# Patient Record
Sex: FEMALE | Race: WHITE | NOT HISPANIC OR LATINO | Employment: OTHER | URBAN - METROPOLITAN AREA
[De-identification: names, ages, dates, MRNs, and addresses within clinical notes are randomized per-mention and may not be internally consistent; named-entity substitution may affect disease eponyms.]

---

## 2017-01-26 ENCOUNTER — TELEPHONE (OUTPATIENT)
Dept: PULMONOLOGY | Facility: HOSPICE | Age: 77
End: 2017-01-26

## 2017-01-26 ENCOUNTER — OFFICE VISIT (OUTPATIENT)
Dept: PULMONOLOGY | Facility: HOSPICE | Age: 77
End: 2017-01-26
Payer: MEDICARE

## 2017-01-26 VITALS
OXYGEN SATURATION: 93 % | DIASTOLIC BLOOD PRESSURE: 78 MMHG | BODY MASS INDEX: 34.93 KG/M2 | RESPIRATION RATE: 16 BRPM | TEMPERATURE: 98.4 F | SYSTOLIC BLOOD PRESSURE: 122 MMHG | WEIGHT: 185 LBS | HEART RATE: 82 BPM | HEIGHT: 61 IN

## 2017-01-26 DIAGNOSIS — G47.33 OSA (OBSTRUCTIVE SLEEP APNEA): ICD-10-CM

## 2017-01-26 PROCEDURE — 1101F PT FALLS ASSESS-DOCD LE1/YR: CPT | Mod: 8P | Performed by: NURSE PRACTITIONER

## 2017-01-26 PROCEDURE — 3017F COLORECTAL CA SCREEN DOC REV: CPT | Mod: 8P | Performed by: NURSE PRACTITIONER

## 2017-01-26 PROCEDURE — G8432 DEP SCR NOT DOC, RNG: HCPCS | Performed by: NURSE PRACTITIONER

## 2017-01-26 PROCEDURE — G8484 FLU IMMUNIZE NO ADMIN: HCPCS | Performed by: NURSE PRACTITIONER

## 2017-01-26 PROCEDURE — G8419 CALC BMI OUT NRM PARAM NOF/U: HCPCS | Performed by: NURSE PRACTITIONER

## 2017-01-26 PROCEDURE — 4040F PNEUMOC VAC/ADMIN/RCVD: CPT | Mod: 8P | Performed by: NURSE PRACTITIONER

## 2017-01-26 PROCEDURE — 99213 OFFICE O/P EST LOW 20 MIN: CPT | Performed by: NURSE PRACTITIONER

## 2017-01-26 PROCEDURE — 3014F SCREEN MAMMO DOC REV: CPT | Performed by: NURSE PRACTITIONER

## 2017-01-26 PROCEDURE — 1036F TOBACCO NON-USER: CPT | Performed by: NURSE PRACTITIONER

## 2017-01-26 RX ORDER — AMOXICILLIN AND CLAVULANATE POTASSIUM 875; 125 MG/1; MG/1
TABLET, FILM COATED ORAL
COMMUNITY
Start: 2017-01-23 | End: 2017-04-25

## 2017-01-26 NOTE — PATIENT INSTRUCTIONS
1. Continue CPAP 15 cm.  2. Clean equipment weekly and replace applies as allowed by insurance.

## 2017-01-26 NOTE — PROGRESS NOTES
Chief Complaint   Patient presents with   • Follow-Up     1 year chk up         HPI:  This is a 76 y.o. female with a history of obstructive sleep apnea.  Polysomnogram indicates AHI 23.6 and minimum saturation 69%. The patient is compliant with CPAP 15 cm. Compliance data 12/27-1/25/17 indicates 100% compliance for 7.5 hours each night. The AHI has been reduced to 0.9. She has minimal leakage. She feels her mask fits well and has no problems tolerating her nasal pillows. She is well rested when she wakes up she generally sleeps through the night. She's recently had low back surgery and right hip replacement and has some pain issues that disturbs her sleep.    Past Medical History   Diagnosis Date   • Hypertension    • Stroke (CMS-HCC)      x2 1994   • Arthritis      hands   • Unspecified urinary incontinence    • Unspecified hemorrhagic conditions (CMS-HCC)      nos-bleeds   • Other specified disorder of intestines      constipation   • High cholesterol    • Sleep apnea      uses cpap   • Snoring    • Constipation 7-2015   • Bronchitis    • Obesity    • Pneumonia    • Post-nasal drip    • Restless leg syndrome    • Urinary bladder disorder    • Tuberculosis      skin test (+), cxr (-)   • Pain 06-09-14     right knee, 6/10   • Pain      l shoulder   • Back pain    • Pain 12/5/16     right arm and shoulder   • DARIN (obstructive sleep apnea) 1/26/2017     AHI 23.6, minimum saturation 69%, on CPAP 15 cm.       Past Surgical History   Procedure Laterality Date   • Hysterectomy, total abdominal  1977   • Shoulder arthroplasty total Left 8/11/2015     Procedure: SHOULDER ARTHROPLASTY REVERSE TOTAL;  Surgeon: Yoshi Gamble M.D.;  Location: SURGERY [facility];  Service:    • Tonsillectomy     • Lumbar fusion posterior  9/26/2016     Procedure: LUMBAR FUSION POSTERIOR L2-S1 onlay ;  Surgeon: Florentino Pike M.D.;  Location: SURGERY [facility];  Service:    • Lumbar laminectomy diskectomy N/A 9/26/2016      "Procedure: LUMBAR LAMINECTOMY DISKECTOMY L2-S1;  Surgeon: Florentino Pike M.D.;  Location: SURGERY [facility];  Service:    • Other orthopedic surgery  1993     back and neck surgery   • Knee arthroplasty total  6/23/2014     Performed by Earle Jacobsen M.D. at SURGERY [facility]   • Carpal tunnel release Bilateral 1993   • Finger orif  as child   • Other orthopedic surgery  2015     shoulder   • Hip arthroplasty total Right 12/12/2016     Procedure: HIP ARTHROPLASTY TOTAL;  Surgeon: Earle Jacobsen M.D.;  Location: SURGERY [facility];  Service:        Social History   Substance Use Topics   • Smoking status: Never Smoker    • Smokeless tobacco: Never Used   • Alcohol Use: No      Comment: 1/month       ROS:   Constitutional: Denies fevers, chills, sweats, fatigue, and weight loss.  Eyes: Glasses.  Ears/nose/mouth/throat: Denies injury.  Cardiovascular: Denies chest pain, tightness.  Respiratory: Denies shortness of breath, cough, sputum, wheezing, hemoptysis.  GI: Denies heartburn, difficulty swallowing, nausea, and vomiting.  Neurological: Denies frequent headaches, dizziness, weakness.  Sleep: See history of present illness.    Vitals:  Filed Vitals:    01/26/17 0809   Height: 1.549 m (5' 0.98\")   Weight: 83.915 kg (185 lb)   Weight % change since last entry.: 0 %   BP: 122/78   Pulse: 82   BMI (Calculated): 34.97   Resp: 16   Temp: 36.9 °C (98.4 °F)     Allergies:  Sulfa drugs; Benicar; Codeine; Demerol; Other drug; Percocet; Statins; Valium; and Vicodin    Medications.  Current Outpatient Prescriptions   Medication Sig Dispense Refill   • fenofibrate (TRICOR) 145 MG Tab Take 145 mg by mouth every day.     • diltiazem CD (CARDIZEM CD) 240 MG CP24 Take 240 mg by mouth every bedtime.     • Non Formulary Request 1 Package. Indications: VITALIZER GOLD-MULTI VITAMIN     • acetaminophen (TYLENOL) 325 MG TABS Take 650 mg by mouth every four hours as needed.     • NON SPECIFIED Take 7 Caps by " "mouth every bedtime. \"Herb-Lax\"   Indications: constipation     • irbesartan (AVAPRO) 150 MG TABS Take 150 mg by mouth every morning. Indications: High Blood Pressure     • amoxicillin-clavulanate (AUGMENTIN) 875-125 MG Tab      • cyclobenzaprine (FLEXERIL) 5 MG tablet Take 1-2 Tabs by mouth 3 times a day as needed for Muscle Spasms. (Patient not taking: Reported on 1/26/2017) 45 Tab 0     No current facility-administered medications for this visit.       PHYSICAL EXAM:  Appearance: Well-developed, well-nourished, no acute distress.  Eyes. PERRL.  Hearing: Grossly intact.  Oropharynx: Tongue normal, posterior pharynx without erythema or exudate.  Respiratory effort: No intercostal retractions or use of accessory muscles.  Lung auscultation: No crackles, wheezing.  Heart auscultation: No murmur, gallop, or rub. Regular rate and rhythm.  Extremities: No cyanosis or edema.  Gait and Station: Normal  Orientation: Oriented to time, place, and person.    Assessment:  1. DARIN (obstructive sleep apnea)  DME MASK AND SUPPLIES         Plan:  1. Continue CPAP 15 cm.  2. Clean equipment weekly and replace applies as allowed by insurance.     Return in about 1 year (around 1/26/2018) for With KUSHAL Fisher.      "

## 2017-01-26 NOTE — TELEPHONE ENCOUNTER
"1. Caller Name: Irina                                          Call Back Number: [phone] (home)       Patient approves a detailed voicemail message: N\A    VOICE MESSAGE: Pt stated she would like chart updated. Patient stated she is not allergic to Sulfa Drugs and in allergies it states \" pain medication.\" Patient stated can take Morphine. Spoke to patient advised it is documented in chart. Pt aware   "

## 2017-01-26 NOTE — MR AVS SNAPSHOT
"        Irina Landa Layton   2017 8:00 AM   Office Visit   MRN: [id number]    Department:  [facility]   Dept Phone:  [phone]    Description:  Female : 1940   Provider:  MELISSA Miguel           Reason for Visit     Follow-Up 1 year chk up      Allergies as of 2017     Allergen Noted Reactions    Sulfa Drugs 2016   Anaphylaxis    blisters    Benicar [Olmesartan] 2014       blisters    Codeine 05/10/2013   Vomiting    Demerol 05/10/2013   Vomiting    Other Drug 2014   Vomiting    \"all pain meds\"    Percocet [Oxycodone-Acetaminophen] 2015   Vomiting    Statins [Hmg-Coa-R Inhibitors] 05/10/2013       Muscle cramping    Valium 2014   Vomiting    Vicodin [Hydrocodone-Acetaminophen] 05/10/2013   Vomiting      You were diagnosed with     DARIN (obstructive sleep apnea)   [182787]         Vital Signs     Blood Pressure Pulse Temperature Respirations Height Weight    122/78 mmHg 82 36.9 °C (98.4 °F) 16 1.549 m (5' 0.98\") 83.915 kg (185 lb)    Body Mass Index Oxygen Saturation Smoking Status             34.97 kg/m2 93% Never Smoker          Basic Information     Date Of Birth Sex Race Ethnicity Preferred Language    1940 Female White Non- English      Problem List              ICD-10-CM Priority Class Noted - Resolved    TIA (transient ischemic attack) G45.9   5/10/2013 - Present    Primary localized osteoarthrosis, lower leg M17.10   2014 - Present    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   2015 - Present    Spinal stenosis M48.00   2016 - Present    Osteoarthritis of right hip M16.11   2016 - Present    DARIN (obstructive sleep apnea) G47.33   2017 - Present      Health Maintenance        Date Due Completion Dates    IMM DTaP/Tdap/Td Vaccine (1 - Tdap) 1961 ---    PAP SMEAR 1963 ---    COLONOSCOPY 1992 ---    IMM ZOSTER VACCINE 2002 ---    BONE DENSITY 2007 ---    IMM PNEUMOCOCCAL 65+ (ADULT) " "LOW/MEDIUM RISK SERIES (1 of 2 - PCV13) 12/8/2007 ---    MAMMOGRAM 5/3/2012 5/3/2011, 4/20/2010, 4/20/2010, 4/16/2009, 4/16/2009, 5/16/2007, 5/16/2007    IMM INFLUENZA (1) 9/1/2016 ---            Current Immunizations     No immunizations on file.      Below and/or attached are the medications your provider expects you to take. Review all of your home medications and newly ordered medications with your provider and/or pharmacist. Follow medication instructions as directed by your provider and/or pharmacist. Please keep your medication list with you and share with your provider. Update the information when medications are discontinued, doses are changed, or new medications (including over-the-counter products) are added; and carry medication information at all times in the event of emergency situations     Allergies:  SULFA DRUGS - Anaphylaxis     BENICAR - (reactions not documented)     CODEINE - Vomiting     DEMEROL - Vomiting     OTHER DRUG - Vomiting     PERCOCET - Vomiting     STATINS - (reactions not documented)     VALIUM - Vomiting     VICODIN - Vomiting               Medications  Valid as of: January 26, 2017 -  8:35 AM    Generic Name Brand Name Tablet Size Instructions for use    Acetaminophen (Tab) TYLENOL 325 MG Take 650 mg by mouth every four hours as needed.        Amoxicillin-Pot Clavulanate (Tab) AUGMENTIN 875-125 MG         Cyclobenzaprine HCl (Tab) FLEXERIL 5 MG Take 1-2 Tabs by mouth 3 times a day as needed for Muscle Spasms.        DiltiaZEM HCl Coated Beads (CAPSULE SR 24 HR) CARDIZEM  MG Take 240 mg by mouth every bedtime.        Fenofibrate (Tab) TRICOR 145 MG Take 145 mg by mouth every day.        Irbesartan (Tab) AVAPRO 150 MG Take 150 mg by mouth every morning. Indications: High Blood Pressure        Non Formulary Request Non Formulary Request  1 Package. Indications: VITALIZER GOLD-MULTI VITAMIN        NON SPECIFIED   Take 7 Caps by mouth every bedtime. \"Herb-Lax\"   Indications: " constipation        .                 Medicines prescribed today were sent to:     [id number] - [place], [place] - [address]    [address] [place] [address]    Phone: [phone] Fax: [phone]    Open 24 Hours?: No      Medication refill instructions:       If your prescription bottle indicates you have medication refills left, it is not necessary to call your provider’s office. Please contact your pharmacy and they will refill your medication.    If your prescription bottle indicates you do not have any refills left, you may request refills at any time through one of the following ways: The online Fabrus system (except Urgent Care), by calling your provider’s office, or by asking your pharmacy to contact your provider’s office with a refill request. Medication refills are processed only during regular business hours and may not be available until the next business day. Your provider may request additional information or to have a follow-up visit with you prior to refilling your medication.   *Please Note: Medication refills are assigned a new Rx number when refilled electronically. Your pharmacy may indicate that no refills were authorized even though a new prescription for the same medication is available at the pharmacy. Please request the medicine by name with the pharmacy before contacting your provider for a refill.        Instructions    1. Continue CPAP 15 cm.  2. Clean equipment weekly and replace applies as allowed by insurance.               Fabrus Access Code: [email or web address]  Expires: 2/2/2017  8:57 AM    Fabrus  A secure, online tool to manage your health information     [employer]’s Fabrus® is a secure, online tool that connects you to your personalized health information from the privacy of your home -- day or night - making it very easy for you to manage your healthcare. Once the activation process is completed, you can even access your medical information using the Fabrus dianne,  which is available for free in the Apple Rigo store or Google Play store.     Create provides the following levels of access (as shown below):   My Chart Features   [facility] Primary Care Doctor [facility]  Specialists [facility]  Urgent  Care Non-[facility]  Primary Care  Doctor   Email your healthcare team securely and privately 24/7 X X X    Manage appointments: schedule your next appointment; view details of past/upcoming appointments X      Request prescription refills. X      View recent personal medical records, including lab and immunizations X X X X   View health record, including health history, allergies, medications X X X X   Read reports about your outpatient visits, procedures, consult and ER notes X X X X   See your discharge summary, which is a recap of your hospital and/or ER visit that includes your diagnosis, lab results, and care plan. X X       How to register for Create:  1. Go to  [email or web address].  2. Click on the Sign Up Now box, which takes you to the New Member Sign Up page. You will need to provide the following information:  a. Enter your Create Access Code exactly as it appears at the top of this page. (You will not need to use this code after you’ve completed the sign-up process. If you do not sign up before the expiration date, you must request a new code.)   b. Enter your date of birth.   c. Enter your home email address.   d. Click Submit, and follow the next screen’s instructions.  3. Create a Create ID. This will be your Create login ID and cannot be changed, so think of one that is secure and easy to remember.  4. Create a Create password. You can change your password at any time.  5. Enter your Password Reset Question and Answer. This can be used at a later time if you forget your password.   6. Enter your e-mail address. This allows you to receive e-mail notifications when new information is available in Create.  7. Click Sign Up. You can now view your health  information.    For assistance activating your [employer] account, call [phone]

## 2017-04-25 DIAGNOSIS — Z01.812 PRE-OPERATIVE LABORATORY EXAMINATION: ICD-10-CM

## 2017-04-25 DIAGNOSIS — Z01.810 PRE-OPERATIVE CARDIOVASCULAR EXAMINATION: ICD-10-CM

## 2017-04-25 LAB
AMORPH CRY #/AREA URNS HPF: PRESENT /HPF
ANION GAP SERPL CALC-SCNC: 6 MMOL/L (ref 0–11.9)
APPEARANCE UR: ABNORMAL
BILIRUB UR QL STRIP.AUTO: NEGATIVE
BUN SERPL-MCNC: 24 MG/DL (ref 8–22)
CALCIUM SERPL-MCNC: 9.4 MG/DL (ref 8.5–10.5)
CHLORIDE SERPL-SCNC: 106 MMOL/L (ref 96–112)
CO2 SERPL-SCNC: 27 MMOL/L (ref 20–33)
COLOR UR: YELLOW
CREAT SERPL-MCNC: 0.89 MG/DL (ref 0.5–1.4)
CULTURE IF INDICATED INDCX: NO UA CULTURE
EPI CELLS #/AREA URNS HPF: NORMAL /HPF
ERYTHROCYTE [DISTWIDTH] IN BLOOD BY AUTOMATED COUNT: 59 FL (ref 35.9–50)
GFR SERPL CREATININE-BSD FRML MDRD: >60 ML/MIN/1.73 M 2
GLUCOSE SERPL-MCNC: 77 MG/DL (ref 65–99)
GLUCOSE UR STRIP.AUTO-MCNC: NEGATIVE MG/DL
HCT VFR BLD AUTO: 39.7 % (ref 37–47)
HGB BLD-MCNC: 12.3 G/DL (ref 12–16)
KETONES UR STRIP.AUTO-MCNC: NEGATIVE MG/DL
LEUKOCYTE ESTERASE UR QL STRIP.AUTO: NEGATIVE
MCH RBC QN AUTO: 28.1 PG (ref 27–33)
MCHC RBC AUTO-ENTMCNC: 31 G/DL (ref 33.6–35)
MCV RBC AUTO: 90.6 FL (ref 81.4–97.8)
MICRO URNS: ABNORMAL
NITRITE UR QL STRIP.AUTO: NEGATIVE
PH UR STRIP.AUTO: 7 [PH]
PLATELET # BLD AUTO: 221 K/UL (ref 164–446)
PMV BLD AUTO: 12.4 FL (ref 9–12.9)
POTASSIUM SERPL-SCNC: 4.2 MMOL/L (ref 3.6–5.5)
PROT UR QL STRIP: NEGATIVE MG/DL
RBC # BLD AUTO: 4.38 M/UL (ref 4.2–5.4)
RBC # URNS HPF: NORMAL /HPF
RBC UR QL AUTO: NEGATIVE
SCCMEC + MECA PNL NOSE NAA+PROBE: NEGATIVE
SCCMEC + MECA PNL NOSE NAA+PROBE: NEGATIVE
SODIUM SERPL-SCNC: 139 MMOL/L (ref 135–145)
SP GR UR STRIP.AUTO: 1.02
WBC # BLD AUTO: 6.4 K/UL (ref 4.8–10.8)

## 2017-04-25 PROCEDURE — 87640 STAPH A DNA AMP PROBE: CPT | Mod: 59

## 2017-04-25 PROCEDURE — 85027 COMPLETE CBC AUTOMATED: CPT

## 2017-04-25 PROCEDURE — 87641 MR-STAPH DNA AMP PROBE: CPT

## 2017-04-25 PROCEDURE — 36415 COLL VENOUS BLD VENIPUNCTURE: CPT

## 2017-04-25 PROCEDURE — 80048 BASIC METABOLIC PNL TOTAL CA: CPT

## 2017-04-25 PROCEDURE — 81001 URINALYSIS AUTO W/SCOPE: CPT

## 2017-04-25 RX ORDER — IBUPROFEN 200 MG
400 TABLET ORAL EVERY 6 HOURS PRN
Status: ON HOLD | COMMUNITY
End: 2017-08-09

## 2017-04-25 RX ORDER — FEXOFENADINE HCL 60 MG/1
60 TABLET, FILM COATED ORAL 2 TIMES DAILY PRN
COMMUNITY
End: 2017-07-25

## 2017-04-25 RX ORDER — AMOXICILLIN AND CLAVULANATE POTASSIUM 875; 125 MG/1; MG/1
TABLET, FILM COATED ORAL PRN
Status: ON HOLD | COMMUNITY
End: 2017-08-08

## 2017-04-25 NOTE — OR NURSING
Pre-admit appointment completed. Pt instructed to continue regularly prescribed medications through the day before surgery. Pt instructed to take the following medications the day of surgery with a sip of water, per anesthesia protocol; tylenol if needed.    Pt will bring CPAP and walker DOS.

## 2017-04-25 NOTE — DISCHARGE PLANNING
I met with IRINA, : 1940, this afternoon to discuss her upcoming Left Total Hip Arthroplasty scheduled for 2017 with Dr. Jacobsen. The patient had a right knee replaced in , a left reverse shoulder replacement in , and a right hip replacement in 2016. Her insurance is [employer], and the patient states that she has all the equipment she needs. Her home is a single story with ramp entry and no steps within the home. She has a tub with chair, and a riser for her toilet. She has a good friend that will be with he during recovery, and will drive her to any appointment. Irina will have home [facility] for her physical therapy. She took a Total Joint class before her 2016 right hip replacement.    I answered her questions, and I reminded her to complete the Surgery Preparation Checklist and to bring it with her on the day of surgery.

## 2017-04-26 LAB — EKG IMPRESSION: NORMAL

## 2017-05-08 ENCOUNTER — APPOINTMENT (OUTPATIENT)
Dept: RADIOLOGY | Facility: MEDICAL CENTER | Age: 77
DRG: 470 | End: 2017-05-08
Attending: ORTHOPAEDIC SURGERY
Payer: MEDICARE

## 2017-05-08 ENCOUNTER — HOSPITAL ENCOUNTER (INPATIENT)
Facility: MEDICAL CENTER | Age: 77
LOS: 1 days | DRG: 470 | End: 2017-05-09
Attending: ORTHOPAEDIC SURGERY | Admitting: ORTHOPAEDIC SURGERY
Payer: MEDICARE

## 2017-05-08 DIAGNOSIS — M16.12 PRIMARY LOCALIZED OSTEOARTHROSIS OF THE HIP, LEFT: ICD-10-CM

## 2017-05-08 PROBLEM — M16.10 PRIMARY LOCALIZED OSTEOARTHROSIS OF THE HIP: Status: ACTIVE | Noted: 2017-05-08

## 2017-05-08 PROCEDURE — A9270 NON-COVERED ITEM OR SERVICE: HCPCS | Performed by: ORTHOPAEDIC SURGERY

## 2017-05-08 PROCEDURE — 500087 HCHG BLADE, RECIPROCATING: Performed by: ORTHOPAEDIC SURGERY

## 2017-05-08 PROCEDURE — A6223 GAUZE >16<=48 NO W/SAL W/O B: HCPCS | Performed by: ORTHOPAEDIC SURGERY

## 2017-05-08 PROCEDURE — 501486 HCHG STRYKER IRRIG SET HC W/TUBING: Performed by: ORTHOPAEDIC SURGERY

## 2017-05-08 PROCEDURE — 94760 N-INVAS EAR/PLS OXIMETRY 1: CPT

## 2017-05-08 PROCEDURE — 501838 HCHG SUTURE GENERAL: Performed by: ORTHOPAEDIC SURGERY

## 2017-05-08 PROCEDURE — 500864 HCHG NEEDLE, SPINAL 18G: Performed by: ORTHOPAEDIC SURGERY

## 2017-05-08 PROCEDURE — 72170 X-RAY EXAM OF PELVIS: CPT

## 2017-05-08 PROCEDURE — 160009 HCHG ANES TIME/MIN: Performed by: ORTHOPAEDIC SURGERY

## 2017-05-08 PROCEDURE — 94660 CPAP INITIATION&MGMT: CPT

## 2017-05-08 PROCEDURE — 502578 HCHG PACK, TOTAL HIP: Performed by: ORTHOPAEDIC SURGERY

## 2017-05-08 PROCEDURE — 700101 HCHG RX REV CODE 250

## 2017-05-08 PROCEDURE — 700111 HCHG RX REV CODE 636 W/ 250 OVERRIDE (IP): Performed by: ORTHOPAEDIC SURGERY

## 2017-05-08 PROCEDURE — 502000 HCHG MISC OR IMPLANTS RC 0278: Performed by: ORTHOPAEDIC SURGERY

## 2017-05-08 PROCEDURE — 97162 PT EVAL MOD COMPLEX 30 MIN: CPT

## 2017-05-08 PROCEDURE — 160035 HCHG PACU - 1ST 60 MINS PHASE I: Performed by: ORTHOPAEDIC SURGERY

## 2017-05-08 PROCEDURE — 160031 HCHG SURGERY MINUTES - 1ST 30 MINS LEVEL 5: Performed by: ORTHOPAEDIC SURGERY

## 2017-05-08 PROCEDURE — 502240 HCHG MISC OR SUPPLY RC 0272: Performed by: ORTHOPAEDIC SURGERY

## 2017-05-08 PROCEDURE — 700102 HCHG RX REV CODE 250 W/ 637 OVERRIDE(OP): Performed by: ORTHOPAEDIC SURGERY

## 2017-05-08 PROCEDURE — 160048 HCHG OR STATISTICAL LEVEL 1-5: Performed by: ORTHOPAEDIC SURGERY

## 2017-05-08 PROCEDURE — 700105 HCHG RX REV CODE 258: Performed by: ORTHOPAEDIC SURGERY

## 2017-05-08 PROCEDURE — G8979 MOBILITY GOAL STATUS: HCPCS | Mod: CI

## 2017-05-08 PROCEDURE — 0SRB0JZ REPLACEMENT OF LEFT HIP JOINT WITH SYNTHETIC SUBSTITUTE, OPEN APPROACH: ICD-10-PCS | Performed by: ORTHOPAEDIC SURGERY

## 2017-05-08 PROCEDURE — L1830 KO IMMOB CANVAS LONG PRE OTS: HCPCS | Performed by: ORTHOPAEDIC SURGERY

## 2017-05-08 PROCEDURE — G8978 MOBILITY CURRENT STATUS: HCPCS | Mod: CK

## 2017-05-08 PROCEDURE — 97530 THERAPEUTIC ACTIVITIES: CPT

## 2017-05-08 PROCEDURE — 160042 HCHG SURGERY MINUTES - EA ADDL 1 MIN LEVEL 5: Performed by: ORTHOPAEDIC SURGERY

## 2017-05-08 PROCEDURE — 700111 HCHG RX REV CODE 636 W/ 250 OVERRIDE (IP)

## 2017-05-08 PROCEDURE — 160002 HCHG RECOVERY MINUTES (STAT): Performed by: ORTHOPAEDIC SURGERY

## 2017-05-08 PROCEDURE — 160036 HCHG PACU - EA ADDL 30 MINS PHASE I: Performed by: ORTHOPAEDIC SURGERY

## 2017-05-08 PROCEDURE — 501487 HCHG STRYKER TIP: Performed by: ORTHOPAEDIC SURGERY

## 2017-05-08 PROCEDURE — 700112 HCHG RX REV CODE 229: Performed by: ORTHOPAEDIC SURGERY

## 2017-05-08 PROCEDURE — 501480 HCHG STOCKINETTE: Performed by: ORTHOPAEDIC SURGERY

## 2017-05-08 PROCEDURE — A4450 NON-WATERPROOF TAPE: HCPCS | Performed by: ORTHOPAEDIC SURGERY

## 2017-05-08 PROCEDURE — 770006 HCHG ROOM/CARE - MED/SURG/GYN SEMI*

## 2017-05-08 DEVICE — IMPLANTABLE DEVICE: Type: IMPLANTABLE DEVICE | Site: HIP | Status: FUNCTIONAL

## 2017-05-08 RX ORDER — IRBESARTAN 150 MG/1
150 TABLET ORAL EVERY MORNING
Status: DISCONTINUED | OUTPATIENT
Start: 2017-05-08 | End: 2017-05-09 | Stop reason: HOSPADM

## 2017-05-08 RX ORDER — DIPHENHYDRAMINE HYDROCHLORIDE 50 MG/ML
25 INJECTION INTRAMUSCULAR; INTRAVENOUS EVERY 6 HOURS PRN
Status: DISCONTINUED | OUTPATIENT
Start: 2017-05-08 | End: 2017-05-09 | Stop reason: HOSPADM

## 2017-05-08 RX ORDER — AMOXICILLIN 250 MG
1 CAPSULE ORAL
Status: DISCONTINUED | OUTPATIENT
Start: 2017-05-08 | End: 2017-05-09 | Stop reason: HOSPADM

## 2017-05-08 RX ORDER — SCOLOPAMINE TRANSDERMAL SYSTEM 1 MG/1
1 PATCH, EXTENDED RELEASE TRANSDERMAL
Status: DISCONTINUED | OUTPATIENT
Start: 2017-05-08 | End: 2017-05-09 | Stop reason: HOSPADM

## 2017-05-08 RX ORDER — KETOROLAC TROMETHAMINE 30 MG/ML
INJECTION, SOLUTION INTRAMUSCULAR; INTRAVENOUS
Status: DISCONTINUED | OUTPATIENT
Start: 2017-05-08 | End: 2017-05-08 | Stop reason: HOSPADM

## 2017-05-08 RX ORDER — HALOPERIDOL 5 MG/ML
1 INJECTION INTRAMUSCULAR EVERY 6 HOURS PRN
Status: DISCONTINUED | OUTPATIENT
Start: 2017-05-08 | End: 2017-05-09 | Stop reason: HOSPADM

## 2017-05-08 RX ORDER — LORATADINE 10 MG/1
10 TABLET ORAL DAILY
COMMUNITY
End: 2017-07-25

## 2017-05-08 RX ORDER — DILTIAZEM HYDROCHLORIDE 120 MG/1
240 CAPSULE, COATED, EXTENDED RELEASE ORAL
Status: DISCONTINUED | OUTPATIENT
Start: 2017-05-08 | End: 2017-05-09 | Stop reason: HOSPADM

## 2017-05-08 RX ORDER — FENOFIBRATE 145 MG/1
145 TABLET, COATED ORAL DAILY
Status: DISCONTINUED | OUTPATIENT
Start: 2017-05-08 | End: 2017-05-08

## 2017-05-08 RX ORDER — FENOFIBRATE 134 MG/1
134 CAPSULE ORAL DAILY
Status: DISCONTINUED | OUTPATIENT
Start: 2017-05-08 | End: 2017-05-09 | Stop reason: HOSPADM

## 2017-05-08 RX ORDER — DEXTROSE, SODIUM CHLORIDE, SODIUM LACTATE, POTASSIUM CHLORIDE, AND CALCIUM CHLORIDE 5; .6; .31; .03; .02 G/100ML; G/100ML; G/100ML; G/100ML; G/100ML
INJECTION, SOLUTION INTRAVENOUS CONTINUOUS
Status: DISCONTINUED | OUTPATIENT
Start: 2017-05-08 | End: 2017-05-09 | Stop reason: HOSPADM

## 2017-05-08 RX ORDER — ONDANSETRON 2 MG/ML
4 INJECTION INTRAMUSCULAR; INTRAVENOUS EVERY 4 HOURS PRN
Status: DISCONTINUED | OUTPATIENT
Start: 2017-05-08 | End: 2017-05-09 | Stop reason: HOSPADM

## 2017-05-08 RX ORDER — LORATADINE 10 MG/1
10 TABLET ORAL DAILY
Status: DISCONTINUED | OUTPATIENT
Start: 2017-05-08 | End: 2017-05-09 | Stop reason: HOSPADM

## 2017-05-08 RX ORDER — POLYETHYLENE GLYCOL 3350 17 G/17G
1 POWDER, FOR SOLUTION ORAL 2 TIMES DAILY PRN
Status: DISCONTINUED | OUTPATIENT
Start: 2017-05-08 | End: 2017-05-09 | Stop reason: HOSPADM

## 2017-05-08 RX ORDER — DEXAMETHASONE SODIUM PHOSPHATE 4 MG/ML
4 INJECTION, SOLUTION INTRA-ARTICULAR; INTRALESIONAL; INTRAMUSCULAR; INTRAVENOUS; SOFT TISSUE
Status: DISCONTINUED | OUTPATIENT
Start: 2017-05-08 | End: 2017-05-09 | Stop reason: HOSPADM

## 2017-05-08 RX ORDER — TRANEXAMIC ACID 100 MG/ML
INJECTION, SOLUTION INTRAVENOUS
Status: DISPENSED
Start: 2017-05-08 | End: 2017-05-08

## 2017-05-08 RX ORDER — MEPERIDINE HYDROCHLORIDE 25 MG/ML
INJECTION INTRAMUSCULAR; INTRAVENOUS; SUBCUTANEOUS
Status: COMPLETED
Start: 2017-05-08 | End: 2017-05-08

## 2017-05-08 RX ORDER — MORPHINE SULFATE 4 MG/ML
4 INJECTION, SOLUTION INTRAMUSCULAR; INTRAVENOUS
Status: DISCONTINUED | OUTPATIENT
Start: 2017-05-08 | End: 2017-05-09 | Stop reason: HOSPADM

## 2017-05-08 RX ORDER — AMOXICILLIN 250 MG
1 CAPSULE ORAL NIGHTLY
Status: DISCONTINUED | OUTPATIENT
Start: 2017-05-08 | End: 2017-05-09 | Stop reason: HOSPADM

## 2017-05-08 RX ORDER — ENEMA 19; 7 G/133ML; G/133ML
1 ENEMA RECTAL
Status: DISCONTINUED | OUTPATIENT
Start: 2017-05-08 | End: 2017-05-09 | Stop reason: HOSPADM

## 2017-05-08 RX ORDER — SODIUM CHLORIDE, SODIUM LACTATE, POTASSIUM CHLORIDE, CALCIUM CHLORIDE 600; 310; 30; 20 MG/100ML; MG/100ML; MG/100ML; MG/100ML
INJECTION, SOLUTION INTRAVENOUS
Status: DISCONTINUED | OUTPATIENT
Start: 2017-05-08 | End: 2017-05-08

## 2017-05-08 RX ORDER — LIDOCAINE HYDROCHLORIDE 10 MG/ML
INJECTION, SOLUTION INFILTRATION; PERINEURAL
Status: COMPLETED
Start: 2017-05-08 | End: 2017-05-08

## 2017-05-08 RX ORDER — BISACODYL 10 MG
10 SUPPOSITORY, RECTAL RECTAL
Status: DISCONTINUED | OUTPATIENT
Start: 2017-05-08 | End: 2017-05-09 | Stop reason: HOSPADM

## 2017-05-08 RX ORDER — EPINEPHRINE 1 MG/ML(1)
AMPUL (ML) INJECTION
Status: DISCONTINUED | OUTPATIENT
Start: 2017-05-08 | End: 2017-05-08 | Stop reason: HOSPADM

## 2017-05-08 RX ORDER — KETOROLAC TROMETHAMINE 30 MG/ML
15 INJECTION, SOLUTION INTRAMUSCULAR; INTRAVENOUS EVERY 6 HOURS
Status: DISCONTINUED | OUTPATIENT
Start: 2017-05-08 | End: 2017-05-09 | Stop reason: HOSPADM

## 2017-05-08 RX ORDER — DOCUSATE SODIUM 100 MG/1
100 CAPSULE, LIQUID FILLED ORAL 2 TIMES DAILY
Status: DISCONTINUED | OUTPATIENT
Start: 2017-05-08 | End: 2017-05-09 | Stop reason: HOSPADM

## 2017-05-08 RX ORDER — ROPIVACAINE HYDROCHLORIDE 5 MG/ML
INJECTION, SOLUTION EPIDURAL; INFILTRATION; PERINEURAL
Status: DISCONTINUED | OUTPATIENT
Start: 2017-05-08 | End: 2017-05-08 | Stop reason: HOSPADM

## 2017-05-08 RX ORDER — ACETAMINOPHEN 500 MG
1000 TABLET ORAL EVERY 6 HOURS
Status: DISCONTINUED | OUTPATIENT
Start: 2017-05-08 | End: 2017-05-09 | Stop reason: HOSPADM

## 2017-05-08 RX ORDER — FEXOFENADINE HCL 60 MG/1
60 TABLET, FILM COATED ORAL 2 TIMES DAILY PRN
Status: DISCONTINUED | OUTPATIENT
Start: 2017-05-08 | End: 2017-05-08

## 2017-05-08 RX ADMIN — DILTIAZEM HYDROCHLORIDE 240 MG: 120 CAPSULE, EXTENDED RELEASE ORAL at 20:25

## 2017-05-08 RX ADMIN — LIDOCAINE HYDROCHLORIDE 0.2 ML: 10 INJECTION, SOLUTION INFILTRATION; PERINEURAL at 07:00

## 2017-05-08 RX ADMIN — MORPHINE SULFATE 4 MG: 4 INJECTION INTRAVENOUS at 20:27

## 2017-05-08 RX ADMIN — HYDROMORPHONE HYDROCHLORIDE 0.5 MG: 1 INJECTION, SOLUTION INTRAMUSCULAR; INTRAVENOUS; SUBCUTANEOUS at 10:15

## 2017-05-08 RX ADMIN — DOCUSATE SODIUM 100 MG: 100 CAPSULE, LIQUID FILLED ORAL at 20:24

## 2017-05-08 RX ADMIN — MEPERIDINE HYDROCHLORIDE 12.5 MG: 25 INJECTION INTRAMUSCULAR; INTRAVENOUS; SUBCUTANEOUS at 09:43

## 2017-05-08 RX ADMIN — ACETAMINOPHEN 1000 MG: 500 TABLET ORAL at 17:57

## 2017-05-08 RX ADMIN — KETOROLAC TROMETHAMINE 15 MG: 30 INJECTION, SOLUTION INTRAMUSCULAR at 23:29

## 2017-05-08 RX ADMIN — KETOROLAC TROMETHAMINE 15 MG: 30 INJECTION, SOLUTION INTRAMUSCULAR at 17:57

## 2017-05-08 RX ADMIN — SODIUM CHLORIDE 2 G: 9 INJECTION, SOLUTION INTRAVENOUS at 23:29

## 2017-05-08 RX ADMIN — FENTANYL CITRATE 50 MCG: 50 INJECTION, SOLUTION INTRAMUSCULAR; INTRAVENOUS at 09:23

## 2017-05-08 RX ADMIN — HYDROMORPHONE HYDROCHLORIDE 0.5 MG: 1 INJECTION, SOLUTION INTRAMUSCULAR; INTRAVENOUS; SUBCUTANEOUS at 10:05

## 2017-05-08 RX ADMIN — HYDROMORPHONE HYDROCHLORIDE 0.4 MG: 1 INJECTION, SOLUTION INTRAMUSCULAR; INTRAVENOUS; SUBCUTANEOUS at 09:35

## 2017-05-08 RX ADMIN — ACETAMINOPHEN 1000 MG: 500 TABLET ORAL at 23:29

## 2017-05-08 RX ADMIN — ACETAMINOPHEN 1000 MG: 500 TABLET ORAL at 12:06

## 2017-05-08 RX ADMIN — SENNOSIDES AND DOCUSATE SODIUM 1 TABLET: 8.6; 5 TABLET ORAL at 20:24

## 2017-05-08 RX ADMIN — HYDROMORPHONE HYDROCHLORIDE 0.4 MG: 1 INJECTION, SOLUTION INTRAMUSCULAR; INTRAVENOUS; SUBCUTANEOUS at 09:38

## 2017-05-08 RX ADMIN — FENTANYL CITRATE 50 MCG: 50 INJECTION, SOLUTION INTRAMUSCULAR; INTRAVENOUS at 09:28

## 2017-05-08 RX ADMIN — HYDROMORPHONE HYDROCHLORIDE 0.5 MG: 1 INJECTION, SOLUTION INTRAMUSCULAR; INTRAVENOUS; SUBCUTANEOUS at 09:40

## 2017-05-08 RX ADMIN — DOCUSATE SODIUM 100 MG: 100 CAPSULE, LIQUID FILLED ORAL at 12:06

## 2017-05-08 RX ADMIN — HYDROMORPHONE HYDROCHLORIDE 0.2 MG: 1 INJECTION, SOLUTION INTRAMUSCULAR; INTRAVENOUS; SUBCUTANEOUS at 09:33

## 2017-05-08 RX ADMIN — KETOROLAC TROMETHAMINE 15 MG: 30 INJECTION, SOLUTION INTRAMUSCULAR at 12:07

## 2017-05-08 RX ADMIN — HYDROMORPHONE HYDROCHLORIDE 0.5 MG: 1 INJECTION, SOLUTION INTRAMUSCULAR; INTRAVENOUS; SUBCUTANEOUS at 09:44

## 2017-05-08 RX ADMIN — SODIUM CHLORIDE, SODIUM LACTATE, POTASSIUM CHLORIDE, CALCIUM CHLORIDE AND DEXTROSE MONOHYDRATE: 5; 600; 310; 30; 20 INJECTION, SOLUTION INTRAVENOUS at 11:48

## 2017-05-08 RX ADMIN — SODIUM CHLORIDE 2 G: 9 INJECTION, SOLUTION INTRAVENOUS at 15:05

## 2017-05-08 RX ADMIN — SODIUM CHLORIDE, SODIUM LACTATE, POTASSIUM CHLORIDE, CALCIUM CHLORIDE 1000 ML: 600; 310; 30; 20 INJECTION, SOLUTION INTRAVENOUS at 07:00

## 2017-05-08 ASSESSMENT — PAIN SCALES - GENERAL
PAINLEVEL_OUTOF10: 3
PAINLEVEL_OUTOF10: 10
PAINLEVEL_OUTOF10: 6
PAINLEVEL_OUTOF10: 10
PAINLEVEL_OUTOF10: 10
PAINLEVEL_OUTOF10: 2
PAINLEVEL_OUTOF10: 10
PAINLEVEL_OUTOF10: 10
PAINLEVEL_OUTOF10: 2
PAINLEVEL_OUTOF10: 2
PAINLEVEL_OUTOF10: 5
PAINLEVEL_OUTOF10: 3
PAINLEVEL_OUTOF10: 1

## 2017-05-08 ASSESSMENT — LIFESTYLE VARIABLES
DO YOU DRINK ALCOHOL: NO
EVER_SMOKED: NEVER
EVER_SMOKED: NEVER

## 2017-05-08 ASSESSMENT — GAIT ASSESSMENTS
DEVIATION: STEP TO;ANTALGIC
DISTANCE (FEET): 12
GAIT LEVEL OF ASSIST: CONTACT GUARD ASSIST
ASSISTIVE DEVICE: FRONT WHEEL WALKER

## 2017-05-08 NOTE — PROGRESS NOTES
Pt from PACU at 1145. Awake. No signs of distress noted. VSS. Pt states pain currently under control. No nausea. Discussed diet and tray ordered. Will monitor.

## 2017-05-08 NOTE — OR NURSING
0613 PT TO PRE OP TO ASSUME CARE. 0715Patient allergies and NPO status verified, home medication reconciliation completed and belongings secured. Patient verbalizes understanding of pain scale, expected course of stay and plan of care. Surgical site verified with patient. IV access established. Sequentials placed on R leg

## 2017-05-08 NOTE — FLOWSHEET NOTE
05/08/17 1325   Interdisciplinary Plan of Care-Goals (Indications)   Hyperinflation Protocol Indications Pre or Post-op Abdominal, Thoracic or Orthopedic Surgery   Interdisciplinary Plan of Care-Outcomes    Hyperinflation Protocol Goals/Outcome Greater Than 60% of Predicted I.S. Volume x 24 hrs   Education   Education Yes - Pt. / Family has been Instructed in use of Respiratory Equipment   Incentive Spirometry Group   Incentive Spirometry Instruction Yes   Breathing Exercises Yes   Incentive Spirometer Volume 1500 mL   Incentive Spirometer Date Last Changed 05/08/17   Respiratory WDL   Respiratory (WDL) X   Chest Exam   Respiration 16   Heart Rate (Monitored) 72   Breath Sounds   RML Breath Sounds Diminished   RLL Breath Sounds Diminished   LLL Breath Sounds Diminished   Secretions   Cough Dry;Non Productive;Strong   Oximetry   #Pulse Oximetry (Single Determination) Yes   Continuous Oximetry Yes   Oxygen   Home O2 Use Prior To Admission? No   Pulse Oximetry 98 %   O2 (LPM) 2   O2 Daily Delivery Respiratory  Nasal Cannula

## 2017-05-08 NOTE — IP AVS SNAPSHOT
" <p align=\"LEFT\"><IMG SRC=\"//[email or web address]\" alt=\"Image\" WIDTH=\"50%\" HEIGHT=\"200\" BORDER=\"\"></p>                   Name:Irina Traylor  Medical Record Number:[id number]  CSN: [id number]    YOB: 1940   Age: 76 y.o.  Sex: female  HT:1.549 m (5' 1\") WT: 76.3 kg (168 lb 3.4 oz)          Admit Date: 5/8/2017     Discharge Date:   Today's Date: 5/9/2017  Attending Doctor:  Earle Jacobsen M.D.                  Allergies:  Benicar; Codeine; Demerol; Other drug; Percocet; Statins; Valium; and Vicodin          Your appointments     Jun 22, 2017 10:15 AM   Adult Draw/Collection with [facility]   [facility] (--)    [address] [facility] [address]   [phone]              Follow-up Information     1. Follow up with Earle Jacobsen M.D. In 2 weeks.    Specialty:  Orthopaedics    Contact information    [address]  [address]  [facility] [address] [phone]           Medication List      Take these Medications        Instructions    acetaminophen 325 MG Tabs   Commonly known as:  TYLENOL    Take 650 mg by mouth every four hours as needed.   Dose:  650 mg       amoxicillin-clavulanate 875-125 MG Tabs   Commonly known as:  AUGMENTIN    Take  by mouth as needed (For dental procedures).       diltiazem  MG Cp24   Commonly known as:  CARDIZEM CD    Take 240 mg by mouth every bedtime.   Dose:  240 mg       fenofibrate 145 MG Tabs   Commonly known as:  TRICOR    Take 145 mg by mouth every day.   Dose:  145 mg       fexofenadine 60 MG Tabs   Commonly known as:  ALLEGRA    Take 60 mg by mouth 2 times a day as needed.   Dose:  60 mg       ibuprofen 200 MG Tabs   Commonly known as:  MOTRIN    Take 400 mg by mouth every 6 hours as needed.   Dose:  400 mg       irbesartan 150 MG Tabs   Commonly known as:  AVAPRO    Take 150 mg by mouth every morning. Indications: High Blood Pressure   Dose:  150 mg       loratadine 10 MG Tabs   Commonly known as:  CLARITIN    Take 10 mg by mouth " "every day. Indications: Hayfever   Dose:  10 mg       Non Formulary Request    1 Package. Indications: VITALIZER GOLD-MULTI VITAMIN   Dose:  1 Package       NON SPECIFIED    Take 7 Caps by mouth every bedtime. \"Herb-Lax\"   Indications: constipation   Dose:  7 Cap         "

## 2017-05-08 NOTE — CARE PLAN
Problem: Venous Thromboembolism (VTW)/Deep Vein Thrombosis (DVT) Prevention:  Goal: Patient will participate in Venous Thrombosis (VTE)/Deep Vein Thrombosis (DVT)Prevention Measures  Post op pt. She will begin pharmacologic DVT prevention tomorrow at 0700. SCDs on.     Problem: Knowledge Deficit  Goal: Knowledge of disease process/condition, treatment plan, diagnostic tests, and medications will improve  Discussed POC for the day.     Problem: Pain Management  Goal: Pain level will decrease to patient’s comfort goal  Pt states she is Opiate naive and and only tolerate tylenol and morphine. These are ordered as well as torodol.

## 2017-05-08 NOTE — IP AVS SNAPSHOT
Wattpad Access Code: Activation code not generated  Current Wattpad Status: Patient Declined    [employer]  A secure, online tool to manage your health information     [employer]’s Wattpad® is a secure, online tool that connects you to your personalized health information from the privacy of your home -- day or night - making it very easy for you to manage your healthcare. Once the activation process is completed, you can even access your medical information using the Wattpad rigo, which is available for free in the Apple Rigo store or Google Play store.     Wattpad provides the following levels of access (as shown below):   My Chart Features   [facility] Primary Care Doctor [facility]  Specialists [facility]  Urgent  Care Non-[facility]  Primary Care  Doctor   Email your healthcare team securely and privately 24/7 X X X X   Manage appointments: schedule your next appointment; view details of past/upcoming appointments X      Request prescription refills. X      View recent personal medical records, including lab and immunizations X X X X   View health record, including health history, allergies, medications X X X X   Read reports about your outpatient visits, procedures, consult and ER notes X X X X   See your discharge summary, which is a recap of your hospital and/or ER visit that includes your diagnosis, lab results, and care plan. X X       How to register for Wattpad:  1. Go to  [email or web address].  2. Click on the Sign Up Now box, which takes you to the New Member Sign Up page. You will need to provide the following information:  a. Enter your Wattpad Access Code exactly as it appears at the top of this page. (You will not need to use this code after you’ve completed the sign-up process. If you do not sign up before the expiration date, you must request a new code.)   b. Enter your date of birth.   c. Enter your home email address.   d. Click Submit, and follow the next screen’s instructions.  3. Create a Wattpad ID.  This will be your [facility] login ID and cannot be changed, so think of one that is secure and easy to remember.  4. Create a [facility] password. You can change your password at any time.  5. Enter your Password Reset Question and Answer. This can be used at a later time if you forget your password.   6. Enter your e-mail address. This allows you to receive e-mail notifications when new information is available in [facility].  7. Click Sign Up. You can now view your health information.    For assistance activating your [facility] account, call [phone]

## 2017-05-08 NOTE — OR NURSING
0915: To PACU from OR via bed, respirations spontaneous and non-labored, Icepack applied over c/d/i L hip surgical dressings.  0930: Pt in pain, medications given per MAR  0945: Pt stating pain medications not helping at all yet, moaning and crying, pain medication given per MAR.  1000: Pain still present, medication given per MAR. respirations spontaneous and non-labored.   1015: Pain decreased, but still not tolerable, medication given per MAR.  1030: Pain is tolerable, respirations spontaneous and non-labored.  1045: CPAP applied, pt now able to tolerate it, attempted use prior, but pain was keeping it from being effective.  1100: Pt resting comfortably, no nausea, pain more tolerable.  1115: Meets criteria to transfer to floor, report called.  1122: Pt transferred to GSU via bed, tolerated it well.

## 2017-05-08 NOTE — OR NURSING
0915: To PACU from OR via bed, respirations spontaneous and non-labored no airway in place, Icepack applied over c/d/i L hip surgical dressings.   0930: Pt in a lot of pain, medications given per MAR.  0945: Pain still not under control, medication given per MAR.  1000: Medication still being given for pain, it is becoming more tolerable, but still needing medicated for it, respirations spontaneous and non-labored, shivering treated with medication, see MAR.  1015: Pain has come down considerably, but just over tolerable, medication given per MAR, No change in surgical site assessment.  1030: Pain in now tolerable, pt resting more comfortably.   1045: CPAP applied, and pt now able to tolerate it, had tried before, but pain kept her from using it effectively.  1100: Pt still resting comfortably, no nausea, pain down to 2/10 and tolerable.  1115: Meets criteria to transfer to Stage 2, report called and pt readied for transfer.  1122: Pt transferred to GSU via bed, tolerated it well.

## 2017-05-08 NOTE — OR SURGEON
Immediate Post-Operative Note      PreOp Diagnosis:  Severe OA left hip    PostOp Diagnosis: same    Procedure(s):  HIP ARTHROPLASTY TOTAL - Wound Class: Clean    Surgeon(s):  ALAN Wick    Anesthesiologist/Type of Anesthesia:  Anesthesiologist: Rojelio Mahoney M.D.  Anesthesia Technician: Elissa Law/General    Surgical Staff:  Assistant: Humza Finnegan C.N.ALizbeth  Circulator: Marie Chauhan R.N.  Limb Marti: Noe Walter  Scrub Person: Yessenia Bray    Specimen: femoral head    Estimated Blood Loss: 150 cc    Findings: OA    Complications: none        5/8/2017 9:16 AM Earle Jacobsen

## 2017-05-08 NOTE — PROGRESS NOTES
Patient finished with PT. Resting in cardiac chair. VSS/CLIP. Tolerated ambulation without complaint.

## 2017-05-08 NOTE — IP AVS SNAPSHOT
5/9/2017    Irina Traylor  [address] [facility] [address]    Dear Irina:    [facility] wants to ensure your discharge home is safe and you or your loved ones have had all of your questions answered regarding your care after you leave the hospital.    Below is a list of resources and contact information should you have any questions regarding your hospital stay, follow-up instructions, or active medical symptoms.    Questions or Concerns Regarding… Contact   Medical Questions Related to Your Discharge  (7 days a week, 8am-5pm) Contact a Nurse Care Coordinator   [phone]   Medical Questions Not Related to Your Discharge  (24 hours a day / 7 days a week)  Contact the Nurse Health Line   [phone]    Medications or Discharge Instructions Refer to your discharge packet   or contact your [facility] Primary Care Provider   [phone]   Follow-up Appointment(s) Schedule your appointment via [employer]   or contact Scheduling [phone]   Billing Review your statement via [employer]  or contact Billing [phone]   Medical Records Review your records via [employer]   or contact Medical Records [phone]     You may receive a telephone call within two days of discharge. This call is to make certain you understand your discharge instructions and have the opportunity to have any questions answered. You can also easily access your medical information, test results and upcoming appointments via the [employer] free online health management tool. You can learn more and sign up at [email or web address]. For assistance setting up your [employer] account, please call [phone].    Once again, we want to ensure your discharge home is safe and that you have a clear understanding of any next steps in your care. If you have any questions or concerns, please do not hesitate to contact us, we are here for you. Thank you for choosing [facility] for your healthcare needs.    Sincerely,    Your [facility] Healthcare Team

## 2017-05-08 NOTE — OP REPORT
DATE OF SERVICE:  05/08/2017    DATE OF SURGERY:  05/08/2017    PREOPERATIVE DIAGNOSIS:  Advanced degenerative arthritis, left hip.    POSTOPERATIVE DIAGNOSIS:  Advanced degenerative arthritis, left hip.    PROCEDURE:  Left total hip replacement using the Biomet system with a size 56   ingrowth G7 cup, a high wall liner and ingrowth size 16 Taperloc stem and a   40, -3 head.    SURGEON:  Earle Jacobsen MD    ASSISTANT:  ACACIA Jordan.  Of note, the assistant played a crucial role   in this procedure by helping with the exposure as well as performing   reductions and dislocations for this complex arthroplasty.    ANESTHESIA:  General per endotracheal tube.    ANESTHESIOLOGIST:  Rojelio Mahoney MD.    OPERATIVE INDICATIONS:  The patient is a 76-year-old white female with a   history of severe bilateral hip arthritis.  She is approximately 4 months   status post right total hip replacement and has done very well.  She is now   limited in her function and rehab due to the pain in her left hip and is no   longer responding to conservative program that has included activity   modification, physical therapy, exercise, nonsteroidal anti-inflammatories and   corticosteroid injections and it is felt that a total hip replacement on the   left was indicated to maintain activity in this active woman.    PROCEDURE IN DETAIL:  The patient was brought to the operating room and placed   on table in supine position where a satisfactory general anesthetic agent was   administered per endotracheal tube.  The patient was given 2 grams of Ancef   and 10 mg/kg tranexamic acid IV prior to beginning the procedure.  Patient was   placed in right lateral decubitus position, secured on the PEG board.  The   left lower extremity was prepped with gel prep, draped free in a sterile   fashion.  A 15-cm posterolateral incision was made and carried sharply through   skin and subcutaneous tissue directly down to the fascia jeferson overlying the    greater trochanter.  This was opened in line with the incision.  Charnley   retractor was placed.  The abductors were retracted and the piriformis was   identified, tagged and divided.  The short external rotators were taken off as   a sleeve and a T-shaped capsulotomy was performed.  The hip was dislocated   and a femoral neck osteotomy was created approximately 1.5 cm proximal to the   lesser trochanter.  There was significant degenerative change noted on the   femoral head with no remaining articular cartilage.  An anterior release was   performed under direct visualization, an anterior retractor was placed with a   Hohmann retractor inferiorly.  The capsule was excised circumferentially.  The   pulvinar was excised as was the transverse acetabular ligament.  Concentric   reaming was performed and the acetabulum beginning with a size 42 and going up   to a size 55 reamer which gave excellent bleeding cortical cancellous bone.    A 56 trial was placed with excellent fit.  The wound was copiously irrigated   with normal saline and a 56 G7 cup was placed approximately 20 degrees of   anteversion and 45 degrees from the horizontal and fully firmly impacted.    This gave excellent stability.  This was secured with 2 screws through the   cup.  A trial high wall liner was placed with the apex at the 1:30 position.    The proximal femur was delivered into the wound.  The stump of the piriformis   was resected.  The cookie cutter was used followed by the canal finder, the   tapered reamer and a lateralizing reamer.  The broaches for the Taperloc   system were then used beginning with a size 6 and going up to a size 16.  With   the 15th in place, the calcar planer was used.  Size 16 gave excellent fit   and stability.  Trial reductions were performed with the 0 and -3 heads.  The   -3 head was just a little bit long.  Trial was removed and the 15 broach was   reinserted and sunk a little deeper with calcar planer again  used.  The was   reinserted and this time, the trial with the -3 head gave essentially equal   leg lengths with excellent stability and full range of motion of the hip.  The   trials were removed.  The wound was again copiously irrigated with normal   saline using a waterpik.  The permanent high wall liner was placed, the apex   at 1:30 position and firmly secured.  The proximal femur was irrigated.  The   permanent 16 Taperloc stem was placed and fully impacted again with excellent   stability.  Another trial reduction with the 40, -3 head was performed again   giving equal leg lengths with excellent stability and full range of motion of   the hip.  The wound was again copiously irrigated.  The 40, -3 head was   applied to the cleaned Marie taper and fully impacted.  The final reduction   was performed.  A joint solution consisting of 40 mL of 0.5% ropivacaine, 30   mg of Toradol, 1 mL of epinephrine mixed and 90 mL of normal saline was mixed.    A 60 mL was injected around the periarticular tissues.  The remainder was   injected in the subcutaneous tissue on closing.  The piriformis was repaired   with #1 Ethibond.  The fascia jeferson was closed with #2 PDO Quill suture, the   subcutaneous tissue with a 2-0 Monocryl and skin with staples.  A sterile   dressing of Adaptic flats and bandage tape was applied.  The patient was   placed into a knee immobilizer, awakened, extubated in the operating room,   taken to recovery room in stable condition.  Sponge and needle counts were   correct.  There were no identified intraoperative complications.       ____________________________________     MD JEMAL Márquez / TAB    DD:  05/08/2017 09:16:13  DT:  05/08/2017 11:44:37    D#:  [id number]  Job#:  [id number]    cc: MARIANNE PIERCE [facility] FA

## 2017-05-08 NOTE — IP AVS SNAPSHOT
" Home Care Instructions                                                                                                                  Name:Irina Traylor  Medical Record Number:[id number]  CSN: [id number]    YOB: 1940   Age: 76 y.o.  Sex: female  HT:1.549 m (5' 1\") WT: 76.3 kg (168 lb 3.4 oz)          Admit Date: 5/8/2017     Discharge Date:   Today's Date: 5/9/2017  Attending Doctor:  Earle Jacobsen M.D.                  Allergies:  Benicar; Codeine; Demerol; Other drug; Percocet; Statins; Valium; and Vicodin            Discharge Instructions       Discharge Instructions    Discharged to home by car with relative. Discharged via wheelchair, hospital escort: Yes.  Special equipment needed: No new equipment needed as the patient has all her own at home.    Be sure to schedule a follow-up appointment with your primary care doctor or any specialists as instructed.     Discharge Plan:   Diet Plan: Discussed  Activity Level: Discussed  Confirmed Follow up Appointment: Appointment Scheduled  Confirmed Symptoms Management: Discussed  Medication Reconciliation Updated: Yes  Influenza Vaccine Indication: Not indicated: Previously immunized this influenza season and > 8 years of age    I understand that a diet low in cholesterol, fat, and sodium is recommended for good health. Unless I have been given specific instructions below for another diet, I accept this instruction as my diet prescription.   Other diet: regular diet    Special Instructions: Discharge instructions for the Orthopedic Patient    Follow up with Primary Care Physician within 2 weeks of discharge to home, regarding:  Review of medications and diagnostic testing.  Surveillance for medical complications.  Workup and treatment of osteoporosis, if appropriate.     -Is this a Joint Replacement patient? Yes   Total Joint Hip Replacement Discharge Instructions    Pain  - The goal is to slowly wean off the prescription pain medicine.  - Ice " can be used for pain control.  20 minutes at a time is recommended, and never directly against your skin or incision.  - Most patients are off the pain pills by 3 weeks; others may require a low level of pain medications for many months. If your pain continues to be severe, follow up with your physician.  Infection  Deep hip joint infections that require removal of the prostheses occur in less than 0.1% of patients. Lesser infections in the skin (cellulites) are more common and much more easily treated.  - Keep the incision as clean and dry as possible.  - Always wash your hands before touching your incision.  - Skin infections tend to develop around 7-10 days after surgery, most can be treated with oral antibiotics.  - Dental Care should be delayed for 3 months after surgery, your surgeon recommends taking a dose of antibiotics 1 hour prior to any dental procedure.  After 2 years, most surgeons recommend antibiotics only before an extensive procedure.  Ask your surgeon what he recommends.  - Signs and symptoms of infection can include:  low grade fever, redness, pain, swelling and drainage from your incision.  Notify your surgeon immediately if you develop any of these symptoms.  Post op Disturbances  - Bowel habits - constipation is extremely common and is caused by a combination of anesthesia, lack of mobility and pain medicine.  Use stool softeners or laxatives if necessary. It is important not to ignore this problem, as bowel obstructions can be a serious complication after joint replacement surgery.  - Mood/Energy Level - Many patients experience a lack of energy and endurance for up to 2-3 months after surgery.  Some may also feel down and can even become depressed.  This is likely due to the postoperative anemia, change in activity level, lack of sleep, pain medicine and just the emotional reaction to the surgery itself that is a big disruption in a person’s life.  This usually passes.  If symptoms persist,  follow up with your primary physician.  - Returning to work - Your surgeon will give you more specific instructions.  Generally, if you work a sedentary job requiring little standing or walking, most patients may return within 2-6 weeks.  Manual labor jobs involving walking, lifting and standing may take 3-4 months.  Your surgeon’s office can provide a release to part-time or light duty work early on in your recovery and progress you to full duty as able.  - Driving - You can begin driving an automatic shift car in 4 to 8 weeks, provided you are no longer taking narcotic pain medication. If you have a stick-shift car and your right hip was replaced, do not begin driving until your doctor says you can.   - Avoiding falls -  throw rugs and tack down loose carpeting.  Be aware of floor hazards such as pets, small objects or uneven surfaces.   -  Airport Metal Detectors - The sensitivity of metal detectors varies and it is likely that your prosthesis will cause an alarm. Inform the  that you have an artificial joint.  Diet  - Resume your normal diet as tolerated.  - It is important to achieve a healthy nutritional status by eating a well balanced diet on a regular basis.  - Your physician may recommend that you take iron and vitamin supplements.   - Continue to drink plenty of fluids.  Shower/Bathing  - You may shower as soon as you get home from the hospital unless otherwise instructed.  - Keep your incision out of water.  To keep the incision dry when showering, cover it with a plastic bag or plastic wrap.  - Pat incision dry if it gets wet.  Don’t rub.  - Do not submerge in a bath until staples are out and the incision is completely healed. (Approximately 6-8 weeks after surgery).  Dressing Change:  Procedure (if recommended by your physician)  - Wash hands.  - Open all dressing change materials.  - Remove old dressing and discard.  - Inspect incision for redness, increase in clear drainage,  yellow/green drainage, odor and surrounding skin hot to touch.  -  ABD (large gauze) pad by one corner and lay over the incision.  Be careful not to touch the inside of the dressing that will lay over the incision.  - Secure in place as instructed (Ace wrap or tape).    Swelling/Bruising  - Swelling is normal after hip replacement and can involve the thigh, knee, calf and foot.  - Swelling can last from 3-6 months.  - Elevate your leg higher than your heart while reclining.  The first week you are home you should elevate your leg an equal amount of time, as you are active.    - Anti-inflammatory pills can be taken once you have stopped the blood thinners.  - The swelling is usually worse after you go home since you are upright for longer periods of time.  - Bruising is common and can involve the entire leg including the thigh, calf and even foot.  Bruising often does not appear until after you arrive home and it can be quite dramatic- purple, black, green.  The bruising you can see is not usually concerning and will subside without any treatment.      Blood Clot Prevention  Blood clots in the legs and the less common, but frightening, clots that travel to the lungs are a real focus of our preventative. Most patients are at standard risk for them, but those patients who are at higher risk include people who have had previous clots, a family history of clotting, smoking, diabetes, obesity, advanced age, use of estrogen and a sedentary lifestyle.    - Signs of blood clots in legs - Swelling in thigh, calf or ankle that does not go down with elevation.  Pain, heat and tenderness in calf, back of calf or groin area.  NOTE: blood clots can occur in either leg.  - You have been receiving anticoagulant therapy (blood thinners) in the hospital and you may be instructed to continue at home depending on your risk factors.  - Your risk for developing a clot continues for up to 2-3 months after surgery.  You should avoid  prolonged sitting and dehydration during that time (long air trips and car trips).  If you do take a trip during this time, please get up and move around every 1- 1.5 hours.  - If you are prescribed blood thinning medication for home, follow instructions as directed. (Handouts provided if applicable).      Activity    Once you get home, you should stay active. The key is not to overdo it! While you can expect some good days and some bad days, you should notice a gradual improvement over time you should notice a gradual improvement and a gradual increase in your endurance over the next 6 to 12 months.    - Weight Bearing - If you have undergone cemented or hybrid hip replacement, you can put some weight on the leg immediately using a cane or walker, and you should continue to use some support for 4 to 6 weeks to help the muscles recover.   - Sleeping Positions - Sleep on your back with your legs slightly apart or on your side with a regular pillow between your knees. Be sure to use the pillow for at least 6 weeks, or until your doctor says you can do without it. Sleeping on your stomach should be all right  - Sitting - For at least the first 3 months, sit only in chairs that have arms. Do not sit on low chairs, low stools, or reclining chairs. Do not cross your legs at the knees. The physical therapist will show you how to sit and stand from a chair, keeping your affected leg out in front of you. Get up and move around on a regular basis--at least once every hour.  - Walking - Walk as much as you like once your doctor gives you the go-ahead, but remember that walking is no substitute for your prescribed exercises. Walking with a pair of trekking poles is helpful and adds as much as 40% to the exercise you get when you walk  - Therapy may be needed in some cases, to strengthen your muscles and improve your gait (walking pattern).  This decision will be made at your post-operative appointment.  Follow your therapist  recommended post-operative exercises (handout provided by Therapist).  - Swimming is also recommended; you can begin as soon as the sutures have been removed and the wound is healed, approximately 6 to 8 weeks after surgery. Using a pair of training fins may make swimming a more enjoyable and effective exercise.  - Other activities - Lower impact activities are preferred.  If you have specific questions, consult your Surgeon.    - Sexual activity - Your surgeon can tell you when it should be safe to resume sexual activity.      When to Call the Doctor   Call the physician if:   - Fever over 100.5? F  - Increased pain, drainage, redness, odor or heat around the incision area  - Shaking chills  - Increased knee pain with activity and rest  - Increased pain in calf, tenderness or redness above or below the knee  - Increased swelling of calf, ankle, foot  - Sudden increased shortness of breath, sudden onset of chest pain, localized chest pain with coughing  - Incision opening  Or, if there are any questions or concerns about medications or care.       -Is this patient being discharged with medication to prevent blood clots?  Yes, Aspirin Aspirin, ASA oral tablets  What is this medicine?  ASPIRIN (AS pir in) is a pain reliever. It is used to treat mild pain and fever. This medicine is also used as directed by a doctor to prevent and to treat heart attacks, to prevent strokes, and to treat arthritis or inflammation.  This medicine may be used for other purposes; ask your health care provider or pharmacist if you have questions.  COMMON BRAND NAME(S): Aspir-Low, Aspir-Geena , Aspirtab , Eulogio Advanced Aspirin, Eulogio Aspirin Extra Strength, Eulogio Aspirin Plus, Eulogio Aspirin, Eulogio Genuine Aspirin, Eulogio Womens Aspirin , Bufferin Extra Strength, Bufferin Low Dose, Bufferin  What should I tell my health care provider before I take this medicine?  They need to know if you have any of these  conditions:  -anemia  -asthma  -bleeding problems  -child with chickenpox, the flu, or other viral infection  -diabetes  -gout  -if you frequently drink alcohol containing drinks  -kidney disease  -liver disease  -low level of vitamin K  -lupus  -smoke tobacco  -stomach ulcers or other problems  -an unusual or allergic reaction to aspirin, tartrazine dye, other medicines, dyes, or preservatives  -pregnant or trying to get pregnant  -breast-feeding  How should I use this medicine?  Take this medicine by mouth with a glass of water. Follow the directions on the package or prescription label. You can take this medicine with or without food. If it upsets your stomach, take it with food. Do not take your medicine more often than directed.  Talk to your pediatrician regarding the use of this medicine in children. While this drug may be prescribed for children as young as 12 years of age for selected conditions, precautions do apply. Children and teenagers should not use this medicine to treat chicken pox or flu symptoms unless directed by a doctor.  Patients over 65 years old may have a stronger reaction and need a smaller dose.  Overdosage: If you think you have taken too much of this medicine contact a poison control center or emergency room at once.  NOTE: This medicine is only for you. Do not share this medicine with others.  What if I miss a dose?  If you are taking this medicine on a regular schedule and miss a dose, take it as soon as you can. If it is almost time for your next dose, take only that dose. Do not take double or extra doses.  What may interact with this medicine?  Do not take this medicine with any of the following medications:  -cidofovir  -ketorolac  -probenecid  This medicine may also interact with the following medications:  -alcohol  -alendronate  -bismuth subsalicylate  -flavocoxid  -herbal supplements like feverfew, garlic, duane, ginkgo biloba, horse chestnut  -medicines for diabetes or  glaucoma like acetazolamide, methazolamide  -medicines for gout  -medicines that treat or prevent blood clots like enoxaparin, heparin, ticlopidine, warfarin  -other aspirin and aspirin-like medicines  -NSAIDs, medicines for pain and inflammation, like ibuprofen or naproxen  -pemetrexed  -sulfinpyrazone  -varicella live vaccine  This list may not describe all possible interactions. Give your health care provider a list of all the medicines, herbs, non-prescription drugs, or dietary supplements you use. Also tell them if you smoke, drink alcohol, or use illegal drugs. Some items may interact with your medicine.  What should I watch for while using this medicine?  If you are treating yourself for pain, tell your doctor or health care professional if the pain lasts more than 10 days, if it gets worse, or if there is a new or different kind of pain. Tell your doctor if you see redness or swelling. Also, check with your doctor if you have a fever that lasts for more than 3 days. Only take this medicine to prevent heart attacks or blood clotting if prescribed by your doctor or health care professional.  Do not take aspirin or aspirin-like medicines with this medicine. Too much aspirin can be dangerous. Always read the labels carefully.  This medicine can irritate your stomach or cause bleeding problems. Do not smoke cigarettes or drink alcohol while taking this medicine. Do not lie down for 30 minutes after taking this medicine to prevent irritation to your throat.  If you are scheduled for any medical or dental procedure, tell your healthcare provider that you are taking this medicine. You may need to stop taking this medicine before the procedure.  What side effects may I notice from receiving this medicine?  Side effects that you should report to your doctor or health care professional as soon as possible:  -allergic reactions like skin rash, itching or hives, swelling of the face, lips, or tongue  -black, tarry  stools  -bloody, coffee ground-like vomit  -breathing problems  -changes in hearing, ringing in the ears  -confusion  -general ill feeling or flu-like symptoms  -pain on swallowing  -redness, blistering, peeling or loosening of the skin, including inside the mouth or nose  -trouble passing urine or change in the amount of urine  -unusual bleeding or bruising  -unusually weak or tired  -yellowing of the eyes or skin  Side effects that usually do not require medical attention (report to your doctor or health care professional if they continue or are bothersome):  -diarrhea or constipation  -nausea, vomiting  -stomach gas, heartburn  This list may not describe all possible side effects. Call your doctor for medical advice about side effects. You may report side effects to FDA at 5-318-FDA-6001.  Where should I keep my medicine?  Keep out of the reach of children.  Store at room temperature between 15 and 30 degrees C (59 and 86 degrees F). Protect from heat and moisture. Do not use this medicine if it has a strong vinegar smell. Throw away any unused medicine after the expiration date.  NOTE: This sheet is a summary. It may not cover all possible information. If you have questions about this medicine, talk to your doctor, pharmacist, or health care provider.  © 2014, [employer]/Gold Standard. (3/10/2009 10:44:17 AM)      · Is patient discharged on Warfarin / Coumadin?   No     · Is patient Post Blood Transfusion?  No    Depression / Suicide Risk    As you are discharged from this [facility] facility, it is important to learn how to keep safe from harming yourself.    Recognize the warning signs:  · Abrupt changes in personality, positive or negative- including increase in energy   · Giving away possessions  · Change in eating patterns- significant weight changes-  positive or negative  · Change in sleeping patterns- unable to sleep or sleeping all the time   · Unwillingness or inability to  communicate  · Depression  · Unusual sadness, discouragement and loneliness  · Talk of wanting to die  · Neglect of personal appearance   · Rebelliousness- reckless behavior  · Withdrawal from people/activities they love  · Confusion- inability to concentrate     If you or a loved one observes any of these behaviors or has concerns about self-harm, here's what you can do:  · Talk about it- your feelings and reasons for harming yourself  · Remove any means that you might use to hurt yourself (examples: pills, rope, extension cords, firearm)  · Get professional help from the community (Mental Health, Substance Abuse, psychological counseling)  · Do not be alone:Call your Safe Contact- someone whom you trust who will be there for you.  · Call your local CRISIS HOTLINE 829-2030 or 744-882-5757  · Call your local Children's Mobile Crisis Response Team [place] (412) 435-4912 or www.Tap 'n Tap  · Call the toll free National Suicide Prevention Hotlines   · National Suicide Prevention Lifeline 605-753-NYHZ (1651)  · [employer] 800-SUICIDE (456-3510)        Your appointments     Jun 22, 2017 10:15 AM   Adult Draw/Collection with LAB [facility]   LAB - [place] (--)    [address] [address]   [phone]              Follow-up Information     1. Follow up with Earle Jacobsen M.D. In 2 weeks.    Specialty:  Orthopaedics    Contact information    [address]  [address]  [place] [address]  [phone]           Discharge Medication Instructions:    Below are the medications your physician expects you to take upon discharge:    Review all your home medications and newly ordered medications with your doctor and/or pharmacist. Follow medication instructions as directed by your doctor and/or pharmacist.    Please keep your medication list with you and share with your physician.               Medication List      CONTINUE taking these medications        Instructions    Morning  "Afternoon Evening Bedtime    acetaminophen 325 MG Tabs   Last time this was given:  1,000 mg on 5/9/2017 12:34 PM   Commonly known as:  TYLENOL        Take 650 mg by mouth every four hours as needed.   Dose:  650 mg                        amoxicillin-clavulanate 875-125 MG Tabs   Commonly known as:  AUGMENTIN        Take  by mouth as needed (For dental procedures).                        diltiazem  MG Cp24   Last time this was given:  240 mg on 5/8/2017  8:25 PM   Commonly known as:  CARDIZEM CD        Take 240 mg by mouth every bedtime.   Dose:  240 mg                        fenofibrate 145 MG Tabs   Commonly known as:  TRICOR        Take 145 mg by mouth every day.   Dose:  145 mg                        fexofenadine 60 MG Tabs   Commonly known as:  ALLEGRA        Take 60 mg by mouth 2 times a day as needed.   Dose:  60 mg                        ibuprofen 200 MG Tabs   Commonly known as:  MOTRIN        Take 400 mg by mouth every 6 hours as needed.   Dose:  400 mg                        irbesartan 150 MG Tabs   Last time this was given:  150 mg on 5/9/2017  8:25 AM   Commonly known as:  AVAPRO        Take 150 mg by mouth every morning. Indications: High Blood Pressure   Dose:  150 mg                        loratadine 10 MG Tabs   Last time this was given:  10 mg on 5/9/2017  8:25 AM   Commonly known as:  CLARITIN        Take 10 mg by mouth every day. Indications: Hayfever   Dose:  10 mg                        Non Formulary Request        1 Package. Indications: VITALIZER GOLD-MULTI VITAMIN   Dose:  1 Package                        NON SPECIFIED        Take 7 Caps by mouth every bedtime. \"Herb-Lax\"   Indications: constipation   Dose:  7 Cap                                Orders for after discharge     REFERRAL TO HOME HEALTH    Complete by:  As directed    Home health will create and establish a plan of care.  Post left KIRILL.  WBAT on left.  Posterior KIRILL precaution.  May shower with incision covered           "   Instructions           Diet / Nutrition:    Follow any diet instructions given to you by your doctor or the dietician, including how much salt (sodium) you are allowed each day.    If you are overweight, talk to your doctor about a weight reduction plan.    Activity:    Remain physically active following your doctor's instructions about exercise and activity.    Rest often.     Any time you become even a little tired or short of breath, SIT DOWN and rest.    Worsening Symptoms:    Report any of the following signs and symptoms to the doctor's office immediately:    *Pain of jaw, arm, or neck  *Chest pain not relieved by medication                               *Dizziness or loss of consciousness  *Difficulty breathing even when at rest   *More tired than usual                                       *Bleeding drainage or swelling of surgical site  *Swelling of feet, ankles, legs or stomach                 *Fever (>100ºF)  *Pink or blood tinged sputum  *Weight gain (3lbs/day or 5lbs /week)           *Shock from internal defibrillator (if applicable)  *Palpitations or irregular heartbeats                *Cool and/or numb extremities    Stroke Awareness    Common Risk Factors for Stroke include:    Age  Atrial Fibrillation  Carotid Artery Stenosis  Diabetes Mellitus  Excessive alcohol consumption  High blood pressure  Overweight   Physical inactivity  Smoking    Warning signs and symptoms of a stroke include:    *Sudden numbness or weakness of the face, arm or leg (especially on one side of the body).  *Sudden confusion, trouble speaking or understanding.  *Sudden trouble seeing in one or both eyes.  *Sudden trouble walking, dizziness, loss of balance or coordination.Sudden severe headache with no known cause.    It is very important to get treatment quickly when a stroke occurs. If you experience any of the above warning signs, call 911 immediately.                   Disclaimer         Quit Smoking / Tobacco Use:    I  understand the use of any tobacco products increases my chance of suffering from future heart disease or stroke and could cause other illnesses which may shorten my life. Quitting the use of tobacco products is the single most important thing I can do to improve my health. For further information on smoking / tobacco cessation call a Toll Free Quit Line at 1-197.547.2355 (*National [employer] [place]) or 1-894.122.4586 (American Lung Association) or you can access the web based program at www.lungusa.org.    [place] Tobacco Users Help Line:  (950) 925-7783       Toll Free: 1-330.255.8176  Quit Tobacco Program [facility] [phone]    Crisis Hotline:    [place] Crisis Hotline:  8-963-TNIULLU or 1-421.330.7825    [place] Crisis Hotline:    1-986.917.2970 or 425-894-9609    Discharge Survey:   Thank you for choosing [facility]. We hope we did everything we could to make your hospital stay a pleasant one. You may be receiving a phone survey and we would appreciate your time and participation in answering the questions. Your input is very valuable to us in our efforts to improve our service to our patients and their families.        My signature on this form indicates that:    1. I have reviewed and understand the above information.  2. My questions regarding this information have been answered to my satisfaction.  3. I have formulated a plan with my discharge nurse to obtain my prescribed medications for home.                  Disclaimer         __________________________________                     __________       ________                       Patient Signature                                                 Date                    Time

## 2017-05-08 NOTE — THERAPY
"Physical Therapy Evaluation completed.   Bed Mobility:  Supine to Sit: Moderate Assist  Transfers: Sit to Stand: Minimal Assist  Gait: Level Of Assist: Contact Guard Assist with Front-Wheel Walker x12 ft     Plan of Care: Will benefit from Physical Therapy 7 times per week  Discharge Recommendations: Equipment: No Equipment Needed. Post-acute therapy Discharge to home with outpatient or home health for additional skilled therapy services.    See \"Rehab Therapy-Acute\" Patient Summary Report for complete documentation.     "

## 2017-05-09 VITALS
OXYGEN SATURATION: 96 % | BODY MASS INDEX: 31.76 KG/M2 | HEIGHT: 61 IN | DIASTOLIC BLOOD PRESSURE: 67 MMHG | RESPIRATION RATE: 18 BRPM | SYSTOLIC BLOOD PRESSURE: 144 MMHG | WEIGHT: 168.21 LBS | TEMPERATURE: 97.8 F | HEART RATE: 62 BPM

## 2017-05-09 LAB
ANION GAP SERPL CALC-SCNC: 8 MMOL/L (ref 0–11.9)
BUN SERPL-MCNC: 22 MG/DL (ref 8–22)
CALCIUM SERPL-MCNC: 9.1 MG/DL (ref 8.4–10.2)
CHLORIDE SERPL-SCNC: 104 MMOL/L (ref 96–112)
CO2 SERPL-SCNC: 26 MMOL/L (ref 20–33)
CREAT SERPL-MCNC: 0.88 MG/DL (ref 0.5–1.4)
ERYTHROCYTE [DISTWIDTH] IN BLOOD BY AUTOMATED COUNT: 55.8 FL (ref 35.9–50)
GFR SERPL CREATININE-BSD FRML MDRD: >60 ML/MIN/1.73 M 2
GLUCOSE SERPL-MCNC: 142 MG/DL (ref 65–99)
HCT VFR BLD AUTO: 30.2 % (ref 37–47)
HGB BLD-MCNC: 9.8 G/DL (ref 12–16)
MCH RBC QN AUTO: 28.7 PG (ref 27–33)
MCHC RBC AUTO-ENTMCNC: 31.8 G/DL (ref 33.6–35)
MCV RBC AUTO: 90.2 FL (ref 81.4–97.8)
PLATELET # BLD AUTO: 137 K/UL (ref 164–446)
PMV BLD AUTO: 13.1 FL (ref 9–12.9)
POTASSIUM SERPL-SCNC: 4.4 MMOL/L (ref 3.6–5.5)
RBC # BLD AUTO: 3.38 M/UL (ref 4.2–5.4)
SODIUM SERPL-SCNC: 138 MMOL/L (ref 135–145)
WBC # BLD AUTO: 12 K/UL (ref 4.8–10.8)

## 2017-05-09 PROCEDURE — G8988 SELF CARE GOAL STATUS: HCPCS | Mod: CJ

## 2017-05-09 PROCEDURE — 80048 BASIC METABOLIC PNL TOTAL CA: CPT

## 2017-05-09 PROCEDURE — 700111 HCHG RX REV CODE 636 W/ 250 OVERRIDE (IP): Performed by: ORTHOPAEDIC SURGERY

## 2017-05-09 PROCEDURE — G8989 SELF CARE D/C STATUS: HCPCS | Mod: CJ

## 2017-05-09 PROCEDURE — 97530 THERAPEUTIC ACTIVITIES: CPT

## 2017-05-09 PROCEDURE — 700112 HCHG RX REV CODE 229: Performed by: ORTHOPAEDIC SURGERY

## 2017-05-09 PROCEDURE — 85027 COMPLETE CBC AUTOMATED: CPT

## 2017-05-09 PROCEDURE — 97110 THERAPEUTIC EXERCISES: CPT

## 2017-05-09 PROCEDURE — 94760 N-INVAS EAR/PLS OXIMETRY 1: CPT

## 2017-05-09 PROCEDURE — G8987 SELF CARE CURRENT STATUS: HCPCS | Mod: CJ

## 2017-05-09 PROCEDURE — 97535 SELF CARE MNGMENT TRAINING: CPT

## 2017-05-09 PROCEDURE — A9270 NON-COVERED ITEM OR SERVICE: HCPCS | Performed by: ORTHOPAEDIC SURGERY

## 2017-05-09 PROCEDURE — 36415 COLL VENOUS BLD VENIPUNCTURE: CPT

## 2017-05-09 PROCEDURE — 97116 GAIT TRAINING THERAPY: CPT

## 2017-05-09 PROCEDURE — 700102 HCHG RX REV CODE 250 W/ 637 OVERRIDE(OP): Performed by: ORTHOPAEDIC SURGERY

## 2017-05-09 PROCEDURE — 97165 OT EVAL LOW COMPLEX 30 MIN: CPT

## 2017-05-09 RX ADMIN — DOCUSATE SODIUM 100 MG: 100 CAPSULE, LIQUID FILLED ORAL at 08:25

## 2017-05-09 RX ADMIN — MORPHINE SULFATE 4 MG: 4 INJECTION INTRAVENOUS at 10:00

## 2017-05-09 RX ADMIN — FENOFIBRATE 134 MG: 134 CAPSULE ORAL at 08:25

## 2017-05-09 RX ADMIN — ACETAMINOPHEN 1000 MG: 500 TABLET ORAL at 05:29

## 2017-05-09 RX ADMIN — ACETAMINOPHEN 1000 MG: 500 TABLET ORAL at 12:34

## 2017-05-09 RX ADMIN — IRBESARTAN 150 MG: 150 TABLET ORAL at 08:25

## 2017-05-09 RX ADMIN — KETOROLAC TROMETHAMINE 15 MG: 30 INJECTION, SOLUTION INTRAMUSCULAR at 05:29

## 2017-05-09 RX ADMIN — ASPIRIN 325 MG: 325 TABLET, DELAYED RELEASE ORAL at 06:32

## 2017-05-09 RX ADMIN — LORATADINE 10 MG: 10 TABLET ORAL at 08:25

## 2017-05-09 ASSESSMENT — ACTIVITIES OF DAILY LIVING (ADL): TOILETING: INDEPENDENT

## 2017-05-09 ASSESSMENT — GAIT ASSESSMENTS
ASSISTIVE DEVICE: 4 WHEEL WALKER
DISTANCE (FEET): 150
GAIT LEVEL OF ASSIST: STAND BY ASSIST
ASSISTIVE DEVICE: 4 WHEEL WALKER
GAIT LEVEL OF ASSIST: STAND BY ASSIST
DISTANCE (FEET): 150
DEVIATION: DECREASED HEEL STRIKE;DECREASED TOE OFF

## 2017-05-09 ASSESSMENT — PAIN SCALES - GENERAL: PAINLEVEL_OUTOF10: 4

## 2017-05-09 NOTE — PROGRESS NOTES
Dr. Jacobsen at the bedside. Pt can D/C this afternoon if all goes well. DSG changed per order and New TEDs placed. OT in the room now.

## 2017-05-09 NOTE — PROGRESS NOTES
"Post op day # 1    No New Complaints, minimal pain, well controlled.  More pain in shoulder than hip at present    Blood pressure 141/74, pulse 63, temperature 37.1 °C (98.7 °F), resp. rate 17, height 1.549 m (5' 1\"), weight 76.3 kg (168 lb 3.4 oz), SpO2 96 %.    Neurovascular intact  Wound Clean and Dry    Recent Labs      05/09/17   0504   WBC  12.0*   RBC  3.38*   HEMOGLOBIN  9.8*   HEMATOCRIT  30.2*   MCV  90.2   MCH  28.7   MCHC  31.8*   RDW  55.8*   PLATELETCT  137*   MPV  13.1*     Recent Labs      05/09/17   0503   SODIUM  138   POTASSIUM  4.4   CHLORIDE  104   CO2  26   GLUCOSE  142*   BUN  22   CREATININE  0.88   CALCIUM  9.1       Plan: Begin mobilization, WBAT on left.  Home after pm PT today.  Will arrange home health and PT as pt lives alone with help from neighbors.  Dictated    "

## 2017-05-09 NOTE — DISCHARGE PLANNING
JULIETA noted new order written for HH for this patient.  JULIETA met with this patient at bedside and completed the choice form for [facility].  JULIETA faxed choice form to [facility] for follow up.

## 2017-05-09 NOTE — THERAPY
"Occupational Therapy Evaluation completed.   Functional Status: A&Ox4. Recalls 3/3 posterior hip prec's. WBAT LLE. BUE shldr flexion limitations (chronic). STS, ADL tranfers with Sup. Standing sponge bath with set-up, [place]. Toileting with Sup. UE dressing with [place], LE with SBA, using reacher, LH shoe horn. Grooming at sink with SBA, modA with hair. Walks 100' with Sup using 4-wheeled walker. Good safety.     Plan of Care: Patient with no further skilled OT needs in the acute care setting at this time  Discharge Recommendations:  Equipment: No Equipment Needed. Post-acute therapy Discharge to home with home health for additional skilled therapy services.  D/C ready from OT. Lives alone with many friends/neighbors whom are available to assist.   See \"Rehab Therapy-Acute\" Patient Summary Report for complete documentation.    "

## 2017-05-09 NOTE — DISCHARGE PLANNING
[facility] received a HH choice form. Per the choice form. The referral has been sent to [facility]

## 2017-05-09 NOTE — THERAPY
"Physical Therapy Treatment completed.   Bed Mobility:  Supine to Sit: Minimal Assist  Transfers: Sit to Stand: Stand by Assist  Gait: Level Of Assist: Stand by Assist with 4-Wheel Walker       Plan of Care: Will benefit from Physical Therapy 7 times per week  Discharge Recommendations: Equipment: No Equipment Needed. Post-acute therapy Discharge to home with outpatient or home health for additional skilled therapy services.     See \"Rehab Therapy-Acute\" Patient Summary Report for complete documentation.       "

## 2017-05-09 NOTE — PROGRESS NOTES
Pt ambulated the entire hallway with OT. Back to recliner chair. She was in quite a bit of pain. Requesting IV morphine. Medication given.

## 2017-05-09 NOTE — DISCHARGE PLANNING
Transition Team Assessment    Met with pt at bedside. Pt lives alone but has support. She has walker and cane at home. Pt will discharge with [facility]. Pt states she has no other needs.    Information Source  Orientation : Oriented x 4  Information Given By: Patient  Informant's Name: Irina Traylor  Who is responsible for making decisions for patient? : Patient    Readmission Evaluation  Is this a readmission?: No    Elopement Risk  Legal Hold: No  Ambulatory or Self Mobile in Wheelchair: No-Not an Elopement Risk  Elopement Risk: Not at Risk for Elopement    Interdisciplinary Discharge Planning  Does Admitting Nurse Feel This Could be a Complex Discharge?: No  Lives with - Patient's Self Care Capacity: Alone and Able to Care For Self  Patient or legal guardian wants to designate a caregiver (see row info): No  Support Systems: Friends / Neighbors  Housing / Facility: [address]  Do You Take your Prescribed Medications Regularly: Yes  Able to Return to Previous ADL's: Yes  Mobility Issues: Yes  Prior Services: Other (Comments) (friends assist with showers, dressing)  Patient Expects to be Discharged to:: home  Assistance Needed: No  Durable Medical Equipment: Walker, Commode    Discharge Preparedness  What is your plan after discharge?: Home with help, Home health care  What are your discharge supports?: Other (comment) (Friends, neighbors)  Prior Functional Level: Ambulatory, Drives Self, Independent with Activities of Daily Living, Independent with Medication Management, Uses Cane, Uses Walker  Difficulity with ADLs: Walking  Difficulty with ADLs Comment: Pt uses cane and walker  Difficulity with IADLs: None    Functional Assesment  Prior Functional Level: Ambulatory, Drives Self, Independent with Activities of Daily Living, Independent with Medication Management, Uses Cane, Uses Walker    Finances  Financial Barriers to Discharge: No  Prescription Coverage: Yes (Pharmacy: [facility], [place]  [place]    Vision / Hearing Impairment  Vision Impairment : Yes  Right Eye Vision: Impaired, Wears Glasses  Left Eye Vision: Impaired, Wears Glasses  Hearing Impairment : No    Values / Beliefs / Concerns  Values / Beliefs Concerns : No  Cultural Requests During Hospitalization:   Spiritual Requests During Hospitalization: Yes    Advance Directive  Advance Directive?: DPOA for Health Care  Durable Power of  Name and Contact : Ngozi Simental    Domestic Abuse  Have you ever been the victim of abuse or violence?: No  Physical Abuse or Sexual Abuse: No  Verbal Abuse or Emotional Abuse: No  Possible Abuse Reported to:: Not Applicable    Psychological Assessment  History of Substance Abuse: None  History of Psychiatric Problems: No  Non-compliant with Treatment: No  Newly Diagnosed Illness: No    Discharge Risks or Barriers  Discharge risks or barriers?: No    Anticipated Discharge Information  Anticipated discharge disposition: Home, [facility]  Discharge Address: [address] [place] [address]  Discharge Contact Phone Number: [phone]

## 2017-05-09 NOTE — DISCHARGE INSTRUCTIONS
Discharge Instructions    Discharged to home by car with relative. Discharged via wheelchair, hospital escort: Yes.  Special equipment needed: No new equipment needed as the patient has all her own at home.    Be sure to schedule a follow-up appointment with your primary care doctor or any specialists as instructed.     Discharge Plan:   Diet Plan: Discussed  Activity Level: Discussed  Confirmed Follow up Appointment: Appointment Scheduled  Confirmed Symptoms Management: Discussed  Medication Reconciliation Updated: Yes  Influenza Vaccine Indication: Not indicated: Previously immunized this influenza season and > 8 years of age    I understand that a diet low in cholesterol, fat, and sodium is recommended for good health. Unless I have been given specific instructions below for another diet, I accept this instruction as my diet prescription.   Other diet: regular diet    Special Instructions: Discharge instructions for the Orthopedic Patient    Follow up with Primary Care Physician within 2 weeks of discharge to home, regarding:  Review of medications and diagnostic testing.  Surveillance for medical complications.  Workup and treatment of osteoporosis, if appropriate.     -Is this a Joint Replacement patient? Yes   Total Joint Hip Replacement Discharge Instructions    Pain  - The goal is to slowly wean off the prescription pain medicine.  - Ice can be used for pain control.  20 minutes at a time is recommended, and never directly against your skin or incision.  - Most patients are off the pain pills by 3 weeks; others may require a low level of pain medications for many months. If your pain continues to be severe, follow up with your physician.  Infection  Deep hip joint infections that require removal of the prostheses occur in less than 0.1% of patients. Lesser infections in the skin (cellulites) are more common and much more easily treated.  - Keep the incision as clean and dry as possible.  - Always wash your  hands before touching your incision.  - Skin infections tend to develop around 7-10 days after surgery, most can be treated with oral antibiotics.  - Dental Care should be delayed for 3 months after surgery, your surgeon recommends taking a dose of antibiotics 1 hour prior to any dental procedure.  After 2 years, most surgeons recommend antibiotics only before an extensive procedure.  Ask your surgeon what he recommends.  - Signs and symptoms of infection can include:  low grade fever, redness, pain, swelling and drainage from your incision.  Notify your surgeon immediately if you develop any of these symptoms.  Post op Disturbances  - Bowel habits - constipation is extremely common and is caused by a combination of anesthesia, lack of mobility and pain medicine.  Use stool softeners or laxatives if necessary. It is important not to ignore this problem, as bowel obstructions can be a serious complication after joint replacement surgery.  - Mood/Energy Level - Many patients experience a lack of energy and endurance for up to 2-3 months after surgery.  Some may also feel down and can even become depressed.  This is likely due to the postoperative anemia, change in activity level, lack of sleep, pain medicine and just the emotional reaction to the surgery itself that is a big disruption in a person’s life.  This usually passes.  If symptoms persist, follow up with your primary physician.  - Returning to work - Your surgeon will give you more specific instructions.  Generally, if you work a sedentary job requiring little standing or walking, most patients may return within 2-6 weeks.  Manual labor jobs involving walking, lifting and standing may take 3-4 months.  Your surgeon’s office can provide a release to part-time or light duty work early on in your recovery and progress you to full duty as able.  - Driving - You can begin driving an automatic shift car in 4 to 8 weeks, provided you are no longer taking narcotic  pain medication. If you have a stick-shift car and your right hip was replaced, do not begin driving until your doctor says you can.   - Avoiding falls -  throw rugs and tack down loose carpeting.  Be aware of floor hazards such as pets, small objects or uneven surfaces.   -  Airport Metal Detectors - The sensitivity of metal detectors varies and it is likely that your prosthesis will cause an alarm. Inform the  that you have an artificial joint.  Diet  - Resume your normal diet as tolerated.  - It is important to achieve a healthy nutritional status by eating a well balanced diet on a regular basis.  - Your physician may recommend that you take iron and vitamin supplements.   - Continue to drink plenty of fluids.  Shower/Bathing  - You may shower as soon as you get home from the hospital unless otherwise instructed.  - Keep your incision out of water.  To keep the incision dry when showering, cover it with a plastic bag or plastic wrap.  - Pat incision dry if it gets wet.  Don’t rub.  - Do not submerge in a bath until staples are out and the incision is completely healed. (Approximately 6-8 weeks after surgery).  Dressing Change:  Procedure (if recommended by your physician)  - Wash hands.  - Open all dressing change materials.  - Remove old dressing and discard.  - Inspect incision for redness, increase in clear drainage, yellow/green drainage, odor and surrounding skin hot to touch.  -  ABD (large gauze) pad by one corner and lay over the incision.  Be careful not to touch the inside of the dressing that will lay over the incision.  - Secure in place as instructed (Ace wrap or tape).    Swelling/Bruising  - Swelling is normal after hip replacement and can involve the thigh, knee, calf and foot.  - Swelling can last from 3-6 months.  - Elevate your leg higher than your heart while reclining.  The first week you are home you should elevate your leg an equal amount of time, as you are  active.    - Anti-inflammatory pills can be taken once you have stopped the blood thinners.  - The swelling is usually worse after you go home since you are upright for longer periods of time.  - Bruising is common and can involve the entire leg including the thigh, calf and even foot.  Bruising often does not appear until after you arrive home and it can be quite dramatic- purple, black, green.  The bruising you can see is not usually concerning and will subside without any treatment.      Blood Clot Prevention  Blood clots in the legs and the less common, but frightening, clots that travel to the lungs are a real focus of our preventative. Most patients are at standard risk for them, but those patients who are at higher risk include people who have had previous clots, a family history of clotting, smoking, diabetes, obesity, advanced age, use of estrogen and a sedentary lifestyle.    - Signs of blood clots in legs - Swelling in thigh, calf or ankle that does not go down with elevation.  Pain, heat and tenderness in calf, back of calf or groin area.  NOTE: blood clots can occur in either leg.  - You have been receiving anticoagulant therapy (blood thinners) in the hospital and you may be instructed to continue at home depending on your risk factors.  - Your risk for developing a clot continues for up to 2-3 months after surgery.  You should avoid prolonged sitting and dehydration during that time (long air trips and car trips).  If you do take a trip during this time, please get up and move around every 1- 1.5 hours.  - If you are prescribed blood thinning medication for home, follow instructions as directed. (Handouts provided if applicable).      Activity    Once you get home, you should stay active. The key is not to overdo it! While you can expect some good days and some bad days, you should notice a gradual improvement over time you should notice a gradual improvement and a gradual increase in your endurance  over the next 6 to 12 months.    - Weight Bearing - If you have undergone cemented or hybrid hip replacement, you can put some weight on the leg immediately using a cane or walker, and you should continue to use some support for 4 to 6 weeks to help the muscles recover.   - Sleeping Positions - Sleep on your back with your legs slightly apart or on your side with a regular pillow between your knees. Be sure to use the pillow for at least 6 weeks, or until your doctor says you can do without it. Sleeping on your stomach should be all right  - Sitting - For at least the first 3 months, sit only in chairs that have arms. Do not sit on low chairs, low stools, or reclining chairs. Do not cross your legs at the knees. The physical therapist will show you how to sit and stand from a chair, keeping your affected leg out in front of you. Get up and move around on a regular basis--at least once every hour.  - Walking - Walk as much as you like once your doctor gives you the go-ahead, but remember that walking is no substitute for your prescribed exercises. Walking with a pair of trekking poles is helpful and adds as much as 40% to the exercise you get when you walk  - Therapy may be needed in some cases, to strengthen your muscles and improve your gait (walking pattern).  This decision will be made at your post-operative appointment.  Follow your therapist recommended post-operative exercises (handout provided by Therapist).  - Swimming is also recommended; you can begin as soon as the sutures have been removed and the wound is healed, approximately 6 to 8 weeks after surgery. Using a pair of training fins may make swimming a more enjoyable and effective exercise.  - Other activities - Lower impact activities are preferred.  If you have specific questions, consult your Surgeon.    - Sexual activity - Your surgeon can tell you when it should be safe to resume sexual activity.      When to Call the Doctor   Call the physician  if:   - Fever over 100.5? F  - Increased pain, drainage, redness, odor or heat around the incision area  - Shaking chills  - Increased knee pain with activity and rest  - Increased pain in calf, tenderness or redness above or below the knee  - Increased swelling of calf, ankle, foot  - Sudden increased shortness of breath, sudden onset of chest pain, localized chest pain with coughing  - Incision opening  Or, if there are any questions or concerns about medications or care.       -Is this patient being discharged with medication to prevent blood clots?  Yes, Aspirin Aspirin, ASA oral tablets  What is this medicine?  ASPIRIN (AS pir in) is a pain reliever. It is used to treat mild pain and fever. This medicine is also used as directed by a doctor to prevent and to treat heart attacks, to prevent strokes, and to treat arthritis or inflammation.  This medicine may be used for other purposes; ask your health care provider or pharmacist if you have questions.  COMMON BRAND NAME(S): Aspir-Low, Aspir-Geena , Aspirtab , EmbedStore Advanced Aspirin, EmbedStore Aspirin Extra Strength, EmbedStore Aspirin Plus, Eulogio Aspirin, EmbedStore Genuine Aspirin, EmbedStore Womens Aspirin , Bufferin Extra Strength, Bufferin Low Dose, Bufferin  What should I tell my health care provider before I take this medicine?  They need to know if you have any of these conditions:  -anemia  -asthma  -bleeding problems  -child with chickenpox, the flu, or other viral infection  -diabetes  -gout  -if you frequently drink alcohol containing drinks  -kidney disease  -liver disease  -low level of vitamin K  -lupus  -smoke tobacco  -stomach ulcers or other problems  -an unusual or allergic reaction to aspirin, tartrazine dye, other medicines, dyes, or preservatives  -pregnant or trying to get pregnant  -breast-feeding  How should I use this medicine?  Take this medicine by mouth with a glass of water. Follow the directions on the package or prescription label. You can take this  medicine with or without food. If it upsets your stomach, take it with food. Do not take your medicine more often than directed.  Talk to your pediatrician regarding the use of this medicine in children. While this drug may be prescribed for children as young as 12 years of age for selected conditions, precautions do apply. Children and teenagers should not use this medicine to treat chicken pox or flu symptoms unless directed by a doctor.  Patients over 65 years old may have a stronger reaction and need a smaller dose.  Overdosage: If you think you have taken too much of this medicine contact a poison control center or emergency room at once.  NOTE: This medicine is only for you. Do not share this medicine with others.  What if I miss a dose?  If you are taking this medicine on a regular schedule and miss a dose, take it as soon as you can. If it is almost time for your next dose, take only that dose. Do not take double or extra doses.  What may interact with this medicine?  Do not take this medicine with any of the following medications:  -cidofovir  -ketorolac  -probenecid  This medicine may also interact with the following medications:  -alcohol  -alendronate  -bismuth subsalicylate  -flavocoxid  -herbal supplements like feverfew, garlic, duane, ginkgo biloba, horse chestnut  -medicines for diabetes or glaucoma like acetazolamide, methazolamide  -medicines for gout  -medicines that treat or prevent blood clots like enoxaparin, heparin, ticlopidine, warfarin  -other aspirin and aspirin-like medicines  -NSAIDs, medicines for pain and inflammation, like ibuprofen or naproxen  -pemetrexed  -sulfinpyrazone  -varicella live vaccine  This list may not describe all possible interactions. Give your health care provider a list of all the medicines, herbs, non-prescription drugs, or dietary supplements you use. Also tell them if you smoke, drink alcohol, or use illegal drugs. Some items may interact with your  medicine.  What should I watch for while using this medicine?  If you are treating yourself for pain, tell your doctor or health care professional if the pain lasts more than 10 days, if it gets worse, or if there is a new or different kind of pain. Tell your doctor if you see redness or swelling. Also, check with your doctor if you have a fever that lasts for more than 3 days. Only take this medicine to prevent heart attacks or blood clotting if prescribed by your doctor or health care professional.  Do not take aspirin or aspirin-like medicines with this medicine. Too much aspirin can be dangerous. Always read the labels carefully.  This medicine can irritate your stomach or cause bleeding problems. Do not smoke cigarettes or drink alcohol while taking this medicine. Do not lie down for 30 minutes after taking this medicine to prevent irritation to your throat.  If you are scheduled for any medical or dental procedure, tell your healthcare provider that you are taking this medicine. You may need to stop taking this medicine before the procedure.  What side effects may I notice from receiving this medicine?  Side effects that you should report to your doctor or health care professional as soon as possible:  -allergic reactions like skin rash, itching or hives, swelling of the face, lips, or tongue  -black, tarry stools  -bloody, coffee ground-like vomit  -breathing problems  -changes in hearing, ringing in the ears  -confusion  -general ill feeling or flu-like symptoms  -pain on swallowing  -redness, blistering, peeling or loosening of the skin, including inside the mouth or nose  -trouble passing urine or change in the amount of urine  -unusual bleeding or bruising  -unusually weak or tired  -yellowing of the eyes or skin  Side effects that usually do not require medical attention (report to your doctor or health care professional if they continue or are bothersome):  -diarrhea or constipation  -nausea,  vomiting  -stomach gas, heartburn  This list may not describe all possible side effects. Call your doctor for medical advice about side effects. You may report side effects to FDA at 8-315-EBV-2878.  Where should I keep my medicine?  Keep out of the reach of children.  Store at room temperature between 15 and 30 degrees C (59 and 86 degrees F). Protect from heat and moisture. Do not use this medicine if it has a strong vinegar smell. Throw away any unused medicine after the expiration date.  NOTE: This sheet is a summary. It may not cover all possible information. If you have questions about this medicine, talk to your doctor, pharmacist, or health care provider.  © 2014, Elsevier/Gold Standard. (3/10/2009 10:44:17 AM)      · Is patient discharged on Warfarin / Coumadin?   No     · Is patient Post Blood Transfusion?  No    Depression / Suicide Risk    As you are discharged from this [facility] facility, it is important to learn how to keep safe from harming yourself.    Recognize the warning signs:  · Abrupt changes in personality, positive or negative- including increase in energy   · Giving away possessions  · Change in eating patterns- significant weight changes-  positive or negative  · Change in sleeping patterns- unable to sleep or sleeping all the time   · Unwillingness or inability to communicate  · Depression  · Unusual sadness, discouragement and loneliness  · Talk of wanting to die  · Neglect of personal appearance   · Rebelliousness- reckless behavior  · Withdrawal from people/activities they love  · Confusion- inability to concentrate     If you or a loved one observes any of these behaviors or has concerns about self-harm, here's what you can do:  · Talk about it- your feelings and reasons for harming yourself  · Remove any means that you might use to hurt yourself (examples: pills, rope, extension cords, firearm)  · Get professional help from the community (Mental Health, Substance Abuse,  psychological counseling)  · Do not be alone:Call your Safe Contact- someone whom you trust who will be there for you.  · Call your local CRISIS HOTLINE 491-6813 or 234-514-2068  · Call your local Children's Mobile Crisis Response Team [place] (304) 959-1068 or www.Art [employer] Click  · Call the toll free National Suicide Prevention Hotlines   · National Suicide Prevention Lifeline 673-595-MRPL (2150)  · National Hope Line Network 800-SUICIDE (315-0670)

## 2017-05-09 NOTE — PROGRESS NOTES
Pt up in  chair. No signs of distress noted. VSS. CMS intact to bilat feet. Able to plantar/dorsi flex both feet as well. DSC CDI. She states her pain under control with scheduled medication and that her right shoulder seems to be hurting her more than surgical hip. Pt working with the patient now. She is up with the FWW and seems to be tolerating ambulation better with the 4ww.

## 2017-05-09 NOTE — CARE PLAN
Problem: Safety  Goal: Will remain free from injury  Pt tolerating ambulation with her 4ww.     Problem: Knowledge Deficit  Goal: Knowledge of disease process/condition, treatment plan, diagnostic tests, and medications will improve  Discussed POC for the day including PT, OT and pain management. Pt wants to plan for discharge today.     Problem: Discharge Barriers/Planning  Goal: Patient’s continuum of care needs will be met  Pt has equipment at home as well as in home assistance.

## 2017-05-09 NOTE — FACE TO FACE
Face to Face Supporting Documentation - Home Health    The encounter with this patient was in whole or in part the primary reason for home health admission.    Date of encounter:   Patient:                    MRN:                       YOB: 2015  Irina Traylor  [id number]  1940     Home health to see patient for:  Skilled Nursing care for assessment, interventions & education  And PT  Skilled need for:  Surgical Aftercare post left KIRILL    Skilled nursing interventions to include:  Comment: Help with ADLS, bathing, wound care    Homebound status evidenced by:  Need the aid of supportive devices such as crutches, canes, wheelchairs or walkers. Leaving home requires a considerable and taxing effort. There is a normal inability to leave the home.    Community Physician to provide follow up care: Kailee Coyne D.O.     Optional Interventions? Yes, Details: Home health assistance and PT      I certify the face to face encounter for this home health care referral meets the CMS requirements and the encounter/clinical assessment with the patient was, in whole, or in part, for the medical condition(s) listed above, which is the primary reason for home health care. Based on my clinical findings: the service(s) are medically necessary, support the need for home health care, and the homebound criteria are met.  I certify that this patient has had a face to face encounter by myself.  Earle Jacobsen M.D. - NPI: [id number]

## 2017-05-10 NOTE — DISCHARGE SUMMARY
DATE OF ADMISSION:  05/08/2017    DATE OF DISCHARGE:  05/09/2017    ADMITTING DIAGNOSIS:  Advanced degenerative arthritis, left hip.    DISCHARGE DIAGNOSIS:  Advanced degenerative arthritis, left hip.    PROCEDURE:  Left total hip replacement on0 5/08/2017.    INFECTIONS:  None.    COMPLICATIONS:  None.    CONDITION ON DISCHARGE:  Stable.    ADMITTING INFORMATION:  Please see dictated note.    HOSPITAL COURSE:  After appropriate preoperative evaluation, obtaining   informed consent, the patient was taken to the operating room on 05/08/2017,   where the above-listed procedure was performed under general anesthetic agent.    The patient tolerated the procedure well and was taken to recovery room and   then to the floor in stable condition.  The patient received 24 hours of   perioperative Ancef.  She was placed on aspirin as well as use of mechanical   prophylaxis for DVT prevention.  She has had a benign postoperative course.    On the evening of surgery, she was helped up with ambulation and by   postoperative day #1, she is comfortable on p.o. pain medication and   independently ambulatory with her walker.  She has a stable hemoglobin, a   clean and intact wound and intact neurologic and vascular exam and soft and   nontender calf.  She will continue with therapy until this afternoon and will   be discharged home after afternoon therapy today.    DISPOSITION:  The patient will be discharged home today and will return to see   me in approximately 2 weeks for wound check and staple removal.  She may be   weightbearing as tolerated on the left leg and has been repeatedly instructed   in total hip precautions.  She will wear her knee immobilizer at night when   she is sleeping.  Arrangements were made for home health to begin this week to   help with activities of daily living and wound care as well as physical   therapy to work vigorously on her ambulation and strengthening.  The wound   should be kept clean and dry  and she may shower with the incision covered.  We   should be notified if there are problems with increasing pain, drainage,   evidence of infection, neurovascular compromise or other significant concern.    The patient will continue with her home medication and does have oral   morphine at home if she needs it, although she states her pain is very minimal   at this time.       ____________________________________     MD JEMAL Márquez / TAB    DD:  05/09/2017 08:32:15  DT:  05/09/2017 19:54:11    D#:  [id number]  Job#:  [id number]

## 2017-05-15 NOTE — DISCHARGE PLANNING
Received call from Amanda jolly [place] they have accepted patient on service and saw patient today.

## 2017-05-16 ENCOUNTER — PATIENT OUTREACH (OUTPATIENT)
Dept: HEALTH INFORMATION MANAGEMENT | Facility: OTHER | Age: 77
End: 2017-05-16

## 2017-06-22 ENCOUNTER — HOSPITAL ENCOUNTER (OUTPATIENT)
Dept: LAB | Facility: MEDICAL CENTER | Age: 77
End: 2017-06-22
Attending: FAMILY MEDICINE
Payer: MEDICARE

## 2017-06-22 LAB
BUN SERPL-MCNC: 33 MG/DL (ref 8–22)
CHOLEST SERPL-MCNC: 165 MG/DL (ref 100–199)
CREAT SERPL-MCNC: 1.08 MG/DL (ref 0.5–1.4)
CREAT UR-MCNC: 84.9 MG/DL
GFR SERPL CREATININE-BSD FRML MDRD: 50 ML/MIN/1.73 M 2
GLUCOSE SERPL-MCNC: 85 MG/DL (ref 65–99)
HDLC SERPL-MCNC: 69 MG/DL
LDLC SERPL CALC-MCNC: 84 MG/DL
MICROALBUMIN UR-MCNC: 5.1 MG/DL
MICROALBUMIN/CREAT UR: 60 MG/G (ref 0–30)
TRIGL SERPL-MCNC: 60 MG/DL (ref 0–149)

## 2017-06-22 PROCEDURE — 80061 LIPID PANEL: CPT

## 2017-06-22 PROCEDURE — 84520 ASSAY OF UREA NITROGEN: CPT

## 2017-06-22 PROCEDURE — 82570 ASSAY OF URINE CREATININE: CPT

## 2017-06-22 PROCEDURE — 82947 ASSAY GLUCOSE BLOOD QUANT: CPT

## 2017-06-22 PROCEDURE — 36415 COLL VENOUS BLD VENIPUNCTURE: CPT

## 2017-06-22 PROCEDURE — 82565 ASSAY OF CREATININE: CPT

## 2017-06-22 PROCEDURE — 82043 UR ALBUMIN QUANTITATIVE: CPT

## 2017-06-29 ENCOUNTER — HOSPITAL ENCOUNTER (OUTPATIENT)
Dept: RADIOLOGY | Facility: MEDICAL CENTER | Age: 77
End: 2017-06-29
Attending: NEUROLOGICAL SURGERY
Payer: MEDICARE

## 2017-06-29 ENCOUNTER — HOSPITAL ENCOUNTER (OUTPATIENT)
Dept: RADIOLOGY | Facility: MEDICAL CENTER | Age: 77
End: 2017-06-29
Attending: ORTHOPAEDIC SURGERY
Payer: MEDICARE

## 2017-06-29 DIAGNOSIS — M54.2 NECK ACHE: ICD-10-CM

## 2017-06-29 DIAGNOSIS — M24.111 ARTICULAR CARTILAGE DISORDER OF RIGHT SHOULDER REGION: ICD-10-CM

## 2017-06-29 PROCEDURE — 73221 MRI JOINT UPR EXTREM W/O DYE: CPT | Mod: RT

## 2017-06-29 PROCEDURE — 72141 MRI NECK SPINE W/O DYE: CPT

## 2017-07-12 ENCOUNTER — HOSPITAL ENCOUNTER (OUTPATIENT)
Dept: RADIOLOGY | Facility: MEDICAL CENTER | Age: 77
End: 2017-07-12
Attending: NEUROLOGICAL SURGERY
Payer: MEDICARE

## 2017-07-12 PROCEDURE — 72050 X-RAY EXAM NECK SPINE 4/5VWS: CPT

## 2017-07-19 ENCOUNTER — PATIENT OUTREACH (OUTPATIENT)
Dept: HEALTH INFORMATION MANAGEMENT | Facility: OTHER | Age: 77
End: 2017-07-19

## 2017-07-19 NOTE — PROGRESS NOTES
Irina Traylor was discharged from [facility] on 05/09/17 with a LACE score of 8. She was discharged home to follow up with her surgeon in 2 weeks. She also has an appointment for Lab services previously scheduled. [facility]'s Patient Advocate was able to engage with patient and confirm that she did not need new medication when she was discharged. Patient was not send home with any other services. Please see patients appointments below:    - Specialist - 06/27/17 - Dr. Earle Jacobsen - Appointment scheduled   Lab Services - 06/22/17 - Appointment scheduled

## 2017-07-25 ENCOUNTER — HOSPITAL ENCOUNTER (OUTPATIENT)
Dept: RADIOLOGY | Facility: MEDICAL CENTER | Age: 77
DRG: 472 | End: 2017-07-25
Attending: NEUROLOGICAL SURGERY | Admitting: NEUROLOGICAL SURGERY
Payer: MEDICARE

## 2017-07-25 ENCOUNTER — APPOINTMENT (OUTPATIENT)
Dept: ADMISSIONS | Facility: MEDICAL CENTER | Age: 77
DRG: 472 | End: 2017-07-25
Payer: MEDICARE

## 2017-07-25 DIAGNOSIS — Z01.811 PRE-OPERATIVE RESPIRATORY EXAMINATION: ICD-10-CM

## 2017-07-25 DIAGNOSIS — Z01.810 PRE-OPERATIVE CARDIOVASCULAR EXAMINATION: ICD-10-CM

## 2017-07-25 DIAGNOSIS — Z01.812 PRE-OPERATIVE LABORATORY EXAMINATION: ICD-10-CM

## 2017-07-25 LAB
ANION GAP SERPL CALC-SCNC: 4 MMOL/L (ref 0–11.9)
APTT PPP: 26.9 SEC (ref 24.7–36)
BASOPHILS # BLD AUTO: 0.8 % (ref 0–1.8)
BASOPHILS # BLD: 0.05 K/UL (ref 0–0.12)
BUN SERPL-MCNC: 38 MG/DL (ref 8–22)
CALCIUM SERPL-MCNC: 10.5 MG/DL (ref 8.5–10.5)
CHLORIDE SERPL-SCNC: 105 MMOL/L (ref 96–112)
CO2 SERPL-SCNC: 28 MMOL/L (ref 20–33)
CREAT SERPL-MCNC: 1.27 MG/DL (ref 0.5–1.4)
EKG IMPRESSION: NORMAL
EOSINOPHIL # BLD AUTO: 0.11 K/UL (ref 0–0.51)
EOSINOPHIL NFR BLD: 1.7 % (ref 0–6.9)
ERYTHROCYTE [DISTWIDTH] IN BLOOD BY AUTOMATED COUNT: 48.4 FL (ref 35.9–50)
GFR SERPL CREATININE-BSD FRML MDRD: 41 ML/MIN/1.73 M 2
GLUCOSE SERPL-MCNC: 80 MG/DL (ref 65–99)
HCT VFR BLD AUTO: 39.9 % (ref 37–47)
HGB BLD-MCNC: 12.3 G/DL (ref 12–16)
IMM GRANULOCYTES # BLD AUTO: 0.03 K/UL (ref 0–0.11)
IMM GRANULOCYTES NFR BLD AUTO: 0.5 % (ref 0–0.9)
INR PPP: 0.93 (ref 0.87–1.13)
LYMPHOCYTES # BLD AUTO: 1.2 K/UL (ref 1–4.8)
LYMPHOCYTES NFR BLD: 18.5 % (ref 22–41)
MCH RBC QN AUTO: 28.7 PG (ref 27–33)
MCHC RBC AUTO-ENTMCNC: 30.8 G/DL (ref 33.6–35)
MCV RBC AUTO: 93.2 FL (ref 81.4–97.8)
MONOCYTES # BLD AUTO: 0.52 K/UL (ref 0–0.85)
MONOCYTES NFR BLD AUTO: 8 % (ref 0–13.4)
NEUTROPHILS # BLD AUTO: 4.58 K/UL (ref 2–7.15)
NEUTROPHILS NFR BLD: 70.5 % (ref 44–72)
NRBC # BLD AUTO: 0 K/UL
NRBC BLD AUTO-RTO: 0 /100 WBC
PLATELET # BLD AUTO: 322 K/UL (ref 164–446)
PMV BLD AUTO: 11.8 FL (ref 9–12.9)
POTASSIUM SERPL-SCNC: 5.4 MMOL/L (ref 3.6–5.5)
PROTHROMBIN TIME: 12.8 SEC (ref 12–14.6)
RBC # BLD AUTO: 4.28 M/UL (ref 4.2–5.4)
SODIUM SERPL-SCNC: 137 MMOL/L (ref 135–145)
WBC # BLD AUTO: 6.5 K/UL (ref 4.8–10.8)

## 2017-07-25 PROCEDURE — 93010 ELECTROCARDIOGRAM REPORT: CPT | Performed by: INTERNAL MEDICINE

## 2017-07-25 PROCEDURE — 85730 THROMBOPLASTIN TIME PARTIAL: CPT

## 2017-07-25 PROCEDURE — 36415 COLL VENOUS BLD VENIPUNCTURE: CPT

## 2017-07-25 PROCEDURE — 80048 BASIC METABOLIC PNL TOTAL CA: CPT

## 2017-07-25 PROCEDURE — 71020 DX-CHEST-2 VIEWS: CPT

## 2017-07-25 PROCEDURE — 85025 COMPLETE CBC W/AUTO DIFF WBC: CPT

## 2017-07-25 PROCEDURE — 93005 ELECTROCARDIOGRAM TRACING: CPT

## 2017-07-25 PROCEDURE — 85610 PROTHROMBIN TIME: CPT

## 2017-07-25 RX ORDER — DIPHENHYDRAMINE HCL 25 MG
25 TABLET ORAL 2 TIMES DAILY
Status: ON HOLD | COMMUNITY
End: 2017-09-07

## 2017-07-25 NOTE — OR NURSING
Pre admit appointment: pt lives alone but stated that she will have someone to stay with her and check on her upon discharge.

## 2017-08-08 ENCOUNTER — APPOINTMENT (OUTPATIENT)
Dept: RADIOLOGY | Facility: MEDICAL CENTER | Age: 77
DRG: 472 | End: 2017-08-08
Attending: NEUROLOGICAL SURGERY
Payer: MEDICARE

## 2017-08-08 ENCOUNTER — HOSPITAL ENCOUNTER (INPATIENT)
Facility: MEDICAL CENTER | Age: 77
LOS: 1 days | DRG: 472 | End: 2017-08-09
Attending: NEUROLOGICAL SURGERY | Admitting: NEUROLOGICAL SURGERY
Payer: MEDICARE

## 2017-08-08 DIAGNOSIS — M50.00 INTERVERTEBRAL CERVICAL DISC DISORDER WITH MYELOPATHY, CERVICAL REGION: ICD-10-CM

## 2017-08-08 PROCEDURE — 700102 HCHG RX REV CODE 250 W/ 637 OVERRIDE(OP): Performed by: PHYSICIAN ASSISTANT

## 2017-08-08 PROCEDURE — 0RG10K1 FUSION OF CERVICAL VERTEBRAL JOINT WITH NONAUTOLOGOUS TISSUE SUBSTITUTE, POSTERIOR APPROACH, POSTERIOR COLUMN, OPEN APPROACH: ICD-10-PCS | Performed by: NEUROLOGICAL SURGERY

## 2017-08-08 PROCEDURE — 160042 HCHG SURGERY MINUTES - EA ADDL 1 MIN LEVEL 5: Performed by: NEUROLOGICAL SURGERY

## 2017-08-08 PROCEDURE — 700111 HCHG RX REV CODE 636 W/ 250 OVERRIDE (IP)

## 2017-08-08 PROCEDURE — A9270 NON-COVERED ITEM OR SERVICE: HCPCS

## 2017-08-08 PROCEDURE — 501423 HCHG SPONGE, SURGIFOAM 12X7: Performed by: NEUROLOGICAL SURGERY

## 2017-08-08 PROCEDURE — 4A11X4G MONITORING OF PERIPHERAL NERVOUS ELECTRICAL ACTIVITY, INTRAOPERATIVE, EXTERNAL APPROACH: ICD-10-PCS | Performed by: NEUROLOGICAL SURGERY

## 2017-08-08 PROCEDURE — 72040 X-RAY EXAM NECK SPINE 2-3 VW: CPT

## 2017-08-08 PROCEDURE — 500105 HCHG BONE MILL BM200: Performed by: NEUROLOGICAL SURGERY

## 2017-08-08 PROCEDURE — 502626 HCHG SURGIFLO HEMOSTATIC MATRIX 6ML: Performed by: NEUROLOGICAL SURGERY

## 2017-08-08 PROCEDURE — 770001 HCHG ROOM/CARE - MED/SURG/GYN PRIV*

## 2017-08-08 PROCEDURE — 160036 HCHG PACU - EA ADDL 30 MINS PHASE I: Performed by: NEUROLOGICAL SURGERY

## 2017-08-08 PROCEDURE — 700101 HCHG RX REV CODE 250

## 2017-08-08 PROCEDURE — 700112 HCHG RX REV CODE 229: Performed by: PHYSICIAN ASSISTANT

## 2017-08-08 PROCEDURE — 502240 HCHG MISC OR SUPPLY RC 0272: Performed by: NEUROLOGICAL SURGERY

## 2017-08-08 PROCEDURE — 160009 HCHG ANES TIME/MIN: Performed by: NEUROLOGICAL SURGERY

## 2017-08-08 PROCEDURE — 700111 HCHG RX REV CODE 636 W/ 250 OVERRIDE (IP): Performed by: PHYSICIAN ASSISTANT

## 2017-08-08 PROCEDURE — 160035 HCHG PACU - 1ST 60 MINS PHASE I: Performed by: NEUROLOGICAL SURGERY

## 2017-08-08 PROCEDURE — A9270 NON-COVERED ITEM OR SERVICE: HCPCS | Performed by: PHYSICIAN ASSISTANT

## 2017-08-08 PROCEDURE — 700101 HCHG RX REV CODE 250: Performed by: PHYSICIAN ASSISTANT

## 2017-08-08 PROCEDURE — 502000 HCHG MISC OR IMPLANTS RC 0278: Performed by: NEUROLOGICAL SURGERY

## 2017-08-08 PROCEDURE — 160031 HCHG SURGERY MINUTES - 1ST 30 MINS LEVEL 5: Performed by: NEUROLOGICAL SURGERY

## 2017-08-08 PROCEDURE — 500364 HCHG DISSECT TOOL, MIDAS: Performed by: NEUROLOGICAL SURGERY

## 2017-08-08 PROCEDURE — 160048 HCHG OR STATISTICAL LEVEL 1-5: Performed by: NEUROLOGICAL SURGERY

## 2017-08-08 PROCEDURE — 500331 HCHG COTTONOID, SURG PATTIE: Performed by: NEUROLOGICAL SURGERY

## 2017-08-08 PROCEDURE — 01N10ZZ RELEASE CERVICAL NERVE, OPEN APPROACH: ICD-10-PCS | Performed by: NEUROLOGICAL SURGERY

## 2017-08-08 PROCEDURE — 500444 HCHG HEMOSTAT, SURGICEL 2X3: Performed by: NEUROLOGICAL SURGERY

## 2017-08-08 PROCEDURE — 501838 HCHG SUTURE GENERAL: Performed by: NEUROLOGICAL SURGERY

## 2017-08-08 PROCEDURE — 160002 HCHG RECOVERY MINUTES (STAT): Performed by: NEUROLOGICAL SURGERY

## 2017-08-08 PROCEDURE — 700102 HCHG RX REV CODE 250 W/ 637 OVERRIDE(OP)

## 2017-08-08 PROCEDURE — 500122 HCHG BOVIE, BLADE: Performed by: NEUROLOGICAL SURGERY

## 2017-08-08 DEVICE — IMPLANTABLE DEVICE: Type: IMPLANTABLE DEVICE | Status: FUNCTIONAL

## 2017-08-08 DEVICE — BONE MATRIX OSTEOCEL PRO 2CC (FORMALY 1CC BONE MATRIX)---MUST ORDER IN QTY'S OF 10---: Type: IMPLANTABLE DEVICE | Status: FUNCTIONAL

## 2017-08-08 RX ORDER — LIDOCAINE AND PRILOCAINE 25; 25 MG/G; MG/G
1 CREAM TOPICAL
Status: DISCONTINUED | OUTPATIENT
Start: 2017-08-08 | End: 2017-08-09 | Stop reason: HOSPADM

## 2017-08-08 RX ORDER — DIPHENHYDRAMINE HYDROCHLORIDE 50 MG/ML
25 INJECTION INTRAMUSCULAR; INTRAVENOUS EVERY 6 HOURS PRN
Status: DISCONTINUED | OUTPATIENT
Start: 2017-08-08 | End: 2017-08-09 | Stop reason: HOSPADM

## 2017-08-08 RX ORDER — DIPHENHYDRAMINE HCL 25 MG
25 TABLET ORAL 2 TIMES DAILY
Status: DISCONTINUED | OUTPATIENT
Start: 2017-08-08 | End: 2017-08-08

## 2017-08-08 RX ORDER — DIPHENHYDRAMINE HCL 25 MG
25 TABLET ORAL EVERY 6 HOURS PRN
Status: DISCONTINUED | OUTPATIENT
Start: 2017-08-08 | End: 2017-08-09 | Stop reason: HOSPADM

## 2017-08-08 RX ORDER — SODIUM CHLORIDE, SODIUM LACTATE, POTASSIUM CHLORIDE, CALCIUM CHLORIDE 600; 310; 30; 20 MG/100ML; MG/100ML; MG/100ML; MG/100ML
INJECTION, SOLUTION INTRAVENOUS CONTINUOUS
Status: DISCONTINUED | OUTPATIENT
Start: 2017-08-08 | End: 2017-08-09 | Stop reason: HOSPADM

## 2017-08-08 RX ORDER — LORATADINE 10 MG/1
10 TABLET ORAL
COMMUNITY
End: 2019-06-04 | Stop reason: SDUPTHER

## 2017-08-08 RX ORDER — MORPHINE SULFATE 4 MG/ML
INJECTION, SOLUTION INTRAMUSCULAR; INTRAVENOUS
Status: COMPLETED
Start: 2017-08-08 | End: 2017-08-08

## 2017-08-08 RX ORDER — CYCLOBENZAPRINE HCL 10 MG
10 TABLET ORAL EVERY 8 HOURS PRN
Status: DISCONTINUED | OUTPATIENT
Start: 2017-08-08 | End: 2017-08-09 | Stop reason: HOSPADM

## 2017-08-08 RX ORDER — AMOXICILLIN 250 MG
1 CAPSULE ORAL NIGHTLY
Status: DISCONTINUED | OUTPATIENT
Start: 2017-08-08 | End: 2017-08-09 | Stop reason: HOSPADM

## 2017-08-08 RX ORDER — IRBESARTAN 150 MG/1
150 TABLET ORAL EVERY MORNING
Status: DISCONTINUED | OUTPATIENT
Start: 2017-08-09 | End: 2017-08-09 | Stop reason: HOSPADM

## 2017-08-08 RX ORDER — DILTIAZEM HYDROCHLORIDE 240 MG/1
240 CAPSULE, COATED, EXTENDED RELEASE ORAL
Status: DISCONTINUED | OUTPATIENT
Start: 2017-08-08 | End: 2017-08-09 | Stop reason: HOSPADM

## 2017-08-08 RX ORDER — LABETALOL HYDROCHLORIDE 5 MG/ML
INJECTION, SOLUTION INTRAVENOUS
Status: DISPENSED
Start: 2017-08-08 | End: 2017-08-09

## 2017-08-08 RX ORDER — DOCUSATE SODIUM 100 MG/1
100 CAPSULE, LIQUID FILLED ORAL 2 TIMES DAILY
Status: DISCONTINUED | OUTPATIENT
Start: 2017-08-08 | End: 2017-08-09 | Stop reason: HOSPADM

## 2017-08-08 RX ORDER — OXYCODONE HYDROCHLORIDE 10 MG/1
5 TABLET ORAL
Status: DISCONTINUED | OUTPATIENT
Start: 2017-08-08 | End: 2017-08-09 | Stop reason: HOSPADM

## 2017-08-08 RX ORDER — AMOXICILLIN 250 MG
1 CAPSULE ORAL
Status: DISCONTINUED | OUTPATIENT
Start: 2017-08-08 | End: 2017-08-09 | Stop reason: HOSPADM

## 2017-08-08 RX ORDER — MORPHINE SULFATE 4 MG/ML
2 INJECTION, SOLUTION INTRAMUSCULAR; INTRAVENOUS
Status: DISCONTINUED | OUTPATIENT
Start: 2017-08-08 | End: 2017-08-09 | Stop reason: HOSPADM

## 2017-08-08 RX ORDER — FENOFIBRATE 145 MG/1
145 TABLET, COATED ORAL EVERY MORNING
Status: DISCONTINUED | OUTPATIENT
Start: 2017-08-09 | End: 2017-08-08

## 2017-08-08 RX ORDER — POLYETHYLENE GLYCOL 3350 17 G/17G
1 POWDER, FOR SOLUTION ORAL 2 TIMES DAILY PRN
Status: DISCONTINUED | OUTPATIENT
Start: 2017-08-08 | End: 2017-08-09 | Stop reason: HOSPADM

## 2017-08-08 RX ORDER — BISACODYL 10 MG
10 SUPPOSITORY, RECTAL RECTAL
Status: DISCONTINUED | OUTPATIENT
Start: 2017-08-08 | End: 2017-08-09 | Stop reason: HOSPADM

## 2017-08-08 RX ORDER — LABETALOL HYDROCHLORIDE 5 MG/ML
INJECTION, SOLUTION INTRAVENOUS
Status: COMPLETED
Start: 2017-08-08 | End: 2017-08-08

## 2017-08-08 RX ORDER — FENOFIBRATE 134 MG/1
134 CAPSULE ORAL DAILY
Status: DISCONTINUED | OUTPATIENT
Start: 2017-08-09 | End: 2017-08-09 | Stop reason: HOSPADM

## 2017-08-08 RX ORDER — HYDROMORPHONE HYDROCHLORIDE 2 MG/1
2 TABLET ORAL
Status: COMPLETED | OUTPATIENT
Start: 2017-08-08 | End: 2017-08-08

## 2017-08-08 RX ORDER — LORATADINE 10 MG/1
10 TABLET ORAL DAILY
Status: DISCONTINUED | OUTPATIENT
Start: 2017-08-09 | End: 2017-08-09 | Stop reason: HOSPADM

## 2017-08-08 RX ORDER — ONDANSETRON 4 MG/1
4 TABLET, ORALLY DISINTEGRATING ORAL EVERY 4 HOURS PRN
Status: DISCONTINUED | OUTPATIENT
Start: 2017-08-08 | End: 2017-08-09 | Stop reason: HOSPADM

## 2017-08-08 RX ORDER — TEMAZEPAM 15 MG/1
15 CAPSULE ORAL
Status: DISCONTINUED | OUTPATIENT
Start: 2017-08-09 | End: 2017-08-09 | Stop reason: HOSPADM

## 2017-08-08 RX ORDER — MORPHINE SULFATE 10 MG/ML
INJECTION, SOLUTION INTRAMUSCULAR; INTRAVENOUS
Status: COMPLETED
Start: 2017-08-08 | End: 2017-08-08

## 2017-08-08 RX ORDER — TRAMADOL HYDROCHLORIDE 50 MG/1
100 TABLET ORAL EVERY 6 HOURS PRN
Status: DISCONTINUED | OUTPATIENT
Start: 2017-08-08 | End: 2017-08-09 | Stop reason: HOSPADM

## 2017-08-08 RX ORDER — ONDANSETRON 2 MG/ML
4 INJECTION INTRAMUSCULAR; INTRAVENOUS EVERY 4 HOURS PRN
Status: DISCONTINUED | OUTPATIENT
Start: 2017-08-08 | End: 2017-08-09 | Stop reason: HOSPADM

## 2017-08-08 RX ORDER — CLONIDINE HYDROCHLORIDE 0.1 MG/1
0.1 TABLET ORAL EVERY 4 HOURS PRN
Status: DISCONTINUED | OUTPATIENT
Start: 2017-08-08 | End: 2017-08-09 | Stop reason: HOSPADM

## 2017-08-08 RX ORDER — OXYCODONE HYDROCHLORIDE 5 MG/1
2.5 TABLET ORAL
Status: DISCONTINUED | OUTPATIENT
Start: 2017-08-08 | End: 2017-08-09 | Stop reason: HOSPADM

## 2017-08-08 RX ORDER — SODIUM CHLORIDE AND POTASSIUM CHLORIDE 150; 900 MG/100ML; MG/100ML
INJECTION, SOLUTION INTRAVENOUS CONTINUOUS
Status: DISCONTINUED | OUTPATIENT
Start: 2017-08-08 | End: 2017-08-09 | Stop reason: HOSPADM

## 2017-08-08 RX ORDER — ENEMA 19; 7 G/133ML; G/133ML
1 ENEMA RECTAL
Status: DISCONTINUED | OUTPATIENT
Start: 2017-08-08 | End: 2017-08-09 | Stop reason: HOSPADM

## 2017-08-08 RX ORDER — BACITRACIN 50000 [IU]/1
INJECTION, POWDER, FOR SOLUTION INTRAMUSCULAR
Status: DISCONTINUED | OUTPATIENT
Start: 2017-08-08 | End: 2017-08-08 | Stop reason: HOSPADM

## 2017-08-08 RX ORDER — CEFAZOLIN SODIUM 2 G/100ML
2 INJECTION, SOLUTION INTRAVENOUS EVERY 8 HOURS
Status: DISCONTINUED | OUTPATIENT
Start: 2017-08-09 | End: 2017-08-09 | Stop reason: HOSPADM

## 2017-08-08 RX ORDER — ACETAMINOPHEN 500 MG
500 TABLET ORAL EVERY 6 HOURS
Status: DISCONTINUED | OUTPATIENT
Start: 2017-08-09 | End: 2017-08-09 | Stop reason: HOSPADM

## 2017-08-08 RX ORDER — LIDOCAINE HYDROCHLORIDE 10 MG/ML
0.5 INJECTION, SOLUTION INFILTRATION; PERINEURAL
Status: DISCONTINUED | OUTPATIENT
Start: 2017-08-08 | End: 2017-08-09 | Stop reason: HOSPADM

## 2017-08-08 RX ORDER — BUPIVACAINE HYDROCHLORIDE AND EPINEPHRINE 5; 5 MG/ML; UG/ML
INJECTION, SOLUTION EPIDURAL; INTRACAUDAL; PERINEURAL
Status: DISCONTINUED | OUTPATIENT
Start: 2017-08-08 | End: 2017-08-08 | Stop reason: HOSPADM

## 2017-08-08 RX ORDER — HYDROMORPHONE HYDROCHLORIDE 2 MG/1
TABLET ORAL
Status: COMPLETED
Start: 2017-08-08 | End: 2017-08-08

## 2017-08-08 RX ORDER — CALCIUM CARBONATE 500 MG/1
500 TABLET, CHEWABLE ORAL 2 TIMES DAILY
Status: DISCONTINUED | OUTPATIENT
Start: 2017-08-08 | End: 2017-08-09 | Stop reason: HOSPADM

## 2017-08-08 RX ADMIN — MORPHINE SULFATE 2 MG: 4 INJECTION INTRAVENOUS at 19:16

## 2017-08-08 RX ADMIN — LABETALOL HYDROCHLORIDE 5 MG: 5 INJECTION, SOLUTION INTRAVENOUS at 18:58

## 2017-08-08 RX ADMIN — POTASSIUM CHLORIDE AND SODIUM CHLORIDE: 900; 150 INJECTION, SOLUTION INTRAVENOUS at 21:20

## 2017-08-08 RX ADMIN — MORPHINE SULFATE 2 MG: 10 INJECTION INTRAVENOUS at 18:48

## 2017-08-08 RX ADMIN — DOCUSATE SODIUM 100 MG: 100 CAPSULE ORAL at 21:44

## 2017-08-08 RX ADMIN — MORPHINE SULFATE 2 MG: 4 INJECTION INTRAVENOUS at 18:16

## 2017-08-08 RX ADMIN — ANTACID TABLETS 500 MG: 500 TABLET, CHEWABLE ORAL at 21:44

## 2017-08-08 RX ADMIN — HYDROMORPHONE HYDROCHLORIDE 2 MG: 2 TABLET ORAL at 18:30

## 2017-08-08 RX ADMIN — MORPHINE SULFATE 2 MG: 10 INJECTION INTRAVENOUS at 18:53

## 2017-08-08 RX ADMIN — STANDARDIZED SENNA CONCENTRATE AND DOCUSATE SODIUM 1 TABLET: 8.6; 5 TABLET, FILM COATED ORAL at 21:44

## 2017-08-08 RX ADMIN — MORPHINE SULFATE 2 MG: 10 INJECTION INTRAVENOUS at 18:26

## 2017-08-08 RX ADMIN — MORPHINE SULFATE 2 MG: 4 INJECTION INTRAVENOUS at 21:45

## 2017-08-08 RX ADMIN — MORPHINE SULFATE 2 MG: 4 INJECTION INTRAVENOUS at 18:10

## 2017-08-08 RX ADMIN — MORPHINE SULFATE 2 MG: 10 INJECTION INTRAVENOUS at 18:42

## 2017-08-08 ASSESSMENT — PAIN SCALES - GENERAL
PAINLEVEL_OUTOF10: 6
PAINLEVEL_OUTOF10: 6
PAINLEVEL_OUTOF10: 5
PAINLEVEL_OUTOF10: 6
PAINLEVEL_OUTOF10: 4
PAINLEVEL_OUTOF10: 6
PAINLEVEL_OUTOF10: 6
PAINLEVEL_OUTOF10: 8
PAINLEVEL_OUTOF10: 5
PAINLEVEL_OUTOF10: 6

## 2017-08-08 NOTE — IP AVS SNAPSHOT
Euphoria App Access Code: Activation code not generated  Current Euphoria App Status: Patient Declined    [employer]  A secure, online tool to manage your health information     [employer]’s Euphoria App® is a secure, online tool that connects you to your personalized health information from the privacy of your home -- day or night - making it very easy for you to manage your healthcare. Once the activation process is completed, you can even access your medical information using the Euphoria App rigo, which is available for free in the Apple Rigo store or Google Play store.     Euphoria App provides the following levels of access (as shown below):   My Chart Features   [facility] Primary Care Doctor [facility]  Specialists [facility]  Urgent  Care Non-[facility]  Primary Care  Doctor   Email your healthcare team securely and privately 24/7 X X X X   Manage appointments: schedule your next appointment; view details of past/upcoming appointments X      Request prescription refills. X      View recent personal medical records, including lab and immunizations X X X X   View health record, including health history, allergies, medications X X X X   Read reports about your outpatient visits, procedures, consult and ER notes X X X X   See your discharge summary, which is a recap of your hospital and/or ER visit that includes your diagnosis, lab results, and care plan. X X       How to register for Euphoria App:  1. Go to  [email or web address].  2. Click on the Sign Up Now box, which takes you to the New Member Sign Up page. You will need to provide the following information:  a. Enter your Euphoria App Access Code exactly as it appears at the top of this page. (You will not need to use this code after you’ve completed the sign-up process. If you do not sign up before the expiration date, you must request a new code.)   b. Enter your date of birth.   c. Enter your home email address.   d. Click Submit, and follow the next screen’s instructions.  3. Create a Euphoria App ID.  This will be your MessageOne login ID and cannot be changed, so think of one that is secure and easy to remember.  4. Create a MessageOne password. You can change your password at any time.  5. Enter your Password Reset Question and Answer. This can be used at a later time if you forget your password.   6. Enter your e-mail address. This allows you to receive e-mail notifications when new information is available in MessageOne.  7. Click Sign Up. You can now view your health information.    For assistance activating your MessageOne account, call [phone]

## 2017-08-08 NOTE — IP AVS SNAPSHOT
" <p align=\"LEFT\"><IMG SRC=\"//[email or web address]\" alt=\"Image\" WIDTH=\"50%\" HEIGHT=\"200\" BORDER=\"\"></p>                   Name:Irina Traylor  Medical Record Number:[id number]  CSN: [id number]    YOB: 1940   Age: 76 y.o.  Sex: female  HT:1.549 m (5' 1\") WT: 66.3 kg (146 lb 2.6 oz)          Admit Date: 8/8/2017     Discharge Date:   Today's Date: 8/9/2017  Attending Doctor:  Florentino Pike M.D.                  Allergies:  Other misc; Benicar; Codeine; Demerol; Other drug; Percocet; Statins; Valium; and Vicodin          Follow-up Information     1. Follow up with [facility] In 2 weeks.    Why:  Post-surgery follow-up    Contact information    [address]  [phone]         Medication List      Take these Medications        Instructions    acetaminophen 325 MG Tabs   Commonly known as:  TYLENOL    Take 650 mg by mouth every four hours as needed.   Dose:  650 mg       BEANO PO   Notes to Patient:  You did not receive this medication during your hospitalization. Please resume as previous.    Take 1 Tab by mouth as needed.   Dose:  1 Tab       calcium carbonate 500 MG Chew   Commonly known as:  TUMS    Take 1 Tab by mouth 2 times a day.   Dose:  500 mg       Cholecalciferol 5000 UNITS Tabs    Take 5,000 Units by mouth every day.   Dose:  5000 Units       cyclobenzaprine 10 MG Tabs   Commonly known as:  FLEXERIL    Take 1 Tab by mouth every 8 hours as needed for Muscle Spasms.   Dose:  10 mg       diltiazem  MG Cp24   Commonly known as:  CARDIZEM CD    Take 240 mg by mouth every bedtime.   Dose:  240 mg       diphenhydrAMINE 25 MG Tabs   Commonly known as:  BENADRYL    Take 25 mg by mouth 2 Times a Day.   Dose:  25 mg       fenofibrate 145 MG Tabs   Commonly known as:  TRICOR    Take 145 mg by mouth every morning.   Dose:  145 mg       irbesartan 150 MG Tabs   Commonly known as:  AVAPRO    Take 150 mg by mouth every morning. " "Indications: High Blood Pressure   Dose:  150 mg       loratadine 10 MG Tabs   Commonly known as:  CLARITIN    Take 10 mg by mouth every day.   Dose:  10 mg       Non Formulary Request    Take 1 Package by mouth every day. Indications: VITALIZER GOLD-MULTI VITAMIN   Dose:  1 Package       NON SPECIFIED    Take 7 Caps by mouth every bedtime. \"Herb-Lax\"   Indications: constipation   Dose:  7 Cap       ondansetron 4 MG Tbdp   Commonly known as:  ZOFRAN ODT    Take 1 Tab by mouth every 8 hours as needed for Nausea/Vomiting (Nausea - Not appropriate if patient is vomiting.??Give PO if IV route is unavailable.).   Dose:  4 mg       * senna-docusate 8.6-50 MG Tabs   Commonly known as:  PERICOLACE or SENOKOT S    Take 2 Tabs by mouth 2 times a day.   Dose:  2 Tab       * senna-docusate 8.6-50 MG Tabs   Commonly known as:  PERICOLACE or SENOKOT S   Notes to Patient:  Please disregard. This is a duplicate order.    Take 1 Tab by mouth 2 times a day.   Dose:  1 Tab       * Notice:  This list has 2 medication(s) that are the same as other medications prescribed for you. Read the directions carefully, and ask your doctor or other care provider to review them with you.      "

## 2017-08-08 NOTE — IP AVS SNAPSHOT
8/9/2017    Irina Traylor  [address] [facility] [address]    Dear Irina:    [facility] wants to ensure your discharge home is safe and you or your loved ones have had all of your questions answered regarding your care after you leave the hospital.    Below is a list of resources and contact information should you have any questions regarding your hospital stay, follow-up instructions, or active medical symptoms.    Questions or Concerns Regarding… Contact   Medical Questions Related to Your Discharge  (7 days a week, 8am-5pm) Contact a Nurse Care Coordinator   [phone]   Medical Questions Not Related to Your Discharge  (24 hours a day / 7 days a week)  Contact the Nurse Health Line   [phone]    Medications or Discharge Instructions Refer to your discharge packet   or contact your [facility] Primary Care Provider   [phone]   Follow-up Appointment(s) Schedule your appointment via FlexScore   or contact Scheduling [phone]   Billing Review your statement via FlexScore  or contact Billing [phone]   Medical Records Review your records via FlexScore   or contact Medical Records [phone]     You may receive a telephone call within two days of discharge. This call is to make certain you understand your discharge instructions and have the opportunity to have any questions answered. You can also easily access your medical information, test results and upcoming appointments via the FlexScore free online health management tool. You can learn more and sign up at [employer]/FlexScore. For assistance setting up your FlexScore account, please call [phone].    Once again, we want to ensure your discharge home is safe and that you have a clear understanding of any next steps in your care. If you have any questions or concerns, please do not hesitate to contact us, we are here for you. Thank you for choosing [facility] for your healthcare needs.    Sincerely,    Your [facility] Healthcare Team

## 2017-08-08 NOTE — IP AVS SNAPSHOT
" Home Care Instructions                                                                                                                  Name:Irina Traylor  Medical Record Number:[id number]  CSN: [id number]    YOB: 1940   Age: 76 y.o.  Sex: female  HT:1.549 m (5' 1\") WT: 66.3 kg (146 lb 2.6 oz)          Admit Date: 8/8/2017     Discharge Date:   Today's Date: 8/9/2017  Attending Doctor:  Florentino Pike M.D.                  Allergies:  Other misc; Benicar; Codeine; Demerol; Other drug; Percocet; Statins; Valium; and Vicodin            Discharge Instructions       Discharge Instructions    Discharge instructions discussed with patient:   Continue to wear brace as directed, may remove for showering. OK to shower on Friday.   No bending, lifting or twisting.   Daily OTC laxative while on narcotics.   No aspirin, NSAID or blood thinners for 2 weeks.   Ambulate often to prevent clots   Continue using your incentive spirometer every hour  Follow up with [facility] in 2 weeks    Discharged to home by car with relative. Discharged via wheelchair, hospital escort: Yes.  Special equipment needed: C-Collar    Be sure to schedule a follow-up appointment with your primary care doctor or any specialists as instructed.     Discharge Plan:   Diet Plan: Discussed  Activity Level: Discussed  Confirmed Follow up Appointment: Patient to Call and Schedule Appointment  Confirmed Symptoms Management: Discussed  Medication Reconciliation Updated: Yes  Influenza Vaccine Indication: Patient Refuses    I understand that a diet low in cholesterol, fat, and sodium is recommended for good health. Unless I have been given specific instructions below for another diet, I accept this instruction as my diet prescription.   Other diet: None    Special Instructions: None    · Is patient discharged on Warfarin / Coumadin?   No     · Is patient Post Blood Transfusion?  No    Depression / Suicide Risk    As you are discharged " from this [facility] facility, it is important to learn how to keep safe from harming yourself.    Recognize the warning signs:  · Abrupt changes in personality, positive or negative- including increase in energy   · Giving away possessions  · Change in eating patterns- significant weight changes-  positive or negative  · Change in sleeping patterns- unable to sleep or sleeping all the time   · Unwillingness or inability to communicate  · Depression  · Unusual sadness, discouragement and loneliness  · Talk of wanting to die  · Neglect of personal appearance   · Rebelliousness- reckless behavior  · Withdrawal from people/activities they love  · Confusion- inability to concentrate     If you or a loved one observes any of these behaviors or has concerns about self-harm, here's what you can do:  · Talk about it- your feelings and reasons for harming yourself  · Remove any means that you might use to hurt yourself (examples: pills, rope, extension cords, firearm)  · Get professional help from the community (Mental Health, Substance Abuse, psychological counseling)  · Do not be alone:Call your Safe Contact- someone whom you trust who will be there for you.  · Call your local CRISIS HOTLINE 403-7621 or 203-276-8245  · Call your local Children's Mobile Crisis Response Team [place] (246) 070-9603 or www.[employer]  · Call the toll free National Suicide Prevention Hotlines   · National Suicide Prevention Lifeline 451-215-MCPD (3430)  · National Hope Line Network 800-SUICIDE (413-7583)        Follow-up Information     1. Follow up with [facility] MD In 2 weeks.    Why:  Post-surgery follow-up    Contact information    [address]  [address]  [facility]  [phone]         Discharge Medication Instructions:    Below are the medications your physician expects you to take upon discharge:    Review all your home medications and newly ordered medications with your doctor and/or  pharmacist. Follow medication instructions as directed by your doctor and/or pharmacist.    Please keep your medication list with you and share with your physician.               Medication List      START taking these medications        Instructions    Morning Afternoon Evening Bedtime    calcium carbonate 500 MG Chew   Last time this was given:  500 mg on 8/9/2017  8:28 AM   Commonly known as:  TUMS   Next Dose Due:  This evening        Take 1 Tab by mouth 2 times a day.   Dose:  500 mg                        Cholecalciferol 5000 UNITS Tabs   Last time this was given:  5,000 Units on 8/9/2017  8:28 AM   Next Dose Due:  Tomorrow        Take 5,000 Units by mouth every day.   Dose:  5000 Units                        cyclobenzaprine 10 MG Tabs   Last time this was given:  10 mg on 8/9/2017 11:24 AM   Commonly known as:  FLEXERIL   Next Dose Due:  If needed, 7:30pm        Take 1 Tab by mouth every 8 hours as needed for Muscle Spasms.   Dose:  10 mg                        ondansetron 4 MG Tbdp   Commonly known as:  ZOFRAN ODT   Next Dose Due:  If needed, 3pm        Take 1 Tab by mouth every 8 hours as needed for Nausea/Vomiting (Nausea - Not appropriate if patient is vomiting.??Give PO if IV route is unavailable.).   Dose:  4 mg                        * senna-docusate 8.6-50 MG Tabs   Last time this was given:  1 Tab on 8/8/2017  9:44 PM   Commonly known as:  PERICOLACE or SENOKOT S   Next Dose Due:  This evening        Take 2 Tabs by mouth 2 times a day.   Dose:  2 Tab                        * senna-docusate 8.6-50 MG Tabs   Last time this was given:  1 Tab on 8/8/2017  9:44 PM   Commonly known as:  PERICOLACE or SENOKOT S   Next Dose Due:  This is a duplicate order. Please disregard.   Notes to Patient:  Please disregard. This is a duplicate order.        Take 1 Tab by mouth 2 times a day.   Dose:  1 Tab                        * Notice:  This list has 2 medication(s) that are the same as other medications prescribed  for you. Read the directions carefully, and ask your doctor or other care provider to review them with you.      CONTINUE taking these medications        Instructions    Morning Afternoon Evening Bedtime    acetaminophen 325 MG Tabs   Last time this was given:  500 mg on 8/9/2017 11:24 AM   Commonly known as:  TYLENOL   Next Dose Due:  If needed, 3:30pm        Take 650 mg by mouth every four hours as needed.   Dose:  650 mg                        BEANO PO   Next Dose Due:  Resume as previous   Notes to Patient:  You did not receive this medication during your hospitalization. Please resume as previous.        Take 1 Tab by mouth as needed.   Dose:  1 Tab                        diltiazem  MG Cp24   Last time this was given:  240 mg on 8/9/2017 12:17 AM   Commonly known as:  CARDIZEM CD   Next Dose Due:  Tonight        Take 240 mg by mouth every bedtime.   Dose:  240 mg                        diphenhydrAMINE 25 MG Tabs   Commonly known as:  BENADRYL   Next Dose Due:  This evening        Take 25 mg by mouth 2 Times a Day.   Dose:  25 mg                        fenofibrate 145 MG Tabs   Commonly known as:  TRICOR   Next Dose Due:  Tomorrow        Take 145 mg by mouth every morning.   Dose:  145 mg                        irbesartan 150 MG Tabs   Last time this was given:  150 mg on 8/9/2017  8:29 AM   Commonly known as:  AVAPRO   Next Dose Due:  Tomorrow        Take 150 mg by mouth every morning. Indications: High Blood Pressure   Dose:  150 mg                        loratadine 10 MG Tabs   Last time this was given:  10 mg on 8/9/2017  8:28 AM   Commonly known as:  CLARITIN   Next Dose Due:  Tomorrow        Take 10 mg by mouth every day.   Dose:  10 mg                        Non Formulary Request   Next Dose Due:  Tomorrow        Take 1 Package by mouth every day. Indications: VITALIZER GOLD-MULTI VITAMIN   Dose:  1 Package                        NON SPECIFIED   Next Dose Due:  This evening        Take 7 Caps by  "mouth every bedtime. \"Herb-Lax\"   Indications: constipation   Dose:  7 Cap                          STOP taking these medications     ibuprofen 200 MG Tabs   Commonly known as:  MOTRIN   Notes to Patient:  Stop this medication. Do not take any aspirin, motrin, aleve, or ibuprofen. Do not take any non-steroidal anti-inflammatory drugs, until approved by your doctor.                    Where to Get Your Medications      Information about where to get these medications is not yet available     ! Ask your nurse or doctor about these medications    - calcium carbonate 500 MG Chew  - Cholecalciferol 5000 UNITS Tabs  - cyclobenzaprine 10 MG Tabs  - ondansetron 4 MG Tbdp  - senna-docusate 8.6-50 MG Tabs  - senna-docusate 8.6-50 MG Tabs            Orders for after discharge     REFERRAL TO HOME HEALTH    Complete by:  As directed    Home health will create and establish a plan of care             Instructions           Diet / Nutrition:    Follow any diet instructions given to you by your doctor or the dietician, including how much salt (sodium) you are allowed each day.    If you are overweight, talk to your doctor about a weight reduction plan.    Activity:    Remain physically active following your doctor's instructions about exercise and activity.    Rest often.     Any time you become even a little tired or short of breath, SIT DOWN and rest.    Worsening Symptoms:    Report any of the following signs and symptoms to the doctor's office immediately:    *Pain of jaw, arm, or neck  *Chest pain not relieved by medication                               *Dizziness or loss of consciousness  *Difficulty breathing even when at rest   *More tired than usual                                       *Bleeding drainage or swelling of surgical site  *Swelling of feet, ankles, legs or stomach                 *Fever (>100ºF)  *Pink or blood tinged sputum  *Weight gain (3lbs/day or 5lbs /week)           *Shock from internal defibrillator " (if applicable)  *Palpitations or irregular heartbeats                *Cool and/or numb extremities    Stroke Awareness    Common Risk Factors for Stroke include:    Age  Atrial Fibrillation  Carotid Artery Stenosis  Diabetes Mellitus  Excessive alcohol consumption  High blood pressure  Overweight   Physical inactivity  Smoking    Warning signs and symptoms of a stroke include:    *Sudden numbness or weakness of the face, arm or leg (especially on one side of the body).  *Sudden confusion, trouble speaking or understanding.  *Sudden trouble seeing in one or both eyes.  *Sudden trouble walking, dizziness, loss of balance or coordination.Sudden severe headache with no known cause.    It is very important to get treatment quickly when a stroke occurs. If you experience any of the above warning signs, call 911 immediately.                   Disclaimer         Quit Smoking / Tobacco Use:    I understand the use of any tobacco products increases my chance of suffering from future heart disease or stroke and could cause other illnesses which may shorten my life. Quitting the use of tobacco products is the single most important thing I can do to improve my health. For further information on smoking / tobacco cessation call a Toll Free Quit Line at 1-637.924.9905 (*National Cancer [place]) or 1-492.314.9025 (American Lung Association) or you can access the web based program at www.lungusa.org.    [place] Tobacco Users Help Line:  (337) 166-6011       Toll Free: 1-278.798.2073  Quit Tobacco Program [facility] (248)[phone]    Crisis Hotline:    [place] Crisis Hotline:  4-747-SWIZCPU or 1-683.307.7116    [place] Crisis Hotline:    1-608.382.5077 or 256-918-1955    Discharge Survey:   Thank you for choosing [facility]. We hope we did everything we could to make your hospital stay a pleasant one. You may be receiving a phone survey and we would appreciate your time and participation in answering the  questions. Your input is very valuable to us in our efforts to improve our service to our patients and their families.        My signature on this form indicates that:    1. I have reviewed and understand the above information.  2. My questions regarding this information have been answered to my satisfaction.  3. I have formulated a plan with my discharge nurse to obtain my prescribed medications for home.                  Disclaimer         __________________________________                     __________       ________                       Patient Signature                                                 Date                    Time

## 2017-08-09 VITALS
HEIGHT: 61 IN | TEMPERATURE: 98.1 F | BODY MASS INDEX: 27.6 KG/M2 | DIASTOLIC BLOOD PRESSURE: 83 MMHG | WEIGHT: 146.16 LBS | SYSTOLIC BLOOD PRESSURE: 155 MMHG | OXYGEN SATURATION: 96 % | RESPIRATION RATE: 16 BRPM | HEART RATE: 75 BPM

## 2017-08-09 PROCEDURE — G8980 MOBILITY D/C STATUS: HCPCS | Mod: CI

## 2017-08-09 PROCEDURE — G8979 MOBILITY GOAL STATUS: HCPCS | Mod: CI

## 2017-08-09 PROCEDURE — 700102 HCHG RX REV CODE 250 W/ 637 OVERRIDE(OP)

## 2017-08-09 PROCEDURE — G8978 MOBILITY CURRENT STATUS: HCPCS | Mod: CI

## 2017-08-09 PROCEDURE — G8987 SELF CARE CURRENT STATUS: HCPCS | Mod: CL

## 2017-08-09 PROCEDURE — 97165 OT EVAL LOW COMPLEX 30 MIN: CPT

## 2017-08-09 PROCEDURE — 700102 HCHG RX REV CODE 250 W/ 637 OVERRIDE(OP): Performed by: PHYSICIAN ASSISTANT

## 2017-08-09 PROCEDURE — A9270 NON-COVERED ITEM OR SERVICE: HCPCS

## 2017-08-09 PROCEDURE — 700112 HCHG RX REV CODE 229: Performed by: PHYSICIAN ASSISTANT

## 2017-08-09 PROCEDURE — G8988 SELF CARE GOAL STATUS: HCPCS | Mod: CI

## 2017-08-09 PROCEDURE — 700111 HCHG RX REV CODE 636 W/ 250 OVERRIDE (IP): Performed by: PHYSICIAN ASSISTANT

## 2017-08-09 PROCEDURE — G8989 SELF CARE D/C STATUS: HCPCS | Mod: CI

## 2017-08-09 PROCEDURE — A9270 NON-COVERED ITEM OR SERVICE: HCPCS | Performed by: PHYSICIAN ASSISTANT

## 2017-08-09 PROCEDURE — 97162 PT EVAL MOD COMPLEX 30 MIN: CPT

## 2017-08-09 RX ORDER — CALCIUM CARBONATE 500 MG/1
500 TABLET, CHEWABLE ORAL 2 TIMES DAILY
Qty: 60 TAB | Refills: 2 | Status: SHIPPED | OUTPATIENT
Start: 2017-08-09 | End: 2018-02-17

## 2017-08-09 RX ORDER — CYCLOBENZAPRINE HCL 10 MG
10 TABLET ORAL EVERY 8 HOURS PRN
Qty: 30 TAB | Refills: 0 | Status: SHIPPED | OUTPATIENT
Start: 2017-08-09 | End: 2018-02-01

## 2017-08-09 RX ORDER — AMOXICILLIN 250 MG
2 CAPSULE ORAL 2 TIMES DAILY
Qty: 30 TAB | Refills: 0 | Status: SHIPPED | OUTPATIENT
Start: 2017-08-09 | End: 2017-09-07

## 2017-08-09 RX ORDER — AMOXICILLIN 250 MG
1 CAPSULE ORAL 2 TIMES DAILY
Qty: 60 TAB | Refills: 0 | Status: SHIPPED | OUTPATIENT
Start: 2017-08-09 | End: 2017-09-07

## 2017-08-09 RX ORDER — ONDANSETRON 4 MG/1
4 TABLET, ORALLY DISINTEGRATING ORAL EVERY 8 HOURS PRN
Qty: 30 TAB | Refills: 0 | Status: ON HOLD | OUTPATIENT
Start: 2017-08-09 | End: 2017-09-07

## 2017-08-09 RX ADMIN — DILTIAZEM HYDROCHLORIDE 240 MG: 240 CAPSULE, COATED, EXTENDED RELEASE ORAL at 00:17

## 2017-08-09 RX ADMIN — CYCLOBENZAPRINE HYDROCHLORIDE 10 MG: 10 TABLET, FILM COATED ORAL at 11:24

## 2017-08-09 RX ADMIN — IRBESARTAN 150 MG: 150 TABLET ORAL at 08:29

## 2017-08-09 RX ADMIN — CEFAZOLIN SODIUM 2 G: 2 INJECTION, SOLUTION INTRAVENOUS at 00:17

## 2017-08-09 RX ADMIN — ACETAMINOPHEN 500 MG: 500 TABLET ORAL at 05:21

## 2017-08-09 RX ADMIN — LORATADINE 10 MG: 10 TABLET ORAL at 08:28

## 2017-08-09 RX ADMIN — VITAMIN D, TAB 1000IU (100/BT) 5000 UNITS: 25 TAB at 08:28

## 2017-08-09 RX ADMIN — FENOFIBRATE 134 MG: 134 CAPSULE ORAL at 08:28

## 2017-08-09 RX ADMIN — OXYCODONE HYDROCHLORIDE 5 MG: 5 TABLET ORAL at 12:56

## 2017-08-09 RX ADMIN — OXYCODONE HYDROCHLORIDE 5 MG: 5 TABLET ORAL at 16:14

## 2017-08-09 RX ADMIN — ANTACID TABLETS 500 MG: 500 TABLET, CHEWABLE ORAL at 08:28

## 2017-08-09 RX ADMIN — DOCUSATE SODIUM 100 MG: 100 CAPSULE ORAL at 08:28

## 2017-08-09 RX ADMIN — ACETAMINOPHEN 500 MG: 500 TABLET ORAL at 00:18

## 2017-08-09 RX ADMIN — TRAMADOL HYDROCHLORIDE 100 MG: 50 TABLET, COATED ORAL at 11:24

## 2017-08-09 RX ADMIN — ACETAMINOPHEN 500 MG: 500 TABLET ORAL at 11:24

## 2017-08-09 RX ADMIN — CEFAZOLIN SODIUM 2 G: 2 INJECTION, SOLUTION INTRAVENOUS at 08:27

## 2017-08-09 ASSESSMENT — COGNITIVE AND FUNCTIONAL STATUS - GENERAL
DRESSING REGULAR LOWER BODY CLOTHING: A LITTLE
SUGGESTED CMS G CODE MODIFIER DAILY ACTIVITY: CI
WALKING IN HOSPITAL ROOM: A LITTLE
DAILY ACTIVITIY SCORE: 23
MOBILITY SCORE: 20
SUGGESTED CMS G CODE MODIFIER MOBILITY: CJ
STANDING UP FROM CHAIR USING ARMS: A LITTLE
CLIMB 3 TO 5 STEPS WITH RAILING: A LITTLE
MOVING TO AND FROM BED TO CHAIR: A LITTLE

## 2017-08-09 ASSESSMENT — GAIT ASSESSMENTS
ASSISTIVE DEVICE: 4 WHEEL WALKER
DISTANCE (FEET): 400
GAIT LEVEL OF ASSIST: SUPERVISED

## 2017-08-09 ASSESSMENT — PAIN SCALES - GENERAL
PAINLEVEL_OUTOF10: 6
PAINLEVEL_OUTOF10: 5
PAINLEVEL_OUTOF10: 6

## 2017-08-09 ASSESSMENT — LIFESTYLE VARIABLES
ALCOHOL_USE: NO
EVER_SMOKED: NEVER

## 2017-08-09 ASSESSMENT — PATIENT HEALTH QUESTIONNAIRE - PHQ9
SUM OF ALL RESPONSES TO PHQ9 QUESTIONS 1 AND 2: 0
2. FEELING DOWN, DEPRESSED, IRRITABLE, OR HOPELESS: NOT AT ALL
SUM OF ALL RESPONSES TO PHQ QUESTIONS 1-9: 0
1. LITTLE INTEREST OR PLEASURE IN DOING THINGS: NOT AT ALL

## 2017-08-09 ASSESSMENT — ACTIVITIES OF DAILY LIVING (ADL): TOILETING: INDEPENDENT

## 2017-08-09 NOTE — PROGRESS NOTES
Patient arrived safely from PACU by transport. Patient resting quietly in bed, no S/S of distress, AA&Ox4. Denies SOB, chest pain, dizziness. Bed alarm on, call light within reach,  pt calls appropriately and does not get out of bed. Bed in lowest position, bed locked, RN and CNA numbers provided, no further needs at this time. No changes from EPIC. Hourly rounding in place.

## 2017-08-09 NOTE — THERAPY
"Physical Therapy Evaluation completed.   Bed Mobility:  Supine to Sit: Stand by Assist  Transfers: Sit to Stand: Supervised  Gait: Level Of Assist: Supervised with 4-Wheel Walker       Plan of Care: Patient with no further skilled PT needs in the acute care setting at this time  Discharge Recommendations: Equipment: No Equipment Needed. Post-acute therapy Discharge to home with outpatient or home health for additional skilled therapy services.    Ms. Traylor is a 77 y/o female who presents to acute s/p posterior cervical fusion C3-5 and laminectomy C3-5 secondary to myelopathy and stenosis. She presents with LE strength equal bilaterally and WFL. No significant losses of balance when ambulating with 4WW, able to maintain normalizeed gait pattern and amara. 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 Extensive support upon discharge home from friends. Reviewed spinal precautions and provided cervical spine handout. No further acute physical therapy needs at this time. Recommend pt continue with home health/outpatient physical therapy services upon discharge.     See \"Rehab Therapy-Acute\" Patient Summary Report for complete documentation.     "

## 2017-08-09 NOTE — DISCHARGE PLANNING
"Care Transition Team Discharge Planning    Anticipated Discharge Information  Anticipated discharge disposition: Home  Discharge Address: [address]  Discharge Contact Phone Number: [phone]              Discharge Plan:  Received call from bedside RN relaying pt has orders for HHC. Reviewed IHD assessment and appears that pt would like HHC through [facility]. Met with pt and RN at bedside, discussed HHC and provided choice, per pt she uses [facility] and states \"I used them when they were called [facility] and they know me\". Completed choice form, faxed to [facility] to process.     [facility] relayed [facility] able to accept pt and they are aware of pt's d/c home today. Updated bedside RN.    "

## 2017-08-09 NOTE — OP REPORT
DATE OF SERVICE:  08/08/2017    PREOPERATIVE DIAGNOSES:  Severe cervical stenosis status post prior C4-C7   anterior cervical diskectomy and fusion performed number of years ago with a   C3-C4 kyphosis and adjacent level disease and myelomalacia in the cord.    POSTOPERATIVE DIAGNOSES:  Severe cervical stenosis status post prior C4-C7   anterior cervical diskectomy and fusion performed number of years ago with a   C3-C4 kyphosis and adjacent level disease and myelomalacia in the cord.    PRINCIPAL PROCEDURE PERFORMED:  1.  C3, C4, C5 decompressive laminectomy, bilateral medial facetectomies and   bilateral foraminotomies.  2.  C3-C4 nonsegmental instrumentation with use of the [employer] lateral   mass screws.  3.  C2-C3, C3-C4 posterolateral fusion.    SURGEON:  Florentino Pike MD    ASSISTANT:  Rosa Sampson PA-C    ANESTHESIA:  The procedure was performed under general anesthesia by Dr. Humza Zuniga.    COMPLICATIONS:  There were no complications.    FINDINGS:  Include evidence of severe spinal cord compression and nice   placement of hardware and improved MEPs and stable SSEPs and EMGs.    For IV fluids, urine output, estimated blood loss, please see the anesthesia   record.    DISPOSITION:  Patient is still intubated, but I expect her to be extubated and   brought to the recovery room.    CLINICAL HISTORY:  The patient is a most pleasant female who has a history of   a stroke.  She underwent a prior lumbar decompression by myself.  She has done   quite well.  Her primary care physician is Kailee Coyne DO.  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  I recommended surgery.  Risks, benefits, and   options were discussed.  The patient signed a written informed consent.    DESCRIPTION OF PROCEDURE:  The patient was brought to the operating room and   placed under general anesthesia.  A Aguayo catheter was placed.  She was turned    prone atop a radiolucent OSI table with her face on a prone view.  All   pressure points were padded.  The suboccipital area along with the neck was   prepped and draped in the usual sterile fashion.  Local anesthetic was   infiltrated in the skin.  The ligamentum nuchae was incised as well.  A sharp   subperiosteal dissection was performed.  Intraoperative fluoroscopy   demonstrated the correct level.  C2-C3 was found to be quite mobile.  There   was some motion at C3-C4 and obviously no motion at C4-C5.  I predrilled the   lateral mass holes at C3 and C4.  The C3-C4 facet joint was decorticated by   using an upbiting curette and an AMA drill bit.  I checked with a ball tip   probe into the lateral mass screw holes and these were found to be perfect.  I   placed four 12 mm Gl[employer] medical lateral mass screws.  Next, I used an AMA   drill bit to create gutters just medial to the facet complexes at C3, C4, C5.    An en bloc decompressive laminectomy was performed from C3-C5.  Medial   facetectomies and foraminotomies were performed with a Kerrison 2 punch.    Perfect hemostasis was achieved.  An ultrasound demonstrated a very nice   decompression and a pulsatile spinal cord inside the thecal sac.  Next, I   placed the appropriate sized rods and I tightened the setscrews to the   appropriate torque.  Next, I placed tricalcium phosphate Kinex putty and also   Osteocel Pro for C3-C4 posterolateral fusion.  Perfect hemostasis was   achieved.  Prior to closure, instruments, sponge and needle counts were   correct.  AP and lateral fluoroscopy demonstrated nice placement of hardware.    The patient will be extubated and brought to the recovery room.       ____________________________________     MD VIKTOR JANE / TAB    DD:  08/08/2017 17:48:13  DT:  08/08/2017 18:09:51    D#:  [id number]  Job#:  [id number]

## 2017-08-09 NOTE — DISCHARGE PLANNING
Care Transition Team Assessment    IHD met with pt at bedside. She states that she normally lives alone at home, but will have a Senior  staying with her overnight during her recovery. She states that the caregiver will be providing transportation upon D/C. She states that she will be unable to drive, and would like all therapies to be provided by [facility]. She currently uses a walker, cane, wheelchair, and home O2 provided by [employer].      Information Source  Orientation : Oriented x 4  Information Given By: Patient  Informant's Name: Tenisha  Who is responsible for making decisions for patient? : Patient         Elopement Risk  Legal Hold: No  Ambulatory or Self Mobile in Wheelchair: No-Not an Elopement Risk  Elopement Risk: Not at Risk for Elopement    Interdisciplinary Discharge Planning  Lives with - Patient's Self Care Capacity: Alone and Able to Care For Self  Housing / Facility: [address]  Prior Services: Home With Outpatient Therapy    Discharge Preparedness  What is your plan after discharge?: Home health care  What are your discharge supports?: Other (comment) (Hired caregiver)  Prior Functional Level: Uses Walker, Independent with Activities of Daily Living, Independent with Medication Management  Difficulity with ADLs: Walking  Difficulty with ADLs Comment: uses walker  Difficulity with IADLs: Driving  Difficulity with IADL Comments: Assisted by caregiver    Functional Assesment  Prior Functional Level: Uses Walker, Independent with Activities of Daily Living, Independent with Medication Management    Finances  Financial Barriers to Discharge: No  Prescription Coverage: Yes (Sac n save on Delvis)    Vision / Hearing Impairment  Right Eye Vision: Wears Glasses  Left Eye Vision: Wears Glasses    Values / Beliefs / Concerns  Spiritual Requests During Hospitalization: No         Domestic Abuse  Have you ever been the victim of abuse or violence?: No  Physical Abuse or Sexual Abuse:  No  Verbal Abuse or Emotional Abuse: No  Possible Abuse Reported to:: Not Applicable    Psychological Assessment  History of Substance Abuse: None  History of Psychiatric Problems: No  Non-compliant with Treatment: No    Discharge Risks or Barriers  Discharge risks or barriers?: No    Anticipated Discharge Information  Anticipated discharge disposition: Home  Discharge Address: [address] [facility]  Discharge Contact Phone Number: [phone]

## 2017-08-09 NOTE — OR SURGEON
Operative Report    PreOp Diagnosis: Severe stenosis C3,4,5 above a prior anterior C4-7 fusion, myelopathy, kyphotic deformity    PostOp Diagnosis: same    Procedure(s):  CERVICAL FUSION POSTERIOR- C3-5, INSTRUMENTED - Wound Class: Clean with Drain  CERVICAL LAMINECTOMY POSTERIOR- C3-5 - Wound Class: Clean with Drain    Surgeon(s):  Florentino Pike M.D.    Anesthesiologist/Type of Anesthesia:  Anesthesiologist: Humza Zuniga M.D./General    Surgical Staff:  Circulator: Rosa Sampson PA-C; Gwen Smith R.N.  Relief Scrub: Gabriele Sanford  Scrub Person: Priscilla Ross  Radiology Technologist: Rosario Garcia    Specimens:  * No specimens in log *    Estimated Blood Loss: < 100cc    Findings: severe stenosis relieved, improved MEP's, stable ssep's, emg's    Complications: none        8/8/2017 5:41 PM Florentino Pike

## 2017-08-09 NOTE — DISCHARGE INSTRUCTIONS
Discharge Instructions    Discharge instructions discussed with patient:   Continue to wear brace as directed, may remove for showering. OK to shower on Friday.   No bending, lifting or twisting.   Daily OTC laxative while on narcotics.   No aspirin, NSAID or blood thinners for 2 weeks.   Ambulate often to prevent clots   Continue using your incentive spirometer every hour  Follow up with [facility] in 2 weeks    Discharged to home by car with relative. Discharged via wheelchair, hospital escort: Yes.  Special equipment needed: C-Collar    Be sure to schedule a follow-up appointment with your primary care doctor or any specialists as instructed.     Discharge Plan:   Diet Plan: Discussed  Activity Level: Discussed  Confirmed Follow up Appointment: Patient to Call and Schedule Appointment  Confirmed Symptoms Management: Discussed  Medication Reconciliation Updated: Yes  Influenza Vaccine Indication: Patient Refuses    I understand that a diet low in cholesterol, fat, and sodium is recommended for good health. Unless I have been given specific instructions below for another diet, I accept this instruction as my diet prescription.   Other diet: None    Special Instructions: None    · Is patient discharged on Warfarin / Coumadin?   No     · Is patient Post Blood Transfusion?  No    Depression / Suicide Risk    As you are discharged from this [facility] facility, it is important to learn how to keep safe from harming yourself.    Recognize the warning signs:  · Abrupt changes in personality, positive or negative- including increase in energy   · Giving away possessions  · Change in eating patterns- significant weight changes-  positive or negative  · Change in sleeping patterns- unable to sleep or sleeping all the time   · Unwillingness or inability to communicate  · Depression  · Unusual sadness, discouragement and loneliness  · Talk of wanting to die  · Neglect of personal appearance   · Rebelliousness-  reckless behavior  · Withdrawal from people/activities they love  · Confusion- inability to concentrate     If you or a loved one observes any of these behaviors or has concerns about self-harm, here's what you can do:  · Talk about it- your feelings and reasons for harming yourself  · Remove any means that you might use to hurt yourself (examples: pills, rope, extension cords, firearm)  · Get professional help from the community (Mental Health, Substance Abuse, psychological counseling)  · Do not be alone:Call your Safe Contact- someone whom you trust who will be there for you.  · Call your local CRISIS HOTLINE 330-4425 or 967-596-9226  · Call your local Children's Mobile Crisis Response Team [place] (888) 567-3182 or www.[employer]  · Call the toll free National Suicide Prevention Hotlines   · National Suicide Prevention Lifeline 137-618-HMQE (6528)  · National Hope Line Network 800-SUICIDE (835-5217)

## 2017-08-09 NOTE — PROGRESS NOTES
"Neurosurgery Progress Note  Doing very well.  Claims resolution of preop RUE pain. \"Dr. Pike is a miracle worker\". She is emotionally happy with her surgical results this am  Has been walking, drinking, tolerating both well.   Tylenol PRN for pain  No nausea  Wants to go home.     Exam:  VSS  A&Ox4, NAD  No hoarseness of voice.   Trachea midline, no difficulty swallowing - no coughing or choking while drinking water   No nuchal rigidity   NM: 5/5 deltoid, biceps, triceps, handgrip, intrinsics   Sensation intact and equal throughout BUE   Abdomen: soft, non-tender  Pulmonary: non-labored breathing on room air, normal respiratory effort  No LE edema, erythema, cyanosis, clubbing  Calves non-tender to compression bilat  Incision CDI, no halo sign   C-collar being worn appropriately  Drain: 30ml out since surgery. Drainage in tube is serosanguinous.   Aguayo      BP  Min: 135/72  Max: 159/85  Temp  Av.6 °C (97.8 °F)  Min: 36.2 °C (97.1 °F)  Max: 37.5 °C (99.5 °F)  Pulse  Av.4  Min: 66  Max: 78  Resp  Av.2  Min: 16  Max: 34  SpO2  Av.7 %  Min: 94 %  Max: 99 %    No Data Recorded        No results for input(s): SODIUM, POTASSIUM, CHLORIDE, CO2, GLUCOSE, BUN, CPKTOTAL in the last 72 hours.            Intake/Output       17 07 - 17 0659 17 - 08/10/17 59       7307-4577 Total 8998-8066 9528-5648 Total       Intake    P.O.  --  500 500  --  -- --    P.O. -- 500 500 -- -- --    I.V.    1297 3297  --  -- --    Crystalloid Intake 2000 -- -- --    IV Volume -- 1197 1197 -- -- --    IV Piggyback Volume (Ancef) -- 100 100 -- -- --    Total Intake  1791 3797 -- -- --       Output    Urine  200  2700 2900  --  -- --    Indwelling Cathether -- 2700 2700 -- -- --    Void (ml) 200 -- 200 -- -- --    Drains  --  60 60  --  -- --    Hemovac 1 -- 60 60 -- -- --    Stool  --  -- --  --  -- --    Number of Times Stooled -- 1 x 1 x -- -- --    Blood  100  -- 100  --  -- -- "    Est. Blood Loss (mL) 100 -- 100 -- -- --    Total Output 300 2760 3060 -- -- --       Net I/O     1700 -963 737 -- -- --            Intake/Output Summary (Last 24 hours) at 08/09/17 0703  Last data filed at 08/09/17 0600   Gross per 24 hour   Intake   3797 ml   Output   3060 ml   Net    737 ml            • lidocaine-prilocaine  1 Application Once PRN      Or   • lidocaine  0.5 mL Once PRN     • LR   Continuous     • diltiazem CD  240 mg QHS     • irbesartan  150 mg QAM     • loratadine  10 mg DAILY     • Pharmacy Consult Request  1 Each PRN     • MD ALERT...Do not administer NSAIDS or ASPIRIN unless ORDERED By Neurosurgery  1 Each PRN     • docusate sodium  100 mg BID     • senna-docusate  1 Tab Nightly     • senna-docusate  1 Tab Q24HRS PRN     • polyethylene glycol/lytes  1 Packet BID PRN     • magnesium hydroxide  30 mL QDAY PRN     • bisacodyl  10 mg Q24HRS PRN     • fleet  1 Each Once PRN     • 0.9 % NaCl with KCl 20 mEq 1,000 mL   Continuous 83 mL/hr at 08/08/17 2120   • acetaminophen  500 mg Q6HRS     • oxycodone immediate-release  2.5 mg Q3HRS PRN     • oxycodone immediate-release  5 mg Q3HRS PRN     • morphine injection  2 mg Q3HRS PRN     • ceFAZolin  2 g Q8HR     • diphenhydrAMINE  25 mg Q6HRS PRN      Or   • diphenhydrAMINE  25 mg Q6HRS PRN     • ondansetron  4 mg Q4HRS PRN     • ondansetron  4 mg Q4HRS PRN     • cyclobenzaprine  10 mg Q8HRS PRN     • temazepam  15 mg HS PRN - MR X 1     • clonidine  0.1 mg Q4HRS PRN     • vitamin D  5,000 Units DAILY     • calcium carbonate  500 mg BID     • benzocaine-menthol  1 Lozenge Q2HRS PRN     • tramadol  100 mg Q6HRS PRN     • fenofibrate micronized  134 mg DAILY         Assessment and Plan:  Hospital day #2  POD #1 s/p C3-4 PSI/PSF, C2-4 laminectomies  Doing very well  Plan  D/C hemovac  D/C azevedo  OK to d/c to home after lunch if she is able to void, ambulate, pain managed, VSS, and still wanting to go.   Discussed d/c instructions.   INCISION CARE:  OK to  shower with incision covered or uncovered on Friday. After shower, pat incision dry and cover with gauze and tape if draining. OK to leave open to air if not draining.   Steri-strips, if applicable can be removed as they fall off on their own naturally.     RESTRICTIONS:  Continue to wear your brace as directed   No lifting greater than 10 pounds, no repetitive bending or twisting  No driving for two weeks, no driving while on narcotic medication or muscle relaxers  No aspirin, blood thinners, NSAIDS (non-steroidal anti-inflammatory medications - aleve, motrin, ibuprofen, celebrex) for two weeks     RECOMMENDATIONS:  Over the counter stool softeners daily while on narcotics  Ambulate often to prevent blood clots in your legs  Continue incentive spirometer hourly   Follow up at [facility] in 2 weeks after surgery.    Please call [phone] to confirm the date, location, and time of your follow up appointment that was made for you.     Prophylactic anticoagulation: no         Start date/time: no    Forrest Ly NP

## 2017-08-09 NOTE — DISCHARGE SUMMARY
DATE OF ADMISSION:  08/08/2017    DATE OF DISCHARGE:  08/09/2017    ADMITTING DIAGNOSES:  Severe cervical stenosis status post prior C4 through C7   anterior cervical diskectomy and fusion with a C3-C4 kyphosis and adjacent   level disease and myelomalacia in the cord.    PROCEDURE PERFORMED:  1.  C3, C4, C5 decompressive laminectomies, facetectomies and foraminotomies.  2.  C3-C4 posterior segmental instrumentation.  3.  C2-C3, C3-C4 posterior lateral fusion.    DISCHARGE DIAGNOSES:  Severe cervical stenosis status post prior C4 through C7   anterior cervical diskectomy and fusion with a C3-C4 kyphosis and adjacent   level disease and myelomalacia in the cord, status post the aforementioned   procedures.    HOSPITAL COURSE:  The patient was admitted for this procedure.  She has been   experiencing severe right upper extremity pain and weakness in the right arm.    Postoperatively, she transitioned to the postanesthesia care unit and then to   the neurosurgical floor.  She has done very well.  She claims complete   resolution of her right upper extremity pain and weakness.  She thrilled with   this and emotionally appreciated Dr. Pike.  Her vital signs are stable.  Her   pain is managed with Tylenol.  She is not having any nausea or vomiting.  She   is tolerating oral intake.  She is ambulating with steady gait.  She has a   Aguayo in place and a Hemovac drain in place.  We will discontinue the Aguayo   and if she is able to void, then she will be able to discharge to home later   this afternoon.  We also just removed her Hemovac which has had minimal   output.  Otherwise, she meets all criteria to be appropriately discharge home.    She will have a friend staying with her for the next few days.  We will   allow her to discharge home this afternoon.    DISCHARGE INSTRUCTIONS:  The patient is being discharged to home.  She may   resume regular activity with restrictions.  She should not lift more than 10   pounds.  She  should not bend or twist.  She should wear her cervical collar at   all times except for when showering.  It is okay for her to shower beginning   Friday.  It is okay for _____ over the incision and pat it dry.  She should   not rub, scrub or place additional dressing over the wound.  She should not   submerge her incision.  She will continue with Tylenol as needed for pain.    She has some morphine tablets at home which she has tolerated okay.  She will   use these if needed.  She has significant sensitivity to narcotics and these   morphine tablets have worked for her.  She should continue these.  We also   prescribed Flexeril which has worked well for her.  She may resume regular   diet.  She was instructed to monitor her bowels, make sure she has a bowel   movement every 1-2 days.  She will follow up with us in 2 weeks.  She will   call us if she has any of the following blood, pus, excessive drainage,   redness or separation of incision edges, increased swelling, uncontrolled   pain, nausea, vomiting, inability to void and/or constipation, fever,   shortness of breath, heart palpitations, extremity swelling and/or discomfort.    She was also instructed to call us at any time should she have any   questions.       ____________________________________     RADHA Galo / TAB    DD:  08/09/2017 09:21:40  DT:  08/09/2017 09:41:37    D#:  [id number]  Job#:  [id number]

## 2017-08-09 NOTE — CARE PLAN
Problem: Safety  Goal: Will remain free from falls  Outcome: PROGRESSING AS EXPECTED  Patient has bed alarm in place. Calls appropriately; call light within reach. Works well with therapy. Ambulates with brace on.     Problem: Pain Management  Goal: Pain level will decrease to patient’s comfort goal  Outcome: PROGRESSING AS EXPECTED  Patient denies pain.    Problem: Mobility  Goal: Risk for activity intolerance will decrease  Outcome: PROGRESSING AS EXPECTED  Patient eager to work with therapy. Ambulated and turns self. Will continue to promote activity.

## 2017-08-09 NOTE — DISCHARGE PLANNING
Received choice form from [employer]([email or web address]). Referral sent to [facility].

## 2017-08-09 NOTE — PROGRESS NOTES
Pt refuses hospital escort. Ambulated off unit with friend and personal walker, scripts, other belongings.

## 2017-08-09 NOTE — THERAPY
"Occupational Therapy Evaluation completed.   Functional Status:  Supervised/SBA with ADLs and txfs  Plan of Care: Patient with no further skilled OT needs in the acute care setting at this time  Discharge Recommendations:  Post-acute therapy Discharge to home with outpatient or home health for additional skilled therapy services.    See \"Rehab Therapy-Acute\" Patient Summary Report for complete documentation.    "

## 2017-08-10 ENCOUNTER — PATIENT OUTREACH (OUTPATIENT)
Dept: HEALTH INFORMATION MANAGEMENT | Facility: OTHER | Age: 77
End: 2017-08-10

## 2017-08-18 ENCOUNTER — PATIENT OUTREACH (OUTPATIENT)
Dept: HEALTH INFORMATION MANAGEMENT | Facility: OTHER | Age: 77
End: 2017-08-18

## 2017-08-31 NOTE — PROGRESS NOTES
Irina Traylor was admitted into the hospital from 08/08/2017 for severe cervical stenosis. She was discharged on 08/09/2017 with orders to follow up with her neurologist Dr. Pike for the 22nd of August. Patient was also ordered Home Health who is completing weekly visits. On the first TCP phone call with IHD, patient declined. During initial outreach call the patient declined IHD's services.

## 2017-09-07 ENCOUNTER — HOSPITAL ENCOUNTER (INPATIENT)
Facility: MEDICAL CENTER | Age: 77
LOS: 1 days | DRG: 093 | End: 2017-09-08
Attending: EMERGENCY MEDICINE | Admitting: HOSPITALIST
Payer: MEDICARE

## 2017-09-07 ENCOUNTER — APPOINTMENT (OUTPATIENT)
Dept: RADIOLOGY | Facility: MEDICAL CENTER | Age: 77
DRG: 093 | End: 2017-09-07
Attending: EMERGENCY MEDICINE
Payer: MEDICARE

## 2017-09-07 ENCOUNTER — RESOLUTE PROFESSIONAL BILLING HOSPITAL PROF FEE (OUTPATIENT)
Dept: HOSPITALIST | Facility: MEDICAL CENTER | Age: 77
End: 2017-09-07
Payer: MEDICARE

## 2017-09-07 DIAGNOSIS — I69.30 HISTORY OF CVA WITH RESIDUAL DEFICIT: ICD-10-CM

## 2017-09-07 DIAGNOSIS — M79.602 LEFT ARM PAIN: ICD-10-CM

## 2017-09-07 DIAGNOSIS — R47.01 EXPRESSIVE APHASIA: ICD-10-CM

## 2017-09-07 DIAGNOSIS — M50.00 INTERVERTEBRAL CERVICAL DISC DISORDER WITH MYELOPATHY, CERVICAL REGION: ICD-10-CM

## 2017-09-07 PROBLEM — I63.9 CVA (CEREBRAL VASCULAR ACCIDENT) (HCC): Status: ACTIVE | Noted: 2017-09-07

## 2017-09-07 PROBLEM — Z98.890 HISTORY OF EXCISION OF LAMINA OF CERVICAL VERTEBRA FOR DECOMPRESSION OF SPINAL CORD: Status: ACTIVE | Noted: 2017-09-07

## 2017-09-07 PROBLEM — I10 BENIGN ESSENTIAL HTN: Status: ACTIVE | Noted: 2017-09-07

## 2017-09-07 LAB
ALBUMIN SERPL BCP-MCNC: 4.7 G/DL (ref 3.2–4.9)
ALBUMIN/GLOB SERPL: 1.5 G/DL
ALP SERPL-CCNC: 58 U/L (ref 30–99)
ALT SERPL-CCNC: 11 U/L (ref 2–50)
ANION GAP SERPL CALC-SCNC: 11 MMOL/L (ref 0–11.9)
APPEARANCE UR: CLEAR
APTT PPP: 27 SEC (ref 24.7–36)
AST SERPL-CCNC: 19 U/L (ref 12–45)
BACTERIA #/AREA URNS HPF: NEGATIVE /HPF
BASOPHILS # BLD AUTO: 0.9 % (ref 0–1.8)
BASOPHILS # BLD: 0.05 K/UL (ref 0–0.12)
BILIRUB SERPL-MCNC: 0.4 MG/DL (ref 0.1–1.5)
BILIRUB UR QL STRIP.AUTO: NEGATIVE
BUN SERPL-MCNC: 24 MG/DL (ref 8–22)
CALCIUM SERPL-MCNC: 10.3 MG/DL (ref 8.5–10.5)
CHLORIDE SERPL-SCNC: 107 MMOL/L (ref 96–112)
CO2 SERPL-SCNC: 21 MMOL/L (ref 20–33)
COLOR UR: YELLOW
CREAT SERPL-MCNC: 0.84 MG/DL (ref 0.5–1.4)
EOSINOPHIL # BLD AUTO: 0.16 K/UL (ref 0–0.51)
EOSINOPHIL NFR BLD: 2.9 % (ref 0–6.9)
EPI CELLS #/AREA URNS HPF: NEGATIVE /HPF
ERYTHROCYTE [DISTWIDTH] IN BLOOD BY AUTOMATED COUNT: 51.9 FL (ref 35.9–50)
GFR SERPL CREATININE-BSD FRML MDRD: >60 ML/MIN/1.73 M 2
GLOBULIN SER CALC-MCNC: 3.2 G/DL (ref 1.9–3.5)
GLUCOSE BLD-MCNC: 86 MG/DL (ref 65–99)
GLUCOSE SERPL-MCNC: 76 MG/DL (ref 65–99)
GLUCOSE UR STRIP.AUTO-MCNC: NEGATIVE MG/DL
HCT VFR BLD AUTO: 41.7 % (ref 37–47)
HGB BLD-MCNC: 13.1 G/DL (ref 12–16)
HYALINE CASTS #/AREA URNS LPF: NORMAL /LPF
IMM GRANULOCYTES # BLD AUTO: 0.02 K/UL (ref 0–0.11)
IMM GRANULOCYTES NFR BLD AUTO: 0.4 % (ref 0–0.9)
INR PPP: 0.92 (ref 0.87–1.13)
KETONES UR STRIP.AUTO-MCNC: NEGATIVE MG/DL
LEUKOCYTE ESTERASE UR QL STRIP.AUTO: ABNORMAL
LYMPHOCYTES # BLD AUTO: 1.09 K/UL (ref 1–4.8)
LYMPHOCYTES NFR BLD: 19.5 % (ref 22–41)
MCH RBC QN AUTO: 29 PG (ref 27–33)
MCHC RBC AUTO-ENTMCNC: 31.4 G/DL (ref 33.6–35)
MCV RBC AUTO: 92.5 FL (ref 81.4–97.8)
MICRO URNS: ABNORMAL
MONOCYTES # BLD AUTO: 0.43 K/UL (ref 0–0.85)
MONOCYTES NFR BLD AUTO: 7.7 % (ref 0–13.4)
NEUTROPHILS # BLD AUTO: 3.84 K/UL (ref 2–7.15)
NEUTROPHILS NFR BLD: 68.6 % (ref 44–72)
NITRITE UR QL STRIP.AUTO: NEGATIVE
NRBC # BLD AUTO: 0 K/UL
NRBC BLD AUTO-RTO: 0 /100 WBC
PH UR STRIP.AUTO: 7 [PH]
PLATELET # BLD AUTO: 203 K/UL (ref 164–446)
PMV BLD AUTO: 12.3 FL (ref 9–12.9)
POTASSIUM SERPL-SCNC: 4.1 MMOL/L (ref 3.6–5.5)
PROT SERPL-MCNC: 7.9 G/DL (ref 6–8.2)
PROT UR QL STRIP: NEGATIVE MG/DL
PROTHROMBIN TIME: 12.6 SEC (ref 12–14.6)
RBC # BLD AUTO: 4.51 M/UL (ref 4.2–5.4)
RBC # URNS HPF: NORMAL /HPF
RBC UR QL AUTO: NEGATIVE
SODIUM SERPL-SCNC: 139 MMOL/L (ref 135–145)
SP GR UR STRIP.AUTO: 1.01
TROPONIN I SERPL-MCNC: <0.01 NG/ML (ref 0–0.04)
UROBILINOGEN UR STRIP.AUTO-MCNC: 0.2 MG/DL
WBC # BLD AUTO: 5.6 K/UL (ref 4.8–10.8)
WBC #/AREA URNS HPF: NORMAL /HPF

## 2017-09-07 PROCEDURE — 700117 HCHG RX CONTRAST REV CODE 255: Performed by: EMERGENCY MEDICINE

## 2017-09-07 PROCEDURE — 70498 CT ANGIOGRAPHY NECK: CPT

## 2017-09-07 PROCEDURE — 71010 DX-CHEST-LIMITED (1 VIEW): CPT

## 2017-09-07 PROCEDURE — 80053 COMPREHEN METABOLIC PANEL: CPT

## 2017-09-07 PROCEDURE — 99221 1ST HOSP IP/OBS SF/LOW 40: CPT | Performed by: HOSPITALIST

## 2017-09-07 PROCEDURE — 36415 COLL VENOUS BLD VENIPUNCTURE: CPT

## 2017-09-07 PROCEDURE — 82962 GLUCOSE BLOOD TEST: CPT

## 2017-09-07 PROCEDURE — A9270 NON-COVERED ITEM OR SERVICE: HCPCS | Performed by: HOSPITALIST

## 2017-09-07 PROCEDURE — 85025 COMPLETE CBC W/AUTO DIFF WBC: CPT

## 2017-09-07 PROCEDURE — 70496 CT ANGIOGRAPHY HEAD: CPT

## 2017-09-07 PROCEDURE — 85730 THROMBOPLASTIN TIME PARTIAL: CPT

## 2017-09-07 PROCEDURE — 84484 ASSAY OF TROPONIN QUANT: CPT

## 2017-09-07 PROCEDURE — 81001 URINALYSIS AUTO W/SCOPE: CPT

## 2017-09-07 PROCEDURE — 85610 PROTHROMBIN TIME: CPT

## 2017-09-07 PROCEDURE — 70450 CT HEAD/BRAIN W/O DYE: CPT

## 2017-09-07 PROCEDURE — 99285 EMERGENCY DEPT VISIT HI MDM: CPT

## 2017-09-07 PROCEDURE — 700102 HCHG RX REV CODE 250 W/ 637 OVERRIDE(OP): Performed by: HOSPITALIST

## 2017-09-07 PROCEDURE — 770006 HCHG ROOM/CARE - MED/SURG/GYN SEMI*

## 2017-09-07 RX ORDER — DILTIAZEM HYDROCHLORIDE 240 MG/1
240 CAPSULE, COATED, EXTENDED RELEASE ORAL
Status: DISCONTINUED | OUTPATIENT
Start: 2017-09-08 | End: 2017-09-07

## 2017-09-07 RX ORDER — HYDRALAZINE HYDROCHLORIDE 20 MG/ML
10 INJECTION INTRAMUSCULAR; INTRAVENOUS
Status: DISCONTINUED | OUTPATIENT
Start: 2017-09-07 | End: 2017-09-08 | Stop reason: HOSPADM

## 2017-09-07 RX ORDER — IRBESARTAN 150 MG/1
150 TABLET ORAL EVERY MORNING
Status: DISCONTINUED | OUTPATIENT
Start: 2017-09-09 | End: 2017-09-08 | Stop reason: HOSPADM

## 2017-09-07 RX ORDER — FENOFIBRATE 134 MG/1
134 CAPSULE ORAL EVERY MORNING
Status: DISCONTINUED | OUTPATIENT
Start: 2017-09-08 | End: 2017-09-08 | Stop reason: HOSPADM

## 2017-09-07 RX ORDER — DILTIAZEM HYDROCHLORIDE 240 MG/1
240 CAPSULE, COATED, EXTENDED RELEASE ORAL
Status: DISCONTINUED | OUTPATIENT
Start: 2017-09-08 | End: 2017-09-08 | Stop reason: HOSPADM

## 2017-09-07 RX ORDER — AMOXICILLIN 250 MG
2 CAPSULE ORAL 2 TIMES DAILY
Status: DISCONTINUED | OUTPATIENT
Start: 2017-09-07 | End: 2017-09-08 | Stop reason: HOSPADM

## 2017-09-07 RX ORDER — ASPIRIN 300 MG/1
300 SUPPOSITORY RECTAL DAILY
Status: DISCONTINUED | OUTPATIENT
Start: 2017-09-08 | End: 2017-09-08 | Stop reason: HOSPADM

## 2017-09-07 RX ORDER — ONDANSETRON 4 MG/1
4 TABLET, ORALLY DISINTEGRATING ORAL EVERY 4 HOURS PRN
Status: DISCONTINUED | OUTPATIENT
Start: 2017-09-07 | End: 2017-09-08 | Stop reason: HOSPADM

## 2017-09-07 RX ORDER — CYCLOBENZAPRINE HCL 10 MG
10 TABLET ORAL EVERY 8 HOURS PRN
Status: DISCONTINUED | OUTPATIENT
Start: 2017-09-07 | End: 2017-09-08 | Stop reason: HOSPADM

## 2017-09-07 RX ORDER — ASPIRIN 325 MG
325 TABLET ORAL DAILY
Status: DISCONTINUED | OUTPATIENT
Start: 2017-09-08 | End: 2017-09-08 | Stop reason: HOSPADM

## 2017-09-07 RX ORDER — LORATADINE 10 MG/1
10 TABLET ORAL DAILY
Status: DISCONTINUED | OUTPATIENT
Start: 2017-09-08 | End: 2017-09-08 | Stop reason: HOSPADM

## 2017-09-07 RX ORDER — ONDANSETRON 2 MG/ML
4 INJECTION INTRAMUSCULAR; INTRAVENOUS EVERY 4 HOURS PRN
Status: DISCONTINUED | OUTPATIENT
Start: 2017-09-07 | End: 2017-09-08 | Stop reason: HOSPADM

## 2017-09-07 RX ORDER — LABETALOL HYDROCHLORIDE 5 MG/ML
10 INJECTION, SOLUTION INTRAVENOUS EVERY 4 HOURS PRN
Status: DISCONTINUED | OUTPATIENT
Start: 2017-09-07 | End: 2017-09-08 | Stop reason: HOSPADM

## 2017-09-07 RX ORDER — ASPIRIN 81 MG/1
324 TABLET, CHEWABLE ORAL DAILY
Status: DISCONTINUED | OUTPATIENT
Start: 2017-09-08 | End: 2017-09-08 | Stop reason: HOSPADM

## 2017-09-07 RX ORDER — POLYETHYLENE GLYCOL 3350 17 G/17G
1 POWDER, FOR SOLUTION ORAL
Status: DISCONTINUED | OUTPATIENT
Start: 2017-09-07 | End: 2017-09-08 | Stop reason: HOSPADM

## 2017-09-07 RX ORDER — ACETAMINOPHEN 325 MG/1
650 TABLET ORAL EVERY 6 HOURS PRN
Status: DISCONTINUED | OUTPATIENT
Start: 2017-09-07 | End: 2017-09-08 | Stop reason: HOSPADM

## 2017-09-07 RX ORDER — DEXTROSE MONOHYDRATE 25 G/50ML
25 INJECTION, SOLUTION INTRAVENOUS
Status: DISCONTINUED | OUTPATIENT
Start: 2017-09-07 | End: 2017-09-08 | Stop reason: HOSPADM

## 2017-09-07 RX ORDER — BISACODYL 10 MG
10 SUPPOSITORY, RECTAL RECTAL
Status: DISCONTINUED | OUTPATIENT
Start: 2017-09-07 | End: 2017-09-08 | Stop reason: HOSPADM

## 2017-09-07 RX ADMIN — IOHEXOL 100 ML: 350 INJECTION, SOLUTION INTRAVENOUS at 15:01

## 2017-09-07 RX ADMIN — STANDARDIZED SENNA CONCENTRATE AND DOCUSATE SODIUM 2 TABLET: 8.6; 5 TABLET, FILM COATED ORAL at 23:25

## 2017-09-07 RX ADMIN — DILTIAZEM HYDROCHLORIDE 240 MG: 240 CAPSULE, COATED, EXTENDED RELEASE ORAL at 23:59

## 2017-09-07 ASSESSMENT — LIFESTYLE VARIABLES
HAVE PEOPLE ANNOYED YOU BY CRITICIZING YOUR DRINKING: NO
AVERAGE NUMBER OF DAYS PER WEEK YOU HAVE A DRINK CONTAINING ALCOHOL: 0
ON A TYPICAL DAY WHEN YOU DRINK ALCOHOL HOW MANY DRINKS DO YOU HAVE: 1
TOTAL SCORE: 0
CONSUMPTION TOTAL: NEGATIVE
HOW MANY TIMES IN THE PAST YEAR HAVE YOU HAD 5 OR MORE DRINKS IN A DAY: 0
EVER HAD A DRINK FIRST THING IN THE MORNING TO STEADY YOUR NERVES TO GET RID OF A HANGOVER: NO
TOTAL SCORE: 0
ALCOHOL_USE: YES
EVER FELT BAD OR GUILTY ABOUT YOUR DRINKING: NO
EVER_SMOKED: NEVER
HAVE YOU EVER FELT YOU SHOULD CUT DOWN ON YOUR DRINKING: NO
TOTAL SCORE: 0

## 2017-09-07 ASSESSMENT — PATIENT HEALTH QUESTIONNAIRE - PHQ9
SUM OF ALL RESPONSES TO PHQ QUESTIONS 1-9: 0
2. FEELING DOWN, DEPRESSED, IRRITABLE, OR HOPELESS: NOT AT ALL
1. LITTLE INTEREST OR PLEASURE IN DOING THINGS: NOT AT ALL
SUM OF ALL RESPONSES TO PHQ9 QUESTIONS 1 AND 2: 0

## 2017-09-07 ASSESSMENT — ENCOUNTER SYMPTOMS
VOMITING: 0
SHORTNESS OF BREATH: 0
BLURRED VISION: 0
NAUSEA: 0
DIARRHEA: 0
CONSTIPATION: 0
DOUBLE VISION: 0
ABDOMINAL PAIN: 0
SPEECH CHANGE: 1
CHILLS: 0
HEADACHES: 0
FEVER: 0

## 2017-09-07 ASSESSMENT — COGNITIVE AND FUNCTIONAL STATUS - GENERAL
HELP NEEDED FOR BATHING: A LITTLE
CLIMB 3 TO 5 STEPS WITH RAILING: A LITTLE
DAILY ACTIVITIY SCORE: 19
DRESSING REGULAR LOWER BODY CLOTHING: A LITTLE
TOILETING: A LITTLE
PERSONAL GROOMING: A LITTLE
MOBILITY SCORE: 22
SUGGESTED CMS G CODE MODIFIER DAILY ACTIVITY: CK
SUGGESTED CMS G CODE MODIFIER MOBILITY: CJ
WALKING IN HOSPITAL ROOM: A LITTLE
DRESSING REGULAR UPPER BODY CLOTHING: A LITTLE

## 2017-09-07 ASSESSMENT — PAIN SCALES - GENERAL: PAINLEVEL_OUTOF10: 0

## 2017-09-07 NOTE — ED PROVIDER NOTES
ED Provider Note    CHIEF COMPLAINT  Chief Complaint   Patient presents with   • Possible Stroke     pt wheeled to triage, Hx CVA, worse expressive aphasia and left arm weakness since 1300 today as well as reported HTN.        HPI  Irina Traylor is a 76 y.o. female who presents For evaluation of a possible stroke, she comes in via the waiting room with about 30 minutes of difficulty speaking, she states this is new. The patient has a history of stroke in the past and she reports this was over 20 years ago. One month ago she had drawn her cervical spine. No new neck pain. She does describe new pain in her left arm but denies weakness or numbness there. The patient tells me that both her speech and pain in the arm are improving. She has no symptoms in her leg. No headache. She denies visual changes. No chest pain, no shortness of breath and no other complaints    REVIEW OF SYSTEMS  Negative for fever, rash, chest pain, dyspnea, abdominal pain, nausea, vomiting, diarrhea, headache, focal weakness, focal numbness, focal tingling, back pain. All other systems are negative.     PAST MEDICAL HISTORY  Past Medical History:   Diagnosis Date   • Pain 4/25/17    Left hip, right arm and shoulder.   • DARIN (obstructive sleep apnea) 1/26/2017    AHI 23.6, minimum saturation 69%, on CPAP 15 cm.   • Pain 12/5/16    right arm and shoulder   • Constipation 7-2015   • Pain 06-09-14    right knee, 6/10   • Bronchitis 1991   • Anesthesia     States feels like she can't breathe with induction for right hip surgery   • Arthritis     To hips, shoulders, hands   • Back pain    • Expressive aphasia     Related to CVA   • Heart murmur    • High cholesterol    • Hypertension    • Obesity    • Other specified disorder of intestines     constipation-treated with herb lax   • Pain     l shoulder   • Post-nasal drip    • Restless leg syndrome     4/25/17-Resolved, states was related to right hip and none S/P replacement.   • Sleep apnea      uses cpap   • Snoring    • Stroke ([facility]) 2/1993, 8/1993    Eomltqhl-meigsd-vyabpodwkb aphasia,mouth forming words, reading, numbers   • Tuberculosis     skin test (+), cxr (-)   • Unspecified hemorrhagic conditions     nose bleeds with dry air, bruises easily   • Unspecified urinary incontinence     Wears pad   • Urinary bladder disorder        FAMILY HISTORY  Family History   Problem Relation Age of Onset   • Cancer Mother    • Stroke Father        SOCIAL HISTORY  Social History   Substance Use Topics   • Smoking status: Never Smoker   • Smokeless tobacco: Never Used   • Alcohol use Yes      Comment: beer or lizeth 1-2 per year       SURGICAL HISTORY  Past Surgical History:   Procedure Laterality Date   • CERVICAL FUSION POSTERIOR  8/8/2017    Procedure: CERVICAL FUSION POSTERIOR- C3-5, INSTRUMENTED;  Surgeon: Florentino Pike M.D.;  Location: [facility];  Service:    • CERVICAL LAMINECTOMY POSTERIOR  8/8/2017    Procedure: CERVICAL LAMINECTOMY POSTERIOR- C3-5;  Surgeon: Florentino Pike M.D.;  Location: [facility];  Service:    • HIP ARTHROPLASTY TOTAL Left 5/8/2017    Procedure: HIP ARTHROPLASTY TOTAL;  Surgeon: Earle Jacobsen M.D.;  Location: [facility];  Service:    • HIP ARTHROPLASTY TOTAL Right 12/12/2016    Procedure: HIP ARTHROPLASTY TOTAL;  Surgeon: Earle Jacobsen M.D.;  Location: [facility];  Service:    • LUMBAR FUSION POSTERIOR  9/26/2016    Procedure: LUMBAR FUSION POSTERIOR L2-S1 onlay ;  Surgeon: Florentino Pike M.D.;  Location: [facility];  Service:    • LUMBAR LAMINECTOMY DISKECTOMY N/A 9/26/2016    Procedure: LUMBAR LAMINECTOMY DISKECTOMY L2-S1;  Surgeon: Florentino Pike M.D.;  Location: [facility];  Service:    • COLONOSCOPY  2/2016    Every 10 years   • SHOULDER ARTHROPLASTY TOTAL Left 8/11/2015    Procedure: SHOULDER ARTHROPLASTY REVERSE TOTAL;  Surgeon: Yoshi Gamble M.D.;  Location: SURGERY  "[facility];  Service:    • KNEE ARTHROPLASTY TOTAL  6/23/2014    Performed by Earle Jacobsen M.D. at SURGERY [facility] ORS   • HAMMERTOE CORRECTION Left 11/12/2007    Removed 3rd toe   • LUMBAR LAMINECTOMY DISKECTOMY  1993    Lumbar   • CARPAL TUNNEL RELEASE Bilateral 1993   • CERVICAL DISK AND FUSION ANTERIOR  1993    X2   • HAMMERTOE CORRECTION Right 1980's    Removed 3rd toe   • BUNIONECTOMY Bilateral 1980's   • HYSTERECTOMY, TOTAL ABDOMINAL  1977   • FINGER ORIF Left as child    Index       CURRENT MEDICATIONS  I personally reviewed the medication list in the charting documentation.     ALLERGIES  Allergies   Allergen Reactions   • Other Misc Shortness of Breath     Anesthesia with surgery 2016  Pt couldn't breathe while being put under and tapped side of bed to let RN know. \"It was the scariest feeling and I want everyone to know\"     • Benicar [Olmesartan]      blisters   • Codeine Vomiting   • Demerol Vomiting   • Other Drug Vomiting     \"all pain meds\" per pt can take morphine.   • Percocet [Oxycodone-Acetaminophen] Vomiting   • Statins [Hmg-Coa-R Inhibitors]      Muscle cramping   • Valium Vomiting   • Vicodin [Hydrocodone-Acetaminophen] Vomiting       MEDICAL RECORD  I have reviewed patient's medical record and pertinent results are listed above.      PHYSICAL EXAM  VITAL SIGNS: BP (!) 174/88   Pulse 82   Temp 36.8 °C (98.3 °F)   Resp 16   Ht 1.549 m (5' 1\")   SpO2 94%    Constitutional: Well appearing patient in no acute distress.  Not toxic, nor ill in appearance.  HENT: Mucus membranes moist.    Eyes: No scleral icterus. Normal conjunctiva   Neck: Immobilized in a cervical collar  Cardiovascular: Regular heart rate and rhythm.   Thorax & Lungs: Chest is nontender.  Lungs are clear to auscultation with good air movement bilaterally.  No wheeze, rhonchi, nor rales.   Abdomen: Soft, with no tenderness, rebound nor guarding.  No mass or pulsatile mass appreciated.  Skin: Warm, dry. No rash " appreciated  Extremities/Musculoskeletal: No sign of trauma. No asymmetric calf tenderness, erythema or edema. Normal range of motion   Neurologic: AAOx4, Cranial nerves II-XII grossly intact, PERRLA, EOMI, expressive aphasia is noted, normal and symmetric sensory function in the bilateral upper and lower extremities as demonstrated on exam. Difficulties to assess her motor function as she is having pain in the left arm which limits her range of motion left arm.  Psychiatric: Normal affect appropriate for the clinical situation.    DIAGNOSTIC STUDIES / PROCEDURES    LABS  Results for orders placed or performed during the hospital encounter of 09/07/17   URINALYSIS   Result Value Ref Range    Color Yellow     Character Clear     Specific Gravity 1.015 <1.035    Ph 7.0 5.0 - 8.0    Glucose Negative Negative mg/dL    Ketones Negative Negative mg/dL    Protein Negative Negative mg/dL    Bilirubin Negative Negative    Urobilinogen, Urine 0.2 Negative    Nitrite Negative Negative    Leukocyte Esterase Trace (A) Negative    Occult Blood Negative Negative    Micro Urine Req Microscopic    PROTHROMBIN TIME   Result Value Ref Range    PT 12.6 12.0 - 14.6 sec    INR 0.92 0.87 - 1.13   APTT   Result Value Ref Range    APTT 27.0 24.7 - 36.0 sec   CBC WITH DIFFERENTIAL   Result Value Ref Range    WBC 5.6 4.8 - 10.8 K/uL    RBC 4.51 4.20 - 5.40 M/uL    Hemoglobin 13.1 12.0 - 16.0 g/dL    Hematocrit 41.7 37.0 - 47.0 %    MCV 92.5 81.4 - 97.8 fL    MCH 29.0 27.0 - 33.0 pg    MCHC 31.4 (L) 33.6 - 35.0 g/dL    RDW 51.9 (H) 35.9 - 50.0 fL    Platelet Count 203 164 - 446 K/uL    MPV 12.3 9.0 - 12.9 fL    Neutrophils-Polys 68.60 44.00 - 72.00 %    Lymphocytes 19.50 (L) 22.00 - 41.00 %    Monocytes 7.70 0.00 - 13.40 %    Eosinophils 2.90 0.00 - 6.90 %    Basophils 0.90 0.00 - 1.80 %    Immature Granulocytes 0.40 0.00 - 0.90 %    Nucleated RBC 0.00 /100 WBC    Neutrophils (Absolute) 3.84 2.00 - 7.15 K/uL    Lymphs (Absolute) 1.09 1.00 -  4.80 K/uL    Monos (Absolute) 0.43 0.00 - 0.85 K/uL    Eos (Absolute) 0.16 0.00 - 0.51 K/uL    Baso (Absolute) 0.05 0.00 - 0.12 K/uL    Immature Granulocytes (abs) 0.02 0.00 - 0.11 K/uL    NRBC (Absolute) 0.00 K/uL   COMP METABOLIC PANEL   Result Value Ref Range    Sodium 139 135 - 145 mmol/L    Potassium 4.1 3.6 - 5.5 mmol/L    Chloride 107 96 - 112 mmol/L    Co2 21 20 - 33 mmol/L    Anion Gap 11.0 0.0 - 11.9    Glucose 76 65 - 99 mg/dL    Bun 24 (H) 8 - 22 mg/dL    Creatinine 0.84 0.50 - 1.40 mg/dL    Calcium 10.3 8.5 - 10.5 mg/dL    AST(SGOT) 19 12 - 45 U/L    ALT(SGPT) 11 2 - 50 U/L    Alkaline Phosphatase 58 30 - 99 U/L    Total Bilirubin 0.4 0.1 - 1.5 mg/dL    Albumin 4.7 3.2 - 4.9 g/dL    Total Protein 7.9 6.0 - 8.2 g/dL    Globulin 3.2 1.9 - 3.5 g/dL    A-G Ratio 1.5 g/dL   TROPONIN   Result Value Ref Range    Troponin I <0.01 0.00 - 0.04 ng/mL   URINE MICROSCOPIC (W/UA)   Result Value Ref Range    WBC 0-2 /hpf    RBC 0-2 /hpf    Bacteria Negative None /hpf    Epithelial Cells Negative /hpf    Hyaline Cast 0-2 /lpf   ESTIMATED GFR   Result Value Ref Range    GFR If African American >60 >60 mL/min/1.73 m 2    GFR If Non African American >60 >60 mL/min/1.73 m 2   ACCU-CHEK GLUCOSE   Result Value Ref Range    Glucose - Accu-Ck 86 65 - 99 mg/dL         RADIOLOGY  DX-CHEST-LIMITED (1 VIEW)   Final Result      Mild cardiomegaly.      CT-CTA NECK WITH & W/O-POST PROCESSING   Final Result      Mild calcified plaque both carotid artery bifurcations. The cervical carotid arteries and vertebral arteries are patent.      CT-CTA HEAD WITH & W/O-POST PROCESS   Final Result      No evidence of thrombosis within the [place].      CT-HEAD W/O   Final Result      1.  Cerebral atrophy.      2.  White matter lucencies most consistent with small vessel ischemic change versus demyelination or gliosis. Bilateral old infarcts unchanged.      3.  Otherwise, Head CT without contrast with no acute findings. No evidence of  acute cerebral hemorrhage or mass lesion.        COURSE & MEDICAL DECISION MAKING  I have reviewed any medical record information, laboratory studies and radiographic results as noted above.  Differential diagnoses includes: CVA, electrolyte abnormalities, hypoglycemia, anemia, complications related to the recent surgery    Encounter Summary: This is a 76 y.o. female with complaints of difficulty speaking of acute onset half hour prior to arrival, apparently according the records she has a history of CVA with expressive aphasia and this was about 20 years ago. She has no focal neurologic complaints or findings on exam otherwise. She appears well otherwise. She did have a recent cervical surgery about a month ago but doesn't have any pain in her neck, she complains of pain in the left arm but no weakness or numbness. NIH stroke scale of 1. Will bring her to the CT scanner for the CT/CTA's, she will then be evaluated at the bedside by the neurologist and the decision will be made as to her candidacy for alteplase ------ unremarkable CT and CTA, blood work is unremarkable. Evaluated by Dr. Singh, neurologist, we agreed this is not representative of an acute CVA and does not warrant alteplase. The patient's friend reports that her aphasia seems to be at baseline and unchanged from her normal. At this point, her acute complaints include left arm pain without any focal neurologic symptoms. Admitted to the hospital as MRI is recommended by the neurologist.      DISPOSITION: Admitted in guarded condition      FINAL IMPRESSION  1. Expressive aphasia    2. Left arm pain           This dictation was created using voice recognition software. The accuracy of the dictation is limited to the abilities of the software. I expect there may be some errors of grammar and possibly content. The nursing notes were reviewed and certain aspects of this information were incorporated into this note.    Electronically signed by: Zion Stevens  Bravo, 9/7/2017 2:41 PM

## 2017-09-07 NOTE — ED NOTES
Chief Complaint   Patient presents with   • Possible Stroke     pt wheeled to triage, Hx CVA, worse expressive aphasia and left arm weakness since 1300 today as well as reported HTN.      Stroke protocol initiated, charge RN notified pt straight back to charge desk. ERP at desk.

## 2017-09-08 ENCOUNTER — PATIENT OUTREACH (OUTPATIENT)
Dept: HEALTH INFORMATION MANAGEMENT | Facility: OTHER | Age: 77
End: 2017-09-08

## 2017-09-08 VITALS
TEMPERATURE: 97.9 F | HEART RATE: 65 BPM | RESPIRATION RATE: 17 BRPM | WEIGHT: 151 LBS | DIASTOLIC BLOOD PRESSURE: 69 MMHG | SYSTOLIC BLOOD PRESSURE: 146 MMHG | BODY MASS INDEX: 28.51 KG/M2 | HEIGHT: 61 IN | OXYGEN SATURATION: 96 %

## 2017-09-08 PROBLEM — Z98.890 HISTORY OF EXCISION OF LAMINA OF CERVICAL VERTEBRA FOR DECOMPRESSION OF SPINAL CORD: Status: RESOLVED | Noted: 2017-09-07 | Resolved: 2017-09-08

## 2017-09-08 PROBLEM — I69.30 HISTORY OF CVA WITH RESIDUAL DEFICIT: Status: RESOLVED | Noted: 2017-09-07 | Resolved: 2017-09-08

## 2017-09-08 LAB
ANION GAP SERPL CALC-SCNC: 3 MMOL/L (ref 0–11.9)
BASOPHILS # BLD AUTO: 1.3 % (ref 0–1.8)
BASOPHILS # BLD: 0.06 K/UL (ref 0–0.12)
BUN SERPL-MCNC: 22 MG/DL (ref 8–22)
CALCIUM SERPL-MCNC: 9.4 MG/DL (ref 8.5–10.5)
CHLORIDE SERPL-SCNC: 108 MMOL/L (ref 96–112)
CHOLEST SERPL-MCNC: 154 MG/DL (ref 100–199)
CO2 SERPL-SCNC: 28 MMOL/L (ref 20–33)
CREAT SERPL-MCNC: 0.82 MG/DL (ref 0.5–1.4)
EOSINOPHIL # BLD AUTO: 0.19 K/UL (ref 0–0.51)
EOSINOPHIL NFR BLD: 4 % (ref 0–6.9)
ERYTHROCYTE [DISTWIDTH] IN BLOOD BY AUTOMATED COUNT: 52.1 FL (ref 35.9–50)
EST. AVERAGE GLUCOSE BLD GHB EST-MCNC: 123 MG/DL
GFR SERPL CREATININE-BSD FRML MDRD: >60 ML/MIN/1.73 M 2
GLUCOSE SERPL-MCNC: 115 MG/DL (ref 65–99)
HBA1C MFR BLD: 5.9 % (ref 0–5.6)
HCT VFR BLD AUTO: 36.5 % (ref 37–47)
HDLC SERPL-MCNC: 52 MG/DL
HGB BLD-MCNC: 11.6 G/DL (ref 12–16)
IMM GRANULOCYTES # BLD AUTO: 0.02 K/UL (ref 0–0.11)
IMM GRANULOCYTES NFR BLD AUTO: 0.4 % (ref 0–0.9)
LDLC SERPL CALC-MCNC: 82 MG/DL
LYMPHOCYTES # BLD AUTO: 1.2 K/UL (ref 1–4.8)
LYMPHOCYTES NFR BLD: 25.2 % (ref 22–41)
MCH RBC QN AUTO: 29.6 PG (ref 27–33)
MCHC RBC AUTO-ENTMCNC: 31.8 G/DL (ref 33.6–35)
MCV RBC AUTO: 93.1 FL (ref 81.4–97.8)
MONOCYTES # BLD AUTO: 0.61 K/UL (ref 0–0.85)
MONOCYTES NFR BLD AUTO: 12.8 % (ref 0–13.4)
NEUTROPHILS # BLD AUTO: 2.69 K/UL (ref 2–7.15)
NEUTROPHILS NFR BLD: 56.3 % (ref 44–72)
NRBC # BLD AUTO: 0 K/UL
NRBC BLD AUTO-RTO: 0 /100 WBC
PLATELET # BLD AUTO: 173 K/UL (ref 164–446)
PMV BLD AUTO: 12.5 FL (ref 9–12.9)
POTASSIUM SERPL-SCNC: 3.5 MMOL/L (ref 3.6–5.5)
RBC # BLD AUTO: 3.92 M/UL (ref 4.2–5.4)
SODIUM SERPL-SCNC: 139 MMOL/L (ref 135–145)
TRIGL SERPL-MCNC: 99 MG/DL (ref 0–149)
TSH SERPL DL<=0.005 MIU/L-ACNC: 1.6 UIU/ML (ref 0.3–3.7)
WBC # BLD AUTO: 4.8 K/UL (ref 4.8–10.8)

## 2017-09-08 PROCEDURE — 80061 LIPID PANEL: CPT

## 2017-09-08 PROCEDURE — 99239 HOSP IP/OBS DSCHRG MGMT >30: CPT | Performed by: HOSPITALIST

## 2017-09-08 PROCEDURE — 700102 HCHG RX REV CODE 250 W/ 637 OVERRIDE(OP): Performed by: HOSPITALIST

## 2017-09-08 PROCEDURE — 700111 HCHG RX REV CODE 636 W/ 250 OVERRIDE (IP): Performed by: HOSPITALIST

## 2017-09-08 PROCEDURE — G8988 SELF CARE GOAL STATUS: HCPCS | Mod: CI

## 2017-09-08 PROCEDURE — 83036 HEMOGLOBIN GLYCOSYLATED A1C: CPT

## 2017-09-08 PROCEDURE — 85025 COMPLETE CBC W/AUTO DIFF WBC: CPT

## 2017-09-08 PROCEDURE — 36415 COLL VENOUS BLD VENIPUNCTURE: CPT

## 2017-09-08 PROCEDURE — A9270 NON-COVERED ITEM OR SERVICE: HCPCS | Performed by: HOSPITALIST

## 2017-09-08 PROCEDURE — 84443 ASSAY THYROID STIM HORMONE: CPT

## 2017-09-08 PROCEDURE — 80048 BASIC METABOLIC PNL TOTAL CA: CPT

## 2017-09-08 PROCEDURE — 97165 OT EVAL LOW COMPLEX 30 MIN: CPT

## 2017-09-08 PROCEDURE — G8987 SELF CARE CURRENT STATUS: HCPCS | Mod: CI

## 2017-09-08 PROCEDURE — G8989 SELF CARE D/C STATUS: HCPCS | Mod: CI

## 2017-09-08 RX ORDER — ASPIRIN 81 MG/1
81 TABLET, CHEWABLE ORAL DAILY
Qty: 30 TAB | Refills: 11 | COMMUNITY
Start: 2017-09-08 | End: 2018-02-17

## 2017-09-08 RX ADMIN — FENOFIBRATE 134 MG: 134 CAPSULE ORAL at 08:56

## 2017-09-08 RX ADMIN — ASPIRIN 325 MG: 325 TABLET, COATED ORAL at 08:55

## 2017-09-08 RX ADMIN — LORATADINE 10 MG: 10 TABLET ORAL at 08:56

## 2017-09-08 RX ADMIN — ENOXAPARIN SODIUM 40 MG: 100 INJECTION SUBCUTANEOUS at 08:54

## 2017-09-08 RX ADMIN — STANDARDIZED SENNA CONCENTRATE AND DOCUSATE SODIUM 2 TABLET: 8.6; 5 TABLET, FILM COATED ORAL at 08:55

## 2017-09-08 ASSESSMENT — COGNITIVE AND FUNCTIONAL STATUS - GENERAL
DAILY ACTIVITIY SCORE: 19
SUGGESTED CMS G CODE MODIFIER DAILY ACTIVITY: CK
MOBILITY SCORE: 22
DAILY ACTIVITIY SCORE: 24
SUGGESTED CMS G CODE MODIFIER DAILY ACTIVITY: CH
WALKING IN HOSPITAL ROOM: A LITTLE
SUGGESTED CMS G CODE MODIFIER MOBILITY: CJ
CLIMB 3 TO 5 STEPS WITH RAILING: A LITTLE
HELP NEEDED FOR BATHING: A LITTLE
DRESSING REGULAR LOWER BODY CLOTHING: A LITTLE
DRESSING REGULAR UPPER BODY CLOTHING: A LITTLE
TOILETING: A LITTLE
PERSONAL GROOMING: A LITTLE

## 2017-09-08 ASSESSMENT — LIFESTYLE VARIABLES
HAVE PEOPLE ANNOYED YOU BY CRITICIZING YOUR DRINKING: NO
HOW MANY TIMES IN THE PAST YEAR HAVE YOU HAD 5 OR MORE DRINKS IN A DAY: 0
EVER FELT BAD OR GUILTY ABOUT YOUR DRINKING: NO
HAVE YOU EVER FELT YOU SHOULD CUT DOWN ON YOUR DRINKING: NO
TOTAL SCORE: 0
CONSUMPTION TOTAL: NEGATIVE
TOTAL SCORE: 0
DO YOU DRINK ALCOHOL: YES
AVERAGE NUMBER OF DAYS PER WEEK YOU HAVE A DRINK CONTAINING ALCOHOL: 0
ON A TYPICAL DAY WHEN YOU DRINK ALCOHOL HOW MANY DRINKS DO YOU HAVE: 1
TOTAL SCORE: 0
EVER HAD A DRINK FIRST THING IN THE MORNING TO STEADY YOUR NERVES TO GET RID OF A HANGOVER: NO

## 2017-09-08 ASSESSMENT — PATIENT HEALTH QUESTIONNAIRE - PHQ9
2. FEELING DOWN, DEPRESSED, IRRITABLE, OR HOPELESS: NOT AT ALL
SUM OF ALL RESPONSES TO PHQ9 QUESTIONS 1 AND 2: 0
SUM OF ALL RESPONSES TO PHQ QUESTIONS 1-9: 0
1. LITTLE INTEREST OR PLEASURE IN DOING THINGS: NOT AT ALL

## 2017-09-08 ASSESSMENT — COPD QUESTIONNAIRES
COPD SCREENING SCORE: 2
HAVE YOU SMOKED AT LEAST 100 CIGARETTES IN YOUR ENTIRE LIFE: NO/DON'T KNOW
DO YOU EVER COUGH UP ANY MUCUS OR PHLEGM?: NO/ONLY WITH OCCASIONAL COLDS OR INFECTIONS
DURING THE PAST 4 WEEKS HOW MUCH DID YOU FEEL SHORT OF BREATH: NONE/LITTLE OF THE TIME

## 2017-09-08 ASSESSMENT — ACTIVITIES OF DAILY LIVING (ADL): TOILETING: INDEPENDENT

## 2017-09-08 ASSESSMENT — PAIN SCALES - GENERAL: PAINLEVEL_OUTOF10: 0

## 2017-09-08 NOTE — DISCHARGE PLANNING
Received choice form from Mariela). Referral sent to [facility] [place].

## 2017-09-08 NOTE — DISCHARGE INSTRUCTIONS
Discharge Instructions    Discharged to home by car with friend. Discharged via walking, hospital escort: Yes.  Special equipment needed: C-Collar    Be sure to schedule a follow-up appointment with your primary care doctor or any specialists as instructed.     Discharge Plan:   Influenza Vaccine Indication: Patient Refuses    I understand that a diet low in cholesterol, fat, and sodium is recommended for good health. Unless I have been given specific instructions below for another diet, I accept this instruction as my diet prescription.   Other diet: Low fat, low sodium     Special Instructions: None    · Is patient discharged on Warfarin / Coumadin?   No     · Is patient Post Blood Transfusion?  No    Depression / Suicide Risk    As you are discharged from this [facility] facility, it is important to learn how to keep safe from harming yourself.    Recognize the warning signs:  · Abrupt changes in personality, positive or negative- including increase in energy   · Giving away possessions  · Change in eating patterns- significant weight changes-  positive or negative  · Change in sleeping patterns- unable to sleep or sleeping all the time   · Unwillingness or inability to communicate  · Depression  · Unusual sadness, discouragement and loneliness  · Talk of wanting to die  · Neglect of personal appearance   · Rebelliousness- reckless behavior  · Withdrawal from people/activities they love  · Confusion- inability to concentrate     If you or a loved one observes any of these behaviors or has concerns about self-harm, here's what you can do:  · Talk about it- your feelings and reasons for harming yourself  · Remove any means that you might use to hurt yourself (examples: pills, rope, extension cords, firearm)  · Get professional help from the community (Mental Health, Substance Abuse, psychological counseling)  · Do not be alone:Call your Safe Contact- someone whom you trust who will be there for you.  · Call your  local CRISIS HOTLINE 502-4420 or 724-513-4804  · Call your local Children's Mobile Crisis Response Team [place] (006) 171-4012 or www.Sportsy  · Call the toll free National Suicide Prevention Hotlines   · National Suicide Prevention Lifeline 263-375-JTPS (6038)  · National Hope Line Network 800-SUICIDE (970-6722)    Stroke Prevention  Some medical conditions and behaviors are associated with an increased chance of having a stroke. You may prevent a stroke by making healthy choices and managing medical conditions.  HOW CAN I REDUCE MY RISK OF HAVING A STROKE?   · Stay physically active. Get at least 30 minutes of activity on most or all days.  · Do not smoke. It may also be helpful to avoid exposure to secondhand smoke.  · Limit alcohol use. Moderate alcohol use is considered to be:  ¨ No more than 2 drinks per day for men.  ¨ No more than 1 drink per day for nonpregnant women.  · Eat healthy foods. This involves:  ¨ Eating 5 or more servings of fruits and vegetables a day.  ¨ Making dietary changes that address high blood pressure (hypertension), high cholesterol, diabetes, or obesity.  · Manage your cholesterol levels.  ¨ Making food choices that are high in fiber and low in saturated fat, trans fat, and cholesterol may control cholesterol levels.  ¨ Take any prescribed medicines to control cholesterol as directed by your health care provider.  · Manage your diabetes.  ¨ Controlling your carbohydrate and sugar intake is recommended to manage diabetes.  ¨ Take any prescribed medicines to control diabetes as directed by your health care provider.  · Control your hypertension.  ¨ Making food choices that are low in salt (sodium), saturated fat, trans fat, and cholesterol is recommended to manage hypertension.  ¨ Ask your health care provider if you need treatment to lower your blood pressure. Take any prescribed medicines to control hypertension as directed by your health care provider.  ¨ If you are  18-39 years of age, have your blood pressure checked every 3-5 years. If you are 40 years of age or older, have your blood pressure checked every year.  · Maintain a healthy weight.  ¨ Reducing calorie intake and making food choices that are low in sodium, saturated fat, trans fat, and cholesterol are recommended to manage weight.  · Stop drug abuse.  · Avoid taking birth control pills.  ¨ Talk to your health care provider about the risks of taking birth control pills if you are over 35 years old, smoke, get migraines, or have ever had a blood clot.  · Get evaluated for sleep disorders (sleep apnea).  ¨ Talk to your health care provider about getting a sleep evaluation if you snore a lot or have excessive sleepiness.  · Take medicines only as directed by your health care provider.  ¨ For some people, aspirin or blood thinners (anticoagulants) are helpful in reducing the risk of forming abnormal blood clots that can lead to stroke. If you have the irregular heart rhythm of atrial fibrillation, you should be on a blood thinner unless there is a good reason you cannot take them.  ¨ Understand all your medicine instructions.  · Make sure that other conditions (such as anemia or atherosclerosis) are addressed.  SEEK IMMEDIATE MEDICAL CARE IF:   · You have sudden weakness or numbness of the face, arm, or leg, especially on one side of the body.  · Your face or eyelid droops to one side.  · You have sudden confusion.  · You have trouble speaking (aphasia) or understanding.  · You have sudden trouble seeing in one or both eyes.  · You have sudden trouble walking.  · You have dizziness.  · You have a loss of balance or coordination.  · You have a sudden, severe headache with no known cause.  · You have new chest pain or an irregular heartbeat.  Any of these symptoms may represent a serious problem that is an emergency. Do not wait to see if the symptoms will go away. Get medical help at once. Call your local emergency  services (911 in [place] Do not drive yourself to the hospital.     This information is not intended to replace advice given to you by your health care provider. Make sure you discuss any questions you have with your health care provider.     Document Released: 01/25/2006 Document Revised: 01/08/2016 Document Reviewed: 06/20/2014  [employer] Interactive Patient Education ©2016 [employer]

## 2017-09-08 NOTE — PROGRESS NOTES
Assumed patient care with patient's arrival to floor from ER via stretcher. Patient alert, oriented, and ambulated to bed with standby assist. [place] J collar in place, patient states she had a neck surgery approximately 3 weeks prior. Plan of care reviewed, and patient denying pain or needs at this time. Fall precautions in place and call light within reach.

## 2017-09-08 NOTE — ASSESSMENT & PLAN NOTE
Status post decompressive surgery 8/2017.  Continue with cervical collar, outpatient management otherwise.

## 2017-09-08 NOTE — PROGRESS NOTES
Monitor summary: New @2237 SR 63-78, NJ 0.18, QRS 0.10, QT 0.40, with rare PVCs per strip from monitor room.

## 2017-09-08 NOTE — FACE TO FACE
Face to Face Supporting Documentation - Home Health    The encounter with this patient was in whole or in part the primary reason for home health admission.    Date of encounter:   Patient:                    MRN:                       YOB: 2015  Irina Traylor  [id number]  1940     Home health to see patient for:  Skilled Nursing care for assessment, interventions & education, Medical social work consult, Home health aide, Physical Therapy evaluation and treatment and Occupational therapy evaluation and treatment    Skilled need for:  Surgical Aftercare cervical surgery    Skilled nursing interventions to include:  Comment: medication management and disease teaching    Homebound status evidenced by:  Need the aid of supportive devices such as crutches, canes, wheelchairs or walkers or Needs the assistance of another person in order to leave the home. Leaving home requires a considerable and taxing effort. There is a normal inability to leave the home.    Community Physician to provide follow up care: Kailee Coyne D.O.     Optional Interventions? No      I certify the face to face encounter for this home health care referral meets the CMS requirements and the encounter/clinical assessment with the patient was, in whole, or in part, for the medical condition(s) listed above, which is the primary reason for home health care. Based on my clinical findings: the service(s) are medically necessary, support the need for home health care, and the homebound criteria are met.  I certify that this patient has had a face to face encounter by myself.  Marcos Bowers M.D. - NPI: [id number]

## 2017-09-08 NOTE — ED NOTES
Report received from CHRIS Dumont. Pt up to and from BS commode with stand by assist. Pt updated on POC. Will continue to monitor.

## 2017-09-08 NOTE — H&P
Hospital Medicine History and Physical    Date of Service  9/7/2017    Chief Complaint  Chief Complaint   Patient presents with   • Possible Stroke     pt wheeled to triage, Hx CVA, worse expressive aphasia and left arm weakness since 1300 today as well as reported HTN.        History of Presenting Illness  76 y.o. female With apparent prior history of cerebral vascular accident associated expressive aphasia was in her usual state of health until the day of admission. She reports about 1:30 in the afternoon, she developed sudden onset of slurred speech. This was alarming to her, and she prepared herself to go to the emergency department by getting her Head, and her paperwork placed in her back. She then took an aspirin, after which she reports feeling somewhat better. She reports also a odd feeling in her left arm with some associated pain. This felt to her like her prior CVA, so she presented to the emergency department for further evaluation. Upon arrival, she did feel somewhat better, with less slurred of her speech, and her arm pain has improved. She currently denies any headache or vision changes, she denies any chest pain, shortness of breath, abdominal pain, nausea vomiting, diarrhea or constipation. She was initially evaluated for possible tissue plasminogen activator, she did recently have a cervical spine surgery. She has no other complaints.     Primary Care Physician  Kailee Coyne D.O.    Consultants  None    Code Status  Full    Review of Systems  Review of Systems   Constitutional: Negative for chills and fever.   Eyes: Negative for blurred vision and double vision.   Respiratory: Negative for shortness of breath.    Cardiovascular: Negative for chest pain.   Gastrointestinal: Negative for abdominal pain, constipation, diarrhea, nausea and vomiting.   Musculoskeletal: Positive for joint pain.   Neurological: Positive for speech change. Negative for headaches.        Past Medical History  Past  Medical History:   Diagnosis Date   • Pain 4/25/17    Left hip, right arm and shoulder.   • DARIN (obstructive sleep apnea) 1/26/2017    AHI 23.6, minimum saturation 69%, on CPAP 15 cm.   • Pain 12/5/16    right arm and shoulder   • Constipation 7-2015   • Pain 06-09-14    right knee, 6/10   • Bronchitis 1991   • Anesthesia     States feels like she can't breathe with induction for right hip surgery   • Arthritis     To hips, shoulders, hands   • Back pain    • Expressive aphasia     Related to CVA   • Heart murmur    • High cholesterol    • Hypertension    • Obesity    • Other specified disorder of intestines     constipation-treated with herb lax   • Pain     l shoulder   • Post-nasal drip    • Restless leg syndrome     4/25/17-Resolved, states was related to right hip and none S/P replacement.   • Sleep apnea     uses cpap   • Snoring    • Stroke ([facility]) 2/1993, 8/1993    Mdxkvvlx-vuwgfj-bgfvgapydn aphasia,mouth forming words, reading, numbers   • Tuberculosis     skin test (+), cxr (-)   • Unspecified hemorrhagic conditions     nose bleeds with dry air, bruises easily   • Unspecified urinary incontinence     Wears pad   • Urinary bladder disorder        Surgical History  Past Surgical History:   Procedure Laterality Date   • CERVICAL FUSION POSTERIOR  8/8/2017    Procedure: CERVICAL FUSION POSTERIOR- C3-5, INSTRUMENTED;  Surgeon: Florentino Pike M.D.;  Location: [facility];  Service:    • CERVICAL LAMINECTOMY POSTERIOR  8/8/2017    Procedure: CERVICAL LAMINECTOMY POSTERIOR- C3-5;  Surgeon: Florentino Pike M.D.;  Location: [facility];  Service:    • HIP ARTHROPLASTY TOTAL Left 5/8/2017    Procedure: HIP ARTHROPLASTY TOTAL;  Surgeon: Earle Jacobsen M.D.;  Location: [facility];  Service:    • HIP ARTHROPLASTY TOTAL Right 12/12/2016    Procedure: HIP ARTHROPLASTY TOTAL;  Surgeon: Earle Jacobsen M.D.;  Location: [facility];  Service:    • LUMBAR FUSION  POSTERIOR  9/26/2016    Procedure: LUMBAR FUSION POSTERIOR L2-S1 onlay ;  Surgeon: Florentino Pike M.D.;  Location: SURGERY [facility];  Service:    • LUMBAR LAMINECTOMY DISKECTOMY N/A 9/26/2016    Procedure: LUMBAR LAMINECTOMY DISKECTOMY L2-S1;  Surgeon: Florentino Pike M.D.;  Location: SURGERY [facility];  Service:    • COLONOSCOPY  2/2016    Every 10 years   • SHOULDER ARTHROPLASTY TOTAL Left 8/11/2015    Procedure: SHOULDER ARTHROPLASTY REVERSE TOTAL;  Surgeon: Yoshi Gamble M.D.;  Location: SURGERY [facility];  Service:    • KNEE ARTHROPLASTY TOTAL  6/23/2014    Performed by Earle Jacobsen M.D. at SURGERY [facility]   • HAMMERTOE CORRECTION Left 11/12/2007    Removed 3rd toe   • LUMBAR LAMINECTOMY DISKECTOMY  1993    Lumbar   • CARPAL TUNNEL RELEASE Bilateral 1993   • CERVICAL DISK AND FUSION ANTERIOR  1993    X2   • HAMMERTOE CORRECTION Right 1980's    Removed 3rd toe   • BUNIONECTOMY Bilateral 1980's   • HYSTERECTOMY, TOTAL ABDOMINAL  1977   • FINGER ORIF Left as child    Index       Medications  No current facility-administered medications on file prior to encounter.      Current Outpatient Prescriptions on File Prior to Encounter   Medication Sig Dispense Refill   • calcium carbonate (TUMS) 500 MG Chew Tab Take 1 Tab by mouth 2 times a day. 60 Tab 2   • ondansetron (ZOFRAN ODT) 4 MG TABLET DISPERSIBLE Take 1 Tab by mouth every 8 hours as needed for Nausea/Vomiting (Nausea - Not appropriate if patient is vomiting.  Give PO if IV route is unavailable.). 30 Tab 0   • cyclobenzaprine (FLEXERIL) 10 MG Tab Take 1 Tab by mouth every 8 hours as needed for Muscle Spasms. 30 Tab 0   • vitamin D 5000 UNITS Tab Take 5,000 Units by mouth every day. 30 Tab 2   • loratadine (CLARITIN) 10 MG Tab Take 10 mg by mouth every day.     • diphenhydrAMINE (BENADRYL) 25 MG Tab Take 25 mg by mouth 2 Times a Day.     • Alpha-D-Galactosidase (BEANO PO) Take 1 Tab by mouth as needed.     • fenofibrate  "(TRICOR) 145 MG Tab Take 145 mg by mouth every morning.     • diltiazem CD (CARDIZEM CD) 240 MG CP24 Take 240 mg by mouth every bedtime.     • Non Formulary Request Take 1 Package by mouth every day. Indications: VITALIZER GOLD-MULTI VITAMIN     • acetaminophen (TYLENOL) 325 MG TABS Take 650 mg by mouth every four hours as needed.     • NON SPECIFIED Take 7 Caps by mouth every bedtime. \"Herb-Lax\"   Indications: constipation     • irbesartan (AVAPRO) 150 MG TABS Take 150 mg by mouth every morning. Indications: High Blood Pressure         Family History  Family History   Problem Relation Age of Onset   • Cancer Mother    • Stroke Father        Social History  Social History   Substance Use Topics   • Smoking status: Never Smoker   • Smokeless tobacco: Never Used   • Alcohol use Yes      Comment: beer or lizeth 1-2 per year       Allergies  Allergies   Allergen Reactions   • Other Misc Shortness of Breath     Anesthesia with surgery   Pt couldn't breathe while being put under and tapped side of bed to let RN know. \"It was the scariest feeling and I want everyone to know\"     • Benicar [Olmesartan]      blisters   • Codeine Vomiting   • Demerol Vomiting   • Other Drug Vomiting     \"all pain meds\" per pt can take morphine.   • Percocet [Oxycodone-Acetaminophen] Vomiting   • Statins [Hmg-Coa-R Inhibitors]      Muscle cramping   • Valium Vomiting   • Vicodin [Hydrocodone-Acetaminophen] Vomiting        Physical Exam  Laboratory   Hemodynamics  Temp (24hrs), Av.8 °C (98.3 °F), Min:36.8 °C (98.3 °F), Max:36.8 °C (98.3 °F)   Temperature: 36.8 °C (98.3 °F)  Pulse  Av  Min: 68  Max: 82 Heart Rate (Monitored): 72  Blood Pressure : (!) 174/88, NIBP: 126/70      Respiratory      Respiration: 15, Pulse Oximetry: 95 %             Physical Exam   Constitutional: She is oriented to person, place, and time. She appears well-developed and well-nourished. No distress.   HENT:   Head: Normocephalic and atraumatic.   Eyes: " Pupils are equal, round, and reactive to light.   Neck: Normal range of motion. Neck supple.   Cardiovascular: Normal rate and regular rhythm.  Exam reveals no gallop and no friction rub.    No murmur heard.  Pulmonary/Chest: Effort normal and breath sounds normal. No respiratory distress. She has no wheezes.   Abdominal: Soft. Bowel sounds are normal. There is no tenderness. There is no rebound and no guarding.   Musculoskeletal: She exhibits no edema.   Neurological: She is alert and oriented to person, place, and time. No cranial nerve deficit.   Expressive aphasia noted, no evidence of slurring of speech however. More word finding difficulties.   Skin: She is not diaphoretic.       Recent Labs      09/07/17   1432   WBC  5.6   RBC  4.51   HEMOGLOBIN  13.1   HEMATOCRIT  41.7   MCV  92.5   MCH  29.0   MCHC  31.4*   RDW  51.9*   PLATELETCT  203   MPV  12.3     Recent Labs      09/07/17   1432   SODIUM  139   POTASSIUM  4.1   CHLORIDE  107   CO2  21   GLUCOSE  76   BUN  24*   CREATININE  0.84   CALCIUM  10.3     Recent Labs      09/07/17   1432   ALTSGPT  11   ASTSGOT  19   ALKPHOSPHAT  58   TBILIRUBIN  0.4   GLUCOSE  76     Recent Labs      09/07/17   1432   APTT  27.0   INR  0.92             Lab Results   Component Value Date    TROPONINI <0.01 09/07/2017     Urinalysis:    Lab Results  Component Value Date/Time   SPECGRAVITY 1.015 09/07/2017 1458   GLUCOSEUR Negative 09/07/2017 1458   KETONES Negative 09/07/2017 1458   NITRITE Negative 09/07/2017 1458   WBCURINE 0-2 09/07/2017 1458   RBCURINE 0-2 09/07/2017 1458   BACTERIA Negative 09/07/2017 1458   EPITHELCELL Negative 09/07/2017 1458        Imaging  Computed tomographic the head with cerebral atrophy, white matter lucencies with small vessel ischemic change, old infarcts, no evidence of acute infarct or hemorrhage.    Computed tomographic angiogram of the head without evidence of thrombosis within the [place].  Computed tomographic angiogram of the  neck with mild calcified plaque in both carotid artery bifurcations, patent arteries otherwise.         Assessment/Plan     I anticipate this patient will require at least two midnights for appropriate medical management, necessitating inpatient admission.    * CVA (cerebral vascular accident) (CMS-[facility])   Assessment & Plan    Slurred speech, worsening of chronic aphasia, with left arm pain and weakness.  Stroke order set ordered.  Appreciate neurology recommendations.          History of excision of lamina of cervical vertebra for decompression of spinal cord   Assessment & Plan    Recently discharged for the same.  Stable.          Benign essential HTN   Assessment & Plan    Currently controlled.  Permissive hypertension allowed due to CVA.         History of CVA with residual deficit   Assessment & Plan    Does have residual expressive aphasia.          Intervertebral cervical disc disorder with myelopathy, cervical region- (present on admission)   Assessment & Plan    Status post decompressive surgery 8/2017.  Continue with cervical collar, outpatient management otherwise.              VTE prophylaxis: Lovenox

## 2017-09-08 NOTE — ED NOTES
Pt assist back to gurHarrison from chair with stand by assist. No other needs at this time. Will continue to monitor.

## 2017-09-08 NOTE — CONSULTS
NEUROLOGY CONSULT         CHIEF COMPLAINT:  Chief Complaint   Patient presents with   • Possible Stroke     pt wheeled to triage, Hx CVA, worse expressive aphasia and left arm weakness since 1300 today as well as reported HTN.          Date of Service:  09/07/17    REQUESTING PHYSICIAN: Zion Perez M.D.,       HISTORY OF PRESENT ILLNESS:  Irina Traylor is a 76 y.o. Female with history of a left MCA CVA with a residual expressive aphasia who presented with acute onset of left arm pain. She developed sudden severe arm pain at around 1:00 PM today. She took her BP and she reports it was elevated at 140. She  was concerned she was having a stroke and called her friend.  However, her friend reported no change in her baseline aphasia, and she was still able to walk as she normally does. There was no noticeable weakness except her left arm might be slightly weak with formal strength testing due to pain.  Her CT of the brain showed her chronic left MCA CVA. Her CTA head and neck was unremarkable for any large vessel occlusions. As she was neurologically close to her baseline, she was not an iv-TPA candidate.   Of note, she also had a cervical fusion last month.        ROS:  Positive review of systems are in BOLD  Constitutional: no fevers, chills, night sweats, weight loss  HEENT: no headache, blurred vision, loss of vision, double vision, congestion, sore throat, rhinorrhea, dyssphagia, edema   Chest: no SOB, cough, wheezing,  Heart: no chest pain, palpitation, orthopnea,  Abd: no abdominal pain, nausea, vomiting, diarrrhea, constipation,   Genitourinary: no hematuria, urinary frequency or urgency, bladder or bowel incontinence  Extremities: no joint pain, muscle pain, back pain, neck pain,   Hematological: no epistaxis, easy bruising, petechia  Neurological: please see history of present illness  Psychiatric: no anxiety, depression  Extremities: no swelling, +left arm pain.     Endocrine: no  "polyuria, polydipsea, heat or cold intolerance, weight gain, weight loss  Skin: no lesions, rashes.       PAST MEDICAL HISTORY:  Past Medical History:   Diagnosis Date   • Pain 4/25/17    Left hip, right arm and shoulder.   • DARIN (obstructive sleep apnea) 1/26/2017    AHI 23.6, minimum saturation 69%, on CPAP 15 cm.   • Pain 12/5/16    right arm and shoulder   • Constipation 7-2015   • Pain 06-09-14    right knee, 6/10   • Bronchitis 1991   • Anesthesia     States feels like she can't breathe with induction for right hip surgery   • Arthritis     To hips, shoulders, hands   • Back pain    • Expressive aphasia     Related to CVA   • Heart murmur    • High cholesterol    • Hypertension    • Obesity    • Other specified disorder of intestines     constipation-treated with herb lax   • Pain     l shoulder   • Post-nasal drip    • Restless leg syndrome     4/25/17-Resolved, states was related to right hip and none S/P replacement.   • Sleep apnea     uses cpap   • Snoring    • Stroke (CMS-[facility]) 2/1993, 8/1993    Qawqfemz-enancc-veaycyxejb aphasia,mouth forming words, reading, numbers   • Tuberculosis     skin test (+), cxr (-)   • Unspecified hemorrhagic conditions     nose bleeds with dry air, bruises easily   • Unspecified urinary incontinence     Wears pad   • Urinary bladder disorder          ALLERGIES:  Allergies   Allergen Reactions   • Other Misc Shortness of Breath     Anesthesia with surgery 2016  Pt couldn't breathe while being put under and tapped side of bed to let RN know. \"It was the scariest feeling and I want everyone to know\"     • Benicar [Olmesartan]      blisters   • Codeine Vomiting   • Demerol Vomiting   • Other Drug Vomiting     \"all pain meds\" per pt can take morphine.   • Percocet [Oxycodone-Acetaminophen] Vomiting   • Statins [Hmg-Coa-R Inhibitors]      Muscle cramping   • Valium Vomiting   • Vicodin [Hydrocodone-Acetaminophen] Vomiting         MEDICATIONS:  No current facility-administered " "medications on file prior to encounter.      Current Outpatient Prescriptions on File Prior to Encounter   Medication Sig Dispense Refill   • calcium carbonate (TUMS) 500 MG Chew Tab Take 1 Tab by mouth 2 times a day. 60 Tab 2   • ondansetron (ZOFRAN ODT) 4 MG TABLET DISPERSIBLE Take 1 Tab by mouth every 8 hours as needed for Nausea/Vomiting (Nausea - Not appropriate if patient is vomiting.  Give PO if IV route is unavailable.). 30 Tab 0   • cyclobenzaprine (FLEXERIL) 10 MG Tab Take 1 Tab by mouth every 8 hours as needed for Muscle Spasms. 30 Tab 0   • vitamin D 5000 UNITS Tab Take 5,000 Units by mouth every day. 30 Tab 2   • loratadine (CLARITIN) 10 MG Tab Take 10 mg by mouth every day.     • diphenhydrAMINE (BENADRYL) 25 MG Tab Take 25 mg by mouth 2 Times a Day.     • Alpha-D-Galactosidase (BEANO PO) Take 1 Tab by mouth as needed.     • fenofibrate (TRICOR) 145 MG Tab Take 145 mg by mouth every morning.     • diltiazem CD (CARDIZEM CD) 240 MG CP24 Take 240 mg by mouth every bedtime.     • Non Formulary Request Take 1 Package by mouth every day. Indications: VITALIZER GOLD-MULTI VITAMIN     • acetaminophen (TYLENOL) 325 MG TABS Take 650 mg by mouth every four hours as needed.     • NON SPECIFIED Take 7 Caps by mouth every bedtime. \"Herb-Lax\"   Indications: constipation     • irbesartan (AVAPRO) 150 MG TABS Take 150 mg by mouth every morning. Indications: High Blood Pressure         MEDICATIONS:    Current Facility-Administered Medications:   •  cyclobenzaprine (FLEXERIL) tablet 10 mg, 10 mg, Oral, Q8HRS PRN, Russell Horta M.D.  •  [START ON 9/8/2017] diltiazem CD (CARDIZEM CD) capsule 240 mg, 240 mg, Oral, QHS, Russell Horta M.D.  •  [START ON 9/8/2017] fenofibrate micronized (LOFIBRA) capsule 134 mg, 134 mg, Oral, QAMRussell M.D.  •  [START ON 9/9/2017] irbesartan (AVAPRO) tablet 150 mg, 150 mg, Oral, QAM, Russell A Gibson, M.D.  •  [START ON 9/8/2017] loratadine (CLARITIN) tablet 10 mg, 10 mg, Oral, " DAILY, Russell Horta M.D.  •  Pharmacy Consult Request, , Other, PRN, Russell Horta M.D.  •  senna-docusate (PERICOLACE or SENOKOT S) 8.6-50 MG per tablet 2 Tab, 2 Tab, Oral, BID **AND** polyethylene glycol/lytes (MIRALAX) PACKET 1 Packet, 1 Packet, Oral, QDAY PRN **AND** magnesium hydroxide (MILK OF MAGNESIA) suspension 30 mL, 30 mL, Oral, QDAY PRN **AND** bisacodyl (DULCOLAX) suppository 10 mg, 10 mg, Rectal, QDAY PRN, Russell Horta M.D.  •  [START ON 9/8/2017] enoxaparin (LOVENOX) inj 40 mg, 40 mg, Subcutaneous, DAILY, Russell Horta M.D.  •  acetaminophen (TYLENOL) tablet 650 mg, 650 mg, Oral, Q6HRS PRN, Russell Horta M.D.  •  labetalol (NORMODYNE,TRANDATE) injection 10 mg, 10 mg, Intravenous, Q4HRS PRN **OR** hydrALAZINE (APRESOLINE) injection 10 mg, 10 mg, Intravenous, Q2HRS PRN, Russell Horta M.D.  •  [START ON 9/8/2017] aspirin (ASA) tablet 325 mg, 325 mg, Oral, DAILY **OR** [START ON 9/8/2017] aspirin (ASA) chewable tab 324 mg, 324 mg, Oral, DAILY **OR** [START ON 9/8/2017] aspirin (ASA) suppository 300 mg, 300 mg, Rectal, DAILY, Russell Horta M.D.  •  insulin lispro (HUMALOG) injection 1-6 Units, 1-6 Units, Subcutaneous, 4X/DAY ACHS, Russell Horta M.D.  •  Action is required: Protocol 1073 Hypoglycemia has been implemented, , , Once **AND** Protocol 1073 Inclusion Criteria, , , CONTINUOUS **AND** Protocol 1073 NOTIFY, , , Once **AND** Protocol 1073 Initiate protocol immediately if FSBG is less than or equal to 70 mg/dL, , , CONTINUOUS **AND** glucose 4 g chewable tablet 16 g, 16 g, Oral, Q15 MIN PRN **AND** dextrose 50% (D50W) injection 25 mL, 25 mL, Intravenous, Q15 MIN PRN, Russell Horta M.D.  •  ondansetron (ZOFRAN) syringe/vial injection 4 mg, 4 mg, Intravenous, Q4HRS PRN, Russell Horta M.D.  •  ondansetron (ZOFRAN ODT) dispertab 4 mg, 4 mg, Oral, Q4HRS PRN, Russell Horta M.D.    Current Outpatient Prescriptions:   •  calcium carbonate (TUMS) 500 MG Chew Tab, Take 1 Tab by mouth 2 times a  "day., Disp: 60 Tab, Rfl: 2  •  ondansetron (ZOFRAN ODT) 4 MG TABLET DISPERSIBLE, Take 1 Tab by mouth every 8 hours as needed for Nausea/Vomiting (Nausea - Not appropriate if patient is vomiting.  Give PO if IV route is unavailable.)., Disp: 30 Tab, Rfl: 0  •  cyclobenzaprine (FLEXERIL) 10 MG Tab, Take 1 Tab by mouth every 8 hours as needed for Muscle Spasms., Disp: 30 Tab, Rfl: 0  •  vitamin D 5000 UNITS Tab, Take 5,000 Units by mouth every day., Disp: 30 Tab, Rfl: 2  •  loratadine (CLARITIN) 10 MG Tab, Take 10 mg by mouth every day., Disp: , Rfl:   •  diphenhydrAMINE (BENADRYL) 25 MG Tab, Take 25 mg by mouth 2 Times a Day., Disp: , Rfl:   •  Alpha-D-Galactosidase (BEANO PO), Take 1 Tab by mouth as needed., Disp: , Rfl:   •  fenofibrate (TRICOR) 145 MG Tab, Take 145 mg by mouth every morning., Disp: , Rfl:   •  diltiazem CD (CARDIZEM CD) 240 MG CP24, Take 240 mg by mouth every bedtime., Disp: , Rfl:   •  Non Formulary Request, Take 1 Package by mouth every day. Indications: VITALIZER GOLD-MULTI VITAMIN, Disp: , Rfl:   •  acetaminophen (TYLENOL) 325 MG TABS, Take 650 mg by mouth every four hours as needed., Disp: , Rfl:   •  NON SPECIFIED, Take 7 Caps by mouth every bedtime. \"Herb-Lax\"   Indications: constipation, Disp: , Rfl:   •  irbesartan (AVAPRO) 150 MG TABS, Take 150 mg by mouth every morning. Indications: High Blood Pressure, Disp: , Rfl:     FAMILY HISTORY:  Family History   Problem Relation Age of Onset   • Cancer Mother    • Stroke Father        SOCIAL HISTORY:  Social History     Social History   • Marital status: Single     Spouse name: N/A   • Number of children: N/A   • Years of education: N/A     Occupational History   • Not on file.     Social History Main Topics   • Smoking status: Never Smoker   • Smokeless tobacco: Never Used   • Alcohol use Yes      Comment: beer or lizeth 1-2 per year   • Drug use: No   • Sexual activity: Not on file     Other Topics Concern   • Not on file     Social History " Narrative   • No narrative on file       EXAM:  Vitals:    09/07/17 1801 09/07/17 1831 09/07/17 1900 09/07/17 2000   BP:       Pulse: 68 65 70 74   Resp: 17 16 16 15   Temp:       SpO2: 92% 92% 91% 92%   Weight:       Height:         General: pt is lying in bed, NAD  HEENT: normocephalic, atraumatic, normal moist oral mucosa  EYes: anicteric, PERRLA, pupils midline  Neck: supple, no carotid bruits  Chest: CTA b/l no wheezing or rales  Heart: regular rate and rhythm, no murmurs  Abd: soft, non-tender, non distended  Extremtiies: no edema  Skin: no rashes or lesions  Neuro  General: pt is alert, oriented x 3, but she has a mild to moderate expressive aphasia.   CN: visual fields are full to confrotation, EOMI, PERRLA,  facial sensation is intact b/l, there is no facial asymmetry, symmetrical palatal elevation, tongue is midline, sternocleidomastoid and trapezius strength are intact  Motor: normal tone. She could lift up all 4 of her extremities with no drift, but had slightly weaker strength on her left arm with deltoid, biceps and triceps testing due to pain. Her right deltoid was also weak due to shoulder pain.    DTR: 2+ throughout  Plantar reflexes: downgoing b/l   Coordination: finger to nose and heel shin show no signs of ataxia b/l  Sensation: intact to soft touch in all 4 extremities.      LABORATORY TESTING  Lab Results   Component Value Date/Time    WBC 5.6 09/07/2017 02:32 PM    RBC 4.51 09/07/2017 02:32 PM    HEMOGLOBIN 13.1 09/07/2017 02:32 PM    HEMATOCRIT 41.7 09/07/2017 02:32 PM    MCV 92.5 09/07/2017 02:32 PM    MCH 29.0 09/07/2017 02:32 PM    MCHC 31.4 (L) 09/07/2017 02:32 PM    MPV 12.3 09/07/2017 02:32 PM    NEUTSPOLYS 68.60 09/07/2017 02:32 PM    LYMPHOCYTES 19.50 (L) 09/07/2017 02:32 PM    MONOCYTES 7.70 09/07/2017 02:32 PM    EOSINOPHILS 2.90 09/07/2017 02:32 PM    BASOPHILS 0.90 09/07/2017 02:32 PM        IMAGING:  Her CT of the brain showed her chronic left MCA CVA. Her CTA head and neck was  unremarkable for any large vessel occlusions.      IMPRESSION AND PLAN: Irina Traylor is a 76 y.o. Female with history of a left MCA CVA with a residual expressive aphasia who presented with acute onset of left arm pain.   Her expressive aphasia is at baseline per her friend, so really her main complaint is left arm pain and weakness (due to the pain). I therefore this that acute ischemic stroke is less likely.  I would rule out other causes of her arm pain such as an acute MI, but can also get an MRI of the brain to r/o stroke.     Aysha Singh M.D.     Neurology

## 2017-09-08 NOTE — PROGRESS NOTES
Discharge instructions given to pt and went over with patient, all questions answered. Pt. Went home via car with friend.

## 2017-09-08 NOTE — DISCHARGE PLANNING
Care Transition Team Assessment    IHD met with pt at bedside. Pt currently lives alone at home, but has community support to rely on. She states that she will have a friend provide transportation home upon D/C. She currently uses a walker and home O2 provided by [employer]. She receives home health provided by [employer] 4x per week, who assist with bathing and home care.     Information Source  Orientation : Oriented x 4  Information Given By: Patient  Informant's Name: Tenisha  Who is responsible for making decisions for patient? : Patient         Elopement Risk  Legal Hold: No  Ambulatory or Self Mobile in Wheelchair: No-Not an Elopement Risk  Elopement Risk: Not at Risk for Elopement    Interdisciplinary Discharge Planning  Does Admitting Nurse Feel This Could be a Complex Discharge?: No  Primary Care Physician: Dr Coyne  Lives with - Patient's Self Care Capacity: Alone and Able to Care For Self  Patient or legal guardian wants to designate a caregiver (see row info): Yes  Caregiver name: Tenisha Sun  Caregiver relationship to patient: friend  Caregiver contact info: [phone]  Support Systems: Friends / Neighbors, [employer] / [employer]  Housing / Facility: [address]  Do You Take your Prescribed Medications Regularly: Yes  Able to Return to Previous ADL's: Yes  Mobility Issues: No  Prior Services: Skilled Home Health Services  Patient Expects to be Discharged to:: Home  Assistance Needed: Unknown at this Time  Durable Medical Equipment: Walker, Commode (CPAP)    Discharge Preparedness  What is your plan after discharge?: Home with help  What are your discharge supports?: Other (comment) (Hired caregivers/friends)  Prior Functional Level: Ambulatory, Independent with Medication Management, Needs Assist with Activities of Daily Living, Uses Walker  Difficulity with ADLs: Walking, Bathing  Difficulty with ADLs Comment: uses walker/assisted by caregivers  Difficulity with IADLs: Driving  Difficulity with IADL Comments:  assisted by friends    Functional Assesment  Prior Functional Level: Ambulatory, Independent with Medication Management, Needs Assist with Activities of Daily Living, Uses Walker    Finances  Financial Barriers to Discharge: No  Prescription Coverage: Yes ([employer] [facility] [place])    Vision / Hearing Impairment  Vision Impairment : Yes  Right Eye Vision: Wears Glasses, Impaired  Left Eye Vision: Impaired, Wears Glasses  Hearing Impairment : No    Values / Beliefs / Concerns  Values / Beliefs Concerns : No         Domestic Abuse  Have you ever been the victim of abuse or violence?: No  Physical Abuse or Sexual Abuse: No  Verbal Abuse or Emotional Abuse: No  Possible Abuse Reported to:: Not Applicable    Psychological Assessment  History of Substance Abuse: None  History of Psychiatric Problems: No  Non-compliant with Treatment: No    Discharge Risks or Barriers  Discharge risks or barriers?: No    Anticipated Discharge Information  Anticipated discharge disposition: Home  Discharge Address: [address]   Discharge Contact Phone Number: [phone]

## 2017-09-08 NOTE — ASSESSMENT & PLAN NOTE
Slurred speech, worsening of chronic aphasia, with left arm pain and weakness.  Stroke order set ordered.  Appreciate neurology recommendations.

## 2017-09-08 NOTE — PROGRESS NOTES
[place] Fall Risk Assessment:     Last Known Fall: No falls  Mobility: Dizziness/generalized weakness  Medications: Cardiovascular or central nervous system meds  Mental Status/LOC/Awareness: Awake, alert, and oriented to date, place, and person  Toileting Needs: No needs  Volume/Electrolyte Status: No problems  Communication/Sensory: No deficits  Behavior: Appropriate behavior  [place] Fall Risk Total: 5  Fall Risk Level: NO RISK    Universal Fall Precautions:  call light/belongings in reach, bed in low position and locked, wheelchairs and assistive devices out of sight, siderails up x 2, use non-slip footwear, adequate lighting, clutter free and spill free environment, educate to call for assistance, educate on level of risk    Fall Risk Level Interventions:          Patient Specific Interventions:     Medication: review medications with patient and family  Mental Status/LOC/Awareness: reinforce falls education and reinforce the use of call light  Toileting: monitor intake and output/use of appropriate interventions  Volume/Electrolyte Status: ensure patient remains hydrated  Communication/Sensory: update plan of care on whiteboard  Behavioral: encourage patient to voice feelings and instruct/reinforce fall program rationale  Mobility: ensure bed is locked and in lowest position

## 2017-09-08 NOTE — DISCHARGE PLANNING
Transitional Care Navigator:    TCN met with patient to discuss transitional care services for discharge planning.  Pt would like to resume home health services with [facility].  Choice form completed and faxed to [facility]. Sw aware.  TCN will follow-up as needed.

## 2017-09-08 NOTE — PROGRESS NOTES
Skin assessed by two RN's. Patient remains in [facility], no skin break down to neck. Old laminectomy scar noted to patient's lumbar. No other skin break down noted at this time.

## 2017-09-08 NOTE — PROGRESS NOTES
AAOX4 and neuro assessment complete. [place] J collar in place.No skin breakdown.Pt sitting in chair. Call light within reach. Incentive spirometer encouraged

## 2017-09-11 ENCOUNTER — PATIENT OUTREACH (OUTPATIENT)
Dept: HEALTH INFORMATION MANAGEMENT | Facility: OTHER | Age: 77
End: 2017-09-11

## 2017-09-12 ENCOUNTER — HOSPITAL ENCOUNTER (OUTPATIENT)
Dept: RADIOLOGY | Facility: MEDICAL CENTER | Age: 77
End: 2017-09-12
Attending: NEUROLOGICAL SURGERY
Payer: MEDICARE

## 2017-09-12 ENCOUNTER — PATIENT OUTREACH (OUTPATIENT)
Dept: HEALTH INFORMATION MANAGEMENT | Facility: OTHER | Age: 77
End: 2017-09-12

## 2017-09-12 DIAGNOSIS — M47.12 CERVICAL SPONDYLOSIS WITH MYELOPATHY: ICD-10-CM

## 2017-09-12 PROCEDURE — 72050 X-RAY EXAM NECK SPINE 4/5VWS: CPT

## 2017-09-12 NOTE — PROGRESS NOTES
Patient was admitted on 9/7/2017 to [facility] for a CVA and discharged on 9/8/2017. During the initial patient outreach call with IHD, the patient declined services stating she has a sturdy support system.

## 2017-10-26 ENCOUNTER — HOSPITAL ENCOUNTER (OUTPATIENT)
Dept: RADIOLOGY | Facility: MEDICAL CENTER | Age: 77
End: 2017-10-26
Attending: NEUROLOGICAL SURGERY
Payer: MEDICARE

## 2017-10-26 DIAGNOSIS — M54.16 LUMBAR RADICULOPATHY: ICD-10-CM

## 2017-10-26 DIAGNOSIS — M48.061 SPINAL STENOSIS OF LUMBAR REGION, UNSPECIFIED WHETHER NEUROGENIC CLAUDICATION PRESENT: ICD-10-CM

## 2017-10-26 DIAGNOSIS — M54.2 CERVICALGIA: ICD-10-CM

## 2017-10-26 PROCEDURE — 72050 X-RAY EXAM NECK SPINE 4/5VWS: CPT

## 2017-10-26 PROCEDURE — 72110 X-RAY EXAM L-2 SPINE 4/>VWS: CPT

## 2017-11-17 ENCOUNTER — HOSPITAL ENCOUNTER (OUTPATIENT)
Dept: LAB | Facility: MEDICAL CENTER | Age: 77
End: 2017-11-17
Attending: FAMILY MEDICINE
Payer: MEDICARE

## 2017-11-17 LAB
BUN SERPL-MCNC: 27 MG/DL (ref 8–22)
CHOLEST SERPL-MCNC: 182 MG/DL (ref 100–199)
CREAT SERPL-MCNC: 0.93 MG/DL (ref 0.5–1.4)
GFR SERPL CREATININE-BSD FRML MDRD: 59 ML/MIN/1.73 M 2
GLUCOSE SERPL-MCNC: 79 MG/DL (ref 65–99)
HDLC SERPL-MCNC: 81 MG/DL
LDLC SERPL CALC-MCNC: 83 MG/DL
TRIGL SERPL-MCNC: 89 MG/DL (ref 0–149)

## 2017-11-17 PROCEDURE — 36415 COLL VENOUS BLD VENIPUNCTURE: CPT

## 2017-11-17 PROCEDURE — 82947 ASSAY GLUCOSE BLOOD QUANT: CPT

## 2017-11-17 PROCEDURE — 80061 LIPID PANEL: CPT

## 2017-11-17 PROCEDURE — 82565 ASSAY OF CREATININE: CPT

## 2017-11-17 PROCEDURE — 84520 ASSAY OF UREA NITROGEN: CPT

## 2017-12-19 ENCOUNTER — SLEEP CENTER VISIT (OUTPATIENT)
Dept: SLEEP MEDICINE | Facility: MEDICAL CENTER | Age: 77
End: 2017-12-19
Payer: MEDICARE

## 2017-12-19 VITALS
BODY MASS INDEX: 28.32 KG/M2 | DIASTOLIC BLOOD PRESSURE: 68 MMHG | RESPIRATION RATE: 16 BRPM | SYSTOLIC BLOOD PRESSURE: 112 MMHG | WEIGHT: 150 LBS | HEART RATE: 70 BPM | HEIGHT: 61 IN | OXYGEN SATURATION: 95 %

## 2017-12-19 DIAGNOSIS — R05.9 COUGH: ICD-10-CM

## 2017-12-19 DIAGNOSIS — G47.33 OSA (OBSTRUCTIVE SLEEP APNEA): ICD-10-CM

## 2017-12-19 PROCEDURE — 99213 OFFICE O/P EST LOW 20 MIN: CPT | Performed by: NURSE PRACTITIONER

## 2017-12-19 RX ORDER — AMOXICILLIN AND CLAVULANATE POTASSIUM 875; 125 MG/1; MG/1
1 TABLET, FILM COATED ORAL 2 TIMES DAILY WITH MEALS
Qty: 14 TAB | Refills: 0 | Status: SHIPPED | OUTPATIENT
Start: 2017-12-19 | End: 2018-02-17

## 2017-12-19 NOTE — PROGRESS NOTES
Chief Complaint   Patient presents with   • Apnea       HPI:  Irina Traylor is a 77 y.o. year old female here today for follow-up on her obstructive sleep apnea.  Polysomnogram indicated an AHI of 23.6 with a minimum 02 saturation of 69%. She is compliant with CPAP 15 cm. Compliance download at her last office visit indicated an AHI of 0.9 with an average use of 7.5 hours. She did not bring her chip to today's office visit. She tolerates the pressure well. She has nasal pillows which she feels is a good fit. She does feel she sleeps better on therapy and wakes more refreshed. She denies any morning headaches.   She states she has had increased sinus drainage, sneezing and sore throat worse over the past few days. She started Claritin OTC. She has had an increased cough and states her mucous has been a little yellow in color. She denies wheezing. She denies any fevers or chills. She denies dyspnea. She has a history of Bronchitis in the past and would like to have an antibiotic on hand in case her symptoms persist or worsen.     Past Medical History:   Diagnosis Date   • Anesthesia     States feels like she can't breathe with induction for right hip surgery   • Arthritis     To hips, shoulders, hands   • Back pain    • Bronchitis 1991   • Constipation 7-2015   • Expressive aphasia     Related to CVA   • Heart murmur    • High cholesterol    • Hypertension    • Obesity    • DARIN (obstructive sleep apnea) 1/26/2017    AHI 23.6, minimum saturation 69%, on CPAP 15 cm.   • Other specified disorder of intestines     constipation-treated with herb lax   • Pain 06-09-14    right knee, 6/10   • Pain     l shoulder   • Pain 12/5/16    right arm and shoulder   • Pain 4/25/17    Left hip, right arm and shoulder.   • Post-nasal drip    • Restless leg syndrome     4/25/17-Resolved, states was related to right hip and none S/P replacement.   • Sleep apnea     uses cpap   • Snoring    • Stroke ([facility]) 2/1993, 8/1993     Ddtopzqd-eukbsx-jrmzbmyzvr aphasia,mouth forming words, reading, numbers   • Tuberculosis     skin test (+), cxr (-)   • Unspecified hemorrhagic conditions     nose bleeds with dry air, bruises easily   • Unspecified urinary incontinence     Wears pad   • Urinary bladder disorder        Past Surgical History:   Procedure Laterality Date   • CERVICAL FUSION POSTERIOR  8/8/2017    Procedure: CERVICAL FUSION POSTERIOR- C3-5, INSTRUMENTED;  Surgeon: Florentino Pike M.D.;  Location: [facility];  Service:    • CERVICAL LAMINECTOMY POSTERIOR  8/8/2017    Procedure: CERVICAL LAMINECTOMY POSTERIOR- C3-5;  Surgeon: Florentino Pike M.D.;  Location: [facility];  Service:    • HIP ARTHROPLASTY TOTAL Left 5/8/2017    Procedure: HIP ARTHROPLASTY TOTAL;  Surgeon: Earle Jacobsen M.D.;  Location: [facility];  Service:    • HIP ARTHROPLASTY TOTAL Right 12/12/2016    Procedure: HIP ARTHROPLASTY TOTAL;  Surgeon: Earle Jacobsen M.D.;  Location: [facility];  Service:    • LUMBAR FUSION POSTERIOR  9/26/2016    Procedure: LUMBAR FUSION POSTERIOR L2-S1 onlay ;  Surgeon: Florentino Pike M.D.;  Location: [facility];  Service:    • LUMBAR LAMINECTOMY DISKECTOMY N/A 9/26/2016    Procedure: LUMBAR LAMINECTOMY DISKECTOMY L2-S1;  Surgeon: Florentino Pike M.D.;  Location: [facility];  Service:    • COLONOSCOPY  2/2016    Every 10 years   • SHOULDER ARTHROPLASTY TOTAL Left 8/11/2015    Procedure: SHOULDER ARTHROPLASTY REVERSE TOTAL;  Surgeon: Yoshi Gamble M.D.;  Location: [facility];  Service:    • KNEE ARTHROPLASTY TOTAL  6/23/2014    Performed by Earle Jacobsen M.D. at [facility]   • HAMMERTOE CORRECTION Left 11/12/2007    Removed 3rd toe   • LUMBAR LAMINECTOMY DISKECTOMY  1993    Lumbar   • CARPAL TUNNEL RELEASE Bilateral 1993   • CERVICAL DISK AND FUSION ANTERIOR  1993    X2   • HAMMERTOE CORRECTION Right 1980's     Removed 3rd toe   • BUNIONECTOMY Bilateral 1980's   • HYSTERECTOMY, TOTAL ABDOMINAL  1977   • FINGER ORIF Left as child    Index       Family History   Problem Relation Age of Onset   • Cancer Mother    • Stroke Father        Social History     Social History   • Marital status: Single     Spouse name: N/A   • Number of children: N/A   • Years of education: N/A     Occupational History   • Not on file.     Social History Main Topics   • Smoking status: Never Smoker   • Smokeless tobacco: Never Used   • Alcohol use Yes      Comment: beer or lizeth 1-2 per year   • Drug use: No   • Sexual activity: Not on file     Other Topics Concern   • Not on file     Social History Narrative   • No narrative on file       ROS:  Constitutional: Denies fevers, chills, sweats, weight loss  Eyes: Denies vision loss, pain, drainage, double vision. Wears glasses  Ears/Nose/Mouth/Throat: Denies ear ache, difficulty hearing, sore throat, persistent hoarseness, decayed teeth/toothache  Cardiovascular: Denies chest pain, tightness, palpitations, swelling in feet/legs, fainting, difficulty breathing when laying down  Respiratory: See HPI   GI: Denies heartburn, difficulty swallowing, nausea, vomiting, abdominal pain, diarrhea, constipation  : Denies frequent urination, painful urination  Integumentary: Denies rashes, lumps or color changes  MSK: Positive for joint pain  Neurological: Denies frequent headaches, dizziness, weakness  Sleep: See HPI       Current Outpatient Prescriptions   Medication Sig Dispense Refill   • aspirin (ASA) 81 MG Chew Tab chewable tablet Take 1 Tab by mouth every day. 30 Tab 11   • calcium carbonate (TUMS) 500 MG Chew Tab Take 1 Tab by mouth 2 times a day. 60 Tab 2   • cyclobenzaprine (FLEXERIL) 10 MG Tab Take 1 Tab by mouth every 8 hours as needed for Muscle Spasms. 30 Tab 0   • vitamin D 5000 UNITS Tab Take 5,000 Units by mouth every day. 30 Tab 2   • loratadine (CLARITIN) 10 MG Tab Take 10 mg by mouth every  "day.     • Alpha-D-Galactosidase (BEANO PO) Take 1 Tab by mouth as needed.     • fenofibrate (TRICOR) 145 MG Tab Take 145 mg by mouth every morning.     • diltiazem CD (CARDIZEM CD) 240 MG CP24 Take 240 mg by mouth every bedtime.     • Non Formulary Request Take 1 Package by mouth every day. Indications: VITALIZER GOLD-MULTI VITAMIN     • acetaminophen (TYLENOL) 325 MG TABS Take 650 mg by mouth every four hours as needed.     • NON SPECIFIED Take 7 Caps by mouth every bedtime. \"Herb-Lax\"   Indications: constipation     • irbesartan (AVAPRO) 150 MG TABS Take 150 mg by mouth every morning. Indications: High Blood Pressure       No current facility-administered medications for this visit.        Allergies   Allergen Reactions   • Other Misc Shortness of Breath     Anesthesia with surgery 2016  Pt couldn't breathe while being put under and tapped side of bed to let RN know. \"It was the scariest feeling and I want everyone to know\"     • Benicar [Olmesartan]      blisters   • Codeine Vomiting   • Demerol Vomiting   • Other Drug Vomiting     \"all pain meds\" per pt can take morphine.   • Percocet [Oxycodone-Acetaminophen] Vomiting   • Statins [Hmg-Coa-R Inhibitors]      Muscle cramping   • Valium Vomiting   • Vicodin [Hydrocodone-Acetaminophen] Vomiting       Blood pressure 112/68, pulse 70, resp. rate 16, height 1.549 m (5' 1\"), weight 68 kg (150 lb), SpO2 95 %.    PE:   Appearance: Well developed, well nourished, no acute distress  Eyes: PERRL, EOM intact, sclera white, conjunctiva moist  Ears: no lesions or deformities  Hearing: grossly intact  Nose: no lesions or deformities  Oropharynx: tongue normal, posterior pharynx without erythema or exudate  Mallampati Classification: Class 3  Neck: supple, trachea midline, no masses   Respiratory effort: no intercostal retractions or use of accessory muscles  Lung auscultation: no rales, rhonchi or wheezes  Heart auscultation: no murmur rub or gallop  Extremities: no cyanosis " or edema  Abdomen: soft ,non tender, no masses  Gait and Station: WC  Digits and nails: no clubbing, cyanosis, petechiae or nodes.  Cranial nerves: grossly intact  Skin: no rashes, lesions or ulcers noted  Orientation: Oriented to time, person and place  Mood and affect: mood and affect appropriate, normal interaction with examiner  Judgement: Intact          Assessment:  1. DARIN (obstructive sleep apnea)  DME MASK AND SUPPLIES   2. Cough  amoxicillin-clavulanate (AUGMENTIN) 875-125 MG Tab         Plan:    1) Continue CPAP @ 15 CM H20. RX for new mask and supplies sent to her DME. Encouraged to bring her compliance chip to her follow up visit.   2) Sleep hygiene discussed. Recommend keeping a set sleep/wake schedule. Logging enough hours of sleep. Limiting/Avoiding naps. No caffeine after noon and no heavy meals in the evening. Recommend daily exercise.   3) RX for Augmentin provided to have on hand. Continue Claritin OTC. Recommend addition of Mucinex. Follow up with PCP or Urgent Care if symptoms persist or worsen.   4) Annual follow up with compliance card download, sooner if needed.

## 2017-12-19 NOTE — PATIENT INSTRUCTIONS
Plan:    1) Continue CPAP @ 15 CM H20. RX for new mask and supplies sent to her DME. Encouraged to bring her compliance chip to her follow up visit.   2) Sleep hygiene discussed. Recommend keeping a set sleep/wake schedule. Logging enough hours of sleep. Limiting/Avoiding naps. No caffeine after noon and no heavy meals in the evening. Recommend daily exercise.   3) RX for Augmentin provided to have on hand. Continue Claritin OTC. Recommend addition of Mucinex. Follow up with PCP or Urgent Care if symptoms persist or worsen.   4) Annual follow up with compliance card download, sooner if needed.

## 2018-01-19 ENCOUNTER — HOSPITAL ENCOUNTER (OUTPATIENT)
Dept: RADIOLOGY | Facility: MEDICAL CENTER | Age: 78
End: 2018-01-19
Attending: NEUROLOGICAL SURGERY
Payer: MEDICARE

## 2018-01-19 DIAGNOSIS — M50.00 CERVICAL DISC DISORDER WITH MYELOPATHY: ICD-10-CM

## 2018-01-19 PROCEDURE — 72050 X-RAY EXAM NECK SPINE 4/5VWS: CPT

## 2018-02-01 DIAGNOSIS — Z01.810 PRE-OPERATIVE CARDIOVASCULAR EXAMINATION: ICD-10-CM

## 2018-02-01 DIAGNOSIS — Z01.812 PRE-OPERATIVE LABORATORY EXAMINATION: ICD-10-CM

## 2018-02-01 LAB
ANION GAP SERPL CALC-SCNC: 7 MMOL/L (ref 0–11.9)
APPEARANCE UR: CLEAR
BACTERIA #/AREA URNS HPF: NEGATIVE /HPF
BILIRUB UR QL STRIP.AUTO: NEGATIVE
BUN SERPL-MCNC: 20 MG/DL (ref 8–22)
CALCIUM SERPL-MCNC: 9.4 MG/DL (ref 8.5–10.5)
CHLORIDE SERPL-SCNC: 105 MMOL/L (ref 96–112)
CHOLEST SERPL-MCNC: 182 MG/DL (ref 100–199)
CO2 SERPL-SCNC: 29 MMOL/L (ref 20–33)
COLOR UR: YELLOW
CREAT SERPL-MCNC: 0.69 MG/DL (ref 0.5–1.4)
CULTURE IF INDICATED INDCX: YES UA CULTURE
EKG IMPRESSION: NORMAL
EPI CELLS #/AREA URNS HPF: NEGATIVE /HPF
ERYTHROCYTE [DISTWIDTH] IN BLOOD BY AUTOMATED COUNT: 45.4 FL (ref 35.9–50)
GLUCOSE SERPL-MCNC: 65 MG/DL (ref 65–99)
GLUCOSE UR STRIP.AUTO-MCNC: NEGATIVE MG/DL
HCT VFR BLD AUTO: 41.2 % (ref 37–47)
HDLC SERPL-MCNC: 74 MG/DL
HGB BLD-MCNC: 12.7 G/DL (ref 12–16)
HYALINE CASTS #/AREA URNS LPF: NORMAL /LPF
KETONES UR STRIP.AUTO-MCNC: NEGATIVE MG/DL
LDLC SERPL CALC-MCNC: 94 MG/DL
LEUKOCYTE ESTERASE UR QL STRIP.AUTO: ABNORMAL
MCH RBC QN AUTO: 29.7 PG (ref 27–33)
MCHC RBC AUTO-ENTMCNC: 30.8 G/DL (ref 33.6–35)
MCV RBC AUTO: 96.3 FL (ref 81.4–97.8)
MICRO URNS: ABNORMAL
NITRITE UR QL STRIP.AUTO: NEGATIVE
PH UR STRIP.AUTO: 6.5 [PH]
PLATELET # BLD AUTO: 234 K/UL (ref 164–446)
PMV BLD AUTO: 12.4 FL (ref 9–12.9)
POTASSIUM SERPL-SCNC: 3.7 MMOL/L (ref 3.6–5.5)
PROT UR QL STRIP: NEGATIVE MG/DL
RBC # BLD AUTO: 4.28 M/UL (ref 4.2–5.4)
RBC # URNS HPF: NORMAL /HPF
RBC UR QL AUTO: NEGATIVE
SODIUM SERPL-SCNC: 141 MMOL/L (ref 135–145)
SP GR UR STRIP.AUTO: 1.01
TRIGL SERPL-MCNC: 70 MG/DL (ref 0–149)
UROBILINOGEN UR STRIP.AUTO-MCNC: 0.2 MG/DL
WBC # BLD AUTO: 6 K/UL (ref 4.8–10.8)
WBC #/AREA URNS HPF: NORMAL /HPF

## 2018-02-01 PROCEDURE — 87086 URINE CULTURE/COLONY COUNT: CPT

## 2018-02-01 PROCEDURE — 87640 STAPH A DNA AMP PROBE: CPT

## 2018-02-01 PROCEDURE — 36415 COLL VENOUS BLD VENIPUNCTURE: CPT

## 2018-02-01 PROCEDURE — 80061 LIPID PANEL: CPT

## 2018-02-01 PROCEDURE — 80048 BASIC METABOLIC PNL TOTAL CA: CPT

## 2018-02-01 PROCEDURE — 93010 ELECTROCARDIOGRAM REPORT: CPT | Performed by: INTERNAL MEDICINE

## 2018-02-01 PROCEDURE — 87641 MR-STAPH DNA AMP PROBE: CPT

## 2018-02-01 PROCEDURE — 93005 ELECTROCARDIOGRAM TRACING: CPT

## 2018-02-01 PROCEDURE — 81001 URINALYSIS AUTO W/SCOPE: CPT

## 2018-02-01 PROCEDURE — 85027 COMPLETE CBC AUTOMATED: CPT

## 2018-02-01 NOTE — OR NURSING
Pre admit apt: Pt. Instructed to continue regularly prescribed medications through day before surgery.  Instructed to take the following medications, the day of surgery, with a sip of water per anesthesia protocol:tylenol, tricosr, tums, herb-lax

## 2018-02-01 NOTE — DISCHARGE PLANNING
DISCHARGE PLANNING NOTE - TOTAL JOINT     Procedure: Procedure(s):  KNEE ARTHROPLASTY TOTAL  Procedure Date: 2/12/2018  Insurance:  Payor: [employer]US / Plan: S[employer] PLUS / Product Type: *No Product type* /   Equipment currently available at home? cane, four-wheel walker, front-wheel walker and tub transfer bench  Steps into the home? 0  Steps within the home? 0  Toilet height? Standard  Type of shower? tub-shower  Who will be with you during your recovery? friend  Is Outpatient Physical Therapy set up after surgery? Yes   Did you take the Total Joint Class and where? No     Plan: Prior knee and hip replacements

## 2018-02-03 LAB
BACTERIA UR CULT: NORMAL
SCCMEC + MECA PNL NOSE NAA+PROBE: NORMAL
SCCMEC + MECA PNL NOSE NAA+PROBE: NORMAL
SIGNIFICANT IND 70042: NORMAL
SITE SITE: NORMAL
SOURCE SOURCE: NORMAL

## 2018-02-12 ENCOUNTER — HOSPITAL ENCOUNTER (OUTPATIENT)
Facility: MEDICAL CENTER | Age: 78
End: 2018-02-13
Attending: ORTHOPAEDIC SURGERY | Admitting: ORTHOPAEDIC SURGERY
Payer: MEDICARE

## 2018-02-12 ENCOUNTER — APPOINTMENT (OUTPATIENT)
Dept: RADIOLOGY | Facility: MEDICAL CENTER | Age: 78
End: 2018-02-12
Attending: ORTHOPAEDIC SURGERY
Payer: MEDICARE

## 2018-02-12 PROCEDURE — 700102 HCHG RX REV CODE 250 W/ 637 OVERRIDE(OP): Performed by: ORTHOPAEDIC SURGERY

## 2018-02-12 PROCEDURE — 700101 HCHG RX REV CODE 250: Performed by: ORTHOPAEDIC SURGERY

## 2018-02-12 PROCEDURE — 500864 HCHG NEEDLE, SPINAL 18G: Performed by: ORTHOPAEDIC SURGERY

## 2018-02-12 PROCEDURE — 700102 HCHG RX REV CODE 250 W/ 637 OVERRIDE(OP)

## 2018-02-12 PROCEDURE — 73560 X-RAY EXAM OF KNEE 1 OR 2: CPT | Mod: LT

## 2018-02-12 PROCEDURE — 700112 HCHG RX REV CODE 229: Performed by: ORTHOPAEDIC SURGERY

## 2018-02-12 PROCEDURE — 160022 HCHG BLOCK: Performed by: ORTHOPAEDIC SURGERY

## 2018-02-12 PROCEDURE — 160041 HCHG SURGERY MINUTES - EA ADDL 1 MIN LEVEL 4: Performed by: ORTHOPAEDIC SURGERY

## 2018-02-12 PROCEDURE — 160036 HCHG PACU - EA ADDL 30 MINS PHASE I: Performed by: ORTHOPAEDIC SURGERY

## 2018-02-12 PROCEDURE — 160029 HCHG SURGERY MINUTES - 1ST 30 MINS LEVEL 4: Performed by: ORTHOPAEDIC SURGERY

## 2018-02-12 PROCEDURE — A6223 GAUZE >16<=48 NO W/SAL W/O B: HCPCS | Performed by: ORTHOPAEDIC SURGERY

## 2018-02-12 PROCEDURE — 160048 HCHG OR STATISTICAL LEVEL 1-5: Performed by: ORTHOPAEDIC SURGERY

## 2018-02-12 PROCEDURE — A9270 NON-COVERED ITEM OR SERVICE: HCPCS

## 2018-02-12 PROCEDURE — 96376 TX/PRO/DX INJ SAME DRUG ADON: CPT

## 2018-02-12 PROCEDURE — 501838 HCHG SUTURE GENERAL: Performed by: ORTHOPAEDIC SURGERY

## 2018-02-12 PROCEDURE — 700111 HCHG RX REV CODE 636 W/ 250 OVERRIDE (IP)

## 2018-02-12 PROCEDURE — G0378 HOSPITAL OBSERVATION PER HR: HCPCS

## 2018-02-12 PROCEDURE — 96374 THER/PROPH/DIAG INJ IV PUSH: CPT

## 2018-02-12 PROCEDURE — A9270 NON-COVERED ITEM OR SERVICE: HCPCS | Performed by: ORTHOPAEDIC SURGERY

## 2018-02-12 PROCEDURE — 160035 HCHG PACU - 1ST 60 MINS PHASE I: Performed by: ORTHOPAEDIC SURGERY

## 2018-02-12 PROCEDURE — 500440 HCHG DRESSING, STERILE ROLL (KERLIX): Performed by: ORTHOPAEDIC SURGERY

## 2018-02-12 PROCEDURE — 94760 N-INVAS EAR/PLS OXIMETRY 1: CPT

## 2018-02-12 PROCEDURE — 700111 HCHG RX REV CODE 636 W/ 250 OVERRIDE (IP): Performed by: ORTHOPAEDIC SURGERY

## 2018-02-12 PROCEDURE — 502000 HCHG MISC OR IMPLANTS RC 0278: Performed by: ORTHOPAEDIC SURGERY

## 2018-02-12 PROCEDURE — 160009 HCHG ANES TIME/MIN: Performed by: ORTHOPAEDIC SURGERY

## 2018-02-12 PROCEDURE — 700105 HCHG RX REV CODE 258: Performed by: ORTHOPAEDIC SURGERY

## 2018-02-12 PROCEDURE — 160002 HCHG RECOVERY MINUTES (STAT): Performed by: ORTHOPAEDIC SURGERY

## 2018-02-12 PROCEDURE — 700101 HCHG RX REV CODE 250

## 2018-02-12 PROCEDURE — L8699 PROSTHETIC IMPLANT NOS: HCPCS | Performed by: ORTHOPAEDIC SURGERY

## 2018-02-12 DEVICE — LGN CR HIGH FLEX XLPE SZ 5-6 10MM: Type: IMPLANTABLE DEVICE | Site: KNEE | Status: FUNCTIONAL

## 2018-02-12 DEVICE — PATELLA GII OVAL RESURFACING PAT 35MM (1EA): Type: IMPLANTABLE DEVICE | Site: KNEE | Status: FUNCTIONAL

## 2018-02-12 DEVICE — IMPLANT GNS II C/R FEM SIZE 6 LEFT: Type: IMPLANTABLE DEVICE | Site: KNEE | Status: FUNCTIONAL

## 2018-02-12 DEVICE — IMPLANT GNS II CMT TIB SIZE 5 LEFT: Type: IMPLANTABLE DEVICE | Site: KNEE | Status: FUNCTIONAL

## 2018-02-12 DEVICE — CEMENT ORTHOPEDIC HV US  (10/PK): Type: IMPLANTABLE DEVICE | Site: KNEE | Status: FUNCTIONAL

## 2018-02-12 DEVICE — IMPLANTABLE DEVICE: Type: IMPLANTABLE DEVICE | Site: KNEE | Status: FUNCTIONAL

## 2018-02-12 RX ORDER — DIPHENHYDRAMINE HYDROCHLORIDE 50 MG/ML
25 INJECTION INTRAMUSCULAR; INTRAVENOUS EVERY 6 HOURS PRN
Status: DISCONTINUED | OUTPATIENT
Start: 2018-02-12 | End: 2018-02-13 | Stop reason: HOSPADM

## 2018-02-12 RX ORDER — LORATADINE 10 MG/1
10 TABLET ORAL DAILY
Status: DISCONTINUED | OUTPATIENT
Start: 2018-02-12 | End: 2018-02-13 | Stop reason: HOSPADM

## 2018-02-12 RX ORDER — DEXTROSE, SODIUM CHLORIDE, SODIUM LACTATE, POTASSIUM CHLORIDE, AND CALCIUM CHLORIDE 5; .6; .31; .03; .02 G/100ML; G/100ML; G/100ML; G/100ML; G/100ML
INJECTION, SOLUTION INTRAVENOUS CONTINUOUS
Status: DISCONTINUED | OUTPATIENT
Start: 2018-02-12 | End: 2018-02-13 | Stop reason: HOSPADM

## 2018-02-12 RX ORDER — OXYCODONE HCL 5 MG/5 ML
SOLUTION, ORAL ORAL
Status: COMPLETED
Start: 2018-02-12 | End: 2018-02-12

## 2018-02-12 RX ORDER — ONDANSETRON 2 MG/ML
4 INJECTION INTRAMUSCULAR; INTRAVENOUS EVERY 4 HOURS PRN
Status: DISCONTINUED | OUTPATIENT
Start: 2018-02-12 | End: 2018-02-13 | Stop reason: HOSPADM

## 2018-02-12 RX ORDER — LIDOCAINE HYDROCHLORIDE 10 MG/ML
INJECTION, SOLUTION INFILTRATION; PERINEURAL
Status: COMPLETED
Start: 2018-02-12 | End: 2018-02-12

## 2018-02-12 RX ORDER — CELECOXIB 200 MG/1
CAPSULE ORAL
Status: COMPLETED
Start: 2018-02-12 | End: 2018-02-12

## 2018-02-12 RX ORDER — OXYCODONE HYDROCHLORIDE 10 MG/1
10 TABLET ORAL
Status: DISCONTINUED | OUTPATIENT
Start: 2018-02-12 | End: 2018-02-13 | Stop reason: HOSPADM

## 2018-02-12 RX ORDER — HALOPERIDOL 5 MG/ML
1 INJECTION INTRAMUSCULAR EVERY 6 HOURS PRN
Status: DISCONTINUED | OUTPATIENT
Start: 2018-02-12 | End: 2018-02-13 | Stop reason: HOSPADM

## 2018-02-12 RX ORDER — BISACODYL 10 MG
10 SUPPOSITORY, RECTAL RECTAL
Status: DISCONTINUED | OUTPATIENT
Start: 2018-02-12 | End: 2018-02-13 | Stop reason: HOSPADM

## 2018-02-12 RX ORDER — FENOFIBRATE 145 MG/1
145 TABLET, COATED ORAL EVERY MORNING
Status: DISCONTINUED | OUTPATIENT
Start: 2018-02-12 | End: 2018-02-12

## 2018-02-12 RX ORDER — TRAMADOL HYDROCHLORIDE 50 MG/1
50 TABLET ORAL EVERY 4 HOURS PRN
Status: DISCONTINUED | OUTPATIENT
Start: 2018-02-12 | End: 2018-02-13 | Stop reason: HOSPADM

## 2018-02-12 RX ORDER — POLYETHYLENE GLYCOL 3350 17 G/17G
1 POWDER, FOR SOLUTION ORAL 2 TIMES DAILY PRN
Status: DISCONTINUED | OUTPATIENT
Start: 2018-02-12 | End: 2018-02-13 | Stop reason: HOSPADM

## 2018-02-12 RX ORDER — ROPIVACAINE HYDROCHLORIDE 5 MG/ML
INJECTION, SOLUTION EPIDURAL; INFILTRATION; PERINEURAL
Status: DISCONTINUED | OUTPATIENT
Start: 2018-02-12 | End: 2018-02-12 | Stop reason: HOSPADM

## 2018-02-12 RX ORDER — AMOXICILLIN 250 MG
1 CAPSULE ORAL
Status: DISCONTINUED | OUTPATIENT
Start: 2018-02-12 | End: 2018-02-13 | Stop reason: HOSPADM

## 2018-02-12 RX ORDER — KETOROLAC TROMETHAMINE 30 MG/ML
INJECTION, SOLUTION INTRAMUSCULAR; INTRAVENOUS
Status: DISCONTINUED | OUTPATIENT
Start: 2018-02-12 | End: 2018-02-12 | Stop reason: HOSPADM

## 2018-02-12 RX ORDER — ACETAMINOPHEN 500 MG
TABLET ORAL
Status: COMPLETED
Start: 2018-02-12 | End: 2018-02-12

## 2018-02-12 RX ORDER — DIPHENHYDRAMINE HCL 25 MG
25 TABLET ORAL EVERY 6 HOURS PRN
Status: DISCONTINUED | OUTPATIENT
Start: 2018-02-12 | End: 2018-02-13 | Stop reason: HOSPADM

## 2018-02-12 RX ORDER — FENOFIBRATE 134 MG/1
134 CAPSULE ORAL DAILY
Status: DISCONTINUED | OUTPATIENT
Start: 2018-02-12 | End: 2018-02-13 | Stop reason: HOSPADM

## 2018-02-12 RX ORDER — DOCUSATE SODIUM 100 MG/1
100 CAPSULE, LIQUID FILLED ORAL 2 TIMES DAILY
Status: DISCONTINUED | OUTPATIENT
Start: 2018-02-12 | End: 2018-02-13 | Stop reason: HOSPADM

## 2018-02-12 RX ORDER — HYDROMORPHONE HYDROCHLORIDE 2 MG/ML
0.5 INJECTION, SOLUTION INTRAMUSCULAR; INTRAVENOUS; SUBCUTANEOUS
Status: DISCONTINUED | OUTPATIENT
Start: 2018-02-12 | End: 2018-02-13 | Stop reason: HOSPADM

## 2018-02-12 RX ORDER — CELECOXIB 200 MG/1
200 CAPSULE ORAL
Status: DISCONTINUED | OUTPATIENT
Start: 2018-02-13 | End: 2018-02-13 | Stop reason: HOSPADM

## 2018-02-12 RX ORDER — DILTIAZEM HYDROCHLORIDE 120 MG/1
240 CAPSULE, COATED, EXTENDED RELEASE ORAL
Status: DISCONTINUED | OUTPATIENT
Start: 2018-02-12 | End: 2018-02-13 | Stop reason: HOSPADM

## 2018-02-12 RX ORDER — ENEMA 19; 7 G/133ML; G/133ML
1 ENEMA RECTAL
Status: DISCONTINUED | OUTPATIENT
Start: 2018-02-12 | End: 2018-02-13 | Stop reason: HOSPADM

## 2018-02-12 RX ORDER — EPINEPHRINE 1 MG/ML(1)
AMPUL (ML) INJECTION
Status: DISCONTINUED | OUTPATIENT
Start: 2018-02-12 | End: 2018-02-12 | Stop reason: HOSPADM

## 2018-02-12 RX ORDER — SCOLOPAMINE TRANSDERMAL SYSTEM 1 MG/1
1 PATCH, EXTENDED RELEASE TRANSDERMAL
Status: DISCONTINUED | OUTPATIENT
Start: 2018-02-12 | End: 2018-02-13 | Stop reason: HOSPADM

## 2018-02-12 RX ORDER — GABAPENTIN 300 MG/1
CAPSULE ORAL
Status: COMPLETED
Start: 2018-02-12 | End: 2018-02-12

## 2018-02-12 RX ORDER — OXYCODONE HYDROCHLORIDE 5 MG/1
5 TABLET ORAL
Status: DISCONTINUED | OUTPATIENT
Start: 2018-02-12 | End: 2018-02-13 | Stop reason: HOSPADM

## 2018-02-12 RX ORDER — IRBESARTAN 150 MG/1
150 TABLET ORAL EVERY MORNING
Status: DISCONTINUED | OUTPATIENT
Start: 2018-02-12 | End: 2018-02-13 | Stop reason: HOSPADM

## 2018-02-12 RX ORDER — ACETAMINOPHEN 500 MG
1000 TABLET ORAL EVERY 6 HOURS
Status: DISCONTINUED | OUTPATIENT
Start: 2018-02-12 | End: 2018-02-13 | Stop reason: HOSPADM

## 2018-02-12 RX ORDER — SODIUM CHLORIDE, SODIUM LACTATE, POTASSIUM CHLORIDE, CALCIUM CHLORIDE 600; 310; 30; 20 MG/100ML; MG/100ML; MG/100ML; MG/100ML
INJECTION, SOLUTION INTRAVENOUS
Status: DISCONTINUED | OUTPATIENT
Start: 2018-02-12 | End: 2018-02-13 | Stop reason: HOSPADM

## 2018-02-12 RX ORDER — OXYCODONE HCL 10 MG/1
TABLET, FILM COATED, EXTENDED RELEASE ORAL
Status: COMPLETED
Start: 2018-02-12 | End: 2018-02-12

## 2018-02-12 RX ORDER — AMOXICILLIN 250 MG
1 CAPSULE ORAL NIGHTLY
Status: DISCONTINUED | OUTPATIENT
Start: 2018-02-12 | End: 2018-02-13 | Stop reason: HOSPADM

## 2018-02-12 RX ADMIN — LIDOCAINE HYDROCHLORIDE 0.2 ML: 10 INJECTION, SOLUTION INFILTRATION; PERINEURAL at 07:20

## 2018-02-12 RX ADMIN — GABAPENTIN 300 MG: 300 CAPSULE ORAL at 06:57

## 2018-02-12 RX ADMIN — ACETAMINOPHEN 1000 MG: 500 TABLET, COATED ORAL at 06:57

## 2018-02-12 RX ADMIN — WATER 2 G: 100 INJECTION, SOLUTION INTRAVENOUS at 16:13

## 2018-02-12 RX ADMIN — WATER 2 G: 100 INJECTION, SOLUTION INTRAVENOUS at 23:55

## 2018-02-12 RX ADMIN — DILTIAZEM HYDROCHLORIDE 240 MG: 120 CAPSULE, COATED, EXTENDED RELEASE ORAL at 21:27

## 2018-02-12 RX ADMIN — ACETAMINOPHEN 1000 MG: 500 TABLET ORAL at 16:17

## 2018-02-12 RX ADMIN — CELECOXIB 200 MG: 200 CAPSULE ORAL at 06:57

## 2018-02-12 RX ADMIN — OXYCODONE HYDROCHLORIDE: 5 SOLUTION ORAL at 10:05

## 2018-02-12 RX ADMIN — SODIUM CHLORIDE, SODIUM LACTATE, POTASSIUM CHLORIDE, CALCIUM CHLORIDE AND DEXTROSE MONOHYDRATE: 5; 600; 310; 30; 20 INJECTION, SOLUTION INTRAVENOUS at 16:13

## 2018-02-12 RX ADMIN — DOCUSATE SODIUM AND SENNOSIDES 1 TABLET: 8.6; 5 TABLET, FILM COATED ORAL at 21:26

## 2018-02-12 RX ADMIN — OXYCODONE HYDROCHLORIDE 5 MG: 5 TABLET ORAL at 17:49

## 2018-02-12 RX ADMIN — SODIUM CHLORIDE, SODIUM LACTATE, POTASSIUM CHLORIDE, CALCIUM CHLORIDE: 600; 310; 30; 20 INJECTION, SOLUTION INTRAVENOUS at 07:20

## 2018-02-12 RX ADMIN — ACETAMINOPHEN 1000 MG: 500 TABLET ORAL at 23:54

## 2018-02-12 RX ADMIN — OXYCODONE HYDROCHLORIDE 5 MG: 5 TABLET ORAL at 21:26

## 2018-02-12 RX ADMIN — OXYCODONE HYDROCHLORIDE 10 MG: 10 TABLET, FILM COATED, EXTENDED RELEASE ORAL at 06:57

## 2018-02-12 RX ADMIN — DOCUSATE SODIUM 100 MG: 100 CAPSULE, LIQUID FILLED ORAL at 21:26

## 2018-02-12 ASSESSMENT — PAIN SCALES - GENERAL
PAINLEVEL_OUTOF10: 5
PAINLEVEL_OUTOF10: 0
PAINLEVEL_OUTOF10: 4
PAINLEVEL_OUTOF10: 0
PAINLEVEL_OUTOF10: 0
PAINLEVEL_OUTOF10: 5
PAINLEVEL_OUTOF10: 0
PAINLEVEL_OUTOF10: 5

## 2018-02-12 ASSESSMENT — COPD QUESTIONNAIRES
DO YOU EVER COUGH UP ANY MUCUS OR PHLEGM?: NO/ONLY WITH OCCASIONAL COLDS OR INFECTIONS
DURING THE PAST 4 WEEKS HOW MUCH DID YOU FEEL SHORT OF BREATH: NONE/LITTLE OF THE TIME
HAVE YOU SMOKED AT LEAST 100 CIGARETTES IN YOUR ENTIRE LIFE: NO/DON'T KNOW
COPD SCREENING SCORE: 2

## 2018-02-12 ASSESSMENT — LIFESTYLE VARIABLES
CONSUMPTION TOTAL: NEGATIVE
EVER FELT BAD OR GUILTY ABOUT YOUR DRINKING: NO
TOTAL SCORE: 0
AVERAGE NUMBER OF DAYS PER WEEK YOU HAVE A DRINK CONTAINING ALCOHOL: 1
TOTAL SCORE: 0
HAVE PEOPLE ANNOYED YOU BY CRITICIZING YOUR DRINKING: NO
ALCOHOL_USE: YES
TOTAL SCORE: 0
EVER_SMOKED: NEVER
EVER_SMOKED: NEVER
HOW MANY TIMES IN THE PAST YEAR HAVE YOU HAD 5 OR MORE DRINKS IN A DAY: 0
EVER HAD A DRINK FIRST THING IN THE MORNING TO STEADY YOUR NERVES TO GET RID OF A HANGOVER: NO
ON A TYPICAL DAY WHEN YOU DRINK ALCOHOL HOW MANY DRINKS DO YOU HAVE: 1
HAVE YOU EVER FELT YOU SHOULD CUT DOWN ON YOUR DRINKING: NO

## 2018-02-12 NOTE — OR SURGEON
Immediate Post OP Note    PreOp Diagnosis: Severe OA left knee    PostOp Diagnosis: same    Procedure(s):  KNEE ARTHROPLASTY TOTAL - Wound Class: Clean    Surgeon(s):  Earle Jacobsen M.D.  Assist Kain    Anesthesiologist/Type of Anesthesia:  Anesthesiologist: Manjit Barkley M.D./General    Surgical Staff:  Assistant: LUCRECIA [email or web address]  Circulator: Aleisha Edward R.N.  Limb Marti: Heri Walter  Relief Scrub: Prabhu Marrero  Scrub Person: Celso Mccloud    Specimens:  * No specimens in log *    Estimated Blood Loss: 50 cc    Findings: OA    Complications: none        2/12/2018 9:11 AM Earle Jacobsen

## 2018-02-12 NOTE — OR NURSING
0907 Pt arrived from the OR via bed , oral airway in place with blow by o2 4l. Spontaneous respirations, lungs clear, pedal pulses equal bilaterally, left knee ace wrapped c/d/i   0916 pt aroused spontaneously, airway removed without incident, pt denies any pain or nausea, stated just sleepy, polar ice to left knee, pillow under ankle, hob elevated to 30 degrees, respirations spontaneous. Pt still dozing. Denies any pain   0925 sats hi 90's o2 decreased to 2 L.   0927 sats remain mid to hi 90's spont. Resp.   0934 no change   0940 tolerating sips water without incident, call to pt's  Friend per pt request, message left.   0944 o2 off as sats remain hi 90's spont. Resp.   0950 pt dozed and sats dropped low 80's, o2 2 L replaced, gave incentive spirometer with instructions for use, pt able to get it up to 1500.   0954 x ray here, called CS for d5lr iv order . Message left   0958 pt awake sats 98-99 o2 decreased to 1L   1005 co pain 6 oral med given, oxy, pt stated no 'allergy' just nausea in the past with use    1015 some relief, pt awake but dozes and sats go down to 87 o2 replaced 2 L.   1034 dc stable to floor. sats 97 2 L,. spont resp.

## 2018-02-12 NOTE — PROGRESS NOTES
Pt from PACU. Drowsy, however awakens to voice. No signs of distress noted. VSS on admit. DSG CDI, able to wiggle both feet. HX of CVA. Speech slurred, slight aphasia. Per pt no other residual Neuro deficits, no others noted by this RN. Water and crackers provided. Pain and nausea under control.

## 2018-02-12 NOTE — OP REPORT
DATE OF SERVICE:  02/12/2018    PREOPERATIVE DIAGNOSIS:  Severe degenerative arthritis, left knee.    POSTOPERATIVE DIAGNOSIS:  Severe degenerative arthritis, left knee.    PROCEDURE:  Left total knee replacement using [employer] Noemi II   system with cemented size 6 femur, cemented size 5 tibia, a 10 mm spacer, and   a cemented 35 oval patella.    SURGEON:  Earle Jacobsen MD    ASSISTANT:  ACACIA Jordan.  Of note, the assistant played a crucial role   in this procedure by helping with the exposure and instrumentation for this   complex arthroplasty.    ANESTHESIA:  General per LMA with adductor canal block.    ANESTHESIOLOGIST:  Manjit Barkley MD    OPERATIVE INDICATIONS:  The patient is a 77-year-old white female who has had   an ongoing and worsening history of left knee degenerative arthritis.  She is   no longer responding to conservative program that includes nonsteroidal   anti-inflammatories, activity modification, corticosteroid injections and   exercise, and it was felt that a total knee replacement was indicated to   maintain activity in this active elderly woman.    PROCEDURE IN DETAIL:  The patient was brought to the operating room and placed   on table in a supine position where a satisfactory general anesthetic agent   was administered per LMA.  Of note, an adductor canal block was performed in   preoperative holding by Dr. Barkley for perioperative pain control.  The left   knee revealed a range of 0, 5, 125 with no pathologic laxity about the   cruciates or collaterals.  Left lower extremity was prepped with gel prep,   draped free in a sterile fashion.  Leg was elevated for exsanguination and the   tourniquet was inflated to 250 mmHg.  A 15 cm midline incision was made,   carried sharply through skin and subcutaneous tissue down to the extensor   mechanism, which was exposed.  A medial parapatellar incision was made.  The   patella was everted and the fat pad was excised.  The  anterior horns of medial   and lateral menisci were resected as was the ACL.  There was significant   degenerative change both medially and laterally.  The distal femur was   cannulated with a drill and a long intramedullary guide nadia was placed with   5-degree valgus alignment guide.  This was appropriately positioned and   secured and a size 6 femur was felt to be appropriate.  The initial anterior   femoral cut was made with very slight notching noted and this was smoothed at   the margin.  The distal femoral cutting guide was applied and a distal femoral   cut was made.  The size 6, 4-in-1 cutting guide was placed and secured, and   final anterior and posterior as well as chamfer cuts were made.  A size 6   femur was placed with excellent fit.  A PCL retractor was placed.  The   posterior horns of the medial and lateral menisci were resected.  The   extramedullary tibial guide was placed appropriately positioned and secured,   and a tibial cut was made.  A size 5 tibia was felt to be appropriate.  The   initial tibial template was placed appropriately positioned and secured and   the tibia was cannulated.  The second tibial guide was placed with the femoral   trial and a 9 mm spacer.  The appropriate rotation was obtained and marked.    The lugs for the femoral pegs were cut.  The tibial fin was cut.  The   tourniquet was released with initial tourniquet time of 22 minutes.    Hemostasis was obtained with the electrocautery and the wound was copiously   irrigated with normal saline.  The patella was measured and a patellar cutting   guide was placed and a patellar cut being made.  A 35 oval patella fit well   and the alignment holes for the patella were drilled.  The wound was again   copiously irrigated with normal saline and hemostasis again obtained with the   electrocautery.  The leg was re-elevated for exsanguination and the tourniquet   reinflated to 250 mmHg.  The joint solution consisting of 40 mL of  0.5%   ropivacaine, 30 mg of Toradol, 1 mL of epinephrine diluted in 90 mL was   created.  40 mL was placed in the periarticular tissues at this time with 20   mL being placed in the subcutaneous tissue at closure.  The wound was again   copiously irrigated.  Two batches of high viscosity cement were mixed.  The   cement was applied first to the tibia and tibial component, which was placed   and fully impacted with excess cement removed.  It was then applied to the   femur and femoral component, which was placed and fully impacted with excess   cement removed.  An 11 mm spacer was used and leg put in full extension to   compress the components.  Cement was then applied to the patella and patellar   component, which was placed with excess cement removed.  This was held with   patellar clamp.  The cement was allowed to fully cure.  All excess cement was   carefully removed.  The size trials with 9 and 11 mm spacers were performed.    The 11 mm spacer did not give quite full extension.  The 9 mm spacer had just   a touch of laxity.  Therefore, a 10 mm spacer was placed and fully seated.    This gave full extension with excellent flexion and excellent stability in   both flexion and extension.  The patella tracked well and a lateral release   was not performed.  The wound was again copiously irrigated with normal saline   and closed with #2 PDO Quill suture in the extensor mechanism, 2-0 Monocryl   in the subcutaneous tissue, and staples in the skin.  A sterile dressing of   Adaptic flats, Kerlix, and Ace wrap was applied.  The tourniquet was released,   total tourniquet time of 60 minutes.  The patient was awakened, extubated in   the operating room, taken to recovery room in stable condition.  Sponge and   needle counts were correct.  There were no identified intraoperative   complications.       ____________________________________     MD JEMAL Márquez / TAB    DD:  02/12/2018 09:10:37  DT:  02/12/2018  09:56:18    D#:  [id number]  Job#:  [id number]    cc: MARIANNE PIERCE CST FA

## 2018-02-12 NOTE — FLOWSHEET NOTE
02/12/18 1402   Interdisciplinary Plan of Care-Goals (Indications)   Hyperinflation Protocol Indications Pre or Post-op Abdominal, Thoracic or Orthopedic Surgery   Interdisciplinary Plan of Care-Outcomes    Hyperinflation Protocol Goals/Outcome Greater Than 60% of Predicted I.S. Volume x 24 hrs   Education   Education Yes - Pt. / Family has been Instructed in use of Respiratory Equipment   Incentive Spirometry Group   Incentive Spirometry Instruction Yes   Breathing Exercises Yes   Incentive Spirometer Volume 1250 mL   Incentive Spirometer Date Last Changed 02/12/18   Incentive Spirometer Next Change Date (Q 30 Days) 03/12/18   Chest Exam   Respiration 14   Heart Rate (Monitored) 72   Breath Sounds   RUL Breath Sounds Clear   RML Breath Sounds Diminished   RLL Breath Sounds Diminished   LAI Breath Sounds Clear   LLL Breath Sounds Diminished   Oximetry   #Pulse Oximetry (Single Determination) Yes   Continuous Oximetry Yes   Oxygen   Home O2 Use Prior To Admission? No   Pulse Oximetry 97 %   O2 (LPM) 1   O2 Daily Delivery Respiratory  Silicone Nasal Cannula

## 2018-02-13 VITALS
DIASTOLIC BLOOD PRESSURE: 63 MMHG | HEART RATE: 70 BPM | BODY MASS INDEX: 29.68 KG/M2 | WEIGHT: 157.19 LBS | RESPIRATION RATE: 18 BRPM | TEMPERATURE: 98.4 F | HEIGHT: 61 IN | SYSTOLIC BLOOD PRESSURE: 147 MMHG | OXYGEN SATURATION: 98 %

## 2018-02-13 LAB
ANION GAP SERPL CALC-SCNC: 6 MMOL/L (ref 0–11.9)
BUN SERPL-MCNC: 20 MG/DL (ref 8–22)
CALCIUM SERPL-MCNC: 9.2 MG/DL (ref 8.4–10.2)
CHLORIDE SERPL-SCNC: 105 MMOL/L (ref 96–112)
CO2 SERPL-SCNC: 27 MMOL/L (ref 20–33)
CREAT SERPL-MCNC: 0.83 MG/DL (ref 0.5–1.4)
ERYTHROCYTE [DISTWIDTH] IN BLOOD BY AUTOMATED COUNT: 44.5 FL (ref 35.9–50)
GLUCOSE SERPL-MCNC: 145 MG/DL (ref 65–99)
HCT VFR BLD AUTO: 33.6 % (ref 37–47)
HGB BLD-MCNC: 10.8 G/DL (ref 12–16)
MCH RBC QN AUTO: 30.5 PG (ref 27–33)
MCHC RBC AUTO-ENTMCNC: 32.1 G/DL (ref 33.6–35)
MCV RBC AUTO: 94.9 FL (ref 81.4–97.8)
PLATELET # BLD AUTO: 142 K/UL (ref 164–446)
PMV BLD AUTO: 12.1 FL (ref 9–12.9)
POTASSIUM SERPL-SCNC: 4 MMOL/L (ref 3.6–5.5)
RBC # BLD AUTO: 3.54 M/UL (ref 4.2–5.4)
SODIUM SERPL-SCNC: 138 MMOL/L (ref 135–145)
WBC # BLD AUTO: 13 K/UL (ref 4.8–10.8)

## 2018-02-13 PROCEDURE — 97162 PT EVAL MOD COMPLEX 30 MIN: CPT

## 2018-02-13 PROCEDURE — G8987 SELF CARE CURRENT STATUS: HCPCS | Mod: CI

## 2018-02-13 PROCEDURE — G8988 SELF CARE GOAL STATUS: HCPCS | Mod: CI

## 2018-02-13 PROCEDURE — G8979 MOBILITY GOAL STATUS: HCPCS | Mod: CJ

## 2018-02-13 PROCEDURE — G8978 MOBILITY CURRENT STATUS: HCPCS | Mod: CJ

## 2018-02-13 PROCEDURE — 700102 HCHG RX REV CODE 250 W/ 637 OVERRIDE(OP): Performed by: ORTHOPAEDIC SURGERY

## 2018-02-13 PROCEDURE — A9270 NON-COVERED ITEM OR SERVICE: HCPCS | Performed by: ORTHOPAEDIC SURGERY

## 2018-02-13 PROCEDURE — 97166 OT EVAL MOD COMPLEX 45 MIN: CPT

## 2018-02-13 PROCEDURE — 36415 COLL VENOUS BLD VENIPUNCTURE: CPT

## 2018-02-13 PROCEDURE — G8980 MOBILITY D/C STATUS: HCPCS | Mod: CJ

## 2018-02-13 PROCEDURE — 80048 BASIC METABOLIC PNL TOTAL CA: CPT

## 2018-02-13 PROCEDURE — 85027 COMPLETE CBC AUTOMATED: CPT

## 2018-02-13 PROCEDURE — 700112 HCHG RX REV CODE 229: Performed by: ORTHOPAEDIC SURGERY

## 2018-02-13 PROCEDURE — G0378 HOSPITAL OBSERVATION PER HR: HCPCS

## 2018-02-13 PROCEDURE — G8989 SELF CARE D/C STATUS: HCPCS | Mod: CI

## 2018-02-13 RX ADMIN — FENOFIBRATE 134 MG: 134 CAPSULE ORAL at 08:58

## 2018-02-13 RX ADMIN — CELECOXIB 200 MG: 200 CAPSULE ORAL at 08:58

## 2018-02-13 RX ADMIN — LORATADINE 10 MG: 10 TABLET ORAL at 08:58

## 2018-02-13 RX ADMIN — OXYCODONE HYDROCHLORIDE 10 MG: 10 TABLET ORAL at 05:54

## 2018-02-13 RX ADMIN — IRBESARTAN 150 MG: 150 TABLET ORAL at 08:58

## 2018-02-13 RX ADMIN — ACETAMINOPHEN 1000 MG: 500 TABLET ORAL at 05:53

## 2018-02-13 RX ADMIN — DOCUSATE SODIUM 100 MG: 100 CAPSULE, LIQUID FILLED ORAL at 08:58

## 2018-02-13 RX ADMIN — OXYCODONE HYDROCHLORIDE 10 MG: 10 TABLET ORAL at 00:30

## 2018-02-13 RX ADMIN — ASPIRIN 325 MG: 325 TABLET, DELAYED RELEASE ORAL at 05:54

## 2018-02-13 ASSESSMENT — GAIT ASSESSMENTS
DISTANCE (FEET): 150
ASSISTIVE DEVICE: 4 WHEEL WALKER
DEVIATION: STEP TO
GAIT LEVEL OF ASSIST: STAND BY ASSIST

## 2018-02-13 ASSESSMENT — PATIENT HEALTH QUESTIONNAIRE - PHQ9
1. LITTLE INTEREST OR PLEASURE IN DOING THINGS: NOT AT ALL
SUM OF ALL RESPONSES TO PHQ9 QUESTIONS 1 AND 2: 0
SUM OF ALL RESPONSES TO PHQ QUESTIONS 1-9: 0
2. FEELING DOWN, DEPRESSED, IRRITABLE, OR HOPELESS: NOT AT ALL

## 2018-02-13 ASSESSMENT — PAIN SCALES - GENERAL
PAINLEVEL_OUTOF10: 5
PAINLEVEL_OUTOF10: 3
PAINLEVEL_OUTOF10: 0
PAINLEVEL_OUTOF10: 0

## 2018-02-13 ASSESSMENT — ACTIVITIES OF DAILY LIVING (ADL): TOILETING: INDEPENDENT

## 2018-02-13 NOTE — PROGRESS NOTES
"Post op day # 1    No New Complaints, minimal pain, well controlled    Blood pressure 147/63, pulse 70, temperature 36.9 °C (98.4 °F), resp. rate 18, height 1.549 m (5' 1\"), weight 71.3 kg (157 lb 3 oz), SpO2 98 %, not currently breastfeeding.    Neurovascular intact  Wound Clean and Dry  Good quad control, ROM 0/1/75    Recent Labs      02/13/18   0532   WBC  13.0*   RBC  3.54*   HEMOGLOBIN  10.8*   HEMATOCRIT  33.6*   MCV  94.9   MCH  30.5   MCHC  32.1*   RDW  44.5   PLATELETCT  142*   MPV  12.1     Recent Labs      02/13/18   0531   SODIUM  138   POTASSIUM  4.0   CHLORIDE  105   CO2  27   GLUCOSE  145*   BUN  20   CREATININE  0.83   CALCIUM  9.2       Plan: Home after pm PT.  WBAT on left, aggressive ROM left knee.  Please remove IV and change dressing before discharge.  I will arrange outpt PT.  May shower with incision covered.   bid.  Dictated  "

## 2018-02-13 NOTE — DISCHARGE INSTRUCTIONS
Discharge Instructions    Discharged to home by car with relative. Discharged via wheelchair, hospital escort: Yes.  Special equipment needed: Not Applicable    Be sure to schedule a follow-up appointment with your primary care doctor or any specialists as instructed.     Discharge Plan:   Influenza Vaccine Indication: Patient Refuses    I understand that a diet low in cholesterol, fat, and sodium is recommended for good health. Unless I have been given specific instructions below for another diet, I accept this instruction as my diet prescription.   Other diet: Regular    Special Instructions: Discharge instructions for the Orthopedic Patient    Follow up with Primary Care Physician within 2 weeks of discharge to home, regarding:  Review of medications and diagnostic testing.  Surveillance for medical complications.  Workup and treatment of osteoporosis, if appropriate.     -Is this a Joint Replacement patient? Yes Total Joint Knee Replacement Discharge Instructions    Pain  - The goal is to slowly wean off the prescription pain medicine.  - Ice can be used for pain control.  20 minutes at a time is recommended, and never directly against your skin or incision.  - Most patients are off the pain pills by 3 weeks; others may require a low level of pain medications for many months.  If your pain continues to be severe, follow up with your physician.  Infection    Knee joint infections; occur in fewer than 2% of patients. The most common causes of infection following total knee replacement surgery are from bacteria that enter the bloodstream during dental procedures, urinary tract infections, or skin infections. These bacteria can lodge around your knee replacement and cause an infection.  - Keep the incision as clean and dry as possible.  - Always wash your hands before touching your incision.  - Skin infections tend to develop around 7-10 days after surgery; most can be treated with oral antibiotics.  - Dental Care  should be delayed for 3 months after surgery, your surgeon recommends taking a dose of antibiotics 1 hour prior to any dental procedure. After 2 years, most surgeons recommend antibiotics only before an extensive procedure.  Ask your surgeon what he recommends.  - Signs and symptoms of infection can include:  low grade fever, redness, pain, swelling and drainage from your incision.  Notify your surgeon immediately if you develop any of these symptoms.  Other instructions  - Bowel habits - constipation is extremely common and is caused by a combination of anesthesia, lack of mobility and pain medicine.  Use stool softeners or laxatives if necessary. It is important not to ignore this problem, as bowel obstructions can be a serious complication after joint replacement surgery.  - Mood/Energy Level - Many patients experience a lack of energy and endurance for up to 2-3 months after surgery.  Some may also feel down and can even become depressed.  This is likely due to the postoperative anemia, change in activity level, lack of sleep, pain medicine and just the emotional reaction to the surgery itself that is a big disruption in a person’s life.  This usually passes.  If symptoms persist, follow up with your primary physician.  - Returning to work - Your surgeon will give you more specific instructions. Depending on the type of activities you perform, it may be 6 to 8 weeks before you return to work.   Generally, if you work a sedentary job requiring little standing or walking, most patients may return within 2-6 weeks.  Manual labor jobs involving walking, lifting and standing may take longer. Your surgeon’s office can provide a release to part-time or light duty work early on in your recovery and progress you to full duty as able.    - Driving - If your left knee was replaced and you have an automatic transmission, you may be able to begin driving in a week or so, provided you are no longer taking narcotic pain  medication. If your right knee was replaced, avoid driving for 6 to 8 weeks. Remember that your reflexes may not be as sharp as before your surgery. Ask your surgeon for specific instructions.   - Avoiding falls - A fall during the first few weeks after surgery can damage your new knee and may result in a need for further surgery.   throw rugs and tack down loose carpeting.  Be aware of floor hazards such as pets, small objects or uneven surfaces.    - Airport Metal Detectors - The sensitivity of metal detectors varies and it is likely that your prosthesis will cause an alarm.  Inform the  of your artificial joint.  Diet  - Resume your normal diet as tolerated.  - It is important to achieve a healthy nutritional status by eating a well balanced diet on a regular basis.  - Your physician may recommend that you take iron and vitamin supplements.   - Continue to drink plenty of fluids.  Shower/Bathing  - You may shower as soon as you get home from the hospital unless otherwise instructed.  - Keep your incision out of water.  To keep the incision dry when showering, cover it with a plastic bag or plastic wrap.  - Pat incision dry if it gets wet.  Don’t rub.  - Do not submerge in a bath until staples are out and the incision is completely healed. (Approximately 6-8 weeks)  Dressing Change:  Procedure (if recommended by your physician)  - Wash hands.  - Open all new dressing change materials.  - Remove old dressing and discard.  - Inspect incision for redness, increase in clear drainage, yellow/green drainage, odor and surrounding skin hot to touch.  -  ABD (large gauze) pad or “island dressing” by one corner and lay over the incision.  Be careful not to touch the inside of the dressing that will lay over the incision.  - Secure in place as instructed (Ace wrap or tape).    Swelling/Bruising    - Swelling can last from 3-6 months.  - Elevate your leg higher than your heart while reclining.    The first week you are home you should elevate your leg an equal amount of time, as you are active.    - Anti-inflammatory pills can be taken once you have stopped the blood thinners.  - The swelling is usually worse after you go home since you are upright for longer periods of time.  - Bruising is common and can involve the entire leg including the thigh, calf and even foot. Bruising often does not appear until after you arrive home and it can be quite dramatic- purple, black, and green.  The bruising you can see is not usually concerning and will subside without any treatment.      Blood Clot Prevention  Blood clots in the legs and the less common, but frightening, clots that travel to the lungs are a real focus of our preventative. Most patients are at standard risk for them, but those patients who are at higher risk include people who have had previous clots, a family history of clotting, smoking, diabetes, obesity, advanced age, use of estrogen and a sedentary lifestyle.    - Signs of blood clots in legs - Swelling in thigh, calf or ankle that does not go down with elevation.  Pain, heat and tenderness in calf, back of calf or groin area.  NOTE: blood clots can occur in either leg.  - You have been receiving anticoagulant therapy (blood thinners) in the hospital and you may be instructed to continue at home depending on your risk factors.  - Your risk for developing a clot continues for up to 2-3 months after surgery.  You should avoid prolonged sitting and dehydration during that time (long air trips and car trips).  If you do take a trip during this time, please get up and move around every 1- 1.5 hours.  - If you are prescribed blood-thinning medication for home, follow instructions as directed. (Handouts provided if applicable).      Activity  Once home, you should continue to stay active. The key is to remember not to overdo it! While you can expect some good days and some bad days, you should notice a  gradual improvement and a gradual increase in your endurance over the next 6 to 12 months. Exercise is a critical component of home care, particularly during the first few weeks after surgery.     - Normal activities of daily living You should be able to resume most within 3 to 6 weeks following surgery. Some pain with activity and at night is common for several weeks after surgery  -  Physical Therapy Exercises - Continue to do the exercises prescribed for at least two months after surgery. Riding a stationary bicycle can help maintain muscle tone and keep your knee flexible. Try to achieve the maximum degree of bending and extension possible. (handout provided by Therapist).  - Sexual Activity -. Your surgeon can tell you when it safe to resume sexual activity.    - Sleeping Positions - You can safely sleep on your back, on either side, or on your stomach.   - Other Activities - Walk as much as you like, but remember that walking is no substitute for the exercises your doctor and physical therapist will prescribe. Lower impact activities are preferred.  If you have specific questions, consult your Surgeon.    When to Call the Doctor   Call the physician if:   - Fever over 100.5? F  - Increased pain, drainage, redness, odor or heat around the incision area  - Shaking chills  - Increased knee pain with activity and rest  - Increased pain in calf, tenderness or redness above or below the knee  - Increased swelling of calf, ankle, foot  - Sudden increased shortness of breath, sudden onset of chest pain, localized chest pain with coughing  - Incision opening  Or, if there are any questions or concerns about medications or care.       -Is this patient being discharged with medication to prevent blood clots?  Yes, Aspirin Aspirin, ASA oral tablets  What is this medicine?  ASPIRIN (AS pir in) is a pain reliever. It is used to treat mild pain and fever. This medicine is also used as directed by a doctor to prevent and to  treat heart attacks, to prevent strokes, and to treat arthritis or inflammation.  This medicine may be used for other purposes; ask your health care provider or pharmacist if you have questions.  COMMON BRAND NAME(S): Aspir-Low, Aspir-Geena , Aspirtab , Eulogio Advanced Aspirin, Eulogio Aspirin Extra Strength, Eulogio Aspirin Plus, Eulogio Aspirin, Eulogio Genuine Aspirin, Eulogio Womens Aspirin , Bufferin Extra Strength, Bufferin Low Dose, Bufferin  What should I tell my health care provider before I take this medicine?  They need to know if you have any of these conditions:  -anemia  -asthma  -bleeding problems  -child with chickenpox, the flu, or other viral infection  -diabetes  -gout  -if you frequently drink alcohol containing drinks  -kidney disease  -liver disease  -low level of vitamin K  -lupus  -smoke tobacco  -stomach ulcers or other problems  -an unusual or allergic reaction to aspirin, tartrazine dye, other medicines, dyes, or preservatives  -pregnant or trying to get pregnant  -breast-feeding  How should I use this medicine?  Take this medicine by mouth with a glass of water. Follow the directions on the package or prescription label. You can take this medicine with or without food. If it upsets your stomach, take it with food. Do not take your medicine more often than directed.  Talk to your pediatrician regarding the use of this medicine in children. While this drug may be prescribed for children as young as 12 years of age for selected conditions, precautions do apply. Children and teenagers should not use this medicine to treat chicken pox or flu symptoms unless directed by a doctor.  Patients over 65 years old may have a stronger reaction and need a smaller dose.  Overdosage: If you think you have taken too much of this medicine contact a poison control center or emergency room at once.  NOTE: This medicine is only for you. Do not share this medicine with others.  What if I miss a dose?  If you are taking  this medicine on a regular schedule and miss a dose, take it as soon as you can. If it is almost time for your next dose, take only that dose. Do not take double or extra doses.  What may interact with this medicine?  Do not take this medicine with any of the following medications:  -cidofovir  -ketorolac  -probenecid  This medicine may also interact with the following medications:  -alcohol  -alendronate  -bismuth subsalicylate  -flavocoxid  -herbal supplements like feverfew, garlic, duane, ginkgo biloba, horse chestnut  -medicines for diabetes or glaucoma like acetazolamide, methazolamide  -medicines for gout  -medicines that treat or prevent blood clots like enoxaparin, heparin, ticlopidine, warfarin  -other aspirin and aspirin-like medicines  -NSAIDs, medicines for pain and inflammation, like ibuprofen or naproxen  -pemetrexed  -sulfinpyrazone  -varicella live vaccine  This list may not describe all possible interactions. Give your health care provider a list of all the medicines, herbs, non-prescription drugs, or dietary supplements you use. Also tell them if you smoke, drink alcohol, or use illegal drugs. Some items may interact with your medicine.  What should I watch for while using this medicine?  If you are treating yourself for pain, tell your doctor or health care professional if the pain lasts more than 10 days, if it gets worse, or if there is a new or different kind of pain. Tell your doctor if you see redness or swelling. Also, check with your doctor if you have a fever that lasts for more than 3 days. Only take this medicine to prevent heart attacks or blood clotting if prescribed by your doctor or health care professional.  Do not take aspirin or aspirin-like medicines with this medicine. Too much aspirin can be dangerous. Always read the labels carefully.  This medicine can irritate your stomach or cause bleeding problems. Do not smoke cigarettes or drink alcohol while taking this medicine. Do  not lie down for 30 minutes after taking this medicine to prevent irritation to your throat.  If you are scheduled for any medical or dental procedure, tell your healthcare provider that you are taking this medicine. You may need to stop taking this medicine before the procedure.  What side effects may I notice from receiving this medicine?  Side effects that you should report to your doctor or health care professional as soon as possible:  -allergic reactions like skin rash, itching or hives, swelling of the face, lips, or tongue  -black, tarry stools  -bloody, coffee ground-like vomit  -breathing problems  -changes in hearing, ringing in the ears  -confusion  -general ill feeling or flu-like symptoms  -pain on swallowing  -redness, blistering, peeling or loosening of the skin, including inside the mouth or nose  -trouble passing urine or change in the amount of urine  -unusual bleeding or bruising  -unusually weak or tired  -yellowing of the eyes or skin  Side effects that usually do not require medical attention (report to your doctor or health care professional if they continue or are bothersome):  -diarrhea or constipation  -nausea, vomiting  -stomach gas, heartburn  This list may not describe all possible side effects. Call your doctor for medical advice about side effects. You may report side effects to FDA at 9-586-FDA-5167.  Where should I keep my medicine?  Keep out of the reach of children.  Store at room temperature between 15 and 30 degrees C (59 and 86 degrees F). Protect from heat and moisture. Do not use this medicine if it has a strong vinegar smell. Throw away any unused medicine after the expiration date.  NOTE: This sheet is a summary. It may not cover all possible information. If you have questions about this medicine, talk to your doctor, pharmacist, or health care provider.  © 2014, [employer]/Gold Standard. (3/10/2009 10:44:17 AM)      · Is patient discharged on Warfarin / Coumadin?   No      Depression / Suicide Risk    As you are discharged from this [facility] facility, it is important to learn how to keep safe from harming yourself.    Recognize the warning signs:  · Abrupt changes in personality, positive or negative- including increase in energy   · Giving away possessions  · Change in eating patterns- significant weight changes-  positive or negative  · Change in sleeping patterns- unable to sleep or sleeping all the time   · Unwillingness or inability to communicate  · Depression  · Unusual sadness, discouragement and loneliness  · Talk of wanting to die  · Neglect of personal appearance   · Rebelliousness- reckless behavior  · Withdrawal from people/activities they love  · Confusion- inability to concentrate     If you or a loved one observes any of these behaviors or has concerns about self-harm, here's what you can do:  · Talk about it- your feelings and reasons for harming yourself  · Remove any means that you might use to hurt yourself (examples: pills, rope, extension cords, firearm)  · Get professional help from the community (Mental Health, Substance Abuse, psychological counseling)  · Do not be alone:Call your Safe Contact- someone whom you trust who will be there for you.  · Call your local CRISIS HOTLINE 304-1852 or 477-488-1370  · Call your local Children's Mobile Crisis Response Team [place] (468) 708-7800 or www.[employer]  · Call the toll free National Suicide Prevention Hotlines   · National Suicide Prevention Lifeline 838-805-RZJR (0858)  · National Hope Line Network 800-SUICIDE (933-4622)

## 2018-02-13 NOTE — THERAPY
"Occupational Therapy Evaluation completed.   Functional Status:  Pt is a 78 y/o female , admit for L TKA. Pt lives by herself with close friends to support her after d/c. Pt presents without c/o pain, and is at the SBA to Modified I level with AMB ADLS with the use of a 4WW. Pt was instructed on ADL techniques and safe functional mobility post surgery. Pt will be seen for initial evaluation and treatment only, as she is functional in this setting.   Plan of Care: Patient with no further skilled OT needs in the acute care setting at this time  Discharge Recommendations:  Equipment: No Equipment Needed. Post-acute therapy Discharge to home with outpatient or home health for additional skilled therapy services.    See \"Rehab Therapy-Acute\" Patient Summary Report for complete documentation.    "

## 2018-02-13 NOTE — CARE PLAN
Problem: Safety  Goal: Will remain free from injury  Outcome: PROGRESSING AS EXPECTED    Intervention: Provide assistance with mobility   02/13/18 0420   OTHER   Assistance / Tolerance Assistance of One;Tolerates Well   Pt educated to call for assistance prior to ambulating hallways or up to bathroom       Problem: Venous Thromboembolism (VTW)/Deep Vein Thrombosis (DVT) Prevention:  Goal: Patient will participate in Venous Thrombosis (VTE)/Deep Vein Thrombosis (DVT)Prevention Measures  Outcome: PROGRESSING AS EXPECTED   02/13/18 0000   OTHER   Risk Assessment Score 5   VTE RISK Very High   Pharmacologic Prophylaxis Used (ASA)   Mechanical/VTE Prophylaxis   Mechanical Prophylaxis  AV Foot Pumps;MEGAN Hose (Graduated Compression Stockings);SCDs, Sequential Compression Device   AV Foot Pumps On   MEGAN Hose (Graduated Compression Stockings) On   SCDs, Sequential Compression Device On

## 2018-02-13 NOTE — PROGRESS NOTES
Received report from NOC RN; assumed pt care. Pt A&Ox4, sitting up in chair. Pt ambulated up to bathroom, 1 assist, tolerated well. Pt denies pain at this time. Pt able to dorsi/planat flex w/out difficulty. Polar ice to the LT knee. Pt notified to callfor assistance.

## 2018-02-13 NOTE — DISCHARGE SUMMARY
DATE OF ADMISSION:  02/12/2018    DATE OF DISCHARGE:  02/13/2018    ADMITTING DIAGNOSIS:  Severe degenerative arthritis, left knee.    DISCHARGE DIAGNOSIS:  Severe degenerative arthritis, left knee.    PROCEDURE:  Left total knee replacement on 02/12/2018.    INFECTIONS:  None.    COMPLICATIONS:  None.    CONDITION ON DISCHARGE:  Stable.    ADMITTING INFORMATION:  Please see dictated note.    HOSPITAL COURSE:  After appropriate preoperative evaluation and obtaining   informed consent, the patient was taken to the operating room on 02/12/2018   where the above listed procedure was performed under general anesthetic agent.    The patient tolerated the procedure well and was taken to recovery room,   then to the floor in stable condition.  She received 24 hours of perioperative   Ancef and was placed on aspirin as well as use of mechanical prophylaxis for   DVT prevention.  She was begun on ambulation on the afternoon of the surgery,   weightbearing as tolerated on the left leg.  By postoperative day #1, she was   comfortable on p.o. pain medications and independently ambulatory with her   walker.  She has good quad control with a range of 0-175 in the left knee.    She has an intact neurologic and vascular exam with clean, dry wound; soft,   nontender calf, and stable labs.  She will be discharged to home after p.m.   physical therapy on postoperative day #1.    DISPOSITION:  The patient will be discharged to home today and will return to   see me in 2 weeks for a wound check and staple removal.  Arrangements were   made for outpatient physical therapy and I have encouraged her to begin this   week.  She does have a prescription for pain medication at home.  She may   shower with the incision covered, but the wound should be kept clean and dry   and we should be notified if there are problems with increasing pain,   drainage, evidence of infection, neurovascular compromise or other significant   concern.  She may be  weightbearing as tolerated on the left leg and has been   encouraged to work vigorously on range of motion both flexion and extension.    She will continue with her normal preoperative home medications and will take   an aspirin 325 mg p.o. b.i.d. for DVT prophylaxis.       ____________________________________     MD JEMAL Márquez / TAB    DD:  02/13/2018 08:39:59  DT:  02/13/2018 09:09:19    D#:  [id number]  Job#:  [id number]

## 2018-02-13 NOTE — PROGRESS NOTES
Total Joint Replacement Program Rounding     Inpatient bedside rounding completed to address quality of care provided by total joint replacement program IDT and overall patient experience at [facility].    Performance Measures addressed: Pt completed 3 CHG showers prior to surgery, as instructed. She refused need for preop education due to her multiple previous joint replacements including L TKA 3-4 yrs ago.   Patient Satisfaction addressed: Happy with her care; no complaints. Eager to DC home today by 2 pm.   Additional notes: Patient/family updated on POC during hospital stay. RN and therapy updated.

## 2018-02-17 ENCOUNTER — HOSPITAL ENCOUNTER (EMERGENCY)
Facility: MEDICAL CENTER | Age: 78
End: 2018-02-17
Attending: EMERGENCY MEDICINE
Payer: MEDICARE

## 2018-02-17 ENCOUNTER — APPOINTMENT (OUTPATIENT)
Dept: RADIOLOGY | Facility: MEDICAL CENTER | Age: 78
End: 2018-02-17
Attending: EMERGENCY MEDICINE
Payer: MEDICARE

## 2018-02-17 VITALS
SYSTOLIC BLOOD PRESSURE: 175 MMHG | RESPIRATION RATE: 18 BRPM | HEIGHT: 61 IN | WEIGHT: 150 LBS | OXYGEN SATURATION: 95 % | HEART RATE: 75 BPM | DIASTOLIC BLOOD PRESSURE: 95 MMHG | BODY MASS INDEX: 28.32 KG/M2 | TEMPERATURE: 98.2 F

## 2018-02-17 DIAGNOSIS — M96.89 POSTOPERATIVE SURGICAL COMPLICATION INVOLVING MUSCULOSKELETAL SYSTEM ASSOCIATED WITH MUSCULOSKELETAL PROCEDURE, UNSPECIFIED COMPLICATION: ICD-10-CM

## 2018-02-17 PROCEDURE — 93971 EXTREMITY STUDY: CPT | Mod: LT

## 2018-02-17 PROCEDURE — 99283 EMERGENCY DEPT VISIT LOW MDM: CPT

## 2018-02-17 RX ORDER — CALCIUM CARBONATE 500 MG/1
500 TABLET, CHEWABLE ORAL DAILY
Status: SHIPPED | COMMUNITY
End: 2021-04-08

## 2018-02-17 RX ORDER — ASPIRIN 325 MG
81 TABLET ORAL EVERY EVENING
COMMUNITY
End: 2018-03-29 | Stop reason: DRUGHIGH

## 2018-02-17 ASSESSMENT — PAIN SCALES - GENERAL: PAINLEVEL_OUTOF10: 5

## 2018-02-17 NOTE — ED NOTES
Pt states a Left total knee replacement on 02/12/2018.  She C/O progressively worsening swelling of her left leg since yesterday.

## 2018-02-17 NOTE — ED NOTES
The Medication Reconciliation process has been completed by interviewing the patient    Allergies have been reviewed  Antibiotic use in 30 days - Ancef post op knee surgery 2/12    Home Pharmacy:  [facility]

## 2018-02-22 ENCOUNTER — HOME HEALTH ADMISSION (OUTPATIENT)
Dept: HOME HEALTH SERVICES | Facility: HOME HEALTHCARE | Age: 78
End: 2018-02-22
Payer: MEDICARE

## 2018-02-23 ENCOUNTER — HOME CARE VISIT (OUTPATIENT)
Dept: HOME HEALTH SERVICES | Facility: HOME HEALTHCARE | Age: 78
End: 2018-02-23
Payer: MEDICARE

## 2018-02-23 VITALS
DIASTOLIC BLOOD PRESSURE: 82 MMHG | HEART RATE: 72 BPM | RESPIRATION RATE: 18 BRPM | SYSTOLIC BLOOD PRESSURE: 130 MMHG | OXYGEN SATURATION: 99 % | TEMPERATURE: 97.9 F | HEIGHT: 61 IN

## 2018-02-23 PROCEDURE — G0493 RN CARE EA 15 MIN HH/HOSPICE: HCPCS

## 2018-02-23 PROCEDURE — 665001 SOC-HOME HEALTH

## 2018-02-23 SDOH — ECONOMIC STABILITY: HOUSING INSECURITY: UNSAFE APPLIANCES: 0

## 2018-02-23 SDOH — ECONOMIC STABILITY: HOUSING INSECURITY: UNSAFE COOKING RANGE AREA: 0

## 2018-02-23 ASSESSMENT — PATIENT HEALTH QUESTIONNAIRE - PHQ9
2. FEELING DOWN, DEPRESSED, IRRITABLE, OR HOPELESS: 00
1. LITTLE INTEREST OR PLEASURE IN DOING THINGS: 00

## 2018-02-23 ASSESSMENT — ACTIVITIES OF DAILY LIVING (ADL): HOME_HEALTH_OASIS: 01

## 2018-02-26 ENCOUNTER — HOME CARE VISIT (OUTPATIENT)
Dept: HOME HEALTH SERVICES | Facility: HOME HEALTHCARE | Age: 78
End: 2018-02-26
Payer: MEDICARE

## 2018-02-26 ENCOUNTER — PATIENT OUTREACH (OUTPATIENT)
Dept: HEALTH INFORMATION MANAGEMENT | Facility: OTHER | Age: 78
End: 2018-02-26

## 2018-02-26 ENCOUNTER — TELEPHONE (OUTPATIENT)
Dept: HEALTH INFORMATION MANAGEMENT | Facility: OTHER | Age: 78
End: 2018-02-26

## 2018-02-26 VITALS
TEMPERATURE: 99.2 F | RESPIRATION RATE: 16 BRPM | HEART RATE: 71 BPM | DIASTOLIC BLOOD PRESSURE: 76 MMHG | OXYGEN SATURATION: 93 % | SYSTOLIC BLOOD PRESSURE: 148 MMHG

## 2018-02-26 VITALS
RESPIRATION RATE: 16 BRPM | SYSTOLIC BLOOD PRESSURE: 148 MMHG | DIASTOLIC BLOOD PRESSURE: 76 MMHG | TEMPERATURE: 99.2 F | HEART RATE: 71 BPM | OXYGEN SATURATION: 93 %

## 2018-02-26 PROCEDURE — G0299 HHS/HOSPICE OF RN EA 15 MIN: HCPCS

## 2018-02-26 PROCEDURE — G0151 HHCP-SERV OF PT,EA 15 MIN: HCPCS

## 2018-02-26 PROCEDURE — G0152 HHCP-SERV OF OT,EA 15 MIN: HCPCS

## 2018-02-26 PROCEDURE — G0156 HHCP-SVS OF AIDE,EA 15 MIN: HCPCS

## 2018-02-26 ASSESSMENT — ACTIVITIES OF DAILY LIVING (ADL)
ADLS_COMMENTS: <!--EPICS-->SEE OT REPORT<!--EPICE-->
IADLS_COMMENTS: <!--EPICS-->SEE OT REPORT<!--EPICE-->

## 2018-02-26 ASSESSMENT — ENCOUNTER SYMPTOMS: DEBILITATING PAIN: 1

## 2018-02-26 NOTE — PROGRESS NOTES
Received referral from [facility] for med rec. Medications reviewed. No clinically significant medication interactions noted.     Mallika Champagne, GualbertoD

## 2018-02-26 NOTE — TELEPHONE ENCOUNTER
Medications reviewed. No clinically significant medication interactions noted.     Mallika Champagne, PharmD

## 2018-02-27 VITALS
TEMPERATURE: 99.2 F | DIASTOLIC BLOOD PRESSURE: 76 MMHG | SYSTOLIC BLOOD PRESSURE: 148 MMHG | OXYGEN SATURATION: 93 % | RESPIRATION RATE: 16 BRPM | HEART RATE: 71 BPM

## 2018-02-27 ASSESSMENT — ACTIVITIES OF DAILY LIVING (ADL)
EATING_ASSISTANCE: 0
BATHING_ASSISTANCE: 6
MEAL_PREP_ASSISTANCE: 4
DRESSING_LB_ASSISTANCE: 4
SHOPPING_ASSISTANCE: 6
HOUSEKEEPING_ASSISTANCE: 6
TRANSPORTATION_ASSISTANCE: 6
TOILETING_EQUIPMENT_USED: TOILET RISER
DRESSING_LB_ASSISTANCE: 5
DRESSING_UB_ASSISTANCE: 4
TELEPHONE_ASSISTANCE: 0
LAUNDRY_ASSISTANCE: 5
ORAL_CARE_ASSISTANCE: 0
TOILETING_ASSISTANCE: 0
BATHING_ASSISTIVE_EQUIPMENT_USED: SHOWER CHAIR, GRAB BAR, HANDHELD SHOWER
GROOMING_ASSISTANCE: 0

## 2018-02-27 ASSESSMENT — ENCOUNTER SYMPTOMS: DEBILITATING PAIN: 1

## 2018-02-28 ENCOUNTER — HOME CARE VISIT (OUTPATIENT)
Dept: HOME HEALTH SERVICES | Facility: HOME HEALTHCARE | Age: 78
End: 2018-02-28
Payer: MEDICARE

## 2018-02-28 VITALS
RESPIRATION RATE: 16 BRPM | HEART RATE: 70 BPM | DIASTOLIC BLOOD PRESSURE: 80 MMHG | SYSTOLIC BLOOD PRESSURE: 151 MMHG | TEMPERATURE: 98.2 F | OXYGEN SATURATION: 95 %

## 2018-02-28 VITALS
HEART RATE: 76 BPM | RESPIRATION RATE: 18 BRPM | TEMPERATURE: 99.6 F | DIASTOLIC BLOOD PRESSURE: 84 MMHG | OXYGEN SATURATION: 98 % | SYSTOLIC BLOOD PRESSURE: 148 MMHG

## 2018-02-28 VITALS
DIASTOLIC BLOOD PRESSURE: 88 MMHG | OXYGEN SATURATION: 94 % | TEMPERATURE: 99.3 F | SYSTOLIC BLOOD PRESSURE: 160 MMHG | RESPIRATION RATE: 16 BRPM | HEART RATE: 62 BPM

## 2018-02-28 PROCEDURE — G0151 HHCP-SERV OF PT,EA 15 MIN: HCPCS

## 2018-02-28 PROCEDURE — G0299 HHS/HOSPICE OF RN EA 15 MIN: HCPCS

## 2018-02-28 SDOH — ECONOMIC STABILITY: HOUSING INSECURITY: UNSAFE APPLIANCES: 0

## 2018-02-28 SDOH — ECONOMIC STABILITY: HOUSING INSECURITY: UNSAFE COOKING RANGE AREA: 0

## 2018-02-28 ASSESSMENT — ENCOUNTER SYMPTOMS
AGORAPHOBIA: 1
VOMITING: PT DENIES ANY EMESIS AT THIS TIME
DEBILITATING PAIN: 1
VOMITING: PT DENIES ANY EMESIS AT THIS TIME
NAUSEA: PT DENIES ANY NAUSEA AT THIS TIME
RESPIRATORY SYMPTOMS COMMENTS: PT IS NOT EXPERIENCING ANY RESPIRATORY DISTRESS AT THIS TIME
SHORTNESS OF BREATH: T
RESPIRATORY SYMPTOMS COMMENTS: PT IS NOT EXPERIENCING ANY RESPIRATORY DISTRESS AT THIS TIME

## 2018-02-28 ASSESSMENT — ACTIVITIES OF DAILY LIVING (ADL): OASIS_M1830: 05

## 2018-03-01 ENCOUNTER — HOME CARE VISIT (OUTPATIENT)
Dept: HOME HEALTH SERVICES | Facility: HOME HEALTHCARE | Age: 78
End: 2018-03-01
Payer: MEDICARE

## 2018-03-01 VITALS
DIASTOLIC BLOOD PRESSURE: 72 MMHG | TEMPERATURE: 100.1 F | HEART RATE: 83 BPM | SYSTOLIC BLOOD PRESSURE: 134 MMHG | OXYGEN SATURATION: 97 % | RESPIRATION RATE: 17 BRPM

## 2018-03-01 PROCEDURE — G0152 HHCP-SERV OF OT,EA 15 MIN: HCPCS

## 2018-03-01 PROCEDURE — G0156 HHCP-SVS OF AIDE,EA 15 MIN: HCPCS

## 2018-03-01 ASSESSMENT — ENCOUNTER SYMPTOMS: DEBILITATING PAIN: 1

## 2018-03-02 ENCOUNTER — HOME CARE VISIT (OUTPATIENT)
Dept: HOME HEALTH SERVICES | Facility: HOME HEALTHCARE | Age: 78
End: 2018-03-02
Payer: MEDICARE

## 2018-03-02 VITALS
OXYGEN SATURATION: 96 % | DIASTOLIC BLOOD PRESSURE: 78 MMHG | RESPIRATION RATE: 18 BRPM | HEART RATE: 76 BPM | TEMPERATURE: 99.4 F | SYSTOLIC BLOOD PRESSURE: 158 MMHG

## 2018-03-02 PROCEDURE — G0157 HHC PT ASSISTANT EA 15: HCPCS

## 2018-03-02 PROCEDURE — G0299 HHS/HOSPICE OF RN EA 15 MIN: HCPCS

## 2018-03-02 SDOH — ECONOMIC STABILITY: HOUSING INSECURITY: UNSAFE APPLIANCES: 0

## 2018-03-02 SDOH — ECONOMIC STABILITY: HOUSING INSECURITY: UNSAFE COOKING RANGE AREA: 0

## 2018-03-02 ASSESSMENT — ENCOUNTER SYMPTOMS
VOMITING: PT DENIES ANY EMESIS AT THIS TIME
NAUSEA: PT DENIES ANY NAUSEA AT THIS TIME
RESPIRATORY SYMPTOMS COMMENTS: PT IS NOT EXPERIENCING ANY RESPIRATORY DISTRESS AT THIS TIME

## 2018-03-03 VITALS
TEMPERATURE: 100.1 F | SYSTOLIC BLOOD PRESSURE: 134 MMHG | RESPIRATION RATE: 17 BRPM | DIASTOLIC BLOOD PRESSURE: 72 MMHG | HEART RATE: 83 BPM | OXYGEN SATURATION: 97 %

## 2018-03-04 VITALS
SYSTOLIC BLOOD PRESSURE: 158 MMHG | HEART RATE: 76 BPM | TEMPERATURE: 99.4 F | DIASTOLIC BLOOD PRESSURE: 78 MMHG | OXYGEN SATURATION: 96 % | RESPIRATION RATE: 18 BRPM

## 2018-03-04 NOTE — ED PROVIDER NOTES
ED Provider Note    CHIEF COMPLAINT  Leg pain and swelling    [facility]  Irina Traylor is a 77 y.o. female who presents with leg pain and swelling and concerns for possible DVTs patient had a recent knee replacement and has some leg swelling. I apparently did not dictated note on that date the patient seen    REVIEW OF SYSTEMS  See HPI for further details. All other systems are negative.    PAST MEDICAL HISTORY  Past Medical History:   Diagnosis Date   • Anesthesia     States feels like she can't breathe with induction for right hip surgery, gets nausea vomiting with most pain meds.  Uses anti-nausea medicine if takes morphine   • Arthritis     To hips, shoulders, hands   • Back pain    • Bronchitis 1991   • Constipation 7-2015   • Expressive aphasia     Related to CVA   • Heart murmur    • High cholesterol    • Hypertension    • Obesity    • DARIN (obstructive sleep apnea) 1/26/2017    AHI 23.6, minimum saturation 69%, on CPAP 15 cm.   • Other specified disorder of intestines     constipation-treated with herb lax   • Pain 06-09-14    right knee, 6/10   • Pain     l shoulder   • Pain 12/5/16    right arm and shoulder   • Pain 4/25/17    Left hip, right arm and shoulder.   • Pain 02/2018    right arm   • Post-nasal drip    • Restless leg syndrome     4/25/17-Resolved, states was related to right hip and none S/P replacement.   • Sleep apnea     uses cpap   • Snoring    • Stroke ([facility]) 2/1993, 8/1993    Tymbehjk-jabscc-wycaxfiggo aphasia,mouth forming words, reading, numbers   • Tuberculosis     skin test (+), cxr (-)   • Unspecified hemorrhagic conditions     nose bleeds with dry air, bruises easily   • Unspecified urinary incontinence     Wears pad   • Urinary bladder disorder        FAMILY HISTORY  Family History   Problem Relation Age of Onset   • Cancer Mother    • Stroke Father        SOCIAL HISTORY  Social History     Social History   • Marital status: Single     Spouse name: N/A   • Number of children:  N/A   • Years of education: N/A     Social History Main Topics   • Smoking status: Never Smoker   • Smokeless tobacco: Never Used   • Alcohol use Yes      Comment: beer or lizeth 1-2 per year   • Drug use: No   • Sexual activity: Not on file     Other Topics Concern   • Not on file     Social History Narrative   • No narrative on file       SURGICAL HISTORY  Past Surgical History:   Procedure Laterality Date   • KNEE ARTHROPLASTY TOTAL Left 2/12/2018    Procedure: KNEE ARTHROPLASTY TOTAL;  Surgeon: Earle Jacobsen M.D.;  Location: [facility];  Service: Orthopedics   • CERVICAL FUSION POSTERIOR  8/8/2017    Procedure: CERVICAL FUSION POSTERIOR- C3-5, INSTRUMENTED;  Surgeon: Florentino Pike M.D.;  Location: [facility];  Service:    • CERVICAL LAMINECTOMY POSTERIOR  8/8/2017    Procedure: CERVICAL LAMINECTOMY POSTERIOR- C3-5;  Surgeon: Florentino Pike M.D.;  Location: [facility];  Service:    • HIP ARTHROPLASTY TOTAL Left 5/8/2017    Procedure: HIP ARTHROPLASTY TOTAL;  Surgeon: Earle Jacobsen M.D.;  Location: [facility];  Service:    • HIP ARTHROPLASTY TOTAL Right 12/12/2016    Procedure: HIP ARTHROPLASTY TOTAL;  Surgeon: Earle Jacobsen M.D.;  Location: [facility];  Service:    • LUMBAR FUSION POSTERIOR  9/26/2016    Procedure: LUMBAR FUSION POSTERIOR L2-S1 onlay ;  Surgeon: Florentino Pike M.D.;  Location: [facility];  Service:    • LUMBAR LAMINECTOMY DISKECTOMY N/A 9/26/2016    Procedure: LUMBAR LAMINECTOMY DISKECTOMY L2-S1;  Surgeon: Florentino Pike M.D.;  Location: [facility];  Service:    • COLONOSCOPY  2/2016    Every 10 years   • SHOULDER ARTHROPLASTY TOTAL Left 8/11/2015    Procedure: SHOULDER ARTHROPLASTY REVERSE TOTAL;  Surgeon: Yoshi Gamble M.D.;  Location: [facility];  Service:    • KNEE ARTHROPLASTY TOTAL  6/23/2014    Performed by Earle Jacobsen M.D. at [facility]  "TOWER ORS   • HAMMERTOE CORRECTION Left 11/12/2007    Removed 3rd toe   • LUMBAR LAMINECTOMY DISKECTOMY  1993    Lumbar   • CARPAL TUNNEL RELEASE Bilateral 1993   • CERVICAL DISK AND FUSION ANTERIOR  1993    X2   • HAMMERTOE CORRECTION Right 1980's    Removed 3rd toe   • BUNIONECTOMY Bilateral 1980's   • HYSTERECTOMY, TOTAL ABDOMINAL  1977   • FINGER ORIF Left as child    Index       CURRENT MEDICATIONS  Home Medications     Reviewed by Nikko Brown (Pharmacy Tech) on 02/17/18 at 1532  Med List Status: Complete   Medication Last Dose Status   acetaminophen (TYLENOL) 325 MG TABS 2/17/2018 Active   aspirin (ASA) 325 MG Tab 2/16/2018 Active   calcium carbonate (TUMS) 500 MG Chew Tab 2/17/2018 Active   diltiazem CD (CARDIZEM CD) 240 MG CP24 2/16/2018 Active   fenofibrate (TRICOR) 145 MG Tab 2/17/2018 Active   irbesartan (AVAPRO) 150 MG TABS 2/17/2018 Active   loratadine (CLARITIN) 10 MG Tab 2/17/2018 Active   Non Formulary Request 2/17/2018 Active   NON SPECIFIED 2/16/2018 Active   Oxycodone-Acetaminophen (PERCOCET PO) 2/17/2018 Active   vitamin D 5000 UNITS Tab 2/17/2018 Active                ALLERGIES  Allergies   Allergen Reactions   • Other Misc Shortness of Breath     Anesthesia with surgery 2016  Pt couldn't breathe while being put under and tapped side of bed to let RN know. \"It was the scariest feeling and I want everyone to know\"     • Benicar [Olmesartan]      blisters   • Codeine Vomiting   • Demerol Vomiting   • Other Drug Vomiting     \"all pain meds\" per pt can take morphine.   • Percocet [Oxycodone-Acetaminophen] Vomiting   • Statins [Hmg-Coa-R Inhibitors]      Muscle cramping   • Valium Vomiting   • Vicodin [Hydrocodone-Acetaminophen] Vomiting       PHYSICAL EXAM  VITAL SIGNS: BP (!) 175/95   Pulse 75   Temp 36.8 °C (98.2 °F)   Resp 18   Ht 1.549 m (5' 1\") Comment: Stated  Wt 68 kg (150 lb)   SpO2 95%   BMI 28.34 kg/m²      Constitutional: Well developed, Well nourished, No acute " distress, Non-toxic appearance.    .   Skin: Warm, Dry, No erythema, No rash.   Extremities: Intact distal pulses, there is some leg swelling and a lot of tenderness to my recollection  Musculoskeletal: Good range of motion in all major joints. No tenderness to palpation or major deformities, or injuries noted.   Neurologic: Alert & oriented x 3, Normal motor function, Normal sensory function, No focal deficits noted.             COURSE & MEDICAL DECISION MAKING  Pertinent Labs & Imaging studies reviewed. (See chart for details)  Patient had a DVT study which is negative   FINAL IMPRESSION  1. Postoperative pain      Electronically signed by: Todd Sandoval, 3/3/2018 5:35 PM

## 2018-03-05 ENCOUNTER — HOME CARE VISIT (OUTPATIENT)
Dept: HOME HEALTH SERVICES | Facility: HOME HEALTHCARE | Age: 78
End: 2018-03-05
Payer: MEDICARE

## 2018-03-05 VITALS
OXYGEN SATURATION: 92 % | TEMPERATURE: 98.4 F | DIASTOLIC BLOOD PRESSURE: 80 MMHG | RESPIRATION RATE: 18 BRPM | HEART RATE: 69 BPM | SYSTOLIC BLOOD PRESSURE: 140 MMHG

## 2018-03-05 VITALS
OXYGEN SATURATION: 92 % | SYSTOLIC BLOOD PRESSURE: 140 MMHG | DIASTOLIC BLOOD PRESSURE: 80 MMHG | HEART RATE: 69 BPM | TEMPERATURE: 98.4 F | RESPIRATION RATE: 18 BRPM

## 2018-03-05 PROCEDURE — G0157 HHC PT ASSISTANT EA 15: HCPCS

## 2018-03-05 PROCEDURE — G0299 HHS/HOSPICE OF RN EA 15 MIN: HCPCS

## 2018-03-05 PROCEDURE — G0156 HHCP-SVS OF AIDE,EA 15 MIN: HCPCS

## 2018-03-05 SDOH — ECONOMIC STABILITY: HOUSING INSECURITY: UNSAFE APPLIANCES: 0

## 2018-03-05 SDOH — ECONOMIC STABILITY: HOUSING INSECURITY: UNSAFE COOKING RANGE AREA: 0

## 2018-03-05 ASSESSMENT — ENCOUNTER SYMPTOMS
NAUSEA: PT DENIES ANY NAUSEA AT THIS TIME
RESPIRATORY SYMPTOMS COMMENTS: PT IS NOT EXPERIENCING ANY RESPIRATORY DISTRESS AT THIS TIME
VOMITING: PT DENIES ANY EMESIS AT THIS TIME

## 2018-03-06 ENCOUNTER — HOME CARE VISIT (OUTPATIENT)
Dept: HOME HEALTH SERVICES | Facility: HOME HEALTHCARE | Age: 78
End: 2018-03-06
Payer: MEDICARE

## 2018-03-06 VITALS
DIASTOLIC BLOOD PRESSURE: 80 MMHG | SYSTOLIC BLOOD PRESSURE: 160 MMHG | TEMPERATURE: 99.6 F | HEART RATE: 73 BPM | RESPIRATION RATE: 18 BRPM | OXYGEN SATURATION: 94 %

## 2018-03-06 VITALS
OXYGEN SATURATION: 92 % | RESPIRATION RATE: 18 BRPM | DIASTOLIC BLOOD PRESSURE: 80 MMHG | SYSTOLIC BLOOD PRESSURE: 140 MMHG | TEMPERATURE: 98.4 F | HEART RATE: 69 BPM

## 2018-03-06 PROCEDURE — G0157 HHC PT ASSISTANT EA 15: HCPCS

## 2018-03-06 PROCEDURE — G0152 HHCP-SERV OF OT,EA 15 MIN: HCPCS

## 2018-03-07 ENCOUNTER — HOME CARE VISIT (OUTPATIENT)
Dept: HOME HEALTH SERVICES | Facility: HOME HEALTHCARE | Age: 78
End: 2018-03-07
Payer: MEDICARE

## 2018-03-07 VITALS
RESPIRATION RATE: 18 BRPM | OXYGEN SATURATION: 97 % | HEART RATE: 79 BPM | SYSTOLIC BLOOD PRESSURE: 160 MMHG | DIASTOLIC BLOOD PRESSURE: 80 MMHG | TEMPERATURE: 99.3 F

## 2018-03-07 VITALS
DIASTOLIC BLOOD PRESSURE: 80 MMHG | OXYGEN SATURATION: 94 % | TEMPERATURE: 99.6 F | RESPIRATION RATE: 18 BRPM | HEART RATE: 73 BPM | SYSTOLIC BLOOD PRESSURE: 130 MMHG

## 2018-03-07 PROCEDURE — G0299 HHS/HOSPICE OF RN EA 15 MIN: HCPCS

## 2018-03-07 SDOH — ECONOMIC STABILITY: HOUSING INSECURITY: UNSAFE APPLIANCES: 0

## 2018-03-07 SDOH — ECONOMIC STABILITY: HOUSING INSECURITY: UNSAFE COOKING RANGE AREA: 0

## 2018-03-08 ENCOUNTER — HOME CARE VISIT (OUTPATIENT)
Dept: HOME HEALTH SERVICES | Facility: HOME HEALTHCARE | Age: 78
End: 2018-03-08
Payer: MEDICARE

## 2018-03-08 VITALS
TEMPERATURE: 98.6 F | SYSTOLIC BLOOD PRESSURE: 150 MMHG | OXYGEN SATURATION: 93 % | DIASTOLIC BLOOD PRESSURE: 85 MMHG | RESPIRATION RATE: 18 BRPM | HEART RATE: 77 BPM

## 2018-03-08 VITALS
SYSTOLIC BLOOD PRESSURE: 142 MMHG | DIASTOLIC BLOOD PRESSURE: 80 MMHG | RESPIRATION RATE: 17 BRPM | OXYGEN SATURATION: 97 % | HEART RATE: 70 BPM | TEMPERATURE: 99.8 F

## 2018-03-08 PROCEDURE — G0157 HHC PT ASSISTANT EA 15: HCPCS

## 2018-03-08 PROCEDURE — G0156 HHCP-SVS OF AIDE,EA 15 MIN: HCPCS

## 2018-03-09 ENCOUNTER — HOME CARE VISIT (OUTPATIENT)
Dept: HOME HEALTH SERVICES | Facility: HOME HEALTHCARE | Age: 78
End: 2018-03-09
Payer: MEDICARE

## 2018-03-09 PROCEDURE — G0152 HHCP-SERV OF OT,EA 15 MIN: HCPCS

## 2018-03-12 ENCOUNTER — HOME CARE VISIT (OUTPATIENT)
Dept: HOME HEALTH SERVICES | Facility: HOME HEALTHCARE | Age: 78
End: 2018-03-12
Payer: MEDICARE

## 2018-03-12 VITALS
DIASTOLIC BLOOD PRESSURE: 78 MMHG | OXYGEN SATURATION: 97 % | SYSTOLIC BLOOD PRESSURE: 158 MMHG | HEART RATE: 69 BPM | RESPIRATION RATE: 18 BRPM | TEMPERATURE: 98.9 F

## 2018-03-12 VITALS
RESPIRATION RATE: 16 BRPM | HEART RATE: 58 BPM | TEMPERATURE: 99 F | OXYGEN SATURATION: 98 % | DIASTOLIC BLOOD PRESSURE: 78 MMHG | SYSTOLIC BLOOD PRESSURE: 152 MMHG

## 2018-03-12 VITALS
HEART RATE: 69 BPM | RESPIRATION RATE: 18 BRPM | SYSTOLIC BLOOD PRESSURE: 158 MMHG | DIASTOLIC BLOOD PRESSURE: 78 MMHG | TEMPERATURE: 98.9 F | OXYGEN SATURATION: 97 %

## 2018-03-12 PROCEDURE — G0157 HHC PT ASSISTANT EA 15: HCPCS

## 2018-03-12 PROCEDURE — G0299 HHS/HOSPICE OF RN EA 15 MIN: HCPCS

## 2018-03-12 SDOH — ECONOMIC STABILITY: HOUSING INSECURITY: UNSAFE APPLIANCES: 0

## 2018-03-12 SDOH — ECONOMIC STABILITY: HOUSING INSECURITY: UNSAFE COOKING RANGE AREA: 0

## 2018-03-12 ASSESSMENT — ENCOUNTER SYMPTOMS
RESPIRATORY SYMPTOMS COMMENTS: PT IS NOT EXPERIENCING ANY RESPIRATORY DISTRESS AT THIS TIME
NAUSEA: PT DENIES ANY NAUSEA AT THIS TIME
VOMITING: PT DENIES ANY EMESIS AT THIS TIME
DEBILITATING PAIN: 1

## 2018-03-13 ENCOUNTER — HOME CARE VISIT (OUTPATIENT)
Dept: HOME HEALTH SERVICES | Facility: HOME HEALTHCARE | Age: 78
End: 2018-03-13
Payer: MEDICARE

## 2018-03-13 VITALS
HEART RATE: 83 BPM | OXYGEN SATURATION: 94 % | DIASTOLIC BLOOD PRESSURE: 62 MMHG | TEMPERATURE: 99.5 F | RESPIRATION RATE: 16 BRPM | SYSTOLIC BLOOD PRESSURE: 137 MMHG

## 2018-03-13 PROCEDURE — G0152 HHCP-SERV OF OT,EA 15 MIN: HCPCS

## 2018-03-13 PROCEDURE — G0156 HHCP-SVS OF AIDE,EA 15 MIN: HCPCS

## 2018-03-14 VITALS
SYSTOLIC BLOOD PRESSURE: 137 MMHG | TEMPERATURE: 99 F | RESPIRATION RATE: 16 BRPM | DIASTOLIC BLOOD PRESSURE: 62 MMHG | HEART RATE: 83 BPM | OXYGEN SATURATION: 94 %

## 2018-03-14 ASSESSMENT — ENCOUNTER SYMPTOMS: DEBILITATING PAIN: 1

## 2018-03-15 ENCOUNTER — HOME CARE VISIT (OUTPATIENT)
Dept: HOME HEALTH SERVICES | Facility: HOME HEALTHCARE | Age: 78
End: 2018-03-15
Payer: MEDICARE

## 2018-03-15 VITALS
HEART RATE: 74 BPM | OXYGEN SATURATION: 92 % | SYSTOLIC BLOOD PRESSURE: 167 MMHG | RESPIRATION RATE: 18 BRPM | DIASTOLIC BLOOD PRESSURE: 80 MMHG | TEMPERATURE: 98.9 F

## 2018-03-15 PROCEDURE — G0156 HHCP-SVS OF AIDE,EA 15 MIN: HCPCS

## 2018-03-16 ENCOUNTER — HOME CARE VISIT (OUTPATIENT)
Dept: HOME HEALTH SERVICES | Facility: HOME HEALTHCARE | Age: 78
End: 2018-03-16
Payer: MEDICARE

## 2018-03-16 VITALS
OXYGEN SATURATION: 94 % | HEART RATE: 72 BPM | RESPIRATION RATE: 18 BRPM | TEMPERATURE: 99.1 F | SYSTOLIC BLOOD PRESSURE: 140 MMHG | DIASTOLIC BLOOD PRESSURE: 70 MMHG

## 2018-03-16 VITALS
SYSTOLIC BLOOD PRESSURE: 140 MMHG | DIASTOLIC BLOOD PRESSURE: 70 MMHG | HEART RATE: 72 BPM | RESPIRATION RATE: 18 BRPM | OXYGEN SATURATION: 94 % | TEMPERATURE: 99.1 F

## 2018-03-16 PROCEDURE — G0157 HHC PT ASSISTANT EA 15: HCPCS

## 2018-03-16 PROCEDURE — G0496 LPN CARE TRAIN/EDU IN HH: HCPCS

## 2018-03-16 SDOH — ECONOMIC STABILITY: HOUSING INSECURITY: UNSAFE APPLIANCES: 0

## 2018-03-16 SDOH — ECONOMIC STABILITY: HOUSING INSECURITY: UNSAFE COOKING RANGE AREA: 0

## 2018-03-16 ASSESSMENT — ENCOUNTER SYMPTOMS
RESPIRATORY SYMPTOMS COMMENTS: PT IS NOT EXPERIENCING ANY RESPIRATORY DISTRESS AT THIS TIME
NAUSEA: PT DENIES ANY NAUSEA AT THIS TIME
VOMITING: PT DENIES ANY EMESIS AT THIS TIME

## 2018-03-19 ENCOUNTER — HOME CARE VISIT (OUTPATIENT)
Dept: HOME HEALTH SERVICES | Facility: HOME HEALTHCARE | Age: 78
End: 2018-03-19
Payer: MEDICARE

## 2018-03-19 VITALS
DIASTOLIC BLOOD PRESSURE: 64 MMHG | HEART RATE: 91 BPM | TEMPERATURE: 99.5 F | OXYGEN SATURATION: 91 % | RESPIRATION RATE: 17 BRPM | SYSTOLIC BLOOD PRESSURE: 124 MMHG

## 2018-03-19 VITALS
TEMPERATURE: 99 F | HEART RATE: 67 BPM | DIASTOLIC BLOOD PRESSURE: 85 MMHG | OXYGEN SATURATION: 98 % | SYSTOLIC BLOOD PRESSURE: 158 MMHG

## 2018-03-19 PROCEDURE — G0156 HHCP-SVS OF AIDE,EA 15 MIN: HCPCS

## 2018-03-19 PROCEDURE — G0299 HHS/HOSPICE OF RN EA 15 MIN: HCPCS

## 2018-03-19 SDOH — ECONOMIC STABILITY: HOUSING INSECURITY: UNSAFE APPLIANCES: 0

## 2018-03-19 SDOH — ECONOMIC STABILITY: HOUSING INSECURITY: UNSAFE COOKING RANGE AREA: 0

## 2018-03-19 ASSESSMENT — ENCOUNTER SYMPTOMS
NAUSEA: PT DENIES ANY NAUSEA AT THIS TIME
VOMITING: PT DENIES ANY EMESIS AT THIS TIME
RESPIRATORY SYMPTOMS COMMENTS: PT IS NOT EXPERIENCING ANY RESPIRATORY DISTRESS AT THIS TIME

## 2018-03-21 ENCOUNTER — HOME CARE VISIT (OUTPATIENT)
Dept: HOME HEALTH SERVICES | Facility: HOME HEALTHCARE | Age: 78
End: 2018-03-21
Payer: MEDICARE

## 2018-03-21 VITALS
RESPIRATION RATE: 18 BRPM | HEART RATE: 75 BPM | OXYGEN SATURATION: 97 % | DIASTOLIC BLOOD PRESSURE: 82 MMHG | TEMPERATURE: 99.2 F | SYSTOLIC BLOOD PRESSURE: 146 MMHG

## 2018-03-21 VITALS
DIASTOLIC BLOOD PRESSURE: 82 MMHG | TEMPERATURE: 99.2 F | OXYGEN SATURATION: 97 % | SYSTOLIC BLOOD PRESSURE: 146 MMHG | HEART RATE: 75 BPM | RESPIRATION RATE: 18 BRPM

## 2018-03-21 PROCEDURE — G0151 HHCP-SERV OF PT,EA 15 MIN: HCPCS

## 2018-03-21 PROCEDURE — G0299 HHS/HOSPICE OF RN EA 15 MIN: HCPCS

## 2018-03-21 SDOH — ECONOMIC STABILITY: HOUSING INSECURITY: UNSAFE COOKING RANGE AREA: 0

## 2018-03-21 SDOH — ECONOMIC STABILITY: HOUSING INSECURITY: UNSAFE APPLIANCES: 0

## 2018-03-21 ASSESSMENT — ENCOUNTER SYMPTOMS
VOMITING: PT DENIES ANY EMESIS AT THIS TIME
RESPIRATORY SYMPTOMS COMMENTS: PT IS NOT EXPERIENCING ANY RESPIRATORY DISTRESS AT THIS TIME
NAUSEA: PT DENIES ANY NAUSEA AT THIS TIME

## 2018-03-22 ENCOUNTER — HOME CARE VISIT (OUTPATIENT)
Dept: HOME HEALTH SERVICES | Facility: HOME HEALTHCARE | Age: 78
End: 2018-03-22
Payer: MEDICARE

## 2018-03-22 PROCEDURE — G0156 HHCP-SVS OF AIDE,EA 15 MIN: HCPCS

## 2018-03-23 ENCOUNTER — HOME CARE VISIT (OUTPATIENT)
Dept: HOME HEALTH SERVICES | Facility: HOME HEALTHCARE | Age: 78
End: 2018-03-23
Payer: MEDICARE

## 2018-03-23 PROCEDURE — G0151 HHCP-SERV OF PT,EA 15 MIN: HCPCS

## 2018-03-24 VITALS
TEMPERATURE: 98.3 F | HEART RATE: 76 BPM | SYSTOLIC BLOOD PRESSURE: 136 MMHG | RESPIRATION RATE: 16 BRPM | OXYGEN SATURATION: 96 % | DIASTOLIC BLOOD PRESSURE: 78 MMHG

## 2018-03-25 SDOH — ECONOMIC STABILITY: HOUSING INSECURITY: UNSAFE COOKING RANGE AREA: 0

## 2018-03-25 SDOH — ECONOMIC STABILITY: HOUSING INSECURITY: UNSAFE APPLIANCES: 0

## 2018-03-26 ENCOUNTER — HOME CARE VISIT (OUTPATIENT)
Dept: HOME HEALTH SERVICES | Facility: HOME HEALTHCARE | Age: 78
End: 2018-03-26
Payer: MEDICARE

## 2018-03-26 PROCEDURE — G0496 LPN CARE TRAIN/EDU IN HH: HCPCS

## 2018-03-27 ENCOUNTER — HOME CARE VISIT (OUTPATIENT)
Dept: HOME HEALTH SERVICES | Facility: HOME HEALTHCARE | Age: 78
End: 2018-03-27
Payer: MEDICARE

## 2018-03-27 VITALS
HEART RATE: 88 BPM | SYSTOLIC BLOOD PRESSURE: 132 MMHG | BODY MASS INDEX: 28.34 KG/M2 | TEMPERATURE: 98.9 F | RESPIRATION RATE: 16 BRPM | WEIGHT: 150 LBS | OXYGEN SATURATION: 94 % | DIASTOLIC BLOOD PRESSURE: 82 MMHG

## 2018-03-27 VITALS
RESPIRATION RATE: 17 BRPM | HEART RATE: 80 BPM | SYSTOLIC BLOOD PRESSURE: 128 MMHG | OXYGEN SATURATION: 98 % | DIASTOLIC BLOOD PRESSURE: 64 MMHG | TEMPERATURE: 98.8 F

## 2018-03-27 PROCEDURE — G0156 HHCP-SVS OF AIDE,EA 15 MIN: HCPCS

## 2018-03-27 SDOH — ECONOMIC STABILITY: HOUSING INSECURITY: UNSAFE COOKING RANGE AREA: 0

## 2018-03-27 SDOH — ECONOMIC STABILITY: HOUSING INSECURITY: UNSAFE APPLIANCES: 0

## 2018-03-27 ASSESSMENT — ENCOUNTER SYMPTOMS: RESPIRATORY SYMPTOMS COMMENTS: PT LUNGS CLEAR IN ALL FIELDS, NO ADVANTAGEOUS SOUND NOTED.

## 2018-03-29 ENCOUNTER — HOME CARE VISIT (OUTPATIENT)
Dept: HOME HEALTH SERVICES | Facility: HOME HEALTHCARE | Age: 78
End: 2018-03-29
Payer: MEDICARE

## 2018-03-29 VITALS
OXYGEN SATURATION: 98 % | RESPIRATION RATE: 16 BRPM | HEART RATE: 65 BPM | DIASTOLIC BLOOD PRESSURE: 76 MMHG | WEIGHT: 148 LBS | SYSTOLIC BLOOD PRESSURE: 130 MMHG | TEMPERATURE: 98 F | BODY MASS INDEX: 27.96 KG/M2

## 2018-03-29 VITALS
SYSTOLIC BLOOD PRESSURE: 124 MMHG | DIASTOLIC BLOOD PRESSURE: 60 MMHG | TEMPERATURE: 99.3 F | OXYGEN SATURATION: 98 % | RESPIRATION RATE: 17 BRPM | HEART RATE: 72 BPM

## 2018-03-29 PROCEDURE — G0496 LPN CARE TRAIN/EDU IN HH: HCPCS

## 2018-03-29 PROCEDURE — G0156 HHCP-SVS OF AIDE,EA 15 MIN: HCPCS

## 2018-03-29 SDOH — ECONOMIC STABILITY: HOUSING INSECURITY: UNSAFE APPLIANCES: 0

## 2018-03-29 SDOH — ECONOMIC STABILITY: HOUSING INSECURITY: UNSAFE COOKING RANGE AREA: 0

## 2018-03-29 ASSESSMENT — ENCOUNTER SYMPTOMS: RESPIRATORY SYMPTOMS COMMENTS: PT LUNGS CLEAR IN ALL FIELDS, NO ADVANTAGEOUS SOUND NOTED.

## 2018-04-02 ENCOUNTER — HOME CARE VISIT (OUTPATIENT)
Dept: HOME HEALTH SERVICES | Facility: HOME HEALTHCARE | Age: 78
End: 2018-04-02
Payer: MEDICARE

## 2018-04-02 VITALS
TEMPERATURE: 99.7 F | SYSTOLIC BLOOD PRESSURE: 132 MMHG | OXYGEN SATURATION: 97 % | RESPIRATION RATE: 17 BRPM | HEART RATE: 75 BPM | DIASTOLIC BLOOD PRESSURE: 80 MMHG

## 2018-04-02 PROCEDURE — G0156 HHCP-SVS OF AIDE,EA 15 MIN: HCPCS

## 2018-04-03 VITALS
HEART RATE: 65 BPM | OXYGEN SATURATION: 98 % | RESPIRATION RATE: 16 BRPM | TEMPERATURE: 98.1 F | DIASTOLIC BLOOD PRESSURE: 76 MMHG | SYSTOLIC BLOOD PRESSURE: 130 MMHG

## 2018-04-04 ENCOUNTER — HOME CARE VISIT (OUTPATIENT)
Dept: HOME HEALTH SERVICES | Facility: HOME HEALTHCARE | Age: 78
End: 2018-04-04
Payer: MEDICARE

## 2018-04-04 VITALS
HEART RATE: 83 BPM | RESPIRATION RATE: 18 BRPM | OXYGEN SATURATION: 98 % | DIASTOLIC BLOOD PRESSURE: 88 MMHG | TEMPERATURE: 98.8 F | SYSTOLIC BLOOD PRESSURE: 150 MMHG

## 2018-04-04 PROCEDURE — G0162 HHC RN E&M PLAN SVS, 15 MIN: HCPCS

## 2018-04-04 SDOH — ECONOMIC STABILITY: HOUSING INSECURITY: UNSAFE COOKING RANGE AREA: 0

## 2018-04-04 SDOH — ECONOMIC STABILITY: HOUSING INSECURITY: UNSAFE APPLIANCES: 0

## 2018-04-04 ASSESSMENT — ACTIVITIES OF DAILY LIVING (ADL)
OASIS_M1830: 01
HOME_HEALTH_OASIS: 00
HOME_HEALTH_OASIS: 01

## 2018-04-13 ENCOUNTER — HOSPITAL ENCOUNTER (OUTPATIENT)
Dept: LAB | Facility: MEDICAL CENTER | Age: 78
End: 2018-04-13
Attending: FAMILY MEDICINE
Payer: MEDICARE

## 2018-04-13 LAB
ALT SERPL-CCNC: 13 U/L (ref 2–50)
AST SERPL-CCNC: 20 U/L (ref 12–45)
BUN SERPL-MCNC: 33 MG/DL (ref 8–22)
CHOLEST SERPL-MCNC: 174 MG/DL (ref 100–199)
CREAT SERPL-MCNC: 1.01 MG/DL (ref 0.5–1.4)
GLUCOSE SERPL-MCNC: 76 MG/DL (ref 65–99)
HDLC SERPL-MCNC: 65 MG/DL
LDLC SERPL CALC-MCNC: 91 MG/DL
TRIGL SERPL-MCNC: 90 MG/DL (ref 0–149)

## 2018-04-13 PROCEDURE — 80061 LIPID PANEL: CPT

## 2018-04-13 PROCEDURE — 82565 ASSAY OF CREATININE: CPT

## 2018-04-13 PROCEDURE — 84460 ALANINE AMINO (ALT) (SGPT): CPT

## 2018-04-13 PROCEDURE — 84520 ASSAY OF UREA NITROGEN: CPT

## 2018-04-13 PROCEDURE — 82947 ASSAY GLUCOSE BLOOD QUANT: CPT

## 2018-04-13 PROCEDURE — 84450 TRANSFERASE (AST) (SGOT): CPT

## 2018-04-13 PROCEDURE — 36415 COLL VENOUS BLD VENIPUNCTURE: CPT

## 2018-09-25 ENCOUNTER — HOSPITAL ENCOUNTER (OUTPATIENT)
Dept: LAB | Facility: MEDICAL CENTER | Age: 78
End: 2018-09-25
Attending: FAMILY MEDICINE
Payer: MEDICARE

## 2018-09-25 LAB
ALT SERPL-CCNC: 16 U/L (ref 2–50)
AST SERPL-CCNC: 21 U/L (ref 12–45)
BUN SERPL-MCNC: 37 MG/DL (ref 8–22)
CHOLEST SERPL-MCNC: 165 MG/DL (ref 100–199)
CREAT SERPL-MCNC: 1.14 MG/DL (ref 0.5–1.4)
FASTING STATUS PATIENT QL REPORTED: NORMAL
GLUCOSE SERPL-MCNC: 78 MG/DL (ref 65–99)
HDLC SERPL-MCNC: 72 MG/DL
LDLC SERPL CALC-MCNC: 82 MG/DL
TRIGL SERPL-MCNC: 54 MG/DL (ref 0–149)

## 2018-09-25 PROCEDURE — 84460 ALANINE AMINO (ALT) (SGPT): CPT

## 2018-09-25 PROCEDURE — 36415 COLL VENOUS BLD VENIPUNCTURE: CPT

## 2018-09-25 PROCEDURE — 84450 TRANSFERASE (AST) (SGOT): CPT

## 2018-09-25 PROCEDURE — 84520 ASSAY OF UREA NITROGEN: CPT

## 2018-09-25 PROCEDURE — 82565 ASSAY OF CREATININE: CPT

## 2018-09-25 PROCEDURE — 82947 ASSAY GLUCOSE BLOOD QUANT: CPT

## 2018-09-25 PROCEDURE — 80061 LIPID PANEL: CPT

## 2018-12-11 ENCOUNTER — SLEEP CENTER VISIT (OUTPATIENT)
Dept: SLEEP MEDICINE | Facility: MEDICAL CENTER | Age: 78
End: 2018-12-11
Payer: MEDICARE

## 2018-12-11 VITALS
DIASTOLIC BLOOD PRESSURE: 72 MMHG | WEIGHT: 148 LBS | HEART RATE: 80 BPM | SYSTOLIC BLOOD PRESSURE: 126 MMHG | TEMPERATURE: 97.6 F | RESPIRATION RATE: 16 BRPM | BODY MASS INDEX: 27.94 KG/M2 | HEIGHT: 61 IN | OXYGEN SATURATION: 90 %

## 2018-12-11 DIAGNOSIS — G47.33 OSA (OBSTRUCTIVE SLEEP APNEA): ICD-10-CM

## 2018-12-11 DIAGNOSIS — J30.9 ALLERGIC RHINITIS, UNSPECIFIED SEASONALITY, UNSPECIFIED TRIGGER: ICD-10-CM

## 2018-12-11 PROCEDURE — 99213 OFFICE O/P EST LOW 20 MIN: CPT | Performed by: NURSE PRACTITIONER

## 2018-12-11 RX ORDER — FLUTICASONE PROPIONATE 50 MCG
1-2 SPRAY, SUSPENSION (ML) NASAL DAILY
Qty: 1 BOTTLE | Refills: 6 | Status: SHIPPED
Start: 2018-12-11 | End: 2019-03-05

## 2018-12-11 NOTE — PATIENT INSTRUCTIONS
Plan:    1) Continue CPAP @ 15 CM H20. RX for new mask and supplies sent to her DME.   2) Sleep hygiene discussed.   3) Declines Influenza vaccination.   4) Trial of Flonase nasal spray 1-2 sprays/nostril daily.   5) Annual sleep follow up with compliance card download, sooner if needed.

## 2018-12-11 NOTE — PROGRESS NOTES
Chief Complaint   Patient presents with   • Apnea     last seen 12/19/17       HPI:  Irina Traylor is a 78 y.o. year old female here today for follow-up on her obstructive sleep apnea.  Polysomnogram indicated an AHI of 23.6 with a minimum 02 saturation of 69%. She is compliant with CPAP 15 cm. Compliance card download today in the office indicates an AHI of 2.4 with an average use of over 6 hours at night.   She has a nasal mask which she feels is comfortable. She is overdue for new supplies. She tolerates the pressure. She does feel she sleeps better on therapy and wakes more refreshed. She denies any morning headaches.   She notes runny nose and sinus congestion which occasionally effects her tolerance. She feels allergies are a trigger.       Past Medical History:   Diagnosis Date   • Anesthesia     States feels like she can't breathe with induction for right hip surgery, gets nausea vomiting with most pain meds.  Uses anti-nausea medicine if takes morphine   • Arthritis     To hips, shoulders, hands   • Back pain    • Bronchitis 1991   • Constipation 7-2015   • Expressive aphasia     Related to CVA   • Heart murmur    • High cholesterol    • Hypertension    • Obesity    • DARIN (obstructive sleep apnea) 1/26/2017    AHI 23.6, minimum saturation 69%, on CPAP 15 cm.   • Other specified disorder of intestines     constipation-treated with herb lax   • Pain 06-09-14    right knee, 6/10   • Pain     l shoulder   • Pain 12/5/16    right arm and shoulder   • Pain 4/25/17    Left hip, right arm and shoulder.   • Pain 02/2018    right arm   • Post-nasal drip    • Restless leg syndrome     4/25/17-Resolved, states was related to right hip and none S/P replacement.   • Sleep apnea     uses cpap   • Snoring    • Stroke (HCC) 2/1993, 8/1993    Xqqriewm-owhztk-pjmiopskhf aphasia,mouth forming words, reading, numbers   • Tuberculosis     skin test (+), cxr (-)   • Unspecified hemorrhagic conditions     nose bleeds with  dry air, bruises easily   • Unspecified urinary incontinence     Wears pad   • Urinary bladder disorder        Past Surgical History:   Procedure Laterality Date   • KNEE ARTHROPLASTY TOTAL Left 2/12/2018    Procedure: KNEE ARTHROPLASTY TOTAL;  Surgeon: Earle Jacobsen M.D.;  Location: [facility];  Service: Orthopedics   • CERVICAL FUSION POSTERIOR  8/8/2017    Procedure: CERVICAL FUSION POSTERIOR- C3-5, INSTRUMENTED;  Surgeon: Florentino Pike M.D.;  Location: [facility];  Service:    • CERVICAL LAMINECTOMY POSTERIOR  8/8/2017    Procedure: CERVICAL LAMINECTOMY POSTERIOR- C3-5;  Surgeon: Florentino Pike M.D.;  Location: [facility];  Service:    • HIP ARTHROPLASTY TOTAL Left 5/8/2017    Procedure: HIP ARTHROPLASTY TOTAL;  Surgeon: Earle Jacobsen M.D.;  Location: [facility];  Service:    • HIP ARTHROPLASTY TOTAL Right 12/12/2016    Procedure: HIP ARTHROPLASTY TOTAL;  Surgeon: Earle Jacobsen M.D.;  Location: [facility];  Service:    • LUMBAR FUSION POSTERIOR  9/26/2016    Procedure: LUMBAR FUSION POSTERIOR L2-S1 onlay ;  Surgeon: Florentino Pike M.D.;  Location: [facility];  Service:    • LUMBAR LAMINECTOMY DISKECTOMY N/A 9/26/2016    Procedure: LUMBAR LAMINECTOMY DISKECTOMY L2-S1;  Surgeon: Florentino Pike M.D.;  Location: [facility];  Service:    • COLONOSCOPY  2/2016    Every 10 years   • SHOULDER ARTHROPLASTY TOTAL Left 8/11/2015    Procedure: SHOULDER ARTHROPLASTY REVERSE TOTAL;  Surgeon: Yoshi Gamble M.D.;  Location: [facility];  Service:    • KNEE ARTHROPLASTY TOTAL  6/23/2014    Performed by Earle Jacobsen M.D. at [facility]   • HAMMERTOE CORRECTION Left 11/12/2007    Removed 3rd toe   • LUMBAR LAMINECTOMY DISKECTOMY  1993    Lumbar   • CARPAL TUNNEL RELEASE Bilateral 1993   • CERVICAL DISK AND FUSION ANTERIOR  1993    X2   • HAMMERTOE CORRECTION Right 1980's     Removed 3rd toe   • BUNIONECTOMY Bilateral 1980's   • HYSTERECTOMY, TOTAL ABDOMINAL  1977   • FINGER ORIF Left as child    Index       Family History   Problem Relation Age of Onset   • Cancer Mother    • Stroke Father        Social History     Social History   • Marital status: Single     Spouse name: N/A   • Number of children: N/A   • Years of education: N/A     Occupational History   • Not on file.     Social History Main Topics   • Smoking status: Never Smoker   • Smokeless tobacco: Never Used   • Alcohol use Yes      Comment: beer or lizeth 1-2 per year   • Drug use: No   • Sexual activity: Not on file     Other Topics Concern   • Not on file     Social History Narrative   • No narrative on file       ROS:  Constitutional: Denies fevers, chills, sweats, weight loss  Eyes: Denies vision loss, pain, drainage, double vision. Wears glasses   Ears/Nose/Mouth/Throat: Denies rhinitis, nasal congestion, ear ache, difficulty hearing, sore throat, persistent hoarseness, decayed teeth/toothache  Cardiovascular: Denies chest pain, tightness, palpitations, swelling in feet/legs, fainting, difficulty breathing when laying down  Respiratory: Denies shortness of breath, cough, sputum, wheezing, painful breathing, coughing up blood  GI: Denies heartburn, difficulty swallowing, nausea, vomiting, abdominal pain, diarrhea, constipation  : Denies frequent urination, painful urination  Integumentary: Denies rashes, lumps or color changes  MSK: Denies painful joints, sore muscles, and back pain.   Neurological: Denies frequent headaches, dizziness, weakness  Sleep: See HPI       Current Outpatient Prescriptions   Medication Sig Dispense Refill   • Ibuprofen 200 MG Cap Take 2 Caps by mouth 4 times a day as needed (pain management).     • aspirin (ASA) 325 MG Tab Take 81 mg by mouth every evening.     • oxyCODONE-acetaminophen (PERCOCET-10)  MG Tab Take 1 Tab by mouth every 6 hours as needed for Severe Pain. Take 1/2 -1 tab  "[id number] PRN     • Ascorbic Acid 1000 MG Tab CR Take 1 Tab by mouth every day.     • B Complex-Folic Acid (B COMPLEX-VITAMIN B12 PO) Take 1 Tab by mouth every day.     • calcium carbonate (TUMS) 500 MG Chew Tab Take 500 mg by mouth every day.     • vitamin D 5000 UNITS Tab Take 5,000 Units by mouth every day. 30 Tab 2   • loratadine (CLARITIN) 10 MG Tab Take 10 mg by mouth 1 time daily as needed (allergy).     • fenofibrate (TRICOR) 145 MG Tab Take 145 mg by mouth every morning.     • diltiazem CD (CARDIZEM CD) 240 MG CP24 Take 240 mg by mouth every bedtime.     • Non Formulary Request Take 1 Package by mouth every day. Indications: VITALIZER GOLD-MULTI VITAMIN     • acetaminophen (TYLENOL) 325 MG TABS Take 650 mg by mouth every four hours as needed.     • NON SPECIFIED Take 7 Caps by mouth every bedtime. \"Herb-Lax\"   Indications: constipation     • irbesartan (AVAPRO) 150 MG TABS Take 150 mg by mouth every morning.       No current facility-administered medications for this visit.        Allergies   Allergen Reactions   • Other Misc Shortness of Breath     Anesthesia with surgery 2016  Pt couldn't breathe while being put under and tapped side of bed to let RN know. \"It was the scariest feeling and I want everyone to know\"     • Benicar [Olmesartan]      blisters   • Codeine Vomiting   • Demerol Vomiting   • Other Drug Vomiting     \"all pain meds\" per pt can take morphine.   • Percocet [Oxycodone-Acetaminophen] Vomiting   • Statins [Hmg-Coa-R Inhibitors]      Muscle cramping   • Valium Vomiting   • Vicodin [Hydrocodone-Acetaminophen] Vomiting       Blood pressure 126/72, pulse 80, temperature 36.4 °C (97.6 °F), temperature source Temporal, resp. rate 16, height 1.549 m (5' 1\"), weight 67.1 kg (148 lb), SpO2 90 %, not currently breastfeeding.    PE:   Appearance: Well developed, well nourished, no acute distress  Eyes: PERRL, EOM intact, sclera white, conjunctiva moist  Ears: no lesions or deformities  Hearing: " grossly intact  Nose: no lesions or deformities  Oropharynx: tongue normal, posterior pharynx without erythema or exudate  Mallampati Classification: Class 3   Neck: supple, trachea midline, no masses   Respiratory effort: no intercostal retractions or use of accessory muscles  Lung auscultation: no rales, rhonchi or wheezes  Heart auscultation: no murmur rub or gallop  Extremities: no cyanosis or edema  Abdomen: soft ,non tender, no masses  Gait and Station: Ambulates with walker   Digits and nails: no clubbing, cyanosis, petechiae or nodes.  Cranial nerves: grossly intact  Skin: no rashes, lesions or ulcers noted  Orientation: Oriented to time, person and place  Mood and affect: mood and affect appropriate, normal interaction with examiner  Judgement: Intact          Assessment:    1. DARIN (obstructive sleep apnea)  DME Mask and Supplies   2. Allergic rhinitis, unspecified seasonality, unspecified trigger  fluticasone (FLONASE) 50 MCG/ACT nasal spray         Plan:    1) Continue CPAP @ 15 CM H20. RX for new mask and supplies sent to her DME.   2) Sleep hygiene discussed.   3) Declines Influenza vaccination.   4) Trial of Flonase nasal spray 1-2 sprays/nostril daily.   5) Annual sleep follow up with compliance card download, sooner if needed.

## 2019-01-04 ENCOUNTER — HOSPITAL ENCOUNTER (OUTPATIENT)
Dept: RADIOLOGY | Facility: MEDICAL CENTER | Age: 79
End: 2019-01-04
Attending: NEUROLOGICAL SURGERY
Payer: MEDICARE

## 2019-01-04 DIAGNOSIS — M50.00 INTERVERTEBRAL CERVICAL DISC DISORDER WITH MYELOPATHY, CERVICAL REGION: ICD-10-CM

## 2019-01-04 PROCEDURE — 72050 X-RAY EXAM NECK SPINE 4/5VWS: CPT

## 2019-03-04 ENCOUNTER — TELEPHONE (OUTPATIENT)
Dept: MEDICAL GROUP | Facility: PHYSICIAN GROUP | Age: 79
End: 2019-03-04

## 2019-03-04 NOTE — LETTER
Request for Medical Records    Patient Name: Irina Traylor    : 1940      Dear Doctor: [facility]    The above named patient receives primary care at the [facility] by Chris Alston M.D..  The patient informs us that you are her eye care Provider.    Please fax a copy of the most recent eye exam to [phone] or answer the  questions below and fax this sheet back to us at the above number.  Attached is a signed Release of Information.      Date of last eye exam: _____________    Retinal eye exam summary:        Please select the choice(s) that apply.    ____ No diabetic retinopathy    ____    Diabetic retinopathy present      Printed Name and Credentials: __________________________________    Signature of Eye Care Provider: _________________________________    We appreciate your assistance and collaboration in providing efficient patient care!    Kindest Regards,    [address] [place] [address] [phone]

## 2019-03-04 NOTE — LETTER
[facility]  Chris Alston M.D.  [address]  [facility] [address]  Fax: [phone]   Authorization for Release/Disclosure of   Protected Health Information   Name: IRINA VELASQUEZ : 1940 SSN: [id number]   Address: [address]  [facility] [address] Phone:    [phone] (home)    I authorize the entity listed below to release/disclose the PHI below to:   [facility]/Chris Alston M.D. and Chris Alston M.D.   Provider or Entity Name:  [facility]   Address   City, [facility], [facility] [place]   Phone:      Fax:  [phone]   Reason for request: continuity of care   Information to be released:    [XXX] LAST COLONOSCOPY,  including any PATH REPORT and follow-up  [  ] LAST FIT/COLOGUARD RESULT [  ] LAST DEXA  [  ] LAST MAMMOGRAM  [  ] LAST PAP  [  ] LAST LABS [  ] RETINA EXAM REPORT  [  ] IMMUNIZATION RECORDS  [  ] Release all info      [  ] Check here and initial the line next to each item to release ALL health information INCLUDING  _____ Care and treatment for drug and / or alcohol abuse  _____ HIV testing, infection status, or AIDS  _____ Genetic Testing    DATES OF SERVICE OR TIME PERIOD TO BE DISCLOSED: _____________  I understand and acknowledge that:  * This Authorization may be revoked at any time by you in writing, except if your health information has already been used or disclosed.  * Your health information that will be used or disclosed as a result of you signing this authorization could be re-disclosed by the recipient. If this occurs, your re-disclosed health information may no longer be protected by State or Federal laws.  * You may refuse to sign this Authorization. Your refusal will not affect your ability to obtain treatment.  * This Authorization becomes effective upon signing and will  on (date) __________.      If no date is indicated, this Authorization will  one (1) year from the signature date.    Name: Irina Velasquez    Signature:  CONTINUITY OF CARE   Date:     3/4/2019       PLEASE FAX REQUESTED RECORDS BACK TO: [phone]

## 2019-03-04 NOTE — LETTER
[facility]  Chris Alston M.[facility].  [address]  [place] [address]  Fax: [phone]   Authorization for Release/Disclosure of   Protected Health Information   Name: IRINA VELASQUEZ : 1940 SSN: [id number]   Address: [address]  [facility] [address] Phone:    [phone] (home)    I authorize the entity listed below to release/disclose the PHI below to:   [facility]/Chris Alston M.D. and Chris Alston M.D.   Provider or Entity Name:  [facility]   Address   City, [facility]   Phone:      Fax:  [phone]     Reason for request: continuity of care   Information to be released:    [  ] LAST COLONOSCOPY,  including any PATH REPORT and follow-up  [  ] LAST FIT/COLOGUARD RESULT [  ] LAST DEXA  [  ] LAST MAMMOGRAM  [  ] LAST PAP  [  ] LAST LABS [XXX] RETINA EXAM REPORT  [  ] IMMUNIZATION RECORDS  [  ] Release all info      [  ] Check here and initial the line next to each item to release ALL health information INCLUDING  _____ Care and treatment for drug and / or alcohol abuse  _____ HIV testing, infection status, or AIDS  _____ Genetic Testing    DATES OF SERVICE OR TIME PERIOD TO BE DISCLOSED: _____________  I understand and acknowledge that:  * This Authorization may be revoked at any time by you in writing, except if your health information has already been used or disclosed.  * Your health information that will be used or disclosed as a result of you signing this authorization could be re-disclosed by the recipient. If this occurs, your re-disclosed health information may no longer be protected by State or Federal laws.  * You may refuse to sign this Authorization. Your refusal will not affect your ability to obtain treatment.  * This Authorization becomes effective upon signing and will  on (date) __________.      If no date is indicated, this Authorization will  one (1) year from the signature date.    Name: Irina Velasquez    Signature:  CONTINUITY OF CARE Date:     3/4/2019       PLEASE FAX REQUESTED RECORDS BACK TO: [phone]

## 2019-03-04 NOTE — TELEPHONE ENCOUNTER
Future Appointments       Provider Department Center    3/5/2019 12:55 PM Chris Alston M.D. [facility]    12/10/2019 12:00 PM JIGNESH Knutson. [facility] Sleep Medicine         NEW PATIENT VISIT PRE-VISIT PLANNING    1.  [employer] Patient is checked in Patient Demographics? YES    2.  Immunizations were updated in Epic using WebIZ?: No WebIZ record      3.  Is this appointment scheduled as a Hospital Follow-Up? No    4.  Patient is due for the following Health Maintenance Topics:   Health Maintenance Due   Topic Date Due   • Annual Wellness Visit  1940   • IMM DTaP/Tdap/Td Vaccine (1 - Tdap) 12/08/1961   • PAP SMEAR  12/08/1963   • COLONOSCOPY  12/08/1992   • IMM ZOSTER VACCINES (1 of 2) 12/08/1992   • BONE DENSITY  12/08/2007   • IMM PNEUMOCOCCAL 65+ (ADULT) LOW/MEDIUM RISK SERIES (1 of 2 - PCV13) 12/08/2007   • MAMMOGRAM  05/03/2012     5. Orders for overdue Health Maintenance topics pended in Pre-Charting? NO    6.  Reviewed/Updated the following with patient:   •   Preferred Pharmacy? YES       •   Preferred Lab? YES       •   Preferred Communication? YES       •   Allergies? YES       •   Medications? YES. Was Abstract Encounter opened and chart updated? YES       •   Social History? YES. Was Abstract Encounter opened and chart updated? YES       •   Family History (document living status of immediate family members and if + hx of cancer, diabetes, hypertension, hyperlipidemia, heart attack, stroke) YES. Was Abstract Encounter opened and chart updated? YES    7.  Updated Care Team?       •   DME Company (gait device, O2, CPAP, etc.) YES, scooter, walker, cane, CPAP       •   Other Specialists (eye doctor, derm, GYN, cardiology, endo, etc): YES    8.  Patient was informed to arrive 15 min prior to their   scheduled appointment and bring in their medication bottles.

## 2019-03-05 ENCOUNTER — OFFICE VISIT (OUTPATIENT)
Dept: MEDICAL GROUP | Facility: PHYSICIAN GROUP | Age: 79
End: 2019-03-05
Payer: MEDICARE

## 2019-03-05 VITALS
BODY MASS INDEX: 33.22 KG/M2 | TEMPERATURE: 97.2 F | RESPIRATION RATE: 16 BRPM | OXYGEN SATURATION: 95 % | SYSTOLIC BLOOD PRESSURE: 142 MMHG | WEIGHT: 169.2 LBS | HEART RATE: 70 BPM | DIASTOLIC BLOOD PRESSURE: 92 MMHG | HEIGHT: 60 IN

## 2019-03-05 DIAGNOSIS — G47.33 OSA (OBSTRUCTIVE SLEEP APNEA): ICD-10-CM

## 2019-03-05 DIAGNOSIS — E78.5 DYSLIPIDEMIA: ICD-10-CM

## 2019-03-05 DIAGNOSIS — I63.9 CEREBROVASCULAR ACCIDENT (CVA), UNSPECIFIED MECHANISM (HCC): ICD-10-CM

## 2019-03-05 DIAGNOSIS — I10 BENIGN ESSENTIAL HTN: ICD-10-CM

## 2019-03-05 PROCEDURE — 99204 OFFICE O/P NEW MOD 45 MIN: CPT | Performed by: INTERNAL MEDICINE

## 2019-03-05 PROCEDURE — 8041 PR SCP AHA: Performed by: INTERNAL MEDICINE

## 2019-03-05 RX ORDER — FLUTICASONE PROPIONATE 50 MCG
1 SPRAY, SUSPENSION (ML) NASAL DAILY
Qty: 16 G | Refills: 0 | Status: SHIPPED | OUTPATIENT
Start: 2019-03-05 | End: 2019-12-03 | Stop reason: SDUPTHER

## 2019-03-05 ASSESSMENT — PATIENT HEALTH QUESTIONNAIRE - PHQ9: CLINICAL INTERPRETATION OF PHQ2 SCORE: 0

## 2019-03-05 NOTE — ASSESSMENT & PLAN NOTE
She's had a history of 2 strokes. On baby aspirin. Not following with neurology, has speech deficits.

## 2019-03-05 NOTE — PROGRESS NOTES
Annual Health Assessment Questions:    1.  Are you currently engaging in any exercise or physical activity? Yes    2.  How would you describe your mood or emotional well-being today? good    3.  Have you had any falls in the last year? Yes    4.  Have you noticed any problems with your balance or had difficulty walking? Yes    5.  In the last six months have you experienced any leakage of urine? Yes    6. DPA/Advanced Directive: Patient has Advanced Directive on file.      PRIMARY CARE CLINIC NEW PATIENT H&P  Chief Complaint   Patient presents with   • Allergic Rhinitis     Flonase refill( [facility])   • Other     History of stroke    • Immunizations     Declines      History of Present Illness     Benign essential HTN  On irbesartan 150 mg daily, diltiazem 240 mg ER. Typically her home blood pressures are 120/80.     Dyslipidemia  Lab Results   Component Value Date/Time    CHOLSTRLTOT 165 09/25/2018 08:00 AM    LDL 82 09/25/2018 08:00 AM    HDL 72 09/25/2018 08:00 AM    TRIGLYCERIDE 54 09/25/2018 08:00 AM     Controlled on fenofibrate 145 mg daily.     Stroke (HCC)  She's had a history of 2 strokes. On baby aspirin. Not following with neurology, has speech deficits.     DARIN (obstructive sleep apnea)  Compliant with CPAP. She follows with sleep medicine.     Current Outpatient Prescriptions   Medication Sig Dispense Refill   • aspirin EC (ECOTRIN) 81 MG Tablet Delayed Response Take 81 mg by mouth every day.     • fluticasone (FLONASE) 50 MCG/ACT nasal spray Spray 1 Spray in nose every day. 16 g 0   • Ibuprofen 200 MG Cap Take 2 Caps by mouth 4 times a day as needed (pain management).     • oxyCODONE-acetaminophen (PERCOCET-10)  MG Tab Take 1 Tab by mouth every 6 hours as needed for Severe Pain. Take 1/2 -1 tab [id number] PRN     • Ascorbic Acid 1000 MG Tab CR Take 1 Tab by mouth every day.     • B Complex-Folic Acid (B COMPLEX-VITAMIN B12 PO) Take 1 Tab by mouth every day.     • calcium carbonate (TUMS)  "500 MG Chew Tab Take 500 mg by mouth every day.     • vitamin D 5000 UNITS Tab Take 5,000 Units by mouth every day. 30 Tab 2   • loratadine (CLARITIN) 10 MG Tab Take 10 mg by mouth 1 time daily as needed (allergy).     • fenofibrate (TRICOR) 145 MG Tab Take 145 mg by mouth every morning.     • diltiazem CD (CARDIZEM CD) 240 MG CP24 Take 240 mg by mouth every bedtime.     • Non Formulary Request Take 1 Package by mouth every day. Indications: VITALIZER GOLD-MULTI VITAMIN     • acetaminophen (TYLENOL) 325 MG TABS Take 650 mg by mouth every four hours as needed.     • NON SPECIFIED Take 7 Caps by mouth every bedtime. \"Herb-Lax\"   Indications: constipation     • irbesartan (AVAPRO) 150 MG TABS Take 150 mg by mouth every morning.       No current facility-administered medications for this visit.        Past Medical History:   Diagnosis Date   • Anesthesia     States feels like she can't breathe with induction for right hip surgery, gets nausea vomiting with most pain meds.  Uses anti-nausea medicine if takes morphine   • Arthritis     To hips, shoulders, hands   • Benign essential HTN 9/7/2017   • Dyslipidemia 3/5/2019   • Expressive aphasia     Related to CVA   • DARIN (obstructive sleep apnea) 1/26/2017    AHI 23.6, minimum saturation 69%, on CPAP 15 cm.   • Post-nasal drip    • Restless leg syndrome     4/25/17-Resolved, states was related to right hip and none S/P replacement.   • Stroke (HCC) 2/1993, 8/1993    Mcdstwpi-mkoypy-ydqcdedsob aphasia,mouth forming words, reading, numbers     Past Surgical History:   Procedure Laterality Date   • KNEE ARTHROPLASTY TOTAL Left 2/12/2018    Procedure: KNEE ARTHROPLASTY TOTAL;  Surgeon: Earle Jacobsen M.D.;  Location: SURGERY [facility];  Service: Orthopedics   • CERVICAL FUSION POSTERIOR  8/8/2017    Procedure: CERVICAL FUSION POSTERIOR- C3-5, INSTRUMENTED;  Surgeon: Florentino Pike M.D.;  Location: SURGERY [facility];  Service:    • CERVICAL LAMINECTOMY " POSTERIOR  8/8/2017    Procedure: CERVICAL LAMINECTOMY POSTERIOR- C3-5;  Surgeon: Florentino Pike M.D.;  Location: SURGERY [facility];  Service:    • HIP ARTHROPLASTY TOTAL Left 5/8/2017    Procedure: HIP ARTHROPLASTY TOTAL;  Surgeon: Earle Jacobsen M.D.;  Location: SURGERY [facility];  Service:    • HIP ARTHROPLASTY TOTAL Right 12/12/2016    Procedure: HIP ARTHROPLASTY TOTAL;  Surgeon: Earle Jacobsen M.D.;  Location: SURGERY [facility];  Service:    • LUMBAR FUSION POSTERIOR  9/26/2016    Procedure: LUMBAR FUSION POSTERIOR L2-S1 onlay ;  Surgeon: Florentino Pike M.D.;  Location: SURGERY [facility];  Service:    • LUMBAR LAMINECTOMY DISKECTOMY N/A 9/26/2016    Procedure: LUMBAR LAMINECTOMY DISKECTOMY L2-S1;  Surgeon: Florentino Pike M.D.;  Location: SURGERY [facility];  Service:    • COLONOSCOPY  2/2016    Every 10 years   • SHOULDER ARTHROPLASTY TOTAL Left 8/11/2015    Procedure: SHOULDER ARTHROPLASTY REVERSE TOTAL;  Surgeon: Yoshi Gamble M.D.;  Location: SURGERY [facility];  Service:    • KNEE ARTHROPLASTY TOTAL  6/23/2014    Performed by [facility] M.D. at [facility]   • HAMMERTOE CORRECTION Left 11/12/2007    Removed 3rd toe   • LUMBAR LAMINECTOMY DISKECTOMY  1993    Lumbar   • CARPAL TUNNEL RELEASE Bilateral 1993   • CERVICAL DISK AND FUSION ANTERIOR  1993    X2   • HAMMERTOE CORRECTION Right 1980's    Removed 3rd toe   • BUNIONECTOMY Bilateral 1980's   • HYSTERECTOMY, TOTAL ABDOMINAL  1977   • FINGER ORIF Left as child    Index     Social History   Substance Use Topics   • Smoking status: Never Smoker   • Smokeless tobacco: Never Used   • Alcohol use Yes      Comment: beer or lizeth 1-2 per year     Social History     Social History Narrative    Retired LVN at [employer]     Family History   Problem Relation Age of Onset   • Cancer Mother         vaginal   • Stroke Father    • No Known Problems Brother    • Diabetes Brother    • Hypertension  "Brother    • Stroke Daughter      Family Status   Relation Status   • Mo    • Fa    • Bro Alive   • MGMo    • MGFa    • PGMo    • PGFa    • Bro Alive   • Bro Alive   • Bhumi      Allergies: Other misc; Benicar [olmesartan]; Codeine; Demerol; Other drug; Percocet [oxycodone-acetaminophen]; Statins [hmg-coa-r inhibitors]; Valium; and Vicodin [hydrocodone-acetaminophen]    ROS  Constitutional: Negative for fatigue/generalized weakness.   HEENT: Negative for  vision changes, hearing changes    Respiratory: Negative for shortness of breath  Cardiovascular: Negative for chest pain, palpitations  Gastrointestinal: Negative for blood in stool, constipation, diarrhea  Genitourinary: Negative for dysuria, polyuria  Musculoskeletal: Negative for myalgias, back pain, and joint pain.   Skin: Negative for rash  Neurological: Negative for numbness, tingling  Psychiatric/Behavioral: Negative for depression, anxiety       Objective   Blood pressure 142/92, pulse 70, temperature 36.2 °C (97.2 °F), resp. rate 16, height 1.52 m (4' 11.84\"), weight 76.7 kg (169 lb 3.2 oz), SpO2 95 %, not currently breastfeeding. Body mass index is 33.22 kg/m².    General: Alert, oriented. In no acute distress   HEET: EOMI, PERRL, conjunctiva non-injected, sclera non-icteric.  Nares patent with no significant congestion or drainage.  Maribell pinnae, external auditory canals, TM pearly gray with normal light reflex bilaterally.Oral mucous membranes pink and moist with no lesions.  Neck: supple with no cervical, subclavicular lymphadenopathy, JVD, palpable thyroid nodules   Lungs: clear to auscultation bilaterally with good excursion.  CV: regular rate and rhythm.  Abdomen soft, non-distended, non-tender with normal bowel sounds. No hepatosplenomegaly, no masses palpated  Skin: no lesions. Warm, dry   Psychiatric: appropriate mood and affect       Assessment and Plan   The following treatment plan was " discussed     1. Benign essential HTN  Home blood pressures well controlled.     2. Dyslipidemia  Well controlled on fenofibrate.     Lab Results   Component Value Date/Time    CHOLSTRLTOT 165 09/25/2018 08:00 AM    LDL 82 09/25/2018 08:00 AM    HDL 72 09/25/2018 08:00 AM    TRIGLYCERIDE 54 09/25/2018 08:00 AM         3. Cerebrovascular accident (CVA), unspecified mechanism (HCC)  Has residual speech deficits. On baby aspirin and blood pressures well controlled.     4. DARIN (obstructive sleep apnea)  Compliant with CPAP.       Return in about 6 months (around 9/5/2019).    Health Maintenance      Health Maintenance Due   Topic Date Due   • Annual Wellness Visit  1940   • BONE DENSITY  12/08/2007       Chris Alston MD  Internal Medicine  [facility]

## 2019-03-06 ENCOUNTER — TELEPHONE (OUTPATIENT)
Dept: MEDICAL GROUP | Facility: PHYSICIAN GROUP | Age: 79
End: 2019-03-06

## 2019-03-06 NOTE — TELEPHONE ENCOUNTER
"VOICEMAIL  1. Caller Name: [facility]                      Call Back Number: [phone]    2. Message: wants a script clarification for Rx Flonase nasal spray. Rx sig states, \"Spray 1 Spray in nose every day. - Nasal\" pharm states that the normal script is 2 spray in nose QD. They also want the Rx quantity switched to a 90 day supply since they are a mail order pharmacy    3. Patient approves office to leave a detailed voicemail/MyChart message: N\A    Please advise.   "

## 2019-03-06 NOTE — TELEPHONE ENCOUNTER
May switch to the preferred sig they suggested. How many bottles do they consider as a 90 day supply? I will sign that pended order per their request/suggestion

## 2019-03-07 RX ORDER — FLUTICASONE PROPIONATE 50 MCG
2 SPRAY, SUSPENSION (ML) NASAL DAILY
Qty: 3 BOTTLE | Refills: 2 | Status: ON HOLD | OUTPATIENT
Start: 2019-03-07 | End: 2019-03-19

## 2019-03-12 ENCOUNTER — PATIENT OUTREACH (OUTPATIENT)
Dept: HEALTH INFORMATION MANAGEMENT | Facility: OTHER | Age: 79
End: 2019-03-12

## 2019-03-12 NOTE — PROGRESS NOTES
1. Attempt #:1    2. HealthConnect Verified: yes    3. Verify PCP: yes    4. Review Care Team: yes    5. WebIZ Checked & Epic Updated: NA  · WebIZ Recommendations: NA  · Is patient due for Tdap? NO  · Is patient due for Shingles? NO    6. Reviewed/Updated the following with patient:       •   Communication Preference Obtained? YES       •   Preferred Pharmacy? YES       •   Preferred Lab? YES       •   Family History (document living status of immediate family members and if + hx of cancer, diabetes, hypertension, hyperlipidemia, heart attack, stroke) NO JUST REVIEWED A WEEK AGO AT NP APPT.     7. Annual Wellness Visit Scheduling  · Scheduling Status:Scheduled     8. Care Gap Scheduling (Attempt to Schedule EACH Overdue Care Gap!)     Health Maintenance Due   Topic Date Due   • Annual Wellness Visit  1940   • BONE DENSITY  12/08/2007        Scheduled patient for Annual Wellness Visit     9. Olapic Activation: declined    10. Olapic Rigo: no    11. Virtual Visits: no    12. Opt In to Text Messages: no    13. Patient was advised: “This is a free wellness visit. The provider will screen for medical conditions to help you stay healthy. If you have other concerns to address you may be asked to discuss these at a separate visit or there may be an additional fee.”     14. Patient was informed to arrive 15 min prior to their scheduled appointment and bring in their medication bottles.

## 2019-03-13 ENCOUNTER — HOSPITAL ENCOUNTER (OUTPATIENT)
Dept: RADIOLOGY | Facility: MEDICAL CENTER | Age: 79
End: 2019-03-13
Attending: PHYSICIAN ASSISTANT
Payer: MEDICARE

## 2019-03-13 ENCOUNTER — OFFICE VISIT (OUTPATIENT)
Dept: URGENT CARE | Facility: PHYSICIAN GROUP | Age: 79
End: 2019-03-13
Payer: MEDICARE

## 2019-03-13 VITALS
WEIGHT: 162 LBS | RESPIRATION RATE: 15 BRPM | OXYGEN SATURATION: 98 % | SYSTOLIC BLOOD PRESSURE: 150 MMHG | HEIGHT: 59 IN | HEART RATE: 74 BPM | TEMPERATURE: 98.5 F | DIASTOLIC BLOOD PRESSURE: 88 MMHG | BODY MASS INDEX: 32.66 KG/M2

## 2019-03-13 DIAGNOSIS — S42.302A CLOSED FRACTURE OF SHAFT OF LEFT HUMERUS, UNSPECIFIED FRACTURE MORPHOLOGY, INITIAL ENCOUNTER: ICD-10-CM

## 2019-03-13 DIAGNOSIS — M25.512 ACUTE PAIN OF LEFT SHOULDER: ICD-10-CM
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
## 2019-03-13 PROCEDURE — 73030 X-RAY EXAM OF SHOULDER: CPT | Mod: LT

## 2019-03-13 PROCEDURE — 99213 OFFICE O/P EST LOW 20 MIN: CPT | Performed by: PHYSICIAN ASSISTANT

## 2019-03-13 ASSESSMENT — ENCOUNTER SYMPTOMS
CONSTIPATION: 0
BOWEL INCONTINENCE: 0
CHILLS: 0
ABDOMINAL PAIN: 0
HEADACHES: 0
NUMBNESS: 0
NAUSEA: 0
TINGLING: 0
VOMITING: 0
FEVER: 0
DIARRHEA: 0
LOSS OF CONSCIOUSNESS: 0

## 2019-03-13 NOTE — PROGRESS NOTES
Subjective:   Irina Traylor is a 78 y.o. female who presents for Fall (Left arm pain post fall on Friday)       Fall   The accident occurred 3 to 5 days ago. Fall occurred: from treadmill. She fell from a height of 1 to 2 ft. She landed on carpet. The volume of blood lost was minimal. The point of impact was the left shoulder and right knee. The pain is present in the left shoulder. The pain is moderate. The symptoms are aggravated by movement, pressure on injury and use of injured limb. Pertinent negatives include no abdominal pain, bowel incontinence, fever, headaches, loss of consciousness, nausea, numbness, tingling or vomiting. She has tried ice and NSAID for the symptoms. The treatment provided mild relief.     Review of Systems   Constitutional: Negative for chills and fever.   Gastrointestinal: Negative for abdominal pain, bowel incontinence, constipation, diarrhea, nausea and vomiting.   Musculoskeletal:        Positive for left shoulder pain     Neurological: Negative for tingling, loss of consciousness, numbness and headaches.   All other systems reviewed and are negative.      PMH:  has a past medical history of Anesthesia; Arthritis; Benign essential HTN (9/7/2017); Dyslipidemia (3/5/2019); Expressive aphasia; DARIN (obstructive sleep apnea) (1/26/2017); Post-nasal drip; Restless leg syndrome; and Stroke (HCC) (2/1993, 8/1993).    MEDS:   Current Outpatient Prescriptions:   •  fluticasone (FLONASE) 50 MCG/ACT nasal spray, Spray 2 Sprays in nose every day., Disp: 3 Bottle, Rfl: 2  •  aspirin EC (ECOTRIN) 81 MG Tablet Delayed Response, Take 81 mg by mouth every day., Disp: , Rfl:   •  fluticasone (FLONASE) 50 MCG/ACT nasal spray, Spray 1 Spray in nose every day., Disp: 16 g, Rfl: 0  •  Ibuprofen 200 MG Cap, Take 2 Caps by mouth 4 times a day as needed (pain management)., Disp: , Rfl:   •  B Complex-Folic Acid (B COMPLEX-VITAMIN B12 PO), Take 1 Tab by mouth every day., Disp: , Rfl:   •  vitamin D  "5000 UNITS Tab, Take 5,000 Units by mouth every day., Disp: 30 Tab, Rfl: 2  •  loratadine (CLARITIN) 10 MG Tab, Take 10 mg by mouth 1 time daily as needed (allergy)., Disp: , Rfl:   •  fenofibrate (TRICOR) 145 MG Tab, Take 145 mg by mouth every morning., Disp: , Rfl:   •  diltiazem CD (CARDIZEM CD) 240 MG CP24, Take 240 mg by mouth every bedtime., Disp: , Rfl:   •  irbesartan (AVAPRO) 150 MG TABS, Take 150 mg by mouth every morning., Disp: , Rfl:   •  oxyCODONE-acetaminophen (PERCOCET-10)  MG Tab, Take 1 Tab by mouth every 6 hours as needed for Severe Pain. Take 1/2 -1 tab [id number] PRN, Disp: , Rfl:   •  Ascorbic Acid 1000 MG Tab CR, Take 1 Tab by mouth every day., Disp: , Rfl:   •  calcium carbonate (TUMS) 500 MG Chew Tab, Take 500 mg by mouth every day., Disp: , Rfl:   •  Non Formulary Request, Take 1 Package by mouth every day. Indications: VITALIZER GOLD-MULTI VITAMIN, Disp: , Rfl:   •  acetaminophen (TYLENOL) 325 MG TABS, Take 650 mg by mouth every four hours as needed., Disp: , Rfl:   •  NON SPECIFIED, Take 7 Caps by mouth every bedtime. \"Herb-Lax\"   Indications: constipation, Disp: , Rfl:     ALLERGIES:   Allergies   Allergen Reactions   • Other Misc Shortness of Breath     Anesthesia with surgery 2016  Pt couldn't breathe while being put under and tapped side of bed to let RN know. \"It was the scariest feeling and I want everyone to know\"     • Benicar [Olmesartan]      blisters   • Codeine Vomiting   • Demerol Vomiting   • Other Drug Vomiting     \"all pain meds\" per pt can take morphine.   • Percocet [Oxycodone-Acetaminophen] Vomiting   • Statins [Hmg-Coa-R Inhibitors]      Muscle cramping   • Valium Vomiting   • Vicodin [Hydrocodone-Acetaminophen] Vomiting       SURGHX:   Past Surgical History:   Procedure Laterality Date   • KNEE ARTHROPLASTY TOTAL Left 2/12/2018    Procedure: KNEE ARTHROPLASTY TOTAL;  Surgeon: Earle Jacobsen M.D.;  Location: SURGERY [facility];  Service: Orthopedics   • " CERVICAL FUSION POSTERIOR  8/8/2017    Procedure: CERVICAL FUSION POSTERIOR- C3-5, INSTRUMENTED;  Surgeon: Florentino Pike M.D.;  Location: [facility];  Service:    • CERVICAL LAMINECTOMY POSTERIOR  8/8/2017    Procedure: CERVICAL LAMINECTOMY POSTERIOR- C3-5;  Surgeon: Florentino Pike M.D.;  Location: [facility];  Service:    • HIP ARTHROPLASTY TOTAL Left 5/8/2017    Procedure: HIP ARTHROPLASTY TOTAL;  Surgeon: Earle Jacobsen M.D.;  Location: [facility];  Service:    • HIP ARTHROPLASTY TOTAL Right 12/12/2016    Procedure: HIP ARTHROPLASTY TOTAL;  Surgeon: Earle Jacobsen M.D.;  Location: [facility];  Service:    • LUMBAR FUSION POSTERIOR  9/26/2016    Procedure: LUMBAR FUSION POSTERIOR L2-S1 onlay ;  Surgeon: Florentino Pike M.D.;  Location: [facility];  Service:    • LUMBAR LAMINECTOMY DISKECTOMY N/A 9/26/2016    Procedure: LUMBAR LAMINECTOMY DISKECTOMY L2-S1;  Surgeon: Florentino Pike M.D.;  Location: [facility];  Service:    • COLONOSCOPY  2/2016    Every 10 years   • SHOULDER ARTHROPLASTY TOTAL Left 8/11/2015    Procedure: SHOULDER ARTHROPLASTY REVERSE TOTAL;  Surgeon: Yoshi Gamble M.D.;  Location: [facility];  Service:    • KNEE ARTHROPLASTY TOTAL  6/23/2014    Performed by Earle Jacobsen M.D. at [facility]   • HAMMERTOE CORRECTION Left 11/12/2007    Removed 3rd toe   • LUMBAR LAMINECTOMY DISKECTOMY  1993    Lumbar   • CARPAL TUNNEL RELEASE Bilateral 1993   • CERVICAL DISK AND FUSION ANTERIOR  1993    X2   • HAMMERTOE CORRECTION Right 1980's    Removed 3rd toe   • BUNIONECTOMY Bilateral 1980's   • HYSTERECTOMY, TOTAL ABDOMINAL  1977   • FINGER ORIF Left as child    Index       SOCHX:  reports that she has never smoked. She has never used smokeless tobacco. She reports that she drinks alcohol. She reports that she does not use drugs.    FH: Reviewed with patient, not pertinent to this  "visit.     Objective:   /88   Pulse 74   Temp 36.9 °C (98.5 °F)   Resp 15   Ht 1.499 m (4' 11\")   Wt 73.5 kg (162 lb)   SpO2 98%   BMI 32.72 kg/m²   Physical Exam   Constitutional: She is oriented to person, place, and time. She appears well-developed and well-nourished. No distress.   HENT:   Head: Normocephalic and atraumatic.   Nose: Nose normal.   Eyes: Conjunctivae and EOM are normal.   Neck: Normal range of motion. No tracheal deviation present.   Cardiovascular: Normal rate and regular rhythm.    Pulses:       Radial pulses are 2+ on the right side, and 2+ on the left side.   Pulmonary/Chest: Effort normal. No respiratory distress.   Musculoskeletal:        Left shoulder: She exhibits decreased range of motion, tenderness, bony tenderness and swelling.   Tenderness along left humeral head, humeral shaft. Ecchymosis proximal to left elbow. Patient unable to move left arm without using right arm for assistance.    Neurological: She is alert and oriented to person, place, and time. No sensory deficit.   Skin: Skin is warm and dry. Capillary refill takes less than 2 seconds.   Psychiatric: She has a normal mood and affect. Her behavior is normal. Judgment and thought content normal.     - DX-SHOULDER 2+ LEFT   Impression: Acute angulated fracture through the proximal humeral diaphysis is identified surrounding the humeral component of the arthroplasty.    Assessment/Plan:   1. Closed fracture of shaft of left humerus, unspecified fracture morphology, initial encounter  - DX-SHOULDER 2+ LEFT; Future  - REFERRAL TO ORTHOPEDICS    2. Fall, initial encounter    - Placed in long arm splint with sling for support  - Advised to take OTC ibuprofen/acetaminophen prn   - Advised to follow up with orthopedics.Patient states that she will call her previous orthopedist today.     Differential diagnosis, natural history, supportive care, and indications for immediate follow-up discussed.  "

## 2019-03-19 ENCOUNTER — APPOINTMENT (OUTPATIENT)
Dept: RADIOLOGY | Facility: MEDICAL CENTER | Age: 79
DRG: 483 | End: 2019-03-19
Attending: EMERGENCY MEDICINE
Payer: MEDICARE

## 2019-03-19 ENCOUNTER — HOSPITAL ENCOUNTER (INPATIENT)
Facility: MEDICAL CENTER | Age: 79
LOS: 3 days | DRG: 483 | End: 2019-03-25
Attending: EMERGENCY MEDICINE | Admitting: HOSPITALIST
Payer: MEDICARE

## 2019-03-19 ENCOUNTER — HOSPITAL ENCOUNTER (OUTPATIENT)
Facility: MEDICAL CENTER | Age: 79
End: 2019-03-19
Attending: ORTHOPAEDIC SURGERY | Admitting: ORTHOPAEDIC SURGERY
Payer: MEDICARE

## 2019-03-19 DIAGNOSIS — S42.295A OTHER CLOSED NONDISPLACED FRACTURE OF PROXIMAL END OF LEFT HUMERUS, INITIAL ENCOUNTER: ICD-10-CM

## 2019-03-19 DIAGNOSIS — R52 INTRACTABLE PAIN: ICD-10-CM

## 2019-03-19 DIAGNOSIS — S42.302A CLOSED FRACTURE OF SHAFT OF LEFT HUMERUS, UNSPECIFIED FRACTURE MORPHOLOGY, INITIAL ENCOUNTER: ICD-10-CM

## 2019-03-19 PROBLEM — M25.522 LEFT ELBOW PAIN: Status: ACTIVE | Noted: 2019-03-19

## 2019-03-19 LAB
ANION GAP SERPL CALC-SCNC: 9 MMOL/L (ref 0–11.9)
BASOPHILS # BLD AUTO: 1 % (ref 0–1.8)
BASOPHILS # BLD: 0.07 K/UL (ref 0–0.12)
BUN SERPL-MCNC: 30 MG/DL (ref 8–22)
CALCIUM SERPL-MCNC: 9.5 MG/DL (ref 8.4–10.2)
CHLORIDE SERPL-SCNC: 104 MMOL/L (ref 96–112)
CO2 SERPL-SCNC: 25 MMOL/L (ref 20–33)
CREAT SERPL-MCNC: 0.92 MG/DL (ref 0.5–1.4)
EKG IMPRESSION: NORMAL
EOSINOPHIL # BLD AUTO: 0.13 K/UL (ref 0–0.51)
EOSINOPHIL NFR BLD: 1.9 % (ref 0–6.9)
ERYTHROCYTE [DISTWIDTH] IN BLOOD BY AUTOMATED COUNT: 48 FL (ref 35.9–50)
GLUCOSE SERPL-MCNC: 96 MG/DL (ref 65–99)
HCT VFR BLD AUTO: 41.3 % (ref 37–47)
HGB BLD-MCNC: 12.9 G/DL (ref 12–16)
IMM GRANULOCYTES # BLD AUTO: 0.03 K/UL (ref 0–0.11)
IMM GRANULOCYTES NFR BLD AUTO: 0.4 % (ref 0–0.9)
INR PPP: 1 (ref 0.87–1.13)
LYMPHOCYTES # BLD AUTO: 0.63 K/UL (ref 1–4.8)
LYMPHOCYTES NFR BLD: 9.3 % (ref 22–41)
MCH RBC QN AUTO: 29.2 PG (ref 27–33)
MCHC RBC AUTO-ENTMCNC: 31.2 G/DL (ref 33.6–35)
MCV RBC AUTO: 93.4 FL (ref 81.4–97.8)
MONOCYTES # BLD AUTO: 0.67 K/UL (ref 0–0.85)
MONOCYTES NFR BLD AUTO: 9.9 % (ref 0–13.4)
NEUTROPHILS # BLD AUTO: 5.22 K/UL (ref 2–7.15)
NEUTROPHILS NFR BLD: 77.5 % (ref 44–72)
NRBC # BLD AUTO: 0 K/UL
NRBC BLD-RTO: 0 /100 WBC
PLATELET # BLD AUTO: 268 K/UL (ref 164–446)
PMV BLD AUTO: 11.5 FL (ref 9–12.9)
POTASSIUM SERPL-SCNC: 3.9 MMOL/L (ref 3.6–5.5)
PROTHROMBIN TIME: 13.1 SEC (ref 12–14.6)
RBC # BLD AUTO: 4.42 M/UL (ref 4.2–5.4)
SODIUM SERPL-SCNC: 138 MMOL/L (ref 135–145)
WBC # BLD AUTO: 6.8 K/UL (ref 4.8–10.8)

## 2019-03-19 PROCEDURE — 700111 HCHG RX REV CODE 636 W/ 250 OVERRIDE (IP): Performed by: EMERGENCY MEDICINE

## 2019-03-19 PROCEDURE — 85025 COMPLETE CBC W/AUTO DIFF WBC: CPT

## 2019-03-19 PROCEDURE — 80048 BASIC METABOLIC PNL TOTAL CA: CPT

## 2019-03-19 PROCEDURE — 36415 COLL VENOUS BLD VENIPUNCTURE: CPT

## 2019-03-19 PROCEDURE — G0378 HOSPITAL OBSERVATION PER HR: HCPCS

## 2019-03-19 PROCEDURE — 99219 PR INITIAL OBSERVATION CARE,LEVL II: CPT | Performed by: INTERNAL MEDICINE

## 2019-03-19 PROCEDURE — 94660 CPAP INITIATION&MGMT: CPT

## 2019-03-19 PROCEDURE — A9270 NON-COVERED ITEM OR SERVICE: HCPCS | Performed by: INTERNAL MEDICINE

## 2019-03-19 PROCEDURE — 85610 PROTHROMBIN TIME: CPT

## 2019-03-19 PROCEDURE — 96374 THER/PROPH/DIAG INJ IV PUSH: CPT

## 2019-03-19 PROCEDURE — 94760 N-INVAS EAR/PLS OXIMETRY 1: CPT

## 2019-03-19 PROCEDURE — 700102 HCHG RX REV CODE 250 W/ 637 OVERRIDE(OP): Performed by: INTERNAL MEDICINE

## 2019-03-19 PROCEDURE — 93005 ELECTROCARDIOGRAM TRACING: CPT | Performed by: EMERGENCY MEDICINE

## 2019-03-19 PROCEDURE — 99285 EMERGENCY DEPT VISIT HI MDM: CPT

## 2019-03-19 PROCEDURE — 96375 TX/PRO/DX INJ NEW DRUG ADDON: CPT

## 2019-03-19 PROCEDURE — 700111 HCHG RX REV CODE 636 W/ 250 OVERRIDE (IP): Performed by: INTERNAL MEDICINE

## 2019-03-19 PROCEDURE — 71045 X-RAY EXAM CHEST 1 VIEW: CPT

## 2019-03-19 RX ORDER — ACETAMINOPHEN 325 MG/1
650 TABLET ORAL EVERY 4 HOURS PRN
Status: DISCONTINUED | OUTPATIENT
Start: 2019-03-19 | End: 2019-03-20

## 2019-03-19 RX ORDER — BISACODYL 10 MG
10 SUPPOSITORY, RECTAL RECTAL
Status: DISCONTINUED | OUTPATIENT
Start: 2019-03-19 | End: 2019-03-21

## 2019-03-19 RX ORDER — ONDANSETRON 4 MG/1
4 TABLET, ORALLY DISINTEGRATING ORAL EVERY 4 HOURS PRN
Status: DISCONTINUED | OUTPATIENT
Start: 2019-03-19 | End: 2019-03-25 | Stop reason: HOSPADM

## 2019-03-19 RX ORDER — DILTIAZEM HYDROCHLORIDE 120 MG/1
240 CAPSULE, COATED, EXTENDED RELEASE ORAL
Status: DISCONTINUED | OUTPATIENT
Start: 2019-03-19 | End: 2019-03-25 | Stop reason: HOSPADM

## 2019-03-19 RX ORDER — MORPHINE SULFATE 4 MG/ML
4 INJECTION, SOLUTION INTRAMUSCULAR; INTRAVENOUS ONCE
Status: COMPLETED | OUTPATIENT
Start: 2019-03-19 | End: 2019-03-19

## 2019-03-19 RX ORDER — HYDRALAZINE HYDROCHLORIDE 20 MG/ML
10 INJECTION INTRAMUSCULAR; INTRAVENOUS EVERY 4 HOURS PRN
Status: DISCONTINUED | OUTPATIENT
Start: 2019-03-19 | End: 2019-03-25 | Stop reason: HOSPADM

## 2019-03-19 RX ORDER — IRBESARTAN 150 MG/1
150 TABLET ORAL EVERY MORNING
Status: DISCONTINUED | OUTPATIENT
Start: 2019-03-20 | End: 2019-03-25 | Stop reason: HOSPADM

## 2019-03-19 RX ORDER — AMOXICILLIN 250 MG
2 CAPSULE ORAL 2 TIMES DAILY
Status: DISCONTINUED | OUTPATIENT
Start: 2019-03-19 | End: 2019-03-21

## 2019-03-19 RX ORDER — OXYCODONE HYDROCHLORIDE 10 MG/1
10 TABLET ORAL
Status: DISCONTINUED | OUTPATIENT
Start: 2019-03-19 | End: 2019-03-25 | Stop reason: HOSPADM

## 2019-03-19 RX ORDER — OXYCODONE HYDROCHLORIDE 10 MG/1
20 TABLET ORAL
Status: DISCONTINUED | OUTPATIENT
Start: 2019-03-19 | End: 2019-03-25 | Stop reason: HOSPADM

## 2019-03-19 RX ORDER — ONDANSETRON 2 MG/ML
4 INJECTION INTRAMUSCULAR; INTRAVENOUS
Status: DISCONTINUED | OUTPATIENT
Start: 2019-03-19 | End: 2019-03-19

## 2019-03-19 RX ORDER — HYDROMORPHONE HYDROCHLORIDE 1 MG/ML
1 INJECTION, SOLUTION INTRAMUSCULAR; INTRAVENOUS; SUBCUTANEOUS
Status: DISCONTINUED | OUTPATIENT
Start: 2019-03-19 | End: 2019-03-25 | Stop reason: HOSPADM

## 2019-03-19 RX ORDER — POLYETHYLENE GLYCOL 3350 17 G/17G
1 POWDER, FOR SOLUTION ORAL
Status: DISCONTINUED | OUTPATIENT
Start: 2019-03-19 | End: 2019-03-21

## 2019-03-19 RX ORDER — ONDANSETRON 2 MG/ML
4 INJECTION INTRAMUSCULAR; INTRAVENOUS EVERY 4 HOURS PRN
Status: DISCONTINUED | OUTPATIENT
Start: 2019-03-19 | End: 2019-03-21

## 2019-03-19 RX ADMIN — MORPHINE SULFATE 4 MG: 4 INJECTION INTRAVENOUS at 18:11

## 2019-03-19 RX ADMIN — STANDARDIZED SENNA CONCENTRATE AND DOCUSATE SODIUM 2 TABLET: 8.6; 5 TABLET, FILM COATED ORAL at 21:03

## 2019-03-19 RX ADMIN — ONDANSETRON 4 MG: 2 INJECTION INTRAMUSCULAR; INTRAVENOUS at 18:11

## 2019-03-19 RX ADMIN — DILTIAZEM HYDROCHLORIDE 240 MG: 120 CAPSULE, COATED, EXTENDED RELEASE ORAL at 21:04

## 2019-03-19 RX ADMIN — HYDROMORPHONE HYDROCHLORIDE 1 MG: 1 INJECTION, SOLUTION INTRAMUSCULAR; INTRAVENOUS; SUBCUTANEOUS at 20:59

## 2019-03-19 ASSESSMENT — COGNITIVE AND FUNCTIONAL STATUS - GENERAL
TURNING FROM BACK TO SIDE WHILE IN FLAT BAD: A LITTLE
STANDING UP FROM CHAIR USING ARMS: A LITTLE
SUGGESTED CMS G CODE MODIFIER DAILY ACTIVITY: CK
HELP NEEDED FOR BATHING: A LITTLE
SUGGESTED CMS G CODE MODIFIER MOBILITY: CK
MOVING FROM LYING ON BACK TO SITTING ON SIDE OF FLAT BED: A LITTLE
WALKING IN HOSPITAL ROOM: A LITTLE
MOBILITY SCORE: 18
CLIMB 3 TO 5 STEPS WITH RAILING: A LITTLE
MOVING TO AND FROM BED TO CHAIR: A LITTLE
EATING MEALS: A LITTLE
PERSONAL GROOMING: A LITTLE
DAILY ACTIVITIY SCORE: 17
DRESSING REGULAR UPPER BODY CLOTHING: A LOT
DRESSING REGULAR LOWER BODY CLOTHING: A LITTLE
TOILETING: A LITTLE

## 2019-03-19 ASSESSMENT — LIFESTYLE VARIABLES
EVER_SMOKED: NEVER
TOTAL SCORE: 0
CONSUMPTION TOTAL: NEGATIVE
HAVE PEOPLE ANNOYED YOU BY CRITICIZING YOUR DRINKING: NO
TOTAL SCORE: 0
HOW MANY TIMES IN THE PAST YEAR HAVE YOU HAD 5 OR MORE DRINKS IN A DAY: 0
ON A TYPICAL DAY WHEN YOU DRINK ALCOHOL HOW MANY DRINKS DO YOU HAVE: 1
EVER FELT BAD OR GUILTY ABOUT YOUR DRINKING: NO
ALCOHOL_USE: YES
HAVE YOU EVER FELT YOU SHOULD CUT DOWN ON YOUR DRINKING: NO
TOTAL SCORE: 0
AVERAGE NUMBER OF DAYS PER WEEK YOU HAVE A DRINK CONTAINING ALCOHOL: 0
EVER HAD A DRINK FIRST THING IN THE MORNING TO STEADY YOUR NERVES TO GET RID OF A HANGOVER: NO

## 2019-03-19 ASSESSMENT — ENCOUNTER SYMPTOMS
CONSTIPATION: 0
HEADACHES: 0
DEPRESSION: 0
FALLS: 0
MYALGIAS: 0
FEVER: 0
PALPITATIONS: 0
VOMITING: 0
DIZZINESS: 0
ABDOMINAL PAIN: 0
STRIDOR: 0
WEAKNESS: 0
CHILLS: 0
DIARRHEA: 0
LOSS OF CONSCIOUSNESS: 0
TINGLING: 0
COUGH: 0
SHORTNESS OF BREATH: 0
NAUSEA: 0
SPUTUM PRODUCTION: 0

## 2019-03-19 ASSESSMENT — COPD QUESTIONNAIRES
IN THE PAST 12 MONTHS DO YOU DO LESS THAN YOU USED TO BECAUSE OF YOUR BREATHING PROBLEMS: DISAGREE/UNSURE
HAVE YOU SMOKED AT LEAST 100 CIGARETTES IN YOUR ENTIRE LIFE: NO/DON'T KNOW
COPD SCREENING SCORE: 2
DURING THE PAST 4 WEEKS HOW MUCH DID YOU FEEL SHORT OF BREATH: NONE/LITTLE OF THE TIME
DO YOU EVER COUGH UP ANY MUCUS OR PHLEGM?: NO/ONLY WITH OCCASIONAL COLDS OR INFECTIONS

## 2019-03-19 ASSESSMENT — PATIENT HEALTH QUESTIONNAIRE - PHQ9
SUM OF ALL RESPONSES TO PHQ9 QUESTIONS 1 AND 2: 0
1. LITTLE INTEREST OR PLEASURE IN DOING THINGS: NOT AT ALL
2. FEELING DOWN, DEPRESSED, IRRITABLE, OR HOPELESS: NOT AT ALL

## 2019-03-20 ENCOUNTER — APPOINTMENT (OUTPATIENT)
Dept: RADIOLOGY | Facility: MEDICAL CENTER | Age: 79
DRG: 483 | End: 2019-03-20
Attending: INTERNAL MEDICINE
Payer: MEDICARE

## 2019-03-20 LAB
ANION GAP SERPL CALC-SCNC: 7 MMOL/L (ref 0–11.9)
BUN SERPL-MCNC: 29 MG/DL (ref 8–22)
CALCIUM SERPL-MCNC: 9.3 MG/DL (ref 8.4–10.2)
CHLORIDE SERPL-SCNC: 108 MMOL/L (ref 96–112)
CO2 SERPL-SCNC: 24 MMOL/L (ref 20–33)
CREAT SERPL-MCNC: 0.96 MG/DL (ref 0.5–1.4)
ERYTHROCYTE [DISTWIDTH] IN BLOOD BY AUTOMATED COUNT: 49.2 FL (ref 35.9–50)
GLUCOSE SERPL-MCNC: 102 MG/DL (ref 65–99)
HCT VFR BLD AUTO: 37.3 % (ref 37–47)
HGB BLD-MCNC: 11.7 G/DL (ref 12–16)
MCH RBC QN AUTO: 29.5 PG (ref 27–33)
MCHC RBC AUTO-ENTMCNC: 31.4 G/DL (ref 33.6–35)
MCV RBC AUTO: 94 FL (ref 81.4–97.8)
PLATELET # BLD AUTO: 225 K/UL (ref 164–446)
PMV BLD AUTO: 11.9 FL (ref 9–12.9)
POTASSIUM SERPL-SCNC: 3.8 MMOL/L (ref 3.6–5.5)
RBC # BLD AUTO: 3.97 M/UL (ref 4.2–5.4)
SODIUM SERPL-SCNC: 139 MMOL/L (ref 135–145)
WBC # BLD AUTO: 5.1 K/UL (ref 4.8–10.8)

## 2019-03-20 PROCEDURE — G0378 HOSPITAL OBSERVATION PER HR: HCPCS

## 2019-03-20 PROCEDURE — 96376 TX/PRO/DX INJ SAME DRUG ADON: CPT

## 2019-03-20 PROCEDURE — A9270 NON-COVERED ITEM OR SERVICE: HCPCS | Performed by: HOSPITALIST

## 2019-03-20 PROCEDURE — 80048 BASIC METABOLIC PNL TOTAL CA: CPT

## 2019-03-20 PROCEDURE — A9270 NON-COVERED ITEM OR SERVICE: HCPCS | Performed by: INTERNAL MEDICINE

## 2019-03-20 PROCEDURE — 700111 HCHG RX REV CODE 636 W/ 250 OVERRIDE (IP): Performed by: INTERNAL MEDICINE

## 2019-03-20 PROCEDURE — 99225 PR SUBSEQUENT OBSERVATION CARE,LEVEL II: CPT | Performed by: HOSPITALIST

## 2019-03-20 PROCEDURE — 700102 HCHG RX REV CODE 250 W/ 637 OVERRIDE(OP): Performed by: INTERNAL MEDICINE

## 2019-03-20 PROCEDURE — 36415 COLL VENOUS BLD VENIPUNCTURE: CPT

## 2019-03-20 PROCEDURE — 85027 COMPLETE CBC AUTOMATED: CPT

## 2019-03-20 PROCEDURE — 700102 HCHG RX REV CODE 250 W/ 637 OVERRIDE(OP): Performed by: HOSPITALIST

## 2019-03-20 RX ORDER — ACETAMINOPHEN 500 MG
1000 TABLET ORAL 3 TIMES DAILY
Status: DISCONTINUED | OUTPATIENT
Start: 2019-03-20 | End: 2019-03-21

## 2019-03-20 RX ADMIN — ACETAMINOPHEN 1000 MG: 500 TABLET, FILM COATED ORAL at 10:55

## 2019-03-20 RX ADMIN — ACETAMINOPHEN 1000 MG: 500 TABLET, FILM COATED ORAL at 17:17

## 2019-03-20 RX ADMIN — HYDROMORPHONE HYDROCHLORIDE 1 MG: 1 INJECTION, SOLUTION INTRAMUSCULAR; INTRAVENOUS; SUBCUTANEOUS at 21:42

## 2019-03-20 RX ADMIN — ONDANSETRON 4 MG: 2 INJECTION INTRAMUSCULAR; INTRAVENOUS at 21:03

## 2019-03-20 RX ADMIN — STANDARDIZED SENNA CONCENTRATE AND DOCUSATE SODIUM 2 TABLET: 8.6; 5 TABLET, FILM COATED ORAL at 05:45

## 2019-03-20 RX ADMIN — IRBESARTAN 150 MG: 150 TABLET ORAL at 05:46

## 2019-03-20 RX ADMIN — STANDARDIZED SENNA CONCENTRATE AND DOCUSATE SODIUM 2 TABLET: 8.6; 5 TABLET, FILM COATED ORAL at 17:17

## 2019-03-20 RX ADMIN — HYDROMORPHONE HYDROCHLORIDE 1 MG: 1 INJECTION, SOLUTION INTRAMUSCULAR; INTRAVENOUS; SUBCUTANEOUS at 00:09

## 2019-03-20 RX ADMIN — ACETAMINOPHEN 650 MG: 325 TABLET, FILM COATED ORAL at 05:45

## 2019-03-20 RX ADMIN — ONDANSETRON 4 MG: 2 INJECTION INTRAMUSCULAR; INTRAVENOUS at 04:56

## 2019-03-20 RX ADMIN — OXYCODONE HYDROCHLORIDE 10 MG: 5 TABLET ORAL at 03:43

## 2019-03-20 ASSESSMENT — ENCOUNTER SYMPTOMS
SHORTNESS OF BREATH: 0
FEVER: 0
RESPIRATORY NEGATIVE: 1
NERVOUS/ANXIOUS: 0
CARDIOVASCULAR NEGATIVE: 1
CONSTITUTIONAL NEGATIVE: 1
BRUISES/BLEEDS EASILY: 0
PSYCHIATRIC NEGATIVE: 1
COUGH: 0
NEUROLOGICAL NEGATIVE: 1
NAUSEA: 0
DIZZINESS: 0
WEAKNESS: 0
ABDOMINAL PAIN: 0
FLANK PAIN: 0
LOSS OF CONSCIOUSNESS: 0
BACK PAIN: 0
CHILLS: 0
DEPRESSION: 0
WEIGHT LOSS: 0
VOMITING: 0
MYALGIAS: 0
GASTROINTESTINAL NEGATIVE: 1

## 2019-03-20 NOTE — ED NOTES
Spoke with hospitalist and he is ok with pt having chest xray done once on the floor since it is for pre-op. Pt in shoulder immobilizer and cast for humerus fracture and in the hallway so difficult to get undressed. Pt cannot ambulate well either to get to BR to change. Will wait until admitted.

## 2019-03-20 NOTE — ED PROVIDER NOTES
ED Provider Note    CHIEF COMPLAINT  Chief Complaint   Patient presents with   • Shoulder Pain     Pt scheduled for surgery for left shoulder arthroplasty 3/21 with Dr. Gamble. Was sent here for admit by his office because she cannot control her pain        HPI  Irina Traylor is a 78 y.o. female who presents with severe left arm and forearm pain after fracturing her humerus 4 days ago.  She is 3 years status post a left reverse shoulder replacement by Dr. Gamble.  She saw him yesterday and has had inadequate pain control and Advil.  He wants her admitted for IV analgesia prior to her planned revision on this Thursday.  Denies blood thinner use.  Pain is severe at 9-10 out of 10.  She is been living at home.    REVIEW OF SYSTEMS  Pertinent positives include: Severe left arm and forearm pain, known fracture.  Pertinent negatives include: Fever, headache, chest pain, cough, abdominal pain, vomiting, diarrhea.  10+ systems reviewed and negative.      PAST MEDICAL HISTORY  Past Medical History:   Diagnosis Date   • Anesthesia     States feels like she can't breathe with induction for right hip surgery, gets nausea vomiting with most pain meds.  Uses anti-nausea medicine if takes morphine   • Arthritis     To hips, shoulders, hands   • Benign essential HTN 9/7/2017   • Dyslipidemia 3/5/2019   • Expressive aphasia     Related to CVA   • DARIN (obstructive sleep apnea) 1/26/2017    AHI 23.6, minimum saturation 69%, on CPAP 15 cm.   • Post-nasal drip    • Restless leg syndrome     4/25/17-Resolved, states was related to right hip and none S/P replacement.   • Stroke ([facility]) 2/1993, 8/1993    Ldapavzc-pcblfb-[id number] aphasia,mouth forming words, reading, numbers       FAMILY HISTORY  Family History   Problem Relation Age of Onset   • Cancer Mother         vaginal   • Stroke Father    • No Known Problems Brother    • Diabetes Brother    • Hypertension Brother    • Stroke Daughter        SOCIAL HISTORY  Social History    Substance Use Topics   • Smoking status: Never Smoker   • Smokeless tobacco: Never Used   • Alcohol use Yes      Comment: beer or lizeth 1-2 per year     History   Drug Use No       SURGICAL HISTORY  Past Surgical History:   Procedure Laterality Date   • KNEE ARTHROPLASTY TOTAL Left 2/12/2018    Procedure: KNEE ARTHROPLASTY TOTAL;  Surgeon: Earle Jacobsen M.D.;  Location: [facility];  Service: Orthopedics   • CERVICAL FUSION POSTERIOR  8/8/2017    Procedure: CERVICAL FUSION POSTERIOR- C3-5, INSTRUMENTED;  Surgeon: Florentino Pike M.D.;  Location: [facility];  Service:    • CERVICAL LAMINECTOMY POSTERIOR  8/8/2017    Procedure: CERVICAL LAMINECTOMY POSTERIOR- C3-5;  Surgeon: Florentino Pike M.D.;  Location: [facility];  Service:    • HIP ARTHROPLASTY TOTAL Left 5/8/2017    Procedure: HIP ARTHROPLASTY TOTAL;  Surgeon: Earle Jacobsen M.D.;  Location: [facility];  Service:    • HIP ARTHROPLASTY TOTAL Right 12/12/2016    Procedure: HIP ARTHROPLASTY TOTAL;  Surgeon: Earle Jacobsen M.D.;  Location: [facility];  Service:    • LUMBAR FUSION POSTERIOR  9/26/2016    Procedure: LUMBAR FUSION POSTERIOR L2-S1 onlay ;  Surgeon: Florentino Pike M.D.;  Location: [facility];  Service:    • LUMBAR LAMINECTOMY DISKECTOMY N/A 9/26/2016    Procedure: LUMBAR LAMINECTOMY DISKECTOMY L2-S1;  Surgeon: Florentino Pike M.D.;  Location: [facility];  Service:    • COLONOSCOPY  2/2016    Every 10 years   • SHOULDER ARTHROPLASTY TOTAL Left 8/11/2015    Procedure: SHOULDER ARTHROPLASTY REVERSE TOTAL;  Surgeon: Yoshi Gamble M.D.;  Location: [facility];  Service:    • KNEE ARTHROPLASTY TOTAL  6/23/2014    Performed by Earle Jacobsen M.D. at [facility]   • HAMMERTOE CORRECTION Left 11/12/2007    Removed 3rd toe   • LUMBAR LAMINECTOMY DISKECTOMY  1993    Lumbar   • CARPAL TUNNEL RELEASE Bilateral 1993  "  • CERVICAL DISK AND FUSION ANTERIOR  1993    X2   • HAMMERTOE CORRECTION Right 1980's    Removed 3rd toe   • BUNIONECTOMY Bilateral 1980's   • HYSTERECTOMY, TOTAL ABDOMINAL  1977   • FINGER ORIF Left as child    Index       CURRENT MEDICATIONS    Current Outpatient Prescriptions   Medication Sig Dispense Refill   • fluticasone (FLONASE) 50 MCG/ACT nasal spray Spray 2 Sprays in nose every day. 3 Bottle 2   • aspirin EC (ECOTRIN) 81 MG Tablet Delayed Response Take 81 mg by mouth every day.     • fluticasone (FLONASE) 50 MCG/ACT nasal spray Spray 1 Spray in nose every day. 16 g 0   • Ibuprofen 200 MG Cap Take 2 Caps by mouth 4 times a day as needed (pain management).     • oxyCODONE-acetaminophen (PERCOCET-10)  MG Tab Take 1 Tab by mouth every 6 hours as needed for Severe Pain. Take 1/2 -1 tab [id number] PRN     • Ascorbic Acid 1000 MG Tab CR Take 1 Tab by mouth every day.     • B Complex-Folic Acid (B COMPLEX-VITAMIN B12 PO) Take 1 Tab by mouth every day.     • calcium carbonate (TUMS) 500 MG Chew Tab Take 500 mg by mouth every day.     • vitamin D 5000 UNITS Tab Take 5,000 Units by mouth every day. 30 Tab 2   • loratadine (CLARITIN) 10 MG Tab Take 10 mg by mouth 1 time daily as needed (allergy).     • fenofibrate (TRICOR) 145 MG Tab Take 145 mg by mouth every morning.     • diltiazem CD (CARDIZEM CD) 240 MG CP24 Take 240 mg by mouth every bedtime.     • Non Formulary Request Take 1 Package by mouth every day. Indications: VITALIZER GOLD-MULTI VITAMIN     • acetaminophen (TYLENOL) 325 MG TABS Take 650 mg by mouth every four hours as needed.     • NON SPECIFIED Take 7 Caps by mouth every bedtime. \"Herb-Lax\"   Indications: constipation     • irbesartan (AVAPRO) 150 MG TABS Take 150 mg by mouth every morning.         ALLERGIES  Allergies   Allergen Reactions   • Other Misc Shortness of Breath     Anesthesia with surgery 2016  Pt couldn't breathe while being put under and tapped side of bed to let RN know. \"It was " "the scariest feeling and I want everyone to know\"     • Benicar [Olmesartan]      blisters   • Codeine Vomiting   • Demerol Vomiting   • Other Drug Vomiting     \"all pain meds\" per pt can take morphine.   • Percocet [Oxycodone-Acetaminophen] Vomiting   • Statins [Hmg-Coa-R Inhibitors]      Muscle cramping   • Valium Vomiting   • Vicodin [Hydrocodone-Acetaminophen] Vomiting       PHYSICAL EXAM  VITAL SIGNS: /77   Pulse 81   Temp 37.2 °C (98.9 °F) (Temporal)   Resp 18   Ht 1.499 m (4' 11\")   Wt 71.7 kg (158 lb)   SpO2 96%   BMI 31.91 kg/m²   Reviewed and high normal blood pressure  Constitutional: Well developed, Well nourished, severe pain.  HENT: Normocephalic, atraumatic, bilateral external ears normal, oropharynx moist, No exudates or erythema.   Eyes: PERRLA, conjunctiva pink, no scleral icterus.   Cardiovascular: Regular S1-S2 without murmur, rub, gallop.  No dependent edema or calf cords.  Capillary refill brisk in the left hand.  Respiratory: No rales, rhonchi, wheeze.  Gastrointestinal: Soft, moderately overweight, nontender.  Skin: Healing abrasion right knee scabs on scattered dorsal toes, wound proximal right left forearm bandaged with Band-Aid  Genitourinary:  No costovertebral angle tenderness.   Neurologic: Alert & oriented x 3, cranial nerves 2-12 intact by passive exam.  No focal deficit noted.  Finger flexion extension and sensation preserved.  Psychiatric: Affect normal, Judgment normal, Mood normal.   Musculoskeletal: Sling and left forearm splint in place.  Tenderness of the left arm.  No deformity.    DIFFERENTIAL DIAGNOSIS:  Intractable fracture pain, failure to thrive, doubt compartment syndrome.    EKG  EKG Interpretation 11:30 PM    Interpreted by me.  Indication: Preop    Rhythm: normal sinus   Rate: Normal at 65  Axis: -49 ectopy: none  Conduction: normal  ST/T Waves: no acute change  Q Waves: none  R Wave progression: normal  Comparison: Minimally changed from prior  High " voltage QRS complexes  Clinical Impression: Sinus rhythm with left axis deviation and LVH      RADIOLOGY/PROCEDURES  DX-ELBOW-COMPLETE 3+ LEFT    (Results Pending)       LABORATORY:  Results for orders placed or performed during the hospital encounter of 03/19/19   CBC WITH DIFFERENTIAL   Result Value Ref Range    WBC 6.8 4.8 - 10.8 K/uL    RBC 4.42 4.20 - 5.40 M/uL    Hemoglobin 12.9 12.0 - 16.0 g/dL    Hematocrit 41.3 37.0 - 47.0 %    MCV 93.4 81.4 - 97.8 fL    MCH 29.2 27.0 - 33.0 pg    MCHC 31.2 (L) 33.6 - 35.0 g/dL    RDW 48.0 35.9 - 50.0 fL    Platelet Count 268 164 - 446 K/uL    MPV 11.5 9.0 - 12.9 fL    Neutrophils-Polys 77.50 (H) 44.00 - 72.00 %    Lymphocytes 9.30 (L) 22.00 - 41.00 %    Monocytes 9.90 0.00 - 13.40 %    Eosinophils 1.90 0.00 - 6.90 %    Basophils 1.00 0.00 - 1.80 %    Immature Granulocytes 0.40 0.00 - 0.90 %    Nucleated RBC 0.00 /100 WBC    Neutrophils (Absolute) 5.22 2.00 - 7.15 K/uL    Lymphs (Absolute) 0.63 (L) 1.00 - 4.80 K/uL    Monos (Absolute) 0.67 0.00 - 0.85 K/uL    Eos (Absolute) 0.13 0.00 - 0.51 K/uL    Baso (Absolute) 0.07 0.00 - 0.12 K/uL    Immature Granulocytes (abs) 0.03 0.00 - 0.11 K/uL    NRBC (Absolute) 0.00 K/uL   Basic Metabolic Panel   Result Value Ref Range    Sodium 138 135 - 145 mmol/L    Potassium 3.9 3.6 - 5.5 mmol/L    Chloride 104 96 - 112 mmol/L    Co2 25 20 - 33 mmol/L    Glucose 96 65 - 99 mg/dL    Bun 30 (H) 8 - 22 mg/dL    Creatinine 0.92 0.50 - 1.40 mg/dL    Calcium 9.5 8.4 - 10.2 mg/dL    Anion Gap 9.0 0.0 - 11.9   Prothrombin Time   Result Value Ref Range    PT 13.1 12.0 - 14.6 sec    INR 1.00 0.87 - 1.13       INTERVENTIONS:  Medications   morphine (pf) 4 mg/ml injection 4 mg (not administered)   ondansetron (ZOFRAN) syringe/vial injection 4 mg (not administered)     Response: Good improvement in pain    COURSE & MEDICAL DECISION MAKING  Case discussed with Dr. Kumar on call for Dr. Gamble who recommended admission to the hospitalist.  Case discussed  with Dr. Ross who agreed to admit the patient for pain control until surgery could be completed in 2 days.    Patient presents with intractable pain from a left shoulder fracture.  She is status post reverse shoulder arthroplasty.  There is no evidence of compartment syndrome..    This patient has borderline or elevated blood pressure as recorded above and was instructed to followup with primary physician for comprehensive blood pressure evaluation and yearly fasting cholesterol assessment according to to CMS protocol.    PLAN:  As above    CONDITION: Fair.    FINAL IMPRESSION  1. Closed fracture of shaft of left humerus, unspecified fracture morphology, initial encounter    2. Intractable pain          Electronically signed by: Grayson Gutierrez, 3/19/2019 6:03 PM

## 2019-03-20 NOTE — PROGRESS NOTES
Med rec complete per pt at bedside with list  Allergies have been verified and updated  No oral ABX within the last 30 days  Pt Home Pharmacy:[facility]

## 2019-03-20 NOTE — PROGRESS NOTES
Received report from night shift CHRIS Read. POC discussed. Patient resting comfortably in bed at this time with no complaints. Safety precautions and hourly rounding in place.    Assessment completed, 3/10 aching pain in left elbow, radiating down forearm. No numbness or tingling of fingers, pulses palpable. Declining pain medication, felt nauseous after their administration earlier. Refused left elbow xray this morning when tech came by bedside. Will make MD aware in rounds.

## 2019-03-20 NOTE — PROGRESS NOTES
Patient arrived on the floor at 2047 with a pain of 8 in her left arm.  She reports that she has been unable to control her pain at home.  She is alert and oriented.  Trace edema noted to left fingers.  Arm is in a splint and a sling.  She has a few scratches noted on her rt knee and bilateral feet.  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  She reports no shortness of breath.  She is on O2 @L due to pain medications, but does not wear this at home.  She did bring her Cpap in and order was placed with Respiratory to set up and verify working capability.  Bowel sounds are active and patient reports having a bowel movement on 03/19.  IV is in the Rt AC and is  Patent.  Patient oriented to room, call light and bed.  Surgery is scheduled for Thursday.

## 2019-03-20 NOTE — PROGRESS NOTES
Patient seen by me in office yesterday for 10 day old periprosthetic left humerus fracture (prior reverse total shoulder).  We planned for surgery Thursday to revise reverse total shoulder.  She had a stable and intact exam.  She is very sensitive to pain meds.  I gave her Rx for pain control and Nausea.  Per ED pain not controlled today and she presented to hospital.  ADmitted to Hospitalist service for pain control.    Will order labs and EKG to eval for stability for surgery.  Pre-op eval for surgery was going to take place outpatient.    Mechanical DVT prophylaxis - if chemical needed hold Wednesday night for midday surgery Thursday  She required an extended SNF stay after original surgery  - will need discharge planing     Appreciate Dr Ross assistance.

## 2019-03-20 NOTE — H&P
"Hospital Medicine History & Physical Note    Date of Service  3/19/2019    Primary Care Physician  Chris Alston M.D.    Consultants  Orthopedic surgery    Code Status  Full    Chief Complaint  Left elbow pain    History of Presenting Illness  78 y.o. female who presented 3/19/2019 with left elbow pain.  Patient did fall last Friday, had an x-ray last Wednesday, noted a left humerus fracture, near her previous hardware.  Patient did see orthopedic surgery, there was a plan for surgery on Thursday.  Patient was told if her pain worsen she needed to present to the emergency department.  She states her pain has been uncontrollable and continued to get worse.  Patient states is located on her left elbow, is severe, when I asked her to describe what kind of pain she said \"lots\".  Patient has received morphine and is a little confused because of this.  Patient denied any new fall.    Review of Systems  Review of Systems   Constitutional: Negative for chills, fever and malaise/fatigue.   HENT: Negative for congestion.    Respiratory: Negative for cough, sputum production, shortness of breath and stridor.    Cardiovascular: Negative for chest pain, palpitations and leg swelling.   Gastrointestinal: Negative for abdominal pain, constipation, diarrhea, nausea and vomiting.   Genitourinary: Negative for dysuria and urgency.   Musculoskeletal: Positive for joint pain (left elbow). Negative for falls and myalgias.   Neurological: Negative for dizziness, tingling, loss of consciousness, weakness and headaches.   Psychiatric/Behavioral: Negative for depression and suicidal ideas.   All other systems reviewed and are negative.      Past Medical History   has a past medical history of Anesthesia; Arthritis; Benign essential HTN (9/7/2017); Dyslipidemia (3/5/2019); Expressive aphasia; DARIN (obstructive sleep apnea) (1/26/2017); Post-nasal drip; Restless leg syndrome; and Stroke (HCC) (2/1993, 8/1993).    Surgical History   has a past " "surgical history that includes hysterectomy, total abdominal (1977); shoulder arthroplasty total (Left, 8/11/2015); lumbar fusion posterior (9/26/2016); lumbar laminectomy diskectomy (N/A, 9/26/2016); lumbar laminectomy diskectomy (1993); knee arthroplasty total (6/23/2014); carpal tunnel release (Bilateral, 1993); finger orif (Left, as child); hip arthroplasty total (Right, 12/12/2016); hammertoe correction (Left, 11/12/2007); cervical disk and fusion anterior (1993); hammertoe correction (Right, 1980's); bunionectomy (Bilateral, 1980's); colonoscopy (2/2016); hip arthroplasty total (Left, 5/8/2017); cervical fusion posterior (8/8/2017); cervical laminectomy posterior (8/8/2017); and knee arthroplasty total (Left, 2/12/2018).     Family History  family history includes Cancer in her mother; Diabetes in her brother; Hypertension in her brother; No Known Problems in her brother; Stroke in her daughter and father.     Social History   reports that she has never smoked. She has never used smokeless tobacco. She reports that she drinks alcohol. She reports that she does not use drugs.    Allergies  Allergies   Allergen Reactions   • Other Misc Shortness of Breath     Anesthesia with surgery 2016  Pt couldn't breathe while being put under and tapped side of bed to let RN know. \"It was the scariest feeling and I want everyone to know\"     • Benicar [Olmesartan]      blisters   • Codeine Vomiting   • Demerol Vomiting   • Other Drug Vomiting     \"all pain meds\" per pt can take morphine.   • Percocet [Oxycodone-Acetaminophen] Vomiting   • Statins [Hmg-Coa-R Inhibitors]      Muscle cramping   • Valium Vomiting   • Vicodin [Hydrocodone-Acetaminophen] Vomiting       Medications  Prior to Admission Medications   Prescriptions Last Dose Informant Patient Reported? Taking?   Ascorbic Acid 1000 MG Tab CR   Yes No   Sig: Take 1 Tab by mouth every day.   B Complex-Folic Acid (B COMPLEX-VITAMIN B12 PO)   Yes No   Sig: Take 1 Tab by " "mouth every day.   Ibuprofen 200 MG Cap   Yes No   Sig: Take 2 Caps by mouth 4 times a day as needed (pain management).   NON SPECIFIED  Patient Yes No   Sig: Take 7 Caps by mouth every bedtime. \"Herb-Lax\"   Indications: constipation   Non Formulary Request  Patient Yes No   Sig: Take 1 Package by mouth every day. Indications: VITALIZER GOLD-MULTI VITAMIN   acetaminophen (TYLENOL) 325 MG TABS  Patient Yes No   Sig: Take 650 mg by mouth every four hours as needed.   aspirin EC (ECOTRIN) 81 MG Tablet Delayed Response   Yes No   Sig: Take 81 mg by mouth every day.   calcium carbonate (TUMS) 500 MG Chew Tab   Yes No   Sig: Take 500 mg by mouth every day.   diltiazem CD (CARDIZEM CD) 240 MG CP24  Patient Yes No   Sig: Take 240 mg by mouth every bedtime.   fenofibrate (TRICOR) 145 MG Tab  Patient Yes No   Sig: Take 145 mg by mouth every morning.   fluticasone (FLONASE) 50 MCG/ACT nasal spray   No No   Sig: Spray 1 Spray in nose every day.   fluticasone (FLONASE) 50 MCG/ACT nasal spray   No No   Sig: Spray 2 Sprays in nose every day.   irbesartan (AVAPRO) 150 MG TABS  Patient Yes No   Sig: Take 150 mg by mouth every morning.   loratadine (CLARITIN) 10 MG Tab  Patient Yes No   Sig: Take 10 mg by mouth 1 time daily as needed (allergy).   oxyCODONE-acetaminophen (PERCOCET-10)  MG Tab   Yes No   Sig: Take 1 Tab by mouth every 6 hours as needed for Severe Pain. Take 1/2 -1 tab [id number] PRN   vitamin D 5000 UNITS Tab  Patient No No   Sig: Take 5,000 Units by mouth every day.      Facility-Administered Medications: None       Physical Exam  Temp:  [37.2 °C (98.9 °F)] 37.2 °C (98.9 °F)  Pulse:  [66-81] 66  Resp:  [16-18] 16  BP: (121-134)/(77-78) 121/78  SpO2:  [93 %-96 %] 93 %    Physical Exam   Constitutional: She is oriented to person, place, and time. She appears well-developed. She is cooperative. No distress.   HENT:   Head: Normocephalic and atraumatic. Not macrocephalic and not microcephalic. Head is without raccoon's " eyes and without Alva's sign.   Right Ear: External ear normal.   Left Ear: External ear normal.   Mouth/Throat: Oropharynx is clear and moist. No oropharyngeal exudate.   Eyes: Conjunctivae are normal. Right eye exhibits no discharge. Left eye exhibits no discharge. No scleral icterus.   Neck: Neck supple. No tracheal deviation present.   Cardiovascular: Normal rate, regular rhythm and intact distal pulses.  Exam reveals no gallop, no distant heart sounds and no friction rub.    No murmur heard.  Pulmonary/Chest: Effort normal. No accessory muscle usage or stridor. No tachypnea and no bradypnea. No respiratory distress. She has no decreased breath sounds. She has no wheezes. She has no rhonchi. She has no rales. She exhibits no tenderness.   Abdominal: Soft. Bowel sounds are normal. She exhibits no distension. There is no hepatosplenomegaly, splenomegaly or hepatomegaly. There is no tenderness. There is no rebound and no guarding.   Musculoskeletal: She exhibits no edema.        Left shoulder: She exhibits decreased range of motion and tenderness.        Left elbow: She exhibits decreased range of motion. Tenderness found.   Lymphadenopathy:     She has no cervical adenopathy.   Neurological: She is alert and oriented to person, place, and time. No cranial nerve deficit. She displays no seizure activity.   Skin: Skin is warm, dry and intact. No rash noted. She is not diaphoretic. No erythema. No pallor.   Psychiatric: Her speech is tangential. Cognition and memory are normal.   Confused post morphine   Nursing note and vitals reviewed.      Laboratory:  Recent Labs      03/19/19 1813   WBC  6.8   RBC  4.42   HEMOGLOBIN  12.9   HEMATOCRIT  41.3   MCV  93.4   MCH  29.2   MCHC  31.2*   RDW  48.0   PLATELETCT  268   MPV  11.5     Recent Labs      03/19/19 1813   SODIUM  138   POTASSIUM  3.9   CHLORIDE  104   CO2  25   GLUCOSE  96   BUN  30*   CREATININE  0.92   CALCIUM  9.5     Recent Labs      03/19/19 1813    GLUCOSE  96     Recent Labs      03/19/19   1813   INR  1.00             No results for input(s): TROPONINI in the last 72 hours.    Urinalysis:    No results found     Imaging:  DX-ELBOW-COMPLETE 3+ LEFT    (Results Pending)         Assessment/Plan:  I anticipate this patient is appropriate for observation status at this time.    * Left elbow pain- (present on admission)   Assessment & Plan    -This is where her pain is actually severe, I have not been able to find any imaging of the left elbow  -Obtain left elbow x-ray  -Start narcotics for pain management     Closed left humeral fracture- (present on admission)   Assessment & Plan    -On x-ray read by myself there is a fracture near the previous hardware, known fracture  -Post fall  -Orthopedic surgery he to perform surgery on Thursday  -Await additional recommendations  -Pain management with narcotics     Benign essential HTN- (present on admission)   Assessment & Plan    -Continue home irbesartan and diltiazem  -Place PRN hydralazine and adjust as needed         VTE prophylaxis: SCDs

## 2019-03-20 NOTE — ED NOTES
Pt assessed. Medicated for pain. Left arm immobilizer and cast in place. CMS intact. Increased pain in arm. Sx scheduled for 3/21 with Dr. Gamble. Will cont to monitor

## 2019-03-20 NOTE — CARE PLAN
Problem: Knowledge Deficit  Goal: Knowledge of disease process/condition, treatment plan, diagnostic tests, and medications will improve  Outcome: PROGRESSING AS EXPECTED  Discussed her plan of care to keep her pain level as low as possible until surgery on Thursday.       Pt c/o R sided CP radiating to her R shoulder onset 0200 with SOB.  Pt took 1 nitro PTA with relief.

## 2019-03-20 NOTE — PROGRESS NOTES
"[facility] Medicine Daily Progress Note    Date of Service  3/20/2019    Chief Complaint  78 y.o. female admitted 3/19/2019 with left arm pain.    Hospital Course    78 y.o. female who presented 3/19/2019 with left elbow pain.  Patient did fall last Friday, had an x-ray last Wednesday, noted a left humerus fracture, near her previous hardware.  Patient did see orthopedic surgery, there was a plan for surgery on Thursday.  Patient was told if her pain worsen she needed to present to the emergency department.  She states her pain has been uncontrollable and continued to get worse.  Patient states is located on her left elbow, is severe, when I asked her to describe what kind of pain she said \"lots\".  Patient has received morphine and is a little confused because of this.  Patient denied any new fall.      Interval Problem Update  Complains of nausea with the narcotic pain medication. Doing better with tylenol and supportive care with ADLs. Patient lives alone, states that she is supported by neighbors who help her out.    Consultants/Specialty  Orthopedic surgery.    Code Status  Full    Disposition  Pt and ot post surgery; may need transitional care prior to going home.    Review of Systems  Review of Systems   Constitutional: Negative.  Negative for chills, fever, malaise/fatigue and weight loss.   HENT: Negative.    Respiratory: Negative.  Negative for cough and shortness of breath.    Cardiovascular: Negative.  Negative for chest pain and leg swelling.   Gastrointestinal: Negative.  Negative for abdominal pain, nausea and vomiting.   Genitourinary: Negative.  Negative for dysuria and flank pain.   Musculoskeletal: Positive for joint pain. Negative for back pain and myalgias.   Neurological: Negative.  Negative for dizziness, loss of consciousness and weakness.   Endo/Heme/Allergies: Negative.  Does not bruise/bleed easily.   Psychiatric/Behavioral: Negative.  Negative for depression. The patient is not " nervous/anxious.    All other systems reviewed and are negative.       Physical Exam  Temp:  [36.7 °C (98.1 °F)-37.2 °C (98.9 °F)] 36.7 °C (98.1 °F)  Pulse:  [59-81] 59  Resp:  [16-18] 18  BP: (111-156)/(53-87) 111/53  SpO2:  [93 %-98 %] 95 %    Physical Exam   Constitutional: She is oriented to person, place, and time. She appears well-developed and well-nourished. No distress.   Plan of care discussed with bedside RN.   HENT:   Nose: Nose normal.   Mouth/Throat: Oropharynx is clear and moist. No oropharyngeal exudate.   Eyes: Conjunctivae are normal. Right eye exhibits no discharge. Left eye exhibits no discharge. No scleral icterus.   Neck: No JVD present. No tracheal deviation present.   Cardiovascular: Normal rate, regular rhythm and normal heart sounds.    Pulmonary/Chest: Effort normal and breath sounds normal. No stridor. No respiratory distress. She has no wheezes. She has no rales. She exhibits no tenderness.   Abdominal: Soft. Bowel sounds are normal. She exhibits no distension. There is no tenderness.   Musculoskeletal: She exhibits edema, tenderness and deformity.   Left upper extremity in immobilizer   Neurological: She is alert and oriented to person, place, and time. No cranial nerve deficit. She exhibits normal muscle tone.   Skin: Skin is warm and dry. She is not diaphoretic. No pallor.   Psychiatric: She has a normal mood and affect. Her behavior is normal. Judgment and thought content normal.   Nursing note and vitals reviewed.      Fluids    Intake/Output Summary (Last 24 hours) at 03/20/19 1440  Last data filed at 03/20/19 1200   Gross per 24 hour   Intake              960 ml   Output              100 ml   Net              860 ml       Laboratory  Recent Labs      03/19/19   1813  03/20/19   0452   WBC  6.8  5.1   RBC  4.42  3.97*   HEMOGLOBIN  12.9  11.7*   HEMATOCRIT  41.3  37.3   MCV  93.4  94.0   MCH  29.2  29.5   MCHC  31.2*  31.4*   RDW  48.0  49.2   PLATELETCT  268  225   MPV  11.5  11.9      Recent Labs      03/19/19   1813  03/20/19   0452   SODIUM  138  139   POTASSIUM  3.9  3.8   CHLORIDE  104  108   CO2  25  24   GLUCOSE  96  102*   BUN  30*  29*   CREATININE  0.92  0.96   CALCIUM  9.5  9.3     Recent Labs      03/19/19   1813   INR  1.00               Imaging  No orders to display        Assessment/Plan  * Left elbow pain- (present on admission)   Assessment & Plan    Refused left elbow x-ray today  Says pain is improved     Closed left humeral fracture- (present on admission)   Assessment & Plan     fracture near the previous hardware, known fracture  -Post fall  Surgery scheduled for 3/21/2019  Tylenol 1000 mg schedule III times daily, patient avoiding narcotics due to nausea and vomiting  Supportive care and assistance with ADLs, patient lives alone unable to care for herself.  Anticipate that she will require transitional care prior to going home after her surgery.  She only has neighbors that help her out.     Benign essential HTN- (present on admission)   Assessment & Plan    Well-controlled  -Continue home irbesartan and diltiazem  -Place PRN hydralazine and adjust as needed          VTE prophylaxis: SCD

## 2019-03-20 NOTE — DISCHARGE PLANNING
Care Transition Team Assessment    Information Source  Orientation : Oriented x 4  Information Given By: Patient  Informant's Name: Irina  Who is responsible for making decisions for patient? : Patient    Readmission Evaluation  Is this a readmission?: No    Elopement Risk  Legal Hold: No  Ambulatory or Self Mobile in Wheelchair: Yes  Disoriented: No  Psychiatric Symptoms: None  History of Wandering: No  Elopement this Admit: No  Vocalizing Wanting to Leave: No  Displays Behaviors, Body Language Wanting to Leave: No-Not at Risk for Elopement  Elopement Risk: Not at Risk for Elopement    Interdisciplinary Discharge Planning  Lives with - Patient's Self Care Capacity: Alone and Able to Care For Self  Patient or legal guardian wants to designate a caregiver (see row info): No    Discharge Preparedness  What is your plan after discharge?: Home with help  Prior Functional Level: Drives Self, Independent with Activities of Daily Living    Functional Assesment  Prior Functional Level: Drives Self, Independent with Activities of Daily Living    Finances  Financial Barriers to Discharge: No  Prescription Coverage: Yes    Vision / Hearing Impairment  Vision Impairment : Yes  Right Eye Vision: Impaired, Wears Glasses  Left Eye Vision: Impaired, Wears Glasses  Hearing Impairment : No    Advance Directive  Advance Directive?: DPOA for Health Care    Domestic Abuse  Have you ever been the victim of abuse or violence?: No  Physical Abuse or Sexual Abuse: No  Verbal Abuse or Emotional Abuse: No         Discharge Risks or Barriers  Discharge risks or barriers?: No    Anticipated Discharge Information  Anticipated discharge disposition: Home

## 2019-03-20 NOTE — CARE PLAN
Problem: Safety  Goal: Will remain free from injury    Intervention: Provide assistance with mobility  Patient unsteady on her feet.  Hand hold used to help her steady herself.

## 2019-03-20 NOTE — ED NOTES
Verbal order from abiel Bueno to change xray order for portable 1 view chest xray routine at 0500 3/21/19 morning before surgery.

## 2019-03-20 NOTE — ASSESSMENT & PLAN NOTE
fracture near the previous hardware, known fracture  -Post fall  Surgery 3/21/2019  Tylenol 1000 mg scheduled three times daily, patient avoiding narcotics due to nausea and vomiting  PT and OT  Reinforced with patient daily that she needs someone to be with her 24 hours per day, she is a very high repeat fall risk, cannot adequately perform ADLs with only having the use of one arm.

## 2019-03-20 NOTE — CARE PLAN
Problem: Communication  Goal: The ability to communicate needs accurately and effectively will improve  Outcome: PROGRESSING AS EXPECTED    Intervention: [place] patient and significant other/support system to call light to alert staff of needs  Patient oriented to call light system and all relevant hospital policies. Call light within reach and used appropriately when in need of assistance.        Problem: Infection  Goal: Will remain free from infection  Outcome: PROGRESSING AS EXPECTED    Intervention: Implement standard precautions and perform hand washing before and after patient contact  Standard precautions and hand hygiene practices implemented before and after patient room entry. Gloves worn during times of patient contact.

## 2019-03-21 ENCOUNTER — APPOINTMENT (OUTPATIENT)
Dept: RADIOLOGY | Facility: MEDICAL CENTER | Age: 79
DRG: 483 | End: 2019-03-21
Attending: INTERNAL MEDICINE
Payer: MEDICARE

## 2019-03-21 ENCOUNTER — APPOINTMENT (OUTPATIENT)
Dept: RADIOLOGY | Facility: MEDICAL CENTER | Age: 79
DRG: 483 | End: 2019-03-21
Attending: ORTHOPAEDIC SURGERY
Payer: MEDICARE

## 2019-03-21 ENCOUNTER — ANESTHESIA (OUTPATIENT)
Dept: SURGERY | Facility: MEDICAL CENTER | Age: 79
DRG: 483 | End: 2019-03-21
Payer: MEDICARE

## 2019-03-21 ENCOUNTER — ANESTHESIA EVENT (OUTPATIENT)
Dept: SURGERY | Facility: MEDICAL CENTER | Age: 79
DRG: 483 | End: 2019-03-21
Payer: MEDICARE

## 2019-03-21 LAB
EKG IMPRESSION: NORMAL
HCT VFR BLD AUTO: 35.5 % (ref 37–47)
HGB BLD-MCNC: 11 G/DL (ref 12–16)

## 2019-03-21 PROCEDURE — 0PSG04Z REPOSITION LEFT HUMERAL SHAFT WITH INTERNAL FIXATION DEVICE, OPEN APPROACH: ICD-10-PCS | Performed by: ORTHOPAEDIC SURGERY

## 2019-03-21 PROCEDURE — 500367 HCHG DRAIN KIT, HEMOVAC: Performed by: ORTHOPAEDIC SURGERY

## 2019-03-21 PROCEDURE — 96365 THER/PROPH/DIAG IV INF INIT: CPT

## 2019-03-21 PROCEDURE — 160022 HCHG BLOCK: Performed by: ORTHOPAEDIC SURGERY

## 2019-03-21 PROCEDURE — G0378 HOSPITAL OBSERVATION PER HR: HCPCS

## 2019-03-21 PROCEDURE — 93005 ELECTROCARDIOGRAM TRACING: CPT | Performed by: ORTHOPAEDIC SURGERY

## 2019-03-21 PROCEDURE — 73060 X-RAY EXAM OF HUMERUS: CPT | Mod: LT

## 2019-03-21 PROCEDURE — 700102 HCHG RX REV CODE 250 W/ 637 OVERRIDE(OP): Performed by: ANESTHESIOLOGY

## 2019-03-21 PROCEDURE — 85014 HEMATOCRIT: CPT

## 2019-03-21 PROCEDURE — 500891 HCHG PACK, ORTHO MAJOR: Performed by: ORTHOPAEDIC SURGERY

## 2019-03-21 PROCEDURE — 85018 HEMOGLOBIN: CPT

## 2019-03-21 PROCEDURE — 3E0T3BZ INTRODUCTION OF ANESTHETIC AGENT INTO PERIPHERAL NERVES AND PLEXI, PERCUTANEOUS APPROACH: ICD-10-PCS | Performed by: ANESTHESIOLOGY

## 2019-03-21 PROCEDURE — 500423 HCHG DRESSING, ABD COMBINE: Performed by: ORTHOPAEDIC SURGERY

## 2019-03-21 PROCEDURE — 700112 HCHG RX REV CODE 229: Performed by: ORTHOPAEDIC SURGERY

## 2019-03-21 PROCEDURE — 700101 HCHG RX REV CODE 250: Performed by: ANESTHESIOLOGY

## 2019-03-21 PROCEDURE — A9270 NON-COVERED ITEM OR SERVICE: HCPCS | Performed by: INTERNAL MEDICINE

## 2019-03-21 PROCEDURE — A9270 NON-COVERED ITEM OR SERVICE: HCPCS | Performed by: ORTHOPAEDIC SURGERY

## 2019-03-21 PROCEDURE — 502000 HCHG MISC OR IMPLANTS RC 0278: Performed by: ORTHOPAEDIC SURGERY

## 2019-03-21 PROCEDURE — 0RRK0J6 REPLACEMENT OF LEFT SHOULDER JOINT WITH SYNTHETIC SUBSTITUTE, HUMERAL SURFACE, OPEN APPROACH: ICD-10-PCS | Performed by: ORTHOPAEDIC SURGERY

## 2019-03-21 PROCEDURE — 160048 HCHG OR STATISTICAL LEVEL 1-5: Performed by: ORTHOPAEDIC SURGERY

## 2019-03-21 PROCEDURE — 93010 ELECTROCARDIOGRAM REPORT: CPT | Performed by: INTERNAL MEDICINE

## 2019-03-21 PROCEDURE — 160029 HCHG SURGERY MINUTES - 1ST 30 MINS LEVEL 4: Performed by: ORTHOPAEDIC SURGERY

## 2019-03-21 PROCEDURE — 160009 HCHG ANES TIME/MIN: Performed by: ORTHOPAEDIC SURGERY

## 2019-03-21 PROCEDURE — 700102 HCHG RX REV CODE 250 W/ 637 OVERRIDE(OP): Performed by: INTERNAL MEDICINE

## 2019-03-21 PROCEDURE — 160036 HCHG PACU - EA ADDL 30 MINS PHASE I: Performed by: ORTHOPAEDIC SURGERY

## 2019-03-21 PROCEDURE — 96376 TX/PRO/DX INJ SAME DRUG ADON: CPT

## 2019-03-21 PROCEDURE — C1713 ANCHOR/SCREW BN/BN,TIS/BN: HCPCS | Performed by: ORTHOPAEDIC SURGERY

## 2019-03-21 PROCEDURE — 700101 HCHG RX REV CODE 250

## 2019-03-21 PROCEDURE — A9270 NON-COVERED ITEM OR SERVICE: HCPCS | Performed by: ANESTHESIOLOGY

## 2019-03-21 PROCEDURE — 73090 X-RAY EXAM OF FOREARM: CPT | Mod: LT

## 2019-03-21 PROCEDURE — 700102 HCHG RX REV CODE 250 W/ 637 OVERRIDE(OP): Performed by: ORTHOPAEDIC SURGERY

## 2019-03-21 PROCEDURE — 700111 HCHG RX REV CODE 636 W/ 250 OVERRIDE (IP): Performed by: ANESTHESIOLOGY

## 2019-03-21 PROCEDURE — 700111 HCHG RX REV CODE 636 W/ 250 OVERRIDE (IP): Performed by: INTERNAL MEDICINE

## 2019-03-21 PROCEDURE — 700105 HCHG RX REV CODE 258: Performed by: ANESTHESIOLOGY

## 2019-03-21 PROCEDURE — 160041 HCHG SURGERY MINUTES - EA ADDL 1 MIN LEVEL 4: Performed by: ORTHOPAEDIC SURGERY

## 2019-03-21 PROCEDURE — 700105 HCHG RX REV CODE 258: Performed by: ORTHOPAEDIC SURGERY

## 2019-03-21 PROCEDURE — 700111 HCHG RX REV CODE 636 W/ 250 OVERRIDE (IP): Performed by: ORTHOPAEDIC SURGERY

## 2019-03-21 PROCEDURE — 0RPK0JZ REMOVAL OF SYNTHETIC SUBSTITUTE FROM LEFT SHOULDER JOINT, OPEN APPROACH: ICD-10-PCS | Performed by: ORTHOPAEDIC SURGERY

## 2019-03-21 PROCEDURE — 94660 CPAP INITIATION&MGMT: CPT

## 2019-03-21 PROCEDURE — 700111 HCHG RX REV CODE 636 W/ 250 OVERRIDE (IP)

## 2019-03-21 PROCEDURE — 160002 HCHG RECOVERY MINUTES (STAT): Performed by: ORTHOPAEDIC SURGERY

## 2019-03-21 PROCEDURE — 500562 HCHG FIBERWIRE: Performed by: ORTHOPAEDIC SURGERY

## 2019-03-21 PROCEDURE — 160035 HCHG PACU - 1ST 60 MINS PHASE I: Performed by: ORTHOPAEDIC SURGERY

## 2019-03-21 PROCEDURE — 501838 HCHG SUTURE GENERAL: Performed by: ORTHOPAEDIC SURGERY

## 2019-03-21 PROCEDURE — 71045 X-RAY EXAM CHEST 1 VIEW: CPT

## 2019-03-21 PROCEDURE — 99225 PR SUBSEQUENT OBSERVATION CARE,LEVEL II: CPT | Performed by: HOSPITALIST

## 2019-03-21 PROCEDURE — 36415 COLL VENOUS BLD VENIPUNCTURE: CPT

## 2019-03-21 DEVICE — CABLE CERCLAGE 1.7 X 780MM: Type: IMPLANTABLE DEVICE | Status: FUNCTIONAL

## 2019-03-21 DEVICE — IMPLANTABLE DEVICE: Type: IMPLANTABLE DEVICE | Status: FUNCTIONAL

## 2019-03-21 RX ORDER — HYDROMORPHONE HYDROCHLORIDE 1 MG/ML
0.1 INJECTION, SOLUTION INTRAMUSCULAR; INTRAVENOUS; SUBCUTANEOUS
Status: DISCONTINUED | OUTPATIENT
Start: 2019-03-21 | End: 2019-03-21 | Stop reason: HOSPADM

## 2019-03-21 RX ORDER — MEPERIDINE HYDROCHLORIDE 25 MG/ML
6.25 INJECTION INTRAMUSCULAR; INTRAVENOUS; SUBCUTANEOUS
Status: DISCONTINUED | OUTPATIENT
Start: 2019-03-21 | End: 2019-03-21 | Stop reason: HOSPADM

## 2019-03-21 RX ORDER — AMOXICILLIN 250 MG
1 CAPSULE ORAL NIGHTLY
Status: DISCONTINUED | OUTPATIENT
Start: 2019-03-21 | End: 2019-03-25 | Stop reason: HOSPADM

## 2019-03-21 RX ORDER — ONDANSETRON 2 MG/ML
4 INJECTION INTRAMUSCULAR; INTRAVENOUS
Status: DISCONTINUED | OUTPATIENT
Start: 2019-03-21 | End: 2019-03-21 | Stop reason: HOSPADM

## 2019-03-21 RX ORDER — BUPIVACAINE HYDROCHLORIDE 2.5 MG/ML
INJECTION, SOLUTION EPIDURAL; INFILTRATION; INTRACAUDAL
Status: COMPLETED
Start: 2019-03-21 | End: 2019-03-21

## 2019-03-21 RX ORDER — DIPHENHYDRAMINE HYDROCHLORIDE 50 MG/ML
25 INJECTION INTRAMUSCULAR; INTRAVENOUS EVERY 6 HOURS PRN
Status: DISCONTINUED | OUTPATIENT
Start: 2019-03-21 | End: 2019-03-25 | Stop reason: HOSPADM

## 2019-03-21 RX ORDER — LIDOCAINE HYDROCHLORIDE 40 MG/ML
SOLUTION TOPICAL PRN
Status: DISCONTINUED | OUTPATIENT
Start: 2019-03-21 | End: 2019-03-21 | Stop reason: SURG

## 2019-03-21 RX ORDER — CEFAZOLIN SODIUM IN 0.9 % NACL 2 G/100 ML
PLASTIC BAG, INJECTION (ML) INTRAVENOUS
Status: COMPLETED
Start: 2019-03-21 | End: 2019-03-21

## 2019-03-21 RX ORDER — ENEMA 19; 7 G/133ML; G/133ML
1 ENEMA RECTAL
Status: DISCONTINUED | OUTPATIENT
Start: 2019-03-21 | End: 2019-03-25 | Stop reason: HOSPADM

## 2019-03-21 RX ORDER — ONDANSETRON 2 MG/ML
INJECTION INTRAMUSCULAR; INTRAVENOUS PRN
Status: DISCONTINUED | OUTPATIENT
Start: 2019-03-21 | End: 2019-03-21 | Stop reason: SURG

## 2019-03-21 RX ORDER — SODIUM CHLORIDE, SODIUM LACTATE, POTASSIUM CHLORIDE, CALCIUM CHLORIDE 600; 310; 30; 20 MG/100ML; MG/100ML; MG/100ML; MG/100ML
1000 INJECTION, SOLUTION INTRAVENOUS CONTINUOUS
Status: DISCONTINUED | OUTPATIENT
Start: 2019-03-21 | End: 2019-03-25 | Stop reason: HOSPADM

## 2019-03-21 RX ORDER — DEXAMETHASONE SODIUM PHOSPHATE 4 MG/ML
4 INJECTION, SOLUTION INTRA-ARTICULAR; INTRALESIONAL; INTRAMUSCULAR; INTRAVENOUS; SOFT TISSUE
Status: DISCONTINUED | OUTPATIENT
Start: 2019-03-21 | End: 2019-03-25 | Stop reason: HOSPADM

## 2019-03-21 RX ORDER — DEXAMETHASONE SODIUM PHOSPHATE 4 MG/ML
INJECTION, SOLUTION INTRA-ARTICULAR; INTRALESIONAL; INTRAMUSCULAR; INTRAVENOUS; SOFT TISSUE PRN
Status: DISCONTINUED | OUTPATIENT
Start: 2019-03-21 | End: 2019-03-21 | Stop reason: SURG

## 2019-03-21 RX ORDER — HALOPERIDOL 5 MG/ML
1 INJECTION INTRAMUSCULAR
Status: DISCONTINUED | OUTPATIENT
Start: 2019-03-21 | End: 2019-03-21 | Stop reason: HOSPADM

## 2019-03-21 RX ORDER — SODIUM CHLORIDE, SODIUM LACTATE, POTASSIUM CHLORIDE, CALCIUM CHLORIDE 600; 310; 30; 20 MG/100ML; MG/100ML; MG/100ML; MG/100ML
INJECTION, SOLUTION INTRAVENOUS
Status: DISCONTINUED | OUTPATIENT
Start: 2019-03-21 | End: 2019-03-21 | Stop reason: SURG

## 2019-03-21 RX ORDER — HYDROMORPHONE HYDROCHLORIDE 1 MG/ML
0.4 INJECTION, SOLUTION INTRAMUSCULAR; INTRAVENOUS; SUBCUTANEOUS
Status: DISCONTINUED | OUTPATIENT
Start: 2019-03-21 | End: 2019-03-21 | Stop reason: HOSPADM

## 2019-03-21 RX ORDER — DIPHENHYDRAMINE HYDROCHLORIDE 50 MG/ML
12.5 INJECTION INTRAMUSCULAR; INTRAVENOUS
Status: DISCONTINUED | OUTPATIENT
Start: 2019-03-21 | End: 2019-03-21 | Stop reason: HOSPADM

## 2019-03-21 RX ORDER — ACETAMINOPHEN 500 MG
1000 TABLET ORAL EVERY 8 HOURS
Status: DISCONTINUED | OUTPATIENT
Start: 2019-03-21 | End: 2019-03-25 | Stop reason: HOSPADM

## 2019-03-21 RX ORDER — SCOLOPAMINE TRANSDERMAL SYSTEM 1 MG/1
1 PATCH, EXTENDED RELEASE TRANSDERMAL
Status: DISCONTINUED | OUTPATIENT
Start: 2019-03-21 | End: 2019-03-25 | Stop reason: HOSPADM

## 2019-03-21 RX ORDER — POLYETHYLENE GLYCOL 3350 17 G/17G
1 POWDER, FOR SOLUTION ORAL 2 TIMES DAILY PRN
Status: DISCONTINUED | OUTPATIENT
Start: 2019-03-21 | End: 2019-03-25 | Stop reason: HOSPADM

## 2019-03-21 RX ORDER — ONDANSETRON 2 MG/ML
4 INJECTION INTRAMUSCULAR; INTRAVENOUS EVERY 4 HOURS PRN
Status: DISCONTINUED | OUTPATIENT
Start: 2019-03-21 | End: 2019-03-25 | Stop reason: HOSPADM

## 2019-03-21 RX ORDER — BISACODYL 10 MG
10 SUPPOSITORY, RECTAL RECTAL
Status: DISCONTINUED | OUTPATIENT
Start: 2019-03-21 | End: 2019-03-25 | Stop reason: HOSPADM

## 2019-03-21 RX ORDER — SODIUM CHLORIDE, SODIUM LACTATE, POTASSIUM CHLORIDE, CALCIUM CHLORIDE 600; 310; 30; 20 MG/100ML; MG/100ML; MG/100ML; MG/100ML
INJECTION, SOLUTION INTRAVENOUS CONTINUOUS
Status: DISCONTINUED | OUTPATIENT
Start: 2019-03-21 | End: 2019-03-21 | Stop reason: HOSPADM

## 2019-03-21 RX ORDER — AMOXICILLIN 250 MG
1 CAPSULE ORAL
Status: DISCONTINUED | OUTPATIENT
Start: 2019-03-21 | End: 2019-03-25 | Stop reason: HOSPADM

## 2019-03-21 RX ORDER — DOCUSATE SODIUM 100 MG/1
100 CAPSULE, LIQUID FILLED ORAL 2 TIMES DAILY
Status: DISCONTINUED | OUTPATIENT
Start: 2019-03-21 | End: 2019-03-25 | Stop reason: HOSPADM

## 2019-03-21 RX ORDER — ACETAMINOPHEN 500 MG
1000 TABLET ORAL ONCE
Status: COMPLETED | OUTPATIENT
Start: 2019-03-21 | End: 2019-03-21

## 2019-03-21 RX ORDER — GABAPENTIN 300 MG/1
300 CAPSULE ORAL ONCE
Status: COMPLETED | OUTPATIENT
Start: 2019-03-21 | End: 2019-03-21

## 2019-03-21 RX ORDER — BUPIVACAINE HYDROCHLORIDE 2.5 MG/ML
INJECTION, SOLUTION EPIDURAL; INFILTRATION; INTRACAUDAL PRN
Status: DISCONTINUED | OUTPATIENT
Start: 2019-03-21 | End: 2019-03-21 | Stop reason: SURG

## 2019-03-21 RX ORDER — PHENYLEPHRINE HYDROCHLORIDE 10 MG/ML
INJECTION, SOLUTION INTRAMUSCULAR; INTRAVENOUS; SUBCUTANEOUS PRN
Status: DISCONTINUED | OUTPATIENT
Start: 2019-03-21 | End: 2019-03-21 | Stop reason: SURG

## 2019-03-21 RX ORDER — OXYCODONE HCL 5 MG/5 ML
5 SOLUTION, ORAL ORAL
Status: DISCONTINUED | OUTPATIENT
Start: 2019-03-21 | End: 2019-03-21 | Stop reason: HOSPADM

## 2019-03-21 RX ORDER — OXYCODONE HCL 5 MG/5 ML
10 SOLUTION, ORAL ORAL
Status: DISCONTINUED | OUTPATIENT
Start: 2019-03-21 | End: 2019-03-21 | Stop reason: HOSPADM

## 2019-03-21 RX ORDER — HALOPERIDOL 5 MG/ML
1 INJECTION INTRAMUSCULAR EVERY 6 HOURS PRN
Status: DISCONTINUED | OUTPATIENT
Start: 2019-03-21 | End: 2019-03-25 | Stop reason: HOSPADM

## 2019-03-21 RX ORDER — HYDROMORPHONE HYDROCHLORIDE 1 MG/ML
0.2 INJECTION, SOLUTION INTRAMUSCULAR; INTRAVENOUS; SUBCUTANEOUS
Status: DISCONTINUED | OUTPATIENT
Start: 2019-03-21 | End: 2019-03-21 | Stop reason: HOSPADM

## 2019-03-21 RX ADMIN — PHENYLEPHRINE HYDROCHLORIDE 200 MCG: 10 INJECTION INTRAVENOUS at 17:05

## 2019-03-21 RX ADMIN — HYDROMORPHONE HYDROCHLORIDE 1 MG: 1 INJECTION, SOLUTION INTRAMUSCULAR; INTRAVENOUS; SUBCUTANEOUS at 12:05

## 2019-03-21 RX ADMIN — HYDROMORPHONE HYDROCHLORIDE 1 MG: 1 INJECTION, SOLUTION INTRAMUSCULAR; INTRAVENOUS; SUBCUTANEOUS at 03:55

## 2019-03-21 RX ADMIN — ONDANSETRON 4 MG: 2 INJECTION INTRAMUSCULAR; INTRAVENOUS at 08:09

## 2019-03-21 RX ADMIN — BUPIVACAINE 10 ML: 13.3 INJECTION, SUSPENSION, LIPOSOMAL INFILTRATION at 15:20

## 2019-03-21 RX ADMIN — EPHEDRINE SULFATE 10 MG: 50 INJECTION INTRAMUSCULAR; INTRAVENOUS; SUBCUTANEOUS at 16:50

## 2019-03-21 RX ADMIN — VANCOMYCIN HYDROCHLORIDE 1000 MG: 1 INJECTION, POWDER, LYOPHILIZED, FOR SOLUTION INTRAVENOUS at 13:59

## 2019-03-21 RX ADMIN — PHENYLEPHRINE HYDROCHLORIDE 100 MCG: 10 INJECTION INTRAVENOUS at 17:10

## 2019-03-21 RX ADMIN — HYDROMORPHONE HYDROCHLORIDE 0.5 MG: 1 INJECTION, SOLUTION INTRAMUSCULAR; INTRAVENOUS; SUBCUTANEOUS at 00:57

## 2019-03-21 RX ADMIN — BUPIVACAINE HYDROCHLORIDE 20 ML: 2.5 INJECTION, SOLUTION EPIDURAL; INFILTRATION; INTRACAUDAL; PERINEURAL at 15:20

## 2019-03-21 RX ADMIN — ONDANSETRON 4 MG: 2 INJECTION INTRAMUSCULAR; INTRAVENOUS at 18:00

## 2019-03-21 RX ADMIN — PROPOFOL 200 MG: 10 INJECTION, EMULSION INTRAVENOUS at 16:03

## 2019-03-21 RX ADMIN — SODIUM CHLORIDE, POTASSIUM CHLORIDE, SODIUM LACTATE AND CALCIUM CHLORIDE 1000 ML: 600; 310; 30; 20 INJECTION, SOLUTION INTRAVENOUS at 20:07

## 2019-03-21 RX ADMIN — PHENYLEPHRINE HYDROCHLORIDE 100 MCG: 10 INJECTION INTRAVENOUS at 17:35

## 2019-03-21 RX ADMIN — ROCURONIUM BROMIDE 50 MG: 10 INJECTION INTRAVENOUS at 16:03

## 2019-03-21 RX ADMIN — STANDARDIZED SENNA CONCENTRATE AND DOCUSATE SODIUM 1 TABLET: 8.6; 5 TABLET, FILM COATED ORAL at 23:11

## 2019-03-21 RX ADMIN — ACETAMINOPHEN 1000 MG: 500 TABLET, COATED ORAL at 13:59

## 2019-03-21 RX ADMIN — ONDANSETRON 4 MG: 2 INJECTION INTRAMUSCULAR; INTRAVENOUS at 01:04

## 2019-03-21 RX ADMIN — HYDROMORPHONE HYDROCHLORIDE 1 MG: 1 INJECTION, SOLUTION INTRAMUSCULAR; INTRAVENOUS; SUBCUTANEOUS at 08:09

## 2019-03-21 RX ADMIN — ONDANSETRON 4 MG: 2 INJECTION INTRAMUSCULAR; INTRAVENOUS at 12:05

## 2019-03-21 RX ADMIN — EPHEDRINE SULFATE 10 MG: 50 INJECTION INTRAMUSCULAR; INTRAVENOUS; SUBCUTANEOUS at 16:36

## 2019-03-21 RX ADMIN — EPHEDRINE SULFATE 20 MG: 50 INJECTION INTRAMUSCULAR; INTRAVENOUS; SUBCUTANEOUS at 16:15

## 2019-03-21 RX ADMIN — EPHEDRINE SULFATE 10 MG: 50 INJECTION INTRAMUSCULAR; INTRAVENOUS; SUBCUTANEOUS at 16:30

## 2019-03-21 RX ADMIN — DOCUSATE SODIUM 100 MG: 100 CAPSULE, LIQUID FILLED ORAL at 23:11

## 2019-03-21 RX ADMIN — SUGAMMADEX 200 MG: 100 INJECTION, SOLUTION INTRAVENOUS at 18:15

## 2019-03-21 RX ADMIN — CEFAZOLIN 2000 MG: 1 INJECTION, POWDER, FOR SOLUTION INTRAVENOUS at 23:33

## 2019-03-21 RX ADMIN — GABAPENTIN 300 MG: 300 CAPSULE ORAL at 13:59

## 2019-03-21 RX ADMIN — PHENYLEPHRINE HYDROCHLORIDE 100 MCG: 10 INJECTION INTRAVENOUS at 17:40

## 2019-03-21 RX ADMIN — ACETAMINOPHEN 1000 MG: 500 TABLET, FILM COATED ORAL at 23:11

## 2019-03-21 RX ADMIN — PHENYLEPHRINE HYDROCHLORIDE 100 MCG: 10 INJECTION INTRAVENOUS at 17:30

## 2019-03-21 RX ADMIN — DEXAMETHASONE SODIUM PHOSPHATE 4 MG: 4 INJECTION, SOLUTION INTRAMUSCULAR; INTRAVENOUS at 16:25

## 2019-03-21 RX ADMIN — SODIUM CHLORIDE, POTASSIUM CHLORIDE, SODIUM LACTATE AND CALCIUM CHLORIDE: 600; 310; 30; 20 INJECTION, SOLUTION INTRAVENOUS at 16:00

## 2019-03-21 RX ADMIN — SODIUM CHLORIDE, POTASSIUM CHLORIDE, SODIUM LACTATE AND CALCIUM CHLORIDE 1000 ML: 600; 310; 30; 20 INJECTION, SOLUTION INTRAVENOUS at 14:00

## 2019-03-21 RX ADMIN — LIDOCAINE HYDROCHLORIDE 4 ML: 40 SOLUTION TOPICAL at 16:24

## 2019-03-21 ASSESSMENT — PATIENT HEALTH QUESTIONNAIRE - PHQ9
1. LITTLE INTEREST OR PLEASURE IN DOING THINGS: NOT AT ALL
SUM OF ALL RESPONSES TO PHQ9 QUESTIONS 1 AND 2: 0
2. FEELING DOWN, DEPRESSED, IRRITABLE, OR HOPELESS: NOT AT ALL

## 2019-03-21 ASSESSMENT — ENCOUNTER SYMPTOMS
PSYCHIATRIC NEGATIVE: 1
BRUISES/BLEEDS EASILY: 0
RESPIRATORY NEGATIVE: 1
DIZZINESS: 0
LOSS OF CONSCIOUSNESS: 0
GASTROINTESTINAL NEGATIVE: 1
CARDIOVASCULAR NEGATIVE: 1
WEIGHT LOSS: 0
WEAKNESS: 0
CONSTITUTIONAL NEGATIVE: 1
MYALGIAS: 0
DEPRESSION: 0
SHORTNESS OF BREATH: 0
NAUSEA: 0
FEVER: 0
CHILLS: 0
ABDOMINAL PAIN: 0
NERVOUS/ANXIOUS: 0
BACK PAIN: 0
COUGH: 0
FLANK PAIN: 0
NEUROLOGICAL NEGATIVE: 1
VOMITING: 0

## 2019-03-21 ASSESSMENT — PAIN SCALES - GENERAL: PAIN_LEVEL: 0

## 2019-03-21 NOTE — PROGRESS NOTES
"[facility] Medicine Daily Progress Note    Date of Service  3/21/2019    Chief Complaint  78 y.o. female admitted 3/19/2019 with left arm pain.    Hospital Course    78 y.o. female who presented 3/19/2019 with left elbow pain.  Patient did fall last Friday, had an x-ray last Wednesday, noted a left humerus fracture, near her previous hardware.  Patient did see orthopedic surgery, there was a plan for surgery on Thursday.  Patient was told if her pain worsen she needed to present to the emergency department.  She states her pain has been uncontrollable and continued to get worse.  Patient states is located on her left elbow, is severe, when I asked her to describe what kind of pain she said \"lots\".  Patient has received morphine and is a little confused because of this.  Patient denied any new fall.      Interval Problem Update  Pain controlled, anticipating surgery today    Consultants/Specialty  Orthopedic surgery.    Code Status  Full    Disposition  Pt and ot post surgery; may need transitional care prior to going home.    Review of Systems  Review of Systems   Constitutional: Negative.  Negative for chills, fever, malaise/fatigue and weight loss.   HENT: Negative.    Respiratory: Negative.  Negative for cough and shortness of breath.    Cardiovascular: Negative.  Negative for chest pain and leg swelling.   Gastrointestinal: Negative.  Negative for abdominal pain, nausea and vomiting.   Genitourinary: Negative.  Negative for dysuria and flank pain.   Musculoskeletal: Positive for joint pain. Negative for back pain and myalgias.   Neurological: Negative.  Negative for dizziness, loss of consciousness and weakness.   Endo/Heme/Allergies: Negative.  Does not bruise/bleed easily.   Psychiatric/Behavioral: Negative.  Negative for depression. The patient is not nervous/anxious.    All other systems reviewed and are negative.       Physical Exam  Temp:  [36.4 °C (97.6 °F)-36.6 °C (97.9 °F)] 36.4 °C (97.6 °F)  Pulse:  " [61-82] 82  Resp:  [16-18] 18  BP: (110-129)/(58-90) 115/62  SpO2:  [90 %-97 %] 90 %    Physical Exam   Constitutional: She is oriented to person, place, and time. She appears well-developed and well-nourished. No distress.   Plan of care discussed with bedside RN.   HENT:   Nose: Nose normal.   Mouth/Throat: Oropharynx is clear and moist. No oropharyngeal exudate.   Eyes: Conjunctivae are normal. Right eye exhibits no discharge. Left eye exhibits no discharge. No scleral icterus.   Neck: No JVD present. No tracheal deviation present.   Cardiovascular: Normal rate, regular rhythm and normal heart sounds.    Pulmonary/Chest: Effort normal and breath sounds normal. No stridor. No respiratory distress. She has no wheezes. She has no rales. She exhibits no tenderness.   Abdominal: Soft. Bowel sounds are normal. She exhibits no distension. There is no tenderness.   Musculoskeletal: She exhibits edema, tenderness and deformity.   Left upper extremity in immobilizer   Neurological: She is alert and oriented to person, place, and time. No cranial nerve deficit. She exhibits normal muscle tone.   Skin: Skin is warm and dry. She is not diaphoretic. No pallor.   Psychiatric: She has a normal mood and affect. Her behavior is normal. Judgment and thought content normal.   Nursing note and vitals reviewed.      Fluids  No intake or output data in the 24 hours ending 03/21/19 1650    Laboratory  Recent Labs      03/19/19 1813 03/20/19   0452   WBC  6.8  5.1   RBC  4.42  3.97*   HEMOGLOBIN  12.9  11.7*   HEMATOCRIT  41.3  37.3   MCV  93.4  94.0   MCH  29.2  29.5   MCHC  31.2*  31.4*   RDW  48.0  49.2   PLATELETCT  268  225   MPV  11.5  11.9     Recent Labs      03/19/19   1813  03/20/19   0452   SODIUM  138  139   POTASSIUM  3.9  3.8   CHLORIDE  104  108   CO2  25  24   GLUCOSE  96  102*   BUN  30*  29*   CREATININE  0.92  0.96   CALCIUM  9.5  9.3     Recent Labs      03/19/19 1813   INR  1.00               Imaging  DX-FOREARM  LEFT   Final Result      1.  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      2.  Multifocal degenerative change of the intercarpal, radiocarpal and first carpometacarpal joints.      3.  Overlying splint material.      DX-CHEST-PORTABLE (1 VIEW)   Final Result         1.  No focal infiltrates.   2.  Cardiomegaly   3.  Perihilar interstitial prominence and bronchial wall cuffing, appearance suggests changes of underlying bronchial inflammation, consider bronchitis.           Assessment/Plan  * Left elbow pain- (present on admission)   Assessment & Plan    Improved.     Closed left humeral fracture- (present on admission)   Assessment & Plan     fracture near the previous hardware, known fracture  -Post fall  Surgery today 3/21/2019  Tylenol 1000 mg scheduled three times daily, patient avoiding narcotics due to nausea and vomiting  PT and OT evaluation post operatively, lives alone, discussed with , will need plan for home care or skilled care to transition to home depending upon how she does in the post op period.     Benign essential HTN- (present on admission)   Assessment & Plan    Well-controlled, no changes in medication needed.  -Continue home irbesartan and diltiazem  -Place PRN hydralazine and adjust as needed          VTE prophylaxis: SCD

## 2019-03-21 NOTE — ANESTHESIA PROCEDURE NOTES
Peripheral Block  Performed by: NORMA LOVE  Authorized by: NORMA LOVE     Patient Location:  Pre-op  Start Time:  3/21/2019 3:16 PM  End Time:  3/21/2019 3:20 PM  Reason for Block: at surgeon's request and post-op pain management    patient identified, IV checked, site marked, risks and benefits discussed, surgical consent, monitors and equipment checked, pre-op evaluation and timeout performed    Patient Position:  Supine  Prep: ChloraPrep    Monitoring:  Heart rate, continuous pulse ox and cardiac monitor  Block Region:  Upper Extremity  Upper Extremity - Block Type:  Interscalene    Laterality:  Left  Procedures: ultrasound guided  Image captured, interpreted and electronically stored.  Strength:  1  Dose:  3  Block Type:  Single-shot  Needle Length:  50mm  Needle Gauge:  22 G  Needle Localization:  Ultrasound guidance  Injection Assessment:  Negative aspiration for heme, no paresthesia on injection, incremental injection and local visualized surrounding nerve on ultrasound  Evidence of intravascular injection: No     US Guided Interscalene Brachial Plexus Block   US transducer placed on the neck in oblique plane approximately at the level of C6.  Anterior and Middle Scalene (MSM) muscles identified with nerve trunks identified between the muscles.  Needle inserted lateral to probe and advanced under direct visualization through the MSM into a perineural position.  After negative aspiration, LA injected with ease and visualized surrounding the nerve trunks.   20 cc 0.25% bupivacaine plain and 10 cc 1.33% exparel injected.  No apparent complications.

## 2019-03-21 NOTE — ANESTHESIA PREPROCEDURE EVALUATION
Relevant Problems   (+) Closed left humeral fracture   (+) DARIN (obstructive sleep apnea)   (+) Spinal stenosis   (+) Stroke (HCC)       Physical Exam    Airway   Mallampati: II  TM distance: >3 FB  Neck ROM: full       Cardiovascular - normal exam  Rhythm: regular  Rate: normal  (-) murmur     Dental - normal exam         Pulmonary - normal exam  Breath sounds clear to auscultation     Abdominal    Neurological - normal exam         Other findings: L humerus fracture            Anesthesia Plan    ASA 3   ASA physical status 3 criteria: hypertension - poorly controlled    Plan - general and peripheral nerve block     Peripheral nerve block will be post-op pain control  Airway plan will be ETT      Plan Factors:   Patient was not previously instructed to abstain from smoking on day of procedure.  Patient did not smoke on day of procedure.    Induction: intravenous    Postoperative Plan: Postoperative administration of opioids is intended.    Pertinent diagnostic labs and testing reviewed    Informed Consent:    Anesthetic plan and risks discussed with patient.    Use of blood products discussed with: patient whom consented to blood products.

## 2019-03-21 NOTE — PROGRESS NOTES
Received report from day shift nurse.   Assumed pt care.  Pt is A&Ox4, resting comfortably in bed.   Pt on r.a.. No signs of SOB/respiratory distress.   Labs noted, VSS.   Pt c/o pain to L shoulder; pt requested to take half dose of Dilaudid because the whole dose of 1mg made her nauseous  However, pt IV is damaged.   New IV to R-UA 20G inserted and pt tolerated well  Dilaudid given per MAR along with Zofran 4mg .   Assessment completed, sling to L shoulder, +CMS and able to move fingers.  Fall precautions in place.   Call light and personal belongings within reach.   Continue to monitor

## 2019-03-21 NOTE — PROGRESS NOTES
Surgical Progress Note    Author: Yoshi Gamble Date & Time created: 3/21/2019   7:01 AM     Interval Events:  C/o severe forearm pain  - no numbness/tingling  ROS  Hemodynamics:  Temp (24hrs), Av.6 °C (97.9 °F), Min:36.6 °C (97.9 °F), Max:36.7 °C (98 °F)  Temperature: 36.6 °C (97.9 °F)  Pulse  Av.3  Min: 59  Max: 81   Blood Pressure : 129/90     Respiratory:    Respiration: 17, Pulse Oximetry: 97 %           Neuro:  GCS       Fluids:    Intake/Output Summary (Last 24 hours) at 19 0701  Last data filed at 19 1200   Gross per 24 hour   Intake              960 ml   Output                0 ml   Net              960 ml        Current Diet Order   Procedures   • Diet NPO     Physical Exam     Left  Upper Extremity: intact median, radial, and ulnar nerve sensation and motor: FPL, FDP, EPL, IO.  Finger pink and warm good CR and good radial and ulnar pulse   C/o sever pain over forearm       Labs:  No results found for this or any previous visit (from the past 24 hour(s)).  Medical Decision Making, by Problem:  Active Hospital Problems    Diagnosis   • Closed left humeral fracture [S42.302A]     Priority: High   • Left elbow pain [M25.522]   • Benign essential HTN [I10]     Plan:  Left shoulder proximal humerus fracture  - periprosthetic   C/o severe arm pain - will xray before OR for revision shoulder today     Quality Measures:  Quality-Core Measures    Discussed patient condition with Patient

## 2019-03-21 NOTE — PROGRESS NOTES
Dr. Gamble called to confirm about the surgery at 1430 tomorrow (03/21). Per MD, pt can drink until 0800, but not eating. Pt will remains strict NPO after 0800 for surgery.

## 2019-03-21 NOTE — ANESTHESIA PROCEDURE NOTES
Airway  Date/Time: 3/21/2019 4:07 PM  Performed by: NORMA LOVE  Authorized by: NORMA LOVE     Location:  OR  Urgency:  Elective  Difficult Airway: No    Indications for Airway Management:  Anesthesia  Spontaneous Ventilation: absent    Sedation Level:  Deep  Preoxygenated: Yes    Patient Position:  Sniffing  Mask Difficulty Assessment:  1 - vent by mask  Final Airway Type:  Endotracheal airway  Final Endotracheal Airway:  ETT  Cuffed: Yes    Technique Used for Successful ETT Placement:  Direct laryngoscopy  Insertion Site:  Oral  Blade Type:  Cici  Laryngoscope Blade/Videolaryngoscope Blade Size:  3  ETT Size (mm):  7.0  Measured from:  Teeth  ETT to Teeth (cm):  20  Placement Verified by: auscultation and capnometry    Cormack-Lehane Classification:  Grade IIa - partial view of glottis  Number of Attempts at Approach:  1

## 2019-03-22 ENCOUNTER — APPOINTMENT (OUTPATIENT)
Dept: RADIOLOGY | Facility: MEDICAL CENTER | Age: 79
DRG: 483 | End: 2019-03-22
Attending: ORTHOPAEDIC SURGERY
Payer: MEDICARE

## 2019-03-22 LAB
HCT VFR BLD AUTO: 30.2 % (ref 37–47)
HGB BLD-MCNC: 9.5 G/DL (ref 12–16)
INR PPP: 1.1 (ref 0.87–1.13)
PROTHROMBIN TIME: 14.1 SEC (ref 12–14.6)

## 2019-03-22 PROCEDURE — 700102 HCHG RX REV CODE 250 W/ 637 OVERRIDE(OP): Performed by: INTERNAL MEDICINE

## 2019-03-22 PROCEDURE — 99232 SBSQ HOSP IP/OBS MODERATE 35: CPT | Performed by: HOSPITALIST

## 2019-03-22 PROCEDURE — A9270 NON-COVERED ITEM OR SERVICE: HCPCS | Performed by: ORTHOPAEDIC SURGERY

## 2019-03-22 PROCEDURE — 700111 HCHG RX REV CODE 636 W/ 250 OVERRIDE (IP)

## 2019-03-22 PROCEDURE — 97535 SELF CARE MNGMENT TRAINING: CPT

## 2019-03-22 PROCEDURE — 700105 HCHG RX REV CODE 258: Performed by: ORTHOPAEDIC SURGERY

## 2019-03-22 PROCEDURE — 85610 PROTHROMBIN TIME: CPT

## 2019-03-22 PROCEDURE — 700102 HCHG RX REV CODE 250 W/ 637 OVERRIDE(OP): Performed by: ORTHOPAEDIC SURGERY

## 2019-03-22 PROCEDURE — 73030 X-RAY EXAM OF SHOULDER: CPT | Mod: LT

## 2019-03-22 PROCEDURE — 97165 OT EVAL LOW COMPLEX 30 MIN: CPT

## 2019-03-22 PROCEDURE — 36415 COLL VENOUS BLD VENIPUNCTURE: CPT

## 2019-03-22 PROCEDURE — 85018 HEMOGLOBIN: CPT

## 2019-03-22 PROCEDURE — 97162 PT EVAL MOD COMPLEX 30 MIN: CPT

## 2019-03-22 PROCEDURE — A9270 NON-COVERED ITEM OR SERVICE: HCPCS | Performed by: INTERNAL MEDICINE

## 2019-03-22 PROCEDURE — 73060 X-RAY EXAM OF HUMERUS: CPT | Mod: LT

## 2019-03-22 PROCEDURE — 700112 HCHG RX REV CODE 229: Performed by: ORTHOPAEDIC SURGERY

## 2019-03-22 PROCEDURE — 770001 HCHG ROOM/CARE - MED/SURG/GYN PRIV*

## 2019-03-22 PROCEDURE — 85014 HEMATOCRIT: CPT

## 2019-03-22 PROCEDURE — 94660 CPAP INITIATION&MGMT: CPT

## 2019-03-22 RX ORDER — SODIUM CHLORIDE 9 MG/ML
INJECTION, SOLUTION INTRAVENOUS CONTINUOUS
Status: ACTIVE | OUTPATIENT
Start: 2019-03-22 | End: 2019-03-22

## 2019-03-22 RX ORDER — CEFAZOLIN SODIUM IN 0.9 % NACL 2 G/100 ML
PLASTIC BAG, INJECTION (ML) INTRAVENOUS
Status: COMPLETED
Start: 2019-03-22 | End: 2019-03-22

## 2019-03-22 RX ADMIN — ACETAMINOPHEN 1000 MG: 500 TABLET, FILM COATED ORAL at 13:18

## 2019-03-22 RX ADMIN — ACETAMINOPHEN 1000 MG: 500 TABLET, FILM COATED ORAL at 05:52

## 2019-03-22 RX ADMIN — SODIUM CHLORIDE: 9 INJECTION, SOLUTION INTRAVENOUS at 01:46

## 2019-03-22 RX ADMIN — ASPIRIN 81 MG: 81 TABLET, COATED ORAL at 18:23

## 2019-03-22 RX ADMIN — IRBESARTAN 150 MG: 150 TABLET ORAL at 05:52

## 2019-03-22 RX ADMIN — STANDARDIZED SENNA CONCENTRATE AND DOCUSATE SODIUM 1 TABLET: 8.6; 5 TABLET, FILM COATED ORAL at 21:04

## 2019-03-22 RX ADMIN — DILTIAZEM HYDROCHLORIDE 120 MG: 120 CAPSULE, COATED, EXTENDED RELEASE ORAL at 21:02

## 2019-03-22 RX ADMIN — DOCUSATE SODIUM 100 MG: 100 CAPSULE, LIQUID FILLED ORAL at 05:52

## 2019-03-22 RX ADMIN — ASPIRIN 81 MG: 81 TABLET, COATED ORAL at 06:01

## 2019-03-22 RX ADMIN — CEFAZOLIN 2000 MG: 1 INJECTION, POWDER, FOR SOLUTION INTRAVENOUS at 05:54

## 2019-03-22 RX ADMIN — DOCUSATE SODIUM 100 MG: 100 CAPSULE, LIQUID FILLED ORAL at 18:23

## 2019-03-22 ASSESSMENT — COGNITIVE AND FUNCTIONAL STATUS - GENERAL
SUGGESTED CMS G CODE MODIFIER MOBILITY: CL
WALKING IN HOSPITAL ROOM: A LITTLE
CLIMB 3 TO 5 STEPS WITH RAILING: A LITTLE
MOVING FROM LYING ON BACK TO SITTING ON SIDE OF FLAT BED: A LITTLE
STANDING UP FROM CHAIR USING ARMS: A LITTLE
MOBILITY SCORE: 14
MOVING TO AND FROM BED TO CHAIR: UNABLE
TURNING FROM BACK TO SIDE WHILE IN FLAT BAD: UNABLE

## 2019-03-22 ASSESSMENT — GAIT ASSESSMENTS
DEVIATION: BRADYKINETIC
DISTANCE (FEET): 125
ASSISTIVE DEVICE: SINGLE POINT CANE;HAND HELD ASSIST
GAIT LEVEL OF ASSIST: CONTACT GUARD ASSIST

## 2019-03-22 ASSESSMENT — ENCOUNTER SYMPTOMS: FALLS: 1

## 2019-03-22 ASSESSMENT — ACTIVITIES OF DAILY LIVING (ADL): TOILETING: INDEPENDENT

## 2019-03-22 NOTE — THERAPY
"Pt is a 77 y/o female admitted with elbow pain following GLF last week. Found to have a L periprosthetic femur fx. Now s/p revision of reverse TSA and ORIF humeral fx. Pt very pleasant and motivated to work with PT, has a hx of CVA and reports owning all necessary DME. Pt presents to physical therapy with impairments in functional mobility, balance, gait and activity tolerance following surgery. Bed mobility not assessed this visit as pt preferred to remain in chair; however, anticipte pt will have considerable difficulty with bed mobility. Pt is most limited by NWB L UE and inability to use L UE for functional tasks. Pt will benefit from acute PT interventions to address present impairments.     Physical Therapy Evaluation completed.   Bed Mobility:     Transfers: Sit to Stand: Contact Guard Assist  Gait: Level Of Assist: Contact Guard Assist with Single Point Cane       Plan of Care: Will benefit from Physical Therapy 3 times per week  Discharge Recommendations: Equipment: Will Continue to Assess for Equipment Needs.   Post-acute therapy:  Pt reports she has good friends who are able to assist 24/7 as needed upon DC home. Functionally pt may be able to DC home should 24/7 support be in place, and they be able to provide at least Min A as needed during mobility tasks. If not, recommend post acute placement to optimize recovery and safety prior to DC home. PLEASE also refer to OT recommendations regarding post acute needs.        See \"Rehab Therapy-Acute\" Patient Summary Report for complete documentation.     "

## 2019-03-22 NOTE — OP REPORT
DATE OF SERVICE:  03/21/2019    PREOPERATIVE DIAGNOSIS:  Left periprosthetic humerus fracture around a reverse   total shoulder with unstable stem.    POSTOPERATIVE DIAGNOSIS:  Left periprosthetic humerus fracture around a   reverse total shoulder with unstable stem.    PROCEDURE:  Revision of left total shoulder arthroplasty of the humeral   component and open reduction and internal fixation of proximal humerus and   humeral shaft fracture, fixation of both the greater tuberosity and the   humeral shaft.    SURGEON:  Yoshi Gamble MD    ASSISTANT:  Carito Kauffman, certified first assist.    IMPLANTS USED:  A Revision humeral stem revised system with a 15 stem, 170   length 12 baseplate and 6 retentive poly insert.    ANESTHESIA:  General plus block.    ANESTHESIOLOGIST:  Prince Carlson MD    BLOOD LOSS:  250-300 mL    COMPLICATIONS:  No apparent.    DISPOSITION:  PACU.    CONDITION:  Stable.    INDICATIONS:  Patient is a 78-year-old female who had fallen roughly 2 weeks   earlier.  I did not think she had had a new injury, presented to urgent care   and then was told to follow up in our office, came to our office Monday of   this week, found to have an unstable humerus fracture.  I discussed with her   treatment options, she was in significant pain with a loose humeral component   due to the fracture of the proximal humerus and the shaft.  We discussed   revision humeral component of her revision total shoulder arthroplasty and   open reduction and internal fixation of the humerus.  The patient lives alone.    I discussed with her admission to the hospital.  She choose not to go the   hospital.  She did present to the hospital late at night on Tuesday the next   day due to poor pain control.  She was admitted, continued to have a normal   motor and sensory exam, was able to flex and extend the fingers pink, warm   with good capillary refill.  Discussed with her the risks, benefits, rationale   of revision  total shoulder and ORIF of the humerus.  Risks include but not   limited to infection, neurovascular injury, particularly neurapraxia,   paralysis or dysfunction of the axillary, radial, musculocutaneous nerve,   possible nonhealing of the fracture, possible instability, need for further   surgery, inability to return to pre-injury state, need for postoperative   physical therapy likely need for skilled nursing facility assistance and she   lives alone, complications with anesthesia, possible DVT, PE, cardiac arrest.    Informed consent was signed and placed in the chart.  All her questions were   answered.  No guarantees were implied or given.    TECHNIQUE:  Both patient and I agreed the correct upper extremity, left   shoulder was signed and marked in preoperative holding.  She was given 2 g IV   Ancef prophylaxis.  I consulted Dr. Prince Carlson, of anesthesia regarding   perioperative pain management.  He performed an interscalene block on the   patient in preoperative holding under sterile technique without complication.    Patient was then taken to the operative suite.  After adequate anesthesia,   she was placed in a beachchair position with prominences well padded.    Time-out was taken by all in room to identify the correct patient, limb, and   procedure.  Left upper extremity was sterilely prepped and draped in standard   fashion.  The old incision was used and then extended with a anterior lateral   approach to the humerus.  Findings were that of a highly comminuted proximal   humerus and humeral shaft fracture with a loose humeral component.  The   humeral component was removed.  The greater tuberosity was preserved.  There   was a high degree of comminution.  Care was taken during this to release soft   tissue just off the proximal humerus.  The axillary nerve was not involved in   the dissection.  The fracture fragments were cleared.  Trialing was done with   the Revive Tornier system, a 15 stem was  appropriate, 190 felt too difficult   to reduce, 170 was more appropriate to reduce, but required buildup, trialing   of 170 with a 12 baseplate and 6 poly showed reapproximation of tension on the   deltoid and a stable shoulder joint.  X-rays in the operating room showed   good high alignment and good overall placement of the stem.  This was a   Press-fit stem into the distal humerus.  Final component was assembled at the   back table.  It was placed and then attention was directed to the open   reduction and internal fixation of the humerus.  Blunt dissection was carried   out on the lateral aspect of the humerus.  The tissue was elevated and a cable   passer was placed under this tissue.  There was nothing under the cable when   it was passed.  It was passed around the large medial fracture fragment.  This   was done twice, one with fixation below the proximal humerus, one higher, and   after these 2 cables were tensioned, the fracture fragment was reduced around   the stem, could not be anatomic because of the stem size did not allow for   bone to bone direct opposition.  Another cable was passed around the greater   tuberosity and the calcar of the implant to reapproximate the greater   tuberosity down on to the shoulder of the implant.  Overall hemostasis was   observed.  There was no significant bleeding.  Following the case, the drain   was placed deep.  The wound was irrigated with dilute Betadine solution, 3   liters sterile saline and closed with 3-0 Vicryl and staples for the skin.    Silver based dressing was placed on the wound.  Patient was transferred to   recovery in stable condition.  Counts were correct.  No apparent   complications.  She had 2+ radial and ulnar pulses, fingers pink, warm with   brisk capillary refill on recovery.    Carito Kauffman, certified first assist, was essential for successful   completion of the case.  It could not have been done without her.        ____________________________________     MD FRAN Nation / TAB    DD:  03/21/2019 19:09:00  DT:  03/21/2019 20:32:08    D#:  [id number]  Job#:  [id number]

## 2019-03-22 NOTE — OR SURGEON
Immediate Post OP Note    PreOp Diagnosis: left periprosthetic humerus fracture with reverse total shoulder     PostOp Diagnosis: same    Procedure(s):  ARTHROPLASTY, SHOULDER, TOTAL-  REVISION REVERSE - Wound Class: Clean  ORIF, FRACTURE, HUMERUS - Wound Class: Clean    Surgeon(s):  Yoshi Gamble M.D.    Anesthesiologist/Type of Anesthesia:  Anesthesiologist: Prince Carlson M.D.  Anesthesia Technician: Rodolfo Osborn/General    Surgical Staff:  Circulator: Salima Knutson R.N.  Relief Circulator: Melanie Tse R.N.  Scrub Person: Celso Mccloud  First Assist: Carito Kauffman [facility]  Radiology Technologist: Reji Quinn    Specimens removed if any:  * No specimens in log *    Estimated Blood Loss: 300cc    Findings: left humerus fracture, unstable humeral stem     Complications: no apparent         3/21/2019 6:49 PM Yoshi Gamble M.D.

## 2019-03-22 NOTE — ANESTHESIA QCDR
2019 [facility] Clinical Data Registry (for Quality Improvement)     Postoperative nausea/vomiting risk protocol (Adult = 18 yrs and Pediatric 3-17 yrs)- (430 and 463)  General inhalation anesthetic (NOT TIVA) with PONV risk factors: Yes  Provision of anti-emetic therapy with at least 2 different classes of agents: Yes   Patient DID NOT receive anti-emetic therapy and reason is documented in Medical Record:  N/A    Multimodal Pain Management- (AQI59)  Patient undergoing Elective Surgery (i.e. Outpatient, or ASC, or Prescheduled Surgery prior to Hospital Admission): Yes  Use of Multimodal Pain Management, two or more drugs and/or interventions, NOT including systemic opioids: Yes   Exception: Documented allergy to multiple classes of analgesics:  N/A    PACU assessment of acute postoperative pain prior to Anesthesia Care End- Applies to Patients Age = 18- (ABG7)  Initial PACU pain score is which of the following: < 7/10  Patient unable to report pain score: N/A    Post-anesthetic transfer of care checklist/protocol to PACU/ICU- (426 and 427)  Upon conclusion of case, patient transferred to which of the following locations: PACU/Non-ICU  Use of transfer checklist/protocol:   Exclusion: Service Performed in Patient Hospital Room (and thus did not require transfer):     PACU Reintubation- (AQI31)  General anesthesia requiring endotracheal intubation (ETT) along with subsequent extubation in OR or PACU: Yes  Required reintubation in the PACU: No   Extubation was a planned trial documented in the medical record prior to removal of the original airway device:  N/A    Unplanned admission to ICU related to anesthesia service up through end of PACU care- (MD51)  Unplanned admission to ICU (not initially anticipated at anesthesia start time): No

## 2019-03-22 NOTE — FLOWSHEET NOTE
03/22/19 0100   CPAP/BiPAP [employer]   Nocturnal CPAP or BiPAP CPAP   #System Evaluation Yes   Home Unit Used? Yes   Equipment Inspected for Cleanliness, Operation, and Safety? Yes   Settings (If Known) 15   Pt is currently not wearing CPAP/BiPap unit Yes   Home Mask Used? Yes

## 2019-03-22 NOTE — PROGRESS NOTES
"[facility] Medicine Daily Progress Note    Date of Service  3/22/2019    Chief Complaint  78 y.o. female admitted 3/19/2019 with left arm pain.    Hospital Course    78 y.o. female who presented 3/19/2019 with left elbow pain.  Patient did fall last Friday, had an x-ray last Wednesday, noted a left humerus fracture, near her previous hardware.  Patient did see orthopedic surgery, there was a plan for surgery on Thursday.  Patient was told if her pain worsen she needed to present to the emergency department.  She states her pain has been uncontrollable and continued to get worse.  Patient states is located on her left elbow, is severe, when I asked her to describe what kind of pain she said \"lots\".  Patient has received morphine and is a little confused because of this.  Patient denied any new fall.      Interval Problem Update  Pain controlled, anticipating surgery today    Consultants/Specialty  Orthopedic surgery.    Code Status  Full    Disposition  Pt and ot post surgery; may need transitional care prior to going home.    Review of Systems  Review of Systems   Musculoskeletal: Positive for falls and joint pain.   All other systems reviewed and are negative.       Physical Exam  Temp:  [36.1 °C (97 °F)-37.4 °C (99.3 °F)] 36.8 °C (98.3 °F)  Pulse:  [] 92  Resp:  [12-20] 18  BP: (114-126)/() 114/58  SpO2:  [92 %-99 %] 92 %    Physical Exam   Constitutional: She is oriented to person, place, and time. She appears well-developed and well-nourished. No distress.   HENT:   Head: Normocephalic and atraumatic.   Right Ear: External ear normal.   Left Ear: External ear normal.   Nose: Nose normal.   Eyes: Conjunctivae are normal. Right eye exhibits no discharge. Left eye exhibits no discharge. No scleral icterus.   Neck: No JVD present. No tracheal deviation present.   Cardiovascular: Normal rate and regular rhythm.    Pulmonary/Chest: Effort normal. No stridor. No respiratory distress.   Abdominal: Soft. She " exhibits no distension.   Musculoskeletal: She exhibits edema, tenderness and deformity.   Left arm in immobilizer, drain in place, erythema and edema of the upper arm consistent with recent surgery   Neurological: She is alert and oriented to person, place, and time.   Skin: Skin is warm and dry. She is not diaphoretic.   Psychiatric: She has a normal mood and affect. Her behavior is normal. Judgment and thought content normal.   Nursing note and vitals reviewed.      Fluids    Intake/Output Summary (Last 24 hours) at 03/22/19 1653  Last data filed at 03/22/19 1240   Gross per 24 hour   Intake             3080 ml   Output             1510 ml   Net             1570 ml       Laboratory  Recent Labs      03/19/19   1813  03/20/19   0452  03/21/19   1927  03/22/19   0522   WBC  6.8  5.1   --    --    RBC  4.42  3.97*   --    --    HEMOGLOBIN  12.9  11.7*  11.0*  9.5*   HEMATOCRIT  41.3  37.3  35.5*  30.2*   MCV  93.4  94.0   --    --    MCH  29.2  29.5   --    --    MCHC  31.2*  31.4*   --    --    RDW  48.0  49.2   --    --    PLATELETCT  268  225   --    --    MPV  11.5  11.9   --    --      Recent Labs      03/19/19 1813 03/20/19   0452   SODIUM  138  139   POTASSIUM  3.9  3.8   CHLORIDE  104  108   CO2  25  24   GLUCOSE  96  102*   BUN  30*  29*   CREATININE  0.92  0.96   CALCIUM  9.5  9.3     Recent Labs      03/19/19 1813 03/22/19   0522   INR  1.00  1.10               Imaging  DX-HUMERUS 2+ LEFT   Final Result   Addendum 1 of 1   Addendum:   The technique for this study was 2 views of the humerus.      Final      Revision reverse arthroplasty with previously noted comminuted periprosthetic fracture.            DX-SHOULDER 2+ LEFT   Final Result      Revision reverse arthroplasty with previously noted comminuted periprosthetic fracture.            DX-PORTABLE FLUORO > 1 HOUR   Final Result      Portable fluoroscopy utilized for 6.4 seconds.         INTERPRETING LOCATION: [address] [place] [address], [facility]       DX-HUMERUS 2+ LEFT   Final Result         1.  Linear bony fragment avulsed from the left proximal humerus.         2. The remainder of the left humeral arthroplasty is intact.      DX-FOREARM LEFT   Final Result      1.  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      2.  Multifocal degenerative change of the intercarpal, radiocarpal and first carpometacarpal joints.      3.  Overlying splint material.      DX-CHEST-PORTABLE (1 VIEW)   Final Result         1.  No focal infiltrates.   2.  Cardiomegaly   3.  Perihilar interstitial prominence and bronchial wall cuffing, appearance suggests changes of underlying bronchial inflammation, consider bronchitis.           Assessment/Plan  * Left elbow pain- (present on admission)   Assessment & Plan    Improved.     Closed left humeral fracture- (present on admission)   Assessment & Plan     fracture near the previous hardware, known fracture  -Post fall  Surgery 3/21/2019  Tylenol 1000 mg scheduled three times daily, patient avoiding narcotics due to nausea and vomiting  PT and OT  Discussed with patient and  that patient may not go home without 24-hour caregiving.  She has a friend that came to the bedside that stated she would stay with the patient 24 hours/day.  Again reiterated to patient that it would be best for her to go to skilled nursing post acute care.     Benign essential HTN- (present on admission)   Assessment & Plan    Continue to be stable and well controlled  -Continue home irbesartan and diltiazem            VTE prophylaxis: SCD

## 2019-03-22 NOTE — PROGRESS NOTES
2 RN skin assessment done; skin not WDL. See Wound flowsheet.Left shoulder Aquacell dressing x 2  With Heamovac. Left arm with sling / immobilizer.

## 2019-03-22 NOTE — DISCHARGE PLANNING
Anticipated Discharge Disposition: TBD    Action: Patient discussed in afternoon rounds.  SW informed by MD patient needs 24/7 care or SNF placement.  Per patient she has two caregivers that are coming in to receive training on how to care for her.     Barriers to Discharge: Medical and Plan    Plan: SW to stay available to assist as needed.

## 2019-03-22 NOTE — THERAPY
"Occupational Therapy Evaluation completed.   Functional Status:  Admitted with L elbow pain following GLF; dx of L periprosthetic humerus fracture with reverse TSA. S/p L reverse revision of TSA, ORIF L humerus fracture. NWB LUE. Immobilizer to be worn at all times except when bathing, dressing or performing pendulums. Pt has decreased sensation of LUE, thus pendulums deferred until sensation is intact. Pt is familiar to brace; L Reverse TSA ~3 yrs ago. Repostioned arm in brace for improved positioning/comfort. Tolerates AROM L hand, AAROM wrist, PROM elbow. Gown change with ModA. Performs ADL tranfser with CGA, HHA. Walks to BR, sink, chair with HHA/CGA. Toileting with SBA; clothing management with CGA. Grooming with Etelvina. Tolerates UIC post session.        Plan of Care: Will benefit from Occupational Therapy 3 times per week  Discharge Recommendations:  Equipment: Will Continue to Assess for Equipment Needs.  Discharge to a transitional care facility for continued skilled therapy services. Pt lives alone and states she has multiple friends and neighbors available to assist but pt is a fall risk thus recommend 24/7 supervision at this time.       See \"Rehab Therapy-Acute\" Patient Summary Report for complete documentation.    "

## 2019-03-22 NOTE — FLOWSHEET NOTE
03/21/19 2041   CPAP/BiPAP [employer]   Nocturnal CPAP or BiPAP CPAP   #System Evaluation Yes   Home Unit Used? Yes   Equipment Inspected for Cleanliness, Operation, and Safety? Yes   Settings (If Known) 15   Pt is currently not wearing CPAP/BiPap unit Yes   Home Mask Used? Yes

## 2019-03-22 NOTE — ANESTHESIA POSTPROCEDURE EVALUATION
Patient: Irina Traylor    Procedure Summary     Date:  03/21/19 Room / Location:   OR  / SURGERY [facility]    Anesthesia Start:  1557 Anesthesia Stop:  1837    Procedures:       ARTHROPLASTY, SHOULDER, TOTAL-  REVISION REVERSE (Left Shoulder)      ORIF, FRACTURE, HUMERUS (Left Shoulder) Diagnosis:  (PERIPROSTHETIC FRACTUE AROUND INTERNAL PROSTHETIS LEFT SHOULDER JOINT, INITIAL ENCOUNTER)    Surgeon:  Yoshi Gamble M.D. Responsible Provider:  Prince Carlson M.D.    Anesthesia Type:  general, peripheral nerve block ASA Status:  3          Final Anesthesia Type: general, peripheral nerve block  Last vitals  BP   Blood Pressure : 115/78, NIBP: 119/72    Temp   36.9 °C (98.4 °F)    Pulse   Pulse: 95, Heart Rate (Monitored): 90   Resp   20    SpO2   98 %      Anesthesia Post Evaluation    Patient location during evaluation: PACU  Patient participation: complete - patient participated  Level of consciousness: awake and alert  Pain score: 0    Airway patency: patent  Anesthetic complications: no  Cardiovascular status: hemodynamically stable  Respiratory status: acceptable  Hydration status: euvolemic    PONV: none           Nurse Pain Score: 0 (NPRS)

## 2019-03-22 NOTE — PROGRESS NOTES
Pt came  In from PACU dept, alert , awake and conversant. POC discussed- safety( use of call light), pain mgm't. , medication and morning labs/ pt verbalized understanding. Will continue to monitor.

## 2019-03-22 NOTE — ANESTHESIA TIME REPORT
Anesthesia Start and Stop Event Times     Date Time Event    3/21/2019 1557 Anesthesia Start     1837 Anesthesia Stop        Responsible Staff  03/21/19    Name Role Begin End    Prince Carlson M.D. Anesth 1557 1837        Preop Diagnosis (Free Text):  Pre-op Diagnosis     PERIPROSTHETIC FRACTUE AROUND INTERNAL PROSTHETIS LEFT SHOULDER JOINT, INITIAL ENCOUNTER        Preop Diagnosis (Codes):  Diagnosis Information     Diagnosis Code(s):         Post op Diagnosis  Periprosthetic fracture around internal prosthetic shoulder joint      Premium Reason  B. 1st Call    Comments:

## 2019-03-22 NOTE — PROGRESS NOTES
Patient wants a . Talked to [facility] who stated they would send a  as soon as possible.

## 2019-03-22 NOTE — PROGRESS NOTES
Received report from NOC RN; assumed pt care. Pt A&Ox4, states 3/10 pain to LUE. Immobilizer in place. Silver dressing noted to LUE. Pt able to wiggle fingers. Plan for pt to work with PT/OT. Pt notified to call for assistance.

## 2019-03-22 NOTE — PROGRESS NOTES
1835 - Pt arrived to PACU via gurney/bed.  Drowsy.  Follows simple commands.  Report received from anesthesia & OR RN.  Natural airway patent.  Lung sounds clear.  Respirations spontaneous.  MASTER CDI.  LUE pink, warm, cap refill < 3 seconds, 2+ radial pulses.  SCD's applied.  No S&S of pain/distress at this time.     1840 - Denies pain, n/v at this time.  Ice pack placed.     1912 - Dr Gamble at bedside.  STAT H&H ordered.      1926 - Lab here for STAT post op H&H.    1931 - Results of H&H received.  Dr Gamble notified of results.  Pt OK to go to room when PACU criteria met.  H&H scheduled for AM.    1935 - Pt noted to have occasional PVCs.  Denies PC, SOB, lightheadedness.  Dr Harrington, notified.  No new orders at this time.  Pt OK to be discharged to room when PACU & STOPBANG criteria met.    1940 - Pt continues to deny pain & n/v. DRSROBB CDI.  LUE pink, warm, cap refill < 3 seconds, 2+radial pulse, able to wiggle fingers and thumb.  Scant sanguinous drainage noted to hemovac.    1941 - Report given to CHRIS Jenkins    1948 - Pt transported to room 218 w RN without incident.

## 2019-03-22 NOTE — FLOWSHEET NOTE
03/21/19 2039   Protocol Pathways   Protocol Pathways Yes   Incentive Spirometer Instruct   Predicted (ml) 1450   60% (ml) 870   40% (ml) 580   Actual (ml) 1500

## 2019-03-23 PROCEDURE — 99232 SBSQ HOSP IP/OBS MODERATE 35: CPT | Performed by: HOSPITALIST

## 2019-03-23 PROCEDURE — 97530 THERAPEUTIC ACTIVITIES: CPT

## 2019-03-23 PROCEDURE — A9270 NON-COVERED ITEM OR SERVICE: HCPCS | Performed by: ORTHOPAEDIC SURGERY

## 2019-03-23 PROCEDURE — 770001 HCHG ROOM/CARE - MED/SURG/GYN PRIV*

## 2019-03-23 PROCEDURE — A9270 NON-COVERED ITEM OR SERVICE: HCPCS | Performed by: INTERNAL MEDICINE

## 2019-03-23 PROCEDURE — 700112 HCHG RX REV CODE 229: Performed by: ORTHOPAEDIC SURGERY

## 2019-03-23 PROCEDURE — 700102 HCHG RX REV CODE 250 W/ 637 OVERRIDE(OP): Performed by: INTERNAL MEDICINE

## 2019-03-23 PROCEDURE — 94760 N-INVAS EAR/PLS OXIMETRY 1: CPT

## 2019-03-23 PROCEDURE — 700102 HCHG RX REV CODE 250 W/ 637 OVERRIDE(OP): Performed by: ORTHOPAEDIC SURGERY

## 2019-03-23 PROCEDURE — 94660 CPAP INITIATION&MGMT: CPT

## 2019-03-23 PROCEDURE — 97535 SELF CARE MNGMENT TRAINING: CPT

## 2019-03-23 RX ADMIN — ACETAMINOPHEN 1000 MG: 500 TABLET, FILM COATED ORAL at 13:46

## 2019-03-23 RX ADMIN — DILTIAZEM HYDROCHLORIDE 240 MG: 120 CAPSULE, COATED, EXTENDED RELEASE ORAL at 22:47

## 2019-03-23 RX ADMIN — DOCUSATE SODIUM 100 MG: 100 CAPSULE, LIQUID FILLED ORAL at 18:43

## 2019-03-23 RX ADMIN — ASPIRIN 81 MG: 81 TABLET, COATED ORAL at 05:17

## 2019-03-23 RX ADMIN — DOCUSATE SODIUM 100 MG: 100 CAPSULE, LIQUID FILLED ORAL at 05:17

## 2019-03-23 RX ADMIN — ACETAMINOPHEN 1000 MG: 500 TABLET, FILM COATED ORAL at 05:16

## 2019-03-23 RX ADMIN — ASPIRIN 81 MG: 81 TABLET, COATED ORAL at 18:43

## 2019-03-23 RX ADMIN — ACETAMINOPHEN 1000 MG: 500 TABLET, FILM COATED ORAL at 22:47

## 2019-03-23 RX ADMIN — IRBESARTAN 150 MG: 150 TABLET ORAL at 05:17

## 2019-03-23 NOTE — PROGRESS NOTES
Pt has no bm x 3 days, miralax med for constipation offered- pt strongly refused( witness by assigned CNA.

## 2019-03-23 NOTE — THERAPY
"Occupational Therapy Treatment completed with focus on ADL transfers and patient education.  Functional Status:  Pt was standing at sink completing grooming with CNA. Pt required totalA to brush hair due to limited shoulder mobility in her RUE. Pt was able to complete sit to stand transfers with supervision. Pt declined participating in dressing ADLs and completing pendulum exercises during treatment session. Pt was educated on pendulum exercises by visual demonstration and with handout, pt reported that she had to do pendulum exercises during past surgeries and reports confidence in completing them. Pt was able to ambulate in hallway with 1 seated rest break for 120ft using 4WW, demonstrated following NWB precautions when pushing walker. Pt required maxA to open containers and remove saran wrap from food tray.  Plan of Care: Will benefit from Occupational Therapy 3 times per week  Discharge Recommendations:  Equipment Will Continue to Assess for Equipment Needs. Post-acute therapy Discharge to home with outpatient or home health for additional skilled therapy services. Pt is not safe to return home alone at this time, she is unable to complete ADLs  (UB dressing, bathing, donning/doffing sling, and eating) independently at this time while maintaining post-op precautions. Pt reports neighbor will be able to provide 24/7 care and is planning to have home health come in to help her. If neighbor or other service is unable to provide 24/7 care, pt is not safe to return home and recommend SNF.     See \"Rehab Therapy-Acute\" Patient Summary Report for complete documentation.   "

## 2019-03-23 NOTE — PROGRESS NOTES
Received shift report and assumed care of patient.  Patient awake, calm and stable, currently positioned in recliner for comfort and safety; call light within reach.  Denies pain or discomfort at this time.  Will continue to monitor.

## 2019-03-23 NOTE — PROGRESS NOTES
Pts haemovac pulled out accidentally (with intact tubing and tip) upon ambulation to BR. Haemovac site intact w/o active bleeding.Serosanguanous drain output 30 ml.

## 2019-03-23 NOTE — FLOWSHEET NOTE
jony used her home CPAP unit last night.     03/23/19 0730   Incentive Spirometry Group   Incentive Spirometry Instruction Yes   Incentive Spirometer Volume 1500 mL   Chest Exam   Respiration 18   Pulse 84   Breath Sounds   RUL Breath Sounds Clear   RML Breath Sounds Clear   RLL Breath Sounds Clear   LAI Breath Sounds Clear   LLL Breath Sounds Clear   Oxygen   Home O2 Use Prior To Admission? No   Pulse Oximetry 94 %   O2 (LPM) 0   O2 Daily Delivery Respiratory  Room Air with O2 Available

## 2019-03-23 NOTE — CARE PLAN
Problem: Safety  Goal: Will remain free from falls  Outcome: PROGRESSING AS EXPECTED  PT WILL BE FREE FROM INJURY, INSTRUCTION FOR USE OF CALL/BR CALL LIGHT GIVEN.Bed alarm on.    Problem: Knowledge Deficit  Goal: Knowledge of disease process/condition, treatment plan, diagnostic tests, and medications will improve  Outcome: PROGRESSING AS EXPECTED  PT AND FAMILY MEMBER  UPDATED ON PTS PLAN OF CARE,NURSES COMMUNICATIONS WITH DOCTORS.

## 2019-03-23 NOTE — PROGRESS NOTES
Surgical Progress Note    Author: Yoshi Gamble Date & Time created: 3/22/2019   9:08 PM     Interval Events:  Doing well pain much improved   ROS  Hemodynamics:  Temp (24hrs), Av.7 °C (98 °F), Min:36.4 °C (97.6 °F), Max:36.8 °C (98.3 °F)  Temperature: 36.8 °C (98.2 °F)  Pulse  Av.4  Min: 59  Max: 102   Blood Pressure : 116/65     Respiratory:    Respiration: 20, Pulse Oximetry: 95 %           Neuro:  GCS       Fluids:    Intake/Output Summary (Last 24 hours) at 19  Last data filed at 19 1710   Gross per 24 hour   Intake             1560 ml   Output             1540 ml   Net               20 ml        Current Diet Order   Procedures   • Diet Order Regular     Physical Exam       Upper Extremity: intact median, radial, and ulnar nerve sensation and motor: FPL, FDP, EPL, IO.  Finger pink and warm good CR and good radial and ulnar pulse       Labs:  Recent Results (from the past 24 hour(s))   Prothrombin Time    Collection Time: 19  5:22 AM   Result Value Ref Range    PT 14.1 12.0 - 14.6 sec    INR 1.10 0.87 - 1.13   Hemoglobin and Hematocrit    Collection Time: 19  5:22 AM   Result Value Ref Range    Hemoglobin 9.5 (L) 12.0 - 16.0 g/dL    Hematocrit 30.2 (L) 37.0 - 47.0 %     Medical Decision Making, by Problem:  Active Hospital Problems    Diagnosis   • Closed left humeral fracture [S42.302A]     Priority: High   • Left elbow pain [M25.522]   • Benign essential HTN [I10]     Plan:  Left revision reverse total shoulder for jana-prosthetic fracture  - ORIF humerus   DOIng well   NWB   Patient reports she is doing well and wants to go home with home health - will need to be arranged by discharge planning/ and clear safety with PT   I have concerns - if she is not cleared she will need to be transferred to a SNF     Quality Measures:  Quality-Core Measures    Discussed patient condition with Patient

## 2019-03-23 NOTE — THERAPY
PT tx attempted. Pt presented resting in bed. Declined to mobilize with PT as she is hopefull to take a nap after being up all morning. Briefly discussed how to perform pendulum exercises and PT demonstrated. Pt observed ambulating with OT using 4WW and R UE. Demo'ed steady gait with 4WW and feels more comfortable using it than SPC. PT will continue to follow while in house. Please see OT DC recommendations as OT needs are greater than PT needs. DC recommendations from PT Evaluation remain appropriate    Vanessa Calabrese, PT, DPT

## 2019-03-23 NOTE — DISCHARGE PLANNING
JULIETA spoke to pt, she provided me with her neighbor/friend's contact info, Jazmin #[phone].  JULIETA spoke to Jazmin, she is able to provide the 24/7 support for the pt while she recovers at home.  Jazmin will come in tomorrow at 1330 tomorrow to be trained by staff.

## 2019-03-23 NOTE — PROGRESS NOTES
"[facility] Medicine Daily Progress Note    Date of Service  3/23/2019    Chief Complaint  78 y.o. female admitted 3/19/2019 with left arm pain.    Hospital Course    78 y.o. female who presented 3/19/2019 with left elbow pain.  Patient did fall last Friday, had an x-ray last Wednesday, noted a left humerus fracture, near her previous hardware.  Patient did see orthopedic surgery, there was a plan for surgery on Thursday.  Patient was told if her pain worsen she needed to present to the emergency department.  She states her pain has been uncontrollable and continued to get worse.  Patient states is located on her left elbow, is severe, when I asked her to describe what kind of pain she said \"lots\".  Patient has received morphine and is a little confused because of this.  Patient denied any new fall.      Interval Problem Update  States pain is well controlled. Up walking with nursing staff and getting around with the help of a cane but her gait is quite unsteady and stand by assistance is needed to make sure she doesn't fall.    Consultants/Specialty  Orthopedic surgery.    Code Status  Full    Disposition  Pt and ot post surgery; may need transitional care prior to going home.    Review of Systems  Review of Systems   Musculoskeletal: Positive for joint pain.   All other systems reviewed and are negative.       Physical Exam  Temp:  [36.6 °C (97.8 °F)-36.9 °C (98.4 °F)] 36.9 °C (98.4 °F)  Pulse:  [82-96] 82  Resp:  [18-20] 18  BP: (102-141)/(59-81) 116/60  SpO2:  [92 %-95 %] 95 %    Physical Exam   Constitutional: She is oriented to person, place, and time. She appears well-developed and well-nourished. No distress.   HENT:   Head: Normocephalic and atraumatic.   Right Ear: External ear normal.   Left Ear: External ear normal.   Nose: Nose normal.   Eyes: Conjunctivae are normal. Right eye exhibits no discharge. Left eye exhibits no discharge. No scleral icterus.   Neck: No JVD present. No tracheal deviation present. "   Cardiovascular: Normal rate and regular rhythm.    Pulmonary/Chest: Effort normal. No stridor. No respiratory distress.   Abdominal: Soft. She exhibits no distension.   Musculoskeletal: She exhibits edema, tenderness and deformity.   Left arm in sling immobilizer  Bruising upper arm   Neurological: She is alert and oriented to person, place, and time.   Skin: Skin is warm and dry. She is not diaphoretic.   Psychiatric: She has a normal mood and affect. Her behavior is normal. Judgment and thought content normal.   Nursing note and vitals reviewed.      Fluids    Intake/Output Summary (Last 24 hours) at 03/23/19 1553  Last data filed at 03/23/19 0500   Gross per 24 hour   Intake             1280 ml   Output             3090 ml   Net            -1810 ml       Laboratory  Recent Labs      03/21/19   1927  03/22/19   0522   HEMOGLOBIN  11.0*  9.5*   HEMATOCRIT  35.5*  30.2*         Recent Labs      03/22/19   0522   INR  1.10               Imaging  DX-HUMERUS 2+ LEFT   Final Result   Addendum 1 of 1   Addendum:   The technique for this study was 2 views of the humerus.      Final      Revision reverse arthroplasty with previously noted comminuted periprosthetic fracture.            DX-SHOULDER 2+ LEFT   Final Result      Revision reverse arthroplasty with previously noted comminuted periprosthetic fracture.            DX-PORTABLE FLUORO > 1 HOUR   Final Result      Portable fluoroscopy utilized for 6.4 seconds.         INTERPRETING LOCATION: [address] [place] [address], [id number]      DX-HUMERUS 2+ LEFT   Final Result         1.  Linear bony fragment avulsed from the left proximal humerus.         2. The remainder of the left humeral arthroplasty is intact.      DX-FOREARM LEFT   Final Result      1.  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      2.  Multifocal degenerative change of the intercarpal, radiocarpal and first carpometacarpal joints.      3.  Overlying splint material.       DX-CHEST-PORTABLE (1 VIEW)   Final Result         1.  No focal infiltrates.   2.  Cardiomegaly   3.  Perihilar interstitial prominence and bronchial wall cuffing, appearance suggests changes of underlying bronchial inflammation, consider bronchitis.           Assessment/Plan  * Left elbow pain- (present on admission)   Assessment & Plan    Improved.     Closed left humeral fracture- (present on admission)   Assessment & Plan     fracture near the previous hardware, known fracture  -Post fall  Surgery 3/21/2019  Tylenol 1000 mg scheduled three times daily, patient avoiding narcotics due to nausea and vomiting  PT and OT  Discussed with patient and  that patient may not go home without 24-hour caregiving. There is a friend who is coming to the bedside tomorrow at 1330 hours to discuss plans for going home.  will meet with the friend to determine if they are willing and able to help her out in this fashion.     Benign essential HTN- (present on admission)   Assessment & Plan    Continue to be stable and well controlled  -Continue home irbesartan and diltiazem            VTE prophylaxis: SCD

## 2019-03-24 ENCOUNTER — PATIENT OUTREACH (OUTPATIENT)
Dept: HEALTH INFORMATION MANAGEMENT | Facility: OTHER | Age: 79
End: 2019-03-24

## 2019-03-24 ENCOUNTER — HOME HEALTH ADMISSION (OUTPATIENT)
Dept: HOME HEALTH SERVICES | Facility: HOME HEALTHCARE | Age: 79
End: 2019-03-24
Payer: MEDICARE

## 2019-03-24 PROCEDURE — 97116 GAIT TRAINING THERAPY: CPT

## 2019-03-24 PROCEDURE — 97530 THERAPEUTIC ACTIVITIES: CPT

## 2019-03-24 PROCEDURE — 99232 SBSQ HOSP IP/OBS MODERATE 35: CPT | Performed by: HOSPITALIST

## 2019-03-24 PROCEDURE — 700102 HCHG RX REV CODE 250 W/ 637 OVERRIDE(OP): Performed by: INTERNAL MEDICINE

## 2019-03-24 PROCEDURE — 700102 HCHG RX REV CODE 250 W/ 637 OVERRIDE(OP): Performed by: ORTHOPAEDIC SURGERY

## 2019-03-24 PROCEDURE — A9270 NON-COVERED ITEM OR SERVICE: HCPCS | Performed by: INTERNAL MEDICINE

## 2019-03-24 PROCEDURE — 94760 N-INVAS EAR/PLS OXIMETRY 1: CPT

## 2019-03-24 PROCEDURE — A9270 NON-COVERED ITEM OR SERVICE: HCPCS | Performed by: ORTHOPAEDIC SURGERY

## 2019-03-24 PROCEDURE — 94660 CPAP INITIATION&MGMT: CPT

## 2019-03-24 PROCEDURE — 770001 HCHG ROOM/CARE - MED/SURG/GYN PRIV*

## 2019-03-24 PROCEDURE — 700112 HCHG RX REV CODE 229: Performed by: ORTHOPAEDIC SURGERY

## 2019-03-24 PROCEDURE — 700111 HCHG RX REV CODE 636 W/ 250 OVERRIDE (IP): Performed by: INTERNAL MEDICINE

## 2019-03-24 RX ADMIN — STANDARDIZED SENNA CONCENTRATE AND DOCUSATE SODIUM 1 TABLET: 8.6; 5 TABLET, FILM COATED ORAL at 21:17

## 2019-03-24 RX ADMIN — ACETAMINOPHEN 1000 MG: 500 TABLET, FILM COATED ORAL at 13:09

## 2019-03-24 RX ADMIN — OXYCODONE HYDROCHLORIDE 10 MG: 5 TABLET ORAL at 13:09

## 2019-03-24 RX ADMIN — ASPIRIN 81 MG: 81 TABLET, COATED ORAL at 18:11

## 2019-03-24 RX ADMIN — DILTIAZEM HYDROCHLORIDE 240 MG: 120 CAPSULE, COATED, EXTENDED RELEASE ORAL at 21:13

## 2019-03-24 RX ADMIN — ASPIRIN 81 MG: 81 TABLET, COATED ORAL at 06:50

## 2019-03-24 RX ADMIN — IRBESARTAN 150 MG: 150 TABLET ORAL at 06:50

## 2019-03-24 RX ADMIN — DOCUSATE SODIUM 100 MG: 100 CAPSULE, LIQUID FILLED ORAL at 18:00

## 2019-03-24 RX ADMIN — ONDANSETRON 4 MG: 4 TABLET, ORALLY DISINTEGRATING ORAL at 13:09

## 2019-03-24 RX ADMIN — ACETAMINOPHEN 1000 MG: 500 TABLET, FILM COATED ORAL at 21:17

## 2019-03-24 RX ADMIN — ACETAMINOPHEN 1000 MG: 500 TABLET, FILM COATED ORAL at 06:50

## 2019-03-24 ASSESSMENT — GAIT ASSESSMENTS
DEVIATION: BRADYKINETIC;SHUFFLED GAIT
ASSISTIVE DEVICE: 4 WHEEL WALKER
GAIT LEVEL OF ASSIST: CONTACT GUARD ASSIST
DISTANCE (FEET): 125

## 2019-03-24 NOTE — FLOWSHEET NOTE
03/24/19 0000   Events/Summary/Plan   Events/Summary/Plan Patient c/o discomfort with cpap and would like to wear nasal cannula    General Vent Information   Pulse Oximetry 95 %   CPAP/BiPAP [employer]   Nocturnal CPAP or BiPAP CPAP   FiO2 or LPM 2   Pt is currently not wearing CPAP/BiPap unit Yes

## 2019-03-24 NOTE — DISCHARGE PLANNING
ATTN: Case Management  RE: Referral for Home Health    As of 3/24/19, we have accepted the Home Health referral for the patient listed above.    A [facility] clinician will be out to see the patient within 48 hours. If you have any questions or concerns regarding the patient’s transition to Home Health, please do not hesitate to contact us at [phone].      We look forward to collaborating with you,  [facility] Team

## 2019-03-24 NOTE — DISCHARGE PLANNING
Anticipated Discharge Disposition: d/c home w/ HH and 24/7 care by neighbor/friend    Action: Lsw met w/ pt who was very concerned LSW has been attempting to place her in a  recently.    Lsw explained HH order and services. LSw requested completion of HH choice. Pt states her neighbor can be w/ her 24/7.    Lsw faxed completed choice for [facility] to [facility].     Barriers to Discharge: acceptance to HH, training for neighbor/friend today at 130 PM by medical team and verification neighbor/friend will provide 24/7 care for pt at home     Plan: f/u w/ medical team, [facility]

## 2019-03-24 NOTE — FACE TO FACE
Face to Face Supporting Documentation - Home Health    The encounter with this patient was in whole or in part the primary reason for home health admission.    Date of encounter:   Patient:                    MRN:                       YOB: 2017  Irina Traylor  [id number]  1940     Home health to see patient for:  Skilled Nursing care for assessment, interventions & education, Physical Therapy evaluation and treatment and Occupational therapy evaluation and treatment    Skilled need for:  Surgical Aftercare left shoulder humerus repair    Skilled nursing interventions to include:  Comment: PT, OT, skillled nursing    Homebound status evidenced by:  Needs the assistance of another person in order to leave the home. Leaving home requires a considerable and taxing effort. There is a normal inability to leave the home.    Community Physician to provide follow up care: Chris Alston M.D.     Optional Interventions? No      I certify the face to face encounter for this home health care referral meets the CMS requirements and the encounter/clinical assessment with the patient was, in whole, or in part, for the medical condition(s) listed above, which is the primary reason for home health care. Based on my clinical findings: the service(s) are medically necessary, support the need for home health care, and the homebound criteria are met.  I certify that this patient has had a face to face encounter by myself.  Yaa An M.D. - NPI: [id number]

## 2019-03-24 NOTE — PROGRESS NOTES
"[facility] Medicine Daily Progress Note    Date of Service  3/24/2019    Chief Complaint  78 y.o. female admitted 3/19/2019 with left arm pain.    Hospital Course    78 y.o. female who presented 3/19/2019 with left elbow pain.  Patient did fall last Friday, had an x-ray last Wednesday, noted a left humerus fracture, near her previous hardware.  Patient did see orthopedic surgery, there was a plan for surgery on Thursday.  Patient was told if her pain worsen she needed to present to the emergency department.  She states her pain has been uncontrollable and continued to get worse.  Patient states is located on her left elbow, is severe, when I asked her to describe what kind of pain she said \"lots\".  Patient has received morphine and is a little confused because of this.  Patient denied any new fall.      Interval Problem Update  Pain well controlled, working with pt and ot.    Consultants/Specialty  Orthopedic surgery.    Code Status  Full    Disposition  Refusing skilled placement, I met with  today and every day and reinforce with patient every day that she MUST have someone with her 24 hours per day when she goes home, she is unable to dress, bathe and feed herself with the use of only one arm and was already falling at home and has weakness due to a prior stroke. Patient states she has friends that will help her at home. I insist that one or more friends who are going to be helping in her care come in to the hospital to confirm that they will indeed be providing 24 hour supervision to her while she has use of only one arm. Home health ordered and choice made today. Total time spent working on this issue today over 35 minutes; greater than 50% of time spent in counseling.    Review of Systems  Review of Systems   Musculoskeletal: Positive for joint pain.   All other systems reviewed and are negative.       Physical Exam  Temp:  [36.7 °C (98 °F)-36.9 °C (98.4 °F)] 36.9 °C (98.4 °F)  Pulse:  [62-91] " 62  Resp:  [16-20] 20  BP: ()/(49-73) 115/52  SpO2:  [93 %-97 %] 93 %    Physical Exam   Constitutional: She is oriented to person, place, and time. She appears well-developed and well-nourished. No distress.   HENT:   Head: Normocephalic and atraumatic.   Right Ear: External ear normal.   Left Ear: External ear normal.   Nose: Nose normal.   Eyes: Conjunctivae are normal. Right eye exhibits no discharge. Left eye exhibits no discharge. No scleral icterus.   Neck: No JVD present. No tracheal deviation present.   Cardiovascular: Normal rate and regular rhythm.    Pulmonary/Chest: Effort normal. No stridor. No respiratory distress.   Abdominal: Soft. She exhibits no distension.   Musculoskeletal: She exhibits edema, tenderness and deformity.   Left arm in sling immobilizer  Bruising upper arm   Neurological: She is alert and oriented to person, place, and time.   Skin: Skin is warm and dry. She is not diaphoretic.   Psychiatric: She has a normal mood and affect. Her behavior is normal. Judgment and thought content normal.   Nursing note and vitals reviewed.      Fluids    Intake/Output Summary (Last 24 hours) at 03/24/19 1513  Last data filed at 03/24/19 1200   Gross per 24 hour   Intake              720 ml   Output              700 ml   Net               20 ml       Laboratory  Recent Labs      03/21/19   1927  03/22/19   0522   HEMOGLOBIN  11.0*  9.5*   HEMATOCRIT  35.5*  30.2*         Recent Labs      03/22/19   0522   INR  1.10               Imaging  DX-HUMERUS 2+ LEFT   Final Result   Addendum 1 of 1   Addendum:   The technique for this study was 2 views of the humerus.      Final      Revision reverse arthroplasty with previously noted comminuted periprosthetic fracture.            DX-SHOULDER 2+ LEFT   Final Result      Revision reverse arthroplasty with previously noted comminuted periprosthetic fracture.            DX-PORTABLE FLUORO > 1 HOUR   Final Result      Portable fluoroscopy utilized for 6.4  seconds.         INTERPRETING LOCATION: [address] [place] [address] [place], [id number]      DX-HUMERUS 2+ LEFT   Final Result         1.  Linear bony fragment avulsed from the left proximal humerus.         2. The remainder of the left humeral arthroplasty is intact.      DX-FOREARM LEFT   Final Result      1.  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      2.  Multifocal degenerative change of the intercarpal, radiocarpal and first carpometacarpal joints.      3.  Overlying splint material.      DX-CHEST-PORTABLE (1 VIEW)   Final Result         1.  No focal infiltrates.   2.  Cardiomegaly   3.  Perihilar interstitial prominence and bronchial wall cuffing, appearance suggests changes of underlying bronchial inflammation, consider bronchitis.           Assessment/Plan  * Left elbow pain- (present on admission)   Assessment & Plan    Improved.     Closed left humeral fracture- (present on admission)   Assessment & Plan     fracture near the previous hardware, known fracture  -Post fall  Surgery 3/21/2019  Tylenol 1000 mg scheduled three times daily, patient avoiding narcotics due to nausea and vomiting  PT and OT  Reinforced with patient daily that she needs someone to be with her 24 hours per day, she is a very high repeat fall risk, cannot adequately perform ADLs with only having the use of one arm.     Benign essential HTN- (present on admission)   Assessment & Plan    Continue to be stable and well controlled  -Continue home irbesartan and diltiazem            VTE prophylaxis: SCD

## 2019-03-24 NOTE — THERAPY
"Physical Therapy treatment completed.   Bed Mobility:  Supine to Sit:  (NT, pt sitting up in chair)  Transfers: Sit to Stand: Contact Guard Assist  Gait: Level Of Assist: Contact Guard Assist with 4-Wheel Walker       Plan of Care: Will benefit from Physical Therapy 7 times per week  Discharge Recommendations: Equipment: Will Continue to Assess for Equipment Needs. Post-acute therapy : TBD, based on outcome of caregiver training with friend. Either home with HHPTvs SNF.    Pt able to ambulate using 4WW with R UE only safely as long as has CGA. Pt is also refusing to wear shoulder immobilizer and instead is wearing a simple sling. Attempted to place shoulder immobilizer on however pt states is too painful and is refusing to wear it. Simple sling applied and RN updated. Pt will need additional caregiver training tomorrow with friend who will be staying with her, both PT and OT will be available. Provided friend can provide assist 24/7 assist and is able to demo safety with pt when mobilizing then anticipate should be ok for DC home with HHPT. Otherwise recommend SNF.    See \"Rehab Therapy-Acute\" Patient Summary Report for complete documentation.     "

## 2019-03-24 NOTE — DISCHARGE PLANNING
Received Choice form at [phone]  Agency/Facility Name: [facility]  Referral sent per Choice form @ [phone]

## 2019-03-24 NOTE — CARE PLAN
Problem: Venous Thromboembolism (VTW)/Deep Vein Thrombosis (DVT) Prevention:  Goal: Patient will participate in Venous Thrombosis (VTE)/Deep Vein Thrombosis (DVT)Prevention Measures    Intervention: Ensure patient wears graduated elastic stockings (MEGAN hose) and/or SCDs, if ordered, when in bed or chair (Remove at least once per shift for skin check)  Encouraged use of SCDs, patient refused after risk for DVT teaching  Intervention: Encourage patient to perform ankle flex, foot rotation, and knee flex exercises in addition to other prophylatic measures every hour while awake  Encouraged exercises while in bed of ankle and foot rotation

## 2019-03-24 NOTE — FLOWSHEET NOTE
03/23/19 2014   Events/Summary/Plan   Events/Summary/Plan cpap s/u at bedside in use   General Vent Information   Pulse Oximetry 95 %   CPAP/BiPAP [employer]   Nocturnal CPAP or BiPAP CPAP   #System Evaluation Yes   Home Unit Used? Yes   Settings (If Known) 15   FiO2 or LPM 0   Home Mask Used? Yes   Chest Exam   Work Of Breathing / Effort Mild

## 2019-03-25 VITALS
RESPIRATION RATE: 16 BRPM | DIASTOLIC BLOOD PRESSURE: 58 MMHG | HEART RATE: 75 BPM | SYSTOLIC BLOOD PRESSURE: 101 MMHG | HEIGHT: 59 IN | WEIGHT: 158 LBS | TEMPERATURE: 98.1 F | OXYGEN SATURATION: 92 % | BODY MASS INDEX: 31.85 KG/M2

## 2019-03-25 PROCEDURE — A9270 NON-COVERED ITEM OR SERVICE: HCPCS | Performed by: ORTHOPAEDIC SURGERY

## 2019-03-25 PROCEDURE — 94760 N-INVAS EAR/PLS OXIMETRY 1: CPT

## 2019-03-25 PROCEDURE — 97112 NEUROMUSCULAR REEDUCATION: CPT

## 2019-03-25 PROCEDURE — 700102 HCHG RX REV CODE 250 W/ 637 OVERRIDE(OP): Performed by: INTERNAL MEDICINE

## 2019-03-25 PROCEDURE — 97530 THERAPEUTIC ACTIVITIES: CPT

## 2019-03-25 PROCEDURE — 97535 SELF CARE MNGMENT TRAINING: CPT

## 2019-03-25 PROCEDURE — 700102 HCHG RX REV CODE 250 W/ 637 OVERRIDE(OP): Performed by: ORTHOPAEDIC SURGERY

## 2019-03-25 PROCEDURE — 97116 GAIT TRAINING THERAPY: CPT

## 2019-03-25 PROCEDURE — 700112 HCHG RX REV CODE 229: Performed by: ORTHOPAEDIC SURGERY

## 2019-03-25 PROCEDURE — A9270 NON-COVERED ITEM OR SERVICE: HCPCS | Performed by: INTERNAL MEDICINE

## 2019-03-25 PROCEDURE — 99239 HOSP IP/OBS DSCHRG MGMT >30: CPT | Performed by: HOSPITALIST

## 2019-03-25 RX ADMIN — ASPIRIN 81 MG: 81 TABLET, COATED ORAL at 06:12

## 2019-03-25 RX ADMIN — ACETAMINOPHEN 1000 MG: 500 TABLET, FILM COATED ORAL at 06:12

## 2019-03-25 RX ADMIN — DOCUSATE SODIUM 100 MG: 100 CAPSULE, LIQUID FILLED ORAL at 06:12

## 2019-03-25 RX ADMIN — IRBESARTAN 150 MG: 150 TABLET ORAL at 06:12

## 2019-03-25 ASSESSMENT — GAIT ASSESSMENTS
ASSISTIVE DEVICE: 4 WHEEL WALKER
DEVIATION: BRADYKINETIC
GAIT LEVEL OF ASSIST: CONTACT GUARD ASSIST
DISTANCE (FEET): 100

## 2019-03-25 ASSESSMENT — PATIENT HEALTH QUESTIONNAIRE - PHQ9
2. FEELING DOWN, DEPRESSED, IRRITABLE, OR HOPELESS: NOT AT ALL
1. LITTLE INTEREST OR PLEASURE IN DOING THINGS: NOT AT ALL
SUM OF ALL RESPONSES TO PHQ9 QUESTIONS 1 AND 2: 0

## 2019-03-25 NOTE — FLOWSHEET NOTE
03/25/19 0917   Events/Summary/Plan   Events/Summary/Plan Off CPAP   Chest Exam   Respiration 16   Heart Rate (Monitored) 72   Oximetry   #Pulse Oximetry (Single Determination) Yes   Oxygen   Pulse Oximetry 92 %   O2 (LPM) 0   O2 Daily Delivery Respiratory  Room Air with O2 Available

## 2019-03-25 NOTE — PROGRESS NOTES
Paged Dr. Gamble to update him on pt d/c plan.     MD aware of d/c plan. Neighbor will be with pt 24/7/ PT/OT martha Wu on pt care, home health ordered.

## 2019-03-25 NOTE — THERAPY
"Physical Therapy treatment completed.   Bed Mobility:  Supine to Sit: Stand by Assist  Transfers: Sit to Stand: Contact Guard Assist  Gait: Level Of Assist: Contact Guard Assist with 4-Wheel Walker using R UE only for drip, L UE in sling.        Plan of Care: Patient with no further skilled PT needs in the acute care setting at this time  Discharge Recommendations: Equipment: No Equipment Needed. Post-acute therapy Discharge to home with home health for additional skilled therapy services.    Caregiver training completed with pts friend, reviewed importance of CGA for all mobility for safety, pts friend states will be able to provide this level of assist with 24/7 care provided Recommend f/u HHPT. Pt plans to  DC home today.    See \"Rehab Therapy-Acute\" Patient Summary Report for complete documentation.     "

## 2019-03-25 NOTE — PROGRESS NOTES
"Received report from NOC RN; assumed pt care. PT A&Ox4, sitting up in chair. Pt denies pain at this moment. Sling to LUE. CMS intact. Pt states her friend Jazmin Paredes will be here \"around noon.\" Per pt Jazmin will be staying with her 24/7. Will update social work/MD. Pt notified to call for assistance.   "

## 2019-03-25 NOTE — THERAPY
"Occupational Therapy Treatment completed with focus on ADLs, ADL transfers, patient education, caregiver training and upper extremity function.  Functional Status:  A&Ox3. Motivated for home. Caregiver, Jazmin present for session. Pt walks to BR,sink,chair with 4ww with SBA. LUE in sling. NWB LUE. Toileting with Sup, Etelvina with clothing management. Grooming with SBA. LB dressing with Etelvina. Sling removed and pt tolerates pendulum ex's on LUE, SBA. AROM L hand,wrist,elbow. UB dressing with ModA, including sling. Caregiver/pt educ re: LUE prec's, felisha/doffing of sling (only when bathing, dressing or performing pendulums), UB dressing tech's, DME use, home safety during ADL's.           Plan of Care: Patient with no further skilled OT needs in the acute care setting at this time  Discharge Recommendations:  Equipment No Equipment Needed.  Discharge to home with home health for additional skilled therapy services. Friend/neighbor Jazmin will stay 24/7 with pt upon d/c. Caregiver training completed.   See \"Rehab Therapy-Acute\" Patient Summary Report for complete documentation.   "

## 2019-03-25 NOTE — FLOWSHEET NOTE
03/24/19 2045   Events/Summary/Plan   Events/Summary/Plan Patient has cpap s/u next to her: she plans to sleep in cardiac chair tonight and may decline cpap-pt aware that 02 avail if she declines cpap   General Vent Information   Pulse Oximetry 96 %   CPAP/BiPAP [employer]   Nocturnal CPAP or BiPAP CPAP   #System Evaluation Yes   Home Unit Used? Yes   Equipment Inspected for Cleanliness, Operation, and Safety? Yes   Settings (If Known) 15   FiO2 or LPM 0   Pt is currently not wearing CPAP/BiPap unit Yes   Home Mask Used? Yes   Chest Exam   Work Of Breathing / Effort Mild

## 2019-03-25 NOTE — DISCHARGE INSTRUCTIONS
Discharge Instructions    Discharged to home by car with relative. Discharged via wheelchair, hospital escort: Yes.  Special equipment needed: Not Applicable    Be sure to schedule a follow-up appointment with your primary care doctor or any specialists as instructed.     Discharge Plan:   Pneumococcal Vaccine Administered/Refused: Not given - Patient refused pneumococcal vaccine  Influenza Vaccine Indication: Patient Refuses    I understand that a diet low in cholesterol, fat, and sodium is recommended for good health. Unless I have been given specific instructions below for another diet, I accept this instruction as my diet prescription.   Other diet: Regular    Special Instructions: Discharge instructions for the Orthopedic Patient    Follow up with Primary Care Physician within 2 weeks of discharge to home, regarding:  Review of medications and diagnostic testing.  Surveillance for medical complications.  Workup and treatment of osteoporosis, if appropriate.     -Is this a Joint Replacement patient? No    -Is this patient being discharged with medication to prevent blood clots?  Yes, Aspirin Aspirin, ASA oral tablets  What is this medicine?  ASPIRIN (AS pir in) is a pain reliever. It is used to treat mild pain and fever. This medicine is also used as directed by a doctor to prevent and to treat heart attacks, to prevent strokes, and to treat arthritis or inflammation.  This medicine may be used for other purposes; ask your health care provider or pharmacist if you have questions.  COMMON BRAND NAME(S): Aspir-Low, Aspir-Geena, Aspirtab, Eulogio Advanced Aspirin, Eulogio Aspirin, Eulogio Aspirin Extra Strength, Eulogio Aspirin Plus, Eulogio Extra Strength, Eulogio Extra Strength Plus, Eulogio Genuine Aspirin, Eulogio Womens Aspirin, Bufferin, Bufferin Extra Strength, Bufferin Low Dose  What should I tell my health care provider before I take this medicine?  They need to know if you have any of these  conditions:  -anemia  -asthma  -bleeding problems  -child with chickenpox, the flu, or other viral infection  -diabetes  -gout  -if you frequently drink alcohol containing drinks  -kidney disease  -liver disease  -low level of vitamin K  -lupus  -smoke tobacco  -stomach ulcers or other problems  -an unusual or allergic reaction to aspirin, tartrazine dye, other medicines, dyes, or preservatives  -pregnant or trying to get pregnant  -breast-feeding  How should I use this medicine?  Take this medicine by mouth with a glass of water. Follow the directions on the package or prescription label. You can take this medicine with or without food. If it upsets your stomach, take it with food. Do not take your medicine more often than directed.  Talk to your pediatrician regarding the use of this medicine in children. While this drug may be prescribed for children as young as 12 years of age for selected conditions, precautions do apply. Children and teenagers should not use this medicine to treat chicken pox or flu symptoms unless directed by a doctor.  Patients over 65 years old may have a stronger reaction and need a smaller dose.  Overdosage: If you think you have taken too much of this medicine contact a poison control center or emergency room at once.  NOTE: This medicine is only for you. Do not share this medicine with others.  What if I miss a dose?  If you are taking this medicine on a regular schedule and miss a dose, take it as soon as you can. If it is almost time for your next dose, take only that dose. Do not take double or extra doses.  What may interact with this medicine?  Do not take this medicine with any of the following medications:  -cidofovir  -ketorolac  -probenecid  This medicine may also interact with the following medications:  -alcohol  -alendronate  -bismuth subsalicylate  -flavocoxid  -herbal supplements like feverfew, garlic, duane, ginkgo biloba, horse chestnut  -medicines for diabetes or  glaucoma like acetazolamide, methazolamide  -medicines for gout  -medicines that treat or prevent blood clots like enoxaparin, heparin, ticlopidine, warfarin  -other aspirin and aspirin-like medicines  -NSAIDs, medicines for pain and inflammation, like ibuprofen or naproxen  -pemetrexed  -sulfinpyrazone  -varicella live vaccine  This list may not describe all possible interactions. Give your health care provider a list of all the medicines, herbs, non-prescription drugs, or dietary supplements you use. Also tell them if you smoke, drink alcohol, or use illegal drugs. Some items may interact with your medicine.  What should I watch for while using this medicine?  If you are treating yourself for pain, tell your doctor or health care professional if the pain lasts more than 10 days, if it gets worse, or if there is a new or different kind of pain. Tell your doctor if you see redness or swelling. Also, check with your doctor if you have a fever that lasts for more than 3 days. Only take this medicine to prevent heart attacks or blood clotting if prescribed by your doctor or health care professional.  Do not take aspirin or aspirin-like medicines with this medicine. Too much aspirin can be dangerous. Always read the labels carefully.  This medicine can irritate your stomach or cause bleeding problems. Do not smoke cigarettes or drink alcohol while taking this medicine. Do not lie down for 30 minutes after taking this medicine to prevent irritation to your throat.  If you are scheduled for any medical or dental procedure, tell your healthcare provider that you are taking this medicine. You may need to stop taking this medicine before the procedure.  This medicine may be used to treat migraines. If you take migraine medicines for 10 or more days a month, your migraines may get worse. Keep a diary of headache days and medicine use. Contact your healthcare professional if your migraine attacks occur more frequently.  What  side effects may I notice from receiving this medicine?  Side effects that you should report to your doctor or health care professional as soon as possible:  -allergic reactions like skin rash, itching or hives, swelling of the face, lips, or tongue  -breathing problems  -changes in hearing, ringing in the ears  -confusion  -general ill feeling or flu-like symptoms  -pain on swallowing  -redness, blistering, peeling or loosening of the skin, including inside the mouth or nose  -signs and symptoms of bleeding such as bloody or black, tarry stools; red or dark-brown urine; spitting up blood or brown material that looks like coffee grounds; red spots on the skin; unusual bruising or bleeding from the eye, gums, or nose  -trouble passing urine or change in the amount of urine  -unusually weak or tired  -yellowing of the eyes or skin  Side effects that usually do not require medical attention (report to your doctor or health care professional if they continue or are bothersome):  -diarrhea or constipation  -headache  -nausea, vomiting  -stomach gas, heartburn  This list may not describe all possible side effects. Call your doctor for medical advice about side effects. You may report side effects to FDA at 2-161-FDA-5347.  Where should I keep my medicine?  Keep out of the reach of children.  Store at room temperature between 15 and 30 degrees C (59 and 86 degrees F). Protect from heat and moisture. Do not use this medicine if it has a strong vinegar smell. Throw away any unused medicine after the expiration date.  NOTE: This sheet is a summary. It may not cover all possible information. If you have questions about this medicine, talk to your doctor, pharmacist, or health care provider.  © 2018 Elsevier/Gold Standard (2014-08-19 11:30:31)      · Is patient discharged on Warfarin / Coumadin?   No     Depression / Suicide Risk    As you are discharged from this [facility] facility, it is important to learn how to keep  safe from harming yourself.    Recognize the warning signs:  · Abrupt changes in personality, positive or negative- including increase in energy   · Giving away possessions  · Change in eating patterns- significant weight changes-  positive or negative  · Change in sleeping patterns- unable to sleep or sleeping all the time   · Unwillingness or inability to communicate  · Depression  · Unusual sadness, discouragement and loneliness  · Talk of wanting to die  · Neglect of personal appearance   · Rebelliousness- reckless behavior  · Withdrawal from people/activities they love  · Confusion- inability to concentrate     If you or a loved one observes any of these behaviors or has concerns about self-harm, here's what you can do:  · Talk about it- your feelings and reasons for harming yourself  · Remove any means that you might use to hurt yourself (examples: pills, rope, extension cords, firearm)  · Get professional help from the community (Mental Health, Substance Abuse, psychological counseling)  · Do not be alone:Call your Safe Contact- someone whom you trust who will be there for you.  · Call your local CRISIS HOTLINE 185-0729 or 727-575-1989  · Call your local Children's Mobile Crisis Response Team [place] (488) 798-5619 or www.Kivo  · Call the toll free National Suicide Prevention Hotlines   · National Suicide Prevention Lifeline 644-494-AJJC (6970)  · National Hope Line Network 800-SUICIDE (895-0838)

## 2019-03-25 NOTE — PROGRESS NOTES
Surgical Progress Note    Author: Yoshi Gamble Date & Time created: 3/25/2019   5:41 AM     Interval Events:  Would like simple sling - abduction sling causes pain   ROS  Hemodynamics:  Temp (24hrs), Av.8 °C (98.2 °F), Min:36.5 °C (97.7 °F), Max:36.9 °C (98.4 °F)  Temperature: 36.5 °C (97.7 °F)  Pulse  Av  Min: 59  Max: 102   Blood Pressure : 120/58     Respiratory:    Respiration: 16, Pulse Oximetry: 93 %, O2 Daily Delivery Respiratory : Room Air with O2 Available     Work Of Breathing / Effort: Mild  RUL Breath Sounds: Clear, RML Breath Sounds: Clear, RLL Breath Sounds: Clear, LAI Breath Sounds: Clear, LLL Breath Sounds: Clear  Neuro:  GCS       Fluids:    Intake/Output Summary (Last 24 hours) at 19 0541  Last data filed at 19 0200   Gross per 24 hour   Intake              960 ml   Output              400 ml   Net              560 ml        Current Diet Order   Procedures   • Diet Order Regular     Physical Exam     Upper Extremity: intact median, radial, and ulnar nerve sensation and motor: FPL, FDP, EPL, IO.  Finger pink and warm good CR and good radial and ulnar pulse         Labs:  No results found for this or any previous visit (from the past 24 hour(s)).  Medical Decision Making, by Problem:  Active Hospital Problems    Diagnosis   • Closed left humeral fracture [S42.302A]     Priority: High   • Left elbow pain [M25.522]   • Benign essential HTN [I10]     Plan:  -POD 3 left humerus OIRF and revision reverse shoulder arthroplasty for jana-prosthetic fracture   -stable and doing well orthopaedically ready for disposition   -Disposition:  Patient wants to go home with home health and neighbor help:  She must be cleared by OT and PT as safe for home and have home health arranged to discharge to home; otherwise she will need stay at a SNF.    Quality Measures:  Quality-Core Measures    Discussed patient condition with patient and nursing

## 2019-03-25 NOTE — PROGRESS NOTES
Discharging pt home per MD order. Discussed discharge instructions, follow up appointments, prescriptions, and home care for Total Shoulder. Pt voiding and ambulating without difficulty, pain controlled, tolerating diet. Family at bedside, all questions answered. Pt discharged off unit with hospital escort at 1351.

## 2019-03-25 NOTE — PROGRESS NOTES
Pt sitting up in chair, aao, reports pain to left forearm, immobilizer adjusted, Dr Gamble bedside discussing left shoulder care with patient, this RN in room and discussed with patient and MD, it is ok for patient to have immobilizer off intermittently allowing LUE to hang and also ok for immobilizer to be worn without cushion attached.

## 2019-03-25 NOTE — DISCHARGE PLANNING
Sw called patient's friend, Jazmin Paredes (#[phone]) and found that she is coming to the facility currently to meet with PT and MD.  Jazmin stated to this SW that she can be available to this patient 24/7.

## 2019-03-25 NOTE — DISCHARGE SUMMARY
"Discharge Summary    CHIEF COMPLAINT ON ADMISSION  Chief Complaint   Patient presents with   • Shoulder Pain     Pt scheduled for surgery for left shoulder arthroplasty 3/21 with Dr. Gamble. Was sent here for admit by his office because she cannot control her pain        Reason for Admission  sent by md     Admission Date  3/19/2019    CODE STATUS  Full Code    HPI & HOSPITAL COURSE      78 y.o. female who presented 3/19/2019 with left elbow pain.  Patient did fall last Friday, had an x-ray last Wednesday, noted a left humerus fracture, near her previous hardware.  Patient did see orthopedic surgery, there was a plan for surgery on Thursday.  Patient was told if her pain worsen she needed to present to the emergency department.  She states her pain has been uncontrollable and continued to get worse.  Patient states is located on her left elbow, is severe, when I asked her to describe what kind of pain she said \"lots\".  Patient has received morphine and is a little confused because of this.  Patient denied any new fall.   She was admitted for pain control and care awaiting surgery which was performed on 3/21/19 (see notes below). Patient did well after surgery and pain well controllled. There is a history of a stroke and she is unsteady and has the use of only one arm. PT and OT recommended skilled nursing but patient refuses and will go home in the care of friends who will take care of her. The , me, the RN and PT/OT met with her friends today and confirmed their plan to care for her at home with the assistance of home health services.    Therefore, she is discharged in guarded and stable condition to home with organized home healthcare and close outpatient follow-up.    The patient met 2-midnight criteria for an inpatient stay at the time of discharge.    Discharge Date  3/25/19    FOLLOW UP ITEMS POST DISCHARGE  Home health  Orthopedic surgery 2 weeks  Primary care as needed.    DISCHARGE " DIAGNOSES  Principal Problem:    Left elbow pain POA: Yes  Active Problems:    Closed left humeral fracture POA: Yes    Benign essential HTN POA: Yes  Resolved Problems:    * No resolved hospital problems. *      FOLLOW UP  Future Appointments  Date Time Provider Department Center   4/3/2019 10:55 AM Chris Alston M.D. [facility]   4/30/2019 1:55 PM Chris Alston M.D. [facility]   9/10/2019 10:55 AM Chris Alston M.D. [facility]   12/10/2019 12:00 PM MELISSA Knutson [facility] None     No follow-up provider specified.    MEDICATIONS ON DISCHARGE     Medication List      CONTINUE taking these medications      Instructions   Ascorbic Acid 1000 MG Tbcr   Take 1 Tab by mouth every day.  Dose:  1 Tab     aspirin EC 81 MG Tbec  Commonly known as:  ECOTRIN   Take 81 mg by mouth every day.  Dose:  81 mg     B COMPLEX-VITAMIN B12 PO   Take 1 Tab by mouth every day.  Dose:  1 Tab     calcium carbonate 500 MG Chew  Commonly known as:  TUMS   Take 500 mg by mouth every day.  Dose:  500 mg     Cholecalciferol 5000 units Tabs   Take 5,000 Units by mouth every day.  Dose:  5000 Units     DILTIAZem  MG Cp24  Commonly known as:  CARDIZEM CD   Take 240 mg by mouth every bedtime.  Dose:  240 mg     fenofibrate 145 MG Tabs  Commonly known as:  TRICOR   Take 145 mg by mouth every morning.  Dose:  145 mg     fluticasone 50 MCG/ACT nasal spray  Commonly known as:  FLONASE   Spray 1 Spray in nose every day.  Dose:  1 Spray     Ibuprofen 200 MG Caps   Take 2 Caps by mouth 4 times a day as needed (pain management).  Dose:  2 Cap     irbesartan 150 MG Tabs  Commonly known as:  AVAPRO   Take 150 mg by mouth every morning.  Dose:  150 mg     loratadine 10 MG Tabs  Commonly known as:  CLARITIN   Take 10 mg by mouth 1 time daily as needed (allergy).  Dose:  10 mg     Non Formulary Request   Take 1 Package by mouth every day. Indications: VITALIZER GOLD-MULTI VITAMIN  Dose:  1 Package     NON SPECIFIED   Take 7 Caps by mouth  "every bedtime. \"Herb-Lax\"   Indications: constipation  Dose:  7 Cap            Allergies  Allergies   Allergen Reactions   • Benicar [Olmesartan] Rash     blisters   • Other Misc Shortness of Breath     Anesthesia with surgery 2016  Pt couldn't breathe while being put under and tapped side of bed to let RN know. \"It was the scariest feeling and I want everyone to know\"     • Codeine Vomiting   • Demerol Vomiting   • Hydrocodone Vomiting   • Other Drug Vomiting     \"all pain meds\" per pt can take morphine.   • Oxycodone Vomiting   • Statins [Hmg-Coa-R Inhibitors] Unspecified     Muscle cramping   • Valium Vomiting       DIET  Orders Placed This Encounter   Procedures   • Diet Order Regular     Standing Status:   Standing     Number of Occurrences:   1     Order Specific Question:   Diet:     Answer:   Regular [1]       ACTIVITY  As tolerated.  non weight bearing-lb weight restriction LEFT arm    CONSULTATIONS  Orthopedic surgery.    PROCEDURES  DATE OF SERVICE:  03/21/2019     PREOPERATIVE DIAGNOSIS:  Left periprosthetic humerus fracture around a reverse   total shoulder with unstable stem.     POSTOPERATIVE DIAGNOSIS:  Left periprosthetic humerus fracture around a   reverse total shoulder with unstable stem.     PROCEDURE:  Revision of left total shoulder arthroplasty of the humeral   component and open reduction and internal fixation of proximal humerus and   humeral shaft fracture, fixation of both the greater tuberosity and the   humeral shaft.     SURGEON:  Yoshi Gamble MD       LABORATORY  Lab Results   Component Value Date    SODIUM 139 03/20/2019    POTASSIUM 3.8 03/20/2019    CHLORIDE 108 03/20/2019    CO2 24 03/20/2019    GLUCOSE 102 (H) 03/20/2019    BUN 29 (H) 03/20/2019    CREATININE 0.96 03/20/2019        Lab Results   Component Value Date    WBC 5.1 03/20/2019    HEMOGLOBIN 9.5 (L) 03/22/2019    HEMATOCRIT 30.2 (L) 03/22/2019    PLATELETCT 225 03/20/2019        Total time of the discharge " process exceeds 45 minutes.

## 2019-03-26 ENCOUNTER — HOME CARE VISIT (OUTPATIENT)
Dept: HOME HEALTH SERVICES | Facility: HOME HEALTHCARE | Age: 79
End: 2019-03-26
Payer: MEDICARE

## 2019-03-26 ENCOUNTER — PATIENT OUTREACH (OUTPATIENT)
Dept: HEALTH INFORMATION MANAGEMENT | Facility: OTHER | Age: 79
End: 2019-03-26

## 2019-03-26 ENCOUNTER — TELEPHONE (OUTPATIENT)
Dept: HEALTH INFORMATION MANAGEMENT | Facility: OTHER | Age: 79
End: 2019-03-26

## 2019-03-26 VITALS
BODY MASS INDEX: 31.91 KG/M2 | RESPIRATION RATE: 78 BRPM | OXYGEN SATURATION: 93 % | DIASTOLIC BLOOD PRESSURE: 80 MMHG | HEART RATE: 78 BPM | HEIGHT: 59 IN | SYSTOLIC BLOOD PRESSURE: 148 MMHG | TEMPERATURE: 99.1 F

## 2019-03-26 PROCEDURE — G0493 RN CARE EA 15 MIN HH/HOSPICE: HCPCS

## 2019-03-26 PROCEDURE — 665001 SOC-HOME HEALTH

## 2019-03-26 ASSESSMENT — PATIENT HEALTH QUESTIONNAIRE - PHQ9
1. LITTLE INTEREST OR PLEASURE IN DOING THINGS: 00
2. FEELING DOWN, DEPRESSED, IRRITABLE, OR HOPELESS: 00
CLINICAL INTERPRETATION OF PHQ2 SCORE: 0

## 2019-03-26 ASSESSMENT — ACTIVITIES OF DAILY LIVING (ADL): OASIS_M1830: 03

## 2019-03-26 ASSESSMENT — ENCOUNTER SYMPTOMS
SHORTNESS OF BREATH: T
VOMITING: DENIES

## 2019-03-26 NOTE — PROGRESS NOTES
SCP post discharge med rec completed. No clinically significant medication issues noted. Patient denies any side effects, barriers to accessing medications, or trouble with adherence.     Patient reports discomfort in her arm. Not tolerating Percocet. Has been taking Advil instead of Tylenol. Discussed the potential for Advil to increase BP. Patient states her BP has been higher than usual, which could also be d/t pain. Recommended she try APAP for pain. Also advised that she can break Percocet in half and take with food to minimize GI upset. Patient understands and agrees.    No further medication related issues noted at this time. Patient states she is feeling well and has support after d/c.

## 2019-03-27 ENCOUNTER — HOME CARE VISIT (OUTPATIENT)
Dept: HOME HEALTH SERVICES | Facility: HOME HEALTHCARE | Age: 79
End: 2019-03-27
Payer: MEDICARE

## 2019-03-27 VITALS
HEART RATE: 82 BPM | OXYGEN SATURATION: 95 % | RESPIRATION RATE: 16 BRPM | SYSTOLIC BLOOD PRESSURE: 142 MMHG | TEMPERATURE: 98.7 F | DIASTOLIC BLOOD PRESSURE: 80 MMHG

## 2019-03-27 PROCEDURE — G0156 HHCP-SVS OF AIDE,EA 15 MIN: HCPCS

## 2019-03-27 NOTE — TELEPHONE ENCOUNTER
Med review completed for home health. No clinically significant medication issues noted. Patient denies any side effects, barriers to accessing medications, or trouble with adherence.      Patient reports discomfort in her arm. Not tolerating Percocet. Has been taking Advil instead of Tylenol. Discussed the potential for Advil to increase BP. Patient states her BP has been higher than usual, which could also be d/t pain. Recommended she try APAP for pain. Also advised that she can break Percocet in half and take with food to minimize GI upset. Patient understands and agrees.     No further medication related issues noted at this time. Patient states she is feeling well and has support after d/c.

## 2019-03-28 ENCOUNTER — HOME CARE VISIT (OUTPATIENT)
Dept: HOME HEALTH SERVICES | Facility: HOME HEALTHCARE | Age: 79
End: 2019-03-28
Payer: MEDICARE

## 2019-03-28 PROCEDURE — G0299 HHS/HOSPICE OF RN EA 15 MIN: HCPCS

## 2019-03-29 ENCOUNTER — HOME CARE VISIT (OUTPATIENT)
Dept: HOME HEALTH SERVICES | Facility: HOME HEALTHCARE | Age: 79
End: 2019-03-29
Payer: MEDICARE

## 2019-03-29 VITALS
DIASTOLIC BLOOD PRESSURE: 80 MMHG | SYSTOLIC BLOOD PRESSURE: 130 MMHG | OXYGEN SATURATION: 94 % | RESPIRATION RATE: 18 BRPM | HEART RATE: 72 BPM | TEMPERATURE: 98.8 F

## 2019-03-29 VITALS
HEART RATE: 69 BPM | TEMPERATURE: 98.5 F | SYSTOLIC BLOOD PRESSURE: 120 MMHG | RESPIRATION RATE: 18 BRPM | DIASTOLIC BLOOD PRESSURE: 70 MMHG | OXYGEN SATURATION: 99 %

## 2019-03-29 PROCEDURE — G0152 HHCP-SERV OF OT,EA 15 MIN: HCPCS

## 2019-03-29 PROCEDURE — G0151 HHCP-SERV OF PT,EA 15 MIN: HCPCS

## 2019-03-29 ASSESSMENT — ACTIVITIES OF DAILY LIVING (ADL)
ADLS_COMMENTS: <!--EPICS-->SEE OT EVAL<!--EPICE-->
IADLS_COMMENTS: <!--EPICS-->SEE OT EVAL<!--EPICE-->

## 2019-03-31 VITALS
DIASTOLIC BLOOD PRESSURE: 80 MMHG | OXYGEN SATURATION: 94 % | HEART RATE: 72 BPM | RESPIRATION RATE: 18 BRPM | SYSTOLIC BLOOD PRESSURE: 130 MMHG | TEMPERATURE: 98.8 F

## 2019-03-31 SDOH — ECONOMIC STABILITY: HOUSING INSECURITY
HOME SAFETY: REVIEWED REVERSE TOTAL SHOULDER PRECAUTIONS. PT WITH SHREDDED PAPER OVERFLOWING FIREPLACE AND EXTENDING TO CARPET. SWEPT UP SHREDDED PAPER SO NO LONGER OUTSIDE OF FIRE BOX.

## 2019-03-31 ASSESSMENT — ACTIVITIES OF DAILY LIVING (ADL)
TOILETING_ASSISTANCE: 0
GROOMING_ASSISTANCE: 4
LAUNDRY_ASSISTANCE: 4
DRESSING_LB_ASSISTANCE: 4
SHOPPING_ASSISTANCE: 6
TELEPHONE_ASSISTANCE: 0
DRESSING_UB_ASSISTANCE: 6
HOUSEKEEPING_ASSISTANCE: 6
TRANSPORTATION_ASSISTANCE: 6
BATHING_ASSISTANCE: 5
MEAL_PREP_ASSISTANCE: 5
EATING_ASSISTANCE: 0
ORAL_CARE_ASSISTANCE: 0

## 2019-04-01 ENCOUNTER — TELEPHONE (OUTPATIENT)
Dept: URGENT CARE | Facility: PHYSICIAN GROUP | Age: 79
End: 2019-04-01

## 2019-04-01 ENCOUNTER — HOME CARE VISIT (OUTPATIENT)
Dept: HOME HEALTH SERVICES | Facility: HOME HEALTHCARE | Age: 79
End: 2019-04-01
Payer: MEDICARE

## 2019-04-01 VITALS
TEMPERATURE: 98.2 F | SYSTOLIC BLOOD PRESSURE: 144 MMHG | RESPIRATION RATE: 17 BRPM | OXYGEN SATURATION: 99 % | DIASTOLIC BLOOD PRESSURE: 80 MMHG | HEART RATE: 70 BPM

## 2019-04-01 PROCEDURE — G0180 MD CERTIFICATION HHA PATIENT: HCPCS | Performed by: INTERNAL MEDICINE

## 2019-04-01 PROCEDURE — G0156 HHCP-SVS OF AIDE,EA 15 MIN: HCPCS

## 2019-04-01 ASSESSMENT — ENCOUNTER SYMPTOMS: DEBILITATING PAIN: 1

## 2019-04-01 NOTE — TELEPHONE ENCOUNTER
Future Appointments       Provider Department [place]    4/2/2019 TBD Norma Tse, CLizbethNLizbethA. [facility]     4/2/2019 TBD [facility] TEAM GARCIA [facility]     4/2/2019 1:15 PM Chris Alston M.D. [facility]    4/3/2019 12:00 PM Ngozi Benedict, PT [facility]     4/4/2019 TBD Silvina Cuellar, OT [facility]     4/5/2019 TBD Sabina Sanabria R.N. [facility]     4/5/2019 TBD Norma Tse, LUCRECIA.N.A. [facility]     4/5/2019 TBD Silvina Cuellar, OT [facility]     4/8/2019 TBD Silvina Cuellar, OT [facility]     4/9/2019 TBD KYUNG CampoverdeN.A. [facility]     4/9/2019 TBD KALEE BradshawN. [facility]     4/10/2019 TBD Silvina Cuellar, OT [facility]     4/11/2019 TBD Ngozi Benedict, PT [facility]     4/12/2019 TBD Norma Tse, LUCRECIA.N.A. [facility]     4/12/2019 TBD Sabina Sanabria R.N. [facility]     4/15/2019 TBD Sabina Sanabria R.N. [facility]     4/15/2019 TBD Silvina Cuellar, OT [facility]     4/16/2019 TBD Norma Tse C.N.A. [facility]     4/17/2019 TBD Ngozi Benedict, PT [facility]     4/17/2019 TBD Silvina Cuellar, OT [facility]     4/19/2019 TBD LUCRECIA Campoverde.N.A. [facility]     4/22/2019 TBD Silvina Cuellar, OT [facility]     4/22/2019 TBD Sabina Sanabria R.N. [facility]     4/23/2019 TBD [facility]     4/24/2019 TBD Silvina Cuellar, MU [facility]     4/26/2019 TBD Norma Tse C.N.A. [facility]     4/29/2019 TBD Norma Tse [facility]. [facility]     4/29/2019 TBD Sabina Sanabria R.N. [facility]     4/30/2019 1:55 PM [facility] [place] [facility]    5/6/2019 D Norma Tse C.N.A. [facility]     5/6/2019 D Sabina Sanabria R.N. [facility]     5/13/2019 D Sabina Sanabria R.N. [facility]     5/13/2019 D Norma Tse C.N.A. [facility]     5/20/2019 D Norma Tse C.N.A. [facility]     5/20/2019 D Sabina Sanabria R.N. [facility]     9/10/2019 10:55 AM Chris Alston M.D. [facility]    12/10/2019 12:00 PM MELISSA Knutson [facility]         ESTABLISHED PATIENT PRE-VISIT PLANNING     Patient was contacted to complete PVP.     1.  Reviewed notes from the last few office visits within the medical group: Yes    2.  If any orders were placed at last visit or intended to be done for this visit (i.e. 6 mos follow-up), do we have Results/Consult Notes?        •  Labs - Labs were not ordered at last office visit.       •  Imaging - Imaging was not ordered at last office visit.       •  Referrals - No referrals were ordered at last office visit.    3. Is this appointment scheduled as a Hospital Follow-Up? Yes, visit was at [facility].     4.  Immunizations were updated in [facility] using WebIZ?: Yes       •  Web Iz Recommendations: FLU, PREVNAR (PCV13) , TDAP, VARICELLA (Chicken Pox)  and SHINGRIX (Shingles)    5.  Patient is due for the following Health Maintenance Topics:   Health Maintenance Due   Topic Date Due   • Annual Wellness Visit  1940   • BONE DENSITY  12/08/2007     6. Orders for overdue Health Maintenance topics pended in Pre-Charting? NO    7.  AHA (MDX) form printed for Provider? No, already completed    8.  Patient was informed to arrive 15 min prior to their scheduled appointment and bring in their medication bottles.

## 2019-04-02 ENCOUNTER — HOME CARE VISIT (OUTPATIENT)
Dept: HOME HEALTH SERVICES | Facility: HOME HEALTHCARE | Age: 79
End: 2019-04-02
Payer: MEDICARE

## 2019-04-02 ENCOUNTER — OFFICE VISIT (OUTPATIENT)
Dept: MEDICAL GROUP | Facility: PHYSICIAN GROUP | Age: 79
End: 2019-04-02
Payer: MEDICARE

## 2019-04-02 VITALS
OXYGEN SATURATION: 95 % | RESPIRATION RATE: 18 BRPM | SYSTOLIC BLOOD PRESSURE: 168 MMHG | HEART RATE: 91 BPM | DIASTOLIC BLOOD PRESSURE: 84 MMHG | TEMPERATURE: 100.2 F

## 2019-04-02 VITALS
OXYGEN SATURATION: 95 % | DIASTOLIC BLOOD PRESSURE: 80 MMHG | RESPIRATION RATE: 18 BRPM | TEMPERATURE: 98.4 F | HEART RATE: 70 BPM | SYSTOLIC BLOOD PRESSURE: 135 MMHG

## 2019-04-02 DIAGNOSIS — S42.295A OTHER CLOSED NONDISPLACED FRACTURE OF PROXIMAL END OF LEFT HUMERUS, INITIAL ENCOUNTER: ICD-10-CM

## 2019-04-02 DIAGNOSIS — Z78.0 POST-MENOPAUSAL: ICD-10-CM

## 2019-04-02 DIAGNOSIS — I63.9 CEREBROVASCULAR ACCIDENT (CVA), UNSPECIFIED MECHANISM (HCC): ICD-10-CM

## 2019-04-02 PROCEDURE — 99213 OFFICE O/P EST LOW 20 MIN: CPT | Performed by: INTERNAL MEDICINE

## 2019-04-02 PROCEDURE — G0299 HHS/HOSPICE OF RN EA 15 MIN: HCPCS

## 2019-04-02 ASSESSMENT — ENCOUNTER SYMPTOMS
NAUSEA: DENIES
VOMITING: DENIES

## 2019-04-02 NOTE — PROGRESS NOTES
PRIMARY CARE CLINIC FOLLOW UP VISIT  Chief Complaint   Patient presents with   • Shoulder Pain     Lt      History of Present Illness     Closed left humeral fracture  Just recently admitted for left humerus fracture for pain control and s/p surgery. Is having some itching of her plate. Says that in the OR the anesthesiologist noted a carotid plaque and she fears this is the cause of her strokes. Home PT/OT will be working with her later this week. She is doing chair exercises now as much as tolerated. Her left shoulder/arm pain is intense. She follows up with the orthopaedic surgery group tomorrow. Is back home where she lives alone but has many neighbors who help her. Has opiates available but avoids them and is taking tylenol for pain control.     Current Outpatient Prescriptions   Medication Sig Dispense Refill   • non-formulary med Take 1 Tab by mouth every day. Royal Jelly 500mg cap     • Green Tea, Camillia sinensis, 315 MG Cap Take 1 Cap by mouth every day.     • vitamin E (VITAMIN E) 1000 UNIT Cap Take 1 Cap by mouth every day.     • ondansetron (ZOFRAN) 4 MG Tab tablet Take 4 mg by mouth every 8 hours as needed.     • oxyCODONE-acetaminophen (PERCOCET)  MG Tab Take 1 Tab by mouth every 8 hours as needed.     • aspirin EC (ECOTRIN) 81 MG Tablet Delayed Response Take 81 mg by mouth every day.     • fluticasone (FLONASE) 50 MCG/ACT nasal spray Spray 1 Spray in nose every day. 16 g 0   • Ascorbic Acid 1000 MG Tab CR Take 1 Tab by mouth every day.     • B Complex-Folic Acid (B COMPLEX-VITAMIN B12 PO) Take 1 Tab by mouth every day.     • calcium carbonate (TUMS) 500 MG Chew Tab Take 500 mg by mouth every day.     • vitamin D 5000 UNITS Tab Take 5,000 Units by mouth every day. 30 Tab 2   • loratadine (CLARITIN) 10 MG Tab Take 10 mg by mouth 1 time daily as needed (allergy).     • fenofibrate (TRICOR) 145 MG Tab Take 145 mg by mouth every morning.     • diltiazem CD (CARDIZEM CD) 240 MG CP24 Take 240 mg by  "mouth every bedtime.     • Non Formulary Request Take 1 Package by mouth every day. Indications: VITALIZER GOLD-MULTI VITAMIN     • NON SPECIFIED Take 7 Caps by mouth every bedtime. \"Herb-Lax\"   Indications: constipation     • irbesartan (AVAPRO) 150 MG TABS Take 150 mg by mouth every morning.       No current facility-administered medications for this visit.      Past Medical History:   Diagnosis Date   • Anesthesia     States feels like she can't breathe with induction for right hip surgery, gets nausea vomiting with most pain meds.  Uses anti-nausea medicine if takes morphine   • Arthritis     To hips, shoulders, hands   • Benign essential HTN 9/7/2017   • Dyslipidemia 3/5/2019   • Expressive aphasia     Related to CVA   • DARIN (obstructive sleep apnea) 1/26/2017    AHI 23.6, minimum saturation 69%, on CPAP 15 cm.   • Post-nasal drip    • Restless leg syndrome     4/25/17-Resolved, states was related to right hip and none S/P replacement.   • Stroke (HCC) 2/1993, 8/1993    Hfpolhwq-zgrlxp-trihmozxsh aphasia,mouth forming words, reading, numbers     Past Surgical History:   Procedure Laterality Date   • PB RECONSTR TOTAL SHOULDER IMPLANT Left 3/21/2019    Procedure: ARTHROPLASTY, SHOULDER, TOTAL-  REVISION REVERSE;  Surgeon: Yoshi Gamble M.D.;  Location: [facility];  Service: Orthopedics   • HUMERUS ORIF Left 3/21/2019    Procedure: ORIF, FRACTURE, HUMERUS;  Surgeon: Yoshi Gamble M.D.;  Location: [facility];  Service: Orthopedics   • KNEE ARTHROPLASTY TOTAL Left 2/12/2018    Procedure: KNEE ARTHROPLASTY TOTAL;  Surgeon: Earle Jacobsen M.D.;  Location: [facility];  Service: Orthopedics   • CERVICAL FUSION POSTERIOR  8/8/2017    Procedure: CERVICAL FUSION POSTERIOR- C3-5, INSTRUMENTED;  Surgeon: Florentino Pike M.D.;  Location: [facility];  Service:    • CERVICAL LAMINECTOMY POSTERIOR  8/8/2017    Procedure: CERVICAL LAMINECTOMY " POSTERIOR- C3-5;  Surgeon: Florentino Pike M.D.;  Location: [facility]ERY [facility];  Service:    • HIP ARTHROPLASTY TOTAL Left 5/8/2017    Procedure: HIP ARTHROPLASTY TOTAL;  Surgeon: Earle Jacobsen M.D.;  Location: [facility];  Service:    • HIP ARTHROPLASTY TOTAL Right 12/12/2016    Procedure: HIP ARTHROPLASTY TOTAL;  Surgeon: Earle Jacobsen M.D.;  Location: SUR[facility]ERY [facility];  Service:    • LUMBAR FUSION POSTERIOR  9/26/2016    Procedure: LUMBAR FUSION POSTERIOR L2-S1 onlay ;  Surgeon: Florentino Pike M.D.;  Location: SUR[facility]ERY [facility];  Service:    • LUMBAR LAMINECTOMY DISKECTOMY N/A 9/26/2016    Procedure: LUMBAR LAMINECTOMY DISKECTOMY L2-S1;  Surgeon: Florentino Pike M.D.;  Location: SUR[facility]ERY [facility];  Service:    • COLONOSCOPY  2/2016    Every 10 years   • SHOULDER ARTHROPLASTY TOTAL Left 8/11/2015    Procedure: SHOULDER ARTHROPLASTY REVERSE TOTAL;  Surgeon: Yoshi Gamble M.D.;  Location: SUR[facility]ERY [facility];  Service:    • KNEE ARTHROPLASTY TOTAL  6/23/2014    Performed by Earle Jacobsen M.D. at SURGERY [facility]   • HAMMERTOE CORRECTION Left 11/12/2007    Removed 3rd toe   • LUMBAR LAMINECTOMY DISKECTOMY  1993    Lumbar   • CARPAL TUNNEL RELEASE Bilateral 1993   • CERVICAL DISK AND FUSION ANTERIOR  1993    X2   • HAMMERTOE CORRECTION Right 1980's    Removed 3rd toe   • BUNIONECTOMY Bilateral 1980's   • HYSTERECTOMY, TOTAL ABDOMINAL  1977   • FINGER ORIF Left as child    Index     Social History   Substance Use Topics   • Smoking status: Never Smoker   • Smokeless tobacco: Never Used   • Alcohol use Yes      Comment: beer or lizeth 1-2 per year     Social History     Social History Narrative    Retired LVN at [employer]     Family History   Problem Relation Age of Onset   • Cancer Mother         vaginal   • Stroke Father    • No Known Problems Brother    • Diabetes Brother    • Hypertension Brother    • Stroke Daughter      Family Status    Relation Status   • Mo    • Fa    • Bro Alive   • MGMo    • MGFa    • PGMo    • PGFa    • Bro Alive   • Bro Alive   • Bhumi      Allergies: Benicar [olmesartan]; Other misc; Codeine; Demerol; Hydrocodone; Other drug; Oxycodone; Statins [hmg-coa-r inhibitors]; and Valium    ROS  As per HPI above. All other systems reviewed and negative.        Objective   Blood pressure (!) 168/84, pulse 91, temperature 37.9 °C (100.2 °F), resp. rate 18, SpO2 95 %, not currently breastfeeding. There is no height or weight on file to calculate BMI.    General: alert and oriented, pleasant, cooperative  HEENT: Normocephalic, atraumatic.   Cardiovascular: regular rate and rhythm, normal S1/S2. No carotid bruits   Pulmonary: lungs clear to auscultation bilaterally  Gastrointestinal: no tenderness to palpation. No hepatosplenomegaly. Bowel sounds normoactive  MSK: left upper extremity in cast   Psychiatric: appropriate mood and affect. Good insight and appropriate judgment     Assessment and Plan   The following treatment plan was discussed     1. Other closed nondisplaced fracture of proximal end of left humerus, initial encounter  Now s/p surgery and taking tylenol for pain control. Following with PT/OT to help regain strength and follows up with her surgeon tomorrow.     2. Cerebrovascular accident (CVA), unspecified mechanism (HCC)  Continue baby aspirin, no carotid bruits heard today and last US carotid was . Check another carotid US. Lipids are at goal but ideally should be on a statin; will discuss that in the future given her recent humerus fracture and the sequale of that.   - US-CAROTID DOPPLER BILAT; Future    3. Post-menopausal  Given recent fracture after fall check DEXA study.   - DS-BONE DENSITY STUDY (DEXA)    Healthcare maintenance     Health Maintenance Due   Topic Date Due   • Annual Wellness Visit  1940   • BONE DENSITY  2007       Return in about  3 months (around 7/2/2019).    Chris Alston MD  Internal Medicine  [facility]

## 2019-04-02 NOTE — ASSESSMENT & PLAN NOTE
Just recently admitted for left humerus fracture for pain control and s/p surgery. Is having some itching of her plate. Says that in the OR the anesthesiologist noted a carotid plaque and she fears this is the cause of her strokes. Home PT/OT will be working with her later this week. She is doing chair exercises now as much as tolerated. Her left shoulder/arm pain is intense. She follows up with the orthopaedic surgery group tomorrow. Is back home where she lives alone but has many neighbors who help her. Has opiates available but avoids them and is taking tylenol for pain control.

## 2019-04-04 ENCOUNTER — HOME CARE VISIT (OUTPATIENT)
Dept: HOME HEALTH SERVICES | Facility: HOME HEALTHCARE | Age: 79
End: 2019-04-04
Payer: MEDICARE

## 2019-04-04 VITALS
HEART RATE: 80 BPM | SYSTOLIC BLOOD PRESSURE: 130 MMHG | DIASTOLIC BLOOD PRESSURE: 72 MMHG | RESPIRATION RATE: 16 BRPM | OXYGEN SATURATION: 96 % | TEMPERATURE: 99.4 F

## 2019-04-04 PROCEDURE — G0151 HHCP-SERV OF PT,EA 15 MIN: HCPCS

## 2019-04-04 PROCEDURE — G0152 HHCP-SERV OF OT,EA 15 MIN: HCPCS

## 2019-04-05 ENCOUNTER — HOME CARE VISIT (OUTPATIENT)
Dept: HOME HEALTH SERVICES | Facility: HOME HEALTHCARE | Age: 79
End: 2019-04-05
Payer: MEDICARE

## 2019-04-05 VITALS
TEMPERATURE: 98.6 F | OXYGEN SATURATION: 98 % | HEART RATE: 62 BPM | DIASTOLIC BLOOD PRESSURE: 80 MMHG | RESPIRATION RATE: 18 BRPM | SYSTOLIC BLOOD PRESSURE: 140 MMHG

## 2019-04-05 VITALS
OXYGEN SATURATION: 96 % | SYSTOLIC BLOOD PRESSURE: 130 MMHG | TEMPERATURE: 99.4 F | HEART RATE: 80 BPM | DIASTOLIC BLOOD PRESSURE: 72 MMHG | RESPIRATION RATE: 16 BRPM

## 2019-04-05 PROCEDURE — G0152 HHCP-SERV OF OT,EA 15 MIN: HCPCS

## 2019-04-05 PROCEDURE — G0299 HHS/HOSPICE OF RN EA 15 MIN: HCPCS

## 2019-04-05 ASSESSMENT — ENCOUNTER SYMPTOMS
NAUSEA: PT DENIES ANY NAUSEA AT THIS TIME
VOMITING: PT DENIES ANY EMESIS AT THIS TIME

## 2019-04-08 ENCOUNTER — HOME CARE VISIT (OUTPATIENT)
Dept: HOME HEALTH SERVICES | Facility: HOME HEALTHCARE | Age: 79
End: 2019-04-08
Payer: MEDICARE

## 2019-04-08 VITALS
DIASTOLIC BLOOD PRESSURE: 80 MMHG | TEMPERATURE: 98.6 F | RESPIRATION RATE: 18 BRPM | SYSTOLIC BLOOD PRESSURE: 140 MMHG | HEART RATE: 62 BPM | OXYGEN SATURATION: 98 %

## 2019-04-08 VITALS
DIASTOLIC BLOOD PRESSURE: 76 MMHG | SYSTOLIC BLOOD PRESSURE: 154 MMHG | HEART RATE: 78 BPM | RESPIRATION RATE: 16 BRPM | OXYGEN SATURATION: 99 % | TEMPERATURE: 99.4 F

## 2019-04-08 PROCEDURE — G0152 HHCP-SERV OF OT,EA 15 MIN: HCPCS

## 2019-04-08 PROCEDURE — G0151 HHCP-SERV OF PT,EA 15 MIN: HCPCS

## 2019-04-09 ENCOUNTER — HOME CARE VISIT (OUTPATIENT)
Dept: HOME HEALTH SERVICES | Facility: HOME HEALTHCARE | Age: 79
End: 2019-04-09
Payer: MEDICARE

## 2019-04-09 VITALS
TEMPERATURE: 98.2 F | RESPIRATION RATE: 16 BRPM | HEART RATE: 55 BPM | SYSTOLIC BLOOD PRESSURE: 160 MMHG | OXYGEN SATURATION: 96 % | DIASTOLIC BLOOD PRESSURE: 80 MMHG

## 2019-04-09 VITALS
DIASTOLIC BLOOD PRESSURE: 76 MMHG | HEART RATE: 78 BPM | TEMPERATURE: 99.4 F | OXYGEN SATURATION: 99 % | SYSTOLIC BLOOD PRESSURE: 154 MMHG | RESPIRATION RATE: 16 BRPM

## 2019-04-09 PROCEDURE — G0299 HHS/HOSPICE OF RN EA 15 MIN: HCPCS

## 2019-04-09 ASSESSMENT — ENCOUNTER SYMPTOMS
VOMITING: PT DENIES ANY EMESIS AT THIS TIME
NAUSEA: PT DENIES ANY NAUSEA AT THIS TIME

## 2019-04-10 ASSESSMENT — ENCOUNTER SYMPTOMS: DEBILITATING PAIN: 1

## 2019-04-11 ENCOUNTER — HOME CARE VISIT (OUTPATIENT)
Dept: HOME HEALTH SERVICES | Facility: HOME HEALTHCARE | Age: 79
End: 2019-04-11
Payer: MEDICARE

## 2019-04-11 ENCOUNTER — HOSPITAL ENCOUNTER (OUTPATIENT)
Dept: RADIOLOGY | Facility: MEDICAL CENTER | Age: 79
End: 2019-04-11
Attending: INTERNAL MEDICINE
Payer: MEDICARE

## 2019-04-11 VITALS
SYSTOLIC BLOOD PRESSURE: 156 MMHG | HEART RATE: 79 BPM | TEMPERATURE: 99.2 F | DIASTOLIC BLOOD PRESSURE: 84 MMHG | RESPIRATION RATE: 17 BRPM | OXYGEN SATURATION: 95 %

## 2019-04-11 DIAGNOSIS — I63.9 CEREBROVASCULAR ACCIDENT (CVA), UNSPECIFIED MECHANISM (HCC): ICD-10-CM

## 2019-04-11 PROCEDURE — 93880 EXTRACRANIAL BILAT STUDY: CPT

## 2019-04-11 PROCEDURE — G0152 HHCP-SERV OF OT,EA 15 MIN: HCPCS

## 2019-04-12 ENCOUNTER — HOME CARE VISIT (OUTPATIENT)
Dept: HOME HEALTH SERVICES | Facility: HOME HEALTHCARE | Age: 79
End: 2019-04-12
Payer: MEDICARE

## 2019-04-12 VITALS
DIASTOLIC BLOOD PRESSURE: 82 MMHG | OXYGEN SATURATION: 99 % | SYSTOLIC BLOOD PRESSURE: 148 MMHG | RESPIRATION RATE: 18 BRPM | TEMPERATURE: 98.7 F | HEART RATE: 69 BPM

## 2019-04-12 PROCEDURE — G0299 HHS/HOSPICE OF RN EA 15 MIN: HCPCS

## 2019-04-12 PROCEDURE — G0156 HHCP-SVS OF AIDE,EA 15 MIN: HCPCS

## 2019-04-12 ASSESSMENT — ENCOUNTER SYMPTOMS
NAUSEA: PT DENIES ANY NAUSEA AT THIS TIME
VOMITING: PT DENIES ANY EMESIS AT THIS TIME

## 2019-04-13 VITALS
OXYGEN SATURATION: 97 % | HEART RATE: 75 BPM | RESPIRATION RATE: 18 BRPM | DIASTOLIC BLOOD PRESSURE: 80 MMHG | SYSTOLIC BLOOD PRESSURE: 130 MMHG | TEMPERATURE: 97.9 F

## 2019-04-15 ENCOUNTER — HOME CARE VISIT (OUTPATIENT)
Dept: HOME HEALTH SERVICES | Facility: HOME HEALTHCARE | Age: 79
End: 2019-04-15
Payer: MEDICARE

## 2019-04-15 VITALS
RESPIRATION RATE: 16 BRPM | SYSTOLIC BLOOD PRESSURE: 158 MMHG | TEMPERATURE: 98.8 F | HEART RATE: 80 BPM | DIASTOLIC BLOOD PRESSURE: 84 MMHG | OXYGEN SATURATION: 96 %

## 2019-04-15 VITALS
DIASTOLIC BLOOD PRESSURE: 84 MMHG | OXYGEN SATURATION: 96 % | RESPIRATION RATE: 16 BRPM | TEMPERATURE: 98.8 F | SYSTOLIC BLOOD PRESSURE: 158 MMHG | HEART RATE: 80 BPM

## 2019-04-15 PROCEDURE — G0152 HHCP-SERV OF OT,EA 15 MIN: HCPCS

## 2019-04-15 PROCEDURE — G0299 HHS/HOSPICE OF RN EA 15 MIN: HCPCS

## 2019-04-15 ASSESSMENT — ENCOUNTER SYMPTOMS
NAUSEA: PT DENIES ANY NAUSEA AT THIS TIME
VOMITING: PT DENIES ANY EMESIS AT THIS TIME

## 2019-04-16 ENCOUNTER — HOME CARE VISIT (OUTPATIENT)
Dept: HOME HEALTH SERVICES | Facility: HOME HEALTHCARE | Age: 79
End: 2019-04-16
Payer: MEDICARE

## 2019-04-16 VITALS
RESPIRATION RATE: 17 BRPM | DIASTOLIC BLOOD PRESSURE: 80 MMHG | SYSTOLIC BLOOD PRESSURE: 150 MMHG | HEART RATE: 92 BPM | OXYGEN SATURATION: 96 % | TEMPERATURE: 98.7 F

## 2019-04-16 PROCEDURE — G0156 HHCP-SVS OF AIDE,EA 15 MIN: HCPCS

## 2019-04-17 ENCOUNTER — HOME CARE VISIT (OUTPATIENT)
Dept: HOME HEALTH SERVICES | Facility: HOME HEALTHCARE | Age: 79
End: 2019-04-17
Payer: MEDICARE

## 2019-04-17 PROCEDURE — G0151 HHCP-SERV OF PT,EA 15 MIN: HCPCS

## 2019-04-18 ENCOUNTER — HOME CARE VISIT (OUTPATIENT)
Dept: HOME HEALTH SERVICES | Facility: HOME HEALTHCARE | Age: 79
End: 2019-04-18
Payer: MEDICARE

## 2019-04-18 VITALS
HEART RATE: 70 BPM | DIASTOLIC BLOOD PRESSURE: 74 MMHG | OXYGEN SATURATION: 98 % | TEMPERATURE: 99.5 F | RESPIRATION RATE: 17 BRPM | SYSTOLIC BLOOD PRESSURE: 138 MMHG

## 2019-04-18 PROCEDURE — G0152 HHCP-SERV OF OT,EA 15 MIN: HCPCS

## 2019-04-19 ENCOUNTER — HOME CARE VISIT (OUTPATIENT)
Dept: HOME HEALTH SERVICES | Facility: HOME HEALTHCARE | Age: 79
End: 2019-04-19
Payer: MEDICARE

## 2019-04-19 VITALS
RESPIRATION RATE: 18 BRPM | HEART RATE: 66 BPM | DIASTOLIC BLOOD PRESSURE: 72 MMHG | OXYGEN SATURATION: 99 % | SYSTOLIC BLOOD PRESSURE: 148 MMHG | TEMPERATURE: 98.9 F

## 2019-04-19 PROCEDURE — G0156 HHCP-SVS OF AIDE,EA 15 MIN: HCPCS

## 2019-04-20 VITALS
OXYGEN SATURATION: 98 % | SYSTOLIC BLOOD PRESSURE: 142 MMHG | RESPIRATION RATE: 18 BRPM | DIASTOLIC BLOOD PRESSURE: 70 MMHG | HEART RATE: 77 BPM | TEMPERATURE: 98.2 F

## 2019-04-22 ENCOUNTER — HOME CARE VISIT (OUTPATIENT)
Dept: HOME HEALTH SERVICES | Facility: HOME HEALTHCARE | Age: 79
End: 2019-04-22
Payer: MEDICARE

## 2019-04-22 VITALS
TEMPERATURE: 97.6 F | RESPIRATION RATE: 17 BRPM | HEART RATE: 97 BPM | OXYGEN SATURATION: 95 % | SYSTOLIC BLOOD PRESSURE: 150 MMHG | DIASTOLIC BLOOD PRESSURE: 80 MMHG

## 2019-04-22 VITALS
TEMPERATURE: 97.4 F | DIASTOLIC BLOOD PRESSURE: 80 MMHG | OXYGEN SATURATION: 98 % | RESPIRATION RATE: 16 BRPM | HEART RATE: 70 BPM | SYSTOLIC BLOOD PRESSURE: 152 MMHG

## 2019-04-22 PROCEDURE — G0156 HHCP-SVS OF AIDE,EA 15 MIN: HCPCS

## 2019-04-22 PROCEDURE — G0299 HHS/HOSPICE OF RN EA 15 MIN: HCPCS

## 2019-04-22 PROCEDURE — G0152 HHCP-SERV OF OT,EA 15 MIN: HCPCS

## 2019-04-22 ASSESSMENT — ENCOUNTER SYMPTOMS
NAUSEA: PT DENIES ANY NAUSEA AT THIS TIME
VOMITING: PT DENIES ANY EMESIS AT THIS TIME

## 2019-04-23 ENCOUNTER — TELEPHONE (OUTPATIENT)
Dept: MEDICAL GROUP | Facility: PHYSICIAN GROUP | Age: 79
End: 2019-04-23

## 2019-04-23 VITALS
RESPIRATION RATE: 16 BRPM | OXYGEN SATURATION: 97 % | HEART RATE: 78 BPM | DIASTOLIC BLOOD PRESSURE: 70 MMHG | SYSTOLIC BLOOD PRESSURE: 144 MMHG | TEMPERATURE: 98.9 F

## 2019-04-23 NOTE — TELEPHONE ENCOUNTER
Future Appointments       Provider Department Center    4/24/2019 1:00 PM Silvina Cuellar, OT [facility]     4/25/2019 1:15 PM SABINA STEPHENS BD 1 IMAGING [facility] KAT [facility]    4/26/2019 TBD KYUNG CampoverdeNMARA [facility]     4/29/2019 TBD Norma Tse C.N.A. [facility]     4/29/2019 TBD Sabina Sanabria R.N. [facility]     4/30/2019 1:55 PM Chris Alston M.D.; [facility]    5/6/2019 TBD Norma Tse C.N.A. [facility]     5/6/2019 D Sabina Sanabria R.N. [facility]     5/13/2019 D Sabina Sanabria R.N. [facility]     5/13/2019 D KYUNG CampoverdeNMARA [facility]     5/20/2019 TBD KYUNG CampoverdeNMRAA [facility]     5/20/2019 D Sabina Sanabria R.N. [facility]     9/10/2019 10:55 AM Chris Alston M.D. [facility]    12/10/2019 12:00 PM MEILSSA Knutson [facility]         ANNUAL WELLNESS VISIT PRE-VISIT PLANNING WITHOUT OUTREACH    1.  Reviewed note from last office visit with PCP: YES    2.  If any orders were placed at last visit, do we have Results/Consult Notes?        •  Labs - Labs ordered, completed on 03/21/2019 and results are in chart.       •  Imaging - Imaging ordered, NOT completed. Patient advised to complete prior to next appointment.- DEXA SCHEDULED ON 4/25/2019       •  Referrals - No referrals were ordered at last office visit.    3.  Immunizations were updated in Epic using WebIZ?: Yes       •  WebIZ Recommendations: FLU, PREVNAR (PCV13) , TDAP, SHINGRIX (Shingles) and CPOX        •  Is patient due for Tdap? NO       •  Is patient due for Shingrix? YES. Patient was not notified of copay/out of pocket cost.     4.  Patient is due for the following Health Maintenance Topics:   Health Maintenance Due   Topic Date Due   • Annual Wellness Visit   1940   • BONE DENSITY  12/08/2007     5.  Reviewed/Updated the following with patient:       •   Preferred Pharmacy? YES       •   Preferred Lab? YES       •   Preferred Communication? YES       •   Allergies? YES       •   Medications? YES. Was Abstract Encounter opened and chart updated? YES       •   Social History? YES. Was Abstract Encounter opened and chart updated? YES       •   Family History (document living status of immediate family members and if + hx of  cancer, diabetes, hypertension, hyperlipidemia, heart attack, stroke) YES. Was Abstract Encounter opened and chart updated? YES    6.  Care Team Updated:       •   DME Company (gait device, O2, CPAP, etc.): YES- Walker, Cane, CPAP       •   Other Specialists (eye doctor, derm, GYN, cardiology, endo, etc): YES    7. Orders for overdue Health Maintenance topics pended in Pre-Charting? NO    8.  Patient has the following Care Path diagnoses on Problem List:  NONE    9.  Patient was advised: “This is a free wellness visit. The provider will screen for medical conditions to help you stay healthy. If you have other concerns to address you may be asked to discuss these at a separate visit or there may be an additional fee.”     10.  Patient was informed to arrive 15 min prior to their scheduled appointment and bring in their medication bottles.  4/23/2019-MITUL

## 2019-04-24 ENCOUNTER — HOME CARE VISIT (OUTPATIENT)
Dept: HOME HEALTH SERVICES | Facility: HOME HEALTHCARE | Age: 79
End: 2019-04-24
Payer: MEDICARE

## 2019-04-24 PROCEDURE — G0152 HHCP-SERV OF OT,EA 15 MIN: HCPCS

## 2019-04-25 ENCOUNTER — HOSPITAL ENCOUNTER (OUTPATIENT)
Dept: RADIOLOGY | Facility: MEDICAL CENTER | Age: 79
End: 2019-04-25
Attending: INTERNAL MEDICINE
Payer: MEDICARE

## 2019-04-25 VITALS
DIASTOLIC BLOOD PRESSURE: 76 MMHG | HEART RATE: 80 BPM | OXYGEN SATURATION: 95 % | SYSTOLIC BLOOD PRESSURE: 136 MMHG | TEMPERATURE: 98.6 F | RESPIRATION RATE: 17 BRPM

## 2019-04-25 PROCEDURE — 77080 DXA BONE DENSITY AXIAL: CPT

## 2019-04-25 ASSESSMENT — ACTIVITIES OF DAILY LIVING (ADL)
MEAL_PREP_ASSISTANCE: 0
TRANSPORTATION_ASSISTANCE: 6
DRESSING_UB_ASSISTANCE: 0
EATING_ASSISTANCE: 0
TELEPHONE_ASSISTANCE: 0
GROOMING_ASSISTANCE: 0
TOILETING_ASSISTANCE: 0
LAUNDRY_ASSISTANCE: 0
ORAL_CARE_ASSISTANCE: 0
DRESSING_LB_ASSISTANCE: 0
BATHING_ASSISTANCE: 1
SHOPPING_ASSISTANCE: 6
HOUSEKEEPING_ASSISTANCE: 5

## 2019-04-26 ENCOUNTER — HOME CARE VISIT (OUTPATIENT)
Dept: HOME HEALTH SERVICES | Facility: HOME HEALTHCARE | Age: 79
End: 2019-04-26
Payer: MEDICARE

## 2019-04-26 PROCEDURE — G0156 HHCP-SVS OF AIDE,EA 15 MIN: HCPCS

## 2019-04-28 VITALS
SYSTOLIC BLOOD PRESSURE: 140 MMHG | HEART RATE: 68 BPM | TEMPERATURE: 98.6 F | DIASTOLIC BLOOD PRESSURE: 80 MMHG | OXYGEN SATURATION: 91 % | RESPIRATION RATE: 18 BRPM

## 2019-04-29 ENCOUNTER — HOME CARE VISIT (OUTPATIENT)
Dept: HOME HEALTH SERVICES | Facility: HOME HEALTHCARE | Age: 79
End: 2019-04-29
Payer: MEDICARE

## 2019-04-29 VITALS
RESPIRATION RATE: 16 BRPM | DIASTOLIC BLOOD PRESSURE: 80 MMHG | HEART RATE: 66 BPM | OXYGEN SATURATION: 94 % | TEMPERATURE: 98.4 F | SYSTOLIC BLOOD PRESSURE: 160 MMHG

## 2019-04-29 PROCEDURE — G0493 RN CARE EA 15 MIN HH/HOSPICE: HCPCS

## 2019-04-29 ASSESSMENT — ACTIVITIES OF DAILY LIVING (ADL)
OASIS_M1830: 01
HOME_HEALTH_OASIS: 00

## 2019-04-29 ASSESSMENT — PATIENT HEALTH QUESTIONNAIRE - PHQ9: CLINICAL INTERPRETATION OF PHQ2 SCORE: 0

## 2019-04-30 ENCOUNTER — OFFICE VISIT (OUTPATIENT)
Dept: MEDICAL GROUP | Facility: PHYSICIAN GROUP | Age: 79
End: 2019-04-30
Payer: MEDICARE

## 2019-04-30 VITALS
HEIGHT: 59 IN | RESPIRATION RATE: 16 BRPM | TEMPERATURE: 99.1 F | HEART RATE: 69 BPM | SYSTOLIC BLOOD PRESSURE: 132 MMHG | BODY MASS INDEX: 33.95 KG/M2 | OXYGEN SATURATION: 94 % | DIASTOLIC BLOOD PRESSURE: 58 MMHG | WEIGHT: 168.4 LBS

## 2019-04-30 DIAGNOSIS — G47.33 OSA (OBSTRUCTIVE SLEEP APNEA): ICD-10-CM

## 2019-04-30 DIAGNOSIS — S42.295A OTHER CLOSED NONDISPLACED FRACTURE OF PROXIMAL END OF LEFT HUMERUS, INITIAL ENCOUNTER: ICD-10-CM

## 2019-04-30 DIAGNOSIS — I10 BENIGN ESSENTIAL HTN: ICD-10-CM

## 2019-04-30 DIAGNOSIS — I63.9 CEREBROVASCULAR ACCIDENT (CVA), UNSPECIFIED MECHANISM (HCC): ICD-10-CM

## 2019-04-30 PROCEDURE — G0439 PPPS, SUBSEQ VISIT: HCPCS | Performed by: INTERNAL MEDICINE

## 2019-04-30 ASSESSMENT — ENCOUNTER SYMPTOMS: GENERAL WELL-BEING: GOOD

## 2019-04-30 ASSESSMENT — ACTIVITIES OF DAILY LIVING (ADL): BATHING_REQUIRES_ASSISTANCE: 0

## 2019-04-30 ASSESSMENT — PATIENT HEALTH QUESTIONNAIRE - PHQ9: CLINICAL INTERPRETATION OF PHQ2 SCORE: 0

## 2019-04-30 NOTE — PROGRESS NOTES
Chief Complaint   Patient presents with   • Annual Wellness Visit         HPI:  Irina is a 78 y.o. 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     • vitamin E (VITAMIN E) 1000 UNIT Cap Take 1 Cap by mouth every day.     • aspirin EC (ECOTRIN) 81 MG Tablet Delayed Response Take 81 mg by mouth every day.     • fluticasone (FLONASE) 50 MCG/ACT nasal spray Spray 1 Spray in nose every day. 16 g 0   • Ascorbic Acid 1000 MG Tab CR Take 1 Tab by mouth every day.     • B Complex-Folic Acid (B COMPLEX-VITAMIN B12 PO) Take 1 Tab by mouth every day.     • vitamin D 5000 UNITS Tab Take 5,000 Units by mouth every day. 30 Tab 2   • loratadine (CLARITIN) 10 MG Tab Take 10 mg by mouth 1 time daily as needed (allergy).     • fenofibrate (TRICOR) 145 MG Tab Take 145 mg by mouth every morning.     • diltiazem CD (CARDIZEM CD) 240 MG CP24 Take 240 mg by mouth every bedtime.     • irbesartan (AVAPRO) 150 MG TABS Take 150 mg by mouth every morning.     • ibuprofen (MOTRIN) 200 MG Tab Take 200 mg by mouth every 6 hours as needed for Mild Pain.     • non-formulary med Take 1 Tab by mouth every day. Royal Jelly 500mg cap     • Green Tea, Camillia sinensis, 315 MG Cap Take 1 Cap by mouth every day.     • ondansetron (ZOFRAN) 4 MG Tab tablet Take 4 mg  "by mouth every 8 hours as needed.     • oxyCODONE-acetaminophen (PERCOCET)  MG Tab Take 1 Tab by mouth every 8 hours as needed.     • calcium carbonate (TUMS) 500 MG Chew Tab Take 500 mg by mouth every day.     • Non Formulary Request Take 1 Package by mouth every day. Indications: VITALIZER GOLD-MULTI VITAMIN     • NON SPECIFIED Take 7 Caps by mouth every bedtime. \"Herb-Lax\"   Indications: constipation       No current facility-administered medications for this visit.         Patient is taking medications as noted in medication list.  Current supplements as per medication list.     Allergies: Benicar [olmesartan]; Other misc; Codeine; Demerol; Hydrocodone; Other drug; Oxycodone; Statins [hmg-coa-r inhibitors]; and Valium    Current social contact/activities: Visiting with friends and Family, Movie night weekly, [employer]    Is patient current with immunizations? Yes.    She  reports that she has never smoked. She has never used smokeless tobacco. She reports that she drinks alcohol. She reports that she does not use drugs.  Counseling given: Not Answered        DPA/Advanced directive: Patient has Advanced Directive on file.     ROS:    Gait: Uses a walker   Ostomy: No   Other tubes: No   Amputations: YES   Chronic oxygen use Yes CPAP  Last eye exam 1-2 Years ago   Wears hearing aids: No   : Denies any urinary leakage during the last 6 months    Annual Health Assessment Questions:    1.  Are you currently engaging in any exercise or physical activity? Yes    2.  How would you describe your mood or emotional well-being today? good    3.  Have you had any falls in the last year? Yes Broke Left Shoulder    4.  Have you noticed any problems with your balance or had difficulty walking? Yes    5.  In the last six months have you experienced any leakage of urine? No    6. DPA/Advanced Directive: Patient has Advanced Directive on file.     Screening:        Depression Screening    Little interest or pleasure in doing " things?  0 - not at all  Feeling down, depressed, or hopeless? 0 - not at all  Patient Health Questionnaire Score: 0    If depressive symptoms identified deferred to follow up visit unless specifically addressed in assessment and plan.    Interpretation of PHQ-9 Total Score   Score Severity   1-4 No Depression   5-9 Mild Depression   10-14 Moderate Depression   15-19 Moderately Severe Depression   20-27 Severe Depression    Screening for Cognitive Impairment    Three Minute Recall (village, kitchen, baby)  3/3 Village Kitchen Baby  Arnold clock face with all 12 numbers and set the hands to show 10 past 10.  Yes 10/10  5/5  If cognitive concerns identified, deferred for follow up unless specifically addressed in assessment and plan.    Fall Risk Assessment    Has the patient had two or more falls in the last year or any fall with injury in the last year?  Yes  If fall risk identified, deferred for follow up unless specifically addressed in assessment and plan.    Safety Assessment    Throw rugs on floor.  Yes  Handrails on all stairs.  Yes  Good lighting in all hallways.  Yes  Difficulty hearing.  No  Patient counseled about all safety risks that were identified.    Functional Assessment ADLs    Are there any barriers preventing you from cooking for yourself or meeting nutritional needs?  No.    Are there any barriers preventing you from driving safely or obtaining transportation?  No.    Are there any barriers preventing you from using a telephone or calling for help?  No.    Are there any barriers preventing you from shopping?  No.    Are there any barriers preventing you from taking care of your own finances?  No.    Are there any barriers preventing you from managing your medications?  No.    Are there any barriers preventing you from showering, bathing or dressing yourself?  No.    Are you currently engaging in any exercise or physical activity?  Yes.  Walking, Stationary Bike  What is your perception of your  health?  Good.    Health Maintenance Summary                IMM ZOSTER VACCINES Postponed 6/5/2019 Originally 12/8/1992. System: vaccine not available, other system reasons    IMM INFLUENZA Postponed 12/27/2019 Originally 9/1/2019. Patient Refused    IMM PNEUMOCOCCAL 65+ (ADULT) LOW/MEDIUM RISK SERIES Postponed 3/5/2020 Originally 12/8/2007. Patient Refused    IMM DTaP/Tdap/Td Vaccine Postponed 3/5/2020 Originally 12/8/1961. Patient Refused    BONE DENSITY Next Due 4/25/2024      Done 4/25/2019 DS-BONE DENSITY STUDY (DEXA)          Patient Care Team:  Chris Alston M.D. as PCP - General (Internal Medicine)  [facility] as Respiratory  [facility] as Home Health Provider  preferred home care  as Respiratory (DME Supplier)  [facility] as Consulting Physician (Ophthalmology)  [facility] as Consulting Physician  Yoshi Gamble M.D. as Consulting Physician (Orthopaedics)  Earle Jacobsen M.D. as Consulting Physician (Orthopaedics)  Emily Cole as Patient Advocate - IHD  [facility] as Home Health Provider    Social History   Substance Use Topics   • Smoking status: Never Smoker   • Smokeless tobacco: Never Used   • Alcohol use Yes      Comment: beer or lizeth 1-2 per year     Family History   Problem Relation Age of Onset   • Cancer Mother         vaginal   • Stroke Father    • Hypertension Brother    • Diabetes Brother    • Hypertension Brother    • Hypertension Brother    • Stroke Daughter      She  has a past medical history of Anesthesia; Arthritis; Benign essential HTN (9/7/2017); Dyslipidemia (3/5/2019); Expressive aphasia; DARIN (obstructive sleep apnea) (1/26/2017); Post-nasal drip; Restless leg syndrome; and Stroke (HCC) (2/1993, 8/1993).   Past Surgical History:   Procedure Laterality Date   • PB RECONSTR TOTAL SHOULDER IMPLANT Left 3/21/2019    Procedure: ARTHROPLASTY, SHOULDER, TOTAL-  REVISION REVERSE;  Surgeon: Yoshi Gamble M.D.;   Location: [facility];  Service: Orthopedics   • HUMERUS ORIF Left 3/21/2019    Procedure: ORIF, FRACTURE, HUMERUS;  Surgeon: Yoshi Gamble M.D.;  Location: [facility];  Service: Orthopedics   • KNEE ARTHROPLASTY TOTAL Left 2/12/2018    Procedure: KNEE ARTHROPLASTY TOTAL;  Surgeon: Earle Jacobsen M.D.;  Location: [facility];  Service: Orthopedics   • CERVICAL FUSION POSTERIOR  8/8/2017    Procedure: CERVICAL FUSION POSTERIOR- C3-5, INSTRUMENTED;  Surgeon: Florentino Pike M.D.;  Location: [facility];  Service:    • CERVICAL LAMINECTOMY POSTERIOR  8/8/2017    Procedure: CERVICAL LAMINECTOMY POSTERIOR- C3-5;  Surgeon: Florentino Pike M.D.;  Location: [facility];  Service:    • HIP ARTHROPLASTY TOTAL Left 5/8/2017    Procedure: HIP ARTHROPLASTY TOTAL;  Surgeon: Earle Jaocbsen M.D.;  Location: [facility];  Service:    • HIP ARTHROPLASTY TOTAL Right 12/12/2016    Procedure: HIP ARTHROPLASTY TOTAL;  Surgeon: Earle Jacobsen M.D.;  Location: [facility];  Service:    • LUMBAR FUSION POSTERIOR  9/26/2016    Procedure: LUMBAR FUSION POSTERIOR L2-S1 onlay ;  Surgeon: Florentino Pike M.D.;  Location: [facility];  Service:    • LUMBAR LAMINECTOMY DISKECTOMY N/A 9/26/2016    Procedure: LUMBAR LAMINECTOMY DISKECTOMY L2-S1;  Surgeon: Florentino Pike M.D.;  Location: [facility];  Service:    • COLONOSCOPY  2/2016    Every 10 years   • SHOULDER ARTHROPLASTY TOTAL Left 8/11/2015    Procedure: SHOULDER ARTHROPLASTY REVERSE TOTAL;  Surgeon: Yoshi Gamble M.D.;  Location: [facility];  Service:    • KNEE ARTHROPLASTY TOTAL  6/23/2014    Performed by Earle Jacobsen M.D. at [facility]   • HAMMERTOE CORRECTION Left 11/12/2007    Removed 3rd toe   • LUMBAR LAMINECTOMY DISKECTOMY  1993    Lumbar   • CARPAL TUNNEL RELEASE Bilateral 1993   • CERVICAL DISK AND FUSION  ANTERIOR  1993    X2   • HAMMERTOE CORRECTION Right 1980's    Removed 3rd toe   • BUNIONECTOMY Bilateral 1980's   • HYSTERECTOMY, TOTAL ABDOMINAL  1977   • FINGER ORIF Left as child    Index           Exam:     There were no vitals taken for this visit. There is no height or weight on file to calculate BMI.    Hearing good.    Dentition good  Alert, oriented in no acute distress.  Eye contact is good, speech goal directed, affect calm      Assessment and Plan. The following treatment and monitoring plan is recommended:    Benign essential HTN  Well controlled on irbesartan 150 mg daily.     DARIN (obstructive sleep apnea)  Compliant with CPAP, following with pulmonary.     Stroke (HCC)  On baby aspirin, has speech deficits.     Closed left humeral fracture  Her cast will be coming off 5/10/2019. Her most recent bone density demonstrated osteopenia but she declines osteoporosis treatment.     Services suggested: No services needed at this time  Health Care Screening recommendations as per orders if indicated.  Referrals offered: PT/OT/Nutrition counseling/Behavioral Health/Smoking cessation as per orders if indicated.    Discussion today about general wellness and lifestyle habits:    · Prevent falls and reduce trip hazards; Cautioned about securing or removing rugs.  · Have a working fire alarm and carbon monoxide detector;   · Engage in regular physical activity and social activities       Follow-up: No Follow-up on file.

## 2019-04-30 NOTE — ASSESSMENT & PLAN NOTE
Her cast will be coming off 5/10/2019. Her most recent bone density demonstrated osteopenia but she declines osteoporosis treatment.

## 2019-05-01 ENCOUNTER — PATIENT OUTREACH (OUTPATIENT)
Dept: HEALTH INFORMATION MANAGEMENT | Facility: OTHER | Age: 79
End: 2019-05-01

## 2019-05-01 NOTE — PROGRESS NOTES
Irina Traylor was admitted to [facility] on 3/19.19 for a shoulder surgery, preformed by Dr.Christopher Gamble. Patient discharged home on 3/25/19. [facility] patient advocate was able to successfully engage with patient post-discharge and throughout the case. Patient lives at home alone with community support and friends that stop by to assist and visit regularly. Patient typically drives herself, however, is currently waiting for her stent to be removed before she starts driving again. Patient utilized Home Health services throughout the case where she was recently discharged and stated she can perform all ADL's comfortably.      Per discharge orders, patient was instructed to see her orthopedic surgeon within two weeks, and her PCP. Patient saw  on 4/3/19 and will return on 5/10/19. Patient saw her PCP on 4/2/19.     PPS screening was conducted and scored above 50%.

## 2019-05-06 ENCOUNTER — PATIENT OUTREACH (OUTPATIENT)
Dept: HEALTH INFORMATION MANAGEMENT | Facility: OTHER | Age: 79
End: 2019-05-06

## 2019-05-06 NOTE — PROGRESS NOTES
1. HealthConnect Verified: yes    2. Verify PCP: yes    3. Review and add  to Care Team: yes    4. WebIZ Checked & Epic Updated: no record found    5. Reviewed/Updated the following with patient:       •   Communication Preference Obtained? YES  • MyChart Activation: declined       •   E-Mail Address Obtained? NO does not have email       •   Appointment Day and Time Preferences? NO       •   Preferred Pharmacy? YES       •   Preferred Lab? YES    6. Care Gap Scheduling (Attempt to Schedule EACH Overdue Care Gap!)    No care gaps due pt already completed hospital fv.  Needs uber to Dr. Gamble on Friday 5/10 will schedule ride

## 2019-06-04 RX ORDER — IRBESARTAN 150 MG/1
150 TABLET ORAL EVERY MORNING
Qty: 100 TAB | Refills: 1 | Status: SHIPPED | OUTPATIENT
Start: 2019-06-04 | End: 2019-12-03 | Stop reason: SDUPTHER

## 2019-06-04 RX ORDER — FENOFIBRATE 145 MG/1
145 TABLET, COATED ORAL EVERY MORNING
Qty: 90 TAB | Refills: 1 | Status: SHIPPED | OUTPATIENT
Start: 2019-06-04 | End: 2019-12-03 | Stop reason: SDUPTHER

## 2019-06-04 RX ORDER — DILTIAZEM HYDROCHLORIDE 240 MG/1
240 CAPSULE, COATED, EXTENDED RELEASE ORAL
Qty: 90 CAP | Refills: 1 | Status: SHIPPED | OUTPATIENT
Start: 2019-06-04 | End: 2019-12-03 | Stop reason: SDUPTHER

## 2019-06-04 RX ORDER — LORATADINE 10 MG/1
10 TABLET ORAL
Qty: 90 TAB | Refills: 1 | Status: SHIPPED | OUTPATIENT
Start: 2019-06-04 | End: 2019-12-04 | Stop reason: SDUPTHER

## 2019-06-04 NOTE — TELEPHONE ENCOUNTER
Pt has had OV within the 12 month protocol and lipid panel is current. 6 month supply sent to pharmacy.   Lab Results   Component Value Date/Time    CHOLSTRLTOT 165 09/25/2018 08:00 AM    LDL 82 09/25/2018 08:00 AM    HDL 72 09/25/2018 08:00 AM    TRIGLYCERIDE 54 09/25/2018 08:00 AM       Lab Results   Component Value Date/Time    SODIUM 139 03/20/2019 04:52 AM    POTASSIUM 3.8 03/20/2019 04:52 AM    CHLORIDE 108 03/20/2019 04:52 AM    CO2 24 03/20/2019 04:52 AM    GLUCOSE 102 (H) 03/20/2019 04:52 AM    BUN 29 (H) 03/20/2019 04:52 AM    CREATININE 0.96 03/20/2019 04:52 AM     Lab Results   Component Value Date/Time    ALKPHOSPHAT 58 09/07/2017 02:32 PM    ASTSGOT 21 09/25/2018 08:00 AM    ALTSGPT 16 09/25/2018 08:00 AM    TBILIRUBIN 0.4 09/07/2017 02:32 PM

## 2019-09-09 ENCOUNTER — TELEPHONE (OUTPATIENT)
Dept: MEDICAL GROUP | Facility: PHYSICIAN GROUP | Age: 79
End: 2019-09-09

## 2019-09-09 NOTE — TELEPHONE ENCOUNTER
Future Appointments       Provider Department Center    9/10/2019 10:55 AM Chris Alston M.D. [facility]    11/12/2019 10:10 AM Umer Moise M.D. [facility]    12/10/2019 12:00 PM MELISSA Knutson [facility] Sleep Medicine         ESTABLISHED PATIENT PRE-VISIT PLANNING     Patient was NOT contacted to complete PVP.       1.  Reviewed notes from the last few office visits within the medical group: Yes    2.  If any orders were placed at last visit or intended to be done for this visit (i.e. 6 mos follow-up), do we have Results/Consult Notes?        •  Labs - Labs ordered, completed on 03/2019 and results are in chart.       •  Imaging - Imaging ordered, completed and results are in chart.       •  Referrals - No referrals were ordered at last office visit.    3. Is this appointment scheduled as a Hospital Follow-Up? No    4.  Immunizations were updated in [facility] using WebIZ?: Yes       •  Web Iz Recommendations: FLU, PREVNAR (PCV13) , TDAP, VARICELLA (Chicken Pox)  and SHINGRIX (Shingles)    5.  Patient is due for the following Health Maintenance Topics:   Health Maintenance Due   Topic Date Due   • IMM ZOSTER VACCINES (1 of 2) 12/08/1992   • IMM PNEUMOCOCCAL VACCINE: 65+ Years (1 of 2 - PCV13) 12/08/2007     6. Orders for overdue Health Maintenance topics pended in Pre-Charting? NO    7.  AHA (MDX) form printed for Provider? No, already completed    8.  Patient was NOT informed to arrive 15 min prior to their scheduled appointment and bring in their medication bottles.

## 2019-09-10 ENCOUNTER — OFFICE VISIT (OUTPATIENT)
Dept: MEDICAL GROUP | Facility: PHYSICIAN GROUP | Age: 79
End: 2019-09-10
Payer: MEDICARE

## 2019-09-10 VITALS
TEMPERATURE: 98.5 F | OXYGEN SATURATION: 97 % | HEART RATE: 69 BPM | WEIGHT: 165 LBS | BODY MASS INDEX: 33.26 KG/M2 | HEIGHT: 59 IN | SYSTOLIC BLOOD PRESSURE: 144 MMHG | RESPIRATION RATE: 16 BRPM | DIASTOLIC BLOOD PRESSURE: 82 MMHG

## 2019-09-10 DIAGNOSIS — S99.922A INJURY OF TOE ON LEFT FOOT, INITIAL ENCOUNTER: ICD-10-CM

## 2019-09-10 PROCEDURE — 99214 OFFICE O/P EST MOD 30 MIN: CPT | Performed by: INTERNAL MEDICINE

## 2019-09-10 NOTE — ASSESSMENT & PLAN NOTE
Last Friday she dropped a  on her left big toe. She has been taking tylenol for pain but it isn't adequate.

## 2019-09-10 NOTE — PROGRESS NOTES
PRIMARY CARE CLINIC FOLLOW UP VISIT  Chief Complaint   Patient presents with   • Toe Injury     Lt big toe injury    • Hypertension   • Other     past stroke/ DARIN med management      History of Present Illness     Injury of toe on left foot  Last Friday she dropped a  on her left big toe. She has been taking tylenol for pain but it isn't adequate.     Current Outpatient Medications   Medication Sig Dispense Refill   • DILTIAZem CD (CARDIZEM CD) 240 MG CAPSULE SR 24 HR Take 1 Cap by mouth every bedtime. 90 Cap 1   • fenofibrate (TRICOR) 145 MG Tab Take 1 Tab by mouth every morning. 90 Tab 1   • irbesartan (AVAPRO) 150 MG Tab Take 1 Tab by mouth every morning. 100 Tab 1   • loratadine (CLARITIN) 10 MG Tab Take 1 Tab by mouth 1 time daily as needed (allergy). 90 Tab 1   • Acetaminophen (TYLENOL 8 HOUR PO) Take  by mouth.     • ibuprofen (MOTRIN) 200 MG Tab Take 200 mg by mouth every 6 hours as needed for Mild Pain.     • non-formulary med Take 1 Tab by mouth every day. Royal Jelly 500mg cap     • Green Tea, Camillia sinensis, 315 MG Cap Take 1 Cap by mouth every day.     • vitamin E (VITAMIN E) 1000 UNIT Cap Take 1 Cap by mouth every day.     • ondansetron (ZOFRAN) 4 MG Tab tablet Take 4 mg by mouth every 8 hours as needed.     • oxyCODONE-acetaminophen (PERCOCET)  MG Tab Take 1 Tab by mouth every 8 hours as needed.     • aspirin EC (ECOTRIN) 81 MG Tablet Delayed Response Take 81 mg by mouth every day.     • fluticasone (FLONASE) 50 MCG/ACT nasal spray Spray 1 Spray in nose every day. 16 g 0   • Ascorbic Acid 1000 MG Tab CR Take 1 Tab by mouth every day.     • B Complex-Folic Acid (B COMPLEX-VITAMIN B12 PO) Take 1 Tab by mouth every day.     • calcium carbonate (TUMS) 500 MG Chew Tab Take 500 mg by mouth every day.     • vitamin D 5000 UNITS Tab Take 5,000 Units by mouth every day. 30 Tab 2   • Non Formulary Request Take 1 Package by mouth every day. Indications: VITALIZER GOLD-MULTI VITAMIN     • NON  "SPECIFIED Take 7 Caps by mouth every bedtime. \"Herb-Lax\"   Indications: constipation       No current facility-administered medications for this visit.      Past Medical History:   Diagnosis Date   • Anesthesia     States feels like she can't breathe with induction for right hip surgery, gets nausea vomiting with most pain meds.  Uses anti-nausea medicine if takes morphine   • Arthritis     To hips, shoulders, hands   • Benign essential HTN 9/7/2017   • Dyslipidemia 3/5/2019   • Expressive aphasia     Related to CVA   • DARIN (obstructive sleep apnea) 1/26/2017    AHI 23.6, minimum saturation 69%, on CPAP 15 cm.   • Post-nasal drip    • Restless leg syndrome     4/25/17-Resolved, states was related to right hip and none S/P replacement.   • Stroke (HCC) 2/1993, 8/1993    Ocqqcqlk-ffjikk-jjfyzmvdgk aphasia,mouth forming words, reading, numbers     Past Surgical History:   Procedure Laterality Date   • PB RECONSTR TOTAL SHOULDER IMPLANT Left 3/21/2019    Procedure: ARTHROPLASTY, SHOULDER, TOTAL-  REVISION REVERSE;  Surgeon: Yoshi Gamble M.D.;  Location: [facility];  Service: Orthopedics   • HUMERUS ORIF Left 3/21/2019    Procedure: ORIF, FRACTURE, HUMERUS;  Surgeon: Yoshi Gamble M.D.;  Location: [facility];  Service: Orthopedics   • KNEE ARTHROPLASTY TOTAL Left 2/12/2018    Procedure: KNEE ARTHROPLASTY TOTAL;  Surgeon: Earle Jacobsen M.D.;  Location: [facility];  Service: Orthopedics   • CERVICAL FUSION POSTERIOR  8/8/2017    Procedure: CERVICAL FUSION POSTERIOR- C3-5, INSTRUMENTED;  Surgeon: Florentino Pike M.D.;  Location: [facility];  Service:    • CERVICAL LAMINECTOMY POSTERIOR  8/8/2017    Procedure: CERVICAL LAMINECTOMY POSTERIOR- C3-5;  Surgeon: Florentino Pike M.D.;  Location: [facility];  Service:    • HIP ARTHROPLASTY TOTAL Left 5/8/2017    Procedure: HIP ARTHROPLASTY TOTAL;  Surgeon: Earle Jacobsen M.D.;  " Location: SURGERY [facility];  Service:    • HIP ARTHROPLASTY TOTAL Right 2016    Procedure: HIP ARTHROPLASTY TOTAL;  Surgeon: Earle Jacobsen M.D.;  Location: SURGERY [facility];  Service:    • LUMBAR FUSION POSTERIOR  2016    Procedure: LUMBAR FUSION POSTERIOR L2-S1 onlay ;  Surgeon: Florentino Pike M.D.;  Location: SURGERY [facility];  Service:    • LUMBAR LAMINECTOMY DISKECTOMY N/A 2016    Procedure: LUMBAR LAMINECTOMY DISKECTOMY L2-S1;  Surgeon: Florentino Pike M.D.;  Location: SURGERY [facility];  Service:    • COLONOSCOPY  2016    Every 10 years   • SHOULDER ARTHROPLASTY TOTAL Left 2015    Procedure: SHOULDER ARTHROPLASTY REVERSE TOTAL;  Surgeon: Yoshi Gamble M.D.;  Location: [facility];  Service:    • KNEE ARTHROPLASTY TOTAL  2014    Performed by Earle Jacobsen M.D. at [facility]   • HAMMERTOE CORRECTION Left 2007    Removed 3rd toe   • LUMBAR LAMINECTOMY DISKECTOMY  1993    Lumbar   • CARPAL TUNNEL RELEASE Bilateral    • CERVICAL DISK AND FUSION ANTERIOR  1993    X2   • HAMMERTOE CORRECTION Right     Removed 3rd toe   • BUNIONECTOMY Bilateral    • HYSTERECTOMY, TOTAL ABDOMINAL     • FINGER ORIF Left as child    Index     Social History     Tobacco Use   • Smoking status: Never Smoker   • Smokeless tobacco: Never Used   Substance Use Topics   • Alcohol use: Yes     Comment: beer or lizeth 1-2 per year   • Drug use: No     Social History     Social History Narrative    Retired LVN at [employer]     Family History   Problem Relation Age of Onset   • Cancer Mother         vaginal   • Stroke Father    • Hypertension Brother    • Diabetes Brother    • Hypertension Brother    • Hypertension Brother    • Stroke Daughter      Family Status   Relation Name Status   • Mo 52    • Fa 72    • Osmani Morgan Alive   • MGMo     • MGFa     • PGMo     • PGFa     • Bro  "Sathya Alive   • Osmani  Alive   • Bhumi Green      Allergies: Benicar [olmesartan]; Other misc; Codeine; Demerol; Hydrocodone; Other drug; Oxycodone; Statins [hmg-coa-r inhibitors]; and Valium    ROS  As per HPI above. All other systems reviewed and negative.        Objective   /82   Pulse 69   Temp 36.9 °C (98.5 °F)   Resp 16   Ht 1.499 m (4' 11\")   Wt 74.8 kg (165 lb)   SpO2 97%  Body mass index is 33.33 kg/m².    General: alert and oriented, pleasant, cooperative  HEENT: Normocephalic, atraumatic.   Extremities: left big toenail avulsion with hematoma underneath   Psychiatric: appropriate mood and affect. Good insight and appropriate judgment     Assessment and Plan   The following treatment plan was discussed     1. Injury of toe on left foot, initial encounter  Has an extensive hematoma of her left toenail bed resulting in avulsion. I spoke to her podiatrist Dr. Chen who readily agreed to see her today. She went directly to his office from her appointment here today.     Healthcare maintenance     Health Maintenance Due   Topic Date Due   • IMM PNEUMOCOCCAL VACCINE: 65+ Years (1 of 2 - PCV13) 2007     No follow-ups on file.    Chris Alston MD  Internal Medicine  [facility]                   "

## 2019-09-30 ENCOUNTER — PATIENT OUTREACH (OUTPATIENT)
Dept: HEALTH INFORMATION MANAGEMENT | Facility: OTHER | Age: 79
End: 2019-09-30

## 2019-09-30 NOTE — PROGRESS NOTES
Pt called to reschedule her appointment with sleep medicine.She also stated that she needs a referral to ENT as she has had water in her ear that has been bothering her. I advised patient that I would send this to [place] so that she may call her back

## 2019-11-07 ENCOUNTER — TELEPHONE (OUTPATIENT)
Dept: MEDICAL GROUP | Facility: PHYSICIAN GROUP | Age: 79
End: 2019-11-07

## 2019-11-07 NOTE — TELEPHONE ENCOUNTER
Future Appointments       Provider Department Center    11/12/2019 10:10 AM Umer Moise M.D. [facility]    1/7/2020 1:40 PM NAYAN Pastor [facility] Sleep Medicine         ESTABLISHED PATIENT PRE-VISIT PLANNING     Patient was NOT contacted to complete PVP.       1.  Reviewed notes from the last few office visits within the medical group: Yes    2.  If any orders were placed at last visit or intended to be done for this visit (i.e. 6 mos follow-up), do we have Results/Consult Notes?        •  Labs - Labs were not ordered at last office visit.       •  Imaging - Imaging was not ordered at last office visit.       •  Referrals - No referrals were ordered at last office visit.    3. Is this appointment scheduled as a Hospital Follow-Up? No    4.  Immunizations were updated in [facility] using WebIZ?: Yes       •  Web Iz Recommendations: FLU, PREVNAR (PCV13) , TDAP, VARICELLA (Chicken Pox)  and SHINGRIX (Shingles)    5.  Patient is due for the following Health Maintenance Topics:   Health Maintenance Due   Topic Date Due   • IMM PNEUMOCOCCAL VACCINE: 65+ Years (1 of 2 - PCV13) 12/08/2007   • IMM INFLUENZA (1) 09/01/2019     6. Orders for overdue Health Maintenance topics pended in Pre-Charting? NO    7.  AHA (MDX) form printed for Provider? No, already completed    8.  Patient was NOT informed to arrive 15 min prior to their scheduled appointment and bring in their medication bottles.

## 2019-12-03 ENCOUNTER — OFFICE VISIT (OUTPATIENT)
Dept: MEDICAL GROUP | Facility: PHYSICIAN GROUP | Age: 79
End: 2019-12-03
Payer: MEDICARE

## 2019-12-03 VITALS
TEMPERATURE: 98.6 F | BODY MASS INDEX: 33.38 KG/M2 | DIASTOLIC BLOOD PRESSURE: 64 MMHG | HEART RATE: 77 BPM | WEIGHT: 165.6 LBS | RESPIRATION RATE: 14 BRPM | HEIGHT: 59 IN | SYSTOLIC BLOOD PRESSURE: 138 MMHG | OXYGEN SATURATION: 96 %

## 2019-12-03 DIAGNOSIS — R41.3 MEMORY DEFICIT: ICD-10-CM

## 2019-12-03 DIAGNOSIS — G47.33 OSA (OBSTRUCTIVE SLEEP APNEA): ICD-10-CM

## 2019-12-03 DIAGNOSIS — R73.9 HYPERGLYCEMIA: ICD-10-CM

## 2019-12-03 DIAGNOSIS — E61.1 IRON DEFICIENCY: ICD-10-CM

## 2019-12-03 DIAGNOSIS — Z51.81 MEDICATION MONITORING ENCOUNTER: ICD-10-CM

## 2019-12-03 DIAGNOSIS — E53.8 VITAMIN B12 DEFICIENCY: ICD-10-CM

## 2019-12-03 DIAGNOSIS — H91.8X3 OTHER SPECIFIED HEARING LOSS OF BOTH EARS: ICD-10-CM

## 2019-12-03 DIAGNOSIS — Z91.09 ENVIRONMENTAL ALLERGIES: ICD-10-CM

## 2019-12-03 DIAGNOSIS — I10 BENIGN ESSENTIAL HTN: ICD-10-CM

## 2019-12-03 DIAGNOSIS — H61.23 IMPACTED CERUMEN, BILATERAL: ICD-10-CM

## 2019-12-03 DIAGNOSIS — E78.5 DYSLIPIDEMIA: ICD-10-CM

## 2019-12-03 DIAGNOSIS — R53.83 OTHER FATIGUE: ICD-10-CM

## 2019-12-03 DIAGNOSIS — E55.9 VITAMIN D DEFICIENCY: ICD-10-CM

## 2019-12-03 DIAGNOSIS — Z86.73 HISTORY OF CVA (CEREBROVASCULAR ACCIDENT): ICD-10-CM

## 2019-12-03 DIAGNOSIS — R47.01 APHASIA: ICD-10-CM

## 2019-12-03 DIAGNOSIS — Z99.89 CPAP (CONTINUOUS POSITIVE AIRWAY PRESSURE) DEPENDENCE: ICD-10-CM

## 2019-12-03 PROBLEM — M16.10 PRIMARY LOCALIZED OSTEOARTHROSIS OF THE HIP: Status: RESOLVED | Noted: 2017-05-08 | Resolved: 2019-12-03

## 2019-12-03 PROBLEM — I63.9 STROKE (HCC): Status: RESOLVED | Noted: 2019-03-05 | Resolved: 2019-12-03

## 2019-12-03 PROBLEM — H91.90 IMPAIRED HEARING: Status: ACTIVE | Noted: 2019-12-03

## 2019-12-03 PROCEDURE — 99215 OFFICE O/P EST HI 40 MIN: CPT | Performed by: FAMILY MEDICINE

## 2019-12-03 RX ORDER — AMOXICILLIN 875 MG/1
875 TABLET, COATED ORAL 2 TIMES DAILY
Status: ON HOLD | COMMUNITY
End: 2022-06-21

## 2019-12-03 RX ORDER — LORATADINE 10 MG/1
TABLET ORAL
COMMUNITY
End: 2019-12-03

## 2019-12-03 RX ORDER — DOXYCYCLINE 100 MG/1
TABLET ORAL
COMMUNITY
End: 2019-12-03

## 2019-12-03 RX ORDER — DOXYCYCLINE HYCLATE 100 MG
TABLET ORAL
COMMUNITY
End: 2019-12-03

## 2019-12-03 RX ORDER — DOXYCYCLINE 100 MG/1
TABLET ORAL
COMMUNITY
Start: 2019-09-10 | End: 2019-12-03

## 2019-12-03 RX ORDER — IRBESARTAN 150 MG/1
150 TABLET ORAL EVERY MORNING
Qty: 100 TAB | Refills: 1 | Status: SHIPPED | OUTPATIENT
Start: 2019-12-03 | End: 2020-02-27 | Stop reason: SDUPTHER

## 2019-12-03 RX ORDER — LOSARTAN POTASSIUM AND HYDROCHLOROTHIAZIDE 12.5; 1 MG/1; MG/1
TABLET ORAL
COMMUNITY
End: 2019-12-03

## 2019-12-03 RX ORDER — LISINOPRIL 20 MG/1
TABLET ORAL
COMMUNITY
End: 2019-12-03

## 2019-12-03 RX ORDER — CEFUROXIME AXETIL 500 MG/1
TABLET ORAL
COMMUNITY
End: 2019-12-03

## 2019-12-03 RX ORDER — CYCLOBENZAPRINE HCL 10 MG
TABLET ORAL
COMMUNITY
End: 2019-12-03

## 2019-12-03 RX ORDER — CYCLOBENZAPRINE HCL 5 MG
TABLET ORAL
COMMUNITY
End: 2019-12-03

## 2019-12-03 RX ORDER — FENOFIBRATE 145 MG/1
TABLET, COATED ORAL
COMMUNITY
End: 2019-12-03

## 2019-12-03 RX ORDER — MELOXICAM 7.5 MG/1
TABLET ORAL
COMMUNITY
End: 2019-12-03

## 2019-12-03 RX ORDER — DILTIAZEM HYDROCHLORIDE 240 MG/1
240 CAPSULE, COATED, EXTENDED RELEASE ORAL
Qty: 100 CAP | Refills: 1 | Status: SHIPPED | OUTPATIENT
Start: 2019-12-03 | End: 2020-02-27 | Stop reason: SDUPTHER

## 2019-12-03 RX ORDER — DILTIAZEM HYDROCHLORIDE 60 MG/1
TABLET, FILM COATED ORAL
COMMUNITY
End: 2019-12-03

## 2019-12-03 RX ORDER — POLYETHYLENE GLYCOL 3350
GRANULES (GRAM) MISCELLANEOUS
COMMUNITY
End: 2019-12-03

## 2019-12-03 RX ORDER — SPIRONOLACTONE 25 MG/1
TABLET ORAL
COMMUNITY
End: 2019-12-03

## 2019-12-03 RX ORDER — AMOXICILLIN 875 MG/1
TABLET, COATED ORAL
COMMUNITY
End: 2019-12-03

## 2019-12-03 RX ORDER — CLONIDINE HYDROCHLORIDE 0.1 MG/1
TABLET ORAL
COMMUNITY
End: 2019-12-03

## 2019-12-03 RX ORDER — CLOPIDOGREL BISULFATE 75 MG/1
TABLET ORAL
COMMUNITY
End: 2019-12-03

## 2019-12-03 RX ORDER — TOBRAMYCIN AND DEXAMETHASONE 3; 1 MG/ML; MG/ML
SUSPENSION/ DROPS OPHTHALMIC
COMMUNITY
End: 2019-12-03

## 2019-12-03 RX ORDER — IRBESARTAN 150 MG/1
TABLET ORAL
COMMUNITY
End: 2019-12-03

## 2019-12-03 RX ORDER — OXYCODONE AND ACETAMINOPHEN 10; 325 MG/1; MG/1
TABLET ORAL
COMMUNITY
End: 2019-12-03

## 2019-12-03 RX ORDER — ONDANSETRON 4 MG/1
TABLET, FILM COATED ORAL
COMMUNITY
End: 2019-12-03

## 2019-12-03 RX ORDER — FLUTICASONE PROPIONATE 50 MCG
SPRAY, SUSPENSION (ML) NASAL
COMMUNITY
End: 2019-12-03

## 2019-12-03 RX ORDER — AMOXICILLIN AND CLAVULANATE POTASSIUM 875; 125 MG/1; MG/1
TABLET, FILM COATED ORAL
COMMUNITY
End: 2019-12-03

## 2019-12-03 RX ORDER — MORPHINE SULFATE 15 MG/1
TABLET, FILM COATED, EXTENDED RELEASE ORAL
COMMUNITY
End: 2019-12-03

## 2019-12-03 RX ORDER — TOBRAMYCIN 3 MG/ML
SOLUTION/ DROPS OPHTHALMIC
COMMUNITY
End: 2019-12-03

## 2019-12-03 RX ORDER — AZITHROMYCIN 250 MG/1
TABLET, FILM COATED ORAL
COMMUNITY
End: 2019-12-03

## 2019-12-03 RX ORDER — CEPHALEXIN 500 MG/1
CAPSULE ORAL
COMMUNITY
End: 2019-12-03

## 2019-12-03 RX ORDER — FLUTICASONE PROPIONATE 50 MCG
1 SPRAY, SUSPENSION (ML) NASAL DAILY
Qty: 16 G | Refills: 3 | Status: SHIPPED | OUTPATIENT
Start: 2019-12-03 | End: 2019-12-05 | Stop reason: SDUPTHER

## 2019-12-03 RX ORDER — DILTIAZEM HYDROCHLORIDE 240 MG/1
CAPSULE, COATED, EXTENDED RELEASE ORAL
COMMUNITY
End: 2019-12-03

## 2019-12-03 RX ORDER — FENOFIBRATE 145 MG/1
145 TABLET, COATED ORAL EVERY MORNING
Qty: 100 TAB | Refills: 1 | Status: SHIPPED | OUTPATIENT
Start: 2019-12-03 | End: 2020-02-27 | Stop reason: SDUPTHER

## 2019-12-03 NOTE — PATIENT INSTRUCTIONS
Diagnoses and all orders for this visit:    History of CVA (cerebrovascular accident)  -     REFERRAL TO NEUROLOGY  -     Lipid Profile; Future  -     REFERRAL TO ENT    Aphasia  -     REFERRAL TO NEUROLOGY    Other specified hearing loss of both ears  -     REFERRAL TO ENT    Memory deficit  -     REFERRAL TO NEUROLOGY    Benign essential HTN  -     DILTIAZem CD (CARDIZEM CD) 240 MG CAPSULE SR 24 HR; Take 1 Cap by mouth every bedtime. Indications: High Blood Pressure Disorder  -     irbesartan (AVAPRO) 150 MG Tab; Take 1 Tab by mouth every morning. Indications: High Blood Pressure Disorder    DARIN (obstructive sleep apnea)    CPAP (continuous positive airway pressure) dependence    BMI 33.0-33.9,adult  -     Patient identified as having weight management issue.  Appropriate orders and counseling given.    Iron deficiency  -     CBC WITH DIFFERENTIAL; Future  -     FERRITIN; Future  -     IRON/TOTAL IRON BIND; Future    Vitamin D deficiency  -     VITAMIN D,25 HYDROXY; Future    Vitamin B12 deficiency  -     VITAMIN B12; Future    Medication monitoring encounter  -     Comp Metabolic Panel; Future    Hyperglycemia  -     HEMOGLOBIN A1C; Future    Dyslipidemia  -     Lipid Profile; Future  -     fenofibrate (TRICOR) 145 MG Tab; Take 1 Tab by mouth every morning. Indications: High Amount of Cholesterol in the Blood    Other fatigue  -     CBC WITH DIFFERENTIAL; Future  -     TSH WITH REFLEX TO FT4; Future    Impacted cerumen, bilateral  -     REFERRAL TO ENT    Environmental allergies  -     fluticasone (FLONASE) 50 MCG/ACT nasal spray; Spray 1 Spray in nose every day.    -Please make a follow-up appointment today for 8 weeks before I broke up and 1 week before you see me please get fasting lab work 8 hours of no eating but drink plenty of water seek make a lab appointment to.  To referrals are sent they should call you in a week if they do not please call the number on the piece of paper for neurology for your history  of strokes and increase of aphasia and to reevaluate your stroke and also for ear nose and throat for her bilateral cerumen impaction and hearing loss as she is unable to clean the ears herself and they have hard impaction of wax and she is having hearing loss.  Continue to use your walker and use your exercises at home.  If she has any falls she will let me know but not having any falls so she will continue her blood pressure medications as she is doing currently and her 81 mg of aspirin.  She will continue irbesartan 150 mg in the morning and diltiazem 240 mg in the evening for her blood pressure.  We will talk about weight loss at her next appointment after we do her lab work as she wants to work on this.  Also please check your blood pressure about twice a week sometimes in the morning sometimes in the evening record the top number systolic and the bottom number diastolic and your heart rate and please bring this blood pressure log at your next appointment as ideal blood pressure for history of stroke is less than 130s over 90s and we will do any medication readjustments as needed depending on her kidney function lab work and blood pressure logs.  ER precautions given if any chest pain, shortness of breath, passing out, strokelike symptoms then please go to the ER call 911 otherwise we will see her back in about 8 weeks to go over her lab work and make any medication adjustments as needed and to go over her blood pressure logs.  Refills given of every other stable medication right now as above. She will find out if she needs the pneumonia vaccine by calling her insurance whether she wants it done at the pharmacy or at her follow-up visit.      Return in about 8 weeks (around 1/28/2020), or lab FU/CVA hx/BP/meds.    How to Take Your Blood Pressure  Blood pressure is a measurement of how strongly your blood is pressing against the walls of your arteries. Arteries are blood vessels that carry blood from your heart  throughout your body. Your health care provider takes your blood pressure at each office visit. You can also take your own blood pressure at home with a blood pressure machine. You may need to take your own blood pressure:  · To confirm a diagnosis of high blood pressure (hypertension).  · To monitor your blood pressure over time.  · To make sure your blood pressure medicine is working.  Supplies needed:  To take your blood pressure, you will need a blood pressure machine. You can buy a blood pressure machine, or blood pressure monitor, at most [employer] or online. There are several types of home blood pressure monitors. When choosing one, consider the following:  · Choose a monitor that has an arm cuff.  · Choose a monitor that wraps snugly around your upper arm. You should be able to fit only one finger between your arm and the cuff.  · Do not choose a monitor that measures your blood pressure from your wrist or finger.  Your health care provider can suggest a reliable monitor that will meet your needs.  How to prepare  To get the most accurate reading, avoid the following for 30 minutes before you check your blood pressure:  · Drinking caffeine.  · Drinking alcohol.  · Eating.  · Smoking.  · Exercising.  Five minutes before you check your blood pressure:  · Empty your bladder.  · Sit quietly without talking in a dining chair, rather than in a soft couch or armchair.  How to take your blood pressure  To check your blood pressure, follow the instructions in the manual that came with your blood pressure monitor. If you have a digital blood pressure monitor, the instructions may be as follows:  1. Sit up straight.  2. Place your feet on the floor. Do not cross your ankles or legs.  3. Rest your left arm at the level of your heart on a table or desk or on the arm of a chair.  4. Pull up your shirt sleeve.  5. Wrap the blood pressure cuff around the upper part of your left arm, 1 inch (2.5 cm) above your elbow. It is  "best to wrap the cuff around bare skin.  6. Fit the cuff snugly around your arm. You should be able to place only one finger between the cuff and your arm.  7. Position the cord inside the groove of your elbow.  8. Press the power button.  9. Sit quietly while the cuff inflates and deflates.  10. Read the digital reading on the monitor screen and write it down (record it).  11. Wait 2-3 minutes, then repeat the steps starting at step 1.  What does my blood pressure reading mean?  A blood pressure reading is recorded as two numbers, such as \"120 over 80\" (or 120/80). The first (\"top\") number is called the systolic pressure. It is a measure of the pressure in your arteries as the heart beats. The second (\"bottom\") number is called the diastolic pressure. It is a measure of the pressure in your arteries as the heart relaxes between beats.  Blood pressure is classified into four stages. The following are the stages for adults who do not have a short-term serious illness or a chronic condition. Systolic pressure and diastolic pressure are measured in a unit called mm Hg.  Normal  · Systolic pressure: below 120.  · Diastolic pressure: below 80.  Prehypertension  · Systolic pressure: 120-139.  · Diastolic pressure: 80-89.  Hypertension stage 1  · Systolic pressure: 140-159.  · Diastolic pressure: 90-99.  Hypertension stage 2  · Systolic pressure: 160 or above.  · Diastolic pressure: 100 or above.  You can have prehypertension or hypertension even if only the systolic or only the diastolic number in your reading is higher than normal.  Follow these instructions at home:  · Check your blood pressure as often as recommended by your health care provider.  · Take your monitor to the next appointment with your health care provider to make sure:  ¨ That you are using it correctly.  ¨ That it provides accurate readings.  · Be sure you understand what your goal blood pressure numbers are.  · Tell your health care provider if you " are having any side effects from blood pressure medicine.  Contact a health care provider if:  · Your blood pressure is consistently high.  Get help right away if:  · Your systolic blood pressure is higher than 180.  · Your diastolic blood pressure is higher than 110.  This information is not intended to replace advice given to you by your health care provider. Make sure you discuss any questions you have with your health care provider.  Document Released: 05/26/2017 Document Revised: 08/08/2017 Document Reviewed: 05/26/2017  [employer] Interactive Patient Education © 2017 [employer]  Introduction  Your blood pressure on this visit to the emergency department or clinic is elevated. This does not necessarily mean you have high blood pressure (hypertension), but it does mean that your blood pressure needs to be rechecked. Many times your blood pressure can increase due to illness, pain, anxiety, or other factors.  We recommend that you get a series of blood pressure readings done over a period of 5 days. It is best to get a reading in the morning and one in the evening. You should make sure to sit and relax for 1-5 minutes before the reading is taken. Write the readings down and make a follow-up appointment with your health care provider to discuss the results. If there is not a free clinic or a drug store with a blood-pressure-taking machine near you, you can purchase blood-pressure-taking equipment from a drug store. Having one in the home allows you the convenience of taking your blood pressure while you are home and relaxed.  BLOOD PRESSURE LOG   Date: _______________________  · a.m. _____________________  · p.m. _____________________  Date: _______________________  · a.m. _____________________  · p.m. _____________________  Date: _______________________  · a.m. _____________________  · p.m. _____________________  Date: _______________________  · a.m. _____________________  · p.m. _____________________  Date:  _______________________  · a.m. _____________________  · p.m. _____________________  This information is not intended to replace advice given to you by your health care provider. Make sure you discuss any questions you have with your health care provider.  Document Released: 09/15/2004 Document Revised: 07/07/2017 Document Reviewed: 02/10/2015  © 2017 [employer]

## 2019-12-03 NOTE — PROGRESS NOTES
cc: Establish care, history of CVA, aphasia, hypertension, DARIN on CPAP, BMI 33    Subjective:     Irina Traylor is a 78 y.o. female presenting Retired LVN at .  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  She is currently driving without any accidents.  She does use a walker to ambulate.  Not having any falls.  Does not have hearing aids but is having hearing concerns.  Some increased aphasia especially since her history of CVAs and memory issues.  March 2019 she was hospitalized from her fracture of her left arm and did have home health which she no longer has right now.  She does not have family here but she does have neighbors who help her out.  No chest pain or problems breathing when she is active.  She has not seen a neurologist in a long time since her strokes.  She does have multiple hardware prosthetics with her joints and so she does take amoxicillin 875 mg twice daily 1 day before her teeth cleaning the day of her teeth cleaning in 1 day after her teeth cleaning.  She does do a lot of activity at home trying to increase her strength and not having any chest pain or problems breathing.  She does have a blood pressure cuff at home and she does know how to use her blood pressure cuff.  She is currently on 2 blood pressure medications diltiazem 240 mg extended release before bed and irbesartan 150 mg once a day in the morning and these doses have not changed.  She did have a statin intolerance before with cramping so she does not take a statin but she takes fenofibrate 145 mg for her cholesterol.  She also takes 81 mg of aspirin daily for stroke prevention.  She is a non-smoker and does not drink alcohol.  Due to financial reasons she does not see too many specialists right now but she has seen a chiropractor and podiatrist in the past and she does see her ophthalmologist and dentist and pulmonologist once a year for her CPAP which she does use daily.  She is having problems with decreased hearing loss in bilateral ears  and has never had any hearing aids or hearing checked recently.    Review of systems:     Constitutional: Negative for fever, chills and positive fatigue.   HENT: Negative for sinus pressure, negative for ear pain or positive hearing loss  Eyes: Negative for blurriness, negative for double vision  Respiratory: Negative for cough and shortness of breath, negative for exertional shortness of breath  Cardiovascular: Negative for leg swelling, negative for palpitations, negative for chest pain  Gastrointestinal: Negative for nausea, vomiting, abdominal pain, constipation and diarrhea.  Genitourinary: Negative for dysuria and hematuria.   Skin: Negative for rash.   Neurological: Negative for dizziness, focal weakness and headaches.   Endo/Heme/Allergies: Denies bleeding, bruising, and recurrent allergies.  Psychiatric/Behavioral: Negative for depression and anxiety.        Current Outpatient Medications:   •  aspirin 81 MG tablet, ASPIRIN 81 MG ORAL TABLET, Disp: , Rfl:   •  amoxicillin (AMOXIL) 875 MG tablet, Take 875 mg by mouth 2 times a day. Take 1 tab po bid for 3 days for dental cleaning, Disp: , Rfl:   •  DILTIAZem CD (CARDIZEM CD) 240 MG CAPSULE SR 24 HR, Take 1 Cap by mouth every bedtime. Indications: High Blood Pressure Disorder, Disp: 100 Cap, Rfl: 1  •  irbesartan (AVAPRO) 150 MG Tab, Take 1 Tab by mouth every morning. Indications: High Blood Pressure Disorder, Disp: 100 Tab, Rfl: 1  •  fenofibrate (TRICOR) 145 MG Tab, Take 1 Tab by mouth every morning. Indications: High Amount of Cholesterol in the Blood, Disp: 100 Tab, Rfl: 1  •  fluticasone (FLONASE) 50 MCG/ACT nasal spray, Spray 1 Spray in nose every day., Disp: 16 g, Rfl: 3  •  loratadine (CLARITIN) 10 MG Tab, Take 1 Tab by mouth 1 time daily as needed (allergy)., Disp: 90 Tab, Rfl: 1  •  Acetaminophen (TYLENOL 8 HOUR PO), Take  by mouth., Disp: , Rfl:   •  ibuprofen (MOTRIN) 200 MG Tab, Take 200 mg by mouth every 6 hours as needed for Mild Pain., Disp:  ", Rfl:   •  non-formulary med, Take 1 Tab by mouth every day. Royal Jelly 500mg cap, Disp: , Rfl:   •  vitamin E (VITAMIN E) 1000 UNIT Cap, Take 1 Cap by mouth every day., Disp: , Rfl:   •  B Complex-Folic Acid (B COMPLEX-VITAMIN B12 PO), Take 1 Tab by mouth every day., Disp: , Rfl:   •  calcium carbonate (TUMS) 500 MG Chew Tab, Take 500 mg by mouth every day., Disp: , Rfl:   •  vitamin D 5000 UNITS Tab, Take 5,000 Units by mouth every day., Disp: 30 Tab, Rfl: 2  •  Non Formulary Request, Take 1 Package by mouth every day. Indications: VITALIZER GOLD-MULTI VITAMIN, Disp: , Rfl:   •  NON SPECIFIED, Take 7 Caps by mouth every bedtime. \"Herb-Lax\"   Indications: constipation, Disp: , Rfl:     Allergies, past medical history, past surgical history, family history, social history reviewed and updated    Objective:     Vitals: /64 (BP Location: Right arm, Patient Position: Sitting, BP Cuff Size: Adult)   Pulse 77   Temp 37 °C (98.6 °F) (Temporal)   Resp 14   Ht 1.499 m (4' 11\")   Wt 75.1 kg (165 lb 9.6 oz)   SpO2 96%   BMI 33.45 kg/m²   General: Alert, pleasant, NAD  HEENT: Normocephalic.  Nontraumatic. EOMI, no icterus or pallor.  Conjunctivae and lids normal. External ears normal. Oropharynx non-erythematous, mucous membranes moist.  Neck supple.  No thyromegaly or masses palpated. No cervical or supraclavicular lymphadenopathy.  Bilateral TM not visualized due to impaction of cerumen.  Heart: Regular rate and rhythm.  S1 and S2 normal.  No murmurs appreciated.  Respiratory: Normal respiratory effort.  Clear to auscultation bilaterally.  Abdomen: Non-distended, soft, non tender in all 4 quadrants.  Skin: Warm, dry, no rashes.  Musculoskeletal: Gait is slow but steady with her walker..  Moves all extremities well.  Extremities: No leg edema.  Pedal pulses 2+ symmetric.   Psych:  Affect/mood is normal, judgement is good, memory is intact, grooming is appropriate.    Assessment/Plan:     Diagnoses and all " orders for this visit:    History of CVA (cerebrovascular accident)  -     REFERRAL TO NEUROLOGY  -     Lipid Profile; Future  -     REFERRAL TO ENT    Aphasia  -     REFERRAL TO NEUROLOGY    Other specified hearing loss of both ears  -     REFERRAL TO ENT    Memory deficit  -     REFERRAL TO NEUROLOGY    Benign essential HTN  -     DILTIAZem CD (CARDIZEM CD) 240 MG CAPSULE SR 24 HR; Take 1 Cap by mouth every bedtime. Indications: High Blood Pressure Disorder  -     irbesartan (AVAPRO) 150 MG Tab; Take 1 Tab by mouth every morning. Indications: High Blood Pressure Disorder    DARIN (obstructive sleep apnea)    CPAP (continuous positive airway pressure) dependence    BMI 33.0-33.9,adult  -     Patient identified as having weight management issue.  Appropriate orders and counseling given.    Iron deficiency  -     CBC WITH DIFFERENTIAL; Future  -     FERRITIN; Future  -     IRON/TOTAL IRON BIND; Future    Vitamin D deficiency  -     VITAMIN D,25 HYDROXY; Future    Vitamin B12 deficiency  -     VITAMIN B12; Future    Medication monitoring encounter  -     Comp Metabolic Panel; Future    Hyperglycemia  -     HEMOGLOBIN A1C; Future    Dyslipidemia  -     Lipid Profile; Future  -     fenofibrate (TRICOR) 145 MG Tab; Take 1 Tab by mouth every morning. Indications: High Amount of Cholesterol in the Blood    Other fatigue  -     CBC WITH DIFFERENTIAL; Future  -     TSH WITH REFLEX TO FT4; Future    Impacted cerumen, bilateral  -     REFERRAL TO ENT    Environmental allergies  -     fluticasone (FLONASE) 50 MCG/ACT nasal spray; Spray 1 Spray in nose every day.    -Please make a follow-up appointment today for 8 weeks before I broke up and 1 week before you see me please get fasting lab work 8 hours of no eating but drink plenty of water seek make a lab appointment to.  To referrals are sent they should call you in a week if they do not please call the number on the piece of paper for neurology for your history of strokes and  increase of aphasia and to reevaluate your stroke and also for ear nose and throat for her bilateral cerumen impaction and hearing loss as she is unable to clean the ears herself and they have hard impaction of wax and she is having hearing loss.  Continue to use your walker and use your exercises at home.  If she has any falls she will let me know but not having any falls so she will continue her blood pressure medications as she is doing currently and her 81 mg of aspirin.  She will continue irbesartan 150 mg in the morning and diltiazem 240 mg in the evening for her blood pressure.  We will talk about weight loss at her next appointment after we do her lab work as she wants to work on this.  Also please check your blood pressure about twice a week sometimes in the morning sometimes in the evening record the top number systolic and the bottom number diastolic and your heart rate and please bring this blood pressure log at your next appointment as ideal blood pressure for history of stroke is less than 130s over 90s and we will do any medication readjustments as needed depending on her kidney function lab work and blood pressure logs.  ER precautions given if any chest pain, shortness of breath, passing out, strokelike symptoms then please go to the ER call [phone] otherwise we will see her back in about 8 weeks to go over her lab work and make any medication adjustments as needed and to go over her blood pressure logs.  Refills given of every other stable medication right now as above.  She will find out if she needs the pneumonia vaccine by calling her insurance whether she wants it done at the pharmacy or at her follow-up visit.    Return in about 8 weeks (around 1/28/2020), or lab FU/CVA hx/BP/meds.    Patient was seen for 60 minutes face-to-face of which greater than 50% of the visit was spent in counseling and coordination of care as documented above in their assessment and plan.

## 2019-12-05 DIAGNOSIS — Z91.09 ENVIRONMENTAL ALLERGIES: ICD-10-CM

## 2019-12-05 RX ORDER — FLUTICASONE PROPIONATE 50 MCG
SPRAY, SUSPENSION (ML) NASAL
Qty: 16 G | Refills: 3 | Status: SHIPPED | OUTPATIENT
Start: 2019-12-05 | End: 2020-06-29

## 2019-12-05 NOTE — TELEPHONE ENCOUNTER
[facility] [place] [id number] - [place], [place] - [address] [place]  [address] [place] [address]  Phone: [phone] Fax: [phone]    Pharmacy states they were told by pt that directions for fluticasone nasal spray should be 2 spray in each nostril daily.  Please sign if appropriate

## 2019-12-05 NOTE — TELEPHONE ENCOUNTER
New prescription signed for 2 sprays and please let the patient know that I signed the new prescription and it is up to her she could do 1 to 2 sprays as needed so she could do 1 or 2 sprays in each nostril but I signed the prescription for 2 sprays with refills that are available at her [facility] pharmacy.

## 2019-12-06 RX ORDER — LORATADINE 10 MG/1
TABLET ORAL
Qty: 90 TAB | Refills: 0 | Status: SHIPPED | OUTPATIENT
Start: 2019-12-06 | End: 2020-02-27 | Stop reason: SDUPTHER

## 2019-12-06 NOTE — TELEPHONE ENCOUNTER
Was the patient seen in the last year in this department? Yes    Does patient have an active prescription for medications requested? No     Received Request Via: Pharmacy    Pt met protocol?: Yes     Last OV 12/03/19

## 2020-01-07 ENCOUNTER — SLEEP CENTER VISIT (OUTPATIENT)
Dept: SLEEP MEDICINE | Facility: MEDICAL CENTER | Age: 80
End: 2020-01-07
Payer: MEDICARE

## 2020-01-07 VITALS
RESPIRATION RATE: 16 BRPM | DIASTOLIC BLOOD PRESSURE: 80 MMHG | SYSTOLIC BLOOD PRESSURE: 130 MMHG | OXYGEN SATURATION: 93 % | HEART RATE: 73 BPM

## 2020-01-07 DIAGNOSIS — G47.33 OSA (OBSTRUCTIVE SLEEP APNEA): ICD-10-CM

## 2020-01-07 PROCEDURE — 99213 OFFICE O/P EST LOW 20 MIN: CPT | Performed by: NURSE PRACTITIONER

## 2020-01-07 ASSESSMENT — ENCOUNTER SYMPTOMS
NEUROLOGICAL NEGATIVE: 1
EYE DISCHARGE: 0
CARDIOVASCULAR NEGATIVE: 1
GASTROINTESTINAL NEGATIVE: 1
PSYCHIATRIC NEGATIVE: 1
MYALGIAS: 0
CONSTITUTIONAL NEGATIVE: 1
RESPIRATORY NEGATIVE: 1
BACK PAIN: 0
NECK PAIN: 0
FALLS: 0
BRUISES/BLEEDS EASILY: 0
EYE PAIN: 0

## 2020-01-07 NOTE — PROGRESS NOTES
Chief Complaint   Patient presents with   • Apnea     Last Seen 12/11/18         HPI: This patient is a 79 y.o. female, who presents for annual follow-up of obstructive sleep apnea.    PSG indicates moderate obstructive sleep apnea with an AHI of 23.6, minimum oxygen saturation of 69 %. she is compliant with CPAP 15 cm H2O. Compliance download over the past 30 days indicates 100 % compliance, average use of 6 hours 33 minutes per night, AHI of 1.6. Patient reports benefiting greatly from therapy.  She does not go without her machine.  She feels she gets a better quality rest and wakes feeling refreshed.  Denies EDS or a.m. headache.  She has been receiving supplies regularly.      Past Medical History:   Diagnosis Date   • Anesthesia     States feels like she can't breathe with induction for right hip surgery, gets nausea vomiting with most pain meds.  Uses anti-nausea medicine if takes morphine   • Arthritis     To hips, shoulders, hands   • Benign essential HTN 9/7/2017   • Cataract    • CPAP (continuous positive airway pressure) dependence    • Dyslipidemia 3/5/2019   • Expressive aphasia     Related to CVA   • Family history of CVA    • Heart murmur    • Muscle disorder    • DARIN (obstructive sleep apnea) 1/26/2017    AHI 23.6, minimum saturation 69%, on CPAP 15 cm.   • Post-nasal drip    • Restless leg syndrome     4/25/17-Resolved, states was related to right hip and none S/P replacement.   • Stroke ([facility]) 2/1993, 8/1993    Hzvvrbsx-gladxv-jkbwenvrgx aphasia,mouth forming words, reading, numbers   • Walker as ambulation aid        Social History     Tobacco Use   • Smoking status: Never Smoker   • Smokeless tobacco: Never Used   Substance Use Topics   • Alcohol use: Not Currently   • Drug use: No       Family History   Problem Relation Age of Onset   • Cancer Mother         vaginal   • Stroke Father    • Hypertension Brother    • Diabetes Brother    • Hypertension Brother    • Hypertension Brother    • Stroke  "Daughter        Immunization History   Administered Date(s) Administered   • Influenza Seasonal Injectable 10/20/2010       Current medications as of today   Current Outpatient Medications   Medication Sig Dispense Refill   • loratadine (CLARITIN) 10 MG Tab TAKE ONE TABLET BY MOUTH DAILY AS NEEDED FOR ALLERGY. 90 Tab 0   • fluticasone (FLONASE) 50 MCG/ACT nasal spray Spray 2 sprays in each nostril every day 16 g 3   • aspirin 81 MG tablet ASPIRIN 81 MG ORAL TABLET     • amoxicillin (AMOXIL) 875 MG tablet Take 875 mg by mouth 2 times a day. Take 1 tab po bid for 3 days for dental cleaning     • DILTIAZem CD (CARDIZEM CD) 240 MG CAPSULE SR 24 HR Take 1 Cap by mouth every bedtime. Indications: High Blood Pressure Disorder 100 Cap 1   • irbesartan (AVAPRO) 150 MG Tab Take 1 Tab by mouth every morning. Indications: High Blood Pressure Disorder 100 Tab 1   • fenofibrate (TRICOR) 145 MG Tab Take 1 Tab by mouth every morning. Indications: High Amount of Cholesterol in the Blood 100 Tab 1   • Acetaminophen (TYLENOL 8 HOUR PO) Take  by mouth.     • ibuprofen (MOTRIN) 200 MG Tab Take 200 mg by mouth every 6 hours as needed for Mild Pain.     • non-formulary med Take 1 Tab by mouth every day. Royal Jelly 500mg cap     • vitamin E (VITAMIN E) 1000 UNIT Cap Take 1 Cap by mouth every day.     • B Complex-Folic Acid (B COMPLEX-VITAMIN B12 PO) Take 1 Tab by mouth every day.     • calcium carbonate (TUMS) 500 MG Chew Tab Take 500 mg by mouth every day.     • vitamin D 5000 UNITS Tab Take 5,000 Units by mouth every day. 30 Tab 2   • Non Formulary Request Take 1 Package by mouth every day. Indications: VITALIZER GOLD-MULTI VITAMIN     • NON SPECIFIED Take 7 Caps by mouth every bedtime. \"Herb-Lax\"   Indications: constipation       No current facility-administered medications for this visit.        Allergies: Benicar [olmesartan]; Dilaudid  [hydromorphone hcl]; Lidocaine; Norco  [apap-fd&c blue #1-hydrocodone]; Other misc; Percocet  " [apap-fd&c red #40 [place]-oxycodone]; Vicodin  [apap-fd&c yellow #10 [place]-hydrocodone]; Codeine; Demerol; Hydrocodone; Other drug; Oxycodone; Statins [hmg-coa-r inhibitors]; and Valium    /80 (BP Location: Left arm, Patient Position: Sitting, BP Cuff Size: Adult)   Pulse 73   Resp 16   SpO2 93%       Review of Systems   Constitutional: Negative.    HENT: Negative.    Eyes: Negative for pain and discharge.   Respiratory: Negative.    Cardiovascular: Negative.    Gastrointestinal: Negative.    Musculoskeletal: Positive for joint pain. Negative for back pain, falls, myalgias and neck pain.   Neurological: Negative.    Endo/Heme/Allergies: Negative for environmental allergies. Does not bruise/bleed easily.   Psychiatric/Behavioral: Negative.        Physical Exam   Constitutional: She is oriented to person, place, and time and well-developed, well-nourished, and in no distress.   HENT:   Head: Normocephalic and atraumatic.   Eyes: Pupils are equal, round, and reactive to light.   Neck: Normal range of motion. Neck supple. No tracheal deviation present.   Cardiovascular: Normal rate, regular rhythm and normal heart sounds.   Pulmonary/Chest: Effort normal and breath sounds normal.   Neurological: She is alert and oriented to person, place, and time.   Unsteady gait, ambulates with cane   Skin: Skin is warm and dry.   Psychiatric: Mood, memory, affect and judgment normal.   Vitals reviewed.      Diagnoses/Plan:    1. DARIN (obstructive sleep apnea)   Continue CPAP 15 cm nightly, Clean mask & tubing weekly, Replace supplies as insurance will allow, RX for new supplies to DME.  Follow-up annually at the sleep clinic, sooner if needed.      - DME Mask and Supplies'    This dictation was created using voice recognition software. The accuracy of the dictation is limited to the abilities of the software. I expect there may be some errors of grammar and possibly content.

## 2020-01-21 ENCOUNTER — APPOINTMENT (OUTPATIENT)
Dept: NEUROLOGY | Facility: MEDICAL CENTER | Age: 80
End: 2020-01-21
Payer: MEDICARE

## 2020-02-04 ENCOUNTER — HOSPITAL ENCOUNTER (OUTPATIENT)
Dept: LAB | Facility: MEDICAL CENTER | Age: 80
End: 2020-02-04
Attending: FAMILY MEDICINE
Payer: MEDICARE

## 2020-02-04 DIAGNOSIS — Z51.81 MEDICATION MONITORING ENCOUNTER: ICD-10-CM

## 2020-02-04 DIAGNOSIS — R53.83 OTHER FATIGUE: ICD-10-CM

## 2020-02-04 DIAGNOSIS — E61.1 IRON DEFICIENCY: ICD-10-CM

## 2020-02-04 DIAGNOSIS — E55.9 VITAMIN D DEFICIENCY: ICD-10-CM

## 2020-02-04 DIAGNOSIS — E53.8 VITAMIN B12 DEFICIENCY: ICD-10-CM

## 2020-02-04 DIAGNOSIS — Z86.73 HISTORY OF CVA (CEREBROVASCULAR ACCIDENT): ICD-10-CM

## 2020-02-04 DIAGNOSIS — R73.9 HYPERGLYCEMIA: ICD-10-CM

## 2020-02-04 DIAGNOSIS — E78.5 DYSLIPIDEMIA: ICD-10-CM

## 2020-02-04 LAB
25(OH)D3 SERPL-MCNC: 33 NG/ML (ref 30–100)
ALBUMIN SERPL BCP-MCNC: 4.5 G/DL (ref 3.2–4.9)
ALBUMIN/GLOB SERPL: 1.3 G/DL
ALP SERPL-CCNC: 52 U/L (ref 30–99)
ALT SERPL-CCNC: 15 U/L (ref 2–50)
ANION GAP SERPL CALC-SCNC: 6 MMOL/L (ref 0–11.9)
AST SERPL-CCNC: 20 U/L (ref 12–45)
BASOPHILS # BLD AUTO: 1 % (ref 0–1.8)
BASOPHILS # BLD: 0.05 K/UL (ref 0–0.12)
BILIRUB SERPL-MCNC: 0.7 MG/DL (ref 0.1–1.5)
BUN SERPL-MCNC: 25 MG/DL (ref 8–22)
CALCIUM SERPL-MCNC: 10.8 MG/DL (ref 8.5–10.5)
CHLORIDE SERPL-SCNC: 105 MMOL/L (ref 96–112)
CHOLEST SERPL-MCNC: 169 MG/DL (ref 100–199)
CO2 SERPL-SCNC: 29 MMOL/L (ref 20–33)
CREAT SERPL-MCNC: 0.83 MG/DL (ref 0.5–1.4)
EOSINOPHIL # BLD AUTO: 0.08 K/UL (ref 0–0.51)
EOSINOPHIL NFR BLD: 1.7 % (ref 0–6.9)
ERYTHROCYTE [DISTWIDTH] IN BLOOD BY AUTOMATED COUNT: 49.7 FL (ref 35.9–50)
FASTING STATUS PATIENT QL REPORTED: NORMAL
FERRITIN SERPL-MCNC: 26.3 NG/ML (ref 10–291)
GLOBULIN SER CALC-MCNC: 3.4 G/DL (ref 1.9–3.5)
GLUCOSE SERPL-MCNC: 76 MG/DL (ref 65–99)
HCT VFR BLD AUTO: 46.6 % (ref 37–47)
HDLC SERPL-MCNC: 65 MG/DL
HGB BLD-MCNC: 14.2 G/DL (ref 12–16)
IMM GRANULOCYTES # BLD AUTO: 0.02 K/UL (ref 0–0.11)
IMM GRANULOCYTES NFR BLD AUTO: 0.4 % (ref 0–0.9)
IRON SATN MFR SERPL: 16 % (ref 15–55)
IRON SERPL-MCNC: 81 UG/DL (ref 40–170)
LDLC SERPL CALC-MCNC: 88 MG/DL
LYMPHOCYTES # BLD AUTO: 0.92 K/UL (ref 1–4.8)
LYMPHOCYTES NFR BLD: 19.2 % (ref 22–41)
MCH RBC QN AUTO: 29.5 PG (ref 27–33)
MCHC RBC AUTO-ENTMCNC: 30.5 G/DL (ref 33.6–35)
MCV RBC AUTO: 96.7 FL (ref 81.4–97.8)
MONOCYTES # BLD AUTO: 0.43 K/UL (ref 0–0.85)
MONOCYTES NFR BLD AUTO: 9 % (ref 0–13.4)
NEUTROPHILS # BLD AUTO: 3.29 K/UL (ref 2–7.15)
NEUTROPHILS NFR BLD: 68.7 % (ref 44–72)
NRBC # BLD AUTO: 0 K/UL
NRBC BLD-RTO: 0 /100 WBC
PLATELET # BLD AUTO: 187 K/UL (ref 164–446)
PMV BLD AUTO: 13.9 FL (ref 9–12.9)
POTASSIUM SERPL-SCNC: 3.9 MMOL/L (ref 3.6–5.5)
PROT SERPL-MCNC: 7.9 G/DL (ref 6–8.2)
RBC # BLD AUTO: 4.82 M/UL (ref 4.2–5.4)
SODIUM SERPL-SCNC: 140 MMOL/L (ref 135–145)
TIBC SERPL-MCNC: 514 UG/DL (ref 250–450)
TRIGL SERPL-MCNC: 80 MG/DL (ref 0–149)
TSH SERPL DL<=0.005 MIU/L-ACNC: 1.81 UIU/ML (ref 0.38–5.33)
VIT B12 SERPL-MCNC: 952 PG/ML (ref 211–911)
WBC # BLD AUTO: 4.8 K/UL (ref 4.8–10.8)

## 2020-02-04 PROCEDURE — 82306 VITAMIN D 25 HYDROXY: CPT

## 2020-02-04 PROCEDURE — 80061 LIPID PANEL: CPT

## 2020-02-04 PROCEDURE — 83036 HEMOGLOBIN GLYCOSYLATED A1C: CPT

## 2020-02-04 PROCEDURE — 84443 ASSAY THYROID STIM HORMONE: CPT

## 2020-02-04 PROCEDURE — 80053 COMPREHEN METABOLIC PANEL: CPT

## 2020-02-04 PROCEDURE — 83550 IRON BINDING TEST: CPT

## 2020-02-04 PROCEDURE — 82607 VITAMIN B-12: CPT

## 2020-02-04 PROCEDURE — 82728 ASSAY OF FERRITIN: CPT

## 2020-02-04 PROCEDURE — 36415 COLL VENOUS BLD VENIPUNCTURE: CPT

## 2020-02-04 PROCEDURE — 85025 COMPLETE CBC W/AUTO DIFF WBC: CPT

## 2020-02-04 PROCEDURE — 83540 ASSAY OF IRON: CPT

## 2020-02-05 LAB
EST. AVERAGE GLUCOSE BLD GHB EST-MCNC: 114 MG/DL
HBA1C MFR BLD: 5.6 % (ref 0–5.6)

## 2020-02-07 ENCOUNTER — TELEPHONE (OUTPATIENT)
Dept: MEDICAL GROUP | Facility: PHYSICIAN GROUP | Age: 80
End: 2020-02-07

## 2020-02-24 ENCOUNTER — PATIENT OUTREACH (OUTPATIENT)
Dept: HEALTH INFORMATION MANAGEMENT | Facility: OTHER | Age: 80
End: 2020-02-24

## 2020-02-27 ENCOUNTER — OFFICE VISIT (OUTPATIENT)
Dept: MEDICAL GROUP | Facility: PHYSICIAN GROUP | Age: 80
End: 2020-02-27
Payer: MEDICARE

## 2020-02-27 VITALS
DIASTOLIC BLOOD PRESSURE: 82 MMHG | TEMPERATURE: 96.8 F | BODY MASS INDEX: 34.27 KG/M2 | RESPIRATION RATE: 12 BRPM | HEIGHT: 59 IN | OXYGEN SATURATION: 93 % | SYSTOLIC BLOOD PRESSURE: 142 MMHG | WEIGHT: 170 LBS | HEART RATE: 72 BPM

## 2020-02-27 DIAGNOSIS — R41.3 MEMORY DEFICIT: ICD-10-CM

## 2020-02-27 DIAGNOSIS — E78.5 DYSLIPIDEMIA: ICD-10-CM

## 2020-02-27 DIAGNOSIS — Z76.89 ENCOUNTER TO ESTABLISH CARE: ICD-10-CM

## 2020-02-27 DIAGNOSIS — I10 BENIGN ESSENTIAL HTN: ICD-10-CM

## 2020-02-27 DIAGNOSIS — Z91.09 ALLERGY TO ENVIRONMENTAL FACTORS: ICD-10-CM

## 2020-02-27 DIAGNOSIS — G47.33 OSA (OBSTRUCTIVE SLEEP APNEA): ICD-10-CM

## 2020-02-27 DIAGNOSIS — Z86.73 HISTORY OF CVA (CEREBROVASCULAR ACCIDENT): ICD-10-CM

## 2020-02-27 PROBLEM — H61.23 IMPACTED CERUMEN, BILATERAL: Status: RESOLVED | Noted: 2019-12-03 | Resolved: 2020-02-27

## 2020-02-27 PROCEDURE — 8041 PR SCP AHA: Performed by: NURSE PRACTITIONER

## 2020-02-27 PROCEDURE — 99214 OFFICE O/P EST MOD 30 MIN: CPT | Performed by: NURSE PRACTITIONER

## 2020-02-27 RX ORDER — IRBESARTAN 150 MG/1
150 TABLET ORAL EVERY MORNING
Qty: 100 TAB | Refills: 2 | Status: SHIPPED | OUTPATIENT
Start: 2020-02-27 | End: 2021-02-04

## 2020-02-27 RX ORDER — DILTIAZEM HYDROCHLORIDE 240 MG/1
240 CAPSULE, COATED, EXTENDED RELEASE ORAL
Qty: 100 CAP | Refills: 2 | Status: SHIPPED | OUTPATIENT
Start: 2020-02-27 | End: 2020-09-21

## 2020-02-27 RX ORDER — FENOFIBRATE 145 MG/1
145 TABLET, COATED ORAL EVERY MORNING
Qty: 100 TAB | Refills: 2 | Status: SHIPPED | OUTPATIENT
Start: 2020-02-27 | End: 2020-12-21

## 2020-02-27 RX ORDER — LORATADINE 10 MG/1
TABLET ORAL
Qty: 90 TAB | Refills: 3 | Status: SHIPPED | OUTPATIENT
Start: 2020-02-27 | End: 2021-02-04

## 2020-02-27 RX ORDER — MULTIVIT WITH MINERALS/LUTEIN
TABLET ORAL
Status: ON HOLD | COMMUNITY
End: 2022-06-21

## 2020-02-27 RX ORDER — CHOLECALCIFEROL (VITAMIN D3) 125 MCG
500 CAPSULE ORAL DAILY
Status: ON HOLD | COMMUNITY
End: 2022-06-21

## 2020-02-27 ASSESSMENT — PATIENT HEALTH QUESTIONNAIRE - PHQ9: CLINICAL INTERPRETATION OF PHQ2 SCORE: 0

## 2020-02-27 NOTE — ASSESSMENT & PLAN NOTE
Chronic ongoing.  New to examiner.  Patient currently taking irbesartan 150 mg daily and diltiazem 240 mg daily.  She does monitor home blood pressure.  She brings in readings today with systolic ranging from 133- 158 and diastolic ranging 72- 99.  She did have 1 elevated blood pressure reading on 2/26 of 228/86, patient does note that she did not take her blood pressure medicine at that time.  Later in the evening on 2/26 her blood pressure was 143/86.  She would like refills on her blood pressure medication today.  Denies any chest pain, dizziness, palpitations or shortness of breath.

## 2020-02-27 NOTE — ASSESSMENT & PLAN NOTE
Chronic history.  New to examiner.  Patient reports that in 1993 she did have a stroke.  She has residuals of expressive aphasia.

## 2020-02-27 NOTE — ASSESSMENT & PLAN NOTE
Chronic ongoing.  New to examiner.  Patient currently taking fenofibrate 145 mg daily.  She has allergies to statin medications.  She had lab work completed beginning of February 2020 which was stable.  She does request a refill on her medication today.

## 2020-02-27 NOTE — ASSESSMENT & PLAN NOTE
Chronic ongoing.  New to examiner.  Patient is currently taking loratadine 10 mg as needed for allergy.  She has no complaints today.  She would like a refill this medication today.

## 2020-02-27 NOTE — ASSESSMENT & PLAN NOTE
Chronic ongoing.  New to examiner.  Patient currently wearing CPAP at night and tolerating.  She recently had follow-up with pulmonology on 1/7/2020.  No issues today.

## 2020-02-27 NOTE — PROGRESS NOTES
Subjective:     CC:  Diagnoses of Benign essential HTN, Dyslipidemia, DARIN (obstructive sleep apnea), Allergy to environmental factors, Memory deficit, History of CVA (cerebrovascular accident), and Encounter to establish care were pertinent to this visit.    HISTORY OF THE PRESENT ILLNESS: Patient is a 79 y.o. female. This pleasant patient is here today to establish care and discuss the following. Her prior PCP was Dr. Moise.    Memory deficit  Chronic ongoing.  New to examiner.  Patient reports that she has difficulty with remembering numbers.  She reports that this is been ongoing since her stroke back in 1993.  She states that she is aware as well as her family and friends.  No issues today.    DARIN (obstructive sleep apnea)  Chronic ongoing.  New to examiner.  Patient currently wearing CPAP at night and tolerating.  She recently had follow-up with pulmonology on 1/7/2020.  No issues today.    History of CVA (cerebrovascular accident)  Chronic history.  New to examiner.  Patient reports that in 1993 she did have a stroke.  She has residuals of expressive aphasia.     Dyslipidemia  Chronic ongoing.  New to examiner.  Patient currently taking fenofibrate 145 mg daily.  She has allergies to statin medications.  She had lab work completed beginning of February 2020 which was stable.  She does request a refill on her medication today.    Benign essential HTN  Chronic ongoing.  New to examiner.  Patient currently taking irbesartan 150 mg daily and diltiazem 240 mg daily.  She does monitor home blood pressure.  She brings in readings today with systolic ranging from 133- 158 and diastolic ranging 72- 99.  She did have 1 elevated blood pressure reading on 2/26 of 228/86, patient does note that she did not take her blood pressure medicine at that time.  Later in the evening on 2/26 her blood pressure was 143/86.  She would like refills on her blood pressure medication today.  Denies any chest pain, dizziness, palpitations  or shortness of breath.    Allergy to environmental factors  Chronic ongoing.  New to examiner.  Patient is currently taking loratadine 10 mg as needed for allergy.  She has no complaints today.  She would like a refill this medication today.    Establish care  Patient is here to establish care today.  She did have recent lab work completed on 2/4/2020.  She does states she gets her teeth cleaned every 6 months and does require antibiotics prior to teeth cleaning.  Her dentist is prescribed this. Patient does live by herself. She does utilize ride share services. She states she has pulleys in the house and does stationary bicycle for exercise at home.       Allergies: Benicar [olmesartan]; Dilaudid  [hydromorphone hcl]; Lidocaine; Norco  [apap-fd&c blue #1-hydrocodone]; Other misc; Percocet  [apap-fd&c red #40 [place]-oxycodone]; Vicodin  [apap-fd&c yellow #10 [place]-hydrocodone]; Codeine; Demerol; Hydrocodone; Other drug; Oxycodone; Statins [hmg-coa-r inhibitors]; and Valium    Current Outpatient Medications Ordered in Epic   Medication Sig Dispense Refill   • Ascorbic Acid (VITAMIN C) 1000 MG Tab Take  by mouth.     • cyanocobalamin (VITAMIN B-12) 500 MCG Tab Take 500 mcg by mouth every day.     • Turmeric Curcumin 500 MG Cap Take 1 Tab by mouth 2 Times a Day.     • DILTIAZem CD (CARDIZEM CD) 240 MG CAPSULE SR 24 HR Take 1 Cap by mouth every bedtime. Indications: High Blood Pressure Disorder 100 Cap 2   • fenofibrate (TRICOR) 145 MG Tab Take 1 Tab by mouth every morning. Indications: High Amount of Cholesterol in the Blood 100 Tab 2   • irbesartan (AVAPRO) 150 MG Tab Take 1 Tab by mouth every morning. Indications: High Blood Pressure Disorder 100 Tab 2   • loratadine (CLARITIN) 10 MG Tab TAKE ONE TABLET BY MOUTH DAILY AS NEEDED FOR ALLERGY. 90 Tab 3   • fluticasone (FLONASE) 50 MCG/ACT nasal spray Spray 2 sprays in each nostril every day 16 g 3   • aspirin 81 MG tablet ASPIRIN 81 MG ORAL TABLET     • amoxicillin  "(AMOXIL) 875 MG tablet Take 875 mg by mouth 2 times a day. Take 1 tab po bid for 3 days for dental cleaning     • Acetaminophen (TYLENOL 8 HOUR PO) Take  by mouth.     • ibuprofen (MOTRIN) 200 MG Tab Take 200 mg by mouth every 6 hours as needed for Mild Pain.     • non-formulary med Take 1 Tab by mouth every day. Royal Jelly 500mg cap     • vitamin E (VITAMIN E) 1000 UNIT Cap Take 1 Cap by mouth every day.     • B Complex-Folic Acid (B COMPLEX-VITAMIN B12 PO) Take 1 Tab by mouth every day.     • calcium carbonate (TUMS) 500 MG Chew Tab Take 500 mg by mouth every day.     • vitamin D 5000 UNITS Tab Take 5,000 Units by mouth every day. 30 Tab 2   • Non Formulary Request Take 1 Package by mouth every day. Indications: VITALIZER GOLD-MULTI VITAMIN     • NON SPECIFIED Take 7 Caps by mouth every bedtime. \"Herb-Lax\"   Indications: constipation       No current Epic-ordered facility-administered medications on file.        Past Medical History:   Diagnosis Date   • Anesthesia     States feels like she can't breathe with induction for right hip surgery, gets nausea vomiting with most pain meds.  Uses anti-nausea medicine if takes morphine   • Arthritis     To hips, shoulders, hands   • Benign essential HTN 9/7/2017   • Cataract    • CPAP (continuous positive airway pressure) dependence    • Dyslipidemia 3/5/2019   • Expressive aphasia     Related to CVA   • Family history of CVA    • Heart murmur    • DARIN (obstructive sleep apnea) 1/26/2017    AHI 23.6, minimum saturation 69%, on CPAP 15 cm.   • Post-nasal drip    • Restless leg syndrome     4/25/17-Resolved, states was related to right hip and none S/P replacement.   • Stroke (HCC) 2/1993, 8/1993    Dtwhywat-ixprge-dldpomvnbk aphasia,mouth forming words, reading, numbers   • Walker as ambulation aid        Past Surgical History:   Procedure Laterality Date   • PB RECONSTR TOTAL SHOULDER IMPLANT Left 3/21/2019    Procedure: ARTHROPLASTY, SHOULDER, TOTAL-  REVISION REVERSE;  " Surgeon: Yoshi Gamble M.D.;  Location: [facility];  Service: Orthopedics   • HUMERUS ORIF Left 3/21/2019    Procedure: ORIF, FRACTURE, HUMERUS;  Surgeon: oYshi Gamble M.D.;  Location: [facility];  Service: Orthopedics   • KNEE ARTHROPLASTY TOTAL Left 2/12/2018    Procedure: KNEE ARTHROPLASTY TOTAL;  Surgeon: Earle Jacobsen M.D.;  Location: [facility];  Service: Orthopedics   • CERVICAL FUSION POSTERIOR  8/8/2017    Procedure: CERVICAL FUSION POSTERIOR- C3-5, INSTRUMENTED;  Surgeon: Florentino Pike M.D.;  Location: [facility];  Service:    • CERVICAL LAMINECTOMY POSTERIOR  8/8/2017    Procedure: CERVICAL LAMINECTOMY POSTERIOR- C3-5;  Surgeon: Florentino Pike M.D.;  Location: [facility];  Service:    • HIP ARTHROPLASTY TOTAL Left 5/8/2017    Procedure: HIP ARTHROPLASTY TOTAL;  Surgeon: Earle Jacobsen M.D.;  Location: [facility];  Service:    • HIP ARTHROPLASTY TOTAL Right 12/12/2016    Procedure: HIP ARTHROPLASTY TOTAL;  Surgeon: Earle Jacobsen M.D.;  Location: [facility];  Service:    • LUMBAR FUSION POSTERIOR  9/26/2016    Procedure: LUMBAR FUSION POSTERIOR L2-S1 onlay ;  Surgeon: Florentino Pike M.D.;  Location: [facility];  Service:    • LUMBAR LAMINECTOMY DISKECTOMY N/A 9/26/2016    Procedure: LUMBAR LAMINECTOMY DISKECTOMY L2-S1;  Surgeon: Florentino Pike M.D.;  Location: [facility];  Service:    • COLONOSCOPY  2/2016    Every 10 years   • SHOULDER ARTHROPLASTY TOTAL Left 8/11/2015    Procedure: SHOULDER ARTHROPLASTY REVERSE TOTAL;  Surgeon: Yoshi Gamble M.D.;  Location: [facility];  Service:    • KNEE ARTHROPLASTY TOTAL  6/23/2014    Performed by Earle Jacobsen M.D. at [facility]   • HAMMERTOE CORRECTION Left 11/12/2007    Removed 3rd toe   • LUMBAR LAMINECTOMY DISKECTOMY  1993    Lumbar   • CARPAL TUNNEL RELEASE  "Bilateral 1993   • CERVICAL DISK AND FUSION ANTERIOR  1993    X2   • HYSTERECTOMY, TOTAL ABDOMINAL  1977   • BUNIONECTOMY Bilateral 1980's   • FINGER ORIF Left as child    Index   • HAMMERTOE CORRECTION Right 1980's    Removed 3rd toe       Social History     Tobacco Use   • Smoking status: Never Smoker   • Smokeless tobacco: Never Used   Substance Use Topics   • Alcohol use: Not Currently   • Drug use: No       Social History     Social History Narrative    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  She is currently driving without any accidents.  She does use a walker to ambulate.  Not having any falls.  Does not have hearing aids but is having hearing concerns.  Some increased aphasia especially since her history of CVAs and memory issues.  March 2019 she was hospitalized from her fracture of her left arm and did have home health which she no longer has right now.  She does not have family here but she does have neighbors who help her out.       Family History   Problem Relation Age of Onset   • Cancer Mother         vaginal   • Stroke Father    • Hypertension Brother    • Diabetes Brother    • Hypertension Brother    • Hypertension Brother    • Stroke Daughter        Health Maintenance: Up-to-date    ROS:   Gen: no fevers/chills, no changes in weight  Eyes: no changes in vision  ENT: no sore throat, no hearing loss, no ear pain  Pulm: no sob, no cough  CV: no chest pain, no palpitations  GI: no nausea/vomiting, no diarrhea, no constipation  : no dysuria, no urgency, no increased frequency  MSk: no myalgias, no falls  Skin: no rash, no bruises  Neuro: no headaches, no dizziness   heme/Lymph: no easy bruising      Objective:     Vital signs reviewed  Exam: /82 (BP Location: Right arm, Patient Position: Sitting, BP Cuff Size: Adult)   Pulse 72   Temp 36 °C (96.8 °F) (Temporal)   Resp 12   Ht 1.499 m (4' 11\")   Wt 77.1 kg (170 lb)   SpO2 93%  Body mass index is 34.34 kg/m².    General: Normal appearing. " No distress.  HENT: Normocephalic. Ears normal shape and contour, canals are clear bilaterally, tympanic membranes are benign, oropharynx is without erythema, edema or exudates.   Eyes: Eyes conjunctiva clear lids without ptosis, pupils equal and reactive to light accommodation, lids normal.  Glasses in place.  Neck: Supple without JVD. Thyroid is not enlarged.  Pulmonary: Clear to ausculation.  Normal effort. No rales, ronchi, or wheezing.  Cardiovascular: Regular rate and rhythm without murmur. Radial pulses are intact and equal bilaterally.  Abdomen: Soft, nontender, nondistended. Normal bowel sounds.   Neurologic: Grossly nonfocal  Lymph: No cervical or supraclavicular lymph nodes are palpable  Skin: Warm and dry.  No obvious lesions.  Musculoskeletal: Normal gait.  Uses four-wheel walker.  No extremity cyanosis, clubbing, or edema.  Psych: Normal mood and affect. Alert and oriented x3. Judgment and insight is normal.    Assessment & Plan:   79 y.o. female with the following -    1. Benign essential HTN  Chronic stable.  New to me.  Continue diltiazem and irbesartan.  Medication refilled.  Continue home blood pressure monitoring.  Monitor and follow.  - DILTIAZem CD (CARDIZEM CD) 240 MG CAPSULE SR 24 HR; Take 1 Cap by mouth every bedtime. Indications: High Blood Pressure Disorder  Dispense: 100 Cap; Refill: 2  - irbesartan (AVAPRO) 150 MG Tab; Take 1 Tab by mouth every morning. Indications: High Blood Pressure Disorder  Dispense: 100 Tab; Refill: 2    2. Dyslipidemia  Chronic stable.  New to me.  Continue fenofibrate.  Medication refilled.  Patient is not due for lab work, last lab work in 2/2/2020 was stable.  Monitor and follow.  - fenofibrate (TRICOR) 145 MG Tab; Take 1 Tab by mouth every morning. Indications: High Amount of Cholesterol in the Blood  Dispense: 100 Tab; Refill: 2    3. DARIN (obstructive sleep apnea)  Chronic stable.  New to me.  Continue CPAP at night.  Continue follow-up with pulmonology as  needed.  Monitor.    4. Allergy to environmental factors  Chronic stable.  New to me.  Continue Claritin as needed for allergies.  Medication refilled.  Monitor and follow.  - loratadine (CLARITIN) 10 MG Tab; TAKE ONE TABLET BY MOUTH DAILY AS NEEDED FOR ALLERGY.  Dispense: 90 Tab; Refill: 3    5. Memory deficit  Chronic stable.  New to me.  Patient reports that she has had issues remembering numbers post CVA in 1993.  She does have expressive aphasia from the CVA as well.  The symptoms are unchanged.  She does not have any issues today.  Monitor and follow.    6. History of CVA (cerebrovascular accident)  Chronic stable.  New to me.  Reviewed history with patient.  Patient does have expressive aphasia as well as difficulty memory numbers.  Monitor and follow.    7. Encounter to establish care  New issue to me.  Care established.  Patient had lab work completed 2/2/2020.  Reviewed lab work with patient, lab work was stable.  Patient is not due for lab work at this time.  She is due for annual wellness visit later this year.  Monitor.    Return in about 6 months (around 8/27/2020) for AWV.    Please note that this dictation was created using voice recognition software. I have made every reasonable attempt to correct obvious errors, but I expect that there are errors of grammar and possibly content that I did not discover before finalizing the note.

## 2020-02-27 NOTE — ASSESSMENT & PLAN NOTE
Chronic ongoing.  New to examiner.  Patient reports that she has difficulty with remembering numbers.  She reports that this is been ongoing since her stroke back in 1993.  She states that she is aware as well as her family and friends.  No issues today.

## 2020-03-02 ENCOUNTER — HOME CARE VISIT (OUTPATIENT)
Dept: HOME HEALTH SERVICES | Facility: HOME HEALTHCARE | Age: 80
End: 2020-03-02

## 2020-03-04 ENCOUNTER — HOME HEALTH ADMISSION (OUTPATIENT)
Dept: HOME HEALTH SERVICES | Facility: HOME HEALTHCARE | Age: 80
End: 2020-03-04
Payer: MEDICARE

## 2020-03-04 ENCOUNTER — OFFICE VISIT (OUTPATIENT)
Dept: NEUROLOGY | Facility: MEDICAL CENTER | Age: 80
End: 2020-03-04
Payer: MEDICARE

## 2020-03-04 VITALS
RESPIRATION RATE: 16 BRPM | WEIGHT: 171.3 LBS | OXYGEN SATURATION: 97 % | SYSTOLIC BLOOD PRESSURE: 136 MMHG | HEART RATE: 82 BPM | TEMPERATURE: 97.9 F | BODY MASS INDEX: 34.53 KG/M2 | DIASTOLIC BLOOD PRESSURE: 80 MMHG | HEIGHT: 59 IN

## 2020-03-04 DIAGNOSIS — R41.3 MEMORY LOSS: ICD-10-CM

## 2020-03-04 DIAGNOSIS — R26.89 BALANCE DISORDER: ICD-10-CM

## 2020-03-04 DIAGNOSIS — Z86.73 HISTORY OF CVA (CEREBROVASCULAR ACCIDENT): ICD-10-CM

## 2020-03-04 DIAGNOSIS — Z91.81 AT HIGH RISK FOR FALLS: ICD-10-CM

## 2020-03-04 DIAGNOSIS — R09.89 BILATERAL CAROTID BRUITS: ICD-10-CM

## 2020-03-04 PROCEDURE — 99205 OFFICE O/P NEW HI 60 MIN: CPT

## 2020-03-04 RX ORDER — MEMANTINE HYDROCHLORIDE 5 MG/1
5 TABLET ORAL 2 TIMES DAILY
Qty: 90 TAB | Refills: 3 | Status: SHIPPED | OUTPATIENT
Start: 2020-03-04 | End: 2021-04-08

## 2020-03-04 ASSESSMENT — FIBROSIS 4 INDEX: FIB4 SCORE: 2.126343797917797506

## 2020-03-04 NOTE — PROGRESS NOTES
CC: History of bilateral strokes, aphasia       HPI:  78 yo female with two prior strokes and residual expressive aphasia presents to establish care.  She does not remember when was her stroke however thinks this was few years ago.  Unsure why strokes happened.  Bills pays by herself,lives alone, drives well, no traffic accidents.  She is here alone and there is no additional history.  Uses a walker when going ourside due to balance instability as she had many recent surgeries in her hips and lower back, neck and knees.  She has hypertension and denies atrial fibrillation or any other arrhythmia.AT the time of her stroke she was 52 yo and this happened at [facility]. We will need records.  Her daughter passed away 20 years ago due to cerebral aneurysm.  Her granddaughter lives in [place].  She has good support from her neighbors and they help her a great deal.  She is trying to manage her BP.  Trouble remembering numbers,names   Needs to write reminders down.      ROS:   Constitutional: No fevers or chills.  Eyes: No blurry vision or eye pain.  ENT: No dysphagia or hearing loss.  Respiratory: No cough or shortness of breath.  Cardiovascular: No chest pain or palpitations.  GI: No nausea, vomiting, or diarrhea.  : No urinary incontinence or dysuria.  Musculoskeletal: No joint swelling or arthralgias.  Skin: No skin rashes.  Neuro: No headaches, dizziness, or tremors.  Endocrine: No heat or cold intolerance. No polydipsia or polyuria.  Psych: No depression or anxiety.  Heme/Lymph: No easy bruising or swollen lymph nodes.    Current Outpatient Medications on File Prior to Visit   Medication Sig Dispense Refill   • Ascorbic Acid (VITAMIN C) 1000 MG Tab Take  by mouth.     • cyanocobalamin (VITAMIN B-12) 500 MCG Tab Take 500 mcg by mouth every day.     • Turmeric Curcumin 500 MG Cap Take 1 Tab by mouth 2 Times a Day.     • DILTIAZem CD (CARDIZEM CD) 240 MG CAPSULE SR 24 HR Take 1 Cap by mouth every bedtime.  "Indications: High Blood Pressure Disorder 100 Cap 2   • fenofibrate (TRICOR) 145 MG Tab Take 1 Tab by mouth every morning. Indications: High Amount of Cholesterol in the Blood 100 Tab 2   • irbesartan (AVAPRO) 150 MG Tab Take 1 Tab by mouth every morning. Indications: High Blood Pressure Disorder 100 Tab 2   • loratadine (CLARITIN) 10 MG Tab TAKE ONE TABLET BY MOUTH DAILY AS NEEDED FOR ALLERGY. 90 Tab 3   • fluticasone (FLONASE) 50 MCG/ACT nasal spray Spray 2 sprays in each nostril every day 16 g 3   • aspirin 81 MG tablet ASPIRIN 81 MG ORAL TABLET     • amoxicillin (AMOXIL) 875 MG tablet Take 875 mg by mouth 2 times a day. Take 1 tab po bid for 3 days for dental cleaning     • ibuprofen (MOTRIN) 200 MG Tab Take 200 mg by mouth every 6 hours as needed for Mild Pain.     • vitamin E (VITAMIN E) 1000 UNIT Cap Take 1 Cap by mouth every day.     • B Complex-Folic Acid (B COMPLEX-VITAMIN B12 PO) Take 1 Tab by mouth every day.     • calcium carbonate (TUMS) 500 MG Chew Tab Take 500 mg by mouth every day.     • vitamin D 5000 UNITS Tab Take 5,000 Units by mouth every day. 30 Tab 2   • Acetaminophen (TYLENOL 8 HOUR PO) Take  by mouth.     • non-formulary med Take 1 Tab by mouth every day. Royal Jelly 500mg cap     • Non Formulary Request Take 1 Package by mouth every day. Indications: VITALIZER GOLD-MULTI VITAMIN     • NON SPECIFIED Take 7 Caps by mouth every bedtime. \"Herb-Lax\"   Indications: constipation       No current facility-administered medications on file prior to visit.        Past Medical History:   Past Medical History:   Diagnosis Date   • Anesthesia     States feels like she can't breathe with induction for right hip surgery, gets nausea vomiting with most pain meds.  Uses anti-nausea medicine if takes morphine   • Arthritis     To hips, shoulders, hands   • Benign essential HTN 9/7/2017   • Cataract    • CPAP (continuous positive airway pressure) dependence    • Dyslipidemia 3/5/2019   • Expressive aphasia  "    Related to CVA   • Family history of CVA    • Heart murmur    • DARIN (obstructive sleep apnea) 1/26/2017    AHI 23.6, minimum saturation 69%, on CPAP 15 cm.   • Post-nasal drip    • Restless leg syndrome     4/25/17-Resolved, states was related to right hip and none S/P replacement.   • Stroke (HCC) 2/1993, 8/1993    Ycsywrvt-hboovm-hnbcjtwkky aphasia,mouth forming words, reading, numbers   • Walker as ambulation aid        Past Surgical History:  Past Surgical History:   Procedure Laterality Date   • PB RECONSTR TOTAL SHOULDER IMPLANT Left 3/21/2019    Procedure: ARTHROPLASTY, SHOULDER, TOTAL-  REVISION REVERSE;  Surgeon: Yoshi Gamble M.D.;  Location: [facility];  Service: Orthopedics   • HUMERUS ORIF Left 3/21/2019    Procedure: ORIF, FRACTURE, HUMERUS;  Surgeon: Yoshi Gamble M.D.;  Location: [facility];  Service: Orthopedics   • KNEE ARTHROPLASTY TOTAL Left 2/12/2018    Procedure: KNEE ARTHROPLASTY TOTAL;  Surgeon: Earle Jacobsen M.D.;  Location: [facility];  Service: Orthopedics   • CERVICAL FUSION POSTERIOR  8/8/2017    Procedure: CERVICAL FUSION POSTERIOR- C3-5, INSTRUMENTED;  Surgeon: Florentino Pike M.D.;  Location: [facility];  Service:    • CERVICAL LAMINECTOMY POSTERIOR  8/8/2017    Procedure: CERVICAL LAMINECTOMY POSTERIOR- C3-5;  Surgeon: Florentino Pike M.D.;  Location: [facility];  Service:    • HIP ARTHROPLASTY TOTAL Left 5/8/2017    Procedure: HIP ARTHROPLASTY TOTAL;  Surgeon: Earle Jacobsen M.D.;  Location: [facility];  Service:    • HIP ARTHROPLASTY TOTAL Right 12/12/2016    Procedure: HIP ARTHROPLASTY TOTAL;  Surgeon: Earle Jacobsen M.D.;  Location: [facility];  Service:    • LUMBAR FUSION POSTERIOR  9/26/2016    Procedure: LUMBAR FUSION POSTERIOR L2-S1 onlay ;  Surgeon: Florentino Pike M.D.;  Location: [facility];  Service:    • LUMBAR LAMINECTOMY  DISKECTOMY N/A 9/26/2016    Procedure: LUMBAR LAMINECTOMY DISKECTOMY L2-S1;  Surgeon: Florentino Pike M.D.;  Location: SURGERY [facility];  Service:    • COLONOSCOPY  2/2016    Every 10 years   • SHOULDER ARTHROPLASTY TOTAL Left 8/11/2015    Procedure: SHOULDER ARTHROPLASTY REVERSE TOTAL;  Surgeon: Yoshi Gamble M.D.;  Location: SURGERY [facility];  Service:    • KNEE ARTHROPLASTY TOTAL  6/23/2014    Performed by Earle Jacobsen M.D. at SURGERY [facility]   • HAMMERTOE CORRECTION Left 11/12/2007    Removed 3rd toe   • LUMBAR LAMINECTOMY DISKECTOMY  1993    Lumbar   • CARPAL TUNNEL RELEASE Bilateral 1993   • CERVICAL DISK AND FUSION ANTERIOR  1993    X2   • HYSTERECTOMY, TOTAL ABDOMINAL  1977   • BUNIONECTOMY Bilateral 1980's   • FINGER ORIF Left as child    Index   • HAMMERTOE CORRECTION Right 1980's    Removed 3rd toe       Social History:   Social History     Socioeconomic History   • Marital status: Single     Spouse name: Not on file   • Number of children: Not on file   • Years of education: Not on file   • Highest education level: Not on file   Occupational History   • Not on file   Social Needs   • Financial resource strain: Not on file   • Food insecurity     Worry: Not on file     Inability: Not on file   • Transportation needs     Medical: Not on file     Non-medical: Not on file   Tobacco Use   • Smoking status: Never Smoker   • Smokeless tobacco: Never Used   Substance and Sexual Activity   • Alcohol use: Not Currently   • Drug use: No   • Sexual activity: Not Currently   Lifestyle   • Physical activity     Days per week: Not on file     Minutes per session: Not on file   • Stress: Not on file   Relationships   • Social connections     Talks on phone: Not on file     Gets together: Not on file     Attends [employer] service: Not on file     Active member of club or organization: Not on file     Attends meetings of clubs or organizations: Not on file     Relationship status: Not  "on file   • Intimate partner violence     Fear of current or ex partner: Not on file     Emotionally abused: Not on file     Physically abused: Not on file     Forced sexual activity: Not on file   Other Topics Concern   • Not on file   Social History Narrative    Retired LVN at [employer]  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  She is currently driving without any accidents.  She does use a walker to ambulate.  Not having any falls.  Does not have hearing aids but is having hearing concerns.  Some increased aphasia especially since her history of CVAs and memory issues.  March 2019 she was hospitalized from her fracture of her left arm and did have home health which she no longer has right now.  She does not have family here but she does have neighbors who help her out.       Family History:   Family History   Problem Relation Age of Onset   • Cancer Mother         vaginal   • Stroke Father    • Hypertension Brother    • Diabetes Brother    • Hypertension Brother    • Hypertension Brother    • Stroke Daughter        Allergies:   Allergies   Allergen Reactions   • Benicar [Olmesartan] Rash     blisters   • Dilaudid  [Hydromorphone Hcl] Vomiting   • Lidocaine    • Norco  [Apap-Fd&C Blue #1-Hydrocodone] Vomiting   • Other Misc Shortness of Breath     Anesthesia with surgery 2016  Pt couldn't breathe while being put under and tapped side of bed to let RN know. \"It was the scariest feeling and I want everyone to know\"     • Percocet  [Apap-Fd&C Red #40 [place]-Oxycodone] Vomiting   • Vicodin  [Apap-Fd&C Yellow #10 [place]-Hydrocodone] Vomiting   • Codeine Vomiting   • Demerol Vomiting   • Hydrocodone Vomiting   • Other Drug Vomiting     \"all pain meds\" per pt can take morphine.   • Oxycodone Vomiting   • Statins [Hmg-Coa-R Inhibitors] Unspecified     Muscle cramping   • Valium Vomiting       Physical Exam:   Encounter Vitals  Standard Vitals  Vitals  Blood Pressure : 136/80  Temperature: 36.6 °C (97.9 °F)  Temp src: Temporal  Pulse: " "82  Respiration: 16  Pulse Oximetry: 97 %  Height: 149.9 cm (4' 11\")  Weight: 77.7 kg (171 lb 4.8 oz)  Encounter Vitals  Temperature: 36.6 °C (97.9 °F)  Temp src: Temporal  Blood Pressure : 136/80  Pulse: 82  Respiration: 16  Pulse Oximetry: 97 %  Weight: 77.7 kg (171 lb 4.8 oz)  Height: 149.9 cm (4' 11\")  BMI (Calculated): 34.6    Constitutional: Well-developed, well-nourished, good hygiene. Appears stated age.  Cardiovascular: RRR, with no murmurs, rubs or gallops. No carotid bruits.   Respiratory: Lungs CTA B/L, no W/R/R.   Abdomen: Soft Non-tender to Palpation. Non-distended.  Extremities: No peripheral edema, pedal pulses intact.   Skin: Warm, dry, intact. No rashes observed.  Eyes: Sclera anicteric   Funduscopic: Optic discs flat with no evidence of papilledema or pallor.   Neurologic:   Mental Status: Awake, alert, oriented x 2   Speech: expressive aphasia    Memory: short term memory loss    Concentration: Attentive. Able to focus on history and follow multi-step commands.              Fund of Knowledge: Appropriate   Cranial Nerves:    CN II: PERRL. No afferent pupillary defect.    CN III, IV, VI: Good eye closure, EOMI.     CN V: Facial sensation intact and symmetric.     CN VII: No facial asymmetry.     CN VIII: Hearing intact.     CN IX and X: Palate elevates symmetrically. Normal gag reflex.    CN XI: Symmetric shoulder shrug.     CN XII: Tongue midline.    Sensory: Intact light touch, vibration and temperature.    Coordination: No evidence of past-pointing on finger to nose testing, no dysdiadochokinesia. Heel to shin intact.             DTR's: 2+ throughout without clonus.    Babinski: Toes downgoing bilaterally.   Romberg: positive    Movements: No tremors observed.   Musculoskeletal:    Strength: limited by pain post surgery especially in left arm ( had steel rods recently)   At least 4/5 in all extremities    Gait: wide based unsteady walks with a walker   Antalgic gait    Tone: Normal bulk and " tone.   Joints: No swelling.   Carotid bruit heard mild bilaterally     Labs:  Reviewed  LDL 88   HDL 65   Cholesterol 169  a1c 5.6     Imaging:     CT head 2017     IMPRESSION:     1.  Cerebral atrophy.     2.  White matter lucencies most consistent with small vessel ischemic change versus demyelination or gliosis. Bilateral old infarcts unchanged.     3.  Otherwise, Head CT without contrast with no acute findings. No evidence of acute cerebral hemorrhage or mass lesion.    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 Proper Diet: We recommend the Mediterranean diet   2. Proper Vascular Health: Making sure that your primary care provider (PCP) is screening for and treating all vascular risk factors (diabetes, high cholesterol, high blood pressure, and such)  3. Quit smoking, if you smoke   4. Exercise as tolerated with a goal of at least 30 minutes 5 days a week or a total of 150 minutes per week  5. Focus on what you enjoy  6. Try learning new hobbies or skills, even if you’re not great at them  7. Regular social interaction: Maintain an active social life as much as possible   8. Keep your brain active with cognitively stimulating activities such as brain games  9. Get 7-8 hours of sleep per night  10. Maintain a predictable daily routine  [You may visit www.healthybrains.org for more information]

## 2020-03-09 ENCOUNTER — HOME CARE VISIT (OUTPATIENT)
Dept: HOME HEALTH SERVICES | Facility: HOME HEALTHCARE | Age: 80
End: 2020-03-09
Payer: MEDICARE

## 2020-03-19 ENCOUNTER — HOSPITAL ENCOUNTER (OUTPATIENT)
Dept: RADIOLOGY | Facility: MEDICAL CENTER | Age: 80
End: 2020-03-19
Payer: MEDICARE

## 2020-03-19 DIAGNOSIS — R09.89 BILATERAL CAROTID BRUITS: ICD-10-CM

## 2020-03-19 DIAGNOSIS — Z86.73 HISTORY OF CVA (CEREBROVASCULAR ACCIDENT): ICD-10-CM

## 2020-03-19 PROCEDURE — 93880 EXTRACRANIAL BILAT STUDY: CPT

## 2020-03-19 PROCEDURE — 70450 CT HEAD/BRAIN W/O DYE: CPT

## 2020-06-24 DIAGNOSIS — Z91.09 ENVIRONMENTAL ALLERGIES: ICD-10-CM

## 2020-06-29 RX ORDER — FLUTICASONE PROPIONATE 50 MCG
SPRAY, SUSPENSION (ML) NASAL
Qty: 48 G | Refills: 3 | Status: SHIPPED | OUTPATIENT
Start: 2020-06-29 | End: 2022-03-18

## 2020-06-29 NOTE — TELEPHONE ENCOUNTER
Requested Prescriptions     Signed Prescriptions Disp Refills   • fluticasone (FLONASE) 50 MCG/ACT nasal spray 48 g 3     Sig: USE 2 SPRAYS IN EACH NOSTRIL EVERY DAY     Authorizing Provider: RHODA BELLA A.P.R.N.

## 2020-09-19 DIAGNOSIS — I10 BENIGN ESSENTIAL HTN: ICD-10-CM

## 2020-09-21 RX ORDER — DILTIAZEM HYDROCHLORIDE 240 MG/1
CAPSULE, COATED, EXTENDED RELEASE ORAL
Qty: 90 CAP | Refills: 1 | Status: SHIPPED | OUTPATIENT
Start: 2020-09-21 | End: 2021-05-24 | Stop reason: SDUPTHER

## 2020-09-21 NOTE — TELEPHONE ENCOUNTER
Requested Prescriptions     Signed Prescriptions Disp Refills   • DILTIAZem CD (CARDIZEM CD) 240 MG CAPSULE SR 24 HR 90 Cap 1     Sig: TAKE ONE CAPSULE BY MOUTH EVERY DAY AT BEDTIME (FOR HIGH BLOOD PRESSURE )     Authorizing Provider: RHODA BELLA A.P.R.N.

## 2020-09-21 NOTE — TELEPHONE ENCOUNTER
Received request via: Pharmacy    Was the patient seen in the last year in this department? Yes lov 2/27/2020    Does the patient have an active prescription (recently filled or refills available) for medication(s) requested? No

## 2020-12-18 DIAGNOSIS — E78.5 DYSLIPIDEMIA: ICD-10-CM

## 2020-12-22 RX ORDER — FENOFIBRATE 145 MG/1
TABLET, COATED ORAL
Qty: 90 TAB | Refills: 0 | Status: SHIPPED | OUTPATIENT
Start: 2020-12-22 | End: 2021-03-15

## 2020-12-22 NOTE — TELEPHONE ENCOUNTER
Requested Prescriptions     Signed Prescriptions Disp Refills   • fenofibrate (TRICOR) 145 MG Tab 90 Tab 0     Sig: TAKE ONE TABLET BY MOUTH EVERY MORNING. FOR HIGH AMOUNTS OF CHOLESTEROL IN THE BLOOD.     Authorizing Provider: NORA ASTUDILLO A.P.R.N.

## 2021-01-08 DIAGNOSIS — Z23 NEED FOR VACCINATION: ICD-10-CM

## 2021-02-04 DIAGNOSIS — I10 BENIGN ESSENTIAL HTN: ICD-10-CM

## 2021-02-04 DIAGNOSIS — Z91.09 ALLERGY TO ENVIRONMENTAL FACTORS: ICD-10-CM

## 2021-02-04 RX ORDER — LORATADINE 10 MG/1
TABLET ORAL
Qty: 90 TAB | Refills: 2 | Status: SHIPPED | OUTPATIENT
Start: 2021-02-04 | End: 2021-12-15 | Stop reason: SDUPTHER

## 2021-02-04 RX ORDER — IRBESARTAN 150 MG/1
150 TABLET ORAL EVERY MORNING
Qty: 100 TAB | Refills: 1 | Status: SHIPPED | OUTPATIENT
Start: 2021-02-04 | End: 2021-09-07

## 2021-02-04 NOTE — TELEPHONE ENCOUNTER
Received request via: Pharmacy    Was the patient seen in the last year in this department? Yes LOV 02/27/2020    Does the patient have an active prescription (recently filled or refills available) for medication(s) requested? No

## 2021-02-04 NOTE — TELEPHONE ENCOUNTER
Requested Prescriptions     Signed Prescriptions Disp Refills   • irbesartan (AVAPRO) 150 MG Tab 100 Tab 1     Sig: Take 1 Tab by mouth every morning. Needs to be seen for annual visit for future refills     Authorizing Provider: RHODA BELLA   • loratadine (CLARITIN) 10 MG Tab 90 Tab 2     Sig: TAKE ONE TABLET BY MOUTH ONE TIME DAILY AS NEEDED FOR ALLERGY     Authorizing Provider: RHODA BELLA A.P.R.N.

## 2021-03-12 DIAGNOSIS — E78.5 DYSLIPIDEMIA: ICD-10-CM

## 2021-03-15 RX ORDER — FENOFIBRATE 145 MG/1
TABLET, COATED ORAL
Qty: 30 TABLET | Refills: 0 | Status: SHIPPED | OUTPATIENT
Start: 2021-03-15 | End: 2021-04-08 | Stop reason: SDUPTHER

## 2021-03-15 NOTE — TELEPHONE ENCOUNTER
Received request via: Pharmacy    Was the patient seen in the last year in this department? No    Last office visit with Sarita was 2/27/2020 or 12.5 months ago.  Patient was supposed to return in 6 months, or around 8/27/2020 for annual wellness visit.    Does the patient have an active prescription (recently filled or refills available) for medication(s) requested? No

## 2021-03-16 NOTE — TELEPHONE ENCOUNTER
Requested Prescriptions     Pending Prescriptions Disp Refills   • fenofibrate (TRICOR) 145 MG Tab [Pharmacy Med Name: Fenofibrate Oral Tablet 145 MG] 30 tablet 0     Sig: TAKE ONE TABLET BY MOUTH EVERY MORNING FOR HIGH AMOUNTS OF CHOLESTEROL IN THE BLOOD       ANDREW Demarco.

## 2021-03-31 ENCOUNTER — TELEPHONE (OUTPATIENT)
Dept: MEDICAL GROUP | Facility: PHYSICIAN GROUP | Age: 81
End: 2021-03-31

## 2021-03-31 NOTE — TELEPHONE ENCOUNTER
Future Appointments       Provider Department Center    4/8/2021 10:00 AM NAYAN Longo [facility]        ANNUAL WELLNESS VISIT PRE-VISIT PLANNING    1.  Reviewed notes from the last office visit: Yes, LOV 02/27/2020    2.  If any orders were ordered or intended to be done prior to visit (i.e. 6 mos follow-up), do we have results/consult notes or has patient scheduled?        •  Labs - Labs were not ordered at last office visit.  Note: If patient appointment is for lab review and patient did not complete labs, check with provider if OK to reschedule patient until labs completed.       •  Imaging - Imaging was not ordered at last office visit.       •  Referrals - No referrals were ordered at last office visit.    3.  Immunizations were updated in Epic using Reconcile Outside Information activity? Yes       •  Is patient due for Tdap? YES. Patient was notified of copay/out of pocket cost.       •  Is patient due for Shingrix? YES. Patient was notified of copay/out of pocket cost.    4.  Patient is due for the following Health Maintenance Topics:   Health Maintenance Due   Topic Date Due   • COVID-19 Vaccine (1) Never done   • IMM DTaP/Tdap/Td Vaccine (1 - Tdap) Never done   • IMM ZOSTER VACCINES (1 of 2) Never done   • IMM PNEUMOCOCCAL VACCINE: 65+ Years (1 of 1 - PPSV23) Never done   • Annual Wellness Visit  04/30/2020       - Patient declines Immunizations: T-dap, Shingrix and Covid.    5.  Reviewed/Updated the following with patient:       •   Preferred Pharmacy? Yes       •   Preferred Lab? Yes       •   Preferred Communication? Yes       •   Allergies? Yes       •   Medications? YES. Was Abstract Encounter opened and chart updated? YES       •   Social History? Yes       •   Family History (document living status of immediate family members and if + hx of  cancer, diabetes, hypertension, hyperlipidemia, heart attack, stroke) Yes    6.  Care Team Updated:       •   DME  Company (gait device, O2, CPAP, etc.): YES, Preferred home care       •   Other Specialists (eye doctor, derm, GYN, cardiology, endo, etc): N\A, No new providers    7.  Patient was advised: “This is a free wellness visit. The provider will screen for medical conditions to help you stay healthy. If you have other concerns to address you may be asked to discuss these at a separate visit or there may be an additional fee.”     8.  AHA (Puls[employer]) form printed for Provider? N/A   Called on 03/31/21 pt's voice mail not set up.  Patient advised to arrive 15 minutes prior to scheduled appointment

## 2021-04-08 ENCOUNTER — OFFICE VISIT (OUTPATIENT)
Dept: MEDICAL GROUP | Facility: PHYSICIAN GROUP | Age: 81
End: 2021-04-08
Payer: MEDICARE

## 2021-04-08 ENCOUNTER — HOME HEALTH ADMISSION (OUTPATIENT)
Dept: HOME HEALTH SERVICES | Facility: HOME HEALTHCARE | Age: 81
End: 2021-04-08
Payer: MEDICARE

## 2021-04-08 VITALS
WEIGHT: 178 LBS | HEART RATE: 72 BPM | DIASTOLIC BLOOD PRESSURE: 80 MMHG | TEMPERATURE: 98.3 F | HEIGHT: 60 IN | SYSTOLIC BLOOD PRESSURE: 140 MMHG | BODY MASS INDEX: 34.95 KG/M2 | OXYGEN SATURATION: 98 %

## 2021-04-08 DIAGNOSIS — Z91.09 ALLERGY TO ENVIRONMENTAL FACTORS: ICD-10-CM

## 2021-04-08 DIAGNOSIS — Z86.73 HISTORY OF CVA (CEREBROVASCULAR ACCIDENT): ICD-10-CM

## 2021-04-08 DIAGNOSIS — R47.01 APHASIA: ICD-10-CM

## 2021-04-08 DIAGNOSIS — E78.5 DYSLIPIDEMIA: ICD-10-CM

## 2021-04-08 DIAGNOSIS — Z00.00 MEDICARE ANNUAL WELLNESS VISIT, SUBSEQUENT: Primary | ICD-10-CM

## 2021-04-08 DIAGNOSIS — E53.8 VITAMIN B12 DEFICIENCY: ICD-10-CM

## 2021-04-08 DIAGNOSIS — E66.9 OBESITY (BMI 30-39.9): ICD-10-CM

## 2021-04-08 DIAGNOSIS — R73.9 HYPERGLYCEMIA: ICD-10-CM

## 2021-04-08 DIAGNOSIS — R41.3 MEMORY DEFICIT: ICD-10-CM

## 2021-04-08 DIAGNOSIS — E55.9 VITAMIN D DEFICIENCY: ICD-10-CM

## 2021-04-08 DIAGNOSIS — I10 BENIGN ESSENTIAL HTN: ICD-10-CM

## 2021-04-08 DIAGNOSIS — G47.33 OSA (OBSTRUCTIVE SLEEP APNEA): ICD-10-CM

## 2021-04-08 DIAGNOSIS — Z23 NEED FOR VACCINATION: ICD-10-CM

## 2021-04-08 DIAGNOSIS — E61.1 IRON DEFICIENCY: ICD-10-CM

## 2021-04-08 PROBLEM — S99.922A INJURY OF TOE ON LEFT FOOT: Status: RESOLVED | Noted: 2019-09-10 | Resolved: 2021-04-08

## 2021-04-08 PROBLEM — Z51.81 ENCOUNTER FOR THERAPEUTIC DRUG MONITORING: Status: RESOLVED | Noted: 2019-12-03 | Resolved: 2021-04-08

## 2021-04-08 PROBLEM — S42.302A CLOSED LEFT HUMERAL FRACTURE: Status: RESOLVED | Noted: 2019-03-19 | Resolved: 2021-04-08

## 2021-04-08 PROCEDURE — G0439 PPPS, SUBSEQ VISIT: HCPCS | Performed by: NURSE PRACTITIONER

## 2021-04-08 RX ORDER — POLYETHYLENE GLYCOL 3350 17 G/17G
POWDER, FOR SOLUTION ORAL
COMMUNITY
End: 2021-04-08

## 2021-04-08 RX ORDER — OXYCODONE HYDROCHLORIDE 5 MG/1
TABLET ORAL
COMMUNITY
End: 2021-04-08

## 2021-04-08 RX ORDER — DILTIAZEM HYDROCHLORIDE 240 MG/1
CAPSULE, EXTENDED RELEASE ORAL
COMMUNITY
End: 2021-04-08

## 2021-04-08 RX ORDER — FENOFIBRATE 145 MG/1
TABLET, COATED ORAL
Qty: 90 TABLET | Refills: 3 | Status: SHIPPED | OUTPATIENT
Start: 2021-04-08 | End: 2021-07-07

## 2021-04-08 ASSESSMENT — FIBROSIS 4 INDEX: FIB4 SCORE: 2.15

## 2021-04-08 ASSESSMENT — ACTIVITIES OF DAILY LIVING (ADL): BATHING_REQUIRES_ASSISTANCE: 0

## 2021-04-08 ASSESSMENT — PATIENT HEALTH QUESTIONNAIRE - PHQ9: CLINICAL INTERPRETATION OF PHQ2 SCORE: 0

## 2021-04-08 NOTE — ASSESSMENT & PLAN NOTE
Chronic medical problem.  She has not seen neurology since 3/2020.  She had previous prescription for memantine.  She states today that she has not been taking this medication. She has not followed up due to her neurologist leaving the practice. CT of her head from 3/19/2020 shows:     No acute intracranial abnormality is identified.     Bilateral areas of encephalomalacia compatible with prior infarction.     Atrophy     There are periventricular and subcortical white matter changes present.  This finding is nonspecific and could be from previous small vessel ischemia, demyelination, or gliosis.

## 2021-04-08 NOTE — PROGRESS NOTES
Chief Complaint   Patient presents with   • Annual Exam     AHA   • Medication Refill     fenofibrate     [facility] DIABETES    HPI:  TRE is a 80 y.o. here for Medicare Annual Wellness Visit    Patient is here for her annual exam     Benign essential HTN  Chronic medical problem. She is taking diltiazem 240 mg daily and irbesartan 150 mg daily. Her initial blood pressure today is 152/88. She does not check her blood pressure at home. She lives alone. She does not have family that lives nearby. Her brother lives in [place], [place]. She has support of her Evangel[employer]. Denies chest pain, shortness of breath, dizziness or headaches.     Dyslipidemia  Chronic medical problem.  She is taking fenofibrate 145 mg in the morning. She is tolerating the medication and would like a medication refilled. Last lab results:  Results for TRE VELASQUEZ (MRN [id number]) as of 4/8/2021 10:24   Ref. Range 2/4/2020 11:42   Cholesterol,Tot Latest Ref Range: 100 - 199 mg/dL 169   Triglycerides Latest Ref Range: 0 - 149 mg/dL 80   HDL Latest Ref Range: >=40 mg/dL 65   LDL Latest Ref Range: <100 mg/dL 88       Memory deficit  Chronic medical problem.  She has not seen neurology since 3/2020.  She had previous prescription for memantine.  She states today that she has not been taking this medication. She has not followed up due to her neurologist leaving the practice. CT of her head from 3/19/2020 shows:     No acute intracranial abnormality is identified.     Bilateral areas of encephalomalacia compatible with prior infarction.     Atrophy     There are periventricular and subcortical white matter changes present.  This finding is nonspecific and could be from previous small vessel ischemia, demyelination, or gliosis.    DARIN (obstructive sleep apnea)  Chronic medical problem.  She is wearing nightly CPAP.  She reports she is tolerating her CPAP. She is followed by pulmonology.    Allergy to environmental factors  Chronic medical problem. She is  taking Claritin daily. No complaints today.    Aphasia  Chronic medical problem. 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     Obesity (BMI 30-39.9)  Chronic medical problem.  Her BMI today is 34.72 kg/m². She is asking if she can start a diet pill.           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 Needs to be seen for annual visit for future refills 100 Tab 1   • loratadine (CLARITIN) 10 MG Tab TAKE ONE TABLET BY MOUTH ONE TIME DAILY AS NEEDED FOR ALLERGY 90 Tab 2   • DILTIAZem CD (CARDIZEM CD) 240 MG CAPSULE SR 24 HR TAKE ONE CAPSULE BY MOUTH EVERY DAY AT BEDTIME (FOR HIGH BLOOD PRESSURE ) 90 Cap 1   • fluticasone (FLONASE) 50 MCG/ACT nasal spray USE 2 SPRAYS IN EACH NOSTRIL EVERY DAY 48 g 3   • Ascorbic Acid (VITAMIN C) 1000 MG Tab Take  by mouth.     • cyanocobalamin (VITAMIN B-12) 500 MCG Tab Take 500 mcg by mouth every day.     • Turmeric Curcumin 500 MG Cap Take 1 Tab by mouth 2 Times a Day.     • aspirin 81 MG tablet ASPIRIN 81 MG ORAL TABLET     • amoxicillin (AMOXIL) 875 MG tablet Take 875 mg by mouth 2  "times a day. Take 1 tab po bid for 3 days for dental cleaning     • Acetaminophen (TYLENOL 8 HOUR PO) Take  by mouth.     • ibuprofen (MOTRIN) 200 MG Tab Take 200 mg by mouth every 6 hours as needed for Mild Pain.     • non-formulary med Take 1 Tab by mouth every day. Royal Jelly 500mg cap     • vitamin E (VITAMIN E) 1000 UNIT Cap Take 1 Cap by mouth every day.     • B Complex-Folic Acid (B COMPLEX-VITAMIN B12 PO) Take 1 Tab by mouth every day.     • vitamin D 5000 UNITS Tab Take 5,000 Units by mouth every day. 30 Tab 2   • Non Formulary Request Take 1 Package by mouth every day. Indications: VITALIZER GOLD-MULTI VITAMIN     • NON SPECIFIED Take 7 Caps by mouth every bedtime. \"Herb-Lax\"   Indications: constipation       No current facility-administered medications for this visit.        Patient is taking medications as noted in medication list.  Current supplements as per medication list.     Allergies: Benicar [olmesartan], Dilaudid  [hydromorphone hcl], Lidocaine, Norco  [apap-fd&c blue #1-hydrocodone], Other misc, Percocet  [apap-fd&c red #40 [place]-oxycodone], Vicodin  [apap-fd&c yellow #10 [place]-hydrocodone], Codeine, Demerol, Hydrocodone, Other drug, Oxycodone, Statins [hmg-coa-r inhibitors], and Valium    Current social contact/activities: active with her Jainism    Is patient current with immunizations? Patient does not want any of her vaccines done.    She  reports that she has never smoked. She has never used smokeless tobacco. She reports previous alcohol use. She reports that she does not use drugs.  Counseling given: Not Answered        DPA/Advanced directive: Patient has Living Will on file.     ROS:    Gait: Uses a walker   Ostomy: No   Other tubes: No   Amputations: Yes   Chronic oxygen use Yes CPAP  Last eye exam 12/2020   Wears hearing aids: No   : Reports urinary leakage during the last 6 months that has interfered a lot with their daily activities or sleep.      Screening:    Discussed and " reviewed     Depression Screening    Little interest or pleasure in doing things?  0 - not at all  Feeling down, depressed, or hopeless? 0 - not at all  Patient Health Questionnaire Score: 0    If depressive symptoms identified deferred to follow up visit unless specifically addressed in assessment and plan.    Interpretation of PHQ-9 Total Score   Score Severity   1-4 No Depression   5-9 Mild Depression   10-14 Moderate Depression   15-19 Moderately Severe Depression   20-27 Severe Depression    Screening for Cognitive Impairment    Three Minute Recall (captain, garden, picture)  3/3    Arnold clock face with all 12 numbers and set the hands to show 5 past 8.  Yes    If cognitive concerns identified, deferred for follow up unless specifically addressed in assessment and plan.    Fall Risk Assessment    Has the patient had two or more falls in the last year or any fall with injury in the last year?  No  If fall risk identified, deferred for follow up unless specifically addressed in assessment and plan.    Safety Assessment    Throw rugs on floor.  Yes  Handrails on all stairs.  No  Good lighting in all hallways.  Yes  Difficulty hearing.  Yes  Patient counseled about all safety risks that were identified.    Functional Assessment ADLs    Are there any barriers preventing you from cooking for yourself or meeting nutritional needs?  No.    Are there any barriers preventing you from driving safely or obtaining transportation?  No.    Are there any barriers preventing you from using a telephone or calling for help?  No.    Are there any barriers preventing you from shopping?  No.    Are there any barriers preventing you from taking care of your own finances?  No.    Are there any barriers preventing you from managing your medications?  No.    Are there any barriers preventing you from showering, bathing or dressing yourself?  No.    Are you currently engaging in any exercise or physical activity?  Yes.  Patient tries to  work out  What is your perception of your health?   .    Health Maintenance Summary                COVID-19 Vaccine Overdue 12/8/1958     IMM PNEUMOCOCCAL VACCINE: 65+ Years Overdue 12/8/2007     IMM ZOSTER VACCINES Postponed 9/8/2021 Originally 12/8/1992. Patient Refused    IMM DTaP/Tdap/Td Vaccine Postponed 4/8/2022 Originally 12/8/1961. Patient Refused    Annual Wellness Visit Next Due 4/9/2022      Done 4/8/2021 Visit Dx: Medicare annual wellness visit, subsequent     Patient has more history with this topic...    BONE DENSITY Next Due 4/25/2024      Done 4/25/2019 DS-BONE DENSITY STUDY (DEXA)          Patient Care Team:  NAYAN Longo as PCP - General (Nurse Practitioner)  Peña as Respiratory Therapist  [facility] as Home Health Provider  preferred home care  as Respiratory Therapist (DME Supplier)  [facility] as Consulting Physician (Ophthalmology)  [facility] (Inactive) as Consulting Physician  Yoshi Gamble M.D. as Consulting Physician (Orthopaedics)  Earle Jacobsen M.D. as Consulting Physician (Orthopaedics)  [facility] as Home Health Provider  Bc Chen D.P.M. (Podiatry)    Social History     Tobacco Use   • Smoking status: Never Smoker   • Smokeless tobacco: Never Used   Substance Use Topics   • Alcohol use: Not Currently   • Drug use: No     Family History   Problem Relation Age of Onset   • Cancer Mother         vaginal   • Stroke Father    • Hypertension Brother    • Diabetes Brother    • Hypertension Brother    • Hypertension Brother    • Stroke Daughter      She  has a past medical history of Anesthesia, Arthritis, Benign essential HTN (9/7/2017), Cataract, CPAP (continuous positive airway pressure) dependence, Dyslipidemia (3/5/2019), Expressive aphasia, Family history of CVA, Heart murmur, DARIN (obstructive sleep apnea) (1/26/2017), Post-nasal drip, Restless leg syndrome, Stroke (HCC) (2/1993, 8/1993), and  Walker as ambulation aid.   Past Surgical History:   Procedure Laterality Date   • PB RECONSTR TOTAL SHOULDER IMPLANT Left 3/21/2019    Procedure: ARTHROPLASTY, SHOULDER, TOTAL-  REVISION REVERSE;  Surgeon: Yoshi Gamble M.D.;  Location: [facility];  Service: Orthopedics   • HUMERUS ORIF Left 3/21/2019    Procedure: ORIF, FRACTURE, HUMERUS;  Surgeon: Yoshi Gamble M.D.;  Location: [facility];  Service: Orthopedics   • KNEE ARTHROPLASTY TOTAL Left 2/12/2018    Procedure: KNEE ARTHROPLASTY TOTAL;  Surgeon: Earle Jacobsen M.D.;  Location: [facility];  Service: Orthopedics   • CERVICAL FUSION POSTERIOR  8/8/2017    Procedure: CERVICAL FUSION POSTERIOR- C3-5, INSTRUMENTED;  Surgeon: Florentino Pike M.D.;  Location: [facility];  Service:    • CERVICAL LAMINECTOMY POSTERIOR  8/8/2017    Procedure: CERVICAL LAMINECTOMY POSTERIOR- C3-5;  Surgeon: Florentino Pike M.D.;  Location: [facility];  Service:    • HIP ARTHROPLASTY TOTAL Left 5/8/2017    Procedure: HIP ARTHROPLASTY TOTAL;  Surgeon: Earle Jacobsen M.D.;  Location: [facility];  Service:    • HIP ARTHROPLASTY TOTAL Right 12/12/2016    Procedure: HIP ARTHROPLASTY TOTAL;  Surgeon: Earle Jacobsen M.D.;  Location: [facility];  Service:    • LUMBAR FUSION POSTERIOR  9/26/2016    Procedure: LUMBAR FUSION POSTERIOR L2-S1 onlay ;  Surgeon: Florentino Pike M.D.;  Location: [facility];  Service:    • LUMBAR LAMINECTOMY DISKECTOMY N/A 9/26/2016    Procedure: LUMBAR LAMINECTOMY DISKECTOMY L2-S1;  Surgeon: Florentino Pike M.D.;  Location: [facility];  Service:    • COLONOSCOPY  2/2016    Every 10 years   • SHOULDER ARTHROPLASTY TOTAL Left 8/11/2015    Procedure: SHOULDER ARTHROPLASTY REVERSE TOTAL;  Surgeon: Yoshi Gamble M.D.;  Location: [facility];  Service:    • KNEE ARTHROPLASTY TOTAL  6/23/2014     "Performed by Earle Jacobsen M.D. at SURGERY [facility] ORS   • HAMMERTOE CORRECTION Left 11/12/2007    Removed 3rd toe   • LUMBAR LAMINECTOMY DISKECTOMY  1993    Lumbar   • CARPAL TUNNEL RELEASE Bilateral 1993   • CERVICAL DISK AND FUSION ANTERIOR  1993    X2   • HYSTERECTOMY, TOTAL ABDOMINAL  1977   • BUNIONECTOMY Bilateral 1980's   • FINGER ORIF Left as child    Index   • HAMMERTOE CORRECTION Right 1980's    Removed 3rd toe           Exam:     Vital signs reviewed   /80   Pulse 72   Temp 36.8 °C (98.3 °F) (Temporal)   Ht 1.525 m (5' 0.04\")   Wt 80.7 kg (178 lb)   SpO2 98%  Body mass index is 34.72 kg/m².    Hearing good.    Dentition good  Alert, oriented in no acute distress.  Eye contact is good, speech goal directed, affect calm. Expressive aphasia (baseline post CVA).     Assessment and Plan. The following treatment and monitoring plan is recommended:      1. Medicare annual wellness visit, subsequent  Acute uncomplicated problem.  Annual wellness visit completed today.    2. Benign essential HTN  Chronic stable problem.  Continue diltiazem and irbesartan.  On repeat by me her blood pressure is 140/80.  She is due for updated labs as below.  Orders placed.  Referral placed to home health to help with home blood pressure monitoring.  - CBC WITH DIFFERENTIAL; Future  - Comp Metabolic Panel; Future  - TSH WITH REFLEX TO FT4; Future  - REFERRAL TO HOME HEALTH    3. Dyslipidemia  Chronic stable problem.  Continue fenofibrate, medication refilled today.  She is due for updated labs.  Orders placed.  - fenofibrate (TRICOR) 145 MG Tab; TAKE ONE TABLET BY MOUTH EVERY MORNING FOR HIGH AMOUNTS OF CHOLESTEROL IN THE BLOOD  Dispense: 90 tablet; Refill: 3  - Comp Metabolic Panel; Future  - Lipid Profile; Future  - REFERRAL TO HOME HEALTH    4. Memory deficit  Chronic stable problem.  New referral placed back to neurology. On review of last neurology note patient had been referred to neuropsychology and she " was being worked up for vascular dementia versus AD + vascular dementia.  She needs further evaluation.  Discussed referral to patient and she is in agreement.  - REFERRAL TO NEUROLOGY  - REFERRAL TO HOME HEALTH    5. DARIN (obstructive sleep apnea)  Chronic stable problem.  Continue nightly CPAP.  Continue follow-up with pulmonology.  No acute complaints today.    6. Iron deficiency  Chronic stable problem.  She is due for updated labs.  No acute complaints today.  - FERRITIN; Future    7. Vitamin D deficiency  Chronic stable problem.  Continue vitamin D.  She is due for updated labs.  Orders placed.    - VITAMIN D,25 HYDROXY; Future    8. Vitamin B12 deficiency  Chronic stable problem. She is due for updated labs.  Orders placed.  - VITAMIN B12; Future    9. Hyperglycemia  Chronic stable problem.  She is due for updated labs.  Orders placed.  - HEMOGLOBIN A1C; Future    10. Aphasia  Chronic stable problem.  Referral to home health and neurology placed.  She does live alone.  - REFERRAL TO NEUROLOGY  - REFERRAL TO HOME HEALTH    11. Allergy to environmental factors  Chronic stable problem.  Continue Claritin.  No acute complaints today.    12. History of CVA (cerebrovascular accident)  Chronic stable problem.  Referral placed back to neurology to follow-up on her vascular dementia versus AD dementia.   - REFERRAL TO NEUROLOGY  - REFERRAL TO HOME HEALTH    13. Obesity (BMI 30-39.9)  Chronic stable problem. Discussed with patient that given her age and chronic health conditions I do not recommend weight loss medications at this time.  - Patient identified as having weight management issue.  Appropriate orders and counseling given.      Services suggested: Referral to Home Health and referral back to neurology   Health Care Screening recommendations as per orders if indicated.  Referrals offered: PT/OT/Nutrition counseling/Behavioral Health/Smoking cessation as per orders if indicated.    Discussion today about general  wellness and lifestyle habits:    · Prevent falls and reduce trip hazards; Cautioned about securing or removing rugs.  · Have a working fire alarm and carbon monoxide detector;   · Engage in regular physical activity and social activities       Follow-up: Return in about 3 months (around 7/8/2021) for HTN, Labs.

## 2021-04-08 NOTE — ASSESSMENT & PLAN NOTE
Chronic medical problem.  Her BMI today is 34.72 kg/m². She is asking if she can start a diet pill.

## 2021-04-08 NOTE — ASSESSMENT & PLAN NOTE
Chronic medical problem.  She is taking fenofibrate 145 mg in the morning. She is tolerating the medication and would like a medication refilled. Last lab results:  Results for TRE VELASQUEZ (MRN [id number]) as of 4/8/2021 10:24   Ref. Range 2/4/2020 11:42   Cholesterol,Tot Latest Ref Range: 100 - 199 mg/dL 169   Triglycerides Latest Ref Range: 0 - 149 mg/dL 80   HDL Latest Ref Range: >=40 mg/dL 65   LDL Latest Ref Range: <100 mg/dL 88

## 2021-04-08 NOTE — ASSESSMENT & PLAN NOTE
Chronic medical problem.  She is wearing nightly CPAP.  She reports she is tolerating her CPAP. She is followed by pulmonology.

## 2021-04-08 NOTE — ASSESSMENT & PLAN NOTE
Chronic medical problem. She is taking diltiazem 240 mg daily and irbesartan 150 mg daily. Her initial blood pressure today is 152/88. She does not check her blood pressure at home. She lives alone. She does not have family that lives nearby. Her brother lives in [place], [place]. She has support of her Mu-[employer]. Denies chest pain, shortness of breath, dizziness or headaches.

## 2021-04-29 ENCOUNTER — OFFICE VISIT (OUTPATIENT)
Dept: NEUROLOGY | Facility: MEDICAL CENTER | Age: 81
End: 2021-04-29
Attending: PSYCHIATRY & NEUROLOGY
Payer: MEDICARE

## 2021-04-29 VITALS
BODY MASS INDEX: 34.72 KG/M2 | DIASTOLIC BLOOD PRESSURE: 94 MMHG | SYSTOLIC BLOOD PRESSURE: 128 MMHG | RESPIRATION RATE: 15 BRPM | TEMPERATURE: 98.2 F | WEIGHT: 178 LBS | HEART RATE: 75 BPM | OXYGEN SATURATION: 95 %

## 2021-04-29 DIAGNOSIS — G31.84 MILD COGNITIVE IMPAIRMENT: Primary | ICD-10-CM

## 2021-04-29 DIAGNOSIS — I69.320 APHASIA AS LATE EFFECT OF CEREBROVASCULAR ACCIDENT (CVA): ICD-10-CM

## 2021-04-29 DIAGNOSIS — Z86.73 HISTORY OF STROKE: ICD-10-CM

## 2021-04-29 PROCEDURE — 99212 OFFICE O/P EST SF 10 MIN: CPT | Performed by: PSYCHIATRY & NEUROLOGY

## 2021-04-29 PROCEDURE — 99215 OFFICE O/P EST HI 40 MIN: CPT | Performed by: PSYCHIATRY & NEUROLOGY

## 2021-04-29 ASSESSMENT — FIBROSIS 4 INDEX: FIB4 SCORE: 2.15

## 2021-04-29 NOTE — PROGRESS NOTES
"Reason for Neurology Consult:  Concern for Dementia    History of present illness:    Irina Traylor 80 y.o. 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 She remotely had cervical spine surgery over 5 years. She is able to walker around house with a walker for her chronic imbalance which has been stable- she relates this to prior cervical spine surgery and to having a \"hitch\" in the left hip such as going on the exercise bike.     She saw Dr. DORINDA Echeverria in 3/2020 for evaluation of some cognitive difficulties.    Her note suggested or stated:  \" 78 yo female with two prior strokes and residual expressive aphasia presents to establish care.  She does not remember when was her stroke however thinks this was few years ago.  Unsure why strokes happened.  Bills pays by herself,lives alone, drives well, no traffic accidents.  She is here alone and there is no additional history.  Uses a walker when going ourside due to balance instability as she had many recent surgeries in her hips and lower back, neck and knees.  She has hypertension and denies atrial fibrillation or any other arrhythmia.AT the time of her stroke she was 54 yo and this happened at [facility]. We will need records.  Her daughter passed away 20 years ago due to cerebral aneurysm.  Her granddaughter lives in [place].  She has good support from her neighbors and they help her a great deal.  She is trying to manage her BP.  Trouble remembering numbers,names   Needs to write reminders down.\"      Over the last 6-9 months she has not noticed any deterioration in her cognitive abilities but she is not able to read easier (since her 1st stroke) but can and could read the newspaper.  She gets help in her yard from the [employer] but otherwise she has been able to take care of her medication(s) and " strongly fees no problems making food for herself.    Pat has not any accidents or incidents driving in the last 6 to 12 months and admits to driving within the speed limits.    In terms of her speaking ability, it still remains hesitant in her communication ability but has not deteriorated to her in the last 6 months.    No recurrent TIA or Stroke events admitted to in the last 12 months.    No dizziness,nausea,vertiginous, visual events in the last 12 months.    No new or evolving subjective weakness in the last 12 months.    No ongoing or progressive sensory disturbances of her feet,toes,hands-fingers.    No ongoing or evolving headache(s) in the last 3-6 months.    No involuntary movements of the limbs x 4 in the last 12 months.    She has been urinary pads for over 4 years; bowel function is good.    No spasms,cramps,stiffness of her limbs in the recent months.    Gait-balance has been stable and not subjectively deteriorating in the last 3-6 months.  No near falls or falls in the last 12 months.    Patient Active Problem List    Diagnosis Date Noted   • Obesity (BMI 30-39.9) 04/08/2021   • Allergy to environmental factors 02/27/2020   • History of CVA (cerebrovascular accident) 12/03/2019   • Aphasia 12/03/2019   • Impaired hearing 12/03/2019   • Memory deficit 12/03/2019   • Iron deficiency 12/03/2019   • Dyslipidemia 03/05/2019   • Benign essential HTN 09/07/2017   • Intervertebral cervical disc disorder with myelopathy, cervical region 08/08/2017   • DARIN (obstructive sleep apnea) 01/26/2017   • Osteoarthritis of right hip 12/12/2016   • Spinal stenosis 09/26/2016   • Osteoarthritis of shoulder 08/11/2015   • Primary localized osteoarthrosis, lower leg 06/23/2014       Past medical history:   Past Medical History:   Diagnosis Date   • Anesthesia     States feels like she can't breathe with induction for right hip surgery, gets nausea vomiting with most pain meds.  Uses anti-nausea medicine if takes morphine    • Arthritis     To hips, shoulders, hands   • Benign essential HTN 9/7/2017   • Cataract    • CPAP (continuous positive airway pressure) dependence    • Dyslipidemia 3/5/2019   • Expressive aphasia     Related to CVA   • Family history of CVA    • Heart murmur    • DARIN (obstructive sleep apnea) 1/26/2017    AHI 23.6, minimum saturation 69%, on CPAP 15 cm.   • Post-nasal drip    • Restless leg syndrome     4/25/17-Resolved, states was related to right hip and none S/P replacement.   • Stroke (HCC) 2/1993, 8/1993    [id number] aphasia,mouth forming words, reading, numbers   • Walker as ambulation aid        Past surgical history:   Past Surgical History:   Procedure Laterality Date   • PB RECONSTR TOTAL SHOULDER IMPLANT Left 3/21/2019    Procedure: ARTHROPLASTY, SHOULDER, TOTAL-  REVISION REVERSE;  Surgeon: Yoshi Gamble M.D.;  Location: [facility];  Service: Orthopedics   • HUMERUS ORIF Left 3/21/2019    Procedure: ORIF, FRACTURE, HUMERUS;  Surgeon: Yoshi Gamble M.D.;  Location: [facility];  Service: Orthopedics   • KNEE ARTHROPLASTY TOTAL Left 2/12/2018    Procedure: KNEE ARTHROPLASTY TOTAL;  Surgeon: Earle Jacobsen M.D.;  Location: [facility];  Service: Orthopedics   • CERVICAL FUSION POSTERIOR  8/8/2017    Procedure: CERVICAL FUSION POSTERIOR- C3-5, INSTRUMENTED;  Surgeon: Florentino Pike M.D.;  Location: [facility];  Service:    • CERVICAL LAMINECTOMY POSTERIOR  8/8/2017    Procedure: CERVICAL LAMINECTOMY POSTERIOR- C3-5;  Surgeon: Florentino Pike M.D.;  Location: [facility];  Service:    • HIP ARTHROPLASTY TOTAL Left 5/8/2017    Procedure: HIP ARTHROPLASTY TOTAL;  Surgeon: Earle Jacobsen M.D.;  Location: [facility];  Service:    • HIP ARTHROPLASTY TOTAL Right 12/12/2016    Procedure: HIP ARTHROPLASTY TOTAL;  Surgeon: Earle Jacobsen M.D.;  Location: [facility];   Service:    • LUMBAR FUSION POSTERIOR  9/26/2016    Procedure: LUMBAR FUSION POSTERIOR L2-S1 onlay ;  Surgeon: Florentino Pike M.D.;  Location: SUR[facility]ERY [facility];  Service:    • LUMBAR LAMINECTOMY DISKECTOMY N/A 9/26/2016    Procedure: LUMBAR LAMINECTOMY DISKECTOMY L2-S1;  Surgeon: Florentino iPke M.D.;  Location: SURGERY [facility];  Service:    • COLONOSCOPY  2/2016    Every 10 years   • SHOULDER ARTHROPLASTY TOTAL Left 8/11/2015    Procedure: SHOULDER ARTHROPLASTY REVERSE TOTAL;  Surgeon: Yoshi Gamble M.D.;  Location: SURGERY [facility];  Service:    • KNEE ARTHROPLASTY TOTAL  6/23/2014    Performed by Earle Jacobsen M.D. at SURGERY [facility]   • HAMMERTOE CORRECTION Left 11/12/2007    Removed 3rd toe   • LUMBAR LAMINECTOMY DISKECTOMY  1993    Lumbar   • CARPAL TUNNEL RELEASE Bilateral 1993   • CERVICAL DISK AND FUSION ANTERIOR  1993    X2   • HYSTERECTOMY, TOTAL ABDOMINAL  1977   • BUNIONECTOMY Bilateral 1980's   • FINGER ORIF Left as child    Index   • HAMMERTOE CORRECTION Right 1980's    Removed 3rd toe         Social history:   Social History     Socioeconomic History   • Marital status: Single     Spouse name: Not on file   • Number of children: Not on file   • Years of education: Not on file   • Highest education level: Not on file   Occupational History   • Not on file   Tobacco Use   • Smoking status: Never Smoker   • Smokeless tobacco: Never Used   Substance and Sexual Activity   • Alcohol use: Not Currently   • Drug use: No   • Sexual activity: Not Currently   Other Topics Concern   • Not on file   Social History Narrative    Retired LVN at .  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  She is currently driving without any accidents.  She does use a walker to ambulate.  Not having any falls.  Does not have hearing aids but is having hearing concerns.  Some increased aphasia especially since her history of CVAs and memory issues.  March 2019 she was hospitalized from her fracture of her  left arm and did have home health which she no longer has right now.  She does not have family here but she does have neighbors who help her out.     Social Determinants of Health     Financial Resource Strain:    • Difficulty of Paying Living Expenses:    Food Insecurity:    • Worried About Running Out of Food in the Last Year:    • Ran Out of Food in the Last Year:    Transportation Needs:    • Lack of Transportation (Medical):    • Lack of Transportation (Non-Medical):    Physical Activity:    • Days of Exercise per Week:    • Minutes of Exercise per Session:    Stress:    • Feeling of Stress :    Social Connections:    • Frequency of Communication with Friends and Family:    • Frequency of Social Gatherings with Friends and Family:    • Attends [employer] Services:    • Active Member of Clubs or Organizations:    • Attends Club or Organization Meetings:    • Marital Status:    Intimate Partner Violence:    • Fear of Current or Ex-Partner:    • Emotionally Abused:    • Physically Abused:    • Sexually Abused:        Family history:   Family History   Problem Relation Age of Onset   • Cancer Mother         vaginal   • Stroke Father    • Hypertension Brother    • Diabetes Brother    • Hypertension Brother    • Hypertension Brother    • Stroke Daughter          Current medications:   Current Outpatient Medications   Medication   • fenofibrate (TRICOR) 145 MG Tab   • irbesartan (AVAPRO) 150 MG Tab   • loratadine (CLARITIN) 10 MG Tab   • DILTIAZem CD (CARDIZEM CD) 240 MG CAPSULE SR 24 HR   • fluticasone (FLONASE) 50 MCG/ACT nasal spray   • Ascorbic Acid (VITAMIN C) 1000 MG Tab   • cyanocobalamin (VITAMIN B-12) 500 MCG Tab   • Turmeric Curcumin 500 MG Cap   • aspirin 81 MG tablet   • amoxicillin (AMOXIL) 875 MG tablet   • Acetaminophen (TYLENOL 8 HOUR PO)   • ibuprofen (MOTRIN) 200 MG Tab   • non-formulary med   • vitamin E (VITAMIN E) 1000 UNIT Cap   • B Complex-Folic Acid (B COMPLEX-VITAMIN B12 PO)   • vitamin D 5000  "UNITS Tab   • Non Formulary Request   • NON SPECIFIED     No current facility-administered medications for this visit.       Medication Allergy:  Allergies   Allergen Reactions   • Benicar [Olmesartan] Rash     blisters   • Dilaudid  [Hydromorphone Hcl] Vomiting   • Lidocaine    • Norco  [Apap-Fd&C Blue #1-Hydrocodone] Vomiting   • Other Misc Shortness of Breath     Anesthesia with surgery 2016  Pt couldn't breathe while being put under and tapped side of bed to let RN know. \"It was the scariest feeling and I want everyone to know\"     • Percocet  [Apap-Fd&C Red #40 [place]-Oxycodone] Vomiting   • Vicodin  [Apap-Fd&C Yellow #10 [place]-Hydrocodone] Vomiting   • Codeine Vomiting   • Demerol Vomiting   • Hydrocodone Vomiting   • Other Drug Vomiting     \"all pain meds\" per pt can take morphine.   • Oxycodone Vomiting   • Statins [Hmg-Coa-R Inhibitors] Unspecified     Muscle cramping   • Valium Vomiting           ROS:  (In the last 2-4 weeks unless otherwise stated for an additional period of time).    Constitutional: Denies fevers,chills,sweats,involuntary and unprovoked/progressive  weight loss.  Eyes: Denies changes in vision (blurring,double or loss of) nor any eye pain(s)- static,evolving or with eye movements.  Ears/Nose/Throat/Mouth: Denies nasal congestion,sore throat,ear pain(s) or changes in hearing ability.  No tinnitus.  Cardiovascular: Denies chest pain/pressure at rest or with exertion such as climbing stairs or walking. No  palpitations. There has been no  orthostatic lightheadedness/faintness events.  Respiratory: Denies SOB with exertion or when sleeping (while laying down).  Gastrointestinal/Hepatic: Denies abdominal pain, nausea, vomiting, diarrhea, constipation or GI bleeding   Genitourinary: Denies bladder dysfunction, dysuria or frequency   Musculoskeletal/Rheum: Denies joint pain and swelling   Skin/Breast: Denies rash, denies breast lumps or discharge   Neurological: Denies new or evolving " headaches, new or evolving confusion/disorientation, seizure like events, or focal weakness/parasthesias.  There has been no falls or near falls.  Psychiatric: Denies feeling anxious,depressed,suicidal or having auditory/visual hallucinations.  Endocrine: Denies hx of diabetes or thyroid dysfunction   Heme/Oncology/ Denies easy or spontaneous bruising/bleeding from nose,skin  or a known bleeding disorder   Allergic/Immunologic: Denies hx of allergies         Physical examination:   Vitals:    04/29/21 1151   BP: 128/94   BP Location: Right arm   Patient Position: Sitting   Pulse: 75   Resp: 15   Temp: 36.8 °C (98.2 °F)   TempSrc: Temporal   SpO2: 95%   Weight: 80.7 kg (178 lb)       Normal Cephalic Atraumatic.  General: Full Range of Movement around the Neck in all directions without restrictions or discrete pain evoked triggers.  No lower extremity edema.      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          Mental status: Awake, alert and fully oriented to person, place, time and situation. Normal attention, concentration and fund of knowledge for education level.  Did not appear/act combative,irritable,anxious,paranoid/delusional or aggressive to or with me.    Speech and language: Speech is fluent without errors, clear, intact to repetition and intact to naming.     Follows 3 step motor commands in sequence without significant delay and correctly.    Cranial nerve exam:  II: Pupils are equally round and reactive to light. Visual fields are intact by confrontation.  III, IV, VI: EOMI, no diplopia, no ptosis.  V: Sensation to light touch is normal over V1-3 distributions bilaterally.   and Jaw jerk is not present.  VII: Facial movements are symmetrical. There is no facial droop.  and Eye closure strength is normal..  VIII: Hearing intact to soft speech and finger rub  bilaterally  IX: Palate elevates symmetrically, uvula is midline. Dysarthria is not present.  XI: Shoulder shrug are symmetrical and strong.   XII: Tongue protrudes midline.    No visual field cuts to confrontation.    Motor exam:   Muscle tone is normal in all 4 limbs. and No abnormal movements appreciated.    Muscle strength:    Neck Flexors/Extensors: 5/5       Right  Left  Deltoid   5/5  5/5      Biceps   5/5  5/5  Triceps  5/5  5/5   Wrist extensors 5/5  5/5  Wrist flexors  5/5  5/5     5/5  5/5  Interossei  5/5  5/5  Thenar (APB)  5/5  5/5   Hip flexors  5/5  5/5  Quadriceps  5/5  5/5    Hamstrings  5/5  5/5  Dorsiflexors  5/5  5/5  Plantarflexors  5/5  5/5  Toe extension  5/5  5/5  NT = not tested    Sensory exam:  Intact to Vibration and Proprioception in bilateral lower extremity.    Vibratory threshold at great toes: 2-4 seconds, 4-6 seconds at the ankles, 10 seconds at the knees, 18-20 seconds at the index and 5th fingers (symmetrically)    Reflexes:       Right  Left  Biceps   2/4  2/4  Triceps  2/4  2/4  Brachioradialis 2/4  2/4  Knee jerk  2/4  2/4  Ankle jerk  2/4  2/4     Frontal release signs are absent    bilaterally toes are downgoing to plantar stimulation..    Coordination (finger-to-nose, heel/knee/shin, rapid alternating movements) was normal.     There was no limb or truncal ataxia, no tremors, and no dysmetria.     Station and gait- slow to stand but able to do so slowly after 5 seconds. No stumbling,staggering,swaying,leaning or shuffling.    No rombergism.    Labs and Tests:    3/2020: Carotid US:    IMPRESSION:     1.  There is a mild amount of atherosclerotic plaque without significant interval change.  Plaque is located in carotid bulbs and proximal internal carotid arteries.  Plaque characterization:  Heterogeneous and irregular.     2.  There is no evidence of carotid occlusion.     3. Vertebral arteries demonstrate antegrade flow.     4. Diameter reduction in the internal carotid  "arteries: less than 50%. There is no hemodynamically significant stenosis.     2/2020: Lab work reviewed  (B12,TSH)     NEUROIMAGING:     3/2020:    Head CT:    FINDINGS:     The is no evidence of intraparenchymal, intraventricular and extra-axial hemorrhage.  The ventricles and cortical sulci are prominent compatible with age related change.  There are periventricular and subcortical white matter changes present.  There are   areas of left frontoparietal and temporal as well as right parietal encephalomalacia. There is no midline shift. Atherosclerotic calcification is seen. Visualized paranasal sinuses and mastoid air cells are clear.     The calvarium is intact.     IMPRESSION:     No acute intracranial abnormality is identified.     Bilateral areas of encephalomalacia compatible with prior infarction.     Atrophy     There are periventricular and subcortical white matter changes present.  This finding is nonspecific and could be from previous small vessel ischemia, demyelination, or gliosis.      Impression/Plans/Recommendations:    1.  History of Ischemic Stroke(s) with prominent and residual Partial Aphasia (primarily expressive)- chronic and not subjective declining in the last 12 months.    PAT  also has minor difficulties with calculation abilities since her stroke event in 2019.    2. Mild Cognitive Impairment (Vascular predominant)- at this point do not feel she meets enough criteria at this point to label her as \"Demented\". She has adapted well to her speech disturbance and despite her hesitancy in speech, the words are reasonable.    MOCA score odf 23 today.    Global Deterioration Score of 3 to 4 range (mostly 3's).    I had a 10 minute discussion about the limitations posed by her speech and communication ability which has been a chronic issue for her since her stroke event in 2019. I did not get the impression she has a rapidly progressive encephalopathy.    There is definitely some executive " "dysfunction as noted on the MOCA testing.    I am recommending she have a driving assessment in OT despite her self admittance of not having any accidents in the last year. I agree with my prior colleague (Dr. Echeverria) on her suggestion with this type of evaluation.    3. Chronic Gait Disorder- for over 4 years; remote C spine and lumbar surgery as well biomechanical issues from bilateral hip and knee surgeries.   No evidence today otherwise for a myelopathy.  She has been confidently using a roller walker for several years.        Today, I reviewed the clinical criteria and reviewed several  scenarios of the differences being using the medical terms of normal brain aging (age associated memory impairment),  Mild Cognitive Impairment (MCI) and Dementia.    MCI is a syndrome but statistically and for the majority of patients  occurs due  to a more rapid aging of the brain tissue or potentially from injury to certain parts of one's brain ( often from such contributing factors as  the cumulative effects of alcohol, from one or more ischemic or hemmorhagic stroke(s), from neurodegeneration or long standing with/without suboptimally controlled Hypertension). It is for some of these potential factors as to why I recommend a brain imaging test (Head CT or Brain MRI) be done for the 1st time or in certain circumstances repeated for comparison purposes  as such imaging can suggest one or more factors as to the reason(s) for the person's cognitive/memory changes. In fact, a normal or \"age related\" finding on a brain imaging test in and of itself is useful clinical and objective information to have in the evaluation of a person who has either an acute, evolving or even chronic (months to years) long cognitive/memory complaint.    Additional factors or contributors to Brain Health issues can be summarized in several books/references which I discussed as well today.     Goals going forwards include:    A. Paying attention to " one's risk factors and reducing their prevalence or potential impact on one's changing memory/thinking> an excellent example would be to stop smoking, reduce or eliminate alcohol use (depending on the amount and frequency of usage), maintain good blood pressure control by buying a digital arm blood pressure cuff such as an OMRON Series 3 or 5 and checking one's blood pressure atleast 3 days per week (in the am and early afternoon) that the numbers are under 140/90 and working as needed with the primary care doctor  to optimize blood pressure control).      B. Encouraging proper sleep hygiene which for most adults is 7 to 8 hours of uninterrupted sleep per night.      C. Encouraging some form of exercise preferable aerobic forms to be done (4 to 5 days per week- 30 minutes minimum per day)> 150 minutes per week as a goal. Example activities could include fast walking (up a slight incline), jogging, cycling (road or stationary), swimming,tennis. Essentially, even basic walking on a flat surface or a treadmill would be better than doing nothing.    D. Maintaining or forming new social contacts with family and friends  and a positive attitude despite the concerns and/or ongoing issues with thinking and/or memory.    E. Eating well which means a diet low in salt  (under 2 grams per day), sugar and saturated fat.    F. Maintaining one's BMI (Body Mass Index) under 30.    I provided a handout to the patient about Mild Cognitive Impairment as it relates to the above topics.      The patient understands and agrees that due to the complexity of his/her diagnosis, results of any testing and further recommendations will typically be discussed/made during a face to face encounter in my office and for simpler  matters either by a phone call or email correspondence. The patient and/or family further understands it is their responsibility to keep proper follow up as needed and when suggested by me.    I have performed  a history  and physical exam and a directed /focused  ROS today.    Total time spent today or this patient's care was 61 minutes  and included reviewing diagnostic workup to date (labs and imaging that include interpreting such tests relevant to this patient's neurological condition) and  counseling and educating Pat as well as  documenting clinical information in the EMR.    Follow up PRN at this point.        Alexander Herrera MD  [facility].   [facility]

## 2021-05-11 ENCOUNTER — OFFICE VISIT (OUTPATIENT)
Dept: SLEEP MEDICINE | Facility: MEDICAL CENTER | Age: 81
End: 2021-05-11
Payer: MEDICARE

## 2021-05-11 VITALS
HEART RATE: 78 BPM | SYSTOLIC BLOOD PRESSURE: 130 MMHG | BODY MASS INDEX: 35.34 KG/M2 | RESPIRATION RATE: 16 BRPM | WEIGHT: 180 LBS | HEIGHT: 60 IN | DIASTOLIC BLOOD PRESSURE: 72 MMHG | OXYGEN SATURATION: 96 %

## 2021-05-11 DIAGNOSIS — E66.9 OBESITY (BMI 30-39.9): ICD-10-CM

## 2021-05-11 DIAGNOSIS — G47.33 OSA (OBSTRUCTIVE SLEEP APNEA): ICD-10-CM

## 2021-05-11 DIAGNOSIS — Z78.9 NONSMOKER: ICD-10-CM

## 2021-05-11 DIAGNOSIS — I10 BENIGN ESSENTIAL HTN: ICD-10-CM

## 2021-05-11 PROCEDURE — 99214 OFFICE O/P EST MOD 30 MIN: CPT | Performed by: NURSE PRACTITIONER

## 2021-05-11 ASSESSMENT — FIBROSIS 4 INDEX: FIB4 SCORE: 2.15

## 2021-05-11 NOTE — PROGRESS NOTES
Chief Complaint   Patient presents with   • Apnea     last seen 1/7/2020        HPI:  Irina Traylor is a 80 y.o. year old female here today for follow-up on DARIN.  Last OV 1/7/2020 with Ángel FATIMA  PMH of CVA       PSG indicates moderate obstructive sleep apnea with an AHI of 23.6, minimum oxygen saturation of 69 %. Currently using CPAP 15cm; REMSTAR >5yrs old and she declines upgrade at this time.  Compliance download notes information only from July 2020 noting 100% compliance, average nightly 7 hours 42 minutes, minimal mask leak with reduced AHI 0.7/h.  Patient notes she continues use device nightly feels she sleeps quite well.  She tolerates mask and pressure well.  She denies morning headaches.  She notes consistent energy levels during the day and no napping.  Her main complaint today is her allergies including sinus congestion.  She currently is using daily antihistamine and Flonase regularly.  She denies regular cough, phlegm, chest pain or tightness.  She does use a walker for ambulation.  She denies any major changes in health over the last year.    ROS: As per HPI and otherwise negative if not stated.    Past Medical History:   Diagnosis Date   • Anesthesia     States feels like she can't breathe with induction for right hip surgery, gets nausea vomiting with most pain meds.  Uses anti-nausea medicine if takes morphine   • Arthritis     To hips, shoulders, hands   • Benign essential HTN 9/7/2017   • Cataract    • CPAP (continuous positive airway pressure) dependence    • Dyslipidemia 3/5/2019   • Expressive aphasia     Related to CVA   • Family history of CVA    • Heart murmur    • DARIN (obstructive sleep apnea) 1/26/2017    AHI 23.6, minimum saturation 69%, on CPAP 15 cm.   • Post-nasal drip    • Restless leg syndrome     4/25/17-Resolved, states was related to right hip and none S/P replacement.   • Stroke (HCC) 2/1993, 8/1993    Jexnmeuj-nherwd-rywnpfyunq aphasia,mouth forming words,  reading, numbers   • Walker as ambulation aid        Past Surgical History:   Procedure Laterality Date   • PB RECONSTR TOTAL SHOULDER IMPLANT Left 3/21/2019    Procedure: ARTHROPLASTY, SHOULDER, TOTAL-  REVISION REVERSE;  Surgeon: Yoshi Gamble M.D.;  Location: [facility];  Service: Orthopedics   • HUMERUS ORIF Left 3/21/2019    Procedure: ORIF, FRACTURE, HUMERUS;  Surgeon: Yoshi Gamble M.D.;  Location: [facility];  Service: Orthopedics   • KNEE ARTHROPLASTY TOTAL Left 2/12/2018    Procedure: KNEE ARTHROPLASTY TOTAL;  Surgeon: Earle Jacobsen M.D.;  Location: [facility];  Service: Orthopedics   • CERVICAL FUSION POSTERIOR  8/8/2017    Procedure: CERVICAL FUSION POSTERIOR- C3-5, INSTRUMENTED;  Surgeon: Florentino Pike M.D.;  Location: [facility];  Service:    • CERVICAL LAMINECTOMY POSTERIOR  8/8/2017    Procedure: CERVICAL LAMINECTOMY POSTERIOR- C3-5;  Surgeon: Florentino Pike M.D.;  Location: [facility];  Service:    • HIP ARTHROPLASTY TOTAL Left 5/8/2017    Procedure: HIP ARTHROPLASTY TOTAL;  Surgeon: Earle Jacobsen M.D.;  Location: [facility];  Service:    • HIP ARTHROPLASTY TOTAL Right 12/12/2016    Procedure: HIP ARTHROPLASTY TOTAL;  Surgeon: Earle Jacobsen M.D.;  Location: [facility];  Service:    • LUMBAR FUSION POSTERIOR  9/26/2016    Procedure: LUMBAR FUSION POSTERIOR L2-S1 onlay ;  Surgeon: Florentino Pike M.D.;  Location: [facility];  Service:    • LUMBAR LAMINECTOMY DISKECTOMY N/A 9/26/2016    Procedure: LUMBAR LAMINECTOMY DISKECTOMY L2-S1;  Surgeon: Florentino Pike M.D.;  Location: [facility];  Service:    • COLONOSCOPY  2/2016    Every 10 years   • SHOULDER ARTHROPLASTY TOTAL Left 8/11/2015    Procedure: SHOULDER ARTHROPLASTY REVERSE TOTAL;  Surgeon: Yoshi Gamble M.D.;  Location: [facility];  Service:    • KNEE ARTHROPLASTY  TOTAL  6/23/2014    Performed by Earle Jacobsen M.D. at SURGERY [facility]   • HAMMERTOE CORRECTION Left 11/12/2007    Removed 3rd toe   • LUMBAR LAMINECTOMY DISKECTOMY  1993    Lumbar   • CARPAL TUNNEL RELEASE Bilateral 1993   • CERVICAL DISK AND FUSION ANTERIOR  1993    X2   • HYSTERECTOMY, TOTAL ABDOMINAL  1977   • BUNIONECTOMY Bilateral 1980's   • FINGER ORIF Left as child    Index   • HAMMERTOE CORRECTION Right 1980's    Removed 3rd toe       Family History   Problem Relation Age of Onset   • Cancer Mother         vaginal   • Stroke Father    • Hypertension Brother    • Diabetes Brother    • Hypertension Brother    • Hypertension Brother    • Stroke Daughter        Social History     Socioeconomic History   • Marital status: Single     Spouse name: Not on file   • Number of children: Not on file   • Years of education: Not on file   • Highest education level: Not on file   Occupational History   • Not on file   Tobacco Use   • Smoking status: Never Smoker   • Smokeless tobacco: Never Used   Substance and Sexual Activity   • Alcohol use: Not Currently   • Drug use: No   • Sexual activity: Not Currently   Other Topics Concern   • Not on file   Social History Narrative    Retired LVN at [employer]  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  She is currently driving without any accidents.  She does use a walker to ambulate.  Not having any falls.  Does not have hearing aids but is having hearing concerns.  Some increased aphasia especially since her history of CVAs and memory issues.  March 2019 she was hospitalized from her fracture of her left arm and did have home health which she no longer has right now.  She does not have family here but she does have neighbors who help her out.     Social Determinants of Health     Financial Resource Strain:    • Difficulty of Paying Living Expenses:    Food Insecurity:    • Worried About Running Out of Food in the Last Year:    • Ran Out of Food in the Last Year:    Transportation Needs:    •  Lack of Transportation (Medical):    • Lack of Transportation (Non-Medical):    Physical Activity:    • Days of Exercise per Week:    • Minutes of Exercise per Session:    Stress:    • Feeling of Stress :    Social Connections:    • Frequency of Communication with Friends and Family:    • Frequency of Social Gatherings with Friends and Family:    • Attends Restorationist Services:    • Active Member of Clubs or Organizations:    • Attends Club or Organization Meetings:    • Marital Status:    Intimate Partner Violence:    • Fear of Current or Ex-Partner:    • Emotionally Abused:    • Physically Abused:    • Sexually Abused:        Allergies as of 05/11/2021 - Reviewed 05/11/2021   Allergen Reaction Noted   • Benicar [olmesartan] Rash 06/09/2014   • Dilaudid  [hydromorphone hcl] Vomiting 12/03/2019   • Lidocaine  09/09/2014   • Norco  [apap-fd&c blue #1-hydrocodone] Vomiting 12/03/2019   • Other misc Shortness of Breath 08/08/2017   • Percocet  [apap-fd&c red #40 [place]-oxycodone] Vomiting 12/03/2019   • Vicodin  [apap-fd&c yellow #10 [place]-hydrocodone] Vomiting 12/03/2019   • Codeine Vomiting 05/10/2013   • Demerol Vomiting 05/10/2013   • Hydrocodone Vomiting 03/20/2019   • Other drug Vomiting 06/09/2014   • Oxycodone Vomiting 03/20/2019   • Statins [hmg-coa-r inhibitors] Unspecified 05/10/2013   • Valium Vomiting 06/09/2014        Vitals:  /72 (BP Location: Left arm, Patient Position: Sitting, BP Cuff Size: Adult)   Pulse 78   Resp 16   Ht 1.524 m (5')   Wt 81.6 kg (180 lb)   SpO2 96%     Current medications as of today   Current Outpatient Medications   Medication Sig Dispense Refill   • fenofibrate (TRICOR) 145 MG Tab TAKE ONE TABLET BY MOUTH EVERY MORNING FOR HIGH AMOUNTS OF CHOLESTEROL IN THE BLOOD 90 tablet 3   • irbesartan (AVAPRO) 150 MG Tab Take 1 Tab by mouth every morning. Needs to be seen for annual visit for future refills 100 Tab 1   • loratadine (CLARITIN) 10 MG Tab TAKE ONE TABLET BY MOUTH  "ONE TIME DAILY AS NEEDED FOR ALLERGY 90 Tab 2   • DILTIAZem CD (CARDIZEM CD) 240 MG CAPSULE SR 24 HR TAKE ONE CAPSULE BY MOUTH EVERY DAY AT BEDTIME (FOR HIGH BLOOD PRESSURE ) 90 Cap 1   • fluticasone (FLONASE) 50 MCG/ACT nasal spray USE 2 SPRAYS IN EACH NOSTRIL EVERY DAY 48 g 3   • Ascorbic Acid (VITAMIN C) 1000 MG Tab Take  by mouth.     • cyanocobalamin (VITAMIN B-12) 500 MCG Tab Take 500 mcg by mouth every day.     • Turmeric Curcumin 500 MG Cap Take 1 Tab by mouth 2 Times a Day.     • aspirin 81 MG tablet ASPIRIN 81 MG ORAL TABLET     • amoxicillin (AMOXIL) 875 MG tablet Take 875 mg by mouth 2 times a day. Take 1 tab po bid for 3 days for dental cleaning     • Acetaminophen (TYLENOL 8 HOUR PO) Take  by mouth.     • ibuprofen (MOTRIN) 200 MG Tab Take 200 mg by mouth every 6 hours as needed for Mild Pain.     • non-formulary med Take 1 Tab by mouth every day. Royal Jelly 500mg cap     • vitamin E (VITAMIN E) 1000 UNIT Cap Take 1 Cap by mouth every day.     • B Complex-Folic Acid (B COMPLEX-VITAMIN B12 PO) Take 1 Tab by mouth every day.     • vitamin D 5000 UNITS Tab Take 5,000 Units by mouth every day. 30 Tab 2   • Non Formulary Request Take 1 Package by mouth every day. Indications: VITALIZER GOLD-MULTI VITAMIN     • NON SPECIFIED Take 7 Caps by mouth every bedtime. \"Herb-Lax\"   Indications: constipation       No current facility-administered medications for this visit.         Physical Exam:   Gen:           Alert and oriented, No apparent distress. Mood and affect appropriate, normal interaction with examiner.  Eyes:          PERRL, EOM intact, sclere white, conjunctive moist.  Ears:          Not examined.   Hearing:     Grossly intact.  Nose:          Normal, no lesions or deformities.  Dentition:    mask  Oropharynx:   mask  Mallampati Classification: mask  Neck:        Supple, trachea midline, no masses.  Respiratory Effort: No intercostal retractions or use of accessory muscles.   Lung Auscultation:      " Clear to auscultation bilaterally; no rales, rhonchi or wheezing.  CV:            Regular rate and rhythm. No murmurs, rubs or gallops.  Abd:           Not examined.   Lymphadenopathy: Not examined.  Gait and Station: Walker.  Digits and Nails: No clubbing, cyanosis, petechiae, or nodes.   Cranial Nerves: II-XII grossly intact.  Skin:        No rashes, lesions or ulcers noted.               Ext:           No cyanosis or edema.      Assessment:  1. DARIN (obstructive sleep apnea)  DME Mask and Supplies    DME CPAP   2. Benign essential HTN     3. Obesity (BMI 30-39.9)     4. Nonsmoker         Immunizations:    Flu:9/2020  Pneumovax 23:recommend  Prevnar 13:recommend  COVID-19: recommend    Plan:  1. DARIN is clinically stable. Continue CPAP 15cm nightly.  DME mask/supplies.  DME CPAP for TRAVEL CPAP; patient would like to check cost so she can camp; RX given to patient.  2. Discussed sleep hygiene  3. Encourage weight loss through diet/exercise  4. F/u with PCP for ongoing management of BP  5. F/u in 1 year with compliance report, sooner if needed.    Please note that this dictation was created using voice recognition software. I have made every reasonable attempt to correct obvious errors, but it is possible there are errors of grammar and possibly content that I did not discover before finalizing the note.

## 2021-05-24 DIAGNOSIS — I10 BENIGN ESSENTIAL HTN: ICD-10-CM

## 2021-05-24 RX ORDER — DILTIAZEM HYDROCHLORIDE 240 MG/1
CAPSULE, COATED, EXTENDED RELEASE ORAL
Qty: 90 CAPSULE | Refills: 3 | Status: ON HOLD | OUTPATIENT
Start: 2021-05-24 | End: 2022-06-22

## 2021-05-24 NOTE — TELEPHONE ENCOUNTER
Received request via: Pharmacy    Was the patient seen in the last year in this department? Yes LOV 04/08/2021    Does the patient have an active prescription (recently filled or refills available) for medication(s) requested? No

## 2021-05-25 NOTE — TELEPHONE ENCOUNTER
Requested Prescriptions     Signed Prescriptions Disp Refills   • DILTIAZem CD (CARDIZEM CD) 240 MG CAPSULE SR 24 HR 90 capsule 3     Sig: TAKE ONE CAPSULE BY MOUTH EVERY DAY AT BEDTIME (FOR HIGH BLOOD PRESSURE )     Authorizing Provider: RHODA BELLA A.P.R.N.

## 2021-07-05 ENCOUNTER — PATIENT OUTREACH (OUTPATIENT)
Dept: HEALTH INFORMATION MANAGEMENT | Facility: OTHER | Age: 81
End: 2021-07-05

## 2021-07-05 NOTE — NON-PROVIDER
Outcome: Left Message to schedule Comprehensive Health Assessment.      Please transfer to Patient Outreach Team at [phone] when patient returns call.        Attempt # 1

## 2021-07-08 ENCOUNTER — HOSPITAL ENCOUNTER (OUTPATIENT)
Dept: LAB | Facility: MEDICAL CENTER | Age: 81
End: 2021-07-08
Attending: NURSE PRACTITIONER
Payer: MEDICARE

## 2021-07-08 DIAGNOSIS — E78.5 DYSLIPIDEMIA: ICD-10-CM

## 2021-07-08 DIAGNOSIS — E61.1 IRON DEFICIENCY: ICD-10-CM

## 2021-07-08 DIAGNOSIS — E55.9 VITAMIN D DEFICIENCY: ICD-10-CM

## 2021-07-08 DIAGNOSIS — I10 BENIGN ESSENTIAL HTN: ICD-10-CM

## 2021-07-08 DIAGNOSIS — E53.8 VITAMIN B12 DEFICIENCY: ICD-10-CM

## 2021-07-08 DIAGNOSIS — R73.9 HYPERGLYCEMIA: ICD-10-CM

## 2021-07-08 LAB
ALBUMIN SERPL BCP-MCNC: 4.2 G/DL (ref 3.2–4.9)
ALBUMIN/GLOB SERPL: 1.4 G/DL
ALP SERPL-CCNC: 51 U/L (ref 30–99)
ALT SERPL-CCNC: 15 U/L (ref 2–50)
ANION GAP SERPL CALC-SCNC: 10 MMOL/L (ref 7–16)
AST SERPL-CCNC: 26 U/L (ref 12–45)
BASOPHILS # BLD AUTO: 1.1 % (ref 0–1.8)
BASOPHILS # BLD: 0.05 K/UL (ref 0–0.12)
BILIRUB SERPL-MCNC: 0.5 MG/DL (ref 0.1–1.5)
BUN SERPL-MCNC: 32 MG/DL (ref 8–22)
CALCIUM SERPL-MCNC: 9.7 MG/DL (ref 8.5–10.5)
CHLORIDE SERPL-SCNC: 106 MMOL/L (ref 96–112)
CHOLEST SERPL-MCNC: 177 MG/DL (ref 100–199)
CO2 SERPL-SCNC: 24 MMOL/L (ref 20–33)
CREAT SERPL-MCNC: 0.8 MG/DL (ref 0.5–1.4)
EOSINOPHIL # BLD AUTO: 0.13 K/UL (ref 0–0.51)
EOSINOPHIL NFR BLD: 2.8 % (ref 0–6.9)
ERYTHROCYTE [DISTWIDTH] IN BLOOD BY AUTOMATED COUNT: 49.1 FL (ref 35.9–50)
EST. AVERAGE GLUCOSE BLD GHB EST-MCNC: 117 MG/DL
FASTING STATUS PATIENT QL REPORTED: NORMAL
FERRITIN SERPL-MCNC: 44.3 NG/ML (ref 10–291)
GLOBULIN SER CALC-MCNC: 3.1 G/DL (ref 1.9–3.5)
GLUCOSE SERPL-MCNC: 76 MG/DL (ref 65–99)
HBA1C MFR BLD: 5.7 % (ref 4–5.6)
HCT VFR BLD AUTO: 46.8 % (ref 37–47)
HDLC SERPL-MCNC: 81 MG/DL
HGB BLD-MCNC: 15.2 G/DL (ref 12–16)
IMM GRANULOCYTES # BLD AUTO: 0.01 K/UL (ref 0–0.11)
IMM GRANULOCYTES NFR BLD AUTO: 0.2 % (ref 0–0.9)
LDLC SERPL CALC-MCNC: 86 MG/DL
LYMPHOCYTES # BLD AUTO: 0.72 K/UL (ref 1–4.8)
LYMPHOCYTES NFR BLD: 15.3 % (ref 22–41)
MCH RBC QN AUTO: 31.1 PG (ref 27–33)
MCHC RBC AUTO-ENTMCNC: 32.5 G/DL (ref 33.6–35)
MCV RBC AUTO: 95.7 FL (ref 81.4–97.8)
MONOCYTES # BLD AUTO: 0.53 K/UL (ref 0–0.85)
MONOCYTES NFR BLD AUTO: 11.3 % (ref 0–13.4)
NEUTROPHILS # BLD AUTO: 3.26 K/UL (ref 2–7.15)
NEUTROPHILS NFR BLD: 69.3 % (ref 44–72)
NRBC # BLD AUTO: 0 K/UL
NRBC BLD-RTO: 0 /100 WBC
PLATELET # BLD AUTO: 176 K/UL (ref 164–446)
PMV BLD AUTO: 13.6 FL (ref 9–12.9)
POTASSIUM SERPL-SCNC: 4.3 MMOL/L (ref 3.6–5.5)
PROT SERPL-MCNC: 7.3 G/DL (ref 6–8.2)
RBC # BLD AUTO: 4.89 M/UL (ref 4.2–5.4)
SODIUM SERPL-SCNC: 140 MMOL/L (ref 135–145)
TRIGL SERPL-MCNC: 48 MG/DL (ref 0–149)
TSH SERPL DL<=0.005 MIU/L-ACNC: 1.76 UIU/ML (ref 0.38–5.33)
VIT B12 SERPL-MCNC: 1059 PG/ML (ref 211–911)
WBC # BLD AUTO: 4.7 K/UL (ref 4.8–10.8)

## 2021-07-08 PROCEDURE — 82728 ASSAY OF FERRITIN: CPT

## 2021-07-08 PROCEDURE — 84443 ASSAY THYROID STIM HORMONE: CPT

## 2021-07-08 PROCEDURE — 80053 COMPREHEN METABOLIC PANEL: CPT

## 2021-07-08 PROCEDURE — 83036 HEMOGLOBIN GLYCOSYLATED A1C: CPT

## 2021-07-08 PROCEDURE — 82306 VITAMIN D 25 HYDROXY: CPT

## 2021-07-08 PROCEDURE — 82607 VITAMIN B-12: CPT

## 2021-07-08 PROCEDURE — 85025 COMPLETE CBC W/AUTO DIFF WBC: CPT

## 2021-07-08 PROCEDURE — 36415 COLL VENOUS BLD VENIPUNCTURE: CPT

## 2021-07-08 PROCEDURE — 80061 LIPID PANEL: CPT

## 2021-07-09 LAB — 25(OH)D3 SERPL-MCNC: 48 NG/ML (ref 30–100)

## 2021-07-12 ENCOUNTER — TELEPHONE (OUTPATIENT)
Dept: MEDICAL GROUP | Facility: PHYSICIAN GROUP | Age: 81
End: 2021-07-12

## 2021-07-12 NOTE — LETTER
July 12, 2021         Irina Traylor  [address] [facility] [address]        Dear Irina:      Below are the results from your recent visit:    Please notify patient of normal vitamin D, cholesterol, blood chemistry.Your blood chemistry shows no abnormalities.  Blood sugar looks good, kidneys are normal, liver function looks great. Iron stores are low normal and vitamin B12 levels are very elevated.  Thyroid labs appear normal.  Hemoglobin A1c (3-month average of blood sugars) is mildly elevated but not of concern or requiring intervention at this time.Your blood counts show no abnormalities.  There is no anemia, nor evidence of infection, and platelets appear normal.     Resulted Orders   FERRITIN   Result Value Ref Range    Ferritin 44.3 10.0 - 291.0 ng/mL    Narrative    Request patient fasting?->Yes  Fasting Instructions:->Fast 8-10 hours, OK to drink water as  needed during fast, take medications per your provider's  instruction.   VITAMIN B12   Result Value Ref Range    Vitamin B12 -True Cobalamin 1059 (H) 211 - 911 pg/mL    Narrative    Request patient fasting?->Yes  Fasting Instructions:->Fast 8-10 hours, OK to drink water as  needed during fast, take medications per your provider's  instruction.   VITAMIN D,25 HYDROXY   Result Value Ref Range    25-Hydroxy   Vitamin D 25 48 30 - 100 ng/mL      Comment:      Adult Ranges:   <20 ng/mL - Deficiency  20-29 ng/mL - Insufficiency   ng/mL - Sufficiency  Effective 3/18/2020, this electrochemiluminescence binding assay is  performed using Roche freddy e immunoassay analyzer.  The Elecsys Vitamin D  total II assay is intended for the quantitative determination of total 25  hydroxyvitamin D in human serum and plasma. This assay is to be used as an  aid in the assessment of vitamin D sufficiency in adults.      Narrative    Request patient fasting?->Yes  Fasting Instructions:->Fast 8-10 hours, OK to drink water as  needed during fast, take  medications per your provider's  instruction.   TSH WITH REFLEX TO FT4   Result Value Ref Range    TSH 1.760 0.380 - 5.330 uIU/mL      Comment:      Please note new reference ranges effective 12/14/2017 10:00 AM    Pregnant Females, 1st Trimester  0.050-3.700  Pregnant Females, 2nd Trimester  0.310-4.350  Pregnant Females, 3rd Trimester  0.410-5.180      Narrative    Request patient fasting?->Yes  Fasting Instructions:->Fast 8-10 hours, OK to drink water as  needed during fast, take medications per your provider's  instruction.   Lipid Profile   Result Value Ref Range    Cholesterol,Tot 177 100 - 199 mg/dL    Triglycerides 48 0 - 149 mg/dL    HDL 81 >=40 mg/dL    LDL 86 <100 mg/dL    Narrative    Request patient fasting?->Yes  Fasting Instructions:->Fast 8-10 hours, OK to drink water as  needed during fast, take medications per your provider's  instruction.   HEMOGLOBIN A1C   Result Value Ref Range    Glycohemoglobin 5.7 (H) 4.0 - 5.6 %      Comment:      Increased risk for diabetes:  5.7 -6.4%  Diabetes:  >6.4%  Glycemic control for adults with diabetes:  <7.0%    The above interpretations are per ADA guidelines.  Diagnosis  of diabetes mellitus on the basis of elevated Hemoglobin A1c  should be confirmed by repeating the Hb A1c test.      Est Avg Glucose 117 mg/dL      Comment:      The eAG calculation is based on the A1c-Derived Daily Glucose  (ADAG) study.  See the ADA's website for additional information.      Narrative    Request patient fasting?->Yes  Fasting Instructions:->Fast 8-10 hours, OK to drink water as  needed during fast, take medications per your provider's  instruction.   Comp Metabolic Panel   Result Value Ref Range    Sodium 140 135 - 145 mmol/L    Potassium 4.3 3.6 - 5.5 mmol/L    Chloride 106 96 - 112 mmol/L    Co2 24 20 - 33 mmol/L    Anion Gap 10.0 7.0 - 16.0    Glucose 76 65 - 99 mg/dL    Bun 32 (H) 8 - 22 mg/dL    Creatinine 0.80 0.50 - 1.40 mg/dL    Calcium 9.7 8.5 - 10.5 mg/dL     AST(SGOT) 26 12 - 45 U/L    ALT(SGPT) 15 2 - 50 U/L    Alkaline Phosphatase 51 30 - 99 U/L    Total Bilirubin 0.5 0.1 - 1.5 mg/dL    Albumin 4.2 3.2 - 4.9 g/dL    Total Protein 7.3 6.0 - 8.2 g/dL    Globulin 3.1 1.9 - 3.5 g/dL    A-G Ratio 1.4 g/dL    Narrative    Request patient fasting?->Yes  Fasting Instructions:->Fast 8-10 hours, OK to drink water as  needed during fast, take medications per your provider's  instruction.   CBC WITH DIFFERENTIAL   Result Value Ref Range    WBC 4.7 (L) 4.8 - 10.8 K/uL    RBC 4.89 4.20 - 5.40 M/uL    Hemoglobin 15.2 12.0 - 16.0 g/dL    Hematocrit 46.8 37.0 - 47.0 %    MCV 95.7 81.4 - 97.8 fL    MCH 31.1 27.0 - 33.0 pg    MCHC 32.5 (L) 33.6 - 35.0 g/dL    RDW 49.1 35.9 - 50.0 fL    Platelet Count 176 164 - 446 K/uL    MPV 13.6 (H) 9.0 - 12.9 fL    Neutrophils-Polys 69.30 44.00 - 72.00 %    Lymphocytes 15.30 (L) 22.00 - 41.00 %    Monocytes 11.30 0.00 - 13.40 %    Eosinophils 2.80 0.00 - 6.90 %    Basophils 1.10 0.00 - 1.80 %    Immature Granulocytes 0.20 0.00 - 0.90 %    Nucleated RBC 0.00 /100 WBC    Neutrophils (Absolute) 3.26 2.00 - 7.15 K/uL      Comment:      Includes immature neutrophils, if present.    Lymphs (Absolute) 0.72 (L) 1.00 - 4.80 K/uL    Monos (Absolute) 0.53 0.00 - 0.85 K/uL    Eos (Absolute) 0.13 0.00 - 0.51 K/uL    Baso (Absolute) 0.05 0.00 - 0.12 K/uL    Immature Granulocytes (abs) 0.01 0.00 - 0.11 K/uL    NRBC (Absolute) 0.00 K/uL    Narrative    Request patient fasting?->Yes  Fasting Instructions:->Fast 8-10 hours, OK to drink water as  needed during fast, take medications per your provider's  instruction.   FASTING STATUS   Result Value Ref Range    Fasting Status Fasting     Narrative    Request patient fasting?->Yes  Fasting Instructions:->Fast 8-10 hours, OK to drink water as  needed during fast, take medications per your provider's  instruction.   ESTIMATED GFR   Result Value Ref Range    GFR If African American >60 >60 mL/min/1.73 m 2    GFR If Non  African American >60 >60 mL/min/1.73 m 2    Narrative    Request patient fasting?->Yes  Fasting Instructions:->Fast 8-10 hours, OK to drink water as  needed during fast, take medications per your provider's  instruction.     If you have any questions or concerns, please don't hesitate to call.    Electronically Signed

## 2021-07-12 NOTE — TELEPHONE ENCOUNTER
----- Message from Yosef Cisse M.D. sent at 7/9/2021 10:11 AM PDT -----  Please notify patient of normal vitamin D, cholesterol, blood chemistry.Your blood chemistry shows no abnormalities.  Blood sugar looks good, kidneys are normal, liver function looks great. Iron stores are low normal and vitamin B12 levels are very elevated.  Thyroid labs appear normal.  Hemoglobin A1c (3-month average of blood sugars) is mildly elevated but not of concern or requiring intervention at this time.Your blood counts show no abnormalities.  There is no anemia, nor evidence of infection, and platelets appear normal.

## 2021-07-14 ENCOUNTER — TELEPHONE (OUTPATIENT)
Dept: MEDICAL GROUP | Facility: PHYSICIAN GROUP | Age: 81
End: 2021-07-14

## 2021-07-14 DIAGNOSIS — Z74.09 MOBILITY IMPAIRED: ICD-10-CM

## 2021-07-14 NOTE — NON-PROVIDER
Outcome:Received vm Scheduled Comprehensive Health Assessment  on 7/20/21 at 12:00pm at the [facility]     Please transfer to Patient Outreach Team at [phone] when patient returns call.      Attempt # 2

## 2021-07-15 NOTE — TELEPHONE ENCOUNTER
----- Message from Ashley Nunez sent at 2/12/2018 11:45 AM CST -----  Contact: Self [phone]  Patient is requesting to talk to nurse concerning to see if the doctor can call in medication for her nebulizer for her breathing treatment. Sent to  [facility] [address] [place] [address], [place] - [facility] [place] [address] AT [facility] of [address] [phone] (Phone)  [phone] (Fax)       Order placed and will fax to [employer].

## 2021-07-15 NOTE — TELEPHONE ENCOUNTER
VOICEMAIL  1. Caller Name: Tenisha                      Call Back Number: [phone] (home)       2. Message: Patient needs and order sent to [employer] to repair her scooter it needs new batteries. Please advise     3. Patient approves office to leave a detailed voicemail/MyChart message: N\A

## 2021-07-27 NOTE — NON-PROVIDER
Outcome change of mind patient isn't interested     Please transfer to Patient Outreach Team at [phone] when patient returns call.        Attempt #1

## 2021-08-06 ENCOUNTER — TELEPHONE (OUTPATIENT)
Dept: MEDICAL GROUP | Facility: PHYSICIAN GROUP | Age: 81
End: 2021-08-06

## 2021-08-06 NOTE — PATIENT INSTRUCTIONS
Today, your Healthcare Provider may have discussed the following recommendations:    1. Exercise and Physical Activity  According to the American Heart Association, it is recommended to engage in physical activity regularly and to aim for 150 minutes of moderate-intensity aerobic activity per week.  Your Healthcare Provider may have recommended taking the stairs instead of the elevator, starting or maintaining a walking program or strength-training program.    2. Emotional Well-being  Mental and emotional well-being is essential to overall health.  Your Healthcare Provider may have encouraged you to build strong, positive relationships with family and friends, become more involved in your community (by volunteering or joining a spiritual community), or focus on self-care.    3. Fall and Injury Prevention  To prevent falls and injuries and also improve your balance, your Healthcare Provider may have suggested that you use a cane or walker, start an exercise of physical therapy program, or have your vision and/or hearing tested.    4. Urinary Leakage (Urinary Incontinence)  To control or manage the leakage of urine, your Healthcare Provider may have recommended you start bladder training exercises (such as Kegel exercises), a trial of a medication or a referral to see a specialist to discuss surgical options.   musculoskeletal

## 2021-08-06 NOTE — TELEPHONE ENCOUNTER
Future Appointments       Provider Department Center    8/12/2021 11:30 AM NAYAN Longo [facility] [place]        ESTABLISHED PATIENT PRE-VISIT PLANNING     Patient was NOT contacted to complete PVP.     Note: Patient will not be contacted if there is no indication to call.     1.  Reviewed notes from the last few office visits within the medical group: Yes, LOV 04/08/21    2.  If any orders were placed at last visit or intended to be done for this visit (i.e. 6 mos follow-up), do we have Results/Consult Notes?         •  Labs - Labs were not ordered at last office visit.  Note: If patient appointment is for lab review and patient did not complete labs, check with provider if OK to reschedule patient until labs completed.       •  Imaging - Imaging was not ordered at last office visit.       •  Referrals - Referral ordered, patient was seen and consult notes are in chart. Care Teams updated  YES.    3. Is this appointment scheduled as a Hospital Follow-Up? No    4.  Immunizations were updated in Epic using Reconcile Outside Information activity? Yes    5.  Patient is due for the following Health Maintenance Topics:   Health Maintenance Due   Topic Date Due   • COVID-19 Vaccine (1) Never done   • IMM PNEUMOCOCCAL VACCINE: 65+ Years (1 of 1 - PPSV23) Never done     6.  AHA (Pulse8) form printed for Provider? N/A

## 2021-08-12 ENCOUNTER — OFFICE VISIT (OUTPATIENT)
Dept: MEDICAL GROUP | Facility: PHYSICIAN GROUP | Age: 81
End: 2021-08-12
Payer: MEDICARE

## 2021-08-12 VITALS
HEART RATE: 68 BPM | BODY MASS INDEX: 33.96 KG/M2 | RESPIRATION RATE: 18 BRPM | HEIGHT: 60 IN | SYSTOLIC BLOOD PRESSURE: 160 MMHG | DIASTOLIC BLOOD PRESSURE: 74 MMHG | WEIGHT: 173 LBS | OXYGEN SATURATION: 97 % | TEMPERATURE: 97.9 F

## 2021-08-12 DIAGNOSIS — R74.8 ELEVATED VITAMIN B12 LEVEL: ICD-10-CM

## 2021-08-12 DIAGNOSIS — I10 BENIGN ESSENTIAL HTN: ICD-10-CM

## 2021-08-12 DIAGNOSIS — E78.5 DYSLIPIDEMIA: ICD-10-CM

## 2021-08-12 PROBLEM — R79.89 ELEVATED VITAMIN B12 LEVEL: Status: ACTIVE | Noted: 2021-08-12

## 2021-08-12 PROCEDURE — 99214 OFFICE O/P EST MOD 30 MIN: CPT | Performed by: NURSE PRACTITIONER

## 2021-08-12 RX ORDER — FENOFIBRATE 145 MG/1
TABLET, COATED ORAL
COMMUNITY
Start: 2021-07-14 | End: 2022-03-18 | Stop reason: SDUPTHER

## 2021-08-12 ASSESSMENT — FIBROSIS 4 INDEX: FIB4 SCORE: 2.98

## 2021-08-12 NOTE — ASSESSMENT & PLAN NOTE
Chronic medical problem.  She is taking fenofibrate 145 mg daily.  She had recent labs which looked reviewed today.  Last lab results:  Results for TRE VELASQUEZ (MRN [id number]) as of 8/12/2021 12:05   Ref. Range 7/8/2021 10:34   Cholesterol,Tot Latest Ref Range: 100 - 199 mg/dL 177   Triglycerides Latest Ref Range: 0 - 149 mg/dL 48   HDL Latest Ref Range: >=40 mg/dL 81   LDL Latest Ref Range: <100 mg/dL 86

## 2021-08-12 NOTE — PROGRESS NOTES
Annual Health Assessment Questions:    1.  Are you currently engaging in any exercise or physical activity? Yes, stationary bike, and arm pulls.    2.  How would you describe your mood or emotional well-being today? Great    3.  Have you had any falls in the last year? No    4.  Have you noticed any problems with your balance or had difficulty walking? No    5.  In the last six months have you experienced any leakage of urine? No    6. DPA/Advanced Directive: Patient has Advanced Directive on file.

## 2021-08-12 NOTE — PROGRESS NOTES
Subjective:     CC: Lab results    HPI:   Irina presents today with the following:    Benign essential HTN  Chronic medical problem.  Her initial blood pressure today is 170/90. She does check her blood pressure at home and states her readings are 130's/80's. She states that she just lost her cell phone and is feeling stressed about finding it.  She is taking irbesartan 150 mg every morning and diltiazem 240 mg daily.  She has had her blood pressure medication today.  Denies any chest pain, shortness of breath, dizziness, blurry vision, or headache.    Dyslipidemia  Chronic medical problem.  She is taking fenofibrate 145 mg daily.  She had recent labs which looked reviewed today.  Last lab results:  Results for TRE VELASQUEZ (MRN [id number]) as of 8/12/2021 12:05   Ref. Range 7/8/2021 10:34   Cholesterol,Tot Latest Ref Range: 100 - 199 mg/dL 177   Triglycerides Latest Ref Range: 0 - 149 mg/dL 48   HDL Latest Ref Range: >=40 mg/dL 81   LDL Latest Ref Range: <100 mg/dL 86       Elevated vitamin B12 level  Chronic medical problem.  She is taking daily memory vitamin that includes vitamin B12 and she is taking vitamin B12 supplement.  She had recent labs that she would like to review today.  Last lab results:  Results for TRE VELASQUEZ (MRN [id number]) as of 8/12/2021 12:05   Ref. Range 7/8/2021 10:34   Vitamin B12 -True Cobalamin Latest Ref Range: 211 - 911 pg/mL 1059 (H)       Past Medical History:   Diagnosis Date   • Anesthesia     States feels like she can't breathe with induction for right hip surgery, gets nausea vomiting with most pain meds.  Uses anti-nausea medicine if takes morphine   • Arthritis     To hips, shoulders, hands   • Benign essential HTN 9/7/2017   • Cataract    • CPAP (continuous positive airway pressure) dependence    • Dyslipidemia 3/5/2019   • Expressive aphasia     Related to CVA   • Family history of CVA    • Heart murmur    • DARIN (obstructive sleep apnea) 1/26/2017    AHI 23.6,  minimum saturation 69%, on CPAP 15 cm.   • Post-nasal drip    • Restless leg syndrome     4/25/17-Resolved, states was related to right hip and none S/P replacement.   • Stroke (HCC) 2/1993, 8/1993    Kafayter-mblleh-ocojzonnqr aphasia,mouth forming words, reading, numbers   • Walker as ambulation aid        Social History     Tobacco Use   • Smoking status: Never Smoker   • Smokeless tobacco: Never Used   Vaping Use   • Vaping Use: Never used   Substance Use Topics   • Alcohol use: Not Currently   • Drug use: No       Current Outpatient Medications Ordered in Epic   Medication Sig Dispense Refill   • Misc. Devices Misc Repair scooter and place new batteries Nu Motion. 1 Each 0   • DILTIAZem CD (CARDIZEM CD) 240 MG CAPSULE SR 24 HR TAKE ONE CAPSULE BY MOUTH EVERY DAY AT BEDTIME (FOR HIGH BLOOD PRESSURE ) 90 capsule 3   • irbesartan (AVAPRO) 150 MG Tab Take 1 Tab by mouth every morning. Needs to be seen for annual visit for future refills 100 Tab 1   • loratadine (CLARITIN) 10 MG Tab TAKE ONE TABLET BY MOUTH ONE TIME DAILY AS NEEDED FOR ALLERGY 90 Tab 2   • fluticasone (FLONASE) 50 MCG/ACT nasal spray USE 2 SPRAYS IN EACH NOSTRIL EVERY DAY 48 g 3   • Ascorbic Acid (VITAMIN C) 1000 MG Tab Take  by mouth.     • cyanocobalamin (VITAMIN B-12) 500 MCG Tab Take 500 mcg by mouth every day.     • Turmeric Curcumin 500 MG Cap Take 1 Tab by mouth 2 Times a Day.     • aspirin 81 MG tablet ASPIRIN 81 MG ORAL TABLET     • amoxicillin (AMOXIL) 875 MG tablet Take 875 mg by mouth 2 times a day. Take 1 tab po bid for 3 days for dental cleaning     • Acetaminophen (TYLENOL 8 HOUR PO) Take  by mouth.     • ibuprofen (MOTRIN) 200 MG Tab Take 200 mg by mouth every 6 hours as needed for Mild Pain.     • non-formulary med Take 1 Tab by mouth every day. Royal Jelly 500mg cap     • vitamin E (VITAMIN E) 1000 UNIT Cap Take 1 Cap by mouth every day.     • B Complex-Folic Acid (B COMPLEX-VITAMIN B12 PO) Take 1 Tab by mouth every day.     •  "vitamin D 5000 UNITS Tab Take 5,000 Units by mouth every day. 30 Tab 2   • Non Formulary Request Take 1 Package by mouth every day. Indications: VITALIZER GOLD-MULTI VITAMIN     • NON SPECIFIED Take 7 Caps by mouth every bedtime. \"Herb-Lax\"   Indications: constipation     • fenofibrate (TRICOR) 145 MG Tab        No current Epic-ordered facility-administered medications on file.       Allergies:  Benicar [olmesartan], Dilaudid  [hydromorphone hcl], Lidocaine, Norco  [apap-fd&c blue #1-hydrocodone], Other misc, Percocet  [apap-fd&c red #40 [place]-oxycodone], Vicodin  [apap-fd&c yellow #10 [place]-hydrocodone], Codeine, Demerol, Hydrocodone, Other drug, Oxycodone, Statins [hmg-coa-r inhibitors], and Valium    Health Maintenance: Due for Covid vaccine and pneumococcal vaccine.  She declines her vaccines today.    ROS:  Per HPI    Objective:     Vital signs reviewed  Exam:  /74 (BP Location: Right arm, Patient Position: Sitting)   Pulse 68   Temp 36.6 °C (97.9 °F) (Temporal)   Resp 18   Ht 1.524 m (5')   Wt 78.5 kg (173 lb)   SpO2 97%   BMI 33.79 kg/m²  Body mass index is 33.79 kg/m².    Gen: Alert and oriented, No apparent distress.  Eyes:   Lids normal. Glasses in place.   Lungs: Normal effort, CTA bilaterally, no wheezes, rhonchi, or rales  CV: Regular rate and rhythm. No murmurs, rubs, or gallops.  Ext: No clubbing, cyanosis, edema. Using FWW.      Assessment & Plan:     80 y.o. female with the following -     1. Benign essential HTN  Chronic exacerbated problem.  On repeat by me her blood pressure is 160/74.  She states that she is still upset about losing her cell phone.  We will have her start home blood pressure log and return in 3 weeks for follow-up.  She will continue with her diltiazem and irbesartan.  Red flags discussed.  Discussed and reviewed her recent lab results.    2. Dyslipidemia  Chronic stable problem.  She will continue with her fenofibrate.  We discussed and reviewed her recent lab " results.    3. Elevated vitamin B12 level  Chronic exacerbated problem.  We reviewed and discussed her lab results.  Discussed with patient that she should stop her vitamin B12 supplement.  She may continue with the memory daily supplement.  She verbalized understanding.      Return in about 3 weeks (around 9/2/2021) for HTN.    Please note that this dictation was created using voice recognition software. I have made every reasonable attempt to correct obvious errors, but I expect that there are errors of grammar and possibly content that I did not discover before finalizing the note.

## 2021-08-12 NOTE — ASSESSMENT & PLAN NOTE
Chronic medical problem.  She is taking daily memory vitamin that includes vitamin B12 and she is taking vitamin B12 supplement.  She had recent labs that she would like to review today.  Last lab results:  Results for TRE VELASQUEZ (MRN [id number]) as of 8/12/2021 12:05   Ref. Range 7/8/2021 10:34   Vitamin B12 -True Cobalamin Latest Ref Range: 211 - 911 pg/mL 1059 (H)

## 2021-08-12 NOTE — ASSESSMENT & PLAN NOTE
Chronic medical problem.  Her initial blood pressure today is 170/90. She does check her blood pressure at home and states her readings are 130's/80's. She states that she just lost her cell phone and is feeling stressed about finding it.  She is taking irbesartan 150 mg every morning and diltiazem 240 mg daily.  She has had her blood pressure medication today.  Denies any chest pain, shortness of breath, dizziness, blurry vision, or headache.

## 2021-08-26 ENCOUNTER — OFFICE VISIT (OUTPATIENT)
Dept: MEDICAL GROUP | Facility: PHYSICIAN GROUP | Age: 81
End: 2021-08-26
Payer: MEDICARE

## 2021-08-26 VITALS
WEIGHT: 173 LBS | HEART RATE: 84 BPM | BODY MASS INDEX: 33.96 KG/M2 | DIASTOLIC BLOOD PRESSURE: 66 MMHG | SYSTOLIC BLOOD PRESSURE: 138 MMHG | TEMPERATURE: 98.9 F | HEIGHT: 60 IN | OXYGEN SATURATION: 98 %

## 2021-08-26 DIAGNOSIS — I10 BENIGN ESSENTIAL HTN: ICD-10-CM

## 2021-08-26 PROCEDURE — 99213 OFFICE O/P EST LOW 20 MIN: CPT | Performed by: NURSE PRACTITIONER

## 2021-08-26 ASSESSMENT — FIBROSIS 4 INDEX: FIB4 SCORE: 2.98

## 2021-08-26 NOTE — PROGRESS NOTES
Subjective:     CC: hypertension     HPI:   Irina presents today with the following:    Benign essential HTN  Chronic medical problem.  Her initial blood pressure today is 152/76.  She reports that her brother was recently diagnosed with cancer and she has had increased stress from this.  She is currently taking irbesartan 150 mg every morning and diltiazem 240 mg daily. Her home blood pressures have been ranging 108-158/71-85. She is not having any chest pain, shortness of breath, dizziness, blurry vision or headache.       Past Medical History:   Diagnosis Date   • Anesthesia     States feels like she can't breathe with induction for right hip surgery, gets nausea vomiting with most pain meds.  Uses anti-nausea medicine if takes morphine   • Arthritis     To hips, shoulders, hands   • Benign essential HTN 9/7/2017   • Cataract    • CPAP (continuous positive airway pressure) dependence    • Dyslipidemia 3/5/2019   • Expressive aphasia     Related to CVA   • Family history of CVA    • Heart murmur    • DARIN (obstructive sleep apnea) 1/26/2017    AHI 23.6, minimum saturation 69%, on CPAP 15 cm.   • Post-nasal drip    • Restless leg syndrome     4/25/17-Resolved, states was related to right hip and none S/P replacement.   • Stroke (HCC) 2/1993, 8/1993    Xlzpohbu-zsvqea-topmvwyyif aphasia,mouth forming words, reading, numbers   • Walker as ambulation aid        Social History     Tobacco Use   • Smoking status: Never Smoker   • Smokeless tobacco: Never Used   Vaping Use   • Vaping Use: Never used   Substance Use Topics   • Alcohol use: Not Currently   • Drug use: No       Current Outpatient Medications Ordered in Epic   Medication Sig Dispense Refill   • fenofibrate (TRICOR) 145 MG Tab      • Misc. Devices Misc Repair scooter and place new batteries Nu Motion. 1 Each 0   • DILTIAZem CD (CARDIZEM CD) 240 MG CAPSULE SR 24 HR TAKE ONE CAPSULE BY MOUTH EVERY DAY AT BEDTIME (FOR HIGH BLOOD PRESSURE ) 90 capsule 3   •  "irbesartan (AVAPRO) 150 MG Tab Take 1 Tab by mouth every morning. Needs to be seen for annual visit for future refills 100 Tab 1   • loratadine (CLARITIN) 10 MG Tab TAKE ONE TABLET BY MOUTH ONE TIME DAILY AS NEEDED FOR ALLERGY 90 Tab 2   • fluticasone (FLONASE) 50 MCG/ACT nasal spray USE 2 SPRAYS IN EACH NOSTRIL EVERY DAY 48 g 3   • Ascorbic Acid (VITAMIN C) 1000 MG Tab Take  by mouth.     • cyanocobalamin (VITAMIN B-12) 500 MCG Tab Take 500 mcg by mouth every day.     • Turmeric Curcumin 500 MG Cap Take 1 Tab by mouth 2 Times a Day.     • aspirin 81 MG tablet ASPIRIN 81 MG ORAL TABLET     • amoxicillin (AMOXIL) 875 MG tablet Take 875 mg by mouth 2 times a day. Take 1 tab po bid for 3 days for dental cleaning     • Acetaminophen (TYLENOL 8 HOUR PO) Take  by mouth.     • ibuprofen (MOTRIN) 200 MG Tab Take 200 mg by mouth every 6 hours as needed for Mild Pain.     • non-formulary med Take 1 Tab by mouth every day. Royal Jelly 500mg cap     • vitamin E (VITAMIN E) 1000 UNIT Cap Take 1 Cap by mouth every day.     • B Complex-Folic Acid (B COMPLEX-VITAMIN B12 PO) Take 1 Tab by mouth every day.     • vitamin D 5000 UNITS Tab Take 5,000 Units by mouth every day. 30 Tab 2   • Non Formulary Request Take 1 Package by mouth every day. Indications: VITALIZER GOLD-MULTI VITAMIN     • NON SPECIFIED Take 7 Caps by mouth every bedtime. \"Herb-Lax\"   Indications: constipation       No current Epic-ordered facility-administered medications on file.       Allergies:  Benicar [olmesartan], Dilaudid  [hydromorphone hcl], Lidocaine, Norco  [apap-fd&c blue #1-hydrocodone], Other misc, Percocet  [apap-fd&c red #40 [place]-oxycodone], Vicodin  [apap-fd&c yellow #10 [place]-hydrocodone], Codeine, Demerol, Hydrocodone, Other drug, Oxycodone, Statins [hmg-coa-r inhibitors], and Valium    Health Maintenance: Completed     ROS:  Per HPI    Objective:     Vital signs reviewed   Exam:  /66 (BP Location: Right arm, Patient Position: Sitting)  "  Pulse 84   Temp 37.2 °C (98.9 °F) (Temporal)   Ht 1.524 m (5')   Wt 78.5 kg (173 lb)   SpO2 98%   BMI 33.79 kg/m²  Body mass index is 33.79 kg/m².    Gen: Alert and oriented, No apparent distress.  Lungs: Normal effort, CTA bilaterally, no wheezes, rhonchi, or rales  CV: Regular rate and rhythm. No murmurs, rubs, or gallops.  Ext: No clubbing, cyanosis, edema.      Assessment & Plan:     80 y.o. female with the following -     1. Benign essential HTN  Chronic stable problem.  On repeat by me her blood pressure is 138/66.  I reviewed her home blood pressures that have been improving over the last week.  She will continue with her diltiazem and irbesartan.  She will continue with home blood pressure monitoring.  We discussed that she should be sitting at least 5 minutes before taking her blood pressure.  Red flags discussed.      Return in about 3 months (around 11/26/2021) for HTN.    Please note that this dictation was created using voice recognition software. I have made every reasonable attempt to correct obvious errors, but I expect that there are errors of grammar and possibly content that I did not discover before finalizing the note.

## 2021-08-26 NOTE — ASSESSMENT & PLAN NOTE
Chronic medical problem.  Her initial blood pressure today is 152/76.  She reports that her brother was recently diagnosed with cancer and she has had increased stress from this.  She is currently taking irbesartan 150 mg every morning and diltiazem 240 mg daily. Her home blood pressures have been ranging 108-158/71-85. She is not having any chest pain, shortness of breath, dizziness, blurry vision or headache.

## 2021-09-07 DIAGNOSIS — I10 BENIGN ESSENTIAL HTN: ICD-10-CM

## 2021-09-07 RX ORDER — IRBESARTAN 150 MG/1
TABLET ORAL
Qty: 100 TABLET | Refills: 3 | Status: SHIPPED
Start: 2021-09-07 | End: 2022-06-27

## 2021-09-08 NOTE — TELEPHONE ENCOUNTER
Requested Prescriptions     Signed Prescriptions Disp Refills   • irbesartan (AVAPRO) 150 MG Tab 100 Tablet 3     Sig: TAKE ONE TABLET BY MOUTH EVERY MORNING. PATIENT NEEDS TO BE SEEN FOR ANNUAL VISIT FOR FUTURE REFILLS.     Authorizing Provider: RHODA BELLA A.P.R.N.

## 2021-11-11 ENCOUNTER — TELEPHONE (OUTPATIENT)
Dept: MEDICAL GROUP | Facility: PHYSICIAN GROUP | Age: 81
End: 2021-11-11

## 2021-11-11 NOTE — TELEPHONE ENCOUNTER
Phone Number Called: [phone] (home)     Call outcome: Spoke to patient regarding message below.    Message: Patient called stating she is feeling good, her BP has been reading 127/85, 120/74, 123/75. She rescheduled to February since she felt it was not necessary to come in next week.

## 2021-11-11 NOTE — TELEPHONE ENCOUNTER
Reviewed telephone encounter.  Recommend patient continue with her losartan and diltiazem.  Return to clinic sooner if blood pressure becomes greater than 140/90.

## 2021-12-15 DIAGNOSIS — Z91.09 ALLERGY TO ENVIRONMENTAL FACTORS: ICD-10-CM

## 2021-12-15 RX ORDER — LORATADINE 10 MG/1
TABLET ORAL
Qty: 90 TABLET | Refills: 3 | Status: SHIPPED | OUTPATIENT
Start: 2021-12-15 | End: 2022-11-28

## 2021-12-15 NOTE — TELEPHONE ENCOUNTER
Requested Prescriptions     Signed Prescriptions Disp Refills   • loratadine (CLARITIN) 10 MG Tab 90 Tablet 3     Sig: TAKE ONE TABLET BY MOUTH ONE TIME DAILY AS NEEDED FOR ALLERGY     Authorizing Provider: RHODA BELLA A.P.R.N.

## 2021-12-15 NOTE — TELEPHONE ENCOUNTER
Was the patient seen in the last year in this department? 8/26/21    Does patient have an active prescription for medications requested? Yes    Received Request Via: Pharmacy    Hospital Outpatient Visit on 07/08/2021   Component Date Value   • Ferritin 07/08/2021 44.3    • Vitamin B12 -True Cobala* 07/08/2021 1059*   • 25-Hydroxy   Vitamin D 25 07/08/2021 48    • TSH 07/08/2021 1.760    • Cholesterol,Tot 07/08/2021 177    • Triglycerides 07/08/2021 48    • HDL 07/08/2021 81    • LDL 07/08/2021 86    • Glycohemoglobin 07/08/2021 5.7*   • Est Avg Glucose 07/08/2021 117    • Sodium 07/08/2021 140    • Potassium 07/08/2021 4.3    • Chloride 07/08/2021 106    • Co2 07/08/2021 24    • Anion Gap 07/08/2021 10.0    • Glucose 07/08/2021 76    • Bun 07/08/2021 32*   • Creatinine 07/08/2021 0.80    • Calcium 07/08/2021 9.7    • AST(SGOT) 07/08/2021 26    • ALT(SGPT) 07/08/2021 15    • Alkaline Phosphatase 07/08/2021 51    • Total Bilirubin 07/08/2021 0.5    • Albumin 07/08/2021 4.2    • Total Protein 07/08/2021 7.3    • Globulin 07/08/2021 3.1    • A-G Ratio 07/08/2021 1.4    • WBC 07/08/2021 4.7*   • RBC 07/08/2021 4.89    • Hemoglobin 07/08/2021 15.2    • Hematocrit 07/08/2021 46.8    • MCV 07/08/2021 95.7    • MCH 07/08/2021 31.1    • MCHC 07/08/2021 32.5*   • RDW 07/08/2021 49.1    • Platelet Count 07/08/2021 176    • MPV 07/08/2021 13.6*   • Neutrophils-Polys 07/08/2021 69.30    • Lymphocytes 07/08/2021 15.30*   • Monocytes 07/08/2021 11.30    • Eosinophils 07/08/2021 2.80    • Basophils 07/08/2021 1.10    • Immature Granulocytes 07/08/2021 0.20    • Nucleated RBC 07/08/2021 0.00    • Neutrophils (Absolute) 07/08/2021 3.26    • Lymphs (Absolute) 07/08/2021 0.72*   • Monos (Absolute) 07/08/2021 0.53    • Eos (Absolute) 07/08/2021 0.13    • Baso (Absolute) 07/08/2021 0.05    • Immature Granulocytes (a* 07/08/2021 0.01    • NRBC (Absolute) 07/08/2021 0.00    • Fasting Status 07/08/2021 Fasting    • GFR If   07/08/2021 >60    • GFR If Non  Ameri* 07/08/2021 >60

## 2022-02-08 ENCOUNTER — APPOINTMENT (OUTPATIENT)
Dept: MEDICAL GROUP | Facility: PHYSICIAN GROUP | Age: 82
End: 2022-02-08
Payer: MEDICARE

## 2022-02-15 ENCOUNTER — APPOINTMENT (OUTPATIENT)
Dept: MEDICAL GROUP | Facility: PHYSICIAN GROUP | Age: 82
End: 2022-02-15
Payer: MEDICARE

## 2022-03-08 ENCOUNTER — TELEPHONE (OUTPATIENT)
Dept: HEALTH INFORMATION MANAGEMENT | Facility: OTHER | Age: 82
End: 2022-03-08
Payer: MEDICARE

## 2022-03-18 DIAGNOSIS — Z91.09 ENVIRONMENTAL ALLERGIES: ICD-10-CM

## 2022-03-18 RX ORDER — FENOFIBRATE 145 MG/1
145 TABLET, COATED ORAL DAILY
Qty: 30 TABLET | Refills: 0 | Status: SHIPPED | OUTPATIENT
Start: 2022-03-18 | End: 2022-03-28 | Stop reason: SDUPTHER

## 2022-03-18 RX ORDER — FENOFIBRATE 145 MG/1
TABLET, COATED ORAL
Qty: 90 TABLET | Refills: 0 | OUTPATIENT
Start: 2022-03-18

## 2022-03-18 RX ORDER — FLUTICASONE PROPIONATE 50 MCG
SPRAY, SUSPENSION (ML) NASAL
Qty: 48 G | Refills: 0 | Status: SHIPPED
Start: 2022-03-18 | End: 2022-07-12

## 2022-03-18 NOTE — TELEPHONE ENCOUNTER
Requested Prescriptions     Pending Prescriptions Disp Refills   • fenofibrate (TRICOR) 145 MG Tab 30 Tablet 0     Sig: Take 1 Tablet by mouth every day.       ANDREW Demarco.

## 2022-03-18 NOTE — TELEPHONE ENCOUNTER
Requested Prescriptions     Pending Prescriptions Disp Refills   • fluticasone (FLONASE) 50 MCG/ACT nasal spray [Pharmacy Med Name: Fluticasone Propionate Nasal Suspension 50 MCG/ACT] 48 g 0     Sig: USE 2 SPRAYS IN EACH NOSTRIL EVERY DAY     Refused Prescriptions Disp Refills   • fenofibrate (TRICOR) 145 MG Tab [Pharmacy Med Name: Fenofibrate Oral Tablet 145 MG] 90 Tablet 0     Sig: TAKE ONE TABLET BY MOUTH EVERY MORNING FOR HIGH AMOUNTS OF CHOLESTEROL IN THE BLOOD       ANDREW Demarco.

## 2022-03-25 PROBLEM — I69.320: Status: ACTIVE | Noted: 2022-03-25

## 2022-03-25 PROBLEM — I73.9 PVD (PERIPHERAL VASCULAR DISEASE) (HCC): Status: ACTIVE | Noted: 2022-03-25

## 2022-03-25 PROBLEM — Z89.422 ACQUIRED ABSENCE OF OTHER LEFT TOE(S) (HCC): Status: ACTIVE | Noted: 2022-03-25

## 2022-03-28 DIAGNOSIS — E78.5 DYSLIPIDEMIA: ICD-10-CM

## 2022-03-28 RX ORDER — FENOFIBRATE 145 MG/1
145 TABLET, COATED ORAL DAILY
Qty: 100 TABLET | Refills: 3 | Status: SHIPPED | OUTPATIENT
Start: 2022-03-28 | End: 2022-06-27 | Stop reason: SDUPTHER

## 2022-03-31 ENCOUNTER — OFFICE VISIT (OUTPATIENT)
Dept: MEDICAL GROUP | Facility: PHYSICIAN GROUP | Age: 82
End: 2022-03-31
Payer: MEDICARE

## 2022-03-31 VITALS
TEMPERATURE: 99.3 F | HEIGHT: 60 IN | OXYGEN SATURATION: 96 % | WEIGHT: 174 LBS | BODY MASS INDEX: 34.16 KG/M2 | SYSTOLIC BLOOD PRESSURE: 140 MMHG | DIASTOLIC BLOOD PRESSURE: 78 MMHG | HEART RATE: 81 BPM

## 2022-03-31 DIAGNOSIS — E55.9 VITAMIN D DEFICIENCY: ICD-10-CM

## 2022-03-31 DIAGNOSIS — E78.5 DYSLIPIDEMIA: ICD-10-CM

## 2022-03-31 DIAGNOSIS — R73.9 HYPERGLYCEMIA: ICD-10-CM

## 2022-03-31 DIAGNOSIS — G31.84 MILD COGNITIVE IMPAIRMENT: ICD-10-CM

## 2022-03-31 DIAGNOSIS — R74.8 ELEVATED VITAMIN B12 LEVEL: ICD-10-CM

## 2022-03-31 DIAGNOSIS — I10 BENIGN ESSENTIAL HTN: ICD-10-CM

## 2022-03-31 PROCEDURE — 99214 OFFICE O/P EST MOD 30 MIN: CPT | Performed by: NURSE PRACTITIONER

## 2022-03-31 ASSESSMENT — FIBROSIS 4 INDEX: FIB4 SCORE: 3.01

## 2022-03-31 NOTE — ASSESSMENT & PLAN NOTE
She has followed up with neurology last year. MOCA 23/30. Diagnosed with mild cognitive impairment vascular predominant. She wants to know if she can take Prevagen supplement. She has not noticed any memory deficits.

## 2022-03-31 NOTE — PROGRESS NOTES
Subjective:     CC: Follow-up blood pressure and medication management    HPI:   Irina presents today with the following:     Benign essential HTN  She has been checking her blood pressure at home. She did not bring her log today. She states that her home blood pressures have not been higher than 140/90. She states at our last visit she had found out her brother had colon cancer and 1 month later her other brother had prostate cancer. Her brother's health have improved and are cancer free. She continues with diltiazem 240 mg daily and irbesartan 150 mg daily. She continues with daily walking with her walker. She watches her salt and sugar intake.     Elevated vitamin B12 level  She has stopped her vitamin B complex. Continues with Vitamin B12 every other day.     Mild cognitive impairment  She has followed up with neurology last year. MOCA 23/30. Diagnosed with mild cognitive impairment vascular predominant. She wants to know if she can take Prevagen supplement. She has not noticed any memory deficits.       Past Medical History:   Diagnosis Date   • Anesthesia     States feels like she can't breathe with induction for right hip surgery, gets nausea vomiting with most pain meds.  Uses anti-nausea medicine if takes morphine   • Arthritis     To hips, shoulders, hands   • Benign essential HTN 9/7/2017   • Cataract    • CPAP (continuous positive airway pressure) dependence    • Dyslipidemia 3/5/2019   • Expressive aphasia     Related to CVA   • Family history of CVA    • Heart murmur    • DARIN (obstructive sleep apnea) 1/26/2017    AHI 23.6, minimum saturation 69%, on CPAP 15 cm.   • Post-nasal drip    • Restless leg syndrome     4/25/17-Resolved, states was related to right hip and none S/P replacement.   • Stroke (HCC) 2/1993, 8/1993    Mlasaucw-dmqumr-twnjpjgcla aphasia,mouth forming words, reading, numbers   • Walker as ambulation aid        Social History     Tobacco Use   • Smoking status: Never Smoker   •  "Smokeless tobacco: Never Used   Vaping Use   • Vaping Use: Never used   Substance Use Topics   • Alcohol use: Not Currently   • Drug use: No       Current Outpatient Medications Ordered in Epic   Medication Sig Dispense Refill   • fenofibrate (TRICOR) 145 MG Tab Take 1 Tablet by mouth every day. 100 Tablet 3   • fluticasone (FLONASE) 50 MCG/ACT nasal spray USE 2 SPRAYS IN EACH NOSTRIL EVERY DAY 48 g 0   • loratadine (CLARITIN) 10 MG Tab TAKE ONE TABLET BY MOUTH ONE TIME DAILY AS NEEDED FOR ALLERGY 90 Tablet 3   • irbesartan (AVAPRO) 150 MG Tab TAKE ONE TABLET BY MOUTH EVERY MORNING. PATIENT NEEDS TO BE SEEN FOR ANNUAL VISIT FOR FUTURE REFILLS. 100 Tablet 3   • Misc. Devices Misc Repair scooter and place new batteries Nu Motion. 1 Each 0   • DILTIAZem CD (CARDIZEM CD) 240 MG CAPSULE SR 24 HR TAKE ONE CAPSULE BY MOUTH EVERY DAY AT BEDTIME (FOR HIGH BLOOD PRESSURE ) 90 capsule 3   • Ascorbic Acid (VITAMIN C) 1000 MG Tab Take  by mouth.     • cyanocobalamin (VITAMIN B-12) 500 MCG Tab Take 500 mcg by mouth every day.     • Turmeric Curcumin 500 MG Cap Take 1 Tab by mouth 2 Times a Day.     • aspirin 81 MG tablet ASPIRIN 81 MG ORAL TABLET     • amoxicillin (AMOXIL) 875 MG tablet Take 875 mg by mouth 2 times a day. Take 1 tab po bid for 3 days for dental cleaning     • Acetaminophen (TYLENOL 8 HOUR PO) Take  by mouth.     • ibuprofen (MOTRIN) 200 MG Tab Take 200 mg by mouth every 6 hours as needed for Mild Pain.     • vitamin E (VITAMIN E) 1000 Unit (450 mg) Cap Take 1 Cap by mouth every day.     • vitamin D 5000 UNITS Tab Take 5,000 Units by mouth every day. 30 Tab 2   • Non Formulary Request Take 1 Package by mouth every day. Indications: VITALIZER GOLD-MULTI VITAMIN     • NON SPECIFIED Take 7 Caps by mouth every bedtime. \"Herb-Lax\"   Indications: constipation     • non-formulary med Take 1 Tab by mouth every day. Royal Jelly 500mg cap (Patient not taking: Reported on 3/25/2022)       No current Epic-ordered " facility-administered medications on file.       Allergies:  Benicar [olmesartan], Dilaudid  [hydromorphone hcl], Lidocaine, Norco  [apap-fd&c blue #1-hydrocodone], Other misc, Percocet  [apap-fd&c red #40 [place]-oxycodone], Vicodin  [apap-fd&c yellow #10 [place]-hydrocodone], Codeine, Demerol, Hydrocodone, Other drug, Oxycodone, Statins [hmg-coa-r inhibitors], and Valium    Health Maintenance: Due for shingles vaccine     Objective:     Vital signs reviewed   Exam:  /78 (BP Location: Right arm, Patient Position: Sitting, BP Cuff Size: Adult)   Pulse 81   Temp 37.4 °C (99.3 °F) (Temporal)   Ht 1.524 m (5')   Wt 78.9 kg (174 lb)   SpO2 96%   BMI 33.98 kg/m²  Body mass index is 33.98 kg/m².    Gen: Alert and oriented, No apparent distress. Expressive aphasia.   Lungs: Normal effort, CTA bilaterally, no wheezes, rhonchi, or rales  CV: Regular rate and rhythm. No murmurs, rubs, or gallops.  Ext: No clubbing, cyanosis, edema. Using FWW with ambulation.     Assessment & Plan:     81 y.o. female with the following -     1. Benign essential HTN  Chronic stable problem.  Her blood pressure today slightly above goal at 140/78.  She does report her blood pressures are less than 140/90.  Continue to monitor.  Reinforced diet lifestyle modifications, low-salt diet.  Due for updated labs around July 2022.  I would like her to return in 6 months for follow-up on her blood pressure.  - CBC WITH DIFFERENTIAL; Future  - Comp Metabolic Panel; Future  - TSH WITH REFLEX TO FT4; Future    2. Dyslipidemia  Chronic stable problem.  Continue with her fenofibrate 145 mg daily.  Continue diet and lifestyle modifications.  Due for updated labs around July 2022.  - Lipid Profile; Future    3. Mild cognitive impairment  Chronic stable problem.  Reviewed her recent [facility] encounter.  She was unable to draw a clock face but states that since her stroke she has difficulty with her hands.  She has not noticed any new memory deficits.  We  discussed continue to monitor she starts noticing memory deficits will need to follow-up again with neurology for memory testing.  She verbalized understanding.    4. Elevated vitamin B12 level  Chronic stable problem.  Will check updated labs around July 2022.  - VITAMIN B12; Future    5. Vitamin D deficiency  Chronic stable problem.  She will continue with her vitamin D supplementation.  Will check updated labs around July 2022.  - VITAMIN D,25 HYDROXY; Future    6. Hyperglycemia  Chronic stable problem.  Continue to watch sugar intake and carbohydrate intake.  Continue with daily exercises.  Will check updated labs around July 2022.  - HEMOGLOBIN A1C; Future      Return in about 6 months (around 9/30/2022) for Hypertension.    Please note that this dictation was created using voice recognition software. I have made every reasonable attempt to correct obvious errors, but I expect that there are errors of grammar and possibly content that I did not discover before finalizing the note.

## 2022-03-31 NOTE — ASSESSMENT & PLAN NOTE
She has been checking her blood pressure at home. She did not bring her log today. She states that her home blood pressures have not been higher than 140/90. She states at our last visit she had found out her brother had colon cancer and 1 month later her other brother had prostate cancer. Her brother's health have improved and are cancer free. She continues with diltiazem 240 mg daily and irbesartan 150 mg daily. She continues with daily walking with her walker. She watches her salt and sugar intake.

## 2022-06-20 ENCOUNTER — APPOINTMENT (OUTPATIENT)
Dept: RADIOLOGY | Facility: MEDICAL CENTER | Age: 82
DRG: 066 | End: 2022-06-20
Attending: EMERGENCY MEDICINE
Payer: MEDICARE

## 2022-06-20 ENCOUNTER — HOSPITAL ENCOUNTER (INPATIENT)
Facility: MEDICAL CENTER | Age: 82
LOS: 2 days | DRG: 066 | End: 2022-06-22
Attending: EMERGENCY MEDICINE | Admitting: STUDENT IN AN ORGANIZED HEALTH CARE EDUCATION/TRAINING PROGRAM
Payer: MEDICARE

## 2022-06-20 DIAGNOSIS — R47.9 SPEECH DISTURBANCE, UNSPECIFIED TYPE: ICD-10-CM

## 2022-06-20 DIAGNOSIS — I63.9 ACUTE ISCHEMIC STROKE (HCC): ICD-10-CM

## 2022-06-20 DIAGNOSIS — R47.01 APHASIA: ICD-10-CM

## 2022-06-20 DIAGNOSIS — Z86.73 H/O: CVA (CEREBROVASCULAR ACCIDENT): ICD-10-CM

## 2022-06-20 DIAGNOSIS — I69.320 APHASIA DUE TO OLD EMBOLIC STROKE: ICD-10-CM

## 2022-06-20 PROBLEM — Z71.89 ACP (ADVANCE CARE PLANNING): Status: ACTIVE | Noted: 2019-12-03

## 2022-06-20 LAB
ABO GROUP BLD: NORMAL
ALBUMIN SERPL BCP-MCNC: 4.1 G/DL (ref 3.2–4.9)
ALBUMIN/GLOB SERPL: 1.4 G/DL
ALP SERPL-CCNC: 54 U/L (ref 30–99)
ALT SERPL-CCNC: 19 U/L (ref 2–50)
ANION GAP SERPL CALC-SCNC: 10 MMOL/L (ref 7–16)
APTT PPP: 27.5 SEC (ref 24.7–36)
AST SERPL-CCNC: 31 U/L (ref 12–45)
BASOPHILS # BLD AUTO: 0.6 % (ref 0–1.8)
BASOPHILS # BLD: 0.03 K/UL (ref 0–0.12)
BILIRUB SERPL-MCNC: 0.3 MG/DL (ref 0.1–1.5)
BLD GP AB SCN SERPL QL: NORMAL
BUN SERPL-MCNC: 42 MG/DL (ref 8–22)
CALCIUM SERPL-MCNC: 9.7 MG/DL (ref 8.5–10.5)
CHLORIDE SERPL-SCNC: 107 MMOL/L (ref 96–112)
CO2 SERPL-SCNC: 23 MMOL/L (ref 20–33)
CREAT SERPL-MCNC: 0.99 MG/DL (ref 0.5–1.4)
EOSINOPHIL # BLD AUTO: 0.22 K/UL (ref 0–0.51)
EOSINOPHIL NFR BLD: 4.2 % (ref 0–6.9)
ERYTHROCYTE [DISTWIDTH] IN BLOOD BY AUTOMATED COUNT: 46 FL (ref 35.9–50)
EST. AVERAGE GLUCOSE BLD GHB EST-MCNC: 111 MG/DL
GFR SERPLBLD CREATININE-BSD FMLA CKD-EPI: 58 ML/MIN/1.73 M 2
GLOBULIN SER CALC-MCNC: 3 G/DL (ref 1.9–3.5)
GLUCOSE SERPL-MCNC: 82 MG/DL (ref 65–99)
HBA1C MFR BLD: 5.5 % (ref 4–5.6)
HCT VFR BLD AUTO: 45 % (ref 37–47)
HGB BLD-MCNC: 15 G/DL (ref 12–16)
IMM GRANULOCYTES # BLD AUTO: 0.04 K/UL (ref 0–0.11)
IMM GRANULOCYTES NFR BLD AUTO: 0.8 % (ref 0–0.9)
INR PPP: 1 (ref 0.87–1.13)
LYMPHOCYTES # BLD AUTO: 0.88 K/UL (ref 1–4.8)
LYMPHOCYTES NFR BLD: 16.7 % (ref 22–41)
MAGNESIUM SERPL-MCNC: 2.1 MG/DL (ref 1.5–2.5)
MCH RBC QN AUTO: 30.3 PG (ref 27–33)
MCHC RBC AUTO-ENTMCNC: 33.3 G/DL (ref 33.6–35)
MCV RBC AUTO: 90.9 FL (ref 81.4–97.8)
MONOCYTES # BLD AUTO: 0.59 K/UL (ref 0–0.85)
MONOCYTES NFR BLD AUTO: 11.2 % (ref 0–13.4)
NEUTROPHILS # BLD AUTO: 3.52 K/UL (ref 2–7.15)
NEUTROPHILS NFR BLD: 66.5 % (ref 44–72)
NRBC # BLD AUTO: 0 K/UL
NRBC BLD-RTO: 0 /100 WBC
PLATELET # BLD AUTO: 194 K/UL (ref 164–446)
PMV BLD AUTO: 12.6 FL (ref 9–12.9)
POTASSIUM SERPL-SCNC: 5.3 MMOL/L (ref 3.6–5.5)
PROT SERPL-MCNC: 7.1 G/DL (ref 6–8.2)
PROTHROMBIN TIME: 12.9 SEC (ref 12–14.6)
RBC # BLD AUTO: 4.95 M/UL (ref 4.2–5.4)
RH BLD: NORMAL
SODIUM SERPL-SCNC: 140 MMOL/L (ref 135–145)
TROPONIN T SERPL-MCNC: 12 NG/L (ref 6–19)
TSH SERPL DL<=0.005 MIU/L-ACNC: 1.55 UIU/ML (ref 0.38–5.33)
WBC # BLD AUTO: 5.3 K/UL (ref 4.8–10.8)

## 2022-06-20 PROCEDURE — 99285 EMERGENCY DEPT VISIT HI MDM: CPT

## 2022-06-20 PROCEDURE — 84443 ASSAY THYROID STIM HORMONE: CPT

## 2022-06-20 PROCEDURE — 85025 COMPLETE CBC W/AUTO DIFF WBC: CPT

## 2022-06-20 PROCEDURE — 770000 HCHG ROOM/CARE - INTERMEDIATE ICU *

## 2022-06-20 PROCEDURE — 70496 CT ANGIOGRAPHY HEAD: CPT | Mod: MC

## 2022-06-20 PROCEDURE — 36415 COLL VENOUS BLD VENIPUNCTURE: CPT

## 2022-06-20 PROCEDURE — 83735 ASSAY OF MAGNESIUM: CPT

## 2022-06-20 PROCEDURE — 0042T CT-CEREBRAL PERFUSION ANALYSIS: CPT

## 2022-06-20 PROCEDURE — 86900 BLOOD TYPING SEROLOGIC ABO: CPT

## 2022-06-20 PROCEDURE — 85730 THROMBOPLASTIN TIME PARTIAL: CPT

## 2022-06-20 PROCEDURE — 83036 HEMOGLOBIN GLYCOSYLATED A1C: CPT

## 2022-06-20 PROCEDURE — 80053 COMPREHEN METABOLIC PANEL: CPT

## 2022-06-20 PROCEDURE — 700117 HCHG RX CONTRAST REV CODE 255: Performed by: EMERGENCY MEDICINE

## 2022-06-20 PROCEDURE — 84484 ASSAY OF TROPONIN QUANT: CPT

## 2022-06-20 PROCEDURE — 99223 1ST HOSP IP/OBS HIGH 75: CPT | Performed by: STUDENT IN AN ORGANIZED HEALTH CARE EDUCATION/TRAINING PROGRAM

## 2022-06-20 PROCEDURE — 94760 N-INVAS EAR/PLS OXIMETRY 1: CPT

## 2022-06-20 PROCEDURE — 85610 PROTHROMBIN TIME: CPT

## 2022-06-20 PROCEDURE — 86901 BLOOD TYPING SEROLOGIC RH(D): CPT

## 2022-06-20 PROCEDURE — 93005 ELECTROCARDIOGRAM TRACING: CPT | Performed by: EMERGENCY MEDICINE

## 2022-06-20 PROCEDURE — 71045 X-RAY EXAM CHEST 1 VIEW: CPT

## 2022-06-20 PROCEDURE — 70498 CT ANGIOGRAPHY NECK: CPT | Mod: MC

## 2022-06-20 PROCEDURE — 86850 RBC ANTIBODY SCREEN: CPT

## 2022-06-20 PROCEDURE — 70450 CT HEAD/BRAIN W/O DYE: CPT | Mod: MC

## 2022-06-20 RX ORDER — HYDRALAZINE HYDROCHLORIDE 20 MG/ML
10 INJECTION INTRAMUSCULAR; INTRAVENOUS
Status: DISCONTINUED | OUTPATIENT
Start: 2022-06-20 | End: 2022-06-22 | Stop reason: HOSPADM

## 2022-06-20 RX ORDER — ONDANSETRON 2 MG/ML
4 INJECTION INTRAMUSCULAR; INTRAVENOUS EVERY 4 HOURS PRN
Status: DISCONTINUED | OUTPATIENT
Start: 2022-06-20 | End: 2022-06-22 | Stop reason: HOSPADM

## 2022-06-20 RX ORDER — AMOXICILLIN 250 MG
2 CAPSULE ORAL 2 TIMES DAILY
Status: DISCONTINUED | OUTPATIENT
Start: 2022-06-21 | End: 2022-06-22 | Stop reason: HOSPADM

## 2022-06-20 RX ORDER — CHOLECALCIFEROL (VITAMIN D3) 125 MCG
500 CAPSULE ORAL DAILY
Status: DISCONTINUED | OUTPATIENT
Start: 2022-06-21 | End: 2022-06-22 | Stop reason: HOSPADM

## 2022-06-20 RX ORDER — POLYETHYLENE GLYCOL 3350 17 G/17G
1 POWDER, FOR SOLUTION ORAL
Status: DISCONTINUED | OUTPATIENT
Start: 2022-06-20 | End: 2022-06-22 | Stop reason: HOSPADM

## 2022-06-20 RX ORDER — LORAZEPAM 1 MG/1
1 TABLET ORAL
Status: DISCONTINUED | OUTPATIENT
Start: 2022-06-20 | End: 2022-06-22 | Stop reason: HOSPADM

## 2022-06-20 RX ORDER — FENOFIBRATE 67 MG/1
134 CAPSULE ORAL DAILY
Status: DISCONTINUED | OUTPATIENT
Start: 2022-06-21 | End: 2022-06-21

## 2022-06-20 RX ORDER — BISACODYL 10 MG
10 SUPPOSITORY, RECTAL RECTAL
Status: DISCONTINUED | OUTPATIENT
Start: 2022-06-20 | End: 2022-06-22 | Stop reason: HOSPADM

## 2022-06-20 RX ORDER — FLUTICASONE PROPIONATE 50 MCG
2 SPRAY, SUSPENSION (ML) NASAL 2 TIMES DAILY
Status: DISCONTINUED | OUTPATIENT
Start: 2022-06-21 | End: 2022-06-22 | Stop reason: HOSPADM

## 2022-06-20 RX ORDER — LORATADINE 10 MG/1
10 TABLET ORAL DAILY
Status: DISCONTINUED | OUTPATIENT
Start: 2022-06-21 | End: 2022-06-22 | Stop reason: HOSPADM

## 2022-06-20 RX ORDER — ONDANSETRON 4 MG/1
4 TABLET, ORALLY DISINTEGRATING ORAL EVERY 4 HOURS PRN
Status: DISCONTINUED | OUTPATIENT
Start: 2022-06-20 | End: 2022-06-22 | Stop reason: HOSPADM

## 2022-06-20 RX ORDER — HYDRALAZINE HYDROCHLORIDE 20 MG/ML
10 INJECTION INTRAMUSCULAR; INTRAVENOUS EVERY 4 HOURS PRN
Status: DISCONTINUED | OUTPATIENT
Start: 2022-06-20 | End: 2022-06-20

## 2022-06-20 RX ORDER — ACETAMINOPHEN 325 MG/1
650 TABLET ORAL EVERY 6 HOURS PRN
Status: DISCONTINUED | OUTPATIENT
Start: 2022-06-20 | End: 2022-06-22 | Stop reason: HOSPADM

## 2022-06-20 RX ORDER — GUAIFENESIN/DEXTROMETHORPHAN 100-10MG/5
10 SYRUP ORAL EVERY 6 HOURS PRN
Status: DISCONTINUED | OUTPATIENT
Start: 2022-06-20 | End: 2022-06-22 | Stop reason: HOSPADM

## 2022-06-20 RX ORDER — VITAMIN B COMPLEX
1000 TABLET ORAL DAILY
Status: DISCONTINUED | OUTPATIENT
Start: 2022-06-21 | End: 2022-06-22 | Stop reason: HOSPADM

## 2022-06-20 RX ORDER — FENOFIBRATE 145 MG/1
145 TABLET, COATED ORAL DAILY
Status: DISCONTINUED | OUTPATIENT
Start: 2022-06-21 | End: 2022-06-20

## 2022-06-20 RX ORDER — HYDRALAZINE HYDROCHLORIDE 20 MG/ML
10 INJECTION INTRAMUSCULAR; INTRAVENOUS EVERY 4 HOURS PRN
Status: DISCONTINUED | OUTPATIENT
Start: 2022-06-20 | End: 2022-06-22 | Stop reason: HOSPADM

## 2022-06-20 RX ADMIN — IOPAMIDOL 80 ML: 755 INJECTION, SOLUTION INTRAVENOUS at 19:20

## 2022-06-20 RX ADMIN — IOHEXOL 40 ML: 350 INJECTION, SOLUTION INTRAVENOUS at 19:45

## 2022-06-20 ASSESSMENT — LIFESTYLE VARIABLES
TOTAL SCORE: 0
TOTAL SCORE: 0
HAVE PEOPLE ANNOYED YOU BY CRITICIZING YOUR DRINKING: NO
DO YOU DRINK ALCOHOL: NO
CONSUMPTION TOTAL: NEGATIVE
AVERAGE NUMBER OF DAYS PER WEEK YOU HAVE A DRINK CONTAINING ALCOHOL: 0
HOW MANY TIMES IN THE PAST YEAR HAVE YOU HAD 5 OR MORE DRINKS IN A DAY: 0
TOTAL SCORE: 0
SUBSTANCE_ABUSE: 0
HAVE YOU EVER FELT YOU SHOULD CUT DOWN ON YOUR DRINKING: NO
ON A TYPICAL DAY WHEN YOU DRINK ALCOHOL HOW MANY DRINKS DO YOU HAVE: 0
EVER HAD A DRINK FIRST THING IN THE MORNING TO STEADY YOUR NERVES TO GET RID OF A HANGOVER: NO
EVER FELT BAD OR GUILTY ABOUT YOUR DRINKING: NO

## 2022-06-20 ASSESSMENT — ENCOUNTER SYMPTOMS
HEARTBURN: 0
PALPITATIONS: 0
SENSORY CHANGE: 0
HEADACHES: 0
LOSS OF CONSCIOUSNESS: 0
DIZZINESS: 1
SEIZURES: 0
VOMITING: 0
CHILLS: 0
FLANK PAIN: 0
BLURRED VISION: 0
DOUBLE VISION: 0
FALLS: 0
MYALGIAS: 0
FOCAL WEAKNESS: 0
SHORTNESS OF BREATH: 0
COUGH: 0
DEPRESSION: 0
BRUISES/BLEEDS EASILY: 0
WEAKNESS: 0
ABDOMINAL PAIN: 0
FEVER: 0
TREMORS: 0
NAUSEA: 0
SPEECH CHANGE: 1
TINGLING: 0

## 2022-06-20 ASSESSMENT — FIBROSIS 4 INDEX: FIB4 SCORE: 3.01

## 2022-06-21 ENCOUNTER — APPOINTMENT (OUTPATIENT)
Dept: CARDIOLOGY | Facility: MEDICAL CENTER | Age: 82
DRG: 066 | End: 2022-06-21
Attending: HOSPITALIST
Payer: MEDICARE

## 2022-06-21 ENCOUNTER — APPOINTMENT (OUTPATIENT)
Dept: RADIOLOGY | Facility: MEDICAL CENTER | Age: 82
DRG: 066 | End: 2022-06-21
Attending: INTERNAL MEDICINE
Payer: MEDICARE

## 2022-06-21 ENCOUNTER — APPOINTMENT (OUTPATIENT)
Dept: RADIOLOGY | Facility: MEDICAL CENTER | Age: 82
DRG: 066 | End: 2022-06-21
Attending: HOSPITALIST
Payer: MEDICARE

## 2022-06-21 LAB
APPEARANCE UR: CLEAR
BILIRUB UR QL STRIP.AUTO: NEGATIVE
CHOLEST SERPL-MCNC: 149 MG/DL (ref 100–199)
COLOR UR: YELLOW
EKG IMPRESSION: NORMAL
GLUCOSE UR STRIP.AUTO-MCNC: NEGATIVE MG/DL
HDLC SERPL-MCNC: 53 MG/DL
KETONES UR STRIP.AUTO-MCNC: NEGATIVE MG/DL
LDLC SERPL CALC-MCNC: 80 MG/DL
LEUKOCYTE ESTERASE UR QL STRIP.AUTO: NEGATIVE
LV EJECT FRACT  99904: 55
LV EJECT FRACT MOD 2C 99903: 41.29
LV EJECT FRACT MOD 4C 99902: 62.84
LV EJECT FRACT MOD BP 99901: 53.05
MICRO URNS: NORMAL
NITRITE UR QL STRIP.AUTO: NEGATIVE
PH UR STRIP.AUTO: 8 [PH] (ref 5–8)
PROT UR QL STRIP: NEGATIVE MG/DL
RBC UR QL AUTO: NEGATIVE
SP GR UR STRIP.AUTO: 1.03
TRIGL SERPL-MCNC: 79 MG/DL (ref 0–149)
UROBILINOGEN UR STRIP.AUTO-MCNC: 0.2 MG/DL

## 2022-06-21 PROCEDURE — A9270 NON-COVERED ITEM OR SERVICE: HCPCS | Performed by: HOSPITALIST

## 2022-06-21 PROCEDURE — 97165 OT EVAL LOW COMPLEX 30 MIN: CPT

## 2022-06-21 PROCEDURE — 74018 RADEX ABDOMEN 1 VIEW: CPT

## 2022-06-21 PROCEDURE — 93306 TTE W/DOPPLER COMPLETE: CPT

## 2022-06-21 PROCEDURE — 99233 SBSQ HOSP IP/OBS HIGH 50: CPT | Performed by: STUDENT IN AN ORGANIZED HEALTH CARE EDUCATION/TRAINING PROGRAM

## 2022-06-21 PROCEDURE — 700102 HCHG RX REV CODE 250 W/ 637 OVERRIDE(OP): Performed by: STUDENT IN AN ORGANIZED HEALTH CARE EDUCATION/TRAINING PROGRAM

## 2022-06-21 PROCEDURE — 302106 OSTOMY POWDER: Performed by: HOSPITALIST

## 2022-06-21 PROCEDURE — A9270 NON-COVERED ITEM OR SERVICE: HCPCS | Performed by: STUDENT IN AN ORGANIZED HEALTH CARE EDUCATION/TRAINING PROGRAM

## 2022-06-21 PROCEDURE — 770001 HCHG ROOM/CARE - MED/SURG/GYN PRIV*

## 2022-06-21 PROCEDURE — 92610 EVALUATE SWALLOWING FUNCTION: CPT

## 2022-06-21 PROCEDURE — 93306 TTE W/DOPPLER COMPLETE: CPT | Mod: 26 | Performed by: INTERNAL MEDICINE

## 2022-06-21 PROCEDURE — 80061 LIPID PANEL: CPT

## 2022-06-21 PROCEDURE — 99233 SBSQ HOSP IP/OBS HIGH 50: CPT | Performed by: HOSPITALIST

## 2022-06-21 PROCEDURE — 81003 URINALYSIS AUTO W/O SCOPE: CPT

## 2022-06-21 PROCEDURE — 70551 MRI BRAIN STEM W/O DYE: CPT | Mod: MG

## 2022-06-21 PROCEDURE — 94760 N-INVAS EAR/PLS OXIMETRY 1: CPT

## 2022-06-21 PROCEDURE — 97161 PT EVAL LOW COMPLEX 20 MIN: CPT

## 2022-06-21 PROCEDURE — 700102 HCHG RX REV CODE 250 W/ 637 OVERRIDE(OP): Performed by: HOSPITALIST

## 2022-06-21 RX ORDER — ROSUVASTATIN CALCIUM 5 MG/1
10 TABLET, COATED ORAL EVERY EVENING
Status: DISCONTINUED | OUTPATIENT
Start: 2022-06-21 | End: 2022-06-22 | Stop reason: HOSPADM

## 2022-06-21 RX ADMIN — FENOFIBRATE 134 MG: 67 CAPSULE ORAL at 05:22

## 2022-06-21 RX ADMIN — FLUTICASONE PROPIONATE 100 MCG: 50 SPRAY, METERED NASAL at 05:24

## 2022-06-21 RX ADMIN — ROSUVASTATIN 10 MG: 10 TABLET, FILM COATED ORAL at 14:21

## 2022-06-21 RX ADMIN — CYANOCOBALAMIN TAB 500 MCG 500 MCG: 500 TAB at 05:22

## 2022-06-21 RX ADMIN — ASPIRIN 81 MG: 81 TABLET, COATED ORAL at 05:22

## 2022-06-21 RX ADMIN — FLUTICASONE PROPIONATE 100 MCG: 50 SPRAY, METERED NASAL at 17:15

## 2022-06-21 RX ADMIN — LORATADINE 10 MG: 10 TABLET ORAL at 05:24

## 2022-06-21 RX ADMIN — Medication 1000 UNITS: at 05:22

## 2022-06-21 ASSESSMENT — COGNITIVE AND FUNCTIONAL STATUS - GENERAL
WALKING IN HOSPITAL ROOM: A LITTLE
SUGGESTED CMS G CODE MODIFIER MOBILITY: CK
STANDING UP FROM CHAIR USING ARMS: A LITTLE
TOILETING: A LITTLE
DAILY ACTIVITIY SCORE: 20
MOVING TO AND FROM BED TO CHAIR: A LITTLE
MOVING FROM LYING ON BACK TO SITTING ON SIDE OF FLAT BED: A LITTLE
TURNING FROM BACK TO SIDE WHILE IN FLAT BAD: A LITTLE
MOBILITY SCORE: 18
CLIMB 3 TO 5 STEPS WITH RAILING: A LITTLE
DRESSING REGULAR UPPER BODY CLOTHING: A LITTLE
PERSONAL GROOMING: A LITTLE
SUGGESTED CMS G CODE MODIFIER DAILY ACTIVITY: CJ
HELP NEEDED FOR BATHING: A LITTLE

## 2022-06-21 ASSESSMENT — ENCOUNTER SYMPTOMS
CHILLS: 0
TREMORS: 0
POLYDIPSIA: 0
NERVOUS/ANXIOUS: 1
HEADACHES: 0
DIZZINESS: 0
RESPIRATORY NEGATIVE: 1
TINGLING: 0
SEIZURES: 0
COUGH: 0
MUSCULOSKELETAL NEGATIVE: 1
HEARTBURN: 0
NECK PAIN: 0
BACK PAIN: 0
FEVER: 0
EYES NEGATIVE: 1
GASTROINTESTINAL NEGATIVE: 1
DEPRESSION: 0
SENSORY CHANGE: 0
LOSS OF CONSCIOUSNESS: 0
PALPITATIONS: 0
NAUSEA: 0
SPEECH CHANGE: 1
CARDIOVASCULAR NEGATIVE: 1
WEAKNESS: 0
FOCAL WEAKNESS: 0
VOMITING: 0
BRUISES/BLEEDS EASILY: 0

## 2022-06-21 ASSESSMENT — FIBROSIS 4 INDEX
FIB4 SCORE: 2.9

## 2022-06-21 ASSESSMENT — PATIENT HEALTH QUESTIONNAIRE - PHQ9
2. FEELING DOWN, DEPRESSED, IRRITABLE, OR HOPELESS: NOT AT ALL
SUM OF ALL RESPONSES TO PHQ9 QUESTIONS 1 AND 2: 0
2. FEELING DOWN, DEPRESSED, IRRITABLE, OR HOPELESS: NOT AT ALL
1. LITTLE INTEREST OR PLEASURE IN DOING THINGS: NOT AT ALL
1. LITTLE INTEREST OR PLEASURE IN DOING THINGS: NOT AT ALL
SUM OF ALL RESPONSES TO PHQ9 QUESTIONS 1 AND 2: 0
2. FEELING DOWN, DEPRESSED, IRRITABLE, OR HOPELESS: NOT AT ALL
SUM OF ALL RESPONSES TO PHQ9 QUESTIONS 1 AND 2: 0
1. LITTLE INTEREST OR PLEASURE IN DOING THINGS: NOT AT ALL

## 2022-06-21 ASSESSMENT — PAIN DESCRIPTION - PAIN TYPE: TYPE: ACUTE PAIN

## 2022-06-21 ASSESSMENT — GAIT ASSESSMENTS
ASSISTIVE DEVICE: FRONT WHEEL WALKER
DEVIATION: NO DEVIATION
DISTANCE (FEET): 150
GAIT LEVEL OF ASSIST: SUPERVISED

## 2022-06-21 ASSESSMENT — ACTIVITIES OF DAILY LIVING (ADL): TOILETING: INDEPENDENT

## 2022-06-21 NOTE — H&P
Hospital Medicine History & Physical Note    Date of Service  6/20/2022    Primary Care Physician  ANDREW Longo.    Consultants  ICU - to [facility]  Neurology    Code Status  Full Code    Chief Complaint  Chief Complaint   Patient presents with   • Possible Stroke     Pt came in as a stroke IR. Pt reports that around 9am this morning she started having dysphagia and slurred speech. Pt took 324 of ASA at 18:00 tonight prior to calling 911. Pt A+Ox4 on arrival but reports she's having a hard time speaking. Pt does not take any blood thinners right now but does take 81 mg of ASA daily. 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 Pt has no neuro deficits on arrival except delayed speech and trouble recalling words.        History of Presenting Illness  Irina Traylor is a 81 y.o. female with history of hypertension, hyperlipidemia, prior CVA who presented 6/20/2022 with expressive aphasia.  Last known normal approximately 9 AM.  Patient reported feeling confused, had difficulty getting words out.  Patient reported taking aspirin 325 mg around 6 PM, reported symptoms improve after that.  However, patient presented to ER as a stroke alert, seen by neurology arrival.  No acute findings seen on CT head, CTA head neck.  Neurology requested [facility] admission for frequent neurochecks.  ICU was consulted, agreeable with [facility] admission.  Admitted to medicine service.    I discussed the plan of care with patient and bedside RN.    Review of Systems  Review of Systems   Constitutional: Positive for malaise/fatigue. Negative for chills and fever.   HENT: Negative for hearing loss and tinnitus.    Eyes: Negative for blurred vision and double vision.   Respiratory: Negative for cough and shortness of breath.    Cardiovascular: Negative for chest pain and palpitations.   Gastrointestinal: Negative for abdominal pain, heartburn, nausea and vomiting.   Genitourinary: Negative for dysuria and flank pain.   Musculoskeletal: Negative for falls and  myalgias.   Skin: Negative for itching and rash.   Neurological: Positive for dizziness and speech change. Negative for tingling, tremors, sensory change, focal weakness, seizures, loss of consciousness, weakness and headaches.   Endo/Heme/Allergies: Negative for environmental allergies. Does not bruise/bleed easily.   Psychiatric/Behavioral: Negative for depression and substance abuse.   All other systems reviewed and are negative.      Past Medical History   has a past medical history of Anesthesia, Arthritis, Benign essential HTN (9/7/2017), Cataract, CPAP (continuous positive airway pressure) dependence, Dyslipidemia (3/5/2019), Expressive aphasia, Family history of CVA, Heart murmur, DARIN (obstructive sleep apnea) (1/26/2017), Post-nasal drip, Restless leg syndrome, Stroke (HCC) (2/1993, 8/1993), and Walker as ambulation aid.    Surgical History   has a past surgical history that includes hysterectomy, total abdominal (1977); fusion, spine, lumbar, plif (9/26/2016); lumbar laminectomy diskectomy (N/A, 9/26/2016); lumbar laminectomy diskectomy (1993); carpal tunnel release (Bilateral, 1993); orif, finger (Left, as child); hammertoe correction (Left, 11/12/2007); cervical disk and fusion anterior (1993); hammertoe correction (Right, 1980's); bunionectomy (Bilateral, 1980's); colonoscopy (2/2016); cervical fusion posterior (8/8/2017); cervical laminectomy posterior (8/8/2017); pr reconstr total shoulder implant (Left, 3/21/2019); orif, fracture, humerus (Left, 3/21/2019); shoulder arthroplasty total (Left, 8/11/2015); knee arthroplasty total (6/23/2014); hip arthroplasty total (Right, 12/12/2016); hip arthroplasty total (Left, 5/8/2017); and knee arthroplasty total (Left, 2/12/2018).     Family History  family history includes Cancer in her mother; Diabetes in her brother; Hypertension in her brother, brother, and brother; Stroke in her daughter and father.   Family history reviewed with patient. There is family  "history that is pertinent to the chief complaint.     Social History   reports that she has never smoked. She has never used smokeless tobacco. She reports previous alcohol use. She reports that she does not use drugs.    Allergies  Allergies   Allergen Reactions   • Benicar [Olmesartan] Rash     blisters   • Dilaudid  [Hydromorphone Hcl] Vomiting   • Lidocaine    • Norco  [Apap-Fd&C Blue #1-Hydrocodone] Vomiting   • Other Misc Shortness of Breath     Anesthesia with surgery 2016  Pt couldn't breathe while being put under and tapped side of bed to let RN know. \"It was the scariest feeling and I want everyone to know\"     • Percocet  [Apap-Fd&C Red #40 [place]-Oxycodone] Vomiting   • Vicodin  [Apap-Fd&C Yellow #10 [place]-Hydrocodone] Vomiting   • Codeine Vomiting   • Demerol Vomiting   • Hydrocodone Vomiting   • Other Drug Vomiting     \"all pain meds\" per pt can take morphine.   • Oxycodone Vomiting   • Statins [Hmg-Coa-R Inhibitors] Unspecified     Muscle cramping   • Valium Vomiting       Medications  Prior to Admission Medications   Prescriptions Last Dose Informant Patient Reported? Taking?   Acetaminophen (TYLENOL 8 HOUR PO)   Yes No   Sig: Take  by mouth.   Ascorbic Acid (VITAMIN C) 1000 MG Tab   Yes No   Sig: Take  by mouth.   DILTIAZem CD (CARDIZEM CD) 240 MG CAPSULE SR 24 HR   No No   Sig: TAKE ONE CAPSULE BY MOUTH EVERY DAY AT BEDTIME (FOR HIGH BLOOD PRESSURE )   Misc. Devices Misc   No No   Sig: Repair scooter and place new batteries [employer].   NON SPECIFIED  Patient Yes No   Sig: Take 7 Caps by mouth every bedtime. \"Herb-Lax\"   Indications: constipation   Non Formulary Request  Patient Yes No   Sig: Take 1 Package by mouth every day. Indications: VITALIZER GOLD-MULTI VITAMIN   Turmeric Curcumin 500 MG Cap   Yes No   Sig: Take 1 Tab by mouth 2 Times a Day.   amoxicillin (AMOXIL) 875 MG tablet   Yes No   Sig: Take 875 mg by mouth 2 times a day. Take 1 tab po bid for 3 days for dental cleaning   aspirin " 81 MG tablet   Yes No   Sig: ASPIRIN 81 MG ORAL TABLET   cyanocobalamin (VITAMIN B-12) 500 MCG Tab   Yes No   Sig: Take 500 mcg by mouth every day.   fenofibrate (TRICOR) 145 MG Tab   No No   Sig: Take 1 Tablet by mouth every day.   fluticasone (FLONASE) 50 MCG/ACT nasal spray   No No   Sig: USE 2 SPRAYS IN EACH NOSTRIL EVERY DAY   ibuprofen (MOTRIN) 200 MG Tab   Yes No   Sig: Take 200 mg by mouth every 6 hours as needed for Mild Pain.   irbesartan (AVAPRO) 150 MG Tab   No No   Sig: TAKE ONE TABLET BY MOUTH EVERY MORNING. PATIENT NEEDS TO BE SEEN FOR ANNUAL VISIT FOR FUTURE REFILLS.   loratadine (CLARITIN) 10 MG Tab   No No   Sig: TAKE ONE TABLET BY MOUTH ONE TIME DAILY AS NEEDED FOR ALLERGY   non-formulary med   Yes No   Sig: Take 1 Tab by mouth every day. Royal Jelly 500mg cap   Patient not taking: Reported on 3/25/2022   vitamin D 5000 UNITS Tab  Patient No No   Sig: Take 5,000 Units by mouth every day.   vitamin E (VITAMIN E) 1000 Unit (450 mg) Cap   Yes No   Sig: Take 1 Cap by mouth every day.      Facility-Administered Medications: None       Physical Exam  Temp:  [36.9 °C (98.4 °F)] 36.9 °C (98.4 °F)  Pulse:  [61-69] 61  Resp:  [15-23] 15  BP: (144-172)/() 144/73  SpO2:  [91 %-97 %] 92 %  Blood Pressure : (!) 165/79       Pulse: 66   Respiration: 17   Pulse Oximetry: 95 %       Physical Exam  Vitals and nursing note reviewed.   Constitutional:       Appearance: Normal appearance.   HENT:      Head: Normocephalic and atraumatic.      Nose: Nose normal.      Mouth/Throat:      Mouth: Mucous membranes are moist.      Pharynx: Oropharynx is clear.   Eyes:      General: No scleral icterus.     Extraocular Movements: Extraocular movements intact.   Cardiovascular:      Rate and Rhythm: Normal rate and regular rhythm.      Pulses: Normal pulses.      Heart sounds:     No friction rub.   Pulmonary:      Effort: Pulmonary effort is normal. No respiratory distress.      Breath sounds: No stridor. No wheezing or  rales.   Abdominal:      General: Bowel sounds are normal. There is no distension.      Palpations: Abdomen is soft.      Tenderness: There is no abdominal tenderness. There is no guarding or rebound.   Musculoskeletal:         General: No swelling or tenderness. Normal range of motion.      Cervical back: Neck supple. No tenderness.   Skin:     General: Skin is warm and dry.   Neurological:      General: No focal deficit present.      Mental Status: She is alert and oriented to person, place, and time.      Comments: Mild dysarthria, expressive aphasia  No pronator drift  No facial droop   Psychiatric:         Mood and Affect: Mood normal.         Laboratory:  Recent Labs     06/20/22 1909   WBC 5.3   RBC 4.95   HEMOGLOBIN 15.0   HEMATOCRIT 45.0   MCV 90.9   MCH 30.3   MCHC 33.3*   RDW 46.0   PLATELETCT 194   MPV 12.6     Recent Labs     06/20/22 1909   SODIUM 140   POTASSIUM 5.3   CHLORIDE 107   CO2 23   GLUCOSE 82   BUN 42*   CREATININE 0.99   CALCIUM 9.7     Recent Labs     06/20/22 1909   ALTSGPT 19   ASTSGOT 31   ALKPHOSPHAT 54   TBILIRUBIN 0.3   GLUCOSE 82     Recent Labs     06/20/22 1909   APTT 27.5   INR 1.00     No results for input(s): NTPROBNP in the last 72 hours.      Recent Labs     06/20/22 1909   TROPONINT 12       Imaging:  DX-CHEST-PORTABLE (1 VIEW)   Final Result      No acute cardiopulmonary abnormality identified.      CT-CEREBRAL PERFUSION ANALYSIS   Final Result      1.  Cerebral blood flow less than 30% likely representing artifact = 9 mL.      2.  T Max more than 6 seconds likely representing combination of completed infarct and ischemia = 0 mL.      3.  Mismatched volume likely representing ischemic brain/penumbra = None      4.  Please note that the cerebral perfusion was performed on the limited brain tissue around the basal ganglia region. Infarct/ischemia outside the CT perfusion sections can be missed in this study.      CT-CTA NECK WITH & W/O-POST PROCESSING   Final Result       Mild bilateral internal carotid artery atherosclerotic plaque without significant stenosis      CT-CTA HEAD WITH & W/O-POST PROCESS   Final Result         1. No hemodynamically significant narrowing of the major intracranial vessels.      CT-HEAD W/O   Final Result      1.  No acute intracranial abnormality      2.  Chronic findings include cerebral volume loss, white matter change, bilateral occipital, and left frontotemporal encephalomalacia         EC-ECHOCARDIOGRAM COMPLETE W/O CONT    (Results Pending)   MR-BRAIN-W/O    (Results Pending)       X-Ray:  I have personally reviewed the images and compared with prior images.  EKG:  I have personally reviewed the images and compared with prior images.    Assessment/Plan:  Justification for Admission Status  I anticipate this patient is appropriate for inpatient status    * Acute ischemic stroke (HCC)- (present on admission)  Assessment & Plan  Suspected, expressive aphasia  No acute etiology seen on CT head  No high grade stenosis seen on CTA head neck  Frequent neuro checks as per neurology  Permissive HTN up to 185/110    ASA  Unable to tolerate statin --continue home fibrate    F/u echo, MRI brain  Neurology f/u appreciated    H/O: CVA (cerebrovascular accident)  Assessment & Plan  Plan as above    Dyslipidemia- (present on admission)  Assessment & Plan  Unable to tolerate statin  Continue home fibrate    Benign essential HTN- (present on admission)  Assessment & Plan  Permissive HTN as per neuro    ACP (advance care planning)  Assessment & Plan  D/w pt -full code        DARIN (obstructive sleep apnea)- (present on admission)  Assessment & Plan  CPAP      VTE prophylaxis: SCDs/TEDs

## 2022-06-21 NOTE — CONSULTS
Neurology Initial Consult H&P  Neurohospitalist Service, [facility]    Referring Physician: Juventino Warner D.O.    Chief Complaint   Patient presents with   • Possible Stroke     Pt came in as a stroke IR. Pt reports that around 9am this morning she started having dysphagia and slurred speech. Pt took 324 of ASA at 18:00 tonight prior to calling 911. Pt A+Ox4 on arrival but reports she's having a hard time speaking. Pt does not take any blood thinners right now but does take 81 mg of ASA daily. 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 Pt has no neuro deficits on arrival except delayed speech and trouble recalling words.        HPI: Irina Traylor is a 81 y.o. woman with a history of hypertension, hyperlipidemia, expressive aphasia in the setting of prior strokes who presents today with recurrence of her difficulty speaking.  She states that around 9:00 AM she felt confused acutely and could not get her words out.  She is unable to give much more detail than this, but she confirms that she did not feel any unilateral weakness or numbness.  She did not lose consciousness.  She denies any headache, fever, chest pain, cough, chills, shortness of breath, dysuria, nausea or vomiting.    She states that she has had symptoms like this before in the setting of multiple strokes.    Of note, she states that she did take an aspirin 325 mg around 6:00 PM, and since that time her symptoms seem to be improving.  She states that at this time her symptoms are feeling much better but she still feels a little off.    Review of systems: In addition to what is detailed in the HPI above, all other systems reviewed and are negative.    Past Medical History:    has a past medical history of Anesthesia, Arthritis, Benign essential HTN (9/7/2017), Cataract, CPAP (continuous positive airway pressure) dependence, Dyslipidemia (3/5/2019), Expressive aphasia, Family history of CVA, Heart murmur, DARIN (obstructive sleep apnea)  "(1/26/2017), Post-nasal drip, Restless leg syndrome, Stroke (HCC) (2/1993, 8/1993), and Walker as ambulation aid.    FHx:  family history includes Cancer in her mother; Diabetes in her brother; Hypertension in her brother, brother, and brother; Stroke in her daughter and father.    SHx:   reports that she has never smoked. She has never used smokeless tobacco. She reports previous alcohol use. She reports that she does not use drugs.    Allergies:  Allergies   Allergen Reactions   • Benicar [Olmesartan] Rash     blisters   • Dilaudid  [Hydromorphone Hcl] Vomiting   • Lidocaine    • Norco  [Apap-Fd&C Blue #1-Hydrocodone] Vomiting   • Other Misc Shortness of Breath     Anesthesia with surgery 2016  Pt couldn't breathe while being put under and tapped side of bed to let RN know. \"It was the scariest feeling and I want everyone to know\"     • Percocet  [Apap-Fd&C Red #40 [place]-Oxycodone] Vomiting   • Vicodin  [Apap-Fd&C Yellow #10 [place]-Hydrocodone] Vomiting   • Codeine Vomiting   • Demerol Vomiting   • Hydrocodone Vomiting   • Other Drug Vomiting     \"all pain meds\" per pt can take morphine.   • Oxycodone Vomiting   • Statins [Hmg-Coa-R Inhibitors] Unspecified     Muscle cramping   • Valium Vomiting       Medications:  No current facility-administered medications for this encounter.    Current Outpatient Medications:   •  fenofibrate (TRICOR) 145 MG Tab, Take 1 Tablet by mouth every day., Disp: 100 Tablet, Rfl: 3  •  fluticasone (FLONASE) 50 MCG/ACT nasal spray, USE 2 SPRAYS IN EACH NOSTRIL EVERY DAY, Disp: 48 g, Rfl: 0  •  loratadine (CLARITIN) 10 MG Tab, TAKE ONE TABLET BY MOUTH ONE TIME DAILY AS NEEDED FOR ALLERGY, Disp: 90 Tablet, Rfl: 3  •  irbesartan (AVAPRO) 150 MG Tab, TAKE ONE TABLET BY MOUTH EVERY MORNING. PATIENT NEEDS TO BE SEEN FOR ANNUAL VISIT FOR FUTURE REFILLS., Disp: 100 Tablet, Rfl: 3  •  Misc. Devices Misc, Repair scooter and place new batteries [employer]., Disp: 1 Each, Rfl: 0  •  DILTIAZem CD " "(CARDIZEM CD) 240 MG CAPSULE SR 24 HR, TAKE ONE CAPSULE BY MOUTH EVERY DAY AT BEDTIME (FOR HIGH BLOOD PRESSURE ), Disp: 90 capsule, Rfl: 3  •  Ascorbic Acid (VITAMIN C) 1000 MG Tab, Take  by mouth., Disp: , Rfl:   •  cyanocobalamin (VITAMIN B-12) 500 MCG Tab, Take 500 mcg by mouth every day., Disp: , Rfl:   •  Turmeric Curcumin 500 MG Cap, Take 1 Tab by mouth 2 Times a Day., Disp: , Rfl:   •  aspirin 81 MG tablet, ASPIRIN 81 MG ORAL TABLET, Disp: , Rfl:   •  amoxicillin (AMOXIL) 875 MG tablet, Take 875 mg by mouth 2 times a day. Take 1 tab po bid for 3 days for dental cleaning, Disp: , Rfl:   •  Acetaminophen (TYLENOL 8 HOUR PO), Take  by mouth., Disp: , Rfl:   •  ibuprofen (MOTRIN) 200 MG Tab, Take 200 mg by mouth every 6 hours as needed for Mild Pain., Disp: , Rfl:   •  non-formulary med, Take 1 Tab by mouth every day. Royal Jelly 500mg cap (Patient not taking: Reported on 3/25/2022), Disp: , Rfl:   •  vitamin E (VITAMIN E) 1000 Unit (450 mg) Cap, Take 1 Cap by mouth every day., Disp: , Rfl:   •  vitamin D 5000 UNITS Tab, Take 5,000 Units by mouth every day., Disp: 30 Tab, Rfl: 2  •  Non Formulary Request, Take 1 Package by mouth every day. Indications: VITALIZER GOLD-MULTI VITAMIN, Disp: , Rfl:   •  NON SPECIFIED, Take 7 Caps by mouth every bedtime. \"Herb-Lax\"   Indications: constipation, Disp: , Rfl:     Physical Examination:     General: Patient is awake and in no acute distress  Eye: Examination of optic disks not indicated at this time given acuity of consult  Neck: There is normal range of motion  CV: regular rate   Extremities:  clear, dry, intact, without peripheral edema    NEUROLOGICAL EXAM:     BP (!) 172/101   Pulse 68   Resp (!) 23   Ht 1.651 m (5' 5\")   Wt 72.6 kg (160 lb)   SpO2 97%   BMI 26.63 kg/m²       Mental status: Awake, alert and fully oriented  Speech and language: Speech is clear but nonfluent.  Her sentences are often broken with several dropped words.  She has profound difficulty " naming objects and repeating but is able to briskly follow commands.  Cranial nerve exam: Pupils are equal, round and reactive to light bilaterally. Visual fields are full. There is no nystagmus. Extraocular muscles are intact. Face is symmetric. Sensation in the face is intact to light touch. Palate elevates symmetrically. Tongue is midline.  Motor exam: Power 5/5 throughout all extremities. There is sustained antigravity with no downward drift in bilateral arms and legs.  There is no pronator drift. Tone is normal. No abnormal movements were seen on exam.  Sensory exam: Reacts to tactile and pinprick in all 4 extremities, no neglect to double stim   Deep tendon reflexes:  2+ throughout. Toes down-going bilaterally.  Coordination: No ataxia on bilateral finger-to-nose testing  Gait: Deferred due to patient preference    NIHSS: National Institutes of Health Stroke Scale    [0] 1a:Level of Consciousness    0-alert 1-drowsy   2-stupor   3-coma  [1] 1b:LOC Questions                  0-both  1-one      2-neither  [0] 1c:LOC Commands                   0-both  1-one      2-neither  [0] 2: Best Gaze                     0-nl    1-partial  2-forced  [0] 3: Visual Fields                   0-nl    1-partial  2-complete 3-bilat  [0] 4: Facial Paresis                0-nl    1-minor    2-partial  3-full  MOTOR                       0-nl  [0] 5: Right Arm           1-drift  [0] 6: Left Arm             2-some effort vs gravity  [0] 7: Right Leg           3-no effort vs gravity  [0] 8: Left Leg             4-no movement                             x-untestable  [0] 9: Limb Ataxia                    0-abs   1-1_limb   2-2+_limbs       x-untestable  [0] 10:Sensory                        0-nl    1-partial  2-dense  [1] 11:Best Language/Aphasia         0-nl    1-mild/mod 2-severe   3-mute  [0] 12:Dysarthria                     0-nl    1-mild/mod 2-severe       x-untestable  [0] 13:Neglect/Inattention            0-none  1-partial   2-complete  [2] TOTAL    NIH Time 1950    Objective Data:    Labs:  Lab Results   Component Value Date/Time    PROTHROMBTM 14.1 03/22/2019 05:22 AM    INR 1.10 03/22/2019 05:22 AM      Lab Results   Component Value Date/Time    WBC 5.3 06/20/2022 07:09 PM    RBC 4.95 06/20/2022 07:09 PM    HEMOGLOBIN 15.0 06/20/2022 07:09 PM    HEMATOCRIT 45.0 06/20/2022 07:09 PM    MCV 90.9 06/20/2022 07:09 PM    MCH 30.3 06/20/2022 07:09 PM    MCHC 33.3 (L) 06/20/2022 07:09 PM    MPV 12.6 06/20/2022 07:09 PM    NEUTSPOLYS 66.50 06/20/2022 07:09 PM    LYMPHOCYTES 16.70 (L) 06/20/2022 07:09 PM    MONOCYTES 11.20 06/20/2022 07:09 PM    EOSINOPHILS 4.20 06/20/2022 07:09 PM    BASOPHILS 0.60 06/20/2022 07:09 PM      Lab Results   Component Value Date/Time    SODIUM 140 06/20/2022 07:09 PM    POTASSIUM 5.3 06/20/2022 07:09 PM    CHLORIDE 107 06/20/2022 07:09 PM    CO2 23 06/20/2022 07:09 PM    GLUCOSE 82 06/20/2022 07:09 PM    BUN 42 (H) 06/20/2022 07:09 PM    CREATININE 0.99 06/20/2022 07:09 PM      Lab Results   Component Value Date/Time    CHOLSTRLTOT 177 07/08/2021 10:34 AM    LDL 86 07/08/2021 10:34 AM    HDL 81 07/08/2021 10:34 AM    TRIGLYCERIDE 48 07/08/2021 10:34 AM       Lab Results   Component Value Date/Time    ALKPHOSPHAT 54 06/20/2022 07:09 PM    ASTSGOT 31 06/20/2022 07:09 PM    ALTSGPT 19 06/20/2022 07:09 PM    TBILIRUBIN 0.3 06/20/2022 07:09 PM        Imaging/Testing:    I interpreted and/or reviewed the patient's neuroimaging    CT-CEREBRAL PERFUSION ANALYSIS   Final Result      1.  Cerebral blood flow less than 30% likely representing artifact = 9 mL.      2.  T Max more than 6 seconds likely representing combination of completed infarct and ischemia = 0 mL.      3.  Mismatched volume likely representing ischemic brain/penumbra = None      4.  Please note that the cerebral perfusion was performed on the limited brain tissue around the basal ganglia region. Infarct/ischemia outside the CT perfusion sections can be  missed in this study.      CT-CTA NECK WITH & W/O-POST PROCESSING   Final Result      Mild bilateral internal carotid artery atherosclerotic plaque without significant stenosis      CT-CTA HEAD WITH & W/O-POST PROCESS   Final Result         1. No hemodynamically significant narrowing of the major intracranial vessels.      CT-HEAD W/O   Final Result      1.  No acute intracranial abnormality      2.  Chronic findings include cerebral volume loss, white matter change, bilateral occipital, and left frontotemporal encephalomalacia         DX-CHEST-PORTABLE (1 VIEW)    (Results Pending)       Assessment and Plan:    Irina Traylor is a 81 y.o. woman with a history of multiple strokes on aspirin and atorvastatin who presents with acute onset expressive aphasia that began around 9:00 AM.  Symptoms are consistent with a new left MCA stroke.  I reviewed the patient's neuroimaging.  CT head reveals large bilateral left greater than right MCA/PCA watershed ischemic infarcts of unclear chronicity.  CT perfusion shows no acute changes or perfusion deficits.  CTA head and neck shows no large vessel occlusions or cutoffs.    I suspect that the patient has had a new vascular left MCA event versus recrudescence of her prior strokes.    At this time I would keep a broad differential and allow for permissive hypertension up to 185/110 until MRI brain is complete.  Check basic labs and UA to rule out signs of infection.  Expedite MRI brain without contrast.  Once MRI is performed, neurology will follow up and tailor our recommendations to the findings.    For now, continue home aspirin and atorvastatin.    Problem list:  1.  Possible new left MCA ischemic infarct versus stroke recrudescence    The evaluation of the patient, and recommended management, was discussed with the resident staff.     Yosef Carl D.O.  Neurohospitalist, Acute Care Services

## 2022-06-21 NOTE — ED TRIAGE NOTES
"Chief Complaint   Patient presents with   • Possible Stroke     Pt came in as a stroke IR. Pt reports that around 9am this morning she started having dysphagia and slurred speech. Pt took 324 of ASA at 18:00 tonight prior to calling 911. Pt A+Ox4 on arrival but reports she's having a hard time speaking. Pt does not take any blood thinners right now but does take 81 mg of ASA daily. 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 Pt has no neuro deficits on arrival except delayed speech and trouble recalling words.          Pt BIBA from home for above complaint. Pt A+Ox4 on arrival and is able to move all extremities with equal strengths throughout. Labs drawn at charge desk. Per neurology NIH 2    No interventions by EMS prior to arrival.       BP (!) 167/94   Pulse 69   Resp 18   Ht 1.651 m (5' 5\")   Wt 72.6 kg (160 lb)   SpO2 95%     "

## 2022-06-21 NOTE — ED NOTES
Pt is A&Ox4 but is having difficulty answering MRI screening questions. Multiple different types of hardware listed in pt's history. Pt's emergency contacts listed are all friends and pt states they would not know her medical history. MRI notified and MRI tech stated she would have her cleared by radiology.

## 2022-06-21 NOTE — ED PROVIDER NOTES
ED Provider Note    CHIEF COMPLAINT  Chief Complaint   Patient presents with   • Possible Stroke     Pt came in as a stroke IR. Pt reports that around 9am this morning she started having dysphagia and slurred speech. Pt took 324 of ASA at 18:00 tonight prior to calling 911. Pt A+Ox4 on arrival but reports she's having a hard time speaking. Pt does not take any blood thinners right now but does take 81 mg of ASA daily. 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 Pt has no neuro deficits on arrival except delayed speech and trouble recalling words.         HPI    Primary care provider: NAYAN Longo   History obtained from: Patient and EMS.  History limited by: None     Irina Traylor is a 81 y.o. female who presents to the ED by EMS and was seen upon arrival as a stroke activation.  Patient is a retired nurse and reports that she noticed difficulty with speaking specifically trying to get her words out at around 9:00 this morning.  She did take a full dose aspirin at 6:00 tonight.  She reports remote history of CVA.  She reports that she had blurry vision earlier in the morning but that has resolved.  She is unable to describe whether it is monocular, biocular or visual fields.  She otherwise denies any other symptoms.  She denies any pain.  She denies other weakness or sensory change.  She denies recent injury or trauma.  She denies recent illness/fever/congestion/sore throat/cough/shortness of breath or difficulty breathing/nausea/vomiting/diarrhea/dysuria/rash.    REVIEW OF SYSTEMS  Please see HPI for pertinent positives/negatives.  All other systems reviewed and are negative.     PAST MEDICAL HISTORY  Past Medical History:   Diagnosis Date   • Dyslipidemia 3/5/2019   • Benign essential HTN 9/7/2017   • DARIN (obstructive sleep apnea) 1/26/2017    AHI 23.6, minimum saturation 69%, on CPAP 15 cm.   • Anesthesia     States feels like she can't breathe with induction for right hip surgery, gets nausea vomiting with  most pain meds.  Uses anti-nausea medicine if takes morphine   • Arthritis     To hips, shoulders, hands   • Cataract    • CPAP (continuous positive airway pressure) dependence    • Expressive aphasia     Related to CVA   • Family history of CVA    • Heart murmur    • Post-nasal drip    • Restless leg syndrome     4/25/17-Resolved, states was related to right hip and none S/P replacement.   • Stroke ([facility]) 2/1993, 8/1993    Qzdxvlqt-hgrhud-ywjfgbaben aphasia,mouth forming words, reading, numbers   • Walker as ambulation aid         SURGICAL HISTORY  Past Surgical History:   Procedure Laterality Date   • PB RECONSTR TOTAL SHOULDER IMPLANT Left 3/21/2019    Procedure: ARTHROPLASTY, SHOULDER, TOTAL-  REVISION REVERSE;  Surgeon: Yoshi Gamble M.D.;  Location: [facility];  Service: Orthopedics   • ORIF, FRACTURE, HUMERUS Left 3/21/2019    Procedure: ORIF, FRACTURE, HUMERUS;  Surgeon: Yoshi Gamble M.D.;  Location: [facility];  Service: Orthopedics   • KNEE ARTHROPLASTY TOTAL Left 2/12/2018    Procedure: KNEE ARTHROPLASTY TOTAL;  Surgeon: Earle Jacobsen M.D.;  Location: [facility];  Service: Orthopedics   • CERVICAL FUSION POSTERIOR  8/8/2017    Procedure: CERVICAL FUSION POSTERIOR- C3-5, INSTRUMENTED;  Surgeon: Florentino Pike M.D.;  Location: [facility];  Service:    • CERVICAL LAMINECTOMY POSTERIOR  8/8/2017    Procedure: CERVICAL LAMINECTOMY POSTERIOR- C3-5;  Surgeon: Florentino Pike M.D.;  Location: [facility];  Service:    • HIP ARTHROPLASTY TOTAL Left 5/8/2017    Procedure: HIP ARTHROPLASTY TOTAL;  Surgeon: Earle Jacobsen M.D.;  Location: [facility];  Service:    • HIP ARTHROPLASTY TOTAL Right 12/12/2016    Procedure: HIP ARTHROPLASTY TOTAL;  Surgeon: Earle Jacobsen M.D.;  Location: [facility];  Service:    • FUSION, SPINE, LUMBAR, PLIF  9/26/2016    Procedure: LUMBAR FUSION POSTERIOR  L2-S1 onlay ;  Surgeon: Florentino Pike M.D.;  Location: SURGERY [facility];  Service:    • LUMBAR LAMINECTOMY DISKECTOMY N/A 9/26/2016    Procedure: LUMBAR LAMINECTOMY DISKECTOMY L2-S1;  Surgeon: Florentino Pike M.D.;  Location: SURGERY [facility];  Service:    • COLONOSCOPY  2/2016    Every 10 years   • SHOULDER ARTHROPLASTY TOTAL Left 8/11/2015    Procedure: SHOULDER ARTHROPLASTY REVERSE TOTAL;  Surgeon: Yoshi Gamble M.D.;  Location: SURGERY [facility];  Service:    • KNEE ARTHROPLASTY TOTAL  6/23/2014    Performed by Earle Jacobsen M.D. at SURGERY [facility]   • HAMMERTOE CORRECTION Left 11/12/2007    Removed 3rd toe   • LUMBAR LAMINECTOMY DISKECTOMY  1993    Lumbar   • CARPAL TUNNEL RELEASE Bilateral 1993   • CERVICAL DISK AND FUSION ANTERIOR  1993    X2   • HYSTERECTOMY, TOTAL ABDOMINAL  1977   • BUNIONECTOMY Bilateral 1980's   • HAMMERTOE CORRECTION Right 1980's    Removed 3rd toe   • ORIF, FINGER Left as child    Index        SOCIAL HISTORY  Social History     Tobacco Use   • Smoking status: Never Smoker   • Smokeless tobacco: Never Used   Vaping Use   • Vaping Use: Never used   Substance and Sexual Activity   • Alcohol use: Not Currently   • Drug use: No   • Sexual activity: Not Currently        FAMILY HISTORY  Family History   Problem Relation Age of Onset   • Cancer Mother         vaginal   • Stroke Father    • Hypertension Brother    • Diabetes Brother    • Hypertension Brother    • Hypertension Brother    • Stroke Daughter         CURRENT MEDICATIONS  Home Medications     Reviewed by Dedra Robles R.N. (Registered Nurse) on 06/20/22 at 1941  Med List Status: Partial   Medication Last Dose Status   Acetaminophen (TYLENOL 8 HOUR PO)  Active   amoxicillin (AMOXIL) 875 MG tablet  Active   Ascorbic Acid (VITAMIN C) 1000 MG Tab  Active   aspirin 81 MG tablet  Active   cyanocobalamin (VITAMIN B-12) 500 MCG Tab  Active   DILTIAZem CD (CARDIZEM CD) 240 MG CAPSULE SR 24 HR   "Active   fenofibrate (TRICOR) 145 MG Tab  Active   fluticasone (FLONASE) 50 MCG/ACT nasal spray  Active   ibuprofen (MOTRIN) 200 MG Tab  Active   irbesartan (AVAPRO) 150 MG Tab  Active   loratadine (CLARITIN) 10 MG Tab  Active   Misc. Devices Misc  Active   Non Formulary Request  Active   NON SPECIFIED  Active   non-formulary med  Active   Turmeric Curcumin 500 MG Cap  Active   vitamin D 5000 UNITS Tab  Active   vitamin E (VITAMIN E) 1000 Unit (450 mg) Cap  Active                 ALLERGIES  Allergies   Allergen Reactions   • Benicar [Olmesartan] Rash     blisters   • Dilaudid  [Hydromorphone Hcl] Vomiting   • Lidocaine    • Norco  [Apap-Fd&C Blue #1-Hydrocodone] Vomiting   • Other Misc Shortness of Breath     Anesthesia with surgery 2016  Pt couldn't breathe while being put under and tapped side of bed to let RN know. \"It was the scariest feeling and I want everyone to know\"     • Percocet  [Apap-Fd&C Red #40 [place]-Oxycodone] Vomiting   • Vicodin  [Apap-Fd&C Yellow #10 [place]-Hydrocodone] Vomiting   • Codeine Vomiting   • Demerol Vomiting   • Hydrocodone Vomiting   • Other Drug Vomiting     \"all pain meds\" per pt can take morphine.   • Oxycodone Vomiting   • Statins [Hmg-Coa-R Inhibitors] Unspecified     Muscle cramping   • Valium Vomiting        PHYSICAL EXAM  VITAL SIGNS: BP (!) 165/79   Pulse 66   Resp 17   Ht 1.651 m (5' 5\")   Wt 72.6 kg (160 lb)   SpO2 95%   BMI 26.63 kg/m²  @KEVIN[[id number]::@     Pulse ox interpretation: 97% I interpret this pulse ox as normal     Cardiac monitor interpretation: Sinus rhythm with heart rate in the 60s as interpreted by me.  The patient presented with possible stroke symptoms and cardiac monitor was ordered to monitor for dysrhythmia.    Constitutional: Well developed, well nourished, alert in no apparent distress, nontoxic appearance    HENT: No external signs of trauma, normocephalic, oropharynx moist and clear, nose normal    Eyes: PERRL, EOMI, vision and visual fields " are grossly intact bilaterally, conjunctiva without erythema, no discharge, no icterus    Neck: Soft and supple, trachea midline, no stridor, no tenderness, no LAD, no JVD, good ROM    Cardiovascular: Regular rate and rhythm, no murmurs/rubs/gallops, strong distal pulses and good perfusion    Thorax & Lungs: No respiratory distress, CTAB   Abdomen: Soft, nontender, nondistended, no guarding, no rebound, normal BS    Back: No CVAT    Extremities: No cyanosis, no edema, no gross deformity, good ROM, no tenderness, intact distal pulses with brisk cap refill    Skin: Warm, dry, no pallor/cyanosis, no rash noted    Lymphatic: No lymphadenopathy noted    Neuro: Alert and oriented to person, place, and time.  GCS 15.  CN II-XII grossly intact.  Patient appears to have difficulty forming her words but otherwise normal speech.  Equal strength bilateral UE/LE.  Sensation intact to touch.  No cerebellar signs.    Psychiatric: Cooperative, normal mood and affect, normal judgement, appropriate for clinical situation        NIH STROKE SCALE    1A: Level of Consciousness=0  Alert; keenly responsive 0  Arouses to minor stimulation +1  Requires repeated stimulation to arouse +2  Movements to pain +2  Postures or unresponsive +3    1B: Ask Month and Age=0  Both questions right 0  1 question right +1  0 questions right +2  Dysarthric/intubated/trauma/language barrier +1  Aphasic +2    1C: 'Blink Eyes' & 'Squeeze Hands'=0  (Pantomime commands if communication barrier)  Performs both tasks 0  Performs 1 task +1  Performs 0 tasks +2    2: Test Horizontal Extraocular Movements=0  Normal 0  Partial gaze palsy: can be overcome +1  Partial gaze palsy: corrects with oculocephalic reflex +1  Forced gaze palsy: cannot be overcome +2    3: Test Visual Fields=0  No visual loss 0  Partial hemianopia +1  Complete hemianopia +2  Patient is bilaterally blind +3  Bilateral hemianopia +3    4: Test Facial Palsy=0  (Use grimace if obtunded)  Normal  symmetry 0  Minor paralysis (flat nasolabial fold, smile asymetry) +1  Partial paralysis (lower face) +2  Unilateral complete paralysis (upper/lower face) +3  Bilateral complete paralysis (upper/lower face) +3    5A: Test Left Arm Motor Drift=0  No drift for 10 Seconds 0  Drift, but doesn't hit bed +1  Drift, hits bed +2  Some effort against gravity +2  No effort against gravity +3  No movement +4  Amputation/joint fusion 0    5B: Test Right Arm Motor Drift=0  No drift for 10 seconds 0  Drift, but doesn't hit bed +1  Drift, hits bed +2  Some effort against gravity +2  No effort against gravity +3  No movement +4  Amputation/joint fusion 0    6A: Test Left Leg Motor Drift=0  No drift for 5 Seconds 0  Drift, but doesn't hit bed +1  Drift, hits bed +2  Some effort against gravity +2  No effort against gravity +3  No movement +4  Amputation/joint fusion 0    6B: Test Right Leg Motor Drift=0  No drift for 5 Seconds 0  Drift, but doesn't hit bed +1  Drift, hits bed +2  Some effort against gravity +2  No effort against gravity +3  No movement +4  Amputation/joint fusion 0    7: Test Limb Ataxia =0  (FNF/Heel-Shin)  No ataxia 0  Ataxia in 1 limb +1  Ataxia in 2 limbs +2  Does not understand 0  Paralyzed 0  Amputation/joint fusion 0    8: Test Sensation=0  Normal; no sensory loss 0  Mild-moderate loss: less sharp/more dull +1  Mild-moderate loss: can sense being touched +1  Complete loss: cannot sense being touched at all +2  No response and quadriplegic +2  Coma/unresponsive +2    9: Test Language/Aphasia=1  Normal; no aphasia 0  Mild-moderate aphasia: some obvious changes, without significant limitation +1  Severe aphasia: fragmentary expression, inference needed, cannot identify materials +2  Mute/global aphasia: no usable speech/auditory comprehension +3  Coma/unresponsive +3    10: Test Dysarthria=0  Normal 0  Mild-moderate dysarthria: slurring but can be understood +1  Severe dysarthria: unintelligble slurring or out  of proportion to dysphasia +2  Mute/anarthric +2  Intubated/unable to test 0    11: Test Extinction/Inattention=0  No abnormality 0  Visual/tactile/auditory/spatial/personal inattention +1  Extinction to bilateral simultaneous stimulation +1  Profound iris-inattention (ex: does not recognize own hand) +2  Extinction to >1 modality +2      NIH Stroke Scale=1      DIAGNOSTIC STUDIES / PROCEDURES    EKG  12 Lead EKG obtained at 2019 and interpreted by me:   Rate: 57   Rhythm: Sinus bradycardia  Ectopy: None  Intervals: Normal   Axis: LAD  QRS: Late precordial R wave transition  ST segments: Normal  T Waves: Normal    Clinical Impression: Sinus bradycardia without acute ischemic changes or other dysrhythmia  Compared to March 19, 2019 without significant change      LABS  All labs reviewed by me.     Results for orders placed or performed during the hospital encounter of 06/20/22   CBC WITH DIFFERENTIAL   Result Value Ref Range    WBC 5.3 4.8 - 10.8 K/uL    RBC 4.95 4.20 - 5.40 M/uL    Hemoglobin 15.0 12.0 - 16.0 g/dL    Hematocrit 45.0 37.0 - 47.0 %    MCV 90.9 81.4 - 97.8 fL    MCH 30.3 27.0 - 33.0 pg    MCHC 33.3 (L) 33.6 - 35.0 g/dL    RDW 46.0 35.9 - 50.0 fL    Platelet Count 194 164 - 446 K/uL    MPV 12.6 9.0 - 12.9 fL    Neutrophils-Polys 66.50 44.00 - 72.00 %    Lymphocytes 16.70 (L) 22.00 - 41.00 %    Monocytes 11.20 0.00 - 13.40 %    Eosinophils 4.20 0.00 - 6.90 %    Basophils 0.60 0.00 - 1.80 %    Immature Granulocytes 0.80 0.00 - 0.90 %    Nucleated RBC 0.00 /100 WBC    Neutrophils (Absolute) 3.52 2.00 - 7.15 K/uL    Lymphs (Absolute) 0.88 (L) 1.00 - 4.80 K/uL    Monos (Absolute) 0.59 0.00 - 0.85 K/uL    Eos (Absolute) 0.22 0.00 - 0.51 K/uL    Baso (Absolute) 0.03 0.00 - 0.12 K/uL    Immature Granulocytes (abs) 0.04 0.00 - 0.11 K/uL    NRBC (Absolute) 0.00 K/uL   COMP METABOLIC PANEL   Result Value Ref Range    Sodium 140 135 - 145 mmol/L    Potassium 5.3 3.6 - 5.5 mmol/L    Chloride 107 96 - 112 mmol/L     Co2 23 20 - 33 mmol/L    Anion Gap 10.0 7.0 - 16.0    Glucose 82 65 - 99 mg/dL    Bun 42 (H) 8 - 22 mg/dL    Creatinine 0.99 0.50 - 1.40 mg/dL    Calcium 9.7 8.5 - 10.5 mg/dL    AST(SGOT) 31 12 - 45 U/L    ALT(SGPT) 19 2 - 50 U/L    Alkaline Phosphatase 54 30 - 99 U/L    Total Bilirubin 0.3 0.1 - 1.5 mg/dL    Albumin 4.1 3.2 - 4.9 g/dL    Total Protein 7.1 6.0 - 8.2 g/dL    Globulin 3.0 1.9 - 3.5 g/dL    A-G Ratio 1.4 g/dL   PROTHROMBIN TIME   Result Value Ref Range    PT 12.9 12.0 - 14.6 sec    INR 1.00 0.87 - 1.13   APTT   Result Value Ref Range    APTT 27.5 24.7 - 36.0 sec   COD (ADULT)   Result Value Ref Range    ABO Grouping Only A     Rh Grouping Only POS     Antibody Screen-Cod NEG    TROPONIN   Result Value Ref Range    Troponin T 12 6 - 19 ng/L   TSH WITH REFLEX TO FT4   Result Value Ref Range    TSH 1.550 0.380 - 5.330 uIU/mL   MAGNESIUM   Result Value Ref Range    Magnesium 2.1 1.5 - 2.5 mg/dL   ESTIMATED GFR   Result Value Ref Range    GFR (CKD-EPI) 58 (A) >60 mL/min/1.73 m 2   EKG (NOW)   Result Value Ref Range    Report       [facility] Emergency Dept.    Test Date:  2022  Pt Name:    JACEK VELASQUEZ             Department: ER  MRN:        [id number]                      Room:       RD 01  Gender:     Female                       Technician: [id number]  :        [id number]                   Requested By:TISH SINGH  Order #:    [id number]                    Reading MD:    Measurements  Intervals                                Axis  Rate:       57                           P:          55  GA:         176                          QRS:        -52  QRSD:       118                          T:          13  QT:         472  QTc:        460    Interpretive Statements  SINUS BRADYCARDIA  LAD, CONSIDER LEFT ANTERIOR FASCICULAR BLOCK  LEFT VENTRICULAR HYPERTROPHY  LATERAL INFARCT, OLD  Compared to ECG 2019 08:03:13  Left ventricular hypertrophy now present  Myocardial infarct finding  now present  Sinus rhythm no longer present  Intraventricular conduction delay no longer present          RADIOLOGY  The radiologist's interpretation of all radiological studies have been reviewed by me.     DX-CHEST-PORTABLE (1 VIEW)   Final Result      No acute cardiopulmonary abnormality identified.      CT-CEREBRAL PERFUSION ANALYSIS   Final Result      1.  Cerebral blood flow less than 30% likely representing artifact = 9 mL.      2.  T Max more than 6 seconds likely representing combination of completed infarct and ischemia = 0 mL.      3.  Mismatched volume likely representing ischemic brain/penumbra = None      4.  Please note that the cerebral perfusion was performed on the limited brain tissue around the basal ganglia region. Infarct/ischemia outside the CT perfusion sections can be missed in this study.      CT-CTA NECK WITH & W/O-POST PROCESSING   Final Result      Mild bilateral internal carotid artery atherosclerotic plaque without significant stenosis      CT-CTA HEAD WITH & W/O-POST PROCESS   Final Result         1. No hemodynamically significant narrowing of the major intracranial vessels.      CT-HEAD W/O   Final Result      1.  No acute intracranial abnormality      2.  Chronic findings include cerebral volume loss, white matter change, bilateral occipital, and left frontotemporal encephalomalacia         EC-ECHOCARDIOGRAM COMPLETE W/O CONT    (Results Pending)   MR-BRAIN-W/O    (Results Pending)          COURSE & MEDICAL DECISION MAKING  Nursing notes, VS, PMSFHx reviewed in chart.     Review of past medical records shows the patient was last seen in the office March 31, 2022 for follow-up regarding blood pressure and medication management.      Differential diagnoses considered include but are not limited to: Tension/migraine/cluster HA, intracranial hemorrhage/SAH, aneurysm, dissection, intracranial HTN, central venous thrombosis, tumor/cancer       2015: D/W hospitalist who will admit  patient    2050: D/W intensivist who agreed to take patient in the MICU      History and physical exam as above.  Patient was seen upon arrival as a stroke activation and protocol was initiated.  EKG, labs and imaging studies were unrevealing.  Patient's NIHSS is 1 and only neuro deficit noted is difficulty with word finding and speech.  Her symptoms started approximately 10 hours ago and therefore patient would not be a good candidate for thrombolytic.  Appreciate assistance from neurologist who evaluated the patient in the ED and provided recommendations.  I updated the patient on the findings and plan and she is agreeable.  Discussed with the hospitalist who graciously agreed to admit patient.  Intensivist was notified regarding admission to the MICU and she accepted.      CRITICAL CARE NOTE:  Total critical care time spent on this patient was 30 minutes exclusive of separately billable procedures.  This may include direct bedside patient care, speaking with family members, review of past medical records, reviewing the results of laboratory/diagnostic studies, consulting with other physicians, as well as evaluating the response to the therapy instituted.        FINAL IMPRESSION  1. Speech disturbance, unspecified type Acute   2. 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  Despite my review, spelling/grammar/context errors may still remain.  Interpretation of this chart should be taken in this context.

## 2022-06-21 NOTE — PROGRESS NOTES
Pt arrived on IMCU at 2300, passed swallow eval, asked for/given lasagna.  Had to cut lasagna and feed patient as patient needs larger handles on her utensils to hold them.  Will relay to day shift to request from OT utensil attachments. Pt social, pleasant, very talkative, as times has difficulty finding right words, strength in upper/lower extremities good-see Neuro assessment.  No reduction in neuro overnight, Pt slept on/off throughout the evening, VSS, no signs/symptoms of pain/distress.  Pt offered given water/cranberry juice this morning. MRI still needs to be done, radiology at bedside 0500--was told in report that radiology needed to sign off on pt prior to MRI. Report to be given to oncoming day shift RN.

## 2022-06-21 NOTE — DISCHARGE PLANNING
Care Transition Team Discharge Planning    Anticipated Discharge Disposition: d/c home w/ HH in home therapy     Action: Lsw attended AM rounds. Pt lives alone in [place]. She is very concerned about being able to afford this whole process. Pt has Q2 neuro checks and is stable or improving. Pt is waiting for an MRI today. Pt is anxious. She is on room air. She has been seen by the therapy disciplines. She will need SLP at d/c.       Later, Lsw received request from RN to please see pt as she is very anxious about her bill. Lsw sent text to [employer]. She will call pt to discuss insurance.    Lsw met w/ pt at bedside and discussed pt's future bills, transport services, senior low income services in community, and acquiring affordable supports.    Lsw provided the following resource pamphlets: [facility] this is an extensive list of supports for seniors in [facility] including transport, etc. Lsw provided [employer] forms to apply for programs which pt says she will not physically qualify for today but maybe in future. Lsw provided a list of [facility] numbers to discuss bill, ask for support w/ bill, etc.     Pt is aware she can get transport w/ SW support. She also is very concerned about an application she has to complete which is on her couch at home. She says she should be filling it out now. She has word finding difficulty and was unable to explain exactly what it is for. She will accept a letter on [facility] letter head indicating the days she was here at the hospital and unable to complete the form. It has an expiration date. She states her home is securely locked and no one can get in to get the application for her.    Lsw completed letter and requested MD please sign for pt to take home w/ her. The letter will hopefully assist pt w/ whomever is requiring her to complete this document w/in a certain amount of time.             Barriers to Discharge: medical clearance    Plan: Lsw will continue to follow, and assist w/ d/c  planning.

## 2022-06-21 NOTE — ASSESSMENT & PLAN NOTE
Suspected, expressive aphasia  No acute etiology seen on CT head  No high grade stenosis seen on CTA head neck  Frequent neuro checks as per neurology  Permissive HTN up to 185/110  ASA  History of intolerance to statin    F/u echo, MRI brain  Neurology f/u appreciated

## 2022-06-21 NOTE — THERAPY
Occupational Therapy   Initial Evaluation     Patient Name: Irina Traylor  Age:  81 y.o., Sex:  female  Medical Record #: [id number]  Today's Date: 6/21/2022     Precautions  Precautions: Fall Risk  Comments: expressive aphasia, permissive /110    Assessment  Patient is 81 y.o. female admitted for CVA w/u d/t dysphagia and slurred speech, consistent with possible L MCA CVA. PMHx of multiple CVAs, HLD, and HTN. Completed ADLs/txfs with SBA-SPV and functional ambulation w/FWW and SPV. Provided with and educated on red built up  for utensils d/t decreased FM coordination at baseline. Reports she feels she is at baseline except for speech deficits, which she reports are improving. Maddison has several friends who can assist PRN, and all necessary DME/AE for adaptive techniques for ADL/txfs. Patient will not be actively followed for occupational therapy services at this time, however may be seen if requested by physician for 1 more visit within 30 days to address any discharge or equipment needs.     Plan    Recommend Occupational Therapy  d/c needs only      DC Equipment Recommendations: None  Discharge Recommendations: Recommend home health for continued occupational therapy services (Defer to SLP for cognition/speech recommendations)     Objective     06/21/22 0848   Prior Living Situation   Prior Services Home-Independent   Housing / Facility 1 Story House   Steps Into Home   (ramp)   Bathroom Set up Bathtub / Shower Combination;Shower Chair;Grab Bars   Equipment Owned Front-Wheel Walker;Tub / Shower Seat;Grab Bar(s) In Tub / Shower;4-Wheel Walker;Sock Aid;Reacher;Ramp;Raised Toilet Seat With Arms   Lives with - Patient's Self Care Capacity Alone and Able to Care For Self   Comments Maddison has several friends who can assist PRN   Prior Level of ADL Function   Self Feeding Independent   Grooming / Hygiene Independent   Bathing Independent  (req adaptive techniques to get head down to hands d/t shoulder  deficits)   Dressing Independent  (uses AE for LB dressing)   Toileting Independent   Prior Level of IADL Function   Medication Management Independent   Laundry Independent   Kitchen Mobility Independent   Finances Independent   Home Management Independent   Shopping Independent   Prior Level Of Mobility Independent With Device in Community;Independent With Device in Home   Precautions   Precautions Fall Risk   Comments expressive aphasia   Vitals   O2 Delivery Device None - Room Air   Pain 0 - 10 Group   Therapist Pain Assessment Post Activity Pain Same as Prior to Activity;During Activity;Nurse Notified  (no c/o pain)   Cognition    Cognition / Consciousness X   Speech/ Communication Dysarthric;Expressive Aphasia;Word Finding Impairment  (possible expressive aphasia)   Level of Consciousness Alert   Comments pleasant and cooperative; demo'd decreased word finding. difficult to fully assess cognition/safety d/t speech deficit   Passive ROM Upper Body   Passive ROM Upper Body WDL   Active ROM Upper Body   Active ROM Upper Body  WDL   Strength Upper Body   Upper Body Strength  WDL   Sensation Upper Body   Upper Extremity Sensation  WDL   Coordination Upper Body   Coordination X   Fine Motor Coordination limited in B hands d/t arthritis- baseline   Gross Motor Coordination BUE limited d/t shoulder impairments- baseline   Balance Assessment   Sitting Balance (Static) Good   Sitting Balance (Dynamic) Fair +   Standing Balance (Static) Fair   Standing Balance (Dynamic) Fair   Weight Shift Sitting Good   Weight Shift Standing Fair   Comments w/FWW   Bed Mobility    Supine to Sit Minimal Assist  (use of therapist hand for leverage)   Sit to Supine   (left seated in chair)   Scooting Supervised   ADL Assessment   Grooming Supervision;Standing  (washing hands)   Lower Body Dressing Moderate Assist  (uses AE at home for LB dressing)   Toileting Standby Assist  (for standing pericare)   Comments Provided with and educated on  red built up  for utensils. Reports she feels she is at baseline except for speech deficits   Functional Mobility   Sit to Stand Supervised   Bed, Chair, Wheelchair Transfer Supervised   Toilet Transfers Supervised   Transfer Method Stand Step   Mobility w/Fww in room and hallway   Edema / Skin Assessment   Edema / Skin  Not Assessed   Activity Tolerance   Comments no c/o fatigue   Education Group   Education Provided Role of Occupational Therapist;Pathology of bedrest   Role of Occupational Therapist Patient Response Patient;Acceptance;Explanation;Verbal Demonstration;Reinforcement Needed   Pathology of Bedrest Patient Response Patient;Acceptance;Explanation;Verbal Demonstration;Reinforcement Needed

## 2022-06-21 NOTE — CARE PLAN
The patient is Watcher - Medium risk of patient condition declining or worsening    Shift Goals  Clinical Goals: Permissive HTN  Patient Goals: be healed and go home, good nights sleep    Progress made toward(s) clinical / shift goals:  progressing      Problem: Mobility - Stroke  Goal: Patient's capacity to carry out activities will improve  Outcome: Progressing     Problem: Urinary Elimination  Goal: Establish and maintain regular urinary output  Outcome: Progressing     Problem: Respiratory - Stroke Patient  Goal: Patient will achieve/maintain optimum respiratory rate/effort  Outcome: Progressing     Problem: Neuro Status  Goal: Neuro status will remain stable or improve  Outcome: Progressing     Problem: Psychosocial - Patient Condition  Goal: Patient's ability to verbalize feelings about condition will improve  Outcome: Progressing     Problem: Knowledge Deficit - Stroke Education  Goal: Patient's knowledge of stroke and risk factors will improve  Outcome: Progressing     Problem: Optimal Care of the Stroke Patient  Goal: Optimal emergency care for the stroke patient  Outcome: Progressing

## 2022-06-21 NOTE — PROGRESS NOTES
[facility] Acceptance Note    Called by Dr. Juventino Warner for admission to IMCU for stroke symptoms with wording finding difficulty requiring neuro checks every 3 hours.  Will admit to IMCU under the care of the hospitalist.      Maritza Kolb MD  Pulmonary and Critical Care Medicine

## 2022-06-21 NOTE — PROGRESS NOTES
[facility] Medicine Daily Progress Note    Date of Service  6/21/2022    Chief Complaint  Irina Traylor is a 81 y.o. female admitted 6/20/2022 with concern for acute CVA with dysphagia, dysarthria    Hospital Course  81-year-old female reporting multiple prior strokes, history of hypertension, dyslipidemia, history of expressive aphasia with prior strokes, presents with worsening speech difficulty.  The patient around 9:00 on admission felt acutely confused and had difficulty expressing herself.  The patient denies any weakness or numbness or tingling, no LOC, denies associated symptoms otherwise.  Initial evaluation by CT, CTA did not show large vessel occlusion or other acute findings, prior CVAs noted.  Neurology consulting, admitted to [facility] for close neurochecks and follow-up.    Interval Problem Update  Patient seen and examined today.  Data, Medication data reviewed.  Case discussed with nursing as available.  Plan of Care reviewed with patient and notified of changes.  6/21 the patient feels improved, she still has expressive aphasia, she denies focal weakness numbness or tingling, she appears in good spirits, she denies pain, no headache, no visual difficulty    I have discussed this patient's plan of care and discharge plan at IDT rounds today with Case Management, Nursing, Nursing leadership, and other members of the IDT team.    Consultants/Specialty  neurology    Code Status  Full Code    Disposition  Patient is not medically cleared for discharge.   Anticipate discharge to to home with close outpatient follow-up.  I have placed the appropriate orders for post-discharge needs.    Review of Systems  Review of Systems   Constitutional: Positive for malaise/fatigue. Negative for chills and fever.   HENT: Negative.    Eyes: Negative.    Respiratory: Negative.  Negative for cough.    Cardiovascular: Negative.  Negative for chest pain and palpitations.   Gastrointestinal: Negative.  Negative for heartburn,  nausea and vomiting.   Genitourinary: Negative.  Negative for dysuria and frequency.   Musculoskeletal: Negative.  Negative for back pain and neck pain.   Skin: Negative.  Negative for itching and rash.   Neurological: Positive for speech change. Negative for dizziness, tingling, tremors, sensory change, focal weakness, seizures, loss of consciousness, weakness and headaches.   Endo/Heme/Allergies: Negative.  Negative for polydipsia. Does not bruise/bleed easily.   Psychiatric/Behavioral: Negative for depression. The patient is nervous/anxious.         Physical Exam  Temp:  [36.8 °C (98.2 °F)-36.9 °C (98.4 °F)] 36.8 °C (98.2 °F)  Pulse:  [61-73] 68  Resp:  [13-23] 20  BP: (141-175)/() 141/67  SpO2:  [91 %-97 %] 94 %    Physical Exam  Vitals and nursing note reviewed.   Constitutional:       Appearance: She is well-developed. She is not diaphoretic.   HENT:      Head: Normocephalic and atraumatic.      Nose: Nose normal.   Eyes:      General: No visual field deficit.     Conjunctiva/sclera: Conjunctivae normal.      Pupils: Pupils are equal, round, and reactive to light.   Neck:      Thyroid: No thyromegaly.      Vascular: No JVD.   Cardiovascular:      Rate and Rhythm: Normal rate and regular rhythm.      Heart sounds: Normal heart sounds.     No friction rub. No gallop.   Pulmonary:      Effort: Pulmonary effort is normal.      Breath sounds: Normal breath sounds. No wheezing or rales.   Abdominal:      General: Bowel sounds are normal. There is no distension.      Palpations: Abdomen is soft. There is no mass.      Tenderness: There is no abdominal tenderness. There is no guarding or rebound.   Musculoskeletal:         General: No tenderness. Normal range of motion.      Cervical back: Normal range of motion and neck supple.   Lymphadenopathy:      Cervical: No cervical adenopathy.   Skin:     General: Skin is warm and dry.   Neurological:      Mental Status: She is alert and oriented to person, place, and  time.      Cranial Nerves: Dysarthria and facial asymmetry present. No cranial nerve deficit.      Sensory: No sensory deficit.      Motor: No weakness.   Psychiatric:         Behavior: Behavior normal.         Fluids    Intake/Output Summary (Last 24 hours) at 6/21/2022 0800  Last data filed at 6/21/2022 0600  Gross per 24 hour   Intake 640 ml   Output 800 ml   Net -160 ml       Laboratory  Recent Labs     06/20/22  1909   WBC 5.3   RBC 4.95   HEMOGLOBIN 15.0   HEMATOCRIT 45.0   MCV 90.9   MCH 30.3   MCHC 33.3*   RDW 46.0   PLATELETCT 194   MPV 12.6     Recent Labs     06/20/22  1909   SODIUM 140   POTASSIUM 5.3   CHLORIDE 107   CO2 23   GLUCOSE 82   BUN 42*   CREATININE 0.99   CALCIUM 9.7     Recent Labs     06/20/22 1909   APTT 27.5   INR 1.00         Recent Labs     06/21/22  0325   TRIGLYCERIDE 79   HDL 53   LDL 80       Imaging  [id number] VIEW   Final Result         1.  Nonspecific bowel gas pattern.   2.  Atherosclerosis      DX-CHEST-PORTABLE (1 VIEW)   Final Result      No acute cardiopulmonary abnormality identified.      CT-CEREBRAL PERFUSION ANALYSIS   Final Result      1.  Cerebral blood flow less than 30% likely representing artifact = 9 mL.      2.  T Max more than 6 seconds likely representing combination of completed infarct and ischemia = 0 mL.      3.  Mismatched volume likely representing ischemic brain/penumbra = None      4.  Please note that the cerebral perfusion was performed on the limited brain tissue around the basal ganglia region. Infarct/ischemia outside the CT perfusion sections can be missed in this study.      CT-CTA NECK WITH & W/O-POST PROCESSING   Final Result      Mild bilateral internal carotid artery atherosclerotic plaque without significant stenosis      CT-CTA HEAD WITH & W/O-POST PROCESS   Final Result         1. No hemodynamically significant narrowing of the major intracranial vessels.      CT-HEAD W/O   Final Result      1.  No acute intracranial abnormality      2.   Chronic findings include cerebral volume loss, white matter change, bilateral occipital, and left frontotemporal encephalomalacia         EC-ECHOCARDIOGRAM COMPLETE W/O CONT    (Results Pending)   MR-BRAIN-W/O    (Results Pending)        Assessment/Plan  * Acute ischemic stroke (HCC)- (present on admission)  Assessment & Plan  Suspected, expressive aphasia  No acute etiology seen on CT head  No high grade stenosis seen on CTA head neck  Frequent neuro checks as per neurology  Permissive HTN up to 185/110  ASA  History of intolerance to statin    F/u echo, MRI brain  Neurology f/u appreciated    ACP (advance care planning)  Assessment & Plan  D/w pt -full code      H/O: CVA (cerebrovascular accident)  Assessment & Plan  Plan as above    Dyslipidemia- (present on admission)  Assessment & Plan  History of statin intolerance  Patient is willing to try Crestor    Benign essential HTN- (present on admission)  Assessment & Plan  Permissive HTN as per neuro    DARIN (obstructive sleep apnea)- (present on admission)  Assessment & Plan  CPAP     Plan  Complete work-up for acute CVA  Await MRI  Further telemetry monitoring to rule out arrhythmia  Continue with antiplatelet therapy,  Patient is willing to try Crestor  See orders  Medically complex high risk patient  The patient is very worried about her financial situation wants to leave the hospital soon as possible  Would likely qualify for outpatient therapy    VTE prophylaxis: enoxaparin ppx    I have performed a physical exam and reviewed and updated ROS and Plan today (6/21/2022). In review of yesterday's note (6/20/2022), there are no changes except as documented above.      Please note that this dictation was created using voice recognition software. I have made every reasonable attempt to correct obvious errors, but I expect that there are errors of grammar and possibly context that I did not discover before finalizing the note.

## 2022-06-21 NOTE — PROGRESS NOTES
Neurology Progress Note  Neurohospitalist Service, [facility]    Referring Physician: Ephraim Dunn M.D.      Interval History: No acute events overnight.  Patient is very anxious about starting new medications and ready to go home, but she has not had her echocardiogram or MRI yet.    Review of systems: In addition to what is detailed in the HPI and/or updated in the interval history, all other systems reviewed and are negative.    Past Medical History, Past Surgical History and Social History reviewed and unchanged from prior    Medications:    Current Facility-Administered Medications:     senna-docusate (PERICOLACE or SENOKOT S) 8.6-50 MG per tablet 2 Tablet, 2 Tablet, Oral, BID **AND** polyethylene glycol/lytes (MIRALAX) PACKET 1 Packet, 1 Packet, Oral, QDAY PRN **AND** magnesium hydroxide (MILK OF MAGNESIA) suspension 30 mL, 30 mL, Oral, QDAY PRN **AND** bisacodyl (DULCOLAX) suppository 10 mg, 10 mg, Rectal, QDAY PRN, Misha Erwin M.D.    ondansetron (ZOFRAN) syringe/vial injection 4 mg, 4 mg, Intravenous, Q4HRS PRN, Misha Erwin M.D.    ondansetron (ZOFRAN ODT) dispertab 4 mg, 4 mg, Oral, Q4HRS PRN, Misha Erwin M.D.    acetaminophen (Tylenol) tablet 650 mg, 650 mg, Oral, Q6HRS PRN, Misha Erwin M.D.    guaiFENesin dextromethorphan (ROBITUSSIN DM) 100-10 MG/5ML syrup 10 mL, 10 mL, Oral, Q6HRS PRN, Misha Erwin M.D.    LORazepam (ATIVAN) tablet 1 mg, 1 mg, Oral, 1X PRN, Misha Erwin M.D.    aspirin EC (ECOTRIN) tablet 81 mg, 81 mg, Oral, DAILY, Misha Erwin M.D., 81 mg at 06/21/22 0522    cyanocobalamin (VITAMIN B-12) tablet 500 mcg, 500 mcg, Oral, DAILY, Misha Erwin M.D., 500 mcg at 06/21/22 0522    fluticasone (FLONASE) nasal spray 100 mcg, 2 Spray, Nasal, BID, Misha Erwin M.D., 100 mcg at 06/21/22 0524    loratadine (CLARITIN) tablet 10 mg, 10 mg, Oral, DAILY, Misha Erwin M.D., 10 mg at 06/21/22 0524    vitamin D3 (cholecalciferol) tablet 1,000 Units, 1,000 Units,  "Oral, DAILY, Misha Erwin M.D., 1,000 Units at 06/21/22 0522    fenofibrate micronized (LOFIBRA) capsule 134 mg, 134 mg, Oral, DAILY, Misha Erwin M.D., 134 mg at 06/21/22 0522    hydrALAZINE (APRESOLINE) injection 10 mg, 10 mg, Intravenous, Q2HRS PRN, Misha Erwin M.D.    hydrALAZINE (APRESOLINE) injection 10 mg, 10 mg, Intravenous, Q4HRS PRN, Misha Erwin M.D.    Physical Examination:   BP (!) 164/77   Pulse (!) 59   Temp 36.8 °C (98.2 °F) (Oral)   Resp 16   Ht 1.651 m (5' 5\")   Wt 80.3 kg (177 lb 0.5 oz)   SpO2 90%   BMI 29.46 kg/m²       General: Patient is awake and in no acute distress  Neck: There is normal range of motion  CV: Regular rate   Extremities:  Warm, dry, and intact, without peripheral lower extremity edema    NEUROLOGICAL EXAM:     Mental status: Awake, alert and fully oriented  Speech and language: Speech is clear and mostly fluent, but with some dropped words occasionally. The patient is able to name and repeat, and follow commands  Cranial nerve exam: Pupils are equal, round and reactive to light bilaterally. Visual fields are full. There is no nystagmus. Extraocular muscles are intact. Face is symmetric. Sensation in the face is intact to light touch. Palate elevates symmetrically. Tongue is midline.  Motor exam: Power 5/5 throughout all extremities. There is sustained antigravity with no downward drift in bilateral arms and legs.  There is no pronator drift. Tone is normal. No abnormal movements were seen on exam.  Sensory exam: Reacts to tactile and pinprick in all 4 extremities, no neglect to double stim   Deep tendon reflexes:  2+ throughout. Toes down-going bilaterally.  Coordination: No ataxia on bilateral finger-to-nose testing  Gait: Deferred due to patient preference    Objective Data:    Labs:  Lab Results   Component Value Date/Time    PROTHROMBTM 12.9 06/20/2022 07:09 PM    INR 1.00 06/20/2022 07:09 PM      Lab Results   Component Value Date/Time    WBC 5.3 06/20/2022 " 07:09 PM    RBC 4.95 06/20/2022 07:09 PM    HEMOGLOBIN 15.0 06/20/2022 07:09 PM    HEMATOCRIT 45.0 06/20/2022 07:09 PM    MCV 90.9 06/20/2022 07:09 PM    MCH 30.3 06/20/2022 07:09 PM    MCHC 33.3 (L) 06/20/2022 07:09 PM    MPV 12.6 06/20/2022 07:09 PM    NEUTSPOLYS 66.50 06/20/2022 07:09 PM    LYMPHOCYTES 16.70 (L) 06/20/2022 07:09 PM    MONOCYTES 11.20 06/20/2022 07:09 PM    EOSINOPHILS 4.20 06/20/2022 07:09 PM    BASOPHILS 0.60 06/20/2022 07:09 PM      Lab Results   Component Value Date/Time    SODIUM 140 06/20/2022 07:09 PM    POTASSIUM 5.3 06/20/2022 07:09 PM    CHLORIDE 107 06/20/2022 07:09 PM    CO2 23 06/20/2022 07:09 PM    GLUCOSE 82 06/20/2022 07:09 PM    BUN 42 (H) 06/20/2022 07:09 PM    CREATININE 0.99 06/20/2022 07:09 PM      Lab Results   Component Value Date/Time    CHOLSTRLTOT 149 06/21/2022 03:25 AM    LDL 80 06/21/2022 03:25 AM    HDL 53 06/21/2022 03:25 AM    TRIGLYCERIDE 79 06/21/2022 03:25 AM       Lab Results   Component Value Date/Time    ALKPHOSPHAT 54 06/20/2022 07:09 PM    ASTSGOT 31 06/20/2022 07:09 PM    ALTSGPT 19 06/20/2022 07:09 PM    TBILIRUBIN 0.3 06/20/2022 07:09 PM        Imaging/Testing:    I interpreted and/or reviewed the patient's neuroimaging    [id number] VIEW   Final Result         1.  Nonspecific bowel gas pattern.   2.  Atherosclerosis      DX-CHEST-PORTABLE (1 VIEW)   Final Result      No acute cardiopulmonary abnormality identified.      CT-CEREBRAL PERFUSION ANALYSIS   Final Result      1.  Cerebral blood flow less than 30% likely representing artifact = 9 mL.      2.  T Max more than 6 seconds likely representing combination of completed infarct and ischemia = 0 mL.      3.  Mismatched volume likely representing ischemic brain/penumbra = None      4.  Please note that the cerebral perfusion was performed on the limited brain tissue around the basal ganglia region. Infarct/ischemia outside the CT perfusion sections can be missed in this study.      CT-CTA NECK WITH &  W/O-POST PROCESSING   Final Result      Mild bilateral internal carotid artery atherosclerotic plaque without significant stenosis      CT-CTA HEAD WITH & W/O-POST PROCESS   Final Result         1. No hemodynamically significant narrowing of the major intracranial vessels.      CT-HEAD W/O   Final Result      1.  No acute intracranial abnormality      2.  Chronic findings include cerebral volume loss, white matter change, bilateral occipital, and left frontotemporal encephalomalacia         EC-ECHOCARDIOGRAM COMPLETE W/O CONT    (Results Pending)   MR-BRAIN-W/O    (Results Pending)       Assessment and Plan:    Irina Traylor is a 81 y.o. woman with a history of multiple strokes (left MCA/PCA greater than right) on aspirin and atorvastatin who presented on 6/20/2022 with acute expressive aphasia that began sometime in the morning.  Her symptoms improved over the following day, but she still feels that she is not quite at baseline.  Advanced neuroimaging was reviewed and does not reveal any acute changes.  MRI brain is still pending to confirm new ischemic stroke versus stroke recrudescence.    Problem list:  1.  Possible new left MCA ischemic infarct versus stroke recrudescence.    Plan:  - Expedite MRI brain without contrast.  - Basic lab studies did not reveal any obvious signs of infection though predispose patient for stroke recrudescence at this time.  - Continue home aspirin.  Agree with addition of low-dose Crestor to optimize patient's LDL, which is 80.  Long-term goal less than 70.  - Check echocardiogram, hemoglobin A1c.  - Can de-escalate to every 4 hour neurochecks and vitals.  Long-term blood pressure goal at this time to be slowly corrected toward normal 110-140/.  Recommend reintroducing her home medications on a staggered schedule.    The evaluation of the patient, and recommended management, was discussed with the resident staff. I have performed a physical exam and reviewed and updated  ROS and Plan today (6/21/2022).     Yosef Carl D.O.  Neurohospitalist, Acute Care Services

## 2022-06-21 NOTE — DISCHARGE PLANNING
[facility] Transitional Care Coordination    Referral from:  Dr. Erwin  Insurance Provider on Facesheet: SCP  Potential Rehab Diagnosis:  Stroke    Chart review indicates patient may have on going medical management, may have therapy needs to possibly meet inpatient rehab facility criteria with the goal of returning to community.    D/C support: TBD     Physiatry consultation pended while waiting for additional information.  Work up ongoing, MR brain pending.  OT/SLP evals pending as clinically appropriate.  PT 6/21 reflects no ongoing acute PT need at this time.  TCC will follow.  Please reach out sooner if PMR consult requested for medical management.     Last Covid test:       Thank you for the referral.

## 2022-06-21 NOTE — DISCHARGE PLANNING
Follow up for post acute services. Limited therapy need for IRF level of care. Anticipate home with OP follow up when medically cleared. MRI pending if positive for a new event may consider outpatient physiatry consult with Dr. Estrada. TCC no following.

## 2022-06-21 NOTE — THERAPY
Speech Language Pathology   Clinical Swallow Evaluation     Patient Name: Irina Traylor  AGE:  81 y.o., SEX:  female  Medical Record #: [id number]  Today's Date: 6/21/2022     Precautions  Precautions: Fall Risk  Comments: expressive aphasia    Assessment    Patient is 81 y.o. female with a diagnosis of recurrence of aphasia. Symptoms are consistent with a new left MCA stroke.  CT head reveals large bilateral left greater than right MCA/PCA watershed ischemic infarcts of unclear chronicity.  CT perfusion shows no acute changes or perfusion deficits.  CTA head and neck shows no large vessel occlusions or cutoffs.    PMHx: hypertension, hyperlipidemia, expressive aphasia in the setting of prior strokes    Level of Consciousness: Alert, Awake  Affect/Behavior: Appropriate, Calm  Follows Directives: Yes  Orientation: Oriented x 4  Hearing: Functional hearing  Vision: Functional vision     Oral Mechanism Evaluation  Facial Symmetry: Equal  Facial Sensation: Equal  Labial Observations: WFL  Lingual Observations: Midline  Dentition: Good  Comments:    Voice  Quality: WFL  Resonance: WFL  Intensity: Appropriate  Cough: WFL  Comments: Slurred speech, halting with word-finding deficits     Current Method of Nutrition   Oral diet (Regular Thins)    Assessment  Positioning: Sandoval's (60-90 degrees)  Bolus Administration: Patient  Oxygen Requirements: Room Air  Factor(s) Affecting Performance: None    Swallowing Trials  Thin Liquid (TN0): WFL  Soft & Bite Sized (SB6): WFL  Regular (RG7): WFL    Comments:    Patient with adequate oral acceptance and containment. Adequate bite and mastication of presented bolus. Presumed timely AP transit and timely swallow trigger. No overt s/sx of aspiration across consistencies tested. Patient with dry vocal quality and no s/sx of respiratory distress upon evaluation.      Clinical Impressions    Oropharyngeal dysphagia NOT suspected based on clinical bedside evaluation. Patient appears  appropriate to resume a regular texture diet.      Recommendations  1.  Continue Regular textures with thin liquids   2.  Instrumentation: None indicated at this time  3.  Swallowing Instructions & Precautions:   Supervision: Independent  Positioning: Fully upright and midline during oral intake  Medication: As tolerated  Strategies: None  Oral Care: BID    Plan    Recommend Speech Therapy for Evaluation only for the following treatments:  Dysphagia Training. Will benefit from a language evaluation.      Objective       06/21/22 0803   Precautions   Precautions Fall Risk   Comments expressive aphasia   Vitals   O2 Delivery Device None - Room Air   Pain 0 - 10 Group   Therapist Pain Assessment Nurse Notified;Post Activity Pain Same as Prior to Activity;0   Prior Living Situation   Lives with - Patient's Self Care Capacity Alone and Able to Care For Self   Prior Level Of Function   Patient's Primary Language English

## 2022-06-21 NOTE — ED NOTES
Unable to obtain med rec at this time  Patient is unable to participate in interview  Home pharmacy ([facility] mail order) is currently closed

## 2022-06-21 NOTE — CARE PLAN
Problem: Optimal Care of the Stroke Patient  Goal: Optimal acute care for the stroke patient  Outcome: Progressing     Problem: Knowledge Deficit - Stroke Education  Goal: Patient's knowledge of stroke and risk factors will improve  Outcome: Progressing   The patient is Watcher - Medium risk of patient condition declining or worsening    Shift Goals  Clinical Goals: permissive HTN, pain control, ST, OT and PT eval  Patient Goals: ambulate in the hallways  Family Goals: NA    Progress made toward(s) clinical / shift goals:  therapy has evaluated patient, ambulating in hallways with walker, monitor vitals and BP    Patient is not progressing towards the following goals:

## 2022-06-21 NOTE — PROGRESS NOTES
Med rec completed per pt's home pharmacy ([facility] mail order)  Unable to verify OTC medications

## 2022-06-21 NOTE — THERAPY
"Physical Therapy   Initial Evaluation     Patient Name: Irina Traylor  Age:  81 y.o., Sex:  female  Medical Record #: [id number]  Today's Date: 6/21/2022          Assessment  Patient is 81 y.o. female that presented to acute with concern for CVA due to slurred speech. PMHx significant for several prior CVAs, HTN, HLD. She presented to PT at or near baseline functional mobility and performed bed mobility, transfers, and community distance ambulation at supervision level. No overt neurological deficits reported or observed with the exception of dysarthria and minimal word finding issues which have been improving per patient report. She reported no concerns regarding returning home following medical DC and appears to be at safe level to do so. Patient will not be actively followed for physical therapy services at this time, however may be seen if requested by physician for 1 more visit within 30 days to address any discharge or equipment needs.    Plan    Recommend Physical Therapy for Evaluation only    DC Equipment Recommendations: None (patient has DME at home)  Discharge Recommendations: Anticipate that the patient will have no further physical therapy needs after discharge from the hospital       Subjective    \"I want to go home today!\"     Objective       06/21/22 0910   Charge Group   PT Evaluation PT Evaluation Low  (25 min)   Initial Contact Note    Initial Contact Note Order Received and Verified, Evaluation Only - Patient Does Not Require Further Acute Physical Therapy at this Time.  However, May Benefit from Post Acute Therapy for Higher Level Functional Deficits.   Vitals   O2 (LPM) 0   O2 Delivery Device None - Room Air   Pain 0 - 10 Group   Therapist Pain Assessment   (no pain complaint during session)   Prior Living Situation   Prior Services None   Housing / Facility 1 Story House   Steps Into Home   (ramp)   Equipment Owned Front-Wheel Walker;4-Wheel Walker   Lives with - Patient's Self Care " Capacity Alone and Able to Care For Self   Comments Patient reported several friends that can assist as needed but she is typically independent   Prior Level of Functional Mobility   Bed Mobility Independent   Transfer Status Independent   Ambulation Independent   Distance Ambulation (Feet)   (community)   Assistive Devices Used Front-Wheel Walker   Cognition    Cognition / Consciousness X   Speech/ Communication Dysarthric;Word Finding Impairment   Level of Consciousness Alert   Comments pleasant, cooperative. reported some difficulties with speech   Passive ROM Lower Body   Passive ROM Lower Body WDL   Active ROM Lower Body    Active ROM Lower Body  WDL   Strength Lower Body   Lower Body Strength  WDL   Sensation Lower Body   Lower Extremity Sensation   WDL   Lower Body Muscle Tone   Lower Body Muscle Tone  WDL   Neurological Concerns   Neurological Concerns Yes   Comments given speech   Coordination Lower Body    Coordination Lower Body  WDL   Balance Assessment   Sitting Balance (Static) Fair +   Sitting Balance (Dynamic) Fair +   Standing Balance (Static) Fair   Standing Balance (Dynamic) Fair   Weight Shift Sitting Good   Weight Shift Standing Good   Comments with FWW, no LOB   Gait Analysis   Gait Level Of Assist Supervised   Assistive Device Front Wheel Walker   Distance (Feet) 150   # of Times Distance was Traveled 1   Deviation No deviation   # of Stairs Climbed 0   Weight Bearing Status no restrictions   Vision Deficits Impacting Mobility NT   Bed Mobility    Supine to Sit Supervised   Sit to Supine   (NT, left in chair)   Scooting Supervised  (seated)   Functional Mobility   Sit to Stand Supervised   Bed, Chair, Wheelchair Transfer Supervised   Transfer Method Stand Step   How much difficulty does the patient currently have...   Turning over in bed (including adjusting bedclothes, sheets and blankets)? 3   Sitting down on and standing up from a chair with arms (e.g., wheelchair, bedside commode, etc.) 3    Moving from lying on back to sitting on the side of the bed? 3   How much help from another person does the patient currently need...   Moving to and from a bed to a chair (including a wheelchair)? 3   Need to walk in a hospital room? 3   Climbing 3-5 steps with a railing? 3   6 clicks Mobility Score 18   Activity Tolerance   Sitting in Chair left in chair   Sitting Edge of Bed 5 min   Standing 10 min   Comments no overt pain, fatigue, SOB, dizziness   Edema / Skin Assessment   Edema / Skin  Not Assessed   Education Group   Education Provided Role of Physical Therapist   Role of Physical Therapist Patient Response Patient;Acceptance;Explanation;Verbal Demonstration   Anticipated Discharge Equipment and Recommendations   DC Equipment Recommendations None  (patient has DME at home)   Discharge Recommendations Anticipate that the patient will have no further physical therapy needs after discharge from the hospital   Interdisciplinary Plan of Care Collaboration   IDT Collaboration with  Nursing;Occupational Therapist   Patient Position at End of Therapy Seated;Chair Alarm On;Call Light within Reach;Tray Table within Reach;Phone within Reach   Collaboration Comments RN aware of visit, response   Session Information   Date / Session Number  6/21 - 1x only

## 2022-06-21 NOTE — DISCHARGE PLANNING
HTH/SCP TCN chart review completed. Noted patient recent transfer from ICU to IM in the setting of CVA, as such TCN will now follow.      Collaborated with case management team, JOE Armando and PT prior to meeting with the patient. The most current review of medical record, knowledge of pt's PLOF and social support, LACE+ score of 64, 6 clicks scores of 18 mobility were considered.       TCN met with patient at bedside. Introduced TCN program. Provided education regarding differences in post acute resources including IRF, SNF, HH and outpatient therapy, specific to benefits of outpatient speech therapy given patient current speech-language deficits due to noted expressive Aphasia characterized by word finding difficulties and dysarthria. Discussed HTH/SCP plan benefits including Meds to Beds and [facility] transitional care services. Patient verbalized understanding.    Appreciate orders present for PT/OT/ SLP. Choice forms proactively obtained for HH and given to DANIELA Donato. Choice for IRF level of care discussed; however, appreciate findings and recommendations of PT consult and patient verbalizing concern regarding ability to afford co-pays of IRF level of care. Patient amendable to [facility] with referral sent.     TCN will continue to follow and collaborate with discharge planning team as additional post acute needs arise. Thank you.

## 2022-06-22 ENCOUNTER — PHARMACY VISIT (OUTPATIENT)
Dept: PHARMACY | Facility: MEDICAL CENTER | Age: 82
End: 2022-06-22
Payer: COMMERCIAL

## 2022-06-22 ENCOUNTER — HOME HEALTH ADMISSION (OUTPATIENT)
Dept: HOME HEALTH SERVICES | Facility: HOME HEALTHCARE | Age: 82
End: 2022-06-22
Payer: MEDICARE

## 2022-06-22 ENCOUNTER — NON-PROVIDER VISIT (OUTPATIENT)
Dept: CARDIOLOGY | Facility: MEDICAL CENTER | Age: 82
End: 2022-06-22
Payer: MEDICARE

## 2022-06-22 VITALS
HEIGHT: 65 IN | WEIGHT: 177.03 LBS | HEART RATE: 84 BPM | RESPIRATION RATE: 16 BRPM | TEMPERATURE: 98.4 F | OXYGEN SATURATION: 95 % | BODY MASS INDEX: 29.49 KG/M2 | DIASTOLIC BLOOD PRESSURE: 92 MMHG | SYSTOLIC BLOOD PRESSURE: 147 MMHG

## 2022-06-22 DIAGNOSIS — I45.5 SINUS ARREST: Chronic | ICD-10-CM

## 2022-06-22 DIAGNOSIS — I10 BENIGN ESSENTIAL HTN: ICD-10-CM

## 2022-06-22 DIAGNOSIS — I47.10 SVT (SUPRAVENTRICULAR TACHYCARDIA) (HCC): ICD-10-CM

## 2022-06-22 DIAGNOSIS — I49.3 PVC'S (PREMATURE VENTRICULAR CONTRACTIONS): ICD-10-CM

## 2022-06-22 DIAGNOSIS — I63.9 ACUTE ISCHEMIC STROKE (HCC): ICD-10-CM

## 2022-06-22 LAB
ALBUMIN SERPL BCP-MCNC: 3.7 G/DL (ref 3.2–4.9)
ALBUMIN/GLOB SERPL: 1.3 G/DL
ALP SERPL-CCNC: 53 U/L (ref 30–99)
ALT SERPL-CCNC: 17 U/L (ref 2–50)
ANION GAP SERPL CALC-SCNC: 12 MMOL/L (ref 7–16)
AST SERPL-CCNC: 25 U/L (ref 12–45)
BILIRUB SERPL-MCNC: 0.3 MG/DL (ref 0.1–1.5)
BUN SERPL-MCNC: 28 MG/DL (ref 8–22)
CALCIUM SERPL-MCNC: 9.3 MG/DL (ref 8.5–10.5)
CHLORIDE SERPL-SCNC: 105 MMOL/L (ref 96–112)
CO2 SERPL-SCNC: 23 MMOL/L (ref 20–33)
CREAT SERPL-MCNC: 1.01 MG/DL (ref 0.5–1.4)
GFR SERPLBLD CREATININE-BSD FMLA CKD-EPI: 56 ML/MIN/1.73 M 2
GLOBULIN SER CALC-MCNC: 2.8 G/DL (ref 1.9–3.5)
GLUCOSE SERPL-MCNC: 81 MG/DL (ref 65–99)
MAGNESIUM SERPL-MCNC: 1.9 MG/DL (ref 1.5–2.5)
PHOSPHATE SERPL-MCNC: 3.2 MG/DL (ref 2.5–4.5)
POTASSIUM SERPL-SCNC: 3.9 MMOL/L (ref 3.6–5.5)
PROT SERPL-MCNC: 6.5 G/DL (ref 6–8.2)
SODIUM SERPL-SCNC: 140 MMOL/L (ref 135–145)

## 2022-06-22 PROCEDURE — 99232 SBSQ HOSP IP/OBS MODERATE 35: CPT | Performed by: STUDENT IN AN ORGANIZED HEALTH CARE EDUCATION/TRAINING PROGRAM

## 2022-06-22 PROCEDURE — 700102 HCHG RX REV CODE 250 W/ 637 OVERRIDE(OP): Performed by: STUDENT IN AN ORGANIZED HEALTH CARE EDUCATION/TRAINING PROGRAM

## 2022-06-22 PROCEDURE — 83735 ASSAY OF MAGNESIUM: CPT

## 2022-06-22 PROCEDURE — 80053 COMPREHEN METABOLIC PANEL: CPT

## 2022-06-22 PROCEDURE — A9270 NON-COVERED ITEM OR SERVICE: HCPCS | Performed by: STUDENT IN AN ORGANIZED HEALTH CARE EDUCATION/TRAINING PROGRAM

## 2022-06-22 PROCEDURE — 302106 OSTOMY POWDER: Performed by: HOSPITALIST

## 2022-06-22 PROCEDURE — 36415 COLL VENOUS BLD VENIPUNCTURE: CPT

## 2022-06-22 PROCEDURE — 99223 1ST HOSP IP/OBS HIGH 75: CPT | Performed by: PHYSICAL MEDICINE & REHABILITATION

## 2022-06-22 PROCEDURE — 99239 HOSP IP/OBS DSCHRG MGMT >30: CPT | Performed by: STUDENT IN AN ORGANIZED HEALTH CARE EDUCATION/TRAINING PROGRAM

## 2022-06-22 PROCEDURE — 84100 ASSAY OF PHOSPHORUS: CPT

## 2022-06-22 PROCEDURE — RXMED WILLOW AMBULATORY MEDICATION CHARGE: Performed by: STUDENT IN AN ORGANIZED HEALTH CARE EDUCATION/TRAINING PROGRAM

## 2022-06-22 RX ORDER — ROSUVASTATIN CALCIUM 10 MG/1
10 TABLET, COATED ORAL EVERY EVENING
Qty: 30 TABLET | Refills: 11 | Status: SHIPPED | OUTPATIENT
Start: 2022-06-22 | End: 2022-06-22

## 2022-06-22 RX ORDER — IRBESARTAN 150 MG/1
150 TABLET ORAL EVERY MORNING
Status: DISCONTINUED | OUTPATIENT
Start: 2022-06-22 | End: 2022-06-22 | Stop reason: HOSPADM

## 2022-06-22 RX ORDER — DILTIAZEM HYDROCHLORIDE 240 MG/1
CAPSULE, COATED, EXTENDED RELEASE ORAL
Qty: 90 CAPSULE | Refills: 3 | Status: SHIPPED | OUTPATIENT
Start: 2022-06-22 | End: 2022-06-27 | Stop reason: SDUPTHER

## 2022-06-22 RX ADMIN — IRBESARTAN 150 MG: 150 TABLET ORAL at 11:00

## 2022-06-22 RX ADMIN — CYANOCOBALAMIN TAB 500 MCG 500 MCG: 500 TAB at 04:38

## 2022-06-22 RX ADMIN — ASPIRIN 81 MG: 81 TABLET, COATED ORAL at 04:38

## 2022-06-22 RX ADMIN — SENNOSIDES AND DOCUSATE SODIUM 2 TABLET: 50; 8.6 TABLET ORAL at 04:38

## 2022-06-22 RX ADMIN — LORATADINE 10 MG: 10 TABLET ORAL at 04:38

## 2022-06-22 RX ADMIN — Medication 1000 UNITS: at 04:38

## 2022-06-22 RX ADMIN — FLUTICASONE PROPIONATE 100 MCG: 50 SPRAY, METERED NASAL at 04:39

## 2022-06-22 ASSESSMENT — PAIN DESCRIPTION - PAIN TYPE
TYPE: ACUTE PAIN
TYPE: ACUTE PAIN

## 2022-06-22 NOTE — PROGRESS NOTES
Neurology Progress Note  Neurohospitalist Service, [facility]    Referring Physician: Ephraim Dunn M.D.      Interval History: No acute events overnight.     Review of systems: In addition to what is detailed in the HPI and/or updated in the interval history, all other systems reviewed and are negative.    Past Medical History, Past Surgical History and Social History reviewed and unchanged from prior    Medications:    Current Facility-Administered Medications:   •  irbesartan (AVAPRO) tablet 150 mg, 150 mg, Oral, QAM, Poncho Warner M.D., 150 mg at 06/22/22 1100  •  rosuvastatin (CRESTOR) tablet 10 mg, 10 mg, Oral, Q EVENING, Ephraim Dunn M.D., 10 mg at 06/21/22 1421  •  senna-docusate (PERICOLACE or SENOKOT S) 8.6-50 MG per tablet 2 Tablet, 2 Tablet, Oral, BID, 2 Tablet at 06/22/22 0438 **AND** polyethylene glycol/lytes (MIRALAX) PACKET 1 Packet, 1 Packet, Oral, QDAY PRN **AND** magnesium hydroxide (MILK OF MAGNESIA) suspension 30 mL, 30 mL, Oral, QDAY PRN **AND** bisacodyl (DULCOLAX) suppository 10 mg, 10 mg, Rectal, QDAY PRN, Misha Erwin M.D.  •  ondansetron (ZOFRAN) syringe/vial injection 4 mg, 4 mg, Intravenous, Q4HRS PRN, Misha Erwin M.D.  •  ondansetron (ZOFRAN ODT) dispertab 4 mg, 4 mg, Oral, Q4HRS PRN, Misha Erwin M.D.  •  acetaminophen (Tylenol) tablet 650 mg, 650 mg, Oral, Q6HRS PRN, Misha Erwin M.D.  •  guaiFENesin dextromethorphan (ROBITUSSIN DM) 100-10 MG/5ML syrup 10 mL, 10 mL, Oral, Q6HRS PRN, Misha Erwin M.D.  •  LORazepam (ATIVAN) tablet 1 mg, 1 mg, Oral, 1X PRN, Misha Erwin M.D.  •  aspirin EC (ECOTRIN) tablet 81 mg, 81 mg, Oral, DAILY, Misha Erwin M.D., 81 mg at 06/22/22 0438  •  cyanocobalamin (VITAMIN B-12) tablet 500 mcg, 500 mcg, Oral, DAILY, Misha Erwin M.D., 500 mcg at 06/22/22 0438  •  fluticasone (FLONASE) nasal spray 100 mcg, 2 Spray, Nasal, BID, Misha Erwin M.D., 100 mcg at 06/22/22 0439  •  loratadine (CLARITIN) tablet 10 mg, 10  "mg, Oral, DAILY, Misha Erwin M.D., 10 mg at 06/22/22 0438  •  vitamin D3 (cholecalciferol) tablet 1,000 Units, 1,000 Units, Oral, DAILY, Misha Erwin M.D., 1,000 Units at 06/22/22 0438  •  hydrALAZINE (APRESOLINE) injection 10 mg, 10 mg, Intravenous, Q2HRS PRN, Misha Erwin M.D.  •  hydrALAZINE (APRESOLINE) injection 10 mg, 10 mg, Intravenous, Q4HRS PRN, Misha Erwin M.D.    Physical Examination:   BP (!) 147/92 Comment: Nurse notified   Pulse 84   Temp 36.9 °C (98.4 °F) (Temporal)   Resp 16   Ht 1.651 m (5' 5\")   Wt 80.3 kg (177 lb 0.5 oz)   SpO2 95%   BMI 29.46 kg/m²       General: Patient is awake and in no acute distress  Neck: There is normal range of motion  CV: Regular rate   Extremities:  Warm, dry, and intact, without peripheral lower extremity edema    NEUROLOGICAL EXAM:     Mental status: Awake, alert and fully oriented  Speech and language: Speech is clear and mostly fluent, but with some dropped words occasionally. The patient is able to name and repeat, and follow commands  Cranial nerve exam: Pupils are equal, round and reactive to light bilaterally. Visual fields are full. There is no nystagmus. Extraocular muscles are intact. Face is symmetric. Sensation in the face is intact to light touch. Palate elevates symmetrically. Tongue is midline.  Motor exam: Power 5/5 throughout all extremities. There is sustained antigravity with no downward drift in bilateral arms and legs.  There is no pronator drift. Tone is normal. No abnormal movements were seen on exam.  Sensory exam: Reacts to tactile and pinprick in all 4 extremities, no neglect to double stim   Deep tendon reflexes:  2+ throughout. Toes down-going bilaterally.  Coordination: No ataxia on bilateral finger-to-nose testing  Gait: Deferred due to patient preference    Objective Data:    Labs:  Lab Results   Component Value Date/Time    PROTHROMBTM 12.9 06/20/2022 07:09 PM    INR 1.00 06/20/2022 07:09 PM      Lab Results   Component " Value Date/Time    WBC 5.3 06/20/2022 07:09 PM    RBC 4.95 06/20/2022 07:09 PM    HEMOGLOBIN 15.0 06/20/2022 07:09 PM    HEMATOCRIT 45.0 06/20/2022 07:09 PM    MCV 90.9 06/20/2022 07:09 PM    MCH 30.3 06/20/2022 07:09 PM    MCHC 33.3 (L) 06/20/2022 07:09 PM    MPV 12.6 06/20/2022 07:09 PM    NEUTSPOLYS 66.50 06/20/2022 07:09 PM    LYMPHOCYTES 16.70 (L) 06/20/2022 07:09 PM    MONOCYTES 11.20 06/20/2022 07:09 PM    EOSINOPHILS 4.20 06/20/2022 07:09 PM    BASOPHILS 0.60 06/20/2022 07:09 PM      Lab Results   Component Value Date/Time    SODIUM 140 06/22/2022 01:44 AM    POTASSIUM 3.9 06/22/2022 01:44 AM    CHLORIDE 105 06/22/2022 01:44 AM    CO2 23 06/22/2022 01:44 AM    GLUCOSE 81 06/22/2022 01:44 AM    BUN 28 (H) 06/22/2022 01:44 AM    CREATININE 1.01 06/22/2022 01:44 AM      Lab Results   Component Value Date/Time    CHOLSTRLTOT 149 06/21/2022 03:25 AM    LDL 80 06/21/2022 03:25 AM    HDL 53 06/21/2022 03:25 AM    TRIGLYCERIDE 79 06/21/2022 03:25 AM       Lab Results   Component Value Date/Time    ALKPHOSPHAT 53 06/22/2022 01:44 AM    ASTSGOT 25 06/22/2022 01:44 AM    ALTSGPT 17 06/22/2022 01:44 AM    TBILIRUBIN 0.3 06/22/2022 01:44 AM        Imaging/Testing:    I interpreted and/or reviewed the patient's neuroimaging    MR-BRAIN-W/O   Final Result         Acute infarct in the left posterior temporal/occipital cortex.      Multiple remote infarcts with encephalomalacia in the bilateral parietal, temporal and occipital regions.      Multiple remote bilateral cerebellar subsegmental infarcts.      Age-related volume loss and chronic microvascular ischemic changes.         EC-ECHOCARDIOGRAM COMPLETE W/O CONT   Final Result      ZC-MSLQSEN-1 VIEW   Final Result         1.  Nonspecific bowel gas pattern.   2.  Atherosclerosis      DX-CHEST-PORTABLE (1 VIEW)   Final Result      No acute cardiopulmonary abnormality identified.      CT-CEREBRAL PERFUSION ANALYSIS   Final Result      1.  Cerebral blood flow less than 30%  likely representing artifact = 9 mL.      2.  T Max more than 6 seconds likely representing combination of completed infarct and ischemia = 0 mL.      3.  Mismatched volume likely representing ischemic brain/penumbra = None      4.  Please note that the cerebral perfusion was performed on the limited brain tissue around the basal ganglia region. Infarct/ischemia outside the CT perfusion sections can be missed in this study.      CT-CTA NECK WITH & W/O-POST PROCESSING   Final Result      Mild bilateral internal carotid artery atherosclerotic plaque without significant stenosis      CT-CTA HEAD WITH & W/O-POST PROCESS   Final Result         1. No hemodynamically significant narrowing of the major intracranial vessels.      CT-HEAD W/O   Final Result      1.  No acute intracranial abnormality      2.  Chronic findings include cerebral volume loss, white matter change, bilateral occipital, and left frontotemporal encephalomalacia             Assessment and Plan:    Irina Traylor is a 81 y.o. woman with a history of multiple strokes (left MCA/PCA greater than right) on aspirin and atorvastatin who presented on 6/20/2022 with acute expressive aphasia that began sometime in the morning.  Her symptoms improved over the following day, but she still feels that she is not quite at baseline.  MRI brain reveals new scattered infarcts in the left MCA territory.    Problem list:  1.  left MCA ischemic infarct, cryptogenic    Plan:  - Continue home aspirin, Tricor.  - Echocardiogram shows normal atria normal ejection fraction without any significant valvular disease.  - hemoglobin A1c 5.5%, LDL 80  - Can de-escalate to every 4 hour neurochecks and vitals.  Long-term blood pressure goal at this time to be slowly corrected toward normal 110-140/.  Recommend reintroducing her home medications on a staggered schedule.  - Given cryptogenic strokes, I recommend long-term cardiac monitoring with Zio patch  - Set up outpatient  follow-up in stroke Bridge clinic in 4 to 6 weeks as well as follow-up with cardiology for Zio patch.  - Stable for discharge home from a neurologic perspective.    The evaluation of the patient, and recommended management, was discussed with the resident staff. I have performed a physical exam and reviewed and updated ROS and Plan today (6/22/2022).     Yosef Carl D.O.  Neurohospitalist, Acute Care Services

## 2022-06-22 NOTE — DISCHARGE PLANNING
Good morning,    This referral has been escalated to a Clinical Supervisor for review in order to determine Home Health appropriateness.  This issue will be resolved as quickly as possible, but for any questions feel free to call us at [phone].  Thank you!

## 2022-06-22 NOTE — DISCHARGE PLANNING
Meds-to-Beds: Discharge prescription orders listed below delivered to patient's bedside. CHRIS Matos notified. Patient counseled.      Patient elected to have co-payment billed to patient account.     Current Outpatient Medications   Medication Sig Dispense Refill   • rosuvastatin (CRESTOR) 10 MG Tab Take 1 Tablet by mouth every evening. 30 Tablet 11   • [START ON 6/23/2022] vitamin D (VITAMIND D3) 1000 UNIT Tab Take 1 Tablet by mouth every day for 30 days. 30 Tablet 0      Nikko Bernstein

## 2022-06-22 NOTE — CARE PLAN
The patient is Stable - Low risk of patient condition declining or worsening    Shift Goals  Clinical Goals: Safety, mobility  Patient Goals: Walk, Shower, go home  Family Goals: MAGDY    Progress made toward(s) clinical / shift goals:  Pt to be discharged home.     Patient is not progressing towards the following goals:

## 2022-06-22 NOTE — DISCHARGE PLANNING
TCN following. HTH/SCP chart review completed. Collaborated with CECY Ortiz. Anticipating pt will discharge home with  and [facility] services. Referral with [facility] is under review for acceptance. [facility] referral was sent. No additional TCN needs identified at this time.

## 2022-06-22 NOTE — DISCHARGE PLANNING
Case Management Discharge Planning    Admission Date: 6/20/2022  GMLOS: 2  ALOS: 2    6-Clicks ADL Score: 20  6-Clicks Mobility Score: 18      Anticipated Discharge Dispo: Discharge Disposition: D/T to home under HHA care in anticipation of covered skilled care (06)  Discharge Address: [address] [place]  [address]  Discharge Contact Phone Number: [phone]    DME Needed: None    Action(s) Taken: Spoke with patient at bedside.  Pt sitting up speaking on telephone with friend, Soledad.  Pt with occasional expressive aphasia.  Pt friendly and smiling.  She stated she lives alone in a one story house in [place].  She uses a FWW.  [employer] just opened across the street and she can walk there.  Her home is equipped with ramps in front and back entrances.  She uses [facility] through [facility] for appointments and her friends also help her with transportation.  Explained home health and patient agreeable.  [facility].  Friend Soledad able to drive patient home at 1100 today.    Letter from JULIETA Paulino was given to patient this a.m.  Pt appreciative.    Medically Clear: yes    Next Steps: Wait for home health acceptance.    Barriers to Discharge: Home health acceptance    Care Transition Team Assessment    Information Source  Orientation Level: Oriented X4  Information Given By: Patient  Who is responsible for making decisions for patient? : Patient    Readmission Evaluation  Is this a readmission?: No    Elopement Risk  Legal Hold: No  Ambulatory or Self Mobile in Wheelchair: Yes  Disoriented: No  Psychiatric Symptoms: None  History of Wandering: No  Elopement this Admit: No  Vocalizing Wanting to Leave: No  Displays Behaviors, Body Language Wanting to Leave: No-Not at Risk for Elopement  Elopement Risk: Not at Risk for Elopement    Interdisciplinary Discharge Planning  Lives with - Patient's Self Care Capacity: Alone and Able to Care For Self  Patient or legal guardian wants to  designate a caregiver: No  Housing / Facility: [address] [facility]  Prior Services: None    Discharge Preparedness  What is your plan after discharge?: Home health care, Home with help  What are your discharge supports?: Other (comment)  Prior Functional Level: Ambulatory, Independent with Activities of Daily Living, Independent with Medication Management, Uses Walker  Difficulity with ADLs: Walking  Difficulity with IADLs: Driving, Using the telephone or computer    Functional Assesment  Prior Functional Level: Ambulatory, Independent with Activities of Daily Living, Independent with Medication Management, Uses Walker    Finances  Financial Barriers to Discharge: No  Prescription Coverage: Yes    Vision / Hearing Impairment  Vision Impairment : Yes  Right Eye Vision: Impaired, Wears Glasses  Left Eye Vision: Impaired, Wears Glasses  Hearing Impairment : No         Advance Directive  Advance Directive?: DPOA for Health Care    Domestic Abuse  Have you ever been the victim of abuse or violence?: No  Physical Abuse or Sexual Abuse: No  Verbal Abuse or Emotional Abuse: No  Possible Abuse/Neglect Reported to:: Not Applicable         Discharge Risks or Barriers  Discharge risks or barriers?: No    Anticipated Discharge Information  Discharge Disposition: D/T to home under A care in anticipation of covered skilled care (06)  Discharge Address: [address] [place]  [address]  Discharge Contact Phone Number: [phone]

## 2022-06-22 NOTE — PROGRESS NOTES
4 Eyes Skin Assessment Completed by CHRIS Yun and CHRIS Stevens.    Head WDL  Ears WDL  Nose WDL  Mouth WDL  Neck WDL  Breast/Chest Redness, underneath L breast  Shoulder Blades WDL  Spine WDL  (R) Arm/Elbow/Hand WDL  (L) Arm/Elbow/Hand WDL  Abdomen Redness underneath R side of pannus  Groin WDL  Scrotum/Coccyx/Buttocks Redness and Blanching  (R) Leg WDL  (L) Leg WDL  (R) Heel/Foot/Toe WDL, middle toe missing  (L) Heel/Foot/Toe WDL, middle toe missing          Devices In Places Tele Box, Blood Pressure Cuff and Pulse Ox      Interventions In Place Pillows, Barrier Cream and Pressure Redistribution Mattress    Possible Skin Injury No    Pictures Uploaded Into Epic N/A  Wound Consult Placed N/A  RN Wound Prevention Protocol Ordered No

## 2022-06-22 NOTE — TELEPHONE ENCOUNTER
Requested Prescriptions     Signed Prescriptions Disp Refills   • DILTIAZem CD (CARDIZEM CD) 240 MG CAPSULE SR 24 HR 90 Capsule 3     Sig: TAKE 1 CAPSULE BY MOUTH DAILY AT BEDTIME FOR HIGH BLOOD PRESSURE     Authorizing Provider: RHODA BELLA A.P.R.N.

## 2022-06-22 NOTE — DISCHARGE PLANNING
ATTN: Case Management  RE: Referral for Home Health    As of 6.22.22, we have accepted the Home Health referral for the patient listed above.    A [facility] clinician will be out to see the patient within 48 hours. If you have any questions or concerns regarding the patient’s transition to Home Health, please do not hesitate to contact us at [phone].      We look forward to collaborating with you,  [facility] Team

## 2022-06-22 NOTE — DISCHARGE PLANNING
Care Transition Team Discharge Planning    Anticipated Discharge Disposition: d/c home w/ HH    Action: Lsw received Md signature on excuse letter for pt.     Pt has now transferred to another floor, S1.    Lsw called d/c planner on S1, and scanned emailed pt's excuse letter to d/c planner for pt.     Barriers to Discharge: medical clearance    Plan: HCM will continue to follow, and assist w/ d/c planning.

## 2022-06-22 NOTE — CONSULTS
Physical Medicine and Rehabilitation Consultation              Date of initial consultation: 6/22/2022  Consulting provider: Misha Erwin MD  Reason for consultation: assess for acute inpatient rehab appropriateness  LOS: 2 Day(s)    Chief complaint: Stroke    HPI: The patient is a 81 y.o. right hand dominant female with a past medical history of hypertension, hyperlipidemia, expressive aphasia due to prior strokes;  who presented on 6/20/2022  7:06 PM with recurrence of difficulty speaking.  Patient presented as stroke work-up, seen by neurology, found to have NIH score of 2.  Symptoms consistent with new left MCA stroke.  MR brain found acute infarcts in the left posterior temporal/occipital cortex.    The patient currently reports resolving deficits, she still has some expressive aphasia. She is very concerned about leaving and getting home to some important documents she needs to sign. She is very concerned about the cost of this hospitalization. She is on the phone with her friends Soledad and Kevin who can provide 24/7 support and transportation to outpatient appointments. She denies, weakness, numbness and tingling.     ROS  Pertinent positives are mentioned in the HPI, all others reviewed and are negative.    Social Hx:  1 SH  Ramp to enter  With: Alone.  Patient has several friends that can assist as needed.    THERAPY:  Restrictions: Fall risk  PT: Functional mobility   6/21: Supervised walking 150 feet with front wheel walker    OT: ADLs  6/21: Mod assist lower body dressing    SLP:   6/21: Regular textures with thin liquids    IMAGING:  MR brain 6/21/2022  Acute infarct in the left posterior temporal/occipital cortex.  Multiple remote infarcts with encephalomalacia in the bilateral parietal, temporal and occipital regions.  Multiple remote bilateral cerebellar subsegmental infarcts.  Age-related volume loss and chronic microvascular ischemic changes.       PROCEDURES:  None    PMH:  Past Medical  History:   Diagnosis Date   • Anesthesia     States feels like she can't breathe with induction for right hip surgery, gets nausea vomiting with most pain meds.  Uses anti-nausea medicine if takes morphine   • Arthritis     To hips, shoulders, hands   • Benign essential HTN 9/7/2017   • Cataract    • CPAP (continuous positive airway pressure) dependence    • Dyslipidemia 3/5/2019   • Expressive aphasia     Related to CVA   • Family history of CVA    • Heart murmur    • DARIN (obstructive sleep apnea) 1/26/2017    AHI 23.6, minimum saturation 69%, on CPAP 15 cm.   • Post-nasal drip    • Restless leg syndrome     4/25/17-Resolved, states was related to right hip and none S/P replacement.   • Stroke (HCC) 2/1993, 8/1993    Kxyjnstc-ibjijx-pqkdovoxqz aphasia,mouth forming words, reading, numbers   • Walker as ambulation aid        PSH:  Past Surgical History:   Procedure Laterality Date   • PB RECONSTR TOTAL SHOULDER IMPLANT Left 3/21/2019    Procedure: ARTHROPLASTY, SHOULDER, TOTAL-  REVISION REVERSE;  Surgeon: Yoshi Gamble M.D.;  Location: [facility];  Service: Orthopedics   • ORIF, FRACTURE, HUMERUS Left 3/21/2019    Procedure: ORIF, FRACTURE, HUMERUS;  Surgeon: Yoshi Gamble M.D.;  Location: [facility];  Service: Orthopedics   • KNEE ARTHROPLASTY TOTAL Left 2/12/2018    Procedure: KNEE ARTHROPLASTY TOTAL;  Surgeon: Earle Jacobsen M.D.;  Location: [facility];  Service: Orthopedics   • CERVICAL FUSION POSTERIOR  8/8/2017    Procedure: CERVICAL FUSION POSTERIOR- C3-5, INSTRUMENTED;  Surgeon: Florentino Pike M.D.;  Location: [facility];  Service:    • CERVICAL LAMINECTOMY POSTERIOR  8/8/2017    Procedure: CERVICAL LAMINECTOMY POSTERIOR- C3-5;  Surgeon: Florentino Pike M.D.;  Location: [facility];  Service:    • HIP ARTHROPLASTY TOTAL Left 5/8/2017    Procedure: HIP ARTHROPLASTY TOTAL;  Surgeon: Earle Jacobsen M.D.;  Location:  SURGERY [facility];  Service:    • HIP ARTHROPLASTY TOTAL Right 12/12/2016    Procedure: HIP ARTHROPLASTY TOTAL;  Surgeon: Earle Jacobsen M.D.;  Location: [facility];  Service:    • FUSION, SPINE, LUMBAR, PLIF  9/26/2016    Procedure: LUMBAR FUSION POSTERIOR L2-S1 onlay ;  Surgeon: Florentino Pike M.D.;  Location: SURGERY [facility];  Service:    • LUMBAR LAMINECTOMY DISKECTOMY N/A 9/26/2016    Procedure: LUMBAR LAMINECTOMY DISKECTOMY L2-S1;  Surgeon: Florentino Pike M.D.;  Location: SURGERY [facility];  Service:    • COLONOSCOPY  2/2016    Every 10 years   • SHOULDER ARTHROPLASTY TOTAL Left 8/11/2015    Procedure: SHOULDER ARTHROPLASTY REVERSE TOTAL;  Surgeon: Yoshi Gamble M.D.;  Location: [facility];  Service:    • KNEE ARTHROPLASTY TOTAL  6/23/2014    Performed by Earle Jacobsen M.D. at [facility]   • HAMMERTOE CORRECTION Left 11/12/2007    Removed 3rd toe   • LUMBAR LAMINECTOMY DISKECTOMY  1993    Lumbar   • CARPAL TUNNEL RELEASE Bilateral 1993   • CERVICAL DISK AND FUSION ANTERIOR  1993    X2   • HYSTERECTOMY, TOTAL ABDOMINAL  1977   • BUNIONECTOMY Bilateral 1980's   • HAMMERTOE CORRECTION Right 1980's    Removed 3rd toe   • ORIF, FINGER Left as child    Index       FHX:  Family History   Problem Relation Age of Onset   • Cancer Mother         vaginal   • Stroke Father    • Hypertension Brother    • Diabetes Brother    • Hypertension Brother    • Hypertension Brother    • Stroke Daughter        Medications:  Current Facility-Administered Medications   Medication Dose   • rosuvastatin (CRESTOR) tablet 10 mg  10 mg   • senna-docusate (PERICOLACE or SENOKOT S) 8.6-50 MG per tablet 2 Tablet  2 Tablet    And   • polyethylene glycol/lytes (MIRALAX) PACKET 1 Packet  1 Packet    And   • magnesium hydroxide (MILK OF MAGNESIA) suspension 30 mL  30 mL    And   • bisacodyl (DULCOLAX) suppository 10 mg  10 mg   • ondansetron (ZOFRAN) syringe/vial  "injection 4 mg  4 mg   • ondansetron (ZOFRAN ODT) dispertab 4 mg  4 mg   • acetaminophen (Tylenol) tablet 650 mg  650 mg   • guaiFENesin dextromethorphan (ROBITUSSIN DM) 100-10 MG/5ML syrup 10 mL  10 mL   • LORazepam (ATIVAN) tablet 1 mg  1 mg   • aspirin EC (ECOTRIN) tablet 81 mg  81 mg   • cyanocobalamin (VITAMIN B-12) tablet 500 mcg  500 mcg   • fluticasone (FLONASE) nasal spray 100 mcg  2 Spray   • loratadine (CLARITIN) tablet 10 mg  10 mg   • vitamin D3 (cholecalciferol) tablet 1,000 Units  1,000 Units   • hydrALAZINE (APRESOLINE) injection 10 mg  10 mg   • hydrALAZINE (APRESOLINE) injection 10 mg  10 mg       Allergies:  Allergies   Allergen Reactions   • Benicar [Olmesartan] Rash     blisters   • Dilaudid  [Hydromorphone Hcl] Vomiting   • Lidocaine    • Norco  [Apap-Fd&C Blue #1-Hydrocodone] Vomiting   • Other Misc Shortness of Breath     Anesthesia with surgery 2016  Pt couldn't breathe while being put under and tapped side of bed to let RN know. \"It was the scariest feeling and I want everyone to know\"     • Percocet  [Apap-Fd&C Red #40 [place]-Oxycodone] Vomiting   • Vicodin  [Apap-Fd&C Yellow #10 [place]-Hydrocodone] Vomiting   • Codeine Vomiting   • Demerol Vomiting   • Hydrocodone Vomiting   • Other Drug Vomiting     \"all pain meds\" per pt can take morphine.   • Oxycodone Vomiting   • Statins [Hmg-Coa-R Inhibitors] Unspecified     Muscle cramping   • Valium Vomiting         Physical Exam:  Vitals: BP (!) 154/90   Pulse 68   Temp 36.6 °C (97.9 °F) (Temporal)   Resp 16   Ht 1.651 m (5' 5\")   Wt 80.3 kg (177 lb 0.5 oz)   SpO2 96%   Gen: NAD  Head:  NC/AT  Eyes/ Nose/ Mouth: PERRLA, moist mucous membranes  Cardio: RRR, good distal perfusion, warm extremities  Pulm: normal respiratory effort, no cyanosis   Abd: Soft NTND, negative borborygmi   Ext: No peripheral edema. No calf tenderness. No clubbing.    Mental status: answers questions appropriately follows commands  Speech: Mild expressive aphasia "     Motor:      Upper Extremity  Myotome R L   Shoulder flexion C5 5 5   Elbow flexion C5 5 5   Wrist extension C6 5 5   Elbow extension C7 5 5   Finger flexion C8 5 5   Finger abduction T1 5 5     Lower Extremity Myotome R L   Hip flexion L2 5 5   Knee extension L3 5 5   Ankle dorsiflexion L4 5 5   Toe extension L5 5 5   Ankle plantarflexion S1 5 5     Coordination:   intact finger abel bilaterally    Labs: Reviewed and significant for   Recent Labs     06/20/22 1909   RBC 4.95   HEMOGLOBIN 15.0   HEMATOCRIT 45.0   PLATELETCT 194   PROTHROMBTM 12.9   APTT 27.5   INR 1.00     Recent Labs     06/20/22 1909 06/22/22  0144   SODIUM 140 140   POTASSIUM 5.3 3.9   CHLORIDE 107 105   CO2 23 23   GLUCOSE 82 81   BUN 42* 28*   CREATININE 0.99 1.01   CALCIUM 9.7 9.3     Recent Results (from the past 24 hour(s))   EC-ECHOCARDIOGRAM COMPLETE W/O CONT    Collection Time: 06/21/22  4:17 PM   Result Value Ref Range    Eject.Frac. MOD BP 53.05     Eject.Frac. MOD 4C 62.84     Eject.Frac. MOD 2C 41.29     Left Ventrical Ejection Fraction 55    Comp Metabolic Panel    Collection Time: 06/22/22  1:44 AM   Result Value Ref Range    Sodium 140 135 - 145 mmol/L    Potassium 3.9 3.6 - 5.5 mmol/L    Chloride 105 96 - 112 mmol/L    Co2 23 20 - 33 mmol/L    Anion Gap 12.0 7.0 - 16.0    Glucose 81 65 - 99 mg/dL    Bun 28 (H) 8 - 22 mg/dL    Creatinine 1.01 0.50 - 1.40 mg/dL    Calcium 9.3 8.5 - 10.5 mg/dL    AST(SGOT) 25 12 - 45 U/L    ALT(SGPT) 17 2 - 50 U/L    Alkaline Phosphatase 53 30 - 99 U/L    Total Bilirubin 0.3 0.1 - 1.5 mg/dL    Albumin 3.7 3.2 - 4.9 g/dL    Total Protein 6.5 6.0 - 8.2 g/dL    Globulin 2.8 1.9 - 3.5 g/dL    A-G Ratio 1.3 g/dL   MAGNESIUM    Collection Time: 06/22/22  1:44 AM   Result Value Ref Range    Magnesium 1.9 1.5 - 2.5 mg/dL   PHOSPHORUS    Collection Time: 06/22/22  1:44 AM   Result Value Ref Range    Phosphorus 3.2 2.5 - 4.5 mg/dL   ESTIMATED GFR    Collection Time: 06/22/22  1:44 AM   Result Value Ref  Range    GFR (CKD-EPI) 56 (A) >60 mL/min/1.73 m 2         ASSESSMENT:  Patient is a 81 y.o. female admitted with Left MCA stroke now with residual expressive aphasia     [facility] Code / Diagnosis to Support: 0001.4 - Stroke: No Paresis    Rehabilitation: Impaired ADLs and mobility  Patient is too high level to qualify for IPR    All cases are subject to administrative review and recommendations may change    Additional Recommendations:  - Patient can discharge home from a functional perspective. She has intact strength and coordination and is walking 150' at supervision level with a FWW and has 24/7 support from her friends Soledad and Kevin. She is not interested in IPR.   - Continue outpatient SLP for mild expressive aphasia   - Follow up with stroke neurology   - Order placed to follow up with Dr. Estrada, outpatient physiatrist for movement related disorders   - Secondary stroke precention with ASA, statin, BP control.   -PMR will sign off, please reconsult or reach out via Voalte if further evaluation or medical management is requested      Thank you for allowing us to participate in the care of this patient.     Patient was seen for 82 minutes on unit/floor of which > 50% of time was spent on counseling and coordination of care regarding the above, including prognosis, risk reduction, benefits of treatment, and options for next stage of care.    Michael Celaya, DO   Physical Medicine and Rehabilitation     Please note that this dictation was created using voice recognition software. I have made every reasonable attempt to correct obvious errors, but there may be errors of grammar and possibly content that I did not discover before finalizing the note.

## 2022-06-22 NOTE — CARE PLAN
The patient is Stable - Low risk of patient condition declining or worsening    Shift Goals  Clinical Goals: Safety/NIHSS  Patient Goals: Go home in the morning  Family Goals: MAGDY    Progress made toward(s) clinical / shift goals: Assumed care of patient at 2355. Patient is A&Ox4, Q4hr neuro checks are in place. NIHSS = 1, pertinent for expressive aphasia only. Stoma powder and viscopaste applied underneath L breast, and under R side of the pannus, per wound care orders. Bed is low and locked, bed alarm is on, call light is within reach, hourly rounding continues.     Problem: Mobility - Stroke  Goal: Patient's capacity to carry out activities will improve  6/22/2022 0146 by Gayla Taylor R.N.  Outcome: Progressing  Note: Patient has stable gait with x1 assist with FWW.     Patient is not progressing towards the following goals:    Problem: Communication  Goal: The ability to communicate needs accurately and effectively will improve  Outcome: Not Progressing  Note: Expressive aphasia apparent.

## 2022-06-22 NOTE — PROGRESS NOTES
Assumed care of pt at 0700. Pt visibly anxious stating she wants to go home today. Pt AAOx4, refused breakfast and stated she wants to get up to walk and to take a shower. Ambulated to bathroom with a FWW, pt steady on her feet. Pt resting in bed at this time. Call light in reach, belongings at bedside. Fall precautions in place.

## 2022-06-22 NOTE — DISCHARGE INSTRUCTIONS
Discharge Instructions    Discharged to home by car with friend. Discharged via wheelchair, hospital escort: Yes.  Special equipment needed: Not Applicable    Be sure to schedule a follow-up appointment with your primary care doctor or any specialists as instructed.     Discharge Plan:   Diet Plan: Discussed  Activity Level: Discussed  Confirmed Follow up Appointment: Appointment Scheduled  Confirmed Symptoms Management: Discussed  Medication Reconciliation Updated: Yes    I understand that a diet low in cholesterol, fat, and sodium is recommended for good health. Unless I have been given specific instructions below for another diet, I accept this instruction as my diet prescription.   Other diet: Regular Diet    Special Instructions: None    Is patient discharged on Warfarin / Coumadin?   No     Depression / Suicide Risk    As you are discharged from this [facility] facility, it is important to learn how to keep safe from harming yourself.    Recognize the warning signs:  Abrupt changes in personality, positive or negative- including increase in energy   Giving away possessions  Change in eating patterns- significant weight changes-  positive or negative  Change in sleeping patterns- unable to sleep or sleeping all the time   Unwillingness or inability to communicate  Depression  Unusual sadness, discouragement and loneliness  Talk of wanting to die  Neglect of personal appearance   Rebelliousness- reckless behavior  Withdrawal from people/activities they love  Confusion- inability to concentrate     If you or a loved one observes any of these behaviors or has concerns about self-harm, here's what you can do:  Talk about it- your feelings and reasons for harming yourself  Remove any means that you might use to hurt yourself (examples: pills, rope, extension cords, firearm)  Get professional help from the community (Mental Health, Substance Abuse, psychological counseling)  Do not be alone:Call your Safe Contact-  someone whom you trust who will be there for you.  Call your local CRISIS HOTLINE 386-4410 or 320-232-8528  Call your local Children's Mobile Crisis Response Team [place] (395) 818-0002 or www.CartiCure  Call the toll free National Suicide Prevention Hotlines   National Suicide Prevention Lifeline 912-560-BKKS (6208)  [facility] Line Network 800-SUICIDE (634-2867)      Stroke Prevention  Some medical conditions and behaviors are associated with a higher chance of having a stroke. You can help prevent a stroke by making nutrition, lifestyle, and other changes, including managing any medical conditions you may have.  What nutrition changes can be made?    Eat healthy foods. You can do this by:  Choosing foods high in fiber, such as fresh fruits and vegetables and whole grains.  Eating at least 5 or more servings of fruits and vegetables a day. Try to fill half of your plate at each meal with fruits and vegetables.  Choosing lean protein foods, such as lean cuts of meat, poultry without skin, fish, tofu, beans, and nuts.  Eating low-fat dairy products.  Avoiding foods that are high in salt (sodium). This can help lower blood pressure.  Avoiding foods that have saturated fat, trans fat, and cholesterol. This can help prevent high cholesterol.  Avoiding processed and premade foods.  Follow your health care provider's specific guidelines for losing weight, controlling high blood pressure (hypertension), lowering high cholesterol, and managing diabetes. These may include:  Reducing your daily calorie intake.  Limiting your daily sodium intake to 1,500 milligrams (mg).  Using only healthy fats for cooking, such as olive oil, canola oil, or sunflower oil.  Counting your daily carbohydrate intake.  What lifestyle changes can be made?  Maintain a healthy weight. Talk to your health care provider about your ideal weight.  Get at least 30 minutes of moderate physical activity at least 5 days a week. Moderate  activity includes brisk walking, biking, and swimming.  Do not use any products that contain nicotine or tobacco, such as cigarettes and e-cigarettes. If you need help quitting, ask your health care provider. It may also be helpful to avoid exposure to secondhand smoke.  Limit alcohol intake to no more than 1 drink a day for nonpregnant women and 2 drinks a day for men. One drink equals 12 oz of beer, 5 oz of wine, or 1½ oz of hard liquor.  Stop any illegal drug use.  Avoid taking birth control pills. Talk to your health care provider about the risks of taking birth control pills if:  You are over 35 years old.  You smoke.  You get migraines.  You have ever had a blood clot.  What other changes can be made?  Manage your cholesterol levels.  Eating a healthy diet is important for preventing high cholesterol. If cholesterol cannot be managed through diet alone, you may also need to take medicines.  Take any prescribed medicines to control your cholesterol as told by your health care provider.  Manage your diabetes.  Eating a healthy diet and exercising regularly are important parts of managing your blood sugar. If your blood sugar cannot be managed through diet and exercise, you may need to take medicines.  Take any prescribed medicines to control your diabetes as told by your health care provider.  Control your hypertension.  To reduce your risk of stroke, try to keep your blood pressure below 130/80.  Eating a healthy diet and exercising regularly are an important part of controlling your blood pressure. If your blood pressure cannot be managed through diet and exercise, you may need to take medicines.  Take any prescribed medicines to control hypertension as told by your health care provider.  Ask your health care provider if you should monitor your blood pressure at home.  Have your blood pressure checked every year, even if your blood pressure is normal. Blood pressure increases with age and some medical  conditions.  Get evaluated for sleep disorders (sleep apnea). Talk to your health care provider about getting a sleep evaluation if you snore a lot or have excessive sleepiness.  Take over-the-counter and prescription medicines only as told by your health care provider. Aspirin or blood thinners (antiplatelets or anticoagulants) may be recommended to reduce your risk of forming blood clots that can lead to stroke.  Make sure that any other medical conditions you have, such as atrial fibrillation or atherosclerosis, are managed.  What are the warning signs of a stroke?  The warning signs of a stroke can be easily remembered as BEFAST.  B is for balance. Signs include:  Dizziness.  Loss of balance or coordination.  Sudden trouble walking.  E is for eyes. Signs include:  A sudden change in vision.  Trouble seeing.  F is for face. Signs include:  Sudden weakness or numbness of the face.  The face or eyelid drooping to one side.  A is for arms. Signs include:  Sudden weakness or numbness of the arm, usually on one side of the body.  S is for speech. Signs include:  Trouble speaking (aphasia).  Trouble understanding.  T is for time.  These symptoms may represent a serious problem that is an emergency. Do not wait to see if the symptoms will go away. Get medical help right away. Call your local emergency services (911 in the [place] Do not drive yourself to the hospital.  Other signs of stroke may include:  A sudden, severe headache with no known cause.  Nausea or vomiting.  Seizure.  Where to find more information  For more information, visit:  American Stroke Association: www.strokeassociation.org  National Stroke Association: www.stroke.org  Summary  You can prevent a stroke by eating healthy, exercising, not smoking, limiting alcohol intake, and managing any medical conditions you may have.  Do not use any products that contain nicotine or tobacco, such as cigarettes and e-cigarettes. If you need help quitting, ask your  health care provider. It may also be helpful to avoid exposure to secondhand smoke.  Remember BEFAST for warning signs of stroke. Get help right away if you or a loved one has any of these signs.  This information is not intended to replace advice given to you by your health care provider. Make sure you discuss any questions you have with your health care provider.  Document Released: 01/25/2006 Document Revised: 11/30/2018 Document Reviewed: 01/23/2018  [employer] Patient Education © 2020 [employer]    Discharge Instructions per Poncho Warner M.D.    Please taking aspirin 81 mg daily.   Check your blood pressure every day. Goal Bp 110-140/  Please follow-up with [facility] in 1 to 2 months and follow-up with PCP as outpatient.    DIET: Cardiac    ACTIVITY: As tolerated    DIAGNOSIS: Acute CVA, hypotension, history of CVer    Return to ER in the event of new or worsening symptoms. Please note importance of compliance and the patient has agreed to proceed with all medical recommendations and follow up plan indicated above. All medications come with benefits and risks. Risks may include permanent injury or death and these risks can be minimized with close reassessment and monitoring. Please make it to your scheduled follow ups with PCP and [facility]

## 2022-06-22 NOTE — DISCHARGE SUMMARY
Discharge Summary    CHIEF COMPLAINT ON ADMISSION  Chief Complaint   Patient presents with   • Possible Stroke     Pt came in as a stroke IR. Pt reports that around 9am this morning she started having dysphagia and slurred speech. Pt took 324 of ASA at 18:00 tonight prior to calling 911. Pt A+Ox4 on arrival but reports she's having a hard time speaking. Pt does not take any blood thinners right now but does take 81 mg of ASA daily. 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 Pt has no neuro deficits on arrival except delayed speech and trouble recalling words.        Reason for Admission  EMS     Admission Date  6/20/2022    CODE STATUS  Full Code    HPI & HOSPITAL COURSE  This is a 81 y.o. female with history of multiple prior strokes with expressive aphasia, history of hypertension, dyslipidemia, who presented on 6/21 with worsening speech difficulty.  Neurology was consulted and NIH score of 2.  CT head no acute intracranial abnormality. CT perfusion shows no acute changes or perfusion deficits.  CTA head and neck shows no large vessel occlusions or cutoffs. MRI notes Acute infarct in the left posterior temporal/occipital cortex. Multiple remote infarcts with encephalomalacia in the bilateral parietal, temporal and occipital regions. Multiple remote bilateral cerebellar subsegmental infarcts.  Echo notes EF 55%.   A1c 5.5. LDL 80.   Neurology recommended continue aspirin 81 mg.  Patient reports that she is allergic to statin as she has developed blisters after taking statin. I discussed with neurology Dr. Carl, given LDL 80, will hold on statin at this point.   Zio patch was placed prior to discharge.   PT/OT recommended home health care, which is arranged.     Therefore, she is discharged in fair and stable condition to home with organized home healthcare and close outpatient follow-up.  She is advised to follow-up with PCP and stroke [facility] in 1 to 2 months.  Checking blood pressure daily with goal of blood pressure  110-140/.    The patient met 2-midnight criteria for an inpatient stay at the time of discharge.    Discharge Date  6/22/2022    FOLLOW UP ITEMS POST DISCHARGE  PCP  [facility]    DISCHARGE DIAGNOSES  Principal Problem:    Acute ischemic stroke (HCC) POA: Yes  Active Problems:    DARIN (obstructive sleep apnea) (Chronic) POA: Yes      Overview: AHI 23.6, minimum saturation 69%, on CPAP 15 cm.    Benign essential HTN POA: Yes    Dyslipidemia POA: Yes    H/O: CVA (cerebrovascular accident) POA: Unknown    ACP (advance care planning) POA: Unknown      Overview: IMO load March 2020  Resolved Problems:    * No resolved hospital problems. *      FOLLOW UP  Future Appointments   Date Time Provider Department Center   10/6/2022 11:00 AM RUSTY Longo.P.R.NLizbeth VMG DIYA RainP.R.DORINDA  [address] [facility] [address]  [address]  [facility] [address]  [phone]    Follow up in 1 week(s)      stroke [facility]    Follow up in 1 month(s)        MEDICATIONS ON DISCHARGE     Medication List      START taking these medications      Instructions   vitamin D 1000 Unit Tabs  Start taking on: June 23, 2022  Commonly known as: Cholecalciferol   Take 1 Tablet by mouth every day for 30 days.  Dose: 1,000 Units        CHANGE how you take these medications      Instructions   DILTIAZem  MG Cp24  What changed:   · how much to take  · how to take this  · when to take this  Commonly known as: CARDIZEM CD   TAKE ONE CAPSULE BY MOUTH EVERY DAY AT BEDTIME (FOR HIGH BLOOD PRESSURE )     fluticasone 50 MCG/ACT nasal spray  What changed:   · how to take this  · when to take this  · additional instructions  Commonly known as: FLONASE   USE 2 SPRAYS IN EACH NOSTRIL EVERY DAY     irbesartan 150 MG Tabs  What changed: See the new instructions.  Commonly known as: AVAPRO   TAKE ONE TABLET BY MOUTH EVERY MORNING. PATIENT NEEDS TO BE SEEN FOR ANNUAL VISIT FOR FUTURE REFILLS.     loratadine 10 MG Tabs  What changed:   · how much  "to take  · how to take this  · when to take this  · reasons to take this  Commonly known as: CLARITIN   TAKE ONE TABLET BY MOUTH ONE TIME DAILY AS NEEDED FOR ALLERGY        CONTINUE taking these medications      Instructions   aspirin 81 MG tablet   Take 81 mg by mouth every day.  Dose: 81 mg     fenofibrate 145 MG Tabs  Commonly known as: TRICOR   Take 1 Tablet by mouth every day.  Dose: 145 mg            Allergies  Allergies   Allergen Reactions   • Benicar [Olmesartan] Rash     blisters   • Dilaudid  [Hydromorphone Hcl] Vomiting   • Lidocaine    • Norco  [Apap-Fd&C Blue #1-Hydrocodone] Vomiting   • Other Misc Shortness of Breath     Anesthesia with surgery 2016  Pt couldn't breathe while being put under and tapped side of bed to let RN know. \"It was the scariest feeling and I want everyone to know\"     • Percocet  [Apap-Fd&C Red #40 [place]-Oxycodone] Vomiting   • Vicodin  [Apap-Fd&C Yellow #10 [place]-Hydrocodone] Vomiting   • Codeine Vomiting   • Demerol Vomiting   • Hydrocodone Vomiting   • Other Drug Vomiting     \"all pain meds\" per pt can take morphine.   • Oxycodone Vomiting   • Statins [Hmg-Coa-R Inhibitors] Unspecified     Muscle cramping   • Valium Vomiting       DIET  Orders Placed This Encounter   Procedures   • Diet Order Diet: Regular     Standing Status:   Standing     Number of Occurrences:   1     Order Specific Question:   Diet:     Answer:   Regular [1]       ACTIVITY  As tolerated.  Weight bearing as tolerated    CONSULTATIONS  neurology    PROCEDURES  none    LABORATORY  Lab Results   Component Value Date    SODIUM 140 06/22/2022    POTASSIUM 3.9 06/22/2022    CHLORIDE 105 06/22/2022    CO2 23 06/22/2022    GLUCOSE 81 06/22/2022    BUN 28 (H) 06/22/2022    CREATININE 1.01 06/22/2022        Lab Results   Component Value Date    WBC 5.3 06/20/2022    HEMOGLOBIN 15.0 06/20/2022    HEMATOCRIT 45.0 06/20/2022    PLATELETCT 194 06/20/2022      MR-BRAIN-W/O   Final Result         Acute infarct in the " left posterior temporal/occipital cortex.      Multiple remote infarcts with encephalomalacia in the bilateral parietal, temporal and occipital regions.      Multiple remote bilateral cerebellar subsegmental infarcts.      Age-related volume loss and chronic microvascular ischemic changes.         EC-ECHOCARDIOGRAM COMPLETE W/O CONT   Final Result      SU-WTRPELB-2 VIEW   Final Result         1.  Nonspecific bowel gas pattern.   2.  Atherosclerosis      DX-CHEST-PORTABLE (1 VIEW)   Final Result      No acute cardiopulmonary abnormality identified.      CT-CEREBRAL PERFUSION ANALYSIS   Final Result      1.  Cerebral blood flow less than 30% likely representing artifact = 9 mL.      2.  T Max more than 6 seconds likely representing combination of completed infarct and ischemia = 0 mL.      3.  Mismatched volume likely representing ischemic brain/penumbra = None      4.  Please note that the cerebral perfusion was performed on the limited brain tissue around the basal ganglia region. Infarct/ischemia outside the CT perfusion sections can be missed in this study.      CT-CTA NECK WITH & W/O-POST PROCESSING   Final Result      Mild bilateral internal carotid artery atherosclerotic plaque without significant stenosis      CT-CTA HEAD WITH & W/O-POST PROCESS   Final Result         1. No hemodynamically significant narrowing of the major intracranial vessels.      CT-HEAD W/O   Final Result      1.  No acute intracranial abnormality      2.  Chronic findings include cerebral volume loss, white matter change, bilateral occipital, and left frontotemporal encephalomalacia               Total time of the discharge process exceeds 33 minutes.

## 2022-06-22 NOTE — PROGRESS NOTES
Telemetry Strip     Strip printed at 1416  Measurements/rhythm from strip were as follows:  Rhythm: SR  HR: 60-80  Measurements: 0.17/0.12/0.42  Ectopy: PVC rare              Normal Values  Rhythm SR  HR Range    Measurements 0.12-0.20 / 0.06-0.10  / 0.30-0.52

## 2022-06-23 ENCOUNTER — PATIENT OUTREACH (OUTPATIENT)
Dept: HEALTH INFORMATION MANAGEMENT | Facility: OTHER | Age: 82
End: 2022-06-23
Payer: MEDICARE

## 2022-06-23 ENCOUNTER — PATIENT OUTREACH (OUTPATIENT)
Dept: MEDICAL GROUP | Facility: PHYSICIAN GROUP | Age: 82
End: 2022-06-23
Payer: MEDICARE

## 2022-06-23 NOTE — PROGRESS NOTES
6/23/22  CHW called [facility] and set up Uber Ride for PCP appt 6/27/22.     6/24/22   Contacted pt via TC on home phone and informed her of uber coming to pick her up for PCP appt Monday 6/27/22 at 7:30am.

## 2022-06-23 NOTE — PROGRESS NOTES
"Called pt, pt continues to experience expressive aphasia. Reviewed all her medications. Pt did not  the Vit.D supplement sent to pharmacy. Pt said she was taking a Vit. D supplement before this stroke and she is going to continue to take the Vit. D she already has at home. Pt stated she took Rosuvastatin this morning, reports she thought she was told to take it in the hospital. I read to Irina the clinician's note that said, \"Neurology recommended continue aspirin 81 mg.  Patient reports that she is allergic to statin as she has developed blisters after taking statin. I discussed with neurology Dr. Carl, given LDL 80, will hold on statin at this point.\" Irina said she will not take the statin from now on. Irina also didn't understand that she was supposed to continue the 81mg ASA- she said she normally takes the ASA at night and will take it tonight. Reviewed all other prescribed meds- she is taking them correctly, along with the supplement Prevagen.     When asked if she had measured her Bp today, Irina said she has been tired. I agreed to call her tomorrow morning and she will have a Bp reading for me then. Per d/c instructions pt was to take Bp daily and reading s/b between 110-140/ . Scheduled appt w/Aide for Monday, 6/27/22 at 0840. Pt requests help scheduling Uber ride, wants her cell phone used for Uber to contact her upon their arrival to her home. Contacted Ngozi Simental, listed on pts chart as guardian and asked Ngozi if she could assist pt w/transporatation. Ngozi is in [place] and therefore unable to assist. Will ask CHW to assist with scheduling.     Pt has an AD on file. Pt took my name and extension if she has questions.   "

## 2022-06-23 NOTE — PROGRESS NOTES
Subjective:     Irina Traylor is a 81 y.o. female who presents for Hospital Follow-up.    POST DISCHARGE CALL:  Discharge Date:6/22/2022   Date of Outreach Call: 6/23/2022 12:48 PM  Now that you're home, how are you doing? Fair  Do you have questions about your medications? No  Did you fill your medications? No  Do you have a follow-up appointment scheduled?Yes  Discharging Department: [facility]  Number of Attempts: 1  Current or previous attempts completed within two business days of discharge? Yes  Provided education regarding treatment plan, medication, self-management, ADLs? Yes  Has patient completed Advance Directive? If yes, advise them to bring to appointment. Yes  Care Manager phone number provided? Yes  Is there anything else I can help you with? No    HPI:   Recently hospitalized for recent dysphagia and slurred speech.  She did take aspirin full dose before arrival to ER.  In the ER she continued to have difficulty time speaking.  EKG shows sinus bradycardia.  Chest x-ray was unremarkable.  CT of her head and neck was unremarkable.  Neurology was consulted and patient was admitted for stroke work-up.  MRI of brain showed acute infarct in left posterior temporal/occipital cortex and multiple bilateral cerebellar infarcts. Neurology recommended continuing aspirin 81 mg daily and was okay to hold statin given her reported allergies to statin medications.  Zio patch was placed at discharge.  Home health care was arranged.  She is to follow-up with [facility] in next 1-2 months.  Neurology recommended having her blood pressure controlled 110-140/.  Her initial blood pressure today is 140/76. Her blood pressure with home health on 6/25/2022 was 140/80 with pulse 78. She continues with fenofibrate 145 mg daily. She has an appointment with [facility] on 8/15/2022.     Current medicines (including reconciliation performed today)  Current Outpatient Medications    Medication Sig Dispense Refill   • Ascorbic Acid (VITAMIN C) 1000 MG Tab Take 1 Each by mouth every day at 6 PM.     • Turmeric 500 MG Cap Take 1 Capsule by mouth as needed (take when she is hurting).     • Misc Natural Products (NARCOSOFT HERBAL LAX) Cap Take 6-8 Capsules by mouth every day at 6 PM. Herb-Lax     • fenofibrate (TRICOR) 145 MG Tab Take 1 Tablet by mouth every day. 100 Tablet 3   • DILTIAZem CD (CARDIZEM CD) 240 MG CAPSULE SR 24 HR TAKE 1 CAPSULE BY MOUTH DAILY AT BEDTIME FOR HIGH BLOOD PRESSURE 90 Capsule 3   • irbesartan (AVAPRO) 300 MG Tab Take 1 Tablet by mouth every evening. 100 Tablet 1   • non-formulary med Take 8 Tablets by mouth at bedtime as needed. VITMIN SUPPLEMENT     • memantine (NAMENDA) 5 MG Tab Take 5 mg by mouth every day. FOR MEMORY AND PRE ALTZHEIMERS.     • Magnesium 400 MG Tab Take 1 Tablet by mouth every day.     • vitamin E (VITAMIN E) 1000 Unit (450 mg) Cap Take 1,000 Units by mouth every day.     • non-formulary med Take 6 Tablets by mouth every day. VITSMIN SUPPLEMENT     • vitamin D (VITAMIND D3) 1000 UNIT Tab Take 1 Tablet by mouth every day for 30 days. 30 Tablet 0   • fluticasone (FLONASE) 50 MCG/ACT nasal spray USE 2 SPRAYS IN EACH NOSTRIL EVERY DAY (Patient taking differently: Administer 2 Sprays into affected nostril(S) every day. USE 2 SPRAYS IN EACH NOSTRIL EVERY DAY) 48 g 0   • loratadine (CLARITIN) 10 MG Tab TAKE ONE TABLET BY MOUTH ONE TIME DAILY AS NEEDED FOR ALLERGY (Patient taking differently: Take 10 mg by mouth 1 time a day as needed (Allergies). TAKE ONE TABLET BY MOUTH ONE TIME DAILY AS NEEDED FOR ALLERGY) 90 Tablet 3   • aspirin 81 MG tablet Take 81 mg by mouth every day.       No current facility-administered medications for this visit.       Allergies:   Benicar [olmesartan], Dilaudid  [hydromorphone hcl], Lidocaine, Norco  [apap-fd&c blue #1-hydrocodone], Other misc, Percocet  [apap-fd&c red #40 [place]-oxycodone], Vicodin  [apap-fd&c yellow #10 [place]-hydrocodone], Codeine, Demerol, Hydrocodone, Other drug, Oxycodone, Statins [hmg-coa-r inhibitors], and Valium    Social History     Tobacco Use   • Smoking status: Never Smoker   • Smokeless tobacco: Never Used   Vaping Use   • Vaping Use: Never used   Substance Use Topics   • Alcohol use: Not Currently   • Drug use: No       ROS:  Denies chest pain,shortness of breath, dizziness, blurry vision or headache.    Objective:     Vitals:    06/27/22 0843 06/27/22 0906   BP: (!) 140/76 138/80   BP Location: Right arm Right arm   Patient Position: Sitting Sitting   BP Cuff Size: Adult    Pulse: 66    Temp: 37.1 °C (98.8 °F)    TempSrc: Temporal    SpO2: 96%    Weight: 75.8 kg (167 lb)    Height: 1.524 m (5')      Body mass index is 32.61 kg/m².    Physical Exam:  Gen: Alert and oriented, No apparent distress.  Eyes:   Lids normal. Glasses in place.   Lungs: Normal effort, CTA bilaterally, no wheezes, rhonchi, or rales.    CV: Regular rate and rhythm. No murmurs, rubs, or gallops.  Ext: No clubbing, cyanosis, edema.  Using front wheel walker with ambulation.      Assessment and Plan:     1. Hospital discharge follow-up  Acute uncomplicated problem.  Hospital discharge completed today.  I did review her recent ER notes, labs, imaging and hospital discharge summary.  She will continue with home health follow-up.  She did request to have her POLST form completed today which we did complete in office today.  Copy scanned into chart and original given back to patient.  She will keep her upcoming Stroke bridge appointment.  We will continue to monitor her blood pressure.    2. Benign essential HTN  Chronic exacerbated problem.  On repeat today her blood pressure is 138/80.  She is at the higher end of her blood pressure goal that neurology was recommending.  She will continue with her diltiazem 240 mg daily in the evening and we will increase her irbesartan to 300 mg daily to get better control of her blood pressures.  She  will continue with home health follow-ups.  She will return in 1 month for follow-up on her blood pressure.  - DILTIAZem CD (CARDIZEM CD) 240 MG CAPSULE SR 24 HR; TAKE 1 CAPSULE BY MOUTH DAILY AT BEDTIME FOR HIGH BLOOD PRESSURE  Dispense: 90 Capsule; Refill: 3  - irbesartan (AVAPRO) 300 MG Tab; Take 1 Tablet by mouth every evening.  Dispense: 100 Tablet; Refill: 1    3. Dyslipidemia  Chronic stable problem.  She will continue with her fenofibrate 145 mg daily.  Medication refilled today.  - fenofibrate (TRICOR) 145 MG Tab; Take 1 Tablet by mouth every day.  Dispense: 100 Tablet; Refill: 3      - Chart and discharge summary were reviewed.   - Hospitalization and results reviewed with patient.   - Medications reviewed including instructions regarding high risk medications, dosing and side effects.  - Recommended Services: No services needed at this time. Home Health is ordered and appointments are schdueled.   - Advance directive/POLST on file?  Yes. She is requesting to have POLST form completed.     Follow-up:Return in about 4 weeks (around 7/25/2022) for Hypertension.    Face-to-face transitional care management services with HIGH (today's visit is within days post discharge & LACE+ score 59+) medical decision complexity were provided.     LACE+ Historical Score Over Time (0-28: Low, 29-58: Medium, 59+: High): 73

## 2022-06-24 ENCOUNTER — TELEPHONE (OUTPATIENT)
Dept: CARDIOLOGY | Facility: MEDICAL CENTER | Age: 82
End: 2022-06-24
Payer: MEDICARE

## 2022-06-24 ENCOUNTER — PATIENT OUTREACH (OUTPATIENT)
Dept: HEALTH INFORMATION MANAGEMENT | Facility: OTHER | Age: 82
End: 2022-06-24
Payer: MEDICARE

## 2022-06-25 ENCOUNTER — HOME CARE VISIT (OUTPATIENT)
Dept: HOME HEALTH SERVICES | Facility: HOME HEALTHCARE | Age: 82
End: 2022-06-25
Payer: MEDICARE

## 2022-06-25 VITALS
RESPIRATION RATE: 16 BRPM | HEART RATE: 78 BPM | TEMPERATURE: 98.7 F | HEIGHT: 65 IN | DIASTOLIC BLOOD PRESSURE: 80 MMHG | OXYGEN SATURATION: 94 % | SYSTOLIC BLOOD PRESSURE: 140 MMHG | WEIGHT: 161 LBS | BODY MASS INDEX: 26.82 KG/M2

## 2022-06-25 DIAGNOSIS — I63.9 ACUTE ISCHEMIC STROKE (HCC): ICD-10-CM

## 2022-06-25 DIAGNOSIS — I45.5 SINUS PAUSE: ICD-10-CM

## 2022-06-25 PROCEDURE — 665001 SOC-HOME HEALTH

## 2022-06-25 PROCEDURE — G0493 RN CARE EA 15 MIN HH/HOSPICE: HCPCS

## 2022-06-25 ASSESSMENT — FIBROSIS 4 INDEX: FIB4 SCORE: 2.47

## 2022-06-25 ASSESSMENT — ENCOUNTER SYMPTOMS
VOMITING: DENIES
SUBJECTIVE PAIN PROGRESSION: UNCHANGED
LOWEST PAIN SEVERITY IN PAST 24 HOURS: 0/10
PERSON REPORTING PAIN: PATIENT
NAUSEA: DENIES
HIGHEST PAIN SEVERITY IN PAST 24 HOURS: 0/10
DENIES PAIN: 1
PAIN SEVERITY GOAL: 0/10

## 2022-06-25 NOTE — PROGRESS NOTES
Attempted to call pt and obtain Bp reading. When pt was contacted yesterday she hadn't checked her Bp and her Hx discharge instructions stated ahe should be checking her Bp every day. No VM set up on home number and cell number VM is full- unable to speak with pt at this time.

## 2022-06-27 ENCOUNTER — DOCUMENTATION (OUTPATIENT)
Dept: MEDICAL GROUP | Facility: PHYSICIAN GROUP | Age: 82
End: 2022-06-27

## 2022-06-27 ENCOUNTER — HOME CARE VISIT (OUTPATIENT)
Dept: HOME HEALTH SERVICES | Facility: HOME HEALTHCARE | Age: 82
End: 2022-06-27
Payer: MEDICARE

## 2022-06-27 ENCOUNTER — OFFICE VISIT (OUTPATIENT)
Dept: MEDICAL GROUP | Facility: PHYSICIAN GROUP | Age: 82
End: 2022-06-27
Payer: MEDICARE

## 2022-06-27 VITALS
WEIGHT: 167 LBS | DIASTOLIC BLOOD PRESSURE: 80 MMHG | HEART RATE: 66 BPM | TEMPERATURE: 98.8 F | SYSTOLIC BLOOD PRESSURE: 138 MMHG | HEIGHT: 60 IN | BODY MASS INDEX: 32.79 KG/M2 | OXYGEN SATURATION: 96 %

## 2022-06-27 VITALS
TEMPERATURE: 98.4 F | RESPIRATION RATE: 16 BRPM | OXYGEN SATURATION: 98 % | SYSTOLIC BLOOD PRESSURE: 178 MMHG | DIASTOLIC BLOOD PRESSURE: 88 MMHG | HEART RATE: 66 BPM

## 2022-06-27 DIAGNOSIS — I10 BENIGN ESSENTIAL HTN: ICD-10-CM

## 2022-06-27 DIAGNOSIS — E78.5 DYSLIPIDEMIA: ICD-10-CM

## 2022-06-27 DIAGNOSIS — Z09 HOSPITAL DISCHARGE FOLLOW-UP: Primary | ICD-10-CM

## 2022-06-27 PROCEDURE — 99214 OFFICE O/P EST MOD 30 MIN: CPT | Performed by: NURSE PRACTITIONER

## 2022-06-27 PROCEDURE — G0153 HHCP-SVS OF S/L PATH,EA 15MN: HCPCS

## 2022-06-27 RX ORDER — VIT C/B6/B5/MAGNESIUM/HERB 173 50-5-6-5MG
1 CAPSULE ORAL PRN
COMMUNITY
End: 2022-07-12

## 2022-06-27 RX ORDER — DILTIAZEM HYDROCHLORIDE 240 MG/1
CAPSULE, COATED, EXTENDED RELEASE ORAL
Qty: 90 CAPSULE | Refills: 3 | Status: SHIPPED | OUTPATIENT
Start: 2022-06-27 | End: 2022-06-30

## 2022-06-27 RX ORDER — IRBESARTAN 300 MG/1
300 TABLET ORAL NIGHTLY
Qty: 100 TABLET | Refills: 1 | Status: SHIPPED | OUTPATIENT
Start: 2022-06-27 | End: 2023-02-08 | Stop reason: SDUPTHER

## 2022-06-27 RX ORDER — FENOFIBRATE 145 MG/1
145 TABLET, COATED ORAL DAILY
Qty: 100 TABLET | Refills: 3 | Status: SHIPPED
Start: 2022-06-27 | End: 2023-02-17

## 2022-06-27 RX ORDER — MULTIVIT WITH MINERALS/LUTEIN
1000 TABLET ORAL DAILY
Status: ON HOLD | COMMUNITY
End: 2022-07-15

## 2022-06-27 ASSESSMENT — ENCOUNTER SYMPTOMS
HIGHEST PAIN SEVERITY IN PAST 24 HOURS: 0/10
ANGER WITHIN DEFINED LIMITS: 1
DENIES PAIN: 1
LOWEST PAIN SEVERITY IN PAST 24 HOURS: 0/10
AGGRESSION WITHIN DEFINED LIMITS: 1
PERSON REPORTING PAIN: PATIENT
PAIN SEVERITY GOAL: 0/10

## 2022-06-27 ASSESSMENT — FIBROSIS 4 INDEX: FIB4 SCORE: 2.47

## 2022-06-27 NOTE — PROGRESS NOTES
Medication chart review for [facility] services    Received referral from [facility].   Medications reviewed  compared with discharge summary if available.    Current medication list per [facility]     Current Outpatient Medications:   •  Vitamin C, 1 Each, Oral, DAILY AT 1800  •  Turmeric, 1 Capsule, Oral, PRN  •  Narcosoft Herbal Lax, 6-8 Capsule, Oral, DAILY AT 1800  •  fenofibrate, 145 mg, Oral, DAILY  •  DILTIAZem CD, TAKE 1 CAPSULE BY MOUTH DAILY AT BEDTIME FOR HIGH BLOOD PRESSURE  •  irbesartan, 300 mg, Oral, Nightly  •  non-formulary med, 8 Tablet, Oral, HS PRN  •  memantine, 5 mg, Oral, DAILY  •  Magnesium, 1 Tablet, Oral, DAILY  •  vitamin E, 1,000 Units, Oral, DAILY  •  non-formulary med, 6 Tablet, Oral, DAILY  •  vitamin D3, 1,000 Units, Oral, DAILY  •  fluticasone, USE 2 SPRAYS IN EACH NOSTRIL EVERY DAY (Patient taking differently: 2 Spray, Nasal, DAILY, USE 2 SPRAYS IN EACH NOSTRIL EVERY DAY )  •  loratadine, TAKE ONE TABLET BY MOUTH ONE TIME DAILY AS NEEDED FOR ALLERGY (Patient taking differently: 10 mg, Oral, 1 TIME DAILY PRN, Allergies, TAKE ONE TABLET BY MOUTH ONE TIME DAILY AS NEEDED FOR ALLERGY)  •  aspirin, 81 mg, Oral, DAILY    Location of hospital, and discharge summary date, if applicable:   [facility], discharge summary date 6/22/2022  Discharge medication summary, per discharge summary:             MEDICATIONS ON DISCHARGE          Medication List           START taking these medications      Instructions   vitamin D 1000 Unit Tabs  Start taking on: June 23, 2022  Commonly known as: Cholecalciferol    Take 1 Tablet by mouth every day for 30 days.  Dose: 1,000 Units                  CHANGE how you take these medications      Instructions   DILTIAZem  MG Cp24  What changed:   · how much to take  · how to take this  · when to take this  Commonly known as: CARDIZEM CD    TAKE ONE CAPSULE BY MOUTH EVERY DAY AT BEDTIME (FOR HIGH BLOOD PRESSURE )      fluticasone 50  "MCG/ACT nasal spray  What changed:   · how to take this  · when to take this  · additional instructions  Commonly known as: FLONASE    USE 2 SPRAYS IN EACH NOSTRIL EVERY DAY      irbesartan 150 MG Tabs  What changed: See the new instructions.  Commonly known as: AVAPRO    TAKE ONE TABLET BY MOUTH EVERY MORNING. PATIENT NEEDS TO BE SEEN FOR ANNUAL VISIT FOR FUTURE REFILLS.      loratadine 10 MG Tabs  What changed:   · how much to take  · how to take this  · when to take this  · reasons to take this  Commonly known as: CLARITIN    TAKE ONE TABLET BY MOUTH ONE TIME DAILY AS NEEDED FOR ALLERGY                  CONTINUE taking these medications      Instructions   aspirin 81 MG tablet    Take 81 mg by mouth every day.  Dose: 81 mg      fenofibrate 145 MG Tabs  Commonly known as: TRICOR    Take 1 Tablet by mouth every day.  Dose: 145 mg            Allergies   Allergen Reactions   • Benicar [Olmesartan] Rash     blisters   • Dilaudid  [Hydromorphone Hcl] Vomiting   • Lidocaine    • Norco  [Apap-Fd&C Blue #1-Hydrocodone] Vomiting   • Other Misc Shortness of Breath     Anesthesia with surgery 2016  Pt couldn't breathe while being put under and tapped side of bed to let RN know. \"It was the scariest feeling and I want everyone to know\"     • Percocet  [Apap-Fd&C Red #40 [place]-Oxycodone] Vomiting   • Vicodin  [Apap-Fd&C Yellow #10 [place]-Hydrocodone] Vomiting   • Codeine Vomiting   • Demerol Vomiting   • Hydrocodone Vomiting   • Other Drug Vomiting     \"all pain meds\" per pt can take morphine.   • Oxycodone Vomiting   • Statins [Hmg-Coa-R Inhibitors] Unspecified     Muscle cramping   • Valium Vomiting         Medications/supplements of significance on DC summary but not on home med list   none    Medications/supplements of significance on home med list but not DC summary          Labs     Lab Results   Component Value Date/Time    SODIUM 140 06/22/2022 01:44 AM    POTASSIUM 3.9 06/22/2022 01:44 AM    CHLORIDE 105 06/22/2022 " 01:44 AM    CO2 23 06/22/2022 01:44 AM    GLUCOSE 81 06/22/2022 01:44 AM    BUN 28 (H) 06/22/2022 01:44 AM    CREATININE 1.01 06/22/2022 01:44 AM     Lab Results   Component Value Date/Time    ALKPHOSPHAT 53 06/22/2022 01:44 AM    ASTSGOT 25 06/22/2022 01:44 AM    ALTSGPT 17 06/22/2022 01:44 AM    TBILIRUBIN 0.3 06/22/2022 01:44 AM    INR 1.00 06/20/2022 07:09 PM    ALBUMIN 3.7 06/22/2022 01:44 AM        Assessment for significant drug interactions, drug omissions/additions, duplicative therapies.            CC   Sarita Noble, A.P.R.N.  [address]  [facility] [address]  Fax: [phone]    [facility]  Phone [phone] fax [phone]    This note was created using voice recognition software (Dragon). The accuracy of the dictation is limited by the abilities of the software. I have reviewed the note prior to signing, however some errors in grammar and context are still possible. If you have any questions related to this note please do not hesitate to contact our office.

## 2022-06-28 ENCOUNTER — HOME CARE VISIT (OUTPATIENT)
Dept: HOME HEALTH SERVICES | Facility: HOME HEALTHCARE | Age: 82
End: 2022-06-28
Payer: MEDICARE

## 2022-06-28 ENCOUNTER — TELEPHONE (OUTPATIENT)
Dept: CARDIOLOGY | Facility: MEDICAL CENTER | Age: 82
End: 2022-06-28

## 2022-06-28 VITALS
SYSTOLIC BLOOD PRESSURE: 130 MMHG | DIASTOLIC BLOOD PRESSURE: 78 MMHG | OXYGEN SATURATION: 95 % | HEART RATE: 71 BPM | TEMPERATURE: 97.4 F | RESPIRATION RATE: 16 BRPM

## 2022-06-28 PROCEDURE — G0299 HHS/HOSPICE OF RN EA 15 MIN: HCPCS

## 2022-06-28 PROCEDURE — G0151 HHCP-SERV OF PT,EA 15 MIN: HCPCS

## 2022-06-28 ASSESSMENT — ACTIVITIES OF DAILY LIVING (ADL)
AMBULATION ASSISTANCE: SUPERVISION
AMBULATION_DISTANCE/DURATION_TOLERATED: 250'
PHYSICAL TRANSFERS ASSESSED: 1
AMBULATION ASSISTANCE: 1
AMBULATION ASSISTANCE ON FLAT SURFACES: 1
CURRENT_FUNCTION: SUPERVISION

## 2022-06-28 ASSESSMENT — ENCOUNTER SYMPTOMS
LIMITED RANGE OF MOTION: 1
PAIN SEVERITY GOAL: 0/10
NAUSEA: PT DENIES ANY NAUSEA AT THIS TIME
LOWEST PAIN SEVERITY IN PAST 24 HOURS: 0/10
MUSCLE WEAKNESS: 1
VOMITING: PT DENIES ANY EMESIS AT THIS TIME
PERSON REPORTING PAIN: PATIENT
HIGHEST PAIN SEVERITY IN PAST 24 HOURS: 0/10
DENIES PAIN: 1
PERSON REPORTING PAIN: PATIENT
DENIES PAIN: 1

## 2022-06-28 NOTE — HOME HEALTH
PHYSICAL THERAPY EVALUATION AND PLAN OF CARE     ·       Patient:  Irina Traylor     ·       Home Health Admission due to:  Recent hospital admission for acute left posterior temporal/occipital cortex ischemic stroke.       ·       Living Situation/PLOF:  Patient lives alone in single story home with ramped access.  Has assistance from neighbors on a daily basis.  DME includes 4WW, toilet riser and shower chair.  Patient reports at prior level she was independent with mobility and able to walk up to a mile around the block using 4WW.  She has history of expressive aphasia and some left sided weakness following multiple CVAs.  Bilateral shoulder ROM limited by history of humerus fracture.     ·       Past Medical History:  Multiple CVAs, DARIN, OA, spinal stenosis, HTN, dyslipidemia, expressive aphasia, left humerus fracture (2019)      ·       Skilled Therapeutic Need: Decreased activity tolerance, LE weakness, Gait training and Fall prevention     Recommend skilled HHPT to address deficits and improve function-report sent to MD     ·       Frequency:   1w4, 2 PRN visits,  Effective 6/28/22     ·       Goals:   1.  Patient will be able to perform HEP @ IND level in order to address impaired strength and endurance in 4 visits  2.  Patient will be able to perform sit <-> stand using LRAD @ Mod I in order to improve functional independence in 4 visits  3.  Patient will be able to ambulate x >500' using 4WW over even and uneven surfaces in order to access community in 4 visits  4.  Patient will demonstrate knowledge and use of fall prevention techniques in order to decrease risk for falls in 4 visits    Does the patient get SOB with  exertion?  No    How often (if at all) does pain interfere with patient's movements?  No reports of pain at this time

## 2022-06-28 NOTE — TELEPHONE ENCOUNTER
ZIO patch Return    Caller: Irina    Topic/issue: Irina has a Zio patch, placed while in hospital, she has no information for returning.     Callback Number: [phone]    Thank you,    Letty SEO

## 2022-06-29 ENCOUNTER — TELEPHONE (OUTPATIENT)
Dept: CARDIOLOGY | Facility: MEDICAL CENTER | Age: 82
End: 2022-06-29
Payer: MEDICARE

## 2022-06-29 DIAGNOSIS — I49.5 SICK SINUS SYNDROME (HCC): Chronic | ICD-10-CM

## 2022-06-29 DIAGNOSIS — I45.5 SINUS ARREST: Chronic | ICD-10-CM

## 2022-06-29 NOTE — TELEPHONE ENCOUNTER
Called pt, [phone], to review ADD-MD recommendations, unable to reach.  Unable to leave voicemail as voicemail is full at this time.      Called pt home number listed in chart, [phone], to review ADD-MD recommendations.  She states during the episodes received, Irina was sleeping without her CPAP last night and she knows she is supposed to wear her CPAP, sometimes forgets to wear it.  She states she will having CPAP on tonight.  Reassurance given task will be relayed to schedulers to schedule [facility].  Pt verbalizes understanding and states no other concerns or questions at this time.  Pt is appreciative of information given.    Task deferred to schedulers to schedule for [facility] via staff message.

## 2022-06-29 NOTE — TELEPHONE ENCOUNTER
----- Message from Citlalli Rockwell R.N. sent at 6/29/2022  3:02 PM PDT -----  Per CW, please schedule pt with anyone in the next week for follow up visit for Sinus Pause seen on her BioTel

## 2022-06-29 NOTE — TELEPHONE ENCOUNTER
Unable to leave voicemail on either phone. Home phone was picked up, hung up and went straight to  when I called, but then it said  not set up. No MyChart.   SLC

## 2022-06-29 NOTE — TELEPHONE ENCOUNTER
Let her know that her monitor did show some pauses in her heart rhythm is very likely that she would remove the pacemaker she should see us as soon as possible.  If she has any near syncope or syncopal events she should just go to the emergency room for immediate evaluation..  In the interim she should hold her diltiazem and monitor her blood pressures

## 2022-06-30 ENCOUNTER — TELEPHONE (OUTPATIENT)
Dept: CARDIOLOGY | Facility: MEDICAL CENTER | Age: 82
End: 2022-06-30
Payer: MEDICARE

## 2022-06-30 ENCOUNTER — OFFICE VISIT (OUTPATIENT)
Dept: CARDIOLOGY | Facility: MEDICAL CENTER | Age: 82
End: 2022-06-30
Payer: MEDICARE

## 2022-06-30 ENCOUNTER — HOME CARE VISIT (OUTPATIENT)
Dept: HOME HEALTH SERVICES | Facility: HOME HEALTHCARE | Age: 82
End: 2022-06-30
Payer: MEDICARE

## 2022-06-30 VITALS
HEIGHT: 60 IN | RESPIRATION RATE: 14 BRPM | SYSTOLIC BLOOD PRESSURE: 142 MMHG | HEART RATE: 80 BPM | OXYGEN SATURATION: 95 % | BODY MASS INDEX: 32.47 KG/M2 | WEIGHT: 165.4 LBS | DIASTOLIC BLOOD PRESSURE: 90 MMHG

## 2022-06-30 VITALS
SYSTOLIC BLOOD PRESSURE: 150 MMHG | TEMPERATURE: 98.2 F | HEART RATE: 71 BPM | OXYGEN SATURATION: 94 % | DIASTOLIC BLOOD PRESSURE: 102 MMHG | RESPIRATION RATE: 16 BRPM

## 2022-06-30 DIAGNOSIS — I45.5 SINUS ARREST: Chronic | ICD-10-CM

## 2022-06-30 PROCEDURE — G0153 HHCP-SVS OF S/L PATH,EA 15MN: HCPCS

## 2022-06-30 PROCEDURE — 99214 OFFICE O/P EST MOD 30 MIN: CPT | Performed by: NURSE PRACTITIONER

## 2022-06-30 ASSESSMENT — ENCOUNTER SYMPTOMS
LOWEST PAIN SEVERITY IN PAST 24 HOURS: 0/10
FEVER: 0
WHEEZING: 0
PAIN SEVERITY GOAL: 0/10
NAUSEA: 0
SPUTUM PRODUCTION: 0
ORTHOPNEA: 0
CHILLS: 0
SPEECH CHANGE: 1
HEADACHES: 0
SHORTNESS OF BREATH: 0
COUGH: 0
HEMOPTYSIS: 0
CLAUDICATION: 0
DIZZINESS: 0
DENIES PAIN: 1
PALPITATIONS: 0
PND: 0
VOMITING: 0
HIGHEST PAIN SEVERITY IN PAST 24 HOURS: 0/10
PERSON REPORTING PAIN: PATIENT

## 2022-06-30 ASSESSMENT — FIBROSIS 4 INDEX: FIB4 SCORE: 2.47

## 2022-06-30 NOTE — PROGRESS NOTES
Chief Complaint   Patient presents with   • Peripheral Vascular Disease (PVD)   • Dyslipidemia   • Hypertension     F/V Dx:Benign essential HTN       Subjective     Irina Traylor is a 81 y.o. female who presents today after being hospitalized June 20 20-22, 2022 for possible stroke.  The patient started having dysphagia and slurred speech and took 324 mg of ASA.  She does not take blood thinners.  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  A Zio patch was placed prior to discharge.  She comes in today as a new patient to cardiology having never been seen by cardiology in the hospital or outpatient previously.  She has had numerous 3-5.1-second pauses and ultimately had a 6.3-second pause which was reviewed by Dr. Zhao.  Patient has a known history of DARIN and is supposed to be using CPAP.  She had not been using it for a while but last night reports that she was using it all night until 530.  It is somewhat difficult to ascertain if she took it off earlier.  Pauses happened around 2:15 in the AM and 5:30 AM.  Patient reports being completely asymptomatic.  She states she did not wake up dizzy or lightheaded and she does not feel that she woke up around 2:15 in the morning but did wake up around 530 for nocturia.    Past Medical History:   Diagnosis Date   • Anesthesia     States feels like she can't breathe with induction for right hip surgery, gets nausea vomiting with most pain meds.  Uses anti-nausea medicine if takes morphine   • Arthritis     To hips, shoulders, hands   • Benign essential HTN 9/7/2017   • Cataract    • CPAP (continuous positive airway pressure) dependence    • Dyslipidemia 3/5/2019   • Expressive aphasia     Related to CVA   • Family history of CVA    • Heart murmur    • DARIN (obstructive sleep apnea) 1/26/2017    AHI 23.6, minimum saturation 69%, on CPAP 15 cm.   • Post-nasal drip    • Restless leg syndrome     4/25/17-Resolved, states was related to right hip and none S/P  replacement.   • Sick sinus syndrome (HCC) 6/29/2022   • Sinus arrest 6/29/2022   • Stroke ([facility]) 2/1993, 8/1993    Biceryxg-ezphnr-uyozsuulux aphasia,mouth forming words, reading, numbers   • Walker as ambulation aid      Past Surgical History:   Procedure Laterality Date   • PB RECONSTR TOTAL SHOULDER IMPLANT Left 3/21/2019    Procedure: ARTHROPLASTY, SHOULDER, TOTAL-  REVISION REVERSE;  Surgeon: Yoshi Gamble M.D.;  Location: [facility];  Service: Orthopedics   • ORIF, FRACTURE, HUMERUS Left 3/21/2019    Procedure: ORIF, FRACTURE, HUMERUS;  Surgeon: Yoshi Gamble M.D.;  Location: [facility];  Service: Orthopedics   • KNEE ARTHROPLASTY TOTAL Left 2/12/2018    Procedure: KNEE ARTHROPLASTY TOTAL;  Surgeon: Earle Jacobsen M.D.;  Location: [facility];  Service: Orthopedics   • CERVICAL FUSION POSTERIOR  8/8/2017    Procedure: CERVICAL FUSION POSTERIOR- C3-5, INSTRUMENTED;  Surgeon: Florentino Pike M.D.;  Location: [facility];  Service:    • CERVICAL LAMINECTOMY POSTERIOR  8/8/2017    Procedure: CERVICAL LAMINECTOMY POSTERIOR- C3-5;  Surgeon: Florentino Pike M.D.;  Location: [facility];  Service:    • HIP ARTHROPLASTY TOTAL Left 5/8/2017    Procedure: HIP ARTHROPLASTY TOTAL;  Surgeon: Earle Jacobsen M.D.;  Location: [facility];  Service:    • HIP ARTHROPLASTY TOTAL Right 12/12/2016    Procedure: HIP ARTHROPLASTY TOTAL;  Surgeon: Earle Jacobsen M.D.;  Location: [facility];  Service:    • FUSION, SPINE, LUMBAR, PLIF  9/26/2016    Procedure: LUMBAR FUSION POSTERIOR L2-S1 onlay ;  Surgeon: Florentino Pike M.D.;  Location: [facility];  Service:    • LUMBAR LAMINECTOMY DISKECTOMY N/A 9/26/2016    Procedure: LUMBAR LAMINECTOMY DISKECTOMY L2-S1;  Surgeon: Florentino Pike M.D.;  Location: [facility];  Service:    • COLONOSCOPY  2/2016    Every 10 years   • SHOULDER  ARTHROPLASTY TOTAL Left 8/11/2015    Procedure: SHOULDER ARTHROPLASTY REVERSE TOTAL;  Surgeon: Yoshi Gamble M.D.;  Location: [facility];  Service:    • KNEE ARTHROPLASTY TOTAL  6/23/2014    Performed by Earle Jacobsen M.D. at SURGERY [facility]   • HAMMERTOE CORRECTION Left 11/12/2007    Removed 3rd toe   • LUMBAR LAMINECTOMY DISKECTOMY  1993    Lumbar   • CARPAL TUNNEL RELEASE Bilateral 1993   • CERVICAL DISK AND FUSION ANTERIOR  1993    X2   • HYSTERECTOMY, TOTAL ABDOMINAL  1977   • BUNIONECTOMY Bilateral 1980's   • HAMMERTOE CORRECTION Right 1980's    Removed 3rd toe   • ORIF, FINGER Left as child    Index     Family History   Problem Relation Age of Onset   • Cancer Mother         vaginal   • Stroke Father    • Hypertension Brother    • Diabetes Brother    • Hypertension Brother    • Hypertension Brother    • Stroke Daughter      Social History     Socioeconomic History   • Marital status: Single     Spouse name: Not on file   • Number of children: Not on file   • Years of education: Not on file   • Highest education level: Not on file   Occupational History   • Not on file   Tobacco Use   • Smoking status: Never Smoker   • Smokeless tobacco: Never Used   Vaping Use   • Vaping Use: Never used   Substance and Sexual Activity   • Alcohol use: Not Currently   • Drug use: No   • Sexual activity: Not Currently   Other Topics Concern   • Not on file   Social History Narrative    Retired LVN at .  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  She is currently driving without any accidents.  She does use a walker to ambulate.  Not having any falls.  Does not have hearing aids but is having hearing concerns.  Some increased aphasia especially since her history of CVAs and memory issues.  March 2019 she was hospitalized from her fracture of her left arm and did have home health which she no longer has right now.  She does not have family here but she does have neighbors who help her out.     Social Determinants of  "Health     Financial Resource Strain: Not on file   Food Insecurity: Not on file   Transportation Needs: Not on file   Physical Activity: Not on file   Stress: Not on file   Social Connections: Not on file   Intimate Partner Violence: Not on file   Housing Stability: Not on file     Allergies   Allergen Reactions   • Benicar [Olmesartan] Rash     blisters   • Dilaudid  [Hydromorphone Hcl] Vomiting   • Lidocaine    • Norco  [Apap-Fd&C Blue #1-Hydrocodone] Vomiting   • Other Misc Shortness of Breath     Anesthesia with surgery 2016  Pt couldn't breathe while being put under and tapped side of bed to let RN know. \"It was the scariest feeling and I want everyone to know\"     • Percocet  [Apap-Fd&C Red #40 [place]-Oxycodone] Vomiting   • Vicodin  [Apap-Fd&C Yellow #10 [place]-Hydrocodone] Vomiting   • Codeine Vomiting   • Demerol Vomiting   • Hydrocodone Vomiting   • Other Drug Vomiting     \"all pain meds\" per pt can take morphine.   • Oxycodone Vomiting   • Statins [Hmg-Coa-R Inhibitors] Unspecified     Muscle cramping   • Valium Vomiting     Outpatient Encounter Medications as of 6/30/2022   Medication Sig Dispense Refill   • Ascorbic Acid (VITAMIN C) 1000 MG Tab Take 1 Each by mouth every day at 6 PM.     • Turmeric 500 MG Cap Take 1 Capsule by mouth as needed (take when she is hurting).     • Misc Natural Products (NARCOSOFT HERBAL LAX) Cap Take 6-8 Capsules by mouth every day at 6 PM. Herb-Lax     • fenofibrate (TRICOR) 145 MG Tab Take 1 Tablet by mouth every day. 100 Tablet 3   • irbesartan (AVAPRO) 300 MG Tab Take 1 Tablet by mouth every evening. 100 Tablet 1   • non-formulary med Take 8 Tablets by mouth at bedtime as needed (take for constipation). VITMIN SUPPLEMENT- herb-lax     • memantine (NAMENDA) 5 MG Tab Take 5 mg by mouth every day. FOR MEMORY AND PRE ALTZHEIMERS.     • Magnesium 400 MG Tab Take 1 Tablet by mouth every day.     • vitamin E (VITAMIN E) 1000 Unit (450 mg) Cap Take 1,000 Units by mouth every " day.     • non-formulary med Take 6 Tablets by mouth every day. VITSMIN SUPPLEMENT     • vitamin D (VITAMIND D3) 1000 UNIT Tab Take 1 Tablet by mouth every day for 30 days. 30 Tablet 0   • fluticasone (FLONASE) 50 MCG/ACT nasal spray USE 2 SPRAYS IN EACH NOSTRIL EVERY DAY (Patient taking differently: Administer 2 Sprays into affected nostril(S) every day. USE 2 SPRAYS IN EACH NOSTRIL EVERY DAY) 48 g 0   • loratadine (CLARITIN) 10 MG Tab TAKE ONE TABLET BY MOUTH ONE TIME DAILY AS NEEDED FOR ALLERGY (Patient taking differently: Take 10 mg by mouth 1 time a day as needed (Allergies). TAKE ONE TABLET BY MOUTH ONE TIME DAILY AS NEEDED FOR ALLERGY) 90 Tablet 3   • aspirin 81 MG tablet Take 81 mg by mouth every day.     • [DISCONTINUED] DILTIAZem CD (CARDIZEM CD) 240 MG CAPSULE SR 24 HR TAKE 1 CAPSULE BY MOUTH DAILY AT BEDTIME FOR HIGH BLOOD PRESSURE (Patient not taking: Reported on 6/30/2022) 90 Capsule 3     No facility-administered encounter medications on file as of 6/30/2022.     Review of Systems   Constitutional: Negative for chills and fever.   Respiratory: Negative for cough, hemoptysis, sputum production, shortness of breath and wheezing.    Cardiovascular: Negative for chest pain, palpitations, orthopnea, claudication, leg swelling and PND.   Gastrointestinal: Negative for nausea and vomiting.   Neurological: Positive for speech change. Negative for dizziness and headaches.   All other systems reviewed and are negative.             Objective     BP (!) 142/90 (BP Location: Left arm, Patient Position: Sitting, BP Cuff Size: Adult)   Pulse 80   Resp 14   Ht 1.524 m (5')   Wt 75 kg (165 lb 6.4 oz)   SpO2 95%   BMI 32.30 kg/m²     Physical Exam  Vitals and nursing note reviewed.   Constitutional:       Appearance: She is well-developed.   Neck:      Vascular: No JVD.   Cardiovascular:      Rate and Rhythm: Normal rate and regular rhythm.      Heart sounds: Normal heart sounds. No murmur heard.  Pulmonary:       Effort: Pulmonary effort is normal.      Breath sounds: Normal breath sounds.   Abdominal:      Palpations: Abdomen is soft.   Musculoskeletal:      Cervical back: Neck supple.   Skin:     General: Skin is warm and dry.   Neurological:      Mental Status: Mental status is at baseline.      Comments: Cranial nerves II-XII WNL   Psychiatric:         Thought Content: Thought content normal.                Assessment & Plan     1. Sinus arrest         Medical Decision Making: Today's Assessment/Status/Plan:   I have discussed this case with Dr. Freire who feels it should be reviewed by electrophysiology to determine need for PPM.  The patient is asymptomatic but it is somewhat difficult to ascertain whether or not she was using her CPAP or not during the night.

## 2022-06-30 NOTE — TELEPHONE ENCOUNTER
Able to reach PT- thinks she is okay, declined to schedule follow up but requested our nurses to call her at home nurse when she arrives at 10:30AM today. I will pass note along to PT's cardio nurse.   SLC

## 2022-07-01 ENCOUNTER — TELEPHONE (OUTPATIENT)
Dept: CARDIOLOGY | Facility: MEDICAL CENTER | Age: 82
End: 2022-07-01
Payer: MEDICARE

## 2022-07-01 ENCOUNTER — HOME CARE VISIT (OUTPATIENT)
Dept: HOME HEALTH SERVICES | Facility: HOME HEALTHCARE | Age: 82
End: 2022-07-01
Payer: MEDICARE

## 2022-07-01 VITALS
DIASTOLIC BLOOD PRESSURE: 82 MMHG | RESPIRATION RATE: 16 BRPM | SYSTOLIC BLOOD PRESSURE: 152 MMHG | HEART RATE: 89 BPM | TEMPERATURE: 98.6 F | OXYGEN SATURATION: 98 %

## 2022-07-01 DIAGNOSIS — I45.5 SINUS ARREST: ICD-10-CM

## 2022-07-01 DIAGNOSIS — I49.5 SICK SINUS SYNDROME (HCC): ICD-10-CM

## 2022-07-01 DIAGNOSIS — I45.9 HEART BLOCK: ICD-10-CM

## 2022-07-01 PROCEDURE — G0299 HHS/HOSPICE OF RN EA 15 MIN: HCPCS

## 2022-07-01 ASSESSMENT — ENCOUNTER SYMPTOMS
PERSON REPORTING PAIN: PATIENT
DENIES PAIN: 1
NAUSEA: PT DENIES ANY NAUSEA AT THIS TIME
HIGHEST PAIN SEVERITY IN PAST 24 HOURS: 0/10
LOWEST PAIN SEVERITY IN PAST 24 HOURS: 0/10
PAIN SEVERITY GOAL: 0/10
VOMITING: PT DENIES ANY EMESIS AT THIS TIME

## 2022-07-01 ASSESSMENT — ACTIVITIES OF DAILY LIVING (ADL): OASIS_M1830: 03

## 2022-07-01 NOTE — CASE COMMUNICATION
Quality Review Completed for SOC 6/25 OASIS by KYUNG Kennedy RN on 7/1/2022:  Edits completed by KYUNG Kennedy RN:  1.  changed to NA for no LSOC ordered.  2.  is 6/22 per intake for valid referral   3.  is 6/22/22 date of DC  4. Flu is NA  5. Narrative reportsing mod level of assist, changed  to 3,  to 1,   to 2, [id number] to 3  6.  changed to 3 per guidelines when ambulation is supervised  7.  [facility] ed to 12 per therapy  8. Added exercises prescribed to Activities Permitted, incontinence and hearing to Functional Limitations, ambulate only w/assist to Safety Measures and F2F to 485 forms  9. Resolved depression, PHQ-2 does not indicate depression and no valid diagnosis or meds  10. Checked completed interventions for depression, suicide, VS and high risk meds  11. Added HTN and PU intervetions to care plan

## 2022-07-01 NOTE — TELEPHONE ENCOUNTER
----- Message from Laura Zuñiga R.N. sent at 7/1/2022  2:35 PM PDT -----  Yes. Please keep NP appt with Marina as Tara Garcia is leaving and she will need a new Gen Cardiologist. Referral to EP is just for PPM placement discussion.    Thanks!  Tia  ----- Message -----  From: Shannen Gonzalez  Sent: 7/1/2022   2:26 PM PDT  To: Laura Zuñiga R.N.    I was able to schedule this nice PT for 8/31- a little sooner than what she already had for September. =)   My Question is does she still need that new patient appt with Marina on 7/29?   ----- Message -----  From: Laura Zuñiga R.N.  Sent: 7/1/2022   2:03 PM PDT  To: MELISSA Tripathi, #    Tara,    Can I place referral to EP under your name?     Tia    Schedulers. Please call patient and get new patient appointment with EP.   ----- Message -----  From: Rosana Wolfe M.D.  Sent: 7/1/2022   1:58 PM PDT  To: Laura Zuñiga R.N., Dakotah Knowles M.D., #    Can be seen in the office.   ----- Message -----  From: Laura Zuñiga R.N.  Sent: 7/1/2022   7:57 AM PDT  To: Rosana Wolfe M.D., Dakotah Knowles M.D., #    Please advise.  ----- Message -----  From: MELISSA Tripathi  Sent: 7/1/2022   7:38 AM PDT  To: Laura Zuñiga R.N.    Pt is new to clinic. She had monitor placed in hospital. She has had numerous pauses 3-5.1 seconds and a 6.3 second pause. I saw her yesterday as a new pt. Discussed situation with Dr Freire who wanted me to talk with EP on what to do. Pt has had several CVAs and is somewhat difficult to follow. She has DARIN and states she was wearing CPAP during the 3-5.1 s pauses. The 6.3 s pause I could not get her to answer whether or not she was wearing CPAP. She is completely asymptomatic. Should she be scheduled for PPM?

## 2022-07-01 NOTE — TELEPHONE ENCOUNTER
----- Message from MELISSA Tripathi sent at 7/1/2022  2:57 PM PDT -----  Yes please.  Thanks for following up. Tara  ----- Message -----  From: Laura Zuñiga R.N.  Sent: 7/1/2022   2:03 PM PDT  To: MELISSA Tripathi, #    Tara,    Can I place referral to EP under your name?     Tia    Schedulers. Please call patient and get new patient appointment with EP.   ----- Message -----  From: Rosana Wolfe M.D.  Sent: 7/1/2022   1:58 PM PDT  To: Laura Zuñiga R.N., Dakotah Knowles M.D., #    Can be seen in the office.   ----- Message -----  From: Laura Zuñiga R.N.  Sent: 7/1/2022   7:57 AM PDT  To: Rosana Wolfe M.D., Dakotah Knowles M.D., #    Please advise.  ----- Message -----  From: MELISSA Tripathi  Sent: 7/1/2022   7:38 AM PDT  To: Laura Zuñiga R.N.    Pt is new to clinic. She had monitor placed in hospital. She has had numerous pauses 3-5.1 seconds and a 6.3 second pause. I saw her yesterday as a new pt. Discussed situation with Dr Freire who wanted me to talk with EP on what to do. Pt has had several CVAs and is somewhat difficult to follow. She has DARIN and states she was wearing CPAP during the 3-5.1 s pauses. The 6.3 s pause I could not get her to answer whether or not she was wearing CPAP. She is completely asymptomatic. Should she be scheduled for PPM?

## 2022-07-02 ENCOUNTER — HOME CARE VISIT (OUTPATIENT)
Dept: HOME HEALTH SERVICES | Facility: HOME HEALTHCARE | Age: 82
End: 2022-07-02
Payer: MEDICARE

## 2022-07-04 NOTE — CASE COMMUNICATION
I agree with changes.  ----- Message -----  From: Bridget Kennedy R.N.  Sent: 7/1/2022   9:16 AM PDT  To: Letitia Griffihts R.N.      Quality Review Completed for SOC 6/25 OASIS by KYUNG Kennedy, RN on 7/1/2022:  Edits completed by KYUNG Kennedy RN:  1.  changed to NA for no LSOC ordered.  2.  is 6/22 per intake for valid referral   3.  is 6/22/22 date of DC  4. Flu is NA  5. Narrative reportsing mod level of assist,  anged  to 3,  to 1,   to 2, [id number] to 3  6.  changed to 3 per guidelines when ambulation is supervised  7.  changed to 12 per therapy  8. Added exercises prescribed to Activities Permitted, incontinence and hearing to Functional Limitations, ambulate only w/assist to Safety Measures and F2F to 485 forms  9. Resolved depression, PHQ-2 does not indicate depression and no valid diagnosis or meds  10. Checked c ompleted interventions for depression, suicide, VS and high risk meds  11. Added HTN and PU intervetions to care plan

## 2022-07-05 ENCOUNTER — HOME CARE VISIT (OUTPATIENT)
Dept: HOME HEALTH SERVICES | Facility: HOME HEALTHCARE | Age: 82
End: 2022-07-05
Payer: MEDICARE

## 2022-07-05 VITALS
SYSTOLIC BLOOD PRESSURE: 141 MMHG | DIASTOLIC BLOOD PRESSURE: 79 MMHG | HEART RATE: 67 BPM | TEMPERATURE: 97.4 F | OXYGEN SATURATION: 99 % | RESPIRATION RATE: 16 BRPM

## 2022-07-05 VITALS
HEART RATE: 67 BPM | OXYGEN SATURATION: 99 % | RESPIRATION RATE: 16 BRPM | TEMPERATURE: 97.4 F | SYSTOLIC BLOOD PRESSURE: 146 MMHG | DIASTOLIC BLOOD PRESSURE: 82 MMHG

## 2022-07-05 PROCEDURE — G0153 HHCP-SVS OF S/L PATH,EA 15MN: HCPCS

## 2022-07-05 PROCEDURE — G0299 HHS/HOSPICE OF RN EA 15 MIN: HCPCS

## 2022-07-05 PROCEDURE — G0152 HHCP-SERV OF OT,EA 15 MIN: HCPCS

## 2022-07-05 ASSESSMENT — ENCOUNTER SYMPTOMS
DENIES PAIN: 1
PAIN SEVERITY GOAL: 0/10
LOWEST PAIN SEVERITY IN PAST 24 HOURS: 0/10
HIGHEST PAIN SEVERITY IN PAST 24 HOURS: 0/10
NAUSEA: PT DENIES ANY NAUSEA AT THIS TIME
VOMITING: PT DENIES ANY EMESIS AT THIS TIME
PERSON REPORTING PAIN: PATIENT

## 2022-07-06 SDOH — ECONOMIC STABILITY: HOUSING INSECURITY: HOME SAFETY: PT REPORTS NO CHANGES TO MEDICATIONS AND NO FALLS SINCE LAST HH VISIT.

## 2022-07-06 ASSESSMENT — ENCOUNTER SYMPTOMS: DENIES PAIN: 1

## 2022-07-07 ENCOUNTER — HOME CARE VISIT (OUTPATIENT)
Dept: HOME HEALTH SERVICES | Facility: HOME HEALTHCARE | Age: 82
End: 2022-07-07
Payer: MEDICARE

## 2022-07-07 VITALS
DIASTOLIC BLOOD PRESSURE: 70 MMHG | SYSTOLIC BLOOD PRESSURE: 128 MMHG | TEMPERATURE: 97.4 F | HEART RATE: 78 BPM | RESPIRATION RATE: 16 BRPM | OXYGEN SATURATION: 97 %

## 2022-07-07 VITALS
OXYGEN SATURATION: 99 % | HEART RATE: 67 BPM | DIASTOLIC BLOOD PRESSURE: 79 MMHG | RESPIRATION RATE: 16 BRPM | TEMPERATURE: 97.4 F | SYSTOLIC BLOOD PRESSURE: 141 MMHG

## 2022-07-07 VITALS
OXYGEN SATURATION: 95 % | RESPIRATION RATE: 15 BRPM | DIASTOLIC BLOOD PRESSURE: 88 MMHG | HEART RATE: 61 BPM | TEMPERATURE: 98.1 F | SYSTOLIC BLOOD PRESSURE: 149 MMHG

## 2022-07-07 PROCEDURE — G0151 HHCP-SERV OF PT,EA 15 MIN: HCPCS

## 2022-07-07 PROCEDURE — G0153 HHCP-SVS OF S/L PATH,EA 15MN: HCPCS

## 2022-07-07 PROCEDURE — G0299 HHS/HOSPICE OF RN EA 15 MIN: HCPCS

## 2022-07-07 ASSESSMENT — ENCOUNTER SYMPTOMS
DENIES PAIN: 1
PERSON REPORTING PAIN: PATIENT

## 2022-07-07 ASSESSMENT — ACTIVITIES OF DAILY LIVING (ADL)
SHOPPING: NEEDS ASSISTANCE
LIGHT HOUSEKEEPING: NEEDS ASSISTANCE
BATHING ASSESSED: 1
BATHING_CURRENT_FUNCTION: SUPERVISION
DRESSING_UB_CURRENT_FUNCTION: INDEPENDENT
DRESSING_LB_CURRENT_FUNCTION: INDEPENDENT
HOUSEKEEPING ASSESSED: 1
SHOPPING ASSESSED: 1
BATHING_CURRENT_FUNCTION: STAND BY ASSIST

## 2022-07-08 ENCOUNTER — TELEPHONE (OUTPATIENT)
Dept: CARDIOLOGY | Facility: MEDICAL CENTER | Age: 82
End: 2022-07-08
Payer: MEDICARE

## 2022-07-08 ENCOUNTER — HOME CARE VISIT (OUTPATIENT)
Dept: HOME HEALTH SERVICES | Facility: HOME HEALTHCARE | Age: 82
End: 2022-07-08
Payer: MEDICARE

## 2022-07-08 VITALS
HEART RATE: 78 BPM | OXYGEN SATURATION: 97 % | TEMPERATURE: 97.4 F | DIASTOLIC BLOOD PRESSURE: 70 MMHG | SYSTOLIC BLOOD PRESSURE: 128 MMHG | RESPIRATION RATE: 16 BRPM

## 2022-07-08 DIAGNOSIS — I10 BENIGN ESSENTIAL HTN: ICD-10-CM

## 2022-07-08 RX ORDER — DILTIAZEM HYDROCHLORIDE 240 MG/1
CAPSULE, COATED, EXTENDED RELEASE ORAL
Qty: 90 CAPSULE | Refills: 3 | Status: SHIPPED
Start: 2022-07-08 | End: 2022-07-12

## 2022-07-08 SDOH — ECONOMIC STABILITY: HOUSING INSECURITY
HOME SAFETY: PT REPORTS NO CHANGES TO MEDICATIONS AND NO FALLS SINCE LAST HH VISIT.  SHE HAS A NEW EMERGENCY PENDANT SYSTEM.

## 2022-07-08 ASSESSMENT — ENCOUNTER SYMPTOMS
MUSCLE WEAKNESS: 1
LIMITED RANGE OF MOTION: 1
DENIES PAIN: 1
DENIES PAIN: 1

## 2022-07-08 ASSESSMENT — ACTIVITIES OF DAILY LIVING (ADL): AMBULATION ASSISTANCE: STAND BY ASSIST

## 2022-07-08 NOTE — TELEPHONE ENCOUNTER
DB    Caller: Irina    Reported Symptoms: Pt called and stated that her PCP upped the dosage of her script of irbesartan (AVAPRO) 300 MG Tab from what DB had orginally at 150 MG.    Recent Blood Pressure/Heart Rate Readin2022m - 124/79 - 69  m - 158/91 - 62  2022m    164/88 -     Callback Number: [phone]

## 2022-07-08 NOTE — TELEPHONE ENCOUNTER
Gabe Gray M.D.  You 3 minutes ago (4:13 PM)         Hi yes, then we can increase irbesartan to 300 mg. Thank you!    Message text        You  Gabe rGay M.D. 8 minutes ago (4:08 PM)         Patient currently taking 150 MG Irbesartan and 250 MG Diltiazem daily. BP remains >/= 140 systolic    Message text        Gabe Gray M.D.  You 16 minutes ago (4:00 PM)         Can you let me know what exactly she is taking currently?     If she is only taking 150 and no diltiazem, and her BP is 140s, yes increase back to 300. Thank you.

## 2022-07-08 NOTE — TELEPHONE ENCOUNTER
Phone Number Called: [phone]    Call outcome: Spoke to patient regarding message below.    Message: Called to inform patient to increase her Irbesartan to 300 MG in the morning and continue her Diltiazem 240 MG. Patient verbalizes understanding.

## 2022-07-08 NOTE — TELEPHONE ENCOUNTER
DB    Caller: Jennifer Home Health Nurse    Name and Department: [facility]    Topic/Issue: HTN    Jennifer would like a call back to discuss the patients HTN and medication management. Please advise.    Thank you,  Edgar MUHAMMAD.      Callback Number or Extension:   PERSONAL PH IN ROUTING

## 2022-07-08 NOTE — TELEPHONE ENCOUNTER
Phone Number Called: [phone]    Call outcome: VM box full. Unable to leave VM    Message: Called to follow up with patient regarding her BP and increased medication dose.

## 2022-07-08 NOTE — TELEPHONE ENCOUNTER
"Phone Number Called: Personal Phone number    Call outcome: Spoke to HH RN Jennifer    Message: Called to follow up regarding patient BP reading and increased medication dose. During the visit yesterday it was discovered she saw PCP and Cards with-in days of each other. PCP ended up changing dose of Irbesartan up to 300 MG. A couple days later cards d/c'd her cardizem. On July 4 pt self resumed Cardizem in result of elevated BP. Patient also self lowered her irbesartan back 150 MG because she \"felt her PCP's dose was wrong\". Neurology's goal for BP is 110's-140 systolic. Patient BP has been over 140's in her log per HH RN.     To HK: Any recommendations as ADD. Pt of DB. Should patient resume 300 MG dose Irbesartan? Diltiazem notes state D/C'd due to patient choice. Patient does not recall saying that, but she did stop taking the Diltiazem until July 4th.     "

## 2022-07-10 ASSESSMENT — ENCOUNTER SYMPTOMS
PERSON REPORTING PAIN: PATIENT
DENIES PAIN: 1

## 2022-07-11 ENCOUNTER — HOME CARE VISIT (OUTPATIENT)
Dept: HOME HEALTH SERVICES | Facility: HOME HEALTHCARE | Age: 82
End: 2022-07-11
Payer: MEDICARE

## 2022-07-12 ENCOUNTER — HOME CARE VISIT (OUTPATIENT)
Dept: HOME HEALTH SERVICES | Facility: HOME HEALTHCARE | Age: 82
End: 2022-07-12
Payer: MEDICARE

## 2022-07-12 ENCOUNTER — OFFICE VISIT (OUTPATIENT)
Dept: CARDIOLOGY | Facility: MEDICAL CENTER | Age: 82
End: 2022-07-12
Payer: MEDICARE

## 2022-07-12 ENCOUNTER — HOSPITAL ENCOUNTER (INPATIENT)
Facility: MEDICAL CENTER | Age: 82
LOS: 3 days | DRG: 244 | End: 2022-07-15
Attending: EMERGENCY MEDICINE | Admitting: INTERNAL MEDICINE
Payer: MEDICARE

## 2022-07-12 VITALS
OXYGEN SATURATION: 98 % | RESPIRATION RATE: 18 BRPM | BODY MASS INDEX: 33.18 KG/M2 | HEIGHT: 60 IN | SYSTOLIC BLOOD PRESSURE: 98 MMHG | WEIGHT: 169 LBS | HEART RATE: 47 BPM | DIASTOLIC BLOOD PRESSURE: 58 MMHG

## 2022-07-12 VITALS
SYSTOLIC BLOOD PRESSURE: 152 MMHG | TEMPERATURE: 98.2 F | OXYGEN SATURATION: 99 % | RESPIRATION RATE: 16 BRPM | DIASTOLIC BLOOD PRESSURE: 88 MMHG | HEART RATE: 58 BPM

## 2022-07-12 VITALS
OXYGEN SATURATION: 99 % | RESPIRATION RATE: 16 BRPM | HEART RATE: 66 BPM | SYSTOLIC BLOOD PRESSURE: 157 MMHG | DIASTOLIC BLOOD PRESSURE: 95 MMHG | TEMPERATURE: 98.2 F

## 2022-07-12 DIAGNOSIS — I45.5 SINUS ARREST: ICD-10-CM

## 2022-07-12 DIAGNOSIS — I49.5 SSS (SICK SINUS SYNDROME) (HCC): ICD-10-CM

## 2022-07-12 DIAGNOSIS — I10 BENIGN ESSENTIAL HTN: ICD-10-CM

## 2022-07-12 DIAGNOSIS — I63.9 ACUTE ISCHEMIC STROKE (HCC): ICD-10-CM

## 2022-07-12 DIAGNOSIS — I49.5 SICK SINUS SYNDROME (HCC): Chronic | ICD-10-CM

## 2022-07-12 DIAGNOSIS — Z86.73 HISTORY OF ISCHEMIC LEFT MCA STROKE: ICD-10-CM

## 2022-07-12 DIAGNOSIS — R47.01 APHASIA: ICD-10-CM

## 2022-07-12 DIAGNOSIS — I45.9 HEART BLOCK: ICD-10-CM

## 2022-07-12 DIAGNOSIS — R00.1 SYMPTOMATIC BRADYCARDIA: ICD-10-CM

## 2022-07-12 DIAGNOSIS — I69.320 APHASIA DUE TO OLD EMBOLIC STROKE: ICD-10-CM

## 2022-07-12 DIAGNOSIS — I48.91 ATRIAL FIBRILLATION, NEW ONSET (HCC): ICD-10-CM

## 2022-07-12 DIAGNOSIS — I49.5 SICK SINUS SYNDROME (HCC): ICD-10-CM

## 2022-07-12 LAB
ALBUMIN SERPL BCP-MCNC: 4.3 G/DL (ref 3.2–4.9)
ALBUMIN/GLOB SERPL: 1.4 G/DL
ALP SERPL-CCNC: 51 U/L (ref 30–99)
ALT SERPL-CCNC: 17 U/L (ref 2–50)
ANION GAP SERPL CALC-SCNC: 11 MMOL/L (ref 7–16)
AST SERPL-CCNC: 20 U/L (ref 12–45)
BASOPHILS # BLD AUTO: 0.8 % (ref 0–1.8)
BASOPHILS # BLD: 0.05 K/UL (ref 0–0.12)
BILIRUB SERPL-MCNC: 0.3 MG/DL (ref 0.1–1.5)
BUN SERPL-MCNC: 33 MG/DL (ref 8–22)
CALCIUM SERPL-MCNC: 9.8 MG/DL (ref 8.5–10.5)
CHLORIDE SERPL-SCNC: 105 MMOL/L (ref 96–112)
CO2 SERPL-SCNC: 24 MMOL/L (ref 20–33)
CREAT SERPL-MCNC: 1.04 MG/DL (ref 0.5–1.4)
EKG IMPRESSION: NORMAL
EOSINOPHIL # BLD AUTO: 0.17 K/UL (ref 0–0.51)
EOSINOPHIL NFR BLD: 2.8 % (ref 0–6.9)
ERYTHROCYTE [DISTWIDTH] IN BLOOD BY AUTOMATED COUNT: 47.4 FL (ref 35.9–50)
FLUAV RNA SPEC QL NAA+PROBE: NEGATIVE
FLUBV RNA SPEC QL NAA+PROBE: NEGATIVE
GFR SERPLBLD CREATININE-BSD FMLA CKD-EPI: 54 ML/MIN/1.73 M 2
GLOBULIN SER CALC-MCNC: 3 G/DL (ref 1.9–3.5)
GLUCOSE SERPL-MCNC: 92 MG/DL (ref 65–99)
HCT VFR BLD AUTO: 46 % (ref 37–47)
HGB BLD-MCNC: 15 G/DL (ref 12–16)
IMM GRANULOCYTES # BLD AUTO: 0.02 K/UL (ref 0–0.11)
IMM GRANULOCYTES NFR BLD AUTO: 0.3 % (ref 0–0.9)
LYMPHOCYTES # BLD AUTO: 0.89 K/UL (ref 1–4.8)
LYMPHOCYTES NFR BLD: 14.6 % (ref 22–41)
MAGNESIUM SERPL-MCNC: 2.3 MG/DL (ref 1.5–2.5)
MCH RBC QN AUTO: 29.8 PG (ref 27–33)
MCHC RBC AUTO-ENTMCNC: 32.6 G/DL (ref 33.6–35)
MCV RBC AUTO: 91.5 FL (ref 81.4–97.8)
MONOCYTES # BLD AUTO: 0.7 K/UL (ref 0–0.85)
MONOCYTES NFR BLD AUTO: 11.5 % (ref 0–13.4)
NEUTROPHILS # BLD AUTO: 4.27 K/UL (ref 2–7.15)
NEUTROPHILS NFR BLD: 70 % (ref 44–72)
NRBC # BLD AUTO: 0 K/UL
NRBC BLD-RTO: 0 /100 WBC
PLATELET # BLD AUTO: 225 K/UL (ref 164–446)
PMV BLD AUTO: 12.8 FL (ref 9–12.9)
POTASSIUM SERPL-SCNC: 4.4 MMOL/L (ref 3.6–5.5)
PROT SERPL-MCNC: 7.3 G/DL (ref 6–8.2)
RBC # BLD AUTO: 5.03 M/UL (ref 4.2–5.4)
RSV RNA SPEC QL NAA+PROBE: NEGATIVE
SARS-COV-2 RNA RESP QL NAA+PROBE: NOTDETECTED
SODIUM SERPL-SCNC: 140 MMOL/L (ref 135–145)
SPECIMEN SOURCE: NORMAL
TROPONIN T SERPL-MCNC: 15 NG/L (ref 6–19)
WBC # BLD AUTO: 6.1 K/UL (ref 4.8–10.8)

## 2022-07-12 PROCEDURE — G0152 HHCP-SERV OF OT,EA 15 MIN: HCPCS

## 2022-07-12 PROCEDURE — 700102 HCHG RX REV CODE 250 W/ 637 OVERRIDE(OP)

## 2022-07-12 PROCEDURE — 36415 COLL VENOUS BLD VENIPUNCTURE: CPT

## 2022-07-12 PROCEDURE — 93000 ELECTROCARDIOGRAM COMPLETE: CPT | Performed by: INTERNAL MEDICINE

## 2022-07-12 PROCEDURE — 84484 ASSAY OF TROPONIN QUANT: CPT

## 2022-07-12 PROCEDURE — 99214 OFFICE O/P EST MOD 30 MIN: CPT | Mod: 25 | Performed by: NURSE PRACTITIONER

## 2022-07-12 PROCEDURE — 99285 EMERGENCY DEPT VISIT HI MDM: CPT

## 2022-07-12 PROCEDURE — 85025 COMPLETE CBC W/AUTO DIFF WBC: CPT

## 2022-07-12 PROCEDURE — 0241U HCHG SARS-COV-2 COVID-19 NFCT DS RESP RNA 4 TRGT MIC: CPT

## 2022-07-12 PROCEDURE — 99223 1ST HOSP IP/OBS HIGH 75: CPT | Performed by: STUDENT IN AN ORGANIZED HEALTH CARE EDUCATION/TRAINING PROGRAM

## 2022-07-12 PROCEDURE — 770020 HCHG ROOM/CARE - TELE (206)

## 2022-07-12 PROCEDURE — 99223 1ST HOSP IP/OBS HIGH 75: CPT | Mod: AI,GC | Performed by: STUDENT IN AN ORGANIZED HEALTH CARE EDUCATION/TRAINING PROGRAM

## 2022-07-12 PROCEDURE — G0153 HHCP-SVS OF S/L PATH,EA 15MN: HCPCS

## 2022-07-12 PROCEDURE — 93005 ELECTROCARDIOGRAM TRACING: CPT

## 2022-07-12 PROCEDURE — C9803 HOPD COVID-19 SPEC COLLECT: HCPCS | Performed by: EMERGENCY MEDICINE

## 2022-07-12 PROCEDURE — G0151 HHCP-SERV OF PT,EA 15 MIN: HCPCS

## 2022-07-12 PROCEDURE — 83735 ASSAY OF MAGNESIUM: CPT

## 2022-07-12 PROCEDURE — 93005 ELECTROCARDIOGRAM TRACING: CPT | Performed by: EMERGENCY MEDICINE

## 2022-07-12 PROCEDURE — 80053 COMPREHEN METABOLIC PANEL: CPT

## 2022-07-12 PROCEDURE — A9270 NON-COVERED ITEM OR SERVICE: HCPCS

## 2022-07-12 RX ORDER — MEMANTINE HYDROCHLORIDE 10 MG/1
5 TABLET ORAL DAILY
Status: DISCONTINUED | OUTPATIENT
Start: 2022-07-13 | End: 2022-07-15 | Stop reason: HOSPADM

## 2022-07-12 RX ORDER — LANOLIN ALCOHOL/MO/W.PET/CERES
400 CREAM (GRAM) TOPICAL DAILY
Status: DISCONTINUED | OUTPATIENT
Start: 2022-07-13 | End: 2022-07-15 | Stop reason: HOSPADM

## 2022-07-12 RX ORDER — IRBESARTAN 150 MG/1
300 TABLET ORAL DAILY
Status: DISCONTINUED | OUTPATIENT
Start: 2022-07-13 | End: 2022-07-15 | Stop reason: HOSPADM

## 2022-07-12 RX ORDER — ASPIRIN 81 MG/1
81 TABLET, CHEWABLE ORAL DAILY
Status: DISCONTINUED | OUTPATIENT
Start: 2022-07-13 | End: 2022-07-15 | Stop reason: HOSPADM

## 2022-07-12 RX ORDER — AMLODIPINE BESYLATE 5 MG/1
5 TABLET ORAL
Status: DISCONTINUED | OUTPATIENT
Start: 2022-07-12 | End: 2022-07-13

## 2022-07-12 RX ORDER — ENOXAPARIN SODIUM 100 MG/ML
40 INJECTION SUBCUTANEOUS DAILY
Status: DISCONTINUED | OUTPATIENT
Start: 2022-07-12 | End: 2022-07-15 | Stop reason: HOSPADM

## 2022-07-12 RX ORDER — AMLODIPINE BESYLATE 5 MG/1
5 TABLET ORAL
Status: DISCONTINUED | OUTPATIENT
Start: 2022-07-12 | End: 2022-07-12

## 2022-07-12 RX ORDER — AMOXICILLIN 250 MG
2 CAPSULE ORAL 2 TIMES DAILY
Status: DISCONTINUED | OUTPATIENT
Start: 2022-07-12 | End: 2022-07-15 | Stop reason: HOSPADM

## 2022-07-12 RX ORDER — POLYETHYLENE GLYCOL 3350 17 G/17G
1 POWDER, FOR SOLUTION ORAL
Status: DISCONTINUED | OUTPATIENT
Start: 2022-07-12 | End: 2022-07-15 | Stop reason: HOSPADM

## 2022-07-12 RX ORDER — BISACODYL 10 MG
10 SUPPOSITORY, RECTAL RECTAL
Status: DISCONTINUED | OUTPATIENT
Start: 2022-07-12 | End: 2022-07-15 | Stop reason: HOSPADM

## 2022-07-12 RX ORDER — FENOFIBRATE 67 MG/1
134 CAPSULE ORAL DAILY
Status: DISCONTINUED | OUTPATIENT
Start: 2022-07-13 | End: 2022-07-15 | Stop reason: HOSPADM

## 2022-07-12 RX ORDER — LORATADINE 10 MG/1
10 TABLET ORAL
Status: DISCONTINUED | OUTPATIENT
Start: 2022-07-12 | End: 2022-07-15 | Stop reason: HOSPADM

## 2022-07-12 RX ADMIN — AMLODIPINE BESYLATE 5 MG: 5 TABLET ORAL at 23:15

## 2022-07-12 ASSESSMENT — ENCOUNTER SYMPTOMS
COUGH: 0
ABDOMINAL PAIN: 0
DENIES PAIN: 1
NAUSEA: 0
POOR JUDGMENT: 1
PALPITATIONS: 0
ABDOMINAL PAIN: 0
PERSON REPORTING PAIN: PATIENT
DIZZINESS: 0
CHILLS: 0
LOWEST PAIN SEVERITY IN PAST 24 HOURS: 0/10
PND: 0
VOMITING: 0
CLAUDICATION: 0
PERSON REPORTING PAIN: PATIENT
HIGHEST PAIN SEVERITY IN PAST 24 HOURS: 0/10
FEVER: 0
SORE THROAT: 0
COUGH: 0
CONSTIPATION: 0
MYALGIAS: 0
HEADACHES: 0
PALPITATIONS: 0
DOUBLE VISION: 0
WHEEZING: 0
SHORTNESS OF BREATH: 0
DIARRHEA: 0
FEVER: 0
DIZZINESS: 0
MYALGIAS: 0
PAIN SEVERITY GOAL: 0/10
WEAKNESS: 0
DENIES PAIN: 1
SHORTNESS OF BREATH: 0
ORTHOPNEA: 0

## 2022-07-12 ASSESSMENT — FIBROSIS 4 INDEX
FIB4 SCORE: 2.47
FIB4 SCORE: 2.47

## 2022-07-12 ASSESSMENT — ACTIVITIES OF DAILY LIVING (ADL): AMBULATION ASSISTANCE ON FLAT SURFACES: 1

## 2022-07-12 ASSESSMENT — LIFESTYLE VARIABLES: SUBSTANCE_ABUSE: 0

## 2022-07-12 NOTE — PATIENT INSTRUCTIONS
Stop Diltiazem    Continue Irbesartan 300 mg daily     Monitor Blood pressures at home 2 hours after medications.

## 2022-07-12 NOTE — PROGRESS NOTES
Chief Complaint   Patient presents with   • Hypertension     F/V Dx: Benign essential HTN     • Dyslipidemia       Subjective     Irina Traylor is a 81 y.o. female who presents today for follow-up on her HTN and her pauses.    Newer patient of cardiology.  Patient plans on establishing with Dr. Gray.  She was last seen in clinic on 6/30/2022 at her initial visit with KUSHAL Fisher.  During that visit, she was referred over to EP after she had some pauses on her event monitor.  Patient was recommended to use her CPAP regularly.  Patient was also recommended to stop/hold her diltiazem.    After her visit, she contacted our office reporting elevated blood pressures.  Patient reports she did resume her diltiazem and her irbesartan was increased to 300 mg daily.    Patient comes in the clinic to follow-up.  Her blood pressures have been fluctuating since her medication changes.  She states on the ninth, she had BPs in the 80s to 120s, systolic.  And then over the past few days, her blood pressures have been around the 150s, systolic.    She did have nursing check her BP in it was similar to what her home monitor stated.    Patient feels well, denies chest pain, shortness of breath, palpitations, dizziness/lightheadedness, orthopnea, PND or Edema.     -Patient reports she has been using her CPAP regularly since her visit in June.  She does report feeling more rested with use.    Additonally, patient has the following medical problems:    -History of multiple strokes, she states at least 10.  Does have expressive aphasia    -Hypertension    -Dyslipidemia    -Sleep apnea: using CPAP    Past Medical History:   Diagnosis Date   • Anesthesia     States feels like she can't breathe with induction for right hip surgery, gets nausea vomiting with most pain meds.  Uses anti-nausea medicine if takes morphine   • Arthritis     To hips, shoulders, hands   • Benign essential HTN 9/7/2017   • Cataract    • CPAP (continuous  positive airway pressure) dependence    • Dyslipidemia 3/5/2019   • Expressive aphasia     Related to CVA   • Family history of CVA    • Heart murmur    • DARIN (obstructive sleep apnea) 1/26/2017    AHI 23.6, minimum saturation 69%, on CPAP 15 cm.   • Post-nasal drip    • Restless leg syndrome     4/25/17-Resolved, states was related to right hip and none S/P replacement.   • Sick sinus syndrome (HCC) 6/29/2022   • Sinus arrest 6/29/2022   • Stroke ([facility]) 2/1993, 8/1993    Idoxujku-ioqivv-hyjfqffmtw aphasia,mouth forming words, reading, numbers   • Walker as ambulation aid      Past Surgical History:   Procedure Laterality Date   • PB RECONSTR TOTAL SHOULDER IMPLANT Left 3/21/2019    Procedure: ARTHROPLASTY, SHOULDER, TOTAL-  REVISION REVERSE;  Surgeon: Yoshi Gamble M.D.;  Location: [facility];  Service: Orthopedics   • ORIF, FRACTURE, HUMERUS Left 3/21/2019    Procedure: ORIF, FRACTURE, HUMERUS;  Surgeon: Yoshi Gamble M.D.;  Location: [facility];  Service: Orthopedics   • KNEE ARTHROPLASTY TOTAL Left 2/12/2018    Procedure: KNEE ARTHROPLASTY TOTAL;  Surgeon: Earle Jacobsen M.D.;  Location: [facility];  Service: Orthopedics   • CERVICAL FUSION POSTERIOR  8/8/2017    Procedure: CERVICAL FUSION POSTERIOR- C3-5, INSTRUMENTED;  Surgeon: Florentino Pike M.D.;  Location: [facility];  Service:    • CERVICAL LAMINECTOMY POSTERIOR  8/8/2017    Procedure: CERVICAL LAMINECTOMY POSTERIOR- C3-5;  Surgeon: Florentino Pike M.D.;  Location: [facility];  Service:    • HIP ARTHROPLASTY TOTAL Left 5/8/2017    Procedure: HIP ARTHROPLASTY TOTAL;  Surgeon: Earle Jacobsen M.D.;  Location: [facility];  Service:    • HIP ARTHROPLASTY TOTAL Right 12/12/2016    Procedure: HIP ARTHROPLASTY TOTAL;  Surgeon: Earle Jacobsen M.D.;  Location: [facility];  Service:    • FUSION, SPINE, LUMBAR, PLIF  9/26/2016    Procedure:  LUMBAR FUSION POSTERIOR L2-S1 onlay ;  Surgeon: Florentino Pike M.D.;  Location: SURGERY [facility];  Service:    • LUMBAR LAMINECTOMY DISKECTOMY N/A 9/26/2016    Procedure: LUMBAR LAMINECTOMY DISKECTOMY L2-S1;  Surgeon: Florentino Pike M.D.;  Location: SURGERY [facility];  Service:    • COLONOSCOPY  2/2016    Every 10 years   • SHOULDER ARTHROPLASTY TOTAL Left 8/11/2015    Procedure: SHOULDER ARTHROPLASTY REVERSE TOTAL;  Surgeon: Yoshi Gamble M.D.;  Location: SURGERY [facility];  Service:    • KNEE ARTHROPLASTY TOTAL  6/23/2014    Performed by Earle Jacobsen M.D. at SURGERY [facility]   • HAMMERTOE CORRECTION Left 11/12/2007    Removed 3rd toe   • LUMBAR LAMINECTOMY DISKECTOMY  1993    Lumbar   • CARPAL TUNNEL RELEASE Bilateral 1993   • CERVICAL DISK AND FUSION ANTERIOR  1993    X2   • HYSTERECTOMY, TOTAL ABDOMINAL  1977   • BUNIONECTOMY Bilateral 1980's   • HAMMERTOE CORRECTION Right 1980's    Removed 3rd toe   • ORIF, FINGER Left as child    Index     Family History   Problem Relation Age of Onset   • Cancer Mother         vaginal   • Stroke Father    • Hypertension Brother    • Diabetes Brother    • Hypertension Brother    • Hypertension Brother    • Stroke Daughter      Social History     Socioeconomic History   • Marital status: Single     Spouse name: Not on file   • Number of children: Not on file   • Years of education: Not on file   • Highest education level: Not on file   Occupational History   • Not on file   Tobacco Use   • Smoking status: Never Smoker   • Smokeless tobacco: Never Used   Vaping Use   • Vaping Use: Never used   Substance and Sexual Activity   • Alcohol use: Not Currently   • Drug use: No   • Sexual activity: Not Currently   Other Topics Concern   • Not on file   Social History Narrative    Retired LVN at .  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  She is currently driving without any accidents.  She does use a walker to ambulate.  Not having any falls.  Does not have  "hearing aids but is having hearing concerns.  Some increased aphasia especially since her history of CVAs and memory issues.  March 2019 she was hospitalized from her fracture of her left arm and did have home health which she no longer has right now.  She does not have family here but she does have neighbors who help her out.     Social Determinants of Health     Financial Resource Strain: Not on file   Food Insecurity: Not on file   Transportation Needs: Not on file   Physical Activity: Not on file   Stress: Not on file   Social Connections: Not on file   Intimate Partner Violence: Not on file   Housing Stability: Not on file     Allergies   Allergen Reactions   • Benicar [Olmesartan] Rash     blisters   • Dilaudid  [Hydromorphone Hcl] Vomiting   • Lidocaine    • Norco  [Apap-Fd&C Blue #1-Hydrocodone] Vomiting   • Other Misc Shortness of Breath     Anesthesia with surgery 2016  Pt couldn't breathe while being put under and tapped side of bed to let RN know. \"It was the scariest feeling and I want everyone to know\"     • Percocet  [Apap-Fd&C Red #40 [place]-Oxycodone] Vomiting   • Vicodin  [Apap-Fd&C Yellow #10 [place]-Hydrocodone] Vomiting   • Codeine Vomiting   • Demerol Vomiting   • Hydrocodone Vomiting   • Other Drug Vomiting     \"all pain meds\" per pt can take morphine.   • Oxycodone Vomiting   • Statins [Hmg-Coa-R Inhibitors] Unspecified     Muscle cramping   • Valium Vomiting     Outpatient Encounter Medications as of 7/12/2022   Medication Sig Dispense Refill   • Ascorbic Acid (VITAMIN C) 1000 MG Tab Take 1 Each by mouth every day at 6 PM.     • Misc Natural Products (NARCOSOFT HERBAL LAX) Cap Take 6-8 Capsules by mouth every day at 6 PM. Herb-Lax     • fenofibrate (TRICOR) 145 MG Tab Take 1 Tablet by mouth every day. 100 Tablet 3   • irbesartan (AVAPRO) 300 MG Tab Take 1 Tablet by mouth every evening. (Patient taking differently: Take 300 mg by mouth every evening. pt taking in the morning) 100 Tablet 1 "   • memantine (NAMENDA) 5 MG Tab Take 5 mg by mouth every day. FOR MEMORY AND PRE ALTZHEIMERS.     • Magnesium 400 MG Tab Take 1 Tablet by mouth every day.     • vitamin E (VITAMIN E) 1000 Unit (450 mg) Cap Take 1,000 Units by mouth every day.     • non-formulary med Take 6 Tablets by mouth every day. VITSMIN SUPPLEMENT     • vitamin D (VITAMIND D3) 1000 UNIT Tab Take 1 Tablet by mouth every day for 30 days. 30 Tablet 0   • fluticasone (FLONASE) 50 MCG/ACT nasal spray USE 2 SPRAYS IN EACH NOSTRIL EVERY DAY (Patient taking differently: Administer 2 Sprays into affected nostril(S) every day. USE 2 SPRAYS IN EACH NOSTRIL EVERY DAY) 48 g 0   • loratadine (CLARITIN) 10 MG Tab TAKE ONE TABLET BY MOUTH ONE TIME DAILY AS NEEDED FOR ALLERGY (Patient taking differently: Take 10 mg by mouth 1 time a day as needed (Allergies). TAKE ONE TABLET BY MOUTH ONE TIME DAILY AS NEEDED FOR ALLERGY) 90 Tablet 3   • aspirin 81 MG tablet Take 81 mg by mouth every day.     • [DISCONTINUED] DILTIAZem CD (CARDIZEM CD) 240 MG CAPSULE SR 24 HR TAKE 1 CAPSULE BY MOUTH DAILY AT BEDTIME FOR HIGH BLOOD PRESSURE 90 Capsule 3   • [DISCONTINUED] aspirin 81 MG EC tablet Take 81 mg by mouth every day.     • [DISCONTINUED] Turmeric 500 MG Cap Take 1 Capsule by mouth as needed (take when she is hurting). (Patient not taking: Reported on 7/12/2022)       No facility-administered encounter medications on file as of 7/12/2022.     Review of Systems   Constitutional: Negative for fever and malaise/fatigue.   Respiratory: Negative for cough and shortness of breath.    Cardiovascular: Negative for chest pain, palpitations, orthopnea, claudication, leg swelling and PND.   Gastrointestinal: Negative for abdominal pain.   Musculoskeletal: Negative for myalgias.   Neurological: Negative for dizziness.   All other systems reviewed and are negative.             Objective     BP (!) 98/58 (BP Location: Left arm, Patient Position: Sitting, BP Cuff Size: Adult)   Pulse  (!) 47   Resp 18   Ht 1.524 m (5')   Wt 76.7 kg (169 lb)   SpO2 98%   BMI 33.01 kg/m²     Physical Exam  Vitals reviewed.   Constitutional:       Appearance: She is well-developed.      Comments: Using walker with ambulation   HENT:      Head: Normocephalic and atraumatic.   Eyes:      Pupils: Pupils are equal, round, and reactive to light.   Neck:      Vascular: No JVD.   Cardiovascular:      Rate and Rhythm: Bradycardia present. Rhythm irregular.      Heart sounds: Normal heart sounds.   Pulmonary:      Effort: Pulmonary effort is normal. No respiratory distress.      Breath sounds: Normal breath sounds. No wheezing or rales.   Abdominal:      General: Bowel sounds are normal.      Palpations: Abdomen is soft.   Musculoskeletal:         General: Normal range of motion.      Cervical back: Normal range of motion and neck supple.      Right lower leg: No edema.      Left lower leg: No edema.   Skin:     General: Skin is warm and dry.   Neurological:      General: No focal deficit present.      Mental Status: She is alert and oriented to person, place, and time.   Psychiatric:         Behavior: Behavior normal.       Lab Results   Component Value Date/Time    CHOLSTRLTOT 149 06/21/2022 03:25 AM    LDL 80 06/21/2022 03:25 AM    HDL 53 06/21/2022 03:25 AM    TRIGLYCERIDE 79 06/21/2022 03:25 AM       Lab Results   Component Value Date/Time    SODIUM 140 06/22/2022 01:44 AM    POTASSIUM 3.9 06/22/2022 01:44 AM    CHLORIDE 105 06/22/2022 01:44 AM    CO2 23 06/22/2022 01:44 AM    GLUCOSE 81 06/22/2022 01:44 AM    BUN 28 (H) 06/22/2022 01:44 AM    CREATININE 1.01 06/22/2022 01:44 AM     Lab Results   Component Value Date/Time    ALKPHOSPHAT 53 06/22/2022 01:44 AM    ASTSGOT 25 06/22/2022 01:44 AM    ALTSGPT 17 06/22/2022 01:44 AM    TBILIRUBIN 0.3 06/22/2022 01:44 AM      Echo 10/15/2013  CONCLUSIONS   Asymmetric septal hypertrophy.   Small organized apical thrombus.    Mild apical hypokinesis.   Normal left  ventricular systolic function.   Grade I diastolic dysfunction is present.   The right ventricle was normal in size and function.    Mildly dilated right atrium.   Mildly dilated left atrium.   Mild mitral regurgitation.   Mitral annular calcification.   Aortic sclerosis without stenosis.   Mild tricuspid regurgitation.    Right ventricular systolic pressure is estimated to be 39-44 mmhg.   Mild- moderate pulmonary hypertension.   Trace pulmonic insufficiency.   Normal pericardium without effusion.    The aortic root is normal.     Echocardiogram 4/2/2014  CONCLUSIONS   Left ventricular ejection fraction is 55% to 60%.   Moderately dilated left atrium.  Millicent 39 ml   Grade I diastolic dysfunction is present.   Mild eccentric MR   Right ventricular systolic pressure is estimated to be 32-37 mmHg   consistent with mild pulmonary hypertension.   The prior echo of Oct 2013 revealed a apical wma w a 1cm clot. Not found on current study     Transthoracic Echo Report 6/21/2022  The left ventricular ejection fraction is normal; visually estimated to   be 55%.  Mild concentric left ventricular hypertrophy.  Thickened, calcified mitral valve leaflets.  Mild mitral regurgitation.  Mild aortic insufficiency.  Normal left atrial size    Event monitor June 2022  Summary:   6.3 seconds sinus pause occurred at 06:01 AM on 06/24/2022.          Assessment & Plan     1. Acute ischemic stroke (HCC)  EKG   2. Heart block     3. Sinus arrest     4. Benign essential HTN     5. Sick sinus syndrome (HCC)         Medical Decision Making: Today's Assessment/Status/Plan:        Prior 6.3-second pause, AV block:  -EKG today shows AV block, Type 2?  -Patient to stop diltiazem  -Pt was asymptomatic during office visit today. While patient was checking out, patient started to feel dizzy and unwell.  Discussed with patient about sending her to the emergency room with her abnormal EKG.  She was agreeable.    Hypertension:  -BP on recheck  122/64  -Continue irbesartan 300 mg daily for now  -Patient to monitor BP at home and, contact our office if BP continues to be greater than 150, systolic    History of multiple strokes:  -Continue to follow-up with Neurology  -Continue aspirin 81 mg daily  -Continue fenofibrate    Sleep apnea:  -Continue regular CPAP use as this could be contributing to her pauses.    FU in clinic in 2 weeks with Dr. Gray. Sooner if needed.    Patient verbalizes understanding and agrees with the plan of care.     PLEASE NOTE: This Note was created using voice recognition Software. I have made every reasonable attempt to correct obvious errors, but I expect that there are errors of grammar and possibly content that I did not discover before finalizing the note

## 2022-07-13 ENCOUNTER — HOME CARE VISIT (OUTPATIENT)
Dept: HOME HEALTH SERVICES | Facility: HOME HEALTHCARE | Age: 82
End: 2022-07-13
Payer: MEDICARE

## 2022-07-13 ENCOUNTER — APPOINTMENT (OUTPATIENT)
Dept: RADIOLOGY | Facility: MEDICAL CENTER | Age: 82
DRG: 244 | End: 2022-07-13
Payer: MEDICARE

## 2022-07-13 LAB
ANION GAP SERPL CALC-SCNC: 8 MMOL/L (ref 7–16)
BASOPHILS # BLD AUTO: 1 % (ref 0–1.8)
BASOPHILS # BLD: 0.05 K/UL (ref 0–0.12)
BUN SERPL-MCNC: 31 MG/DL (ref 8–22)
CALCIUM SERPL-MCNC: 9.3 MG/DL (ref 8.5–10.5)
CHLORIDE SERPL-SCNC: 106 MMOL/L (ref 96–112)
CO2 SERPL-SCNC: 25 MMOL/L (ref 20–33)
CREAT SERPL-MCNC: 0.82 MG/DL (ref 0.5–1.4)
EKG IMPRESSION: NORMAL
EKG IMPRESSION: NORMAL
EOSINOPHIL # BLD AUTO: 0.2 K/UL (ref 0–0.51)
EOSINOPHIL NFR BLD: 4 % (ref 0–6.9)
ERYTHROCYTE [DISTWIDTH] IN BLOOD BY AUTOMATED COUNT: 47.3 FL (ref 35.9–50)
GFR SERPLBLD CREATININE-BSD FMLA CKD-EPI: 72 ML/MIN/1.73 M 2
GLUCOSE BLD STRIP.AUTO-MCNC: 83 MG/DL (ref 65–99)
GLUCOSE SERPL-MCNC: 89 MG/DL (ref 65–99)
HCT VFR BLD AUTO: 45 % (ref 37–47)
HGB BLD-MCNC: 14.6 G/DL (ref 12–16)
IMM GRANULOCYTES # BLD AUTO: 0.02 K/UL (ref 0–0.11)
IMM GRANULOCYTES NFR BLD AUTO: 0.4 % (ref 0–0.9)
LYMPHOCYTES # BLD AUTO: 0.94 K/UL (ref 1–4.8)
LYMPHOCYTES NFR BLD: 18.9 % (ref 22–41)
MAGNESIUM SERPL-MCNC: 2 MG/DL (ref 1.5–2.5)
MCH RBC QN AUTO: 29.6 PG (ref 27–33)
MCHC RBC AUTO-ENTMCNC: 32.4 G/DL (ref 33.6–35)
MCV RBC AUTO: 91.1 FL (ref 81.4–97.8)
MONOCYTES # BLD AUTO: 0.61 K/UL (ref 0–0.85)
MONOCYTES NFR BLD AUTO: 12.2 % (ref 0–13.4)
NEUTROPHILS # BLD AUTO: 3.16 K/UL (ref 2–7.15)
NEUTROPHILS NFR BLD: 63.5 % (ref 44–72)
NRBC # BLD AUTO: 0 K/UL
NRBC BLD-RTO: 0 /100 WBC
PLATELET # BLD AUTO: 202 K/UL (ref 164–446)
PMV BLD AUTO: 12.2 FL (ref 9–12.9)
POTASSIUM SERPL-SCNC: 3.8 MMOL/L (ref 3.6–5.5)
RBC # BLD AUTO: 4.94 M/UL (ref 4.2–5.4)
SODIUM SERPL-SCNC: 139 MMOL/L (ref 135–145)
WBC # BLD AUTO: 5 K/UL (ref 4.8–10.8)

## 2022-07-13 PROCEDURE — 93005 ELECTROCARDIOGRAM TRACING: CPT | Performed by: NURSE PRACTITIONER

## 2022-07-13 PROCEDURE — A9270 NON-COVERED ITEM OR SERVICE: HCPCS | Performed by: NURSE PRACTITIONER

## 2022-07-13 PROCEDURE — 770020 HCHG ROOM/CARE - TELE (206)

## 2022-07-13 PROCEDURE — 99233 SBSQ HOSP IP/OBS HIGH 50: CPT | Mod: 25,FS | Performed by: INTERNAL MEDICINE

## 2022-07-13 PROCEDURE — 82962 GLUCOSE BLOOD TEST: CPT

## 2022-07-13 PROCEDURE — 700102 HCHG RX REV CODE 250 W/ 637 OVERRIDE(OP): Performed by: NURSE PRACTITIONER

## 2022-07-13 PROCEDURE — 700101 HCHG RX REV CODE 250

## 2022-07-13 PROCEDURE — A9270 NON-COVERED ITEM OR SERVICE: HCPCS

## 2022-07-13 PROCEDURE — 83735 ASSAY OF MAGNESIUM: CPT

## 2022-07-13 PROCEDURE — 94660 CPAP INITIATION&MGMT: CPT

## 2022-07-13 PROCEDURE — 85025 COMPLETE CBC W/AUTO DIFF WBC: CPT

## 2022-07-13 PROCEDURE — 99232 SBSQ HOSP IP/OBS MODERATE 35: CPT | Mod: GC | Performed by: INTERNAL MEDICINE

## 2022-07-13 PROCEDURE — 80048 BASIC METABOLIC PNL TOTAL CA: CPT

## 2022-07-13 PROCEDURE — 99222 1ST HOSP IP/OBS MODERATE 55: CPT | Mod: 57 | Performed by: INTERNAL MEDICINE

## 2022-07-13 PROCEDURE — 700102 HCHG RX REV CODE 250 W/ 637 OVERRIDE(OP)

## 2022-07-13 RX ORDER — HYDRALAZINE HYDROCHLORIDE 20 MG/ML
20 INJECTION INTRAMUSCULAR; INTRAVENOUS EVERY 6 HOURS PRN
Status: DISCONTINUED | OUTPATIENT
Start: 2022-07-13 | End: 2022-07-15 | Stop reason: HOSPADM

## 2022-07-13 RX ORDER — DEXTROSE MONOHYDRATE, SODIUM CHLORIDE, AND POTASSIUM CHLORIDE 50; 1.49; 4.5 G/1000ML; G/1000ML; G/1000ML
INJECTION, SOLUTION INTRAVENOUS CONTINUOUS
Status: DISPENSED | OUTPATIENT
Start: 2022-07-13 | End: 2022-07-14

## 2022-07-13 RX ORDER — AMLODIPINE BESYLATE 10 MG/1
10 TABLET ORAL
Status: DISCONTINUED | OUTPATIENT
Start: 2022-07-13 | End: 2022-07-15 | Stop reason: HOSPADM

## 2022-07-13 RX ADMIN — IRBESARTAN 300 MG: 150 TABLET ORAL at 05:25

## 2022-07-13 RX ADMIN — ASPIRIN 81 MG: 81 TABLET, CHEWABLE ORAL at 05:24

## 2022-07-13 RX ADMIN — POTASSIUM CHLORIDE, DEXTROSE MONOHYDRATE AND SODIUM CHLORIDE 50 ML: 150; 5; 450 INJECTION, SOLUTION INTRAVENOUS at 15:27

## 2022-07-13 RX ADMIN — FENOFIBRATE 134 MG: 67 CAPSULE ORAL at 05:25

## 2022-07-13 RX ADMIN — AMLODIPINE BESYLATE 10 MG: 10 TABLET ORAL at 12:20

## 2022-07-13 RX ADMIN — MEMANTINE HYDROCHLORIDE 5 MG: 10 TABLET ORAL at 05:24

## 2022-07-13 RX ADMIN — Medication 400 MG: at 05:25

## 2022-07-13 RX ADMIN — SENNOSIDES AND DOCUSATE SODIUM 2 TABLET: 50; 8.6 TABLET ORAL at 05:24

## 2022-07-13 RX ADMIN — SENNOSIDES AND DOCUSATE SODIUM 2 TABLET: 50; 8.6 TABLET ORAL at 17:54

## 2022-07-13 ASSESSMENT — ENCOUNTER SYMPTOMS
ABDOMINAL DISTENTION: 0
NERVOUS/ANXIOUS: 0
NECK PAIN: 0
FALLS: 0
SORE THROAT: 0
FATIGUE: 0
BRUISES/BLEEDS EASILY: 0
CHILLS: 0
CONSTIPATION: 1
BLURRED VISION: 1
DIZZINESS: 1
DIAPHORESIS: 0
MUSCULOSKELETAL NEGATIVE: 1
FEVER: 0
VOMITING: 0
WHEEZING: 0
NAUSEA: 0
PALPITATIONS: 0
LIGHT-HEADEDNESS: 0
CHEST TIGHTNESS: 0
SHORTNESS OF BREATH: 0
BLOOD IN STOOL: 0
HEMOPTYSIS: 0
EYE PAIN: 0
COUGH: 0
DIZZINESS: 0
SPUTUM PRODUCTION: 0
SENSORY CHANGE: 0
CONFUSION: 0
PSYCHIATRIC NEGATIVE: 1
DOUBLE VISION: 0
ABDOMINAL PAIN: 0
ACTIVITY CHANGE: 0
DIARRHEA: 0
HEADACHES: 1
WEAKNESS: 0
PHOTOPHOBIA: 0

## 2022-07-13 ASSESSMENT — COGNITIVE AND FUNCTIONAL STATUS - GENERAL
MOVING TO AND FROM BED TO CHAIR: A LITTLE
CLIMB 3 TO 5 STEPS WITH RAILING: A LOT
DAILY ACTIVITIY SCORE: 18
TOILETING: A LITTLE
MOVING FROM LYING ON BACK TO SITTING ON SIDE OF FLAT BED: A LITTLE
STANDING UP FROM CHAIR USING ARMS: A LITTLE
TURNING FROM BACK TO SIDE WHILE IN FLAT BAD: A LITTLE
HELP NEEDED FOR BATHING: A LITTLE
DRESSING REGULAR LOWER BODY CLOTHING: A LITTLE
SUGGESTED CMS G CODE MODIFIER DAILY ACTIVITY: CK
DRESSING REGULAR UPPER BODY CLOTHING: A LITTLE
PERSONAL GROOMING: A LITTLE
EATING MEALS: A LITTLE

## 2022-07-13 ASSESSMENT — PAIN DESCRIPTION - PAIN TYPE: TYPE: ACUTE PAIN

## 2022-07-13 ASSESSMENT — FIBROSIS 4 INDEX: FIB4 SCORE: 1.9

## 2022-07-13 NOTE — PROGRESS NOTES
Pt transferred to -55 via wheelchair. A/O X4, with mild expressive aphasia, on R/A, breathing unlabored and pt in no acute respiratory distress. Pt denies pain, N/V and lightheadedness at this time. L wrist IV access intact. POC reviewed with pt, all questions answered to the best of my knowledge and pt verbalized understanding. Pt requested for water, same provided and well tolerated. Purewick placed on pt. Pt stable.

## 2022-07-13 NOTE — ED NOTES
Pt report to CHRIS Tay.   Routing refill request to provider for review/approval because:  Labs not current:    Patient needs to be seen because:  Due for follow up.  Pended with approval  Sloane Nye RN

## 2022-07-13 NOTE — PROGRESS NOTES
Attending Physician (Waylon Pleitez) paged via [employer] to verify pt's lovenox 40mg SC order as pt is NPO and  for possible pacemaker placement today per cardiology note. Lovenox order placed on hold by attending physician.

## 2022-07-13 NOTE — DISCHARGE PLANNING
Case Management Discharge Planning    Admission Date: 7/12/2022  GMLOS: 1.9  ALOS: 1    6-Clicks ADL Score:    6-Clicks Mobility Score:        Anticipated Discharge Dispo: Discharge Disposition: Discharged to home/self care (01)    DME Needed: No    Action(s) Taken:Chart reviewed, choice forms for DME and SNFs received from DONNA Wise, faxed to [facility].     Escalations Completed: None    Medically Clear: No    Next Steps: Follow-up with medical team to discuss discharge needs and barriers.    Barriers to Discharge: Medical clearance

## 2022-07-13 NOTE — ASSESSMENT & PLAN NOTE
Patient with symptomatic bradycardia sent over by cardiology office after becoming dizzy with previous cardiac monitor demonstrating pauses around 3 to 6 seconds per cardiology note. [facility] ranging 35-40 bpm appears to be junctional with PACs.   - Currently in normal sinus rhythm with isolated V2 ST segment elevation  - Per EP, PPM to be placed today  - K greater than 4, mag greater than 2

## 2022-07-13 NOTE — PROGRESS NOTES
Pt is  AXOX4.oriented   to room.Pt explained that she is NPO for a procedure todayHas delayed response and aphsia  due to previous stroke Pt denies any pain at the moment .She says she has no headache at the moment.Bed is in a low and locked position..Will continue to monitor pt closely.Call light is within reach

## 2022-07-13 NOTE — ED NOTES
Orthostatics performed, pt denies any dizziness with all positional changes. Pt able to stand and use bedside commode with assistance.

## 2022-07-13 NOTE — PROGRESS NOTES
Daily Progress Note:     Date of Service: 7/13/2022  Primary Team: [facility] Team   Attending: Stuart Valiente M.D.   Senior Resident: Dr. Dial  Intern: Dr. Dalton  Contact:  [phone]    Chief Complaint:   Dizziness    Subjective  Irina Traylor is a 82 y/o F w/ PMH s/f multiple prior strokes/left MCA complicated by aphasia and obstructive sleep apnea on CPAP who presents 7/12 for dizziness. This AM, patient reports no new episodes of dizziness. New throbbing, left sided headache rated 1/10, subsided within 1 hour without intervention. No headaches since. Patient endorsed some dizziness this afternoon with ambulation, but no significant change in vital signs representing orthostasis. Patient denies chest pains, fever, chills, diarrhea. Patient endorses some chronic constipation    Consultants/Specialty:  Cardiology/EP    Review of Systems:   Review of Systems   Constitutional: Negative for chills, diaphoresis and fever.   HENT: Negative.  Negative for hearing loss and sore throat.    Eyes: Positive for blurred vision. Negative for double vision, photophobia and pain.   Respiratory: Negative for cough, hemoptysis, sputum production, shortness of breath and wheezing.    Cardiovascular: Negative for chest pain, palpitations and leg swelling.   Gastrointestinal: Positive for constipation. Negative for abdominal pain, blood in stool, diarrhea, melena, nausea and vomiting.   Genitourinary: Negative.  Negative for dysuria, frequency and urgency.   Musculoskeletal: Negative.  Negative for falls and neck pain.   Skin: Negative for itching and rash.   Neurological: Positive for dizziness and headaches. Negative for sensory change and weakness.   Endo/Heme/Allergies: Negative.    Psychiatric/Behavioral: Negative.        Objective Data:   Physical Exam:   Vitals:   Temp:  [36 °C (96.8 °F)-36.6 °C (97.9 °F)] 36.1 °C (96.9 °F)  Pulse:  [55-75] 73  Resp:  [15-23] 16  BP: (133-175)/(65-95) 164/95  SpO2:  [89 %-97 %] 95  %    Physical Exam  Constitutional:       General: She is not in acute distress.     Appearance: Normal appearance.   HENT:      Head: Normocephalic and atraumatic.      Mouth/Throat:      Mouth: Mucous membranes are moist.      Pharynx: Oropharynx is clear. No oropharyngeal exudate or posterior oropharyngeal erythema.   Eyes:      Extraocular Movements: Extraocular movements intact.      Conjunctiva/sclera: Conjunctivae normal.      Pupils: Pupils are equal, round, and reactive to light.   Cardiovascular:      Rate and Rhythm: Normal rate and regular rhythm.      Pulses: Normal pulses.      Heart sounds: Normal heart sounds.   Pulmonary:      Effort: Pulmonary effort is normal.      Breath sounds: Normal breath sounds.   Abdominal:      General: Abdomen is flat.      Palpations: Abdomen is soft.      Tenderness: There is no abdominal tenderness.   Musculoskeletal:         General: Normal range of motion.      Cervical back: Normal range of motion and neck supple.   Skin:     General: Skin is warm and dry.      Capillary Refill: Capillary refill takes less than 2 seconds.   Neurological:      Mental Status: She is alert and oriented to person, place, and time. Mental status is at baseline.      GCS: GCS eye subscore is 4. GCS verbal subscore is 5. GCS motor subscore is 6.      Motor: Weakness present.     Weakness isolated to LLE, 4/5    Labs:   Admission on 2022   Component Date Value   • Report 2022                      Value:[facility] Emergency Dept.    Test Date:  2022  Pt Name:    JACEK VELASQUEZ             Department: ER  MRN:        [id number]                      Room:       [facility]  Gender:     Female                       Technician: [id number]  :        1940                   Requested By:ER TRIAGE PROTOCOL  Order #:    [id number]                    Monica MD: Rome Ching MD    Measurements  Intervals                                Axis  Rate:       69                            P:          66  WA:         180                          QRS:        -61  QRSD:       124                          T:          57  QT:         432  QTc:        463    Interpretive Statements  EKG is normal sinus rhythm, isolated ST elevation in V2, no ST elevation  otherwise, no reciprocal changes.  QRS is 124, normal axis  Electronically Signed On 7- 18:59:34 PDT by Rome Ching MD     • WBC 07/12/2022 6.1    • RBC 07/12/2022 5.03    • Hemoglobin 07/12/2022 15.0    • Hematocrit 07/12/2022 46.0    • MCV 07/12/2022 91.5    • MCH 07/12/2022 29.8    • MCHC 07/12/2022 32.6 (A)   • RDW 07/12/2022 47.4    • Platelet Count 07/12/2022 225    • MPV 07/12/2022 12.8    • Neutrophils-Polys 07/12/2022 70.00    • Lymphocytes 07/12/2022 14.60 (A)   • Monocytes 07/12/2022 11.50    • Eosinophils 07/12/2022 2.80    • Basophils 07/12/2022 0.80    • Immature Granulocytes 07/12/2022 0.30    • Nucleated RBC 07/12/2022 0.00    • Neutrophils (Absolute) 07/12/2022 4.27    • Lymphs (Absolute) 07/12/2022 0.89 (A)   • Monos (Absolute) 07/12/2022 0.70    • Eos (Absolute) 07/12/2022 0.17    • Baso (Absolute) 07/12/2022 0.05    • Immature Granulocytes (a* 07/12/2022 0.02    • NRBC (Absolute) 07/12/2022 0.00    • Sodium 07/12/2022 140    • Potassium 07/12/2022 4.4    • Chloride 07/12/2022 105    • Co2 07/12/2022 24    • Anion Gap 07/12/2022 11.0    • Glucose 07/12/2022 92    • Bun 07/12/2022 33 (A)   • Creatinine 07/12/2022 1.04    • Calcium 07/12/2022 9.8    • AST(SGOT) 07/12/2022 20    • ALT(SGPT) 07/12/2022 17    • Alkaline Phosphatase 07/12/2022 51    • Total Bilirubin 07/12/2022 0.3    • Albumin 07/12/2022 4.3    • Total Protein 07/12/2022 7.3    • Globulin 07/12/2022 3.0    • A-G Ratio 07/12/2022 1.4    • Troponin T 07/12/2022 15    • GFR (CKD-EPI) 07/12/2022 54 (A)   • Magnesium 07/12/2022 2.3    • Influenza virus A RNA 07/12/2022 Negative    • Influenza virus B, PCR 07/12/2022 Negative    • RSV, PCR 07/12/2022  Negative    • SARS-CoV-2 by PCR 2022 NotDetected    • SARS-CoV-2 Source 2022 NP Swab    • WBC 2022 5.0    • RBC 2022 4.94    • Hemoglobin 2022 14.6    • Hematocrit 2022 45.0    • MCV 2022 91.1    • MCH 2022 29.6    • MCHC 2022 32.4 (A)   • RDW 2022 47.3    • Platelet Count 2022 202    • MPV 2022 12.2    • Neutrophils-Polys 2022 63.50    • Lymphocytes 2022 18.90 (A)   • Monocytes 2022 12.20    • Eosinophils 2022 4.00    • Basophils 2022 1.00    • Immature Granulocytes 2022 0.40    • Nucleated RBC 2022 0.00    • Neutrophils (Absolute) 2022 3.16    • Lymphs (Absolute) 2022 0.94 (A)   • Monos (Absolute) 2022 0.61    • Eos (Absolute) 2022 0.20    • Baso (Absolute) 2022 0.05    • Immature Granulocytes (a* 2022 0.02    • NRBC (Absolute) 2022 0.00    • Sodium 2022 139    • Potassium 2022 3.8    • Chloride 2022 106    • Co2 2022 25    • Glucose 2022 89    • Bun 2022 31 (A)   • Creatinine 2022 0.82    • Calcium 2022 9.3    • Anion Gap 2022 8.0    • Magnesium 2022 2.0    • GFR (CKD-EPI) 2022 72    • Report 2022                      Value:[facility] Emergency Dept.    Test Date:  2022  Pt Name:    JACEK VELASQUEZ             Department: ER  MRN:        [id number]                      Room:       [facility]  Gender:     Female                       Technician: [id number]  :        1940                   Requested By:VINH HOWARD  Order #:    [id number]                    Reading MD:    Measurements  Intervals                                Axis  Rate:       57                           P:          50  MO:         180                          QRS:        -55  QRSD:       120                          T:          6  QT:         448  QTc:        437    Interpretive Statements  SINUS  BRADYCARDIA  PROBABLE LEFT ATRIAL ABNORMALITY  INCOMPLETE RBBB AND LAFB  LEFT VENTRICULAR HYPERTROPHY  ANTERIOR Q WAVES, POSSIBLY DUE TO LVH  Compared to ECG 2022 17:53:52  Incomplete right bundle-branch block now present  Right bundle-branch block now present  Left ventricular hypertrophy now present  Q waves now pres                          ent  Sinus rhythm no longer present  Myocardial infarct finding no longer present     • POC Glucose, Blood 2022 83    Office Visit on 2022   Component Date Value   • Report 2022                      Value:[facility]    Test Date:  2022  Pt Name:    JACEK VELASQUEZ             Department: [facility]  MRN:        [id number]                      Room:  Gender:     Female                       Technician: DR ASHB:        1940                   Requested By:GERMAINE ZAPIEN  Order #:    [id number]                    Reading MD: Yoshi Holt MD    Measurements  Intervals                                Axis  Rate:       58                           P:  MI:                                      QRS:        -75  QRSD:       124                          T:          70  QT:         455  QTc:        446    Interpretive Statements  Atrial fibrillation  Nonspecific IVCD with LAD  Left ventricular hypertrophy  Anterior Q waves, possibly due to LVH  ST elevation, consider inferior injury  Compared to ECG 2022 20:19:03  Intraventricular conduction delay now present  Q waves now present  ST (T wave) deviation now present  Sinus bradycardia no longer present  Myocardial infarct                           finding still present  Electronically Signed On 2022 7:55:46 PDT by Yoshi Holt MD         Imaging:   No orders to display       Problem Representation:        * Symptomatic bradycardia  Assessment & Plan  Patient with symptomatic bradycardia sent over by cardiology office after becoming dizzy with previous cardiac monitor  demonstrating pauses around 3 to 6 seconds per cardiology note. A strip by [facility] showed heart rate ranging 35-40 bpm appears to be junctional with PACs.   - Currently in normal sinus rhythm with isolated V2 ST segment elevation  - Per EP, PPM to be placed tomorrow  - K greater than 4, mag greater than 2    Hypertension, essential  Assessment & Plan  Elevated blood pressure on admission  - Continue home irbesartan  - Increased norvas to 10 mg p.o. 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  CODE STATUS discussed with patient and wants to be DNR/DNI    Mild cognitive impairment- (present on admission)  Assessment & Plan  Per chart review, was placed on memantine outpatient    History of ischemic left MCA stroke  Assessment & Plan  On last admission left MCA stroke 6/22/2022    DARIN (obstructive sleep apnea)- (present on admission)  Assessment & Plan  On CPAP at home, AHI 23.6, 15 cm H2O

## 2022-07-13 NOTE — ASSESSMENT & PLAN NOTE
Elevated blood pressure on admission  - Continue home irbesartan  - Increased norvas to 10 mg p.o. daily  - Hydralazine 20 mg IV PRN for HTN urgency/emergency

## 2022-07-13 NOTE — DISCHARGE PLANNING
"HTH/SCP TCN chart review completed. Collaborated with CM Roby Montero and Brayan GRANDA prior to meeting with the pt. The most current review of medical record, knowledge of pt's PLOF and social support, LACE+ score of 66, 6 clicks scores of ( none entered)  mobility were considered.  Gordo transferred from ED YELLOW 55 to TELE 822. Gordo [facility] with upcoming visits on 7/13, 7/14, 7/15, 7/18, 7/19.    Pt seen at bedside along with Bedside RN Rachel. Mbr is very pleasant, noticeable s/s expressive aphasia; O2 via NC in use. Member is able to organize thoughts and has good insights; AOX4.  Mbr endorses functioning below baseline ( modified independent with use on FWW and rollator assistive devices). Mbr states \" I don't eat, My blood pressure is so high, I need help!\". Gordo does not have a working home telephone ( drops calls) but carries a [employer] phone ( [phone] --- in need of a telephone . Gordo lives alone but claims she has great support from friend and neighbour. She takes [employer] as mode of transportation.     Introduced TCN program. Provided education regarding differences in post acute resources including IRF, SNF and HH . Discussed HTH/SCP plan benefits including Meds to Beds and [facility] transitional care services. Pt verbalizes understanding. Choice forms proactively obtained for SNF, HH, DME and given to Primary CECY GRANDA Requested [facility] Dr Stuart Valiente via Vo[employer]  for a PT OT eval orderr.  Sending referral to [facility] will continue to follow and collaborate with discharge planning team as additional post acute needs arise. Thank you.       Completed today 7/13/2022:  - Choice forms obtained: SNF, HH DME  -Requested MD order for PT OT eval  -[facility] referral ( sent)       "

## 2022-07-13 NOTE — ED PROVIDER NOTES
ED Provider Note    CHIEF COMPLAINT  Chief Complaint   Patient presents with   • Dizziness       HPI  Irina Traylor is a 81 y.o. female who presents with chief complaint of dizziness.  Patient was being seen at cardiology clinic today when she developed symptoms of feeling like the room was spinning and she was going to pass out, patient had an EKG in clinic which showed questionable secondary heart block, type II, and was sent to our facility for further care.  Patient has a history of recent stroke with residual speech deficit, peripheral vascular disease, dyslipidemia and hypertension.   Patient reports that the dizziness has resolved.  She denies any associated chest pain or palpitations, nausea or diaphoresis during her dizziness episode.  Patient denies any new weakness or numbness.  She denies any worsening of her speech deficit.      REVIEW OF SYSTEMS  ROS    See HPI for further details. All other systems are negative.     PAST MEDICAL HISTORY   has a past medical history of Anesthesia, Arthritis, Benign essential HTN (9/7/2017), Cataract, CPAP (continuous positive airway pressure) dependence, Dyslipidemia (3/5/2019), Expressive aphasia, Family history of CVA, Heart murmur, DARIN (obstructive sleep apnea) (1/26/2017), Post-nasal drip, Restless leg syndrome, Sick sinus syndrome (HCC) (6/29/2022), Sinus arrest (6/29/2022), Stroke (HCC) (2/1993, 8/1993), and Walker as ambulation aid.    SOCIAL HISTORY  Social History     Tobacco Use   • Smoking status: Never Smoker   • Smokeless tobacco: Never Used   Vaping Use   • Vaping Use: Never used   Substance and Sexual Activity   • Alcohol use: Not Currently   • Drug use: No   • Sexual activity: Not Currently       SURGICAL HISTORY   has a past surgical history that includes hysterectomy, total abdominal (1977); fusion, spine, lumbar, plif (9/26/2016); lumbar laminectomy diskectomy (N/A, 9/26/2016); lumbar laminectomy diskectomy (1993); carpal tunnel release  "(Bilateral, 1993); orif, finger (Left, as child); hammertoe correction (Left, 11/12/2007); cervical disk and fusion anterior (1993); hammertoe correction (Right, 1980's); bunionectomy (Bilateral, 1980's); colonoscopy (2/2016); cervical fusion posterior (8/8/2017); cervical laminectomy posterior (8/8/2017); reconstr total shoulder implant (Left, 3/21/2019); orif, fracture, humerus (Left, 3/21/2019); shoulder arthroplasty total (Left, 8/11/2015); knee arthroplasty total (6/23/2014); hip arthroplasty total (Right, 12/12/2016); hip arthroplasty total (Left, 5/8/2017); and knee arthroplasty total (Left, 2/12/2018).    CURRENT MEDICATIONS  Home Medications     Reviewed by Diana Carl R.N. (Registered Nurse) on 07/12/22 at 1748  Med List Status: <None>   Medication Last Dose Status   Ascorbic Acid (VITAMIN C) 1000 MG Tab  Active   aspirin 81 MG tablet  Active   fenofibrate (TRICOR) 145 MG Tab  Active   fluticasone (FLONASE) 50 MCG/ACT nasal spray  Active   irbesartan (AVAPRO) 300 MG Tab  Active   loratadine (CLARITIN) 10 MG Tab  Active   Magnesium 400 MG Tab  Active   memantine (NAMENDA) 5 MG Tab  Active   Misc Natural Products (NARCOSOFT HERBAL LAX) Cap  Active   non-formulary med  Active   vitamin D (VITAMIND D3) 1000 UNIT Tab  Active   vitamin E (VITAMIN E) 1000 Unit (450 mg) Cap  Active                ALLERGIES  Allergies   Allergen Reactions   • Benicar [Olmesartan] Rash     blisters   • Dilaudid  [Hydromorphone Hcl] Vomiting   • Lidocaine    • Norco  [Apap-Fd&C Blue #1-Hydrocodone] Vomiting   • Other Misc Shortness of Breath     Anesthesia with surgery 2016  Pt couldn't breathe while being put under and tapped side of bed to let RN know. \"It was the scariest feeling and I want everyone to know\"     • Percocet  [Apap-Fd&C Red #40 [place]-Oxycodone] Vomiting   • Vicodin  [Apap-Fd&C Yellow #10 [place]-Hydrocodone] Vomiting   • Codeine Vomiting   • Demerol Vomiting   • Hydrocodone Vomiting   • Other Drug Vomiting     " "\"all pain meds\" per pt can take morphine.   • Oxycodone Vomiting   • Statins [Hmg-Coa-R Inhibitors] Unspecified     Muscle cramping   • Valium Vomiting       PHYSICAL EXAM  Vitals:    07/12/22 1804   BP: (!) 185/97   Pulse: 78   Resp:    Temp:    SpO2: 96%       Physical Exam  Constitutional:       Appearance: She is well-developed.   HENT:      Head: Normocephalic and atraumatic.   Eyes:      Conjunctiva/sclera: Conjunctivae normal.   Cardiovascular:      Rate and Rhythm: Normal rate and regular rhythm.   Pulmonary:      Effort: Pulmonary effort is normal.      Breath sounds: Normal breath sounds.   Abdominal:      General: Bowel sounds are normal. There is no distension.      Palpations: Abdomen is soft.      Tenderness: There is no abdominal tenderness. There is no rebound.   Musculoskeletal:      Cervical back: Normal range of motion and neck supple.   Skin:     General: Skin is warm and dry.      Findings: No rash.   Neurological:      Mental Status: She is alert and oriented to person, place, and time.      Comments: Mild aphasia, some word finding difficulties which patient reports is chronic for her since 3 weeks, strength is 5 out of 5 in all extremities.  No associated dysmetria.  No associated nystagmus.  Sensation is intact throughout.   Psychiatric:         Behavior: Behavior normal.           DIAGNOSTIC STUDIES / PROCEDURES    EKG  See below    LABS  Results for orders placed or performed during the hospital encounter of 07/12/22   CBC WITH DIFFERENTIAL   Result Value Ref Range    WBC 6.1 4.8 - 10.8 K/uL    RBC 5.03 4.20 - 5.40 M/uL    Hemoglobin 15.0 12.0 - 16.0 g/dL    Hematocrit 46.0 37.0 - 47.0 %    MCV 91.5 81.4 - 97.8 fL    MCH 29.8 27.0 - 33.0 pg    MCHC 32.6 (L) 33.6 - 35.0 g/dL    RDW 47.4 35.9 - 50.0 fL    Platelet Count 225 164 - 446 K/uL    MPV 12.8 9.0 - 12.9 fL    Neutrophils-Polys 70.00 44.00 - 72.00 %    Lymphocytes 14.60 (L) 22.00 - 41.00 %    Monocytes 11.50 0.00 - 13.40 %    " Eosinophils 2.80 0.00 - 6.90 %    Basophils 0.80 0.00 - 1.80 %    Immature Granulocytes 0.30 0.00 - 0.90 %    Nucleated RBC 0.00 /100 WBC    Neutrophils (Absolute) 4.27 2.00 - 7.15 K/uL    Lymphs (Absolute) 0.89 (L) 1.00 - 4.80 K/uL    Monos (Absolute) 0.70 0.00 - 0.85 K/uL    Eos (Absolute) 0.17 0.00 - 0.51 K/uL    Baso (Absolute) 0.05 0.00 - 0.12 K/uL    Immature Granulocytes (abs) 0.02 0.00 - 0.11 K/uL    NRBC (Absolute) 0.00 K/uL   CMP   Result Value Ref Range    Sodium 140 135 - 145 mmol/L    Potassium 4.4 3.6 - 5.5 mmol/L    Chloride 105 96 - 112 mmol/L    Co2 24 20 - 33 mmol/L    Anion Gap 11.0 7.0 - 16.0    Glucose 92 65 - 99 mg/dL    Bun 33 (H) 8 - 22 mg/dL    Creatinine 1.04 0.50 - 1.40 mg/dL    Calcium 9.8 8.5 - 10.5 mg/dL    AST(SGOT) 20 12 - 45 U/L    ALT(SGPT) 17 2 - 50 U/L    Alkaline Phosphatase 51 30 - 99 U/L    Total Bilirubin 0.3 0.1 - 1.5 mg/dL    Albumin 4.3 3.2 - 4.9 g/dL    Total Protein 7.3 6.0 - 8.2 g/dL    Globulin 3.0 1.9 - 3.5 g/dL    A-G Ratio 1.4 g/dL   TROPONIN   Result Value Ref Range    Troponin T 15 6 - 19 ng/L   ESTIMATED GFR   Result Value Ref Range    GFR (CKD-EPI) 54 (A) >60 mL/min/1.73 m 2   MAGNESIUM   Result Value Ref Range    Magnesium 2.3 1.5 - 2.5 mg/dL   CoV-2, FLU A/B, and RSV by PCR (2-4 Hours CEPHEID) : Collect NP swab in VTM    Specimen: Respirate   Result Value Ref Range    Influenza virus A RNA Negative Negative    Influenza virus B, PCR Negative Negative    RSV, PCR Negative Negative    SARS-CoV-2 by PCR NotDetected     SARS-CoV-2 Source NP Swab    EKG   Result Value Ref Range    Report       [facility] Emergency Dept.    Test Date:  2022  Pt Name:    JACEK VELASQUEZ             Department: ER  MRN:        [id number]                      Room:        20  Gender:     Female                       Technician: [id number]  :        1940                   Requested By:ER TRIAGE PROTOCOL  Order #:    [id number]                    Monica MD:  Rome Ching MD    Measurements  Intervals                                Axis  Rate:       69                           P:          66  MI:         180                          QRS:        -61  QRSD:       124                          T:          57  QT:         432  QTc:        463    Interpretive Statements  EKG is normal sinus rhythm, isolated ST elevation in V2, no ST elevation  otherwise, no reciprocal changes.  QRS is 124, normal axis  Electronically Signed On 7- 18:59:34 PDT by Rome Ching MD         COURSE & MEDICAL DECISION MAKING  Pertinent Labs & Imaging studies reviewed. (See chart for details)    Very pleasant woman here with normal sinus rhythm and reassuring EKG here in the emergency department.  Questionable second-degree heart block and cardiology clinic.  Will discuss case with cardiology for further evaluation.  Check basic labs and troponin in the interim.  I discussed the case with cardiology, they would like patient admitted for telemetry, EP likely to see tomorrow.  I discussed the case with hospitalist.    FINAL IMPRESSION  1.  Dizziness, arrhythmia, cardiac near syncope      Electronically signed by: Humza Peter M.D., 7/12/2022 6:30 PM

## 2022-07-13 NOTE — PROGRESS NOTES
4 Eyes Skin Assessment Completed by Donavan Ramos RN and CHRIS Quigley.    Head WDL  Ears WDL  Nose WDL  Mouth WDL  Neck WDL  Breast/Chest WDL  Shoulder Blades WDL  Spine WDL  (R) Arm/Elbow/Hand WDL  (L) Arm/Elbow/Hand WDL  Abdomen WDL  Groin WDL  Scrotum/Coccyx/Buttocks WDL  (R) Leg WDL  (L) Leg WDL  (R) Heel/Foot/Toe has 4 toes  (L) Heel/Foot/Toe  Has 4 toes          Devices In Places Tele Box and SCD's      Interventions In Place Pillows, Q2 Turns, Low Air Loss Mattress and Barrier Cream    Possible Skin Injury No    Pictures Uploaded Into Epic N/A  Wound Consult Placed N/A  RN Wound Prevention Protocol Ordered Yes

## 2022-07-13 NOTE — PROGRESS NOTES
08:45 am, paged Dr. Dalton to report headache 1/10 throbbing R temporal headache per patient. MD at bedside at 08:48 am.       Report called to CHRIS Ramos on T8. Flex RN here to transport patient.

## 2022-07-13 NOTE — PROGRESS NOTES
"Cardiology Follow Up Progress Note    Date of Service  7/13/2022    Attending Physician  Stuart Valiente M.D.    Chief Complaint   Chief Complaint   Patient presents with   • Dizziness       HPI  Irina Traylor is a 81 y.o. female admitted 7/12/2022 with per Dr. Gray, \"with history of hypertension and stroke who presented with abnormal event monitor results to cardiology clinic, sent to ER for evaluation of symptomatic bradycardia.     The patient was evaluated in cardiology clinic today by KUSHAL Cardenas, and was found to be in bradycardia and symptomatic towards the end of the visit. As such, the patient was sent to ER for further evaluation.     I personally reviewed the EKG, which showed junctional rhythm with PACs.  The patient was previously evaluated in cardiology clinic by KUSHAL Fisher, on 6/30/2022 for pauses found on Zio patch.  One of the urgent reports was reviewed by Dr. Zhao, with patient having 6.3-second pause around 6 AM.  The patient also has several other pauses ranging from 3.1 to 5.0 seconds occurring in the early morning hours.  The patient was referred to EP, pending appointment with Dr. Wolfe in August.       When the patient presented to clinic today, she reported feeling lightheadedness towards the end of the visit. The patient was recommended to discontinue diltiazem previously, but she had resumed it for high blood pressure.  A strip by HERMINIO showed heart rate ranging 35-40 bpm appears to be junctional with PACs.      On arrival to the ER, the patient remains in sinus rhythm.  She reports feeling well currently.  She denies any current lightheadedness.  She denies any chest pain or shortness of breath on exertion.  No orthopnea, PND, leg swelling.  No palpitations.  No syncope.\"    Interim Events  7/13/2022: No overnight cardiac events. Tele monitoring personally interpreted by me shows SB-SR. VSS; RA. Labs reviewed. NPO for EP consultation and possible procedure.     Review of " Systems  Review of Systems   Constitutional: Negative for activity change, fatigue and fever.   Respiratory: Negative for cough, chest tightness and shortness of breath.    Cardiovascular: Negative for chest pain, palpitations and leg swelling.   Gastrointestinal: Negative for abdominal distention and abdominal pain.   Genitourinary: Negative for hematuria.   Skin: Negative for pallor and rash.   Neurological: Negative for dizziness and light-headedness.   Hematological: Does not bruise/bleed easily.   Psychiatric/Behavioral: Negative for confusion. The patient is not nervous/anxious.        Vital signs in last 24 hours  Temp:  [36 °C (96.8 °F)-37.3 °C (99.1 °F)] 36.6 °C (97.9 °F)  Pulse:  [55-78] 60  Resp:  [15-23] 20  BP: (133-185)/(65-97) 157/86  SpO2:  [89 %-97 %] 96 %    Physical Exam  Physical Exam  Constitutional:       General: She is not in acute distress.     Appearance: Normal appearance.   HENT:      Head: Normocephalic and atraumatic.   Eyes:      Extraocular Movements: Extraocular movements intact.      Conjunctiva/sclera: Conjunctivae normal.   Cardiovascular:      Rate and Rhythm: Normal rate and regular rhythm.      Pulses: Normal pulses.      Heart sounds: Normal heart sounds.   Pulmonary:      Effort: Pulmonary effort is normal.      Breath sounds: Normal breath sounds.   Musculoskeletal:      Right lower leg: No edema.      Left lower leg: No edema.   Skin:     General: Skin is warm and dry.      Findings: No rash.   Neurological:      Mental Status: She is alert and oriented to person, place, and time. Mental status is at baseline.         Lab Review  Lab Results   Component Value Date/Time    WBC 5.0 07/13/2022 01:59 AM    RBC 4.94 07/13/2022 01:59 AM    HEMOGLOBIN 14.6 07/13/2022 01:59 AM    HEMATOCRIT 45.0 07/13/2022 01:59 AM    MCV 91.1 07/13/2022 01:59 AM    MCH 29.6 07/13/2022 01:59 AM    MCHC 32.4 (L) 07/13/2022 01:59 AM    MPV 12.2 07/13/2022 01:59 AM      Lab Results   Component Value  Date/Time    SODIUM 139 07/13/2022 01:59 AM    POTASSIUM 3.8 07/13/2022 01:59 AM    CHLORIDE 106 07/13/2022 01:59 AM    CO2 25 07/13/2022 01:59 AM    GLUCOSE 89 07/13/2022 01:59 AM    BUN 31 (H) 07/13/2022 01:59 AM    CREATININE 0.82 07/13/2022 01:59 AM      Lab Results   Component Value Date/Time    ASTSGOT 20 07/12/2022 05:53 PM    ALTSGPT 17 07/12/2022 05:53 PM     Lab Results   Component Value Date/Time    CHOLSTRLTOT 149 06/21/2022 03:25 AM    LDL 80 06/21/2022 03:25 AM    HDL 53 06/21/2022 03:25 AM    TRIGLYCERIDE 79 06/21/2022 03:25 AM    TROPONINT 15 07/12/2022 05:53 PM       No results for input(s): NTPROBNP in the last 72 hours.    Cardiac Imaging and Procedures Review  EKG 7/12/2022 at 16:12:  junctional rhythm with PACs    EKG 7/12/2022 at 18:59: normal sinus rhythm    EKG (7/13/2022):  My personal interpretation of the EKG dated 7/13/2022 is SB with RBBB and LVH    Echocardiogram (6/21/2022):  The left ventricular ejection fraction is normal; visually estimated to   be 55%. Mild concentric left ventricular hypertrophy.  Thickened, calcified mitral valve leaflets.  Mild mitral regurgitation.  Mild aortic insufficiency.  Normal left atrial size.    Event monitor 6/2022  DOS: 6/24/2022   Summary:   6.3 seconds sinus pause occurred at 06:01 AM on 06/24/2022.      Assessment/Plan  Symptomatic bradycardia  Sinus pauses  Junctional rhythm  The patient had an event monitor placed, which showed pauses ranging from 3.1-second to 6.3 seconds, all occurring during early morning hours.  The patient did not have any symptoms during these episodes.  However, the patient reported lightheadedness yesterday in clinic, with EKG showing junctional rhythm with PACs.  The patient has a pending appointment with EP for possible pacemaker placement next month.  -Continue to hold diltiazem  -EP consulted  -Keep n.p.o. for possible pacemaker placement     Hypertension  -remains elevated  -Negative orthostatic  -Avoid AV alexandro  blockade  -Continue irbesartan 300 mg daily  -Increase amlodipine to 10 mg daily    History of multiple strokes:  -Continue aspirin 81 mg daily  -Continue fenofibrate     Sleep apnea:  -Continue regular CPAP use as this could be contributing to her pauses.     Thank you for allowing me to participate in the care of this patient.  I will continue to follow this patient    Please contact me with any questions.    Please see Dr. Zhao's attestation for further details and MDM.     I personally spent a total of 15 minutes which includes face-to-face time and non-face-to-face time spent on preparing to see the patient, reviewing hospital notes and tests, obtaining history from the patient, performing a medically appropriate exam, counseling and educating the patient, ordering medications/tests/procedures/referrals as clinically indicated, and documenting information in the electronic medical record.    ANDREW Lisa.   [facility]  [phone]

## 2022-07-13 NOTE — PROGRESS NOTES
Pt desaturating at 89% on RA, pt placed on O2-2L,NC, SPO2 96%. Pt stable. No NOC CPAP/BiPAP equipment available per RT.

## 2022-07-13 NOTE — ED NOTES
Med rec updated and complete. Allergies reviewed. Confirmed name and date of birth.  Pt denies antibiotic use in last 30 days.      [facility] [phone]

## 2022-07-13 NOTE — CONSULTS
Reason for Consult:  Asked by Dr Humza Peter M.D. to see this patient with symptomatic bradycardia   Patient's PCP: NAYAN Longo    CC:   Chief Complaint   Patient presents with   • Dizziness       HPI:  Irina Traylor is a 81-year-old woman with history of hypertension and stroke who presented with abnormal event monitor results to cardiology clinic, sent to ER for evaluation of symptomatic bradycardia.    The patient was evaluated in cardiology clinic today by KUSHAL Cardenas, and was found to be in bradycardia and symptomatic towards the end of the visit. As such, the patient was sent to ER for further evaluation.    I personally reviewed the EKG, which showed junctional rhythm with PACs.  The patient was previously evaluated in cardiology clinic by KUSHAL Fisher, on 6/30/2022 for pauses found on Zio patch.  One of the urgent reports was reviewed by Dr. Zhao, with patient having 6.3-second pause around 6 AM.  The patient also has several other pauses ranging from 3.1 to 5.0 seconds occurring in the early morning hours.  The patient was referred to EP, pending appointment with Dr. Wolfe in August.      When the patient presented to clinic today, she reported feeling lightheadedness towards the end of the visit. The patient was recommended to discontinue diltiazem previously, but she had resumed it for high blood pressure.  A strip by HERMINIO showed heart rate ranging 35-40 bpm appears to be junctional with PACs.     On arrival to the ER, the patient remains in sinus rhythm.  She reports feeling well currently.  She denies any current lightheadedness.  She denies any chest pain or shortness of breath on exertion.  No orthopnea, PND, leg swelling.  No palpitations.  No syncope.      Medications / Drug list prior to admission:  No current facility-administered medications on file prior to encounter.     Current Outpatient Medications on File Prior to Encounter   Medication Sig Dispense Refill    • Ascorbic Acid (VITAMIN C) 1000 MG Tab Take 1,000 mg by mouth every day at 6 PM.     • Misc Natural Products (NARCOSOFT HERBAL LAX) Cap Take 6 Capsules by mouth every day at 6 PM. Herb-Lax     • fenofibrate (TRICOR) 145 MG Tab Take 1 Tablet by mouth every day. 100 Tablet 3   • irbesartan (AVAPRO) 300 MG Tab Take 1 Tablet by mouth every evening. (Patient taking differently: Take 300 mg by mouth every evening. pt taking in the morning) 100 Tablet 1   • memantine (NAMENDA) 5 MG Tab Take 5 mg by mouth every day. FOR MEMORY AND PRE ALTZHEIMERS.     • Magnesium 400 MG Tab Take 400 mg by mouth every day.     • loratadine (CLARITIN) 10 MG Tab TAKE ONE TABLET BY MOUTH ONE TIME DAILY AS NEEDED FOR ALLERGY (Patient taking differently: Take 10 mg by mouth 1 time a day as needed (Allergies). TAKE ONE TABLET BY MOUTH ONE TIME DAILY AS NEEDED FOR ALLERGY) 90 Tablet 3   • aspirin 81 MG tablet Take 81 mg by mouth at bedtime.         Current list of administered Medications:  No current facility-administered medications for this encounter.    Current Outpatient Medications:   •  Ascorbic Acid (VITAMIN C) 1000 MG Tab, Take 1,000 mg by mouth every day at 6 PM., Disp: , Rfl:   •  Misc Natural Products (NARCOSOFT HERBAL LAX) Cap, Take 6 Capsules by mouth every day at 6 PM. Herb-Lax, Disp: , Rfl:   •  fenofibrate (TRICOR) 145 MG Tab, Take 1 Tablet by mouth every day., Disp: 100 Tablet, Rfl: 3  •  irbesartan (AVAPRO) 300 MG Tab, Take 1 Tablet by mouth every evening. (Patient taking differently: Take 300 mg by mouth every evening. pt taking in the morning), Disp: 100 Tablet, Rfl: 1  •  memantine (NAMENDA) 5 MG Tab, Take 5 mg by mouth every day. FOR MEMORY AND PRE ALTZHEIMERS., Disp: , Rfl:   •  Magnesium 400 MG Tab, Take 400 mg by mouth every day., Disp: , Rfl:   •  loratadine (CLARITIN) 10 MG Tab, TAKE ONE TABLET BY MOUTH ONE TIME DAILY AS NEEDED FOR ALLERGY (Patient taking differently: Take 10 mg by mouth 1 time a day as needed  (Allergies). TAKE ONE TABLET BY MOUTH ONE TIME DAILY AS NEEDED FOR ALLERGY), Disp: 90 Tablet, Rfl: 3  •  aspirin 81 MG tablet, Take 81 mg by mouth at bedtime., Disp: , Rfl:     Past Medical History:   Diagnosis Date   • Anesthesia     States feels like she can't breathe with induction for right hip surgery, gets nausea vomiting with most pain meds.  Uses anti-nausea medicine if takes morphine   • Arthritis     To hips, shoulders, hands   • Benign essential HTN 9/7/2017   • Cataract    • CPAP (continuous positive airway pressure) dependence    • Dyslipidemia 3/5/2019   • Expressive aphasia     Related to CVA   • Family history of CVA    • Heart murmur    • DARIN (obstructive sleep apnea) 1/26/2017    AHI 23.6, minimum saturation 69%, on CPAP 15 cm.   • Post-nasal drip    • Restless leg syndrome     4/25/17-Resolved, states was related to right hip and none S/P replacement.   • Sick sinus syndrome (HCC) 6/29/2022   • Sinus arrest 6/29/2022   • Stroke ([facility]) 2/1993, 8/1993    Vpoucvsf-vjckoh-qtbshndqei aphasia,mouth forming words, reading, numbers   • Walker as ambulation aid        Past Surgical History:   Procedure Laterality Date   • PB RECONSTR TOTAL SHOULDER IMPLANT Left 3/21/2019    Procedure: ARTHROPLASTY, SHOULDER, TOTAL-  REVISION REVERSE;  Surgeon: Yoshi Gamble M.D.;  Location: [facility];  Service: Orthopedics   • ORIF, FRACTURE, HUMERUS Left 3/21/2019    Procedure: ORIF, FRACTURE, HUMERUS;  Surgeon: Yoshi Gamble M.D.;  Location: [facility];  Service: Orthopedics   • KNEE ARTHROPLASTY TOTAL Left 2/12/2018    Procedure: KNEE ARTHROPLASTY TOTAL;  Surgeon: Earle Jacobsen M.D.;  Location: [facility];  Service: Orthopedics   • CERVICAL FUSION POSTERIOR  8/8/2017    Procedure: CERVICAL FUSION POSTERIOR- C3-5, INSTRUMENTED;  Surgeon: Florentino Pike M.D.;  Location: [facility];  Service:    • CERVICAL LAMINECTOMY POSTERIOR  8/8/2017     Procedure: CERVICAL LAMINECTOMY POSTERIOR- C3-5;  Surgeon: Florentino Pike M.D.;  Location: SURGERY [facility];  Service:    • HIP ARTHROPLASTY TOTAL Left 5/8/2017    Procedure: HIP ARTHROPLASTY TOTAL;  Surgeon: Earle Jacobsen M.D.;  Location: [facility];  Service:    • HIP ARTHROPLASTY TOTAL Right 12/12/2016    Procedure: HIP ARTHROPLASTY TOTAL;  Surgeon: Earle Jacobsen M.D.;  Location: [facility];  Service:    • FUSION, SPINE, LUMBAR, PLIF  9/26/2016    Procedure: LUMBAR FUSION POSTERIOR L2-S1 onlay ;  Surgeon: Florentino Pike M.D.;  Location: SURGERY [facility];  Service:    • LUMBAR LAMINECTOMY DISKECTOMY N/A 9/26/2016    Procedure: LUMBAR LAMINECTOMY DISKECTOMY L2-S1;  Surgeon: Florentino Pike M.D.;  Location: [facility];  Service:    • COLONOSCOPY  2/2016    Every 10 years   • SHOULDER ARTHROPLASTY TOTAL Left 8/11/2015    Procedure: SHOULDER ARTHROPLASTY REVERSE TOTAL;  Surgeon: Yoshi Gamble M.D.;  Location: [facility];  Service:    • KNEE ARTHROPLASTY TOTAL  6/23/2014    Performed by Earle Jacobsen M.D. at [facility]   • HAMMERTOE CORRECTION Left 11/12/2007    Removed 3rd toe   • LUMBAR LAMINECTOMY DISKECTOMY  1993    Lumbar   • CARPAL TUNNEL RELEASE Bilateral 1993   • CERVICAL DISK AND FUSION ANTERIOR  1993    X2   • HYSTERECTOMY, TOTAL ABDOMINAL  1977   • BUNIONECTOMY Bilateral 1980's   • HAMMERTOE CORRECTION Right 1980's    Removed 3rd toe   • ORIF, FINGER Left as child    Index       Family History   Problem Relation Age of Onset   • Cancer Mother         vaginal   • Stroke Father    • Hypertension Brother    • Diabetes Brother    • Hypertension Brother    • Hypertension Brother    • Stroke Daughter      Patient family history was personally reviewed, no pertinent family history to current presentation    Social History     Tobacco Use   • Smoking status: Never Smoker   • Smokeless tobacco: Never Used  "  Vaping Use   • Vaping Use: Never used   Substance Use Topics   • Alcohol use: Not Currently   • Drug use: No       ALLERGIES:  Allergies   Allergen Reactions   • Benicar [Olmesartan] Rash     blisters   • Dilaudid  [Hydromorphone Hcl] Vomiting   • Lidocaine    • Norco  [Apap-Fd&C Blue #1-Hydrocodone] Vomiting   • Other Misc Shortness of Breath     Anesthesia with surgery 2016  Pt couldn't breathe while being put under and tapped side of bed to let RN know. \"It was the scariest feeling and I want everyone to know\"     • Percocet  [Apap-Fd&C Red #40 [place]-Oxycodone] Vomiting   • Vicodin  [Apap-Fd&C Yellow #10 [place]-Hydrocodone] Vomiting   • Codeine Vomiting   • Demerol Vomiting   • Hydrocodone Vomiting   • Other Drug Vomiting     \"all pain meds\" per pt can take morphine.   • Oxycodone Vomiting   • Statins [Hmg-Coa-R Inhibitors] Unspecified     Muscle cramping   • Valium Vomiting       Review of systems:  A complete review of symptoms was reviewed with patient. This is reviewed in H&P and PMH. ALL OTHERS reviewed and negative    Physical exam:  Patient Vitals for the past 24 hrs:   BP Temp Temp src Pulse Resp SpO2 Height Weight   07/12/22 1804 (!) 185/97 -- -- 78 -- 96 % -- --   07/12/22 1802 (!) 157/89 -- -- 70 -- 95 % -- --   07/12/22 1801 (!) 152/80 -- -- 71 -- 92 % -- --   07/12/22 1745 (!) 174/89 37.3 °C (99.1 °F) Temporal 71 18 95 % 1.524 m (5') 76.7 kg (169 lb)     General: No acute distress.   EYES: no jaundice  HEENT: OP clear   Neck: No bruits No JVD.   CVS:  RRR. S1 + S2. No M/R/G  Resp: CTAB. No wheezing or crackles/rhonchi.  Abdomen: Soft, NT, ND,  Skin: Grossly nothing acute no obvious rashes  Neurological: Alert, Moves all extremities, no cranial nerve defects on limited exam  Extremities: Pulse 2+ in b/l LE.  No edema. No cyanosis.       Data:  Laboratory studies personally reviewed by me:  Recent Results (from the past 24 hour(s))   EKG    Collection Time: 07/12/22  4:12 PM   Result Value Ref " Range    Report       [facility]    Test Date:  2022  Pt Name:    JACEK VELASQUEZ             Department: [facility]  MRN:        [id number]                      Room:  Gender:     Female                       Technician:   :        [id number]                   Requested By:GERMAINE ZAPIEN  Order #:    [id number]                    Reading MD:    Measurements  Intervals                                Axis  Rate:       58                           P:  MA:                                      QRS:        -75  QRSD:       124                          T:          70  QT:         455  QTc:        446    Interpretive Statements  Atrial fibrillation  Nonspecific IVCD with LAD  Left ventricular hypertrophy  Anterior Q waves, possibly due to LVH  ST elevation, consider inferior injury  Compared to ECG 2022 20:19:03  Intraventricular conduction delay now present  Q waves now present  ST (T wave) deviation now present  Sinus bradycardia no longer present  Myocardial infarct finding still present      EKG    Collection Time: 22  5:42 PM   Result Value Ref Range    Report       [facility]    Test Date:  2022  Pt Name:    JACEK VELASQUEZ             Department: ER  MRN:        [id number]                      Room:        20  Gender:     Female                       Technician: [id number]  :        [id number]                   Requested By:ER TRIAGE PROTOCOL  Order #:    [id number]                    Reading MD: Rome Ching MD    Measurements  Intervals                                Axis  Rate:       69                           P:          66  MA:         180                          QRS:        -61  QRSD:       124                          T:          57  QT:         432  QTc:        463    Interpretive Statements  EKG is normal sinus rhythm, isolated ST elevation in V2, no ST elevation  otherwise, no reciprocal changes.  QRS is 124, normal  axis  Electronically Signed On 7- 18:59:34 PDT by Rome Ching MD     CBC WITH DIFFERENTIAL    Collection Time: 07/12/22  5:53 PM   Result Value Ref Range    WBC 6.1 4.8 - 10.8 K/uL    RBC 5.03 4.20 - 5.40 M/uL    Hemoglobin 15.0 12.0 - 16.0 g/dL    Hematocrit 46.0 37.0 - 47.0 %    MCV 91.5 81.4 - 97.8 fL    MCH 29.8 27.0 - 33.0 pg    MCHC 32.6 (L) 33.6 - 35.0 g/dL    RDW 47.4 35.9 - 50.0 fL    Platelet Count 225 164 - 446 K/uL    MPV 12.8 9.0 - 12.9 fL    Neutrophils-Polys 70.00 44.00 - 72.00 %    Lymphocytes 14.60 (L) 22.00 - 41.00 %    Monocytes 11.50 0.00 - 13.40 %    Eosinophils 2.80 0.00 - 6.90 %    Basophils 0.80 0.00 - 1.80 %    Immature Granulocytes 0.30 0.00 - 0.90 %    Nucleated RBC 0.00 /100 WBC    Neutrophils (Absolute) 4.27 2.00 - 7.15 K/uL    Lymphs (Absolute) 0.89 (L) 1.00 - 4.80 K/uL    Monos (Absolute) 0.70 0.00 - 0.85 K/uL    Eos (Absolute) 0.17 0.00 - 0.51 K/uL    Baso (Absolute) 0.05 0.00 - 0.12 K/uL    Immature Granulocytes (abs) 0.02 0.00 - 0.11 K/uL    NRBC (Absolute) 0.00 K/uL   CMP    Collection Time: 07/12/22  5:53 PM   Result Value Ref Range    Sodium 140 135 - 145 mmol/L    Potassium 4.4 3.6 - 5.5 mmol/L    Chloride 105 96 - 112 mmol/L    Co2 24 20 - 33 mmol/L    Anion Gap 11.0 7.0 - 16.0    Glucose 92 65 - 99 mg/dL    Bun 33 (H) 8 - 22 mg/dL    Creatinine 1.04 0.50 - 1.40 mg/dL    Calcium 9.8 8.5 - 10.5 mg/dL    AST(SGOT) 20 12 - 45 U/L    ALT(SGPT) 17 2 - 50 U/L    Alkaline Phosphatase 51 30 - 99 U/L    Total Bilirubin 0.3 0.1 - 1.5 mg/dL    Albumin 4.3 3.2 - 4.9 g/dL    Total Protein 7.3 6.0 - 8.2 g/dL    Globulin 3.0 1.9 - 3.5 g/dL    A-G Ratio 1.4 g/dL   TROPONIN    Collection Time: 07/12/22  5:53 PM   Result Value Ref Range    Troponin T 15 6 - 19 ng/L   ESTIMATED GFR    Collection Time: 07/12/22  5:53 PM   Result Value Ref Range    GFR (CKD-EPI) 54 (A) >60 mL/min/1.73 m 2       Imaging:  No orders to display           EKG 7/12/2022 at 18:59: personally reviewed by me  normal sinus rhythm    EKG 7/12/2022 at 16:12: Personally reviewed by me junctional rhythm with PACs    Echocardiogram 6/21/2022  CONCLUSIONS  The left ventricular ejection fraction is normal; visually estimated to   be 55%.  Mild concentric left ventricular hypertrophy.  Thickened, calcified mitral valve leaflets.  Mild mitral regurgitation.  Mild aortic insufficiency.  Normal left atrial size      Event monitor 6/2022  DOS: 6/24/2022     Summary:     6.3 seconds sinus pause occurred at 06:01 AM on 06/24/2022.       Electronically Signed by: Taty Zhao M.D.     All pertinent features of laboratory and imaging reviewed including primary images where applicable      Active Problems:    Symptomatic bradycardia POA: Unknown    Hypertension, essential POA: Unknown  Resolved Problems:    * No resolved hospital problems. *      Assessment / Plan:  Symptomatic bradycardia  Sinus pauses  Junctional rhythm  The patient had an event monitor placed, which showed pauses ranging from 3.1-second to 6.3 seconds, all occurring during early morning hours.  The patient did not have any symptoms during these episodes.  However, the patient reported lightheadedness today in clinic, with EKG showing junctional rhythm with PACs.  The patient has a pending appointment with EP for possible pacemaker placement next month.  -Stop diltiazem  -Will discuss with EP regarding possible pacemaker placement  -Keep n.p.o. after midnight for possible pacemaker placement    Hypertension  -Avoid AV alexandro blockade  -Continue irbesartan 300 mg daily  -Consider amlodipine 5 mg daily if BP remains elevated.      I personally discussed her case with  Dr Humza Peter M.D.    Future Appointments   Date Time Provider Department Center   7/13/2022  1:45 PM Jennifer Baldwin R.N. [facility] None   7/14/2022  9:30 AM Luba Valentino, SLP [facility] None   7/15/2022 12:00 PM Cydney Galarza OT [facility] None   7/18/2022 To Be Determined Ngozi Benedict, PT [facility]  None   7/19/2022 To Be Determined [facility] None   7/19/2022 To Be Determined Lbua Valentino, SLP [facility] None   7/19/2022 To Be Determined Cydney Galarza, OT [facility] None   7/21/2022 To Be Determined Luba Valentino, SLP [facility] None   7/22/2022 To Be Determined Cydney Galarza, OT [facility] None   7/27/2022 To Be Determined [facility] None   7/29/2022  8:45 AM Gabe Gray M.D. [facility] None   8/1/2022 10:00 AM [facility]   8/3/2022 To Be Determined Sabina Sanabria R.N. [facility] None   8/10/2022 To Be Determined Sabina Sanabria R.N. [facility] None   8/15/2022  1:50 PM NAYAN Haywood [facility] None   8/31/2022  3:00 PM Rosana Wolfe M.D. [facility] None   10/6/2022 11:00 AM [facility]       It is my pleasure to participate in the care of Ms. Traylor.  Please do not hesitate to contact me with questions or concerns.    Gabe Gray MD   Cardiologist [facility]    7/12/2022    Please note that this dictation was created using voice recognition software. There may be errors I did not discover before finalizing the note.

## 2022-07-13 NOTE — PROGRESS NOTES
Due meds were given and well tolerated. Nil complaint of dizziness, pt stable, NPO maintained. Report given to day shift RN.

## 2022-07-13 NOTE — ED TRIAGE NOTES
Chief Complaint   Patient presents with   • Dizziness     Pt sent from cardiology by EMS for dizziness. Pt was having follow up appointment for recent stroke.as patient was walking out ot her car she became very dizzy. EKG performed by cardiology and concerned for 3rd degree heart block. Per EMS pt rhythm was changing continually and now she is in NSR.

## 2022-07-13 NOTE — ASSESSMENT & PLAN NOTE
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

## 2022-07-13 NOTE — H&P
Date of Admission: 7/12/2022  Admission Status: Emergency  [facility] Team: CHARY  Attending: Humza Peter M.D.   Resident: Dr. Hugo Lyles  Contact Number: [phone]    Chief Complaint: Dizziness     History of Present Illness (HPI):   Patient is a 81-year-old female with past medical history of multiple prior strokes/left MCA complicated by aphasia 6/2022, and obstructive sleep apnea on CPAP who presents 7/12 for dizziness and cardiology appointment.  Patient states he had dizziness without loss of consciousness while at the cardiology office.  Episode lasted for about 45 minutes with associated bradycardia.  On previous cardiac monitor, patient had pulses 6.3 seconds.  Cardiology was consulted in the ED who will discuss the case with the electrophysiology tomorrow for possible pacemaker placement.  In the ED, patient was completely asymptomatic and denies chest pain, palpitations, lightheadedness, headache, vision changes, shortness of breath, or dyspnea on exertion.    In ED, temperature 37.3 degrees Celsius, pulse 70, respiratory rate 18, blood pressure 185/97, oxygen saturation 96% on room air  Labs: Potassium 4.4, magnesium 2.3, creatinine 1.04  EKG: Different rate, 1 with junctional rhythm with bigeminy and any other showing normal sinus rhythm with isolated P2 segment elevation  Troponins 15    Review of Systems:   Review of Systems   Constitutional: Negative for chills and fever.   HENT: Negative for sore throat.    Eyes: Negative for double vision.   Respiratory: Negative for cough, shortness of breath and wheezing.    Cardiovascular: Negative for chest pain, palpitations and leg swelling.   Gastrointestinal: Negative for abdominal pain, constipation, diarrhea, nausea and vomiting.   Genitourinary: Negative for dysuria and urgency.   Musculoskeletal: Negative for myalgias.   Skin: Negative for rash.   Neurological: Negative for dizziness, weakness and headaches.   Psychiatric/Behavioral: Negative for  substance abuse.       Past Medical History:   Past Medical History was reviewed with patient.   has a past medical history of Anesthesia, Arthritis, Benign essential HTN (9/7/2017), Cataract, CPAP (continuous positive airway pressure) dependence, Dyslipidemia (3/5/2019), Expressive aphasia, Family history of CVA, Heart murmur, DARIN (obstructive sleep apnea) (1/26/2017), Post-nasal drip, Restless leg syndrome, Sick sinus syndrome (HCC) (6/29/2022), Sinus arrest (6/29/2022), Stroke (HCC) (2/1993, 8/1993), and Walker as ambulation aid.    Past Surgical History: Past Surgical History was reviewed with patient.   has a past surgical history that includes hysterectomy, total abdominal (1977); fusion, spine, lumbar, plif (9/26/2016); lumbar laminectomy diskectomy (N/A, 9/26/2016); lumbar laminectomy diskectomy (1993); carpal tunnel release (Bilateral, 1993); orif, finger (Left, as child); hammertoe correction (Left, 11/12/2007); cervical disk and fusion anterior (1993); hammertoe correction (Right, 1980's); bunionectomy (Bilateral, 1980's); colonoscopy (2/2016); cervical fusion posterior (8/8/2017); cervical laminectomy posterior (8/8/2017); pr reconstr total shoulder implant (Left, 3/21/2019); orif, fracture, humerus (Left, 3/21/2019); shoulder arthroplasty total (Left, 8/11/2015); knee arthroplasty total (6/23/2014); hip arthroplasty total (Right, 12/12/2016); hip arthroplasty total (Left, 5/8/2017); and knee arthroplasty total (Left, 2/12/2018).    Medications: Medications have been reviewed with patient.  Prior to Admission Medications   Prescriptions Last Dose Informant Patient Reported? Taking?   Ascorbic Acid (VITAMIN C) 1000 MG Tab 7/12/2022 at 0800  Yes No   Sig: Take 1,000 mg by mouth every day at 6 PM.   Magnesium 400 MG Tab 7/12/2022 at 0800  Yes No   Sig: Take 400 mg by mouth every day.   Misc Natural Products (NARCOSOFT HERBAL LAX) Cap 7/11/2022 at 2000  Yes No   Sig: Take 6 Capsules by mouth every day at 6  "PM. Herb-Lax   aspirin 81 MG tablet 7/11/2022 at 2000 Patient's Home Pharmacy Yes No   Sig: Take 81 mg by mouth at bedtime.   fenofibrate (TRICOR) 145 MG Tab 7/12/2022 at 0800  No No   Sig: Take 1 Tablet by mouth every day.   irbesartan (AVAPRO) 300 MG Tab 7/12/2022 at 0800  No No   Sig: Take 1 Tablet by mouth every evening.   Patient taking differently: Take 300 mg by mouth every evening. pt taking in the morning   loratadine (CLARITIN) 10 MG Tab 7/12/2022 at 0800  No No   Sig: TAKE ONE TABLET BY MOUTH ONE TIME DAILY AS NEEDED FOR ALLERGY   Patient taking differently: Take 10 mg by mouth 1 time a day as needed (Allergies). TAKE ONE TABLET BY MOUTH ONE TIME DAILY AS NEEDED FOR ALLERGY   memantine (NAMENDA) 5 MG Tab 7/12/2022 at 0800  Yes No   Sig: Take 5 mg by mouth every day. FOR MEMORY AND PRE ALTZHEIMERS.      Facility-Administered Medications: None        Allergies: Allergies have been reviewed with patient.  Allergies   Allergen Reactions   • Benicar [Olmesartan] Rash     blisters   • Dilaudid  [Hydromorphone Hcl] Vomiting   • Lidocaine    • Norco  [Apap-Fd&C Blue #1-Hydrocodone] Vomiting   • Other Misc Shortness of Breath     Anesthesia with surgery 2016  Pt couldn't breathe while being put under and tapped side of bed to let RN know. \"It was the scariest feeling and I want everyone to know\"     • Percocet  [Apap-Fd&C Red #40 [place]-Oxycodone] Vomiting   • Vicodin  [Apap-Fd&C Yellow #10 [place]-Hydrocodone] Vomiting   • Codeine Vomiting   • Demerol Vomiting   • Hydrocodone Vomiting   • Other Drug Vomiting     \"all pain meds\" per pt can take morphine.   • Oxycodone Vomiting   • Statins [Hmg-Coa-R Inhibitors] Unspecified     Muscle cramping   • Valium Vomiting       Family History:   family history includes Cancer in her mother; Diabetes in her brother; Hypertension in her brother, brother, and brother; Stroke in her daughter and father.     Social History:   Tobacco: Denies   Alcohol: Denies   Recreational " drugs (illegal and prescription):  Denies   Living situation:  [place] [place] herself with lots of help from her neighbors and friends    Primary Care Provider: reviewed NAYAN Longo  Other (stressors, spirituality, exposures):  Latter-day  Physical Exam:   Vitals:  Temp:  [36.8 °C (98.2 °F)-37.3 °C (99.1 °F)] 37.3 °C (99.1 °F)  Pulse:  [47-78] 78  Resp:  [16-18] 18  BP: ()/(58-97) 185/97  SpO2:  [92 %-99 %] 96 %    Physical Exam  Vitals and nursing note reviewed.   Constitutional:       General: She is not in acute distress.  HENT:      Head: Normocephalic and atraumatic.      Mouth/Throat:      Mouth: Mucous membranes are moist.   Eyes:      General: No scleral icterus.     Extraocular Movements: Extraocular movements intact.      Pupils: Pupils are equal, round, and reactive to light.   Cardiovascular:      Rate and Rhythm: Normal rate and regular rhythm.      Heart sounds: No murmur heard.    No friction rub. No gallop.   Pulmonary:      Effort: Pulmonary effort is normal.      Breath sounds: No wheezing, rhonchi or rales.   Abdominal:      General: Abdomen is flat. Bowel sounds are normal. There is no distension.      Palpations: Abdomen is soft.      Tenderness: There is no abdominal tenderness.   Musculoskeletal:         General: No tenderness.      Right lower leg: No edema.      Left lower leg: No edema.   Skin:     General: Skin is warm.      Capillary Refill: Capillary refill takes less than 2 seconds.   Neurological:      General: No focal deficit present.      Mental Status: She is alert and oriented to person, place, and time.      Cranial Nerves: No cranial nerve deficit.      Motor: Weakness present.      Comments: Isolated left lower extremity weakness, 3-4+         Labs:   HEMATOLOGY/ ONCOLOGY/ID:            Recent Labs     07/12/22  1753   WBC 6.1   RBC 5.03   HEMOGLOBIN 15.0   HEMATOCRIT 46.0   MCV 91.5   MCH 29.8   RDW 47.4   PLATELETCT 225   MPV 12.8    NEUTSPOLYS 70.00   LYMPHOCYTES 14.60*   MONOCYTES 11.50   EOSINOPHILS 2.80   BASOPHILS 0.80     Lab Results   Component Value Date    CGJVUDBJ68 1059 (H) 07/08/2021    WMMYCEVW18 952 (H) 02/04/2020    FERRITIN 44.3 07/08/2021    FERRITIN 26.3 02/04/2020    IRON 81 02/04/2020    TOTIRONBC 514 (H) 02/04/2020       RENAL:        Estimated GFR/CRCL = Estimated Creatinine Clearance: 40.2 mL/min (by C-G formula based on SCr of 1.04 mg/dL).  Recent Labs     07/12/22  1753   SODIUM 140   POTASSIUM 4.4   CHLORIDE 105   CO2 24   GLUCOSE 92   BUN 33*   CREATININE 1.04   CALCIUM 9.8   ALBUMIN 4.3       GASTROINTESTINAL/ HEPATIC:          Recent Labs     07/12/22  1753   ALTSGPT 17   ASTSGOT 20   ALKPHOSPHAT 51   TBILIRUBIN 0.3   ALBUMIN 4.3   GLOBULIN 3.0     No results found for: AMMONIA    ENDOCRINE:              Recent Labs     07/12/22  1753   GLUCOSE 92     Lab Results   Component Value Date    HBA1C 5.5 06/20/2022    HBA1C 5.7 (H) 07/08/2021    HBA1C 5.6 02/04/2020        EKG: Per my read, see above    Imaging:   No orders to display       Previous Data Review: reviewed    Assessment/Plan:    I anticipate patient will require at least 2 midnights stays because of symptomatic bradycardia requiring electrophysiology cardiology consult    * Symptomatic bradycardia  Assessment & Plan  Patient with symptomatic bradycardia sent over by cardiology office after becoming dizzy with previous cardiac monitor demonstrating pauses around 3 to 6 seconds per cardiology note. A strip by [facility] showed heart rate ranging 35-40 bpm appears to be junctional with PACs.   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    Hypertension, essential  Assessment & Plan  Elevated blood pressure on admission  - Continue home irbesartan  - Start amlodipine 5 mg p.o. daily    Obesity (BMI 30-39.9)- (present on admission)  Assessment & Plan  Follow-up outpatient    ACP (advance care planning)- (present on admission)  Assessment & Plan  I spent 17 minutes at bedside with patient discussing work-up, results, diagnosis, prognosis.  CODE STATUS discussed with patient and wants to be DNR/DNI    Mild cognitive impairment- (present on admission)  Assessment & Plan  Per chart review, was placed on memantine outpatient    History of ischemic left MCA stroke  Assessment & Plan  On last admission left MCA stroke 6/22/2022    DARIN (obstructive sleep apnea)- (present on admission)  Assessment & Plan  On CPAP at home, AHI 23.6, 15 cm H2O       Code status: DNR/DNI  Tubes/lines/drains: PIV  Diet: NPO at midnight  DVT PPX: Lovenox  GI PPX: Bowel regimen  Disposition: TBD    This note was generated using voice recognition software which has a small chance of producing errors of grammar and possibly content. I have made every reasonable attempt to find and correct any obvious errors, but expect that some may not be found prior to finalization of this note.

## 2022-07-14 ENCOUNTER — HOME CARE VISIT (OUTPATIENT)
Dept: HOME HEALTH SERVICES | Facility: HOME HEALTHCARE | Age: 82
End: 2022-07-14
Payer: MEDICARE

## 2022-07-14 ENCOUNTER — APPOINTMENT (OUTPATIENT)
Dept: RADIOLOGY | Facility: MEDICAL CENTER | Age: 82
DRG: 244 | End: 2022-07-14
Attending: INTERNAL MEDICINE
Payer: MEDICARE

## 2022-07-14 ENCOUNTER — APPOINTMENT (OUTPATIENT)
Dept: CARDIOLOGY | Facility: MEDICAL CENTER | Age: 82
DRG: 244 | End: 2022-07-14
Attending: NURSE PRACTITIONER
Payer: MEDICARE

## 2022-07-14 VITALS
DIASTOLIC BLOOD PRESSURE: 68 MMHG | OXYGEN SATURATION: 95 % | TEMPERATURE: 98.7 F | RESPIRATION RATE: 17 BRPM | SYSTOLIC BLOOD PRESSURE: 140 MMHG | HEART RATE: 72 BPM

## 2022-07-14 LAB
ANION GAP SERPL CALC-SCNC: 10 MMOL/L (ref 7–16)
BUN SERPL-MCNC: 32 MG/DL (ref 8–22)
CALCIUM SERPL-MCNC: 9.4 MG/DL (ref 8.5–10.5)
CHLORIDE SERPL-SCNC: 105 MMOL/L (ref 96–112)
CO2 SERPL-SCNC: 23 MMOL/L (ref 20–33)
CREAT SERPL-MCNC: 0.86 MG/DL (ref 0.5–1.4)
EKG IMPRESSION: NORMAL
GFR SERPLBLD CREATININE-BSD FMLA CKD-EPI: 68 ML/MIN/1.73 M 2
GLUCOSE SERPL-MCNC: 103 MG/DL (ref 65–99)
POTASSIUM SERPL-SCNC: 4.4 MMOL/L (ref 3.6–5.5)
SODIUM SERPL-SCNC: 138 MMOL/L (ref 135–145)

## 2022-07-14 PROCEDURE — 99232 SBSQ HOSP IP/OBS MODERATE 35: CPT | Mod: GC | Performed by: INTERNAL MEDICINE

## 2022-07-14 PROCEDURE — 770020 HCHG ROOM/CARE - TELE (206)

## 2022-07-14 PROCEDURE — 93010 ELECTROCARDIOGRAM REPORT: CPT | Mod: 59 | Performed by: INTERNAL MEDICINE

## 2022-07-14 PROCEDURE — A9270 NON-COVERED ITEM OR SERVICE: HCPCS

## 2022-07-14 PROCEDURE — 36415 COLL VENOUS BLD VENIPUNCTURE: CPT

## 2022-07-14 PROCEDURE — 71045 X-RAY EXAM CHEST 1 VIEW: CPT

## 2022-07-14 PROCEDURE — 94660 CPAP INITIATION&MGMT: CPT

## 2022-07-14 PROCEDURE — C1894 INTRO/SHEATH, NON-LASER: HCPCS

## 2022-07-14 PROCEDURE — A9270 NON-COVERED ITEM OR SERVICE: HCPCS | Performed by: NURSE PRACTITIONER

## 2022-07-14 PROCEDURE — 02H63JZ INSERTION OF PACEMAKER LEAD INTO RIGHT ATRIUM, PERCUTANEOUS APPROACH: ICD-10-PCS | Performed by: INTERNAL MEDICINE

## 2022-07-14 PROCEDURE — 700117 HCHG RX CONTRAST REV CODE 255: Performed by: INTERNAL MEDICINE

## 2022-07-14 PROCEDURE — 700102 HCHG RX REV CODE 250 W/ 637 OVERRIDE(OP)

## 2022-07-14 PROCEDURE — 93005 ELECTROCARDIOGRAM TRACING: CPT | Performed by: INTERNAL MEDICINE

## 2022-07-14 PROCEDURE — 700111 HCHG RX REV CODE 636 W/ 250 OVERRIDE (IP)

## 2022-07-14 PROCEDURE — 02HK3JZ INSERTION OF PACEMAKER LEAD INTO RIGHT VENTRICLE, PERCUTANEOUS APPROACH: ICD-10-PCS | Performed by: INTERNAL MEDICINE

## 2022-07-14 PROCEDURE — 33208 INSRT HEART PM ATRIAL & VENT: CPT | Performed by: INTERNAL MEDICINE

## 2022-07-14 PROCEDURE — 700111 HCHG RX REV CODE 636 W/ 250 OVERRIDE (IP): Performed by: INTERNAL MEDICINE

## 2022-07-14 PROCEDURE — 700102 HCHG RX REV CODE 250 W/ 637 OVERRIDE(OP): Performed by: NURSE PRACTITIONER

## 2022-07-14 PROCEDURE — 700101 HCHG RX REV CODE 250

## 2022-07-14 PROCEDURE — 80048 BASIC METABOLIC PNL TOTAL CA: CPT

## 2022-07-14 PROCEDURE — 0JH606Z INSERTION OF PACEMAKER, DUAL CHAMBER INTO CHEST SUBCUTANEOUS TISSUE AND FASCIA, OPEN APPROACH: ICD-10-PCS | Performed by: INTERNAL MEDICINE

## 2022-07-14 PROCEDURE — 99152 MOD SED SAME PHYS/QHP 5/>YRS: CPT | Performed by: INTERNAL MEDICINE

## 2022-07-14 RX ORDER — LIDOCAINE HYDROCHLORIDE 20 MG/ML
INJECTION, SOLUTION EPIDURAL; INFILTRATION; INTRACAUDAL; PERINEURAL
Status: COMPLETED
Start: 2022-07-14 | End: 2022-07-14

## 2022-07-14 RX ORDER — CEFAZOLIN SODIUM 2 G/100ML
2 INJECTION, SOLUTION INTRAVENOUS EVERY 8 HOURS
Status: COMPLETED | OUTPATIENT
Start: 2022-07-14 | End: 2022-07-15

## 2022-07-14 RX ORDER — CEFAZOLIN SODIUM 1 G/3ML
INJECTION, POWDER, FOR SOLUTION INTRAMUSCULAR; INTRAVENOUS
Status: COMPLETED
Start: 2022-07-14 | End: 2022-07-14

## 2022-07-14 RX ORDER — BUPIVACAINE HYDROCHLORIDE 2.5 MG/ML
INJECTION, SOLUTION EPIDURAL; INFILTRATION; INTRACAUDAL
Status: COMPLETED
Start: 2022-07-14 | End: 2022-07-14

## 2022-07-14 RX ORDER — MIDAZOLAM HYDROCHLORIDE 1 MG/ML
INJECTION INTRAMUSCULAR; INTRAVENOUS
Status: COMPLETED
Start: 2022-07-14 | End: 2022-07-14

## 2022-07-14 RX ORDER — DIPHENHYDRAMINE HYDROCHLORIDE 50 MG/ML
INJECTION INTRAMUSCULAR; INTRAVENOUS
Status: COMPLETED
Start: 2022-07-14 | End: 2022-07-14

## 2022-07-14 RX ADMIN — CEFAZOLIN 3000 MG: 330 INJECTION, POWDER, FOR SOLUTION INTRAMUSCULAR; INTRAVENOUS at 14:04

## 2022-07-14 RX ADMIN — DIPHENHYDRAMINE HYDROCHLORIDE 50 MG: 50 INJECTION INTRAMUSCULAR; INTRAVENOUS at 14:25

## 2022-07-14 RX ADMIN — CEFAZOLIN SODIUM 2 G: 2 INJECTION, SOLUTION INTRAVENOUS at 20:00

## 2022-07-14 RX ADMIN — MEMANTINE HYDROCHLORIDE 5 MG: 10 TABLET ORAL at 05:51

## 2022-07-14 RX ADMIN — FENTANYL CITRATE 75 MCG: 50 INJECTION, SOLUTION INTRAMUSCULAR; INTRAVENOUS at 14:52

## 2022-07-14 RX ADMIN — LIDOCAINE HYDROCHLORIDE 5 ML: 20 INJECTION, SOLUTION EPIDURAL; INFILTRATION; INTRACAUDAL; PERINEURAL at 14:04

## 2022-07-14 RX ADMIN — FENOFIBRATE 134 MG: 67 CAPSULE ORAL at 05:51

## 2022-07-14 RX ADMIN — SENNOSIDES AND DOCUSATE SODIUM 2 TABLET: 50; 8.6 TABLET ORAL at 17:53

## 2022-07-14 RX ADMIN — IRBESARTAN 300 MG: 150 TABLET ORAL at 05:51

## 2022-07-14 RX ADMIN — AMLODIPINE BESYLATE 10 MG: 10 TABLET ORAL at 05:51

## 2022-07-14 RX ADMIN — Medication 400 MG: at 05:51

## 2022-07-14 RX ADMIN — BUPIVACAINE HYDROCHLORIDE: 2.5 INJECTION, SOLUTION EPIDURAL; INFILTRATION; INTRACAUDAL; PERINEURAL at 14:04

## 2022-07-14 RX ADMIN — ASPIRIN 81 MG: 81 TABLET, CHEWABLE ORAL at 05:51

## 2022-07-14 RX ADMIN — IOHEXOL 10 ML: 350 INJECTION, SOLUTION INTRAVENOUS at 14:58

## 2022-07-14 RX ADMIN — LIDOCAINE HYDROCHLORIDE 5 ML: 20 INJECTION, SOLUTION EPIDURAL; INFILTRATION; INTRACAUDAL; PERINEURAL at 14:05

## 2022-07-14 RX ADMIN — MIDAZOLAM HYDROCHLORIDE 1 MG: 1 INJECTION, SOLUTION INTRAMUSCULAR; INTRAVENOUS at 14:52

## 2022-07-14 ASSESSMENT — ENCOUNTER SYMPTOMS
CHILLS: 0
ABDOMINAL PAIN: 0
NECK PAIN: 0
DENIES PAIN: 1
HEADACHES: 1
WEAKNESS: 0
PALPITATIONS: 0
DOUBLE VISION: 0
COUGH: 0
FALLS: 0
BLURRED VISION: 1
SORE THROAT: 0
DIARRHEA: 0
FEVER: 0
VOMITING: 0
NAUSEA: 0
HEMOPTYSIS: 0
EYE PAIN: 0
CONSTIPATION: 1
WHEEZING: 0
MUSCULOSKELETAL NEGATIVE: 1
PHOTOPHOBIA: 0
SHORTNESS OF BREATH: 0
DIZZINESS: 1
SENSORY CHANGE: 0
PSYCHIATRIC NEGATIVE: 1
SPUTUM PRODUCTION: 0
DIAPHORESIS: 0
BLOOD IN STOOL: 0

## 2022-07-14 ASSESSMENT — PATIENT HEALTH QUESTIONNAIRE - PHQ9
SUM OF ALL RESPONSES TO PHQ9 QUESTIONS 1 AND 2: 0
2. FEELING DOWN, DEPRESSED, IRRITABLE, OR HOPELESS: NOT AT ALL
1. LITTLE INTEREST OR PLEASURE IN DOING THINGS: NOT AT ALL

## 2022-07-14 ASSESSMENT — PAIN DESCRIPTION - PAIN TYPE: TYPE: ACUTE PAIN

## 2022-07-14 ASSESSMENT — FIBROSIS 4 INDEX: FIB4 SCORE: 1.9

## 2022-07-14 NOTE — CASE COMMUNICATION
Quality Review Completed for TX 7/14 OASIS by KYUNG Kennedy, RN on 7/15/2022:  Edits completed by KYUNG Kennedy RN:  1.  is NA to depression and yes to PU interventions per care plan

## 2022-07-14 NOTE — PROGRESS NOTES
Began care at 1845, Report received from Rachel PEREZ. Patient is SR 70's on monitor, on RA O2, A&Ox4, no pain. Plan of care updated with the patient, no questions at this time. Initial assessment completed, orders reviewed, call light within reach, and fall precautions are in place.

## 2022-07-14 NOTE — OP REPORT
PROCEDURE PERFORMED: Permanent Pacemaker Implantation    [facility]    DATE OF SERVICE: 7/14/2022    : Dakotah Knowles MD    ASSISTANT: None    ANESTHESIA:   Moderate Sedation was provided, start time 1427 stop time 1454  The moderate sedation document has been reviewed, signed and scanned into media.    MEDICATIONS:  1mg Versed and 75mcg Fentanyl  2g Ancef    EBL: 20 cc    SPECIMENS: None    STATEMENT OF MEDICAL NECESSITY:  Sick sinus syndrome    DESCRIPTION OF PROCEDURE:  After informed written consent, the patient was brought to the electrophysiology lab in the fasting, unsedated state. The patient was prepped and draped in the usual sterile fashion. The procedure was performed under moderate sedation with local anesthetic. A left upper extremity venogram was performed, demonstrating a patent subclavian vein. A left infraclavicular incision was made with a scalpel and the pectoral device pocket was created using a combination of blunt dissection and electrocautery. The modified Seldinger technique was used to gain access to the left axillary vein. Two peel-away hemostasis sheaths were placed in the vein. Under fluoroscopic guidance, the pacemaker leads were introduced into the heart. The ventricular lead was advanced to the RVOT and then lowered into position at the RV apical septum. The atrial lead was positioned on R atrial appendage. The leads were tested and had satisfactory sensing and pacing parameters. High output ventricular pacing did not produce extracardiac stimulation. The leads were sutured to the underlying pectoral muscle with interrupted silk over a silastic suture sleeve. The device pocket was irrigated with antibiotic solution, inspected, and no bleeding was seen. The leads were connected to the pacemaker pulse generator and the device was inserted into the pocket. The wound was closed with three layers of absorbable sutures. Following recovery from sedation, the  patient was transferred to a monitored bed in good condition.     IMPLANTED DEVICE INFORMATION:  Pulse generator is a [employer]/Pereira model [id number]  Serial # [id number]    LEAD INFORMATION:  1)Right atrial lead is a [employer]/Abbott model #[id number] , serial #[id number] ,P wave 1.9 millivolts, threshold 1.0 Volts at 0.5 milliseconds, pacing impedance 530 Ohms.    2)Right ventricular lead is a [employer]/Abbott  model #[id number]  , serial #[id number] ,R wave 3.0 millivolts, threshold 0.75 Volts at 0.5 milliseconds, pacing impedance 870 Ohms.    DEVICE PROGRAMMING:  DDDR 60 -120 ppm    FLUOROSCOPY TIME: 1.5 min    IMPRESSIONS:  1. Successful dual chamber pacemaker implantation    RECOMMENDATIONS:  1. Transfer to monitored bed  2. PA and lateral chest x-ray  3. Device interrogation prior to hospital discharge  4. IV antibiotics for 2 doses  5. Followup in device clinic

## 2022-07-14 NOTE — PROGRESS NOTES
Pt is AXOX4.She has expressive aphasia.She denies any [ain.Pt explained importance of being NPO for he PPM procedure.She verbalized understanding.call light is within reach.Will monitor pt closely.Bed is in a low and locked position

## 2022-07-14 NOTE — PROGRESS NOTES
Pt has been Sinus Rhythm with BBB OP Heart Failure  Vitals     Weight: 72.6 kg (160 lb 0.9 oz)  Weight Source: Stand Up Scale     Height: 152.4 cm (5')  BMI (Calculated): 33.01  Blood Pressure : (S) (!) 164/95  Pulse: 73    System Assessment          Smoking Hx                   Alcohol Hx                               Illicit Drug Hx       Social Hx             Education                         Medications                      Dietary Assessment                            [place] Living with Heart Failure                                                                      6 Minute Walk Test             CardioMEMS      Pt has been Sinus Rhythm with BBB on Telemetry.Heart Rate 60-76.DE 0.16 QRS 0.11 DE 0.40   Ectopy:Pt had PACS

## 2022-07-14 NOTE — PROGRESS NOTES
Daily Progress Note:     Date of Service: 7/14/2022  Primary Team: [facility] Team   Attending: Stuart Valiente M.D.   Senior Resident: Dr. Dial  Intern: Dr. Dalton  Contact:  [phone]    Chief Complaint:   Dizziness    Subjective  Irina Traylor is a 80 y/o F w/ PMH s/f multiple prior strokes/left MCA complicated by aphasia and obstructive sleep apnea on CPAP who presents 7/12 for dizziness. This AM, patient reports no new episodes of dizziness. New throbbing, left sided headache rated 1/10, subsided within 1 hour without intervention. No headaches since yesterday. Patient denies chest pains, fever, chills, diarrhea.    Consultants/Specialty:  Cardiology/EP    Review of Systems:   Review of Systems   Constitutional: Negative for chills, diaphoresis and fever.   HENT: Negative.  Negative for hearing loss and sore throat.    Eyes: Positive for blurred vision. Negative for double vision, photophobia and pain.   Respiratory: Negative for cough, hemoptysis, sputum production, shortness of breath and wheezing.    Cardiovascular: Negative for chest pain, palpitations and leg swelling.   Gastrointestinal: Positive for constipation. Negative for abdominal pain, blood in stool, diarrhea, melena, nausea and vomiting.   Genitourinary: Negative.  Negative for dysuria, frequency and urgency.   Musculoskeletal: Negative.  Negative for falls and neck pain.   Skin: Negative for itching and rash.   Neurological: Positive for dizziness and headaches. Negative for sensory change and weakness.   Endo/Heme/Allergies: Negative.    Psychiatric/Behavioral: Negative.        Objective Data:   Physical Exam:   Vitals:   Temp:  [36.1 °C (96.9 °F)-36.6 °C (97.9 °F)] 36.6 °C (97.9 °F)  Pulse:  [61-76] 76  Resp:  [16-20] 16  BP: (128-173)/(80-95) 151/91  SpO2:  [94 %-97 %] 97 %    Physical Exam  Constitutional:       General: She is not in acute distress.     Appearance: Normal appearance.   HENT:      Head: Normocephalic and atraumatic.       Mouth/Throat:      Mouth: Mucous membranes are moist.      Pharynx: Oropharynx is clear. No oropharyngeal exudate or posterior oropharyngeal erythema.   Eyes:      Extraocular Movements: Extraocular movements intact.      Conjunctiva/sclera: Conjunctivae normal.      Pupils: Pupils are equal, round, and reactive to light.   Cardiovascular:      Rate and Rhythm: Normal rate and regular rhythm.      Pulses: Normal pulses.      Heart sounds: Normal heart sounds.   Pulmonary:      Effort: Pulmonary effort is normal.      Breath sounds: Normal breath sounds.   Abdominal:      General: Abdomen is flat.      Palpations: Abdomen is soft.      Tenderness: There is no abdominal tenderness.   Musculoskeletal:         General: Normal range of motion.      Cervical back: Normal range of motion and neck supple.   Skin:     General: Skin is warm and dry.      Capillary Refill: Capillary refill takes less than 2 seconds.   Neurological:      Mental Status: She is alert and oriented to person, place, and time. Mental status is at baseline.      GCS: GCS eye subscore is 4. GCS verbal subscore is 5. GCS motor subscore is 6.      Motor: Weakness present.     Weakness isolated to LLE, 4/5    Labs:   Admission on 2022   Component Date Value   • Report 2022                      Value:[facility] Emergency Dept.    Test Date:  2022  Pt Name:    JACEK VELASQUEZ             Department: ER  MRN:        [id number]                      Room:        20  Gender:     Female                       Technician: [id number]  :        1940                   Requested By:ER TRIAGE PROTOCOL  Order #:    [id number]                    Reading MD: Rome Ching MD    Measurements  Intervals                                Axis  Rate:       69                           P:          66  DC:         180                          QRS:        -61  QRSD:       124                          T:          57  QT:          432  QTc:        463    Interpretive Statements  EKG is normal sinus rhythm, isolated ST elevation in V2, no ST elevation  otherwise, no reciprocal changes.  QRS is 124, normal axis  Electronically Signed On 7- 18:59:34 PDT by Rome Ching MD     • WBC 07/12/2022 6.1    • RBC 07/12/2022 5.03    • Hemoglobin 07/12/2022 15.0    • Hematocrit 07/12/2022 46.0    • MCV 07/12/2022 91.5    • MCH 07/12/2022 29.8    • MCHC 07/12/2022 32.6 (A)   • RDW 07/12/2022 47.4    • Platelet Count 07/12/2022 225    • MPV 07/12/2022 12.8    • Neutrophils-Polys 07/12/2022 70.00    • Lymphocytes 07/12/2022 14.60 (A)   • Monocytes 07/12/2022 11.50    • Eosinophils 07/12/2022 2.80    • Basophils 07/12/2022 0.80    • Immature Granulocytes 07/12/2022 0.30    • Nucleated RBC 07/12/2022 0.00    • Neutrophils (Absolute) 07/12/2022 4.27    • Lymphs (Absolute) 07/12/2022 0.89 (A)   • Monos (Absolute) 07/12/2022 0.70    • Eos (Absolute) 07/12/2022 0.17    • Baso (Absolute) 07/12/2022 0.05    • Immature Granulocytes (a* 07/12/2022 0.02    • NRBC (Absolute) 07/12/2022 0.00    • Sodium 07/12/2022 140    • Potassium 07/12/2022 4.4    • Chloride 07/12/2022 105    • Co2 07/12/2022 24    • Anion Gap 07/12/2022 11.0    • Glucose 07/12/2022 92    • Bun 07/12/2022 33 (A)   • Creatinine 07/12/2022 1.04    • Calcium 07/12/2022 9.8    • AST(SGOT) 07/12/2022 20    • ALT(SGPT) 07/12/2022 17    • Alkaline Phosphatase 07/12/2022 51    • Total Bilirubin 07/12/2022 0.3    • Albumin 07/12/2022 4.3    • Total Protein 07/12/2022 7.3    • Globulin 07/12/2022 3.0    • A-G Ratio 07/12/2022 1.4    • Troponin T 07/12/2022 15    • GFR (CKD-EPI) 07/12/2022 54 (A)   • Magnesium 07/12/2022 2.3    • Influenza virus A RNA 07/12/2022 Negative    • Influenza virus B, PCR 07/12/2022 Negative    • RSV, PCR 07/12/2022 Negative    • SARS-CoV-2 by PCR 07/12/2022 NotDetected    • SARS-CoV-2 Source 07/12/2022 NP Swab    • WBC 07/13/2022 5.0    • RBC 07/13/2022 4.94    • Hemoglobin  2022 14.6    • Hematocrit 2022 45.0    • MCV 2022 91.1    • MCH 2022 29.6    • MCHC 2022 32.4 (A)   • RDW 2022 47.3    • Platelet Count 2022 202    • MPV 2022 12.2    • Neutrophils-Polys 2022 63.50    • Lymphocytes 2022 18.90 (A)   • Monocytes 2022 12.20    • Eosinophils 2022 4.00    • Basophils 2022 1.00    • Immature Granulocytes 2022 0.40    • Nucleated RBC 2022 0.00    • Neutrophils (Absolute) 2022 3.16    • Lymphs (Absolute) 2022 0.94 (A)   • Monos (Absolute) 2022 0.61    • Eos (Absolute) 2022 0.20    • Baso (Absolute) 2022 0.05    • Immature Granulocytes (a* 2022 0.02    • NRBC (Absolute) 2022 0.00    • Sodium 2022 139    • Potassium 2022 3.8    • Chloride 2022 106    • Co2 2022 25    • Glucose 2022 89    • Bun 2022 31 (A)   • Creatinine 2022 0.82    • Calcium 2022 9.3    • Anion Gap 2022 8.0    • Magnesium 2022 2.0    • GFR (CKD-EPI) 2022 72    • Report 2022                      Value:[facility] Emergency Dept.    Test Date:  2022  Pt Name:    JACEK VELASQUEZ             Department: ER  MRN:        [id number]                      Room:       [facility]  Gender:     Female                       Technician: [id number]  :        1940                   Requested By:VINH HOWARD  Order #:    [id number]                    Reading MD:    Measurements  Intervals                                Axis  Rate:       57                           P:          50  MD:         180                          QRS:        -55  QRSD:       120                          T:          6  QT:         448  QTc:        437    Interpretive Statements  SINUS BRADYCARDIA  PROBABLE LEFT ATRIAL ABNORMALITY  INCOMPLETE RBBB AND LAFB  LEFT VENTRICULAR HYPERTROPHY  ANTERIOR Q WAVES, POSSIBLY DUE TO LVH  Compared to ECG  2022 17:53:52  Incomplete right bundle-branch block now present  Right bundle-branch block now present  Left ventricular hypertrophy now present  Q waves now pres                          ent  Sinus rhythm no longer present  Myocardial infarct finding no longer present     • POC Glucose, Blood 2022 83    Office Visit on 2022   Component Date Value   • Report 2022                      Value:[facility]    Test Date:  2022  Pt Name:    JACEK VELASQUEZ             Department: [facility]  MRN:        [id number]                      Room:  Gender:     Female                       Technician: DR ASHB:        1940                   Requested By:GERMAINE ZAPIEN  Order #:    [id number]                    Reading MD: Yoshi Holt MD    Measurements  Intervals                                Axis  Rate:       58                           P:  TX:                                      QRS:        -75  QRSD:       124                          T:          70  QT:         455  QTc:        446    Interpretive Statements  Atrial fibrillation  Nonspecific IVCD with LAD  Left ventricular hypertrophy  Anterior Q waves, possibly due to LVH  ST elevation, consider inferior injury  Compared to ECG 2022 20:19:03  Intraventricular conduction delay now present  Q waves now present  ST (T wave) deviation now present  Sinus bradycardia no longer present  Myocardial infarct                           finding still present  Electronically Signed On 2022 7:55:46 PDT by Yoshi Holt MD         Imaging:   CL-PERMANENT PACEMAKER INSERTION    (Results Pending)       Problem Representation:        * Symptomatic bradycardia  Assessment & Plan  Patient with symptomatic bradycardia sent over by cardiology office after becoming dizzy with previous cardiac monitor demonstrating pauses around 3 to 6 seconds per cardiology note. A strip by [facility] showed heart rate ranging 35-40 bpm appears to be  junctional with PACs.   - Currently in normal sinus rhythm with isolated V2 ST segment elevation  - Per EP, PPM to be placed today  - K greater than 4, mag greater than 2    Hypertension, essential  Assessment & Plan  Elevated blood pressure on admission  - Continue home irbesartan  - Increased norvas to 10 mg p.o. 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- (present on admission)  Assessment & Plan  Last left MCA stroke on prior admission 6/22/2022 with residual expressive aphasia  - Continue home aspirin and fenofibrate    Obesity (BMI 30-39.9)- (present on admission)  Assessment & Plan  Follow-up outpatient    ACP (advance care planning)- (present on admission)  Assessment & Plan  CODE STATUS discussed with patient: DNR/DNI    Mild cognitive impairment- (present on admission)  Assessment & Plan  Per chart review, was placed on memantine outpatient    History of ischemic left MCA stroke  Assessment & Plan  On last admission left MCA stroke 6/22/2022    DARIN (obstructive sleep apnea)- (present on admission)  Assessment & Plan  On CPAP at home, AHI 23.6, 15 cm H2O

## 2022-07-14 NOTE — CONSULTS
Electrophysiology Initial Consult Note    DOS: 7/13/2022    Referring physician: Dr Gray    Chief complaint/Reason for consult: SSS    HPI: 82 y/o F with h/o CVA. Had Zio patch placed for AF screening. Incidentally noted to have evidence of intermittent SA block with sinus arrest and junctional escape, pauses up to 6 seconds. She was initially referred to EP to see Dr Wolfe as an outpatient to discuss PPM. She presented to cardiology clinic yesterday with malaise and feeling unwell, found to be in sinus arrest with junctional escape and PACs. Sent to the ED, noted to have recovery in sinus function by then, admitted to telemetry and noted to be in sinus rhythm since. Feels fine today.    ROS (+ highlighted in bold):  Constitutional: Fevers/chills/fatigue/weightloss  HEENT: Blurry vision/eye pain/sore throat/hearing loss  Respiratory: Shortness of breath/cough  Cardiovascular: Chest pain/palpitations/edema/orthopnea/syncope  GI: Nausea/vomitting/diarrhea  MSK: Arthralgias/myagias/muscle weakness  Skin: Rash/sores  Neurological: Numbness/tremors/vertigo  Endocrine: Excessive thirst/polyuria/cold intolerance/heat intolerance  Psych: Depression/anxiety    Past Medical History:   Diagnosis Date   • Anesthesia     States feels like she can't breathe with induction for right hip surgery, gets nausea vomiting with most pain meds.  Uses anti-nausea medicine if takes morphine   • Arthritis     To hips, shoulders, hands   • Benign essential HTN 9/7/2017   • Cataract    • CPAP (continuous positive airway pressure) dependence    • Dyslipidemia 3/5/2019   • Expressive aphasia     Related to CVA   • Family history of CVA    • Heart murmur    • DARIN (obstructive sleep apnea) 1/26/2017    AHI 23.6, minimum saturation 69%, on CPAP 15 cm.   • Post-nasal drip    • Restless leg syndrome     4/25/17-Resolved, states was related to right hip and none S/P replacement.   • Sick sinus syndrome (HCC) 6/29/2022   • Sinus arrest 6/29/2022   •  Stroke ([facility]) 2/1993, 8/1993    Rfcxfrde-awxxcm-szlgyfriva aphasia,mouth forming words, reading, numbers   • Walker as ambulation aid        Past Surgical History:   Procedure Laterality Date   • PB RECONSTR TOTAL SHOULDER IMPLANT Left 3/21/2019    Procedure: ARTHROPLASTY, SHOULDER, TOTAL-  REVISION REVERSE;  Surgeon: Yoshi Gamble M.D.;  Location: [facility];  Service: Orthopedics   • ORIF, FRACTURE, HUMERUS Left 3/21/2019    Procedure: ORIF, FRACTURE, HUMERUS;  Surgeon: Yoshi Gamble M.D.;  Location: [facility];  Service: Orthopedics   • KNEE ARTHROPLASTY TOTAL Left 2/12/2018    Procedure: KNEE ARTHROPLASTY TOTAL;  Surgeon: Earle Jacobsen M.D.;  Location: [facility];  Service: Orthopedics   • CERVICAL FUSION POSTERIOR  8/8/2017    Procedure: CERVICAL FUSION POSTERIOR- C3-5, INSTRUMENTED;  Surgeon: Florentino Pike M.D.;  Location: [facility];  Service:    • CERVICAL LAMINECTOMY POSTERIOR  8/8/2017    Procedure: CERVICAL LAMINECTOMY POSTERIOR- C3-5;  Surgeon: Florentino Pike M.D.;  Location: [facility];  Service:    • HIP ARTHROPLASTY TOTAL Left 5/8/2017    Procedure: HIP ARTHROPLASTY TOTAL;  Surgeon: Earle Jacobsen M.D.;  Location: [facility];  Service:    • HIP ARTHROPLASTY TOTAL Right 12/12/2016    Procedure: HIP ARTHROPLASTY TOTAL;  Surgeon: Earle Jacobsen M.D.;  Location: [facility];  Service:    • FUSION, SPINE, LUMBAR, PLIF  9/26/2016    Procedure: LUMBAR FUSION POSTERIOR L2-S1 onlay ;  Surgeon: Florentino Pike M.D.;  Location: [facility];  Service:    • LUMBAR LAMINECTOMY DISKECTOMY N/A 9/26/2016    Procedure: LUMBAR LAMINECTOMY DISKECTOMY L2-S1;  Surgeon: Florentino Pike M.D.;  Location: [facility];  Service:    • COLONOSCOPY  2/2016    Every 10 years   • SHOULDER ARTHROPLASTY TOTAL Left 8/11/2015    Procedure: SHOULDER ARTHROPLASTY REVERSE TOTAL;   Surgeon: Yoshi Gamble M.D.;  Location: SURGERY [facility];  Service:    • KNEE ARTHROPLASTY TOTAL  6/23/2014    Performed by Earle Jacobsen M.D. at SURGERY [facility]   • HAMMERTOE CORRECTION Left 11/12/2007    Removed 3rd toe   • LUMBAR LAMINECTOMY DISKECTOMY  1993    Lumbar   • CARPAL TUNNEL RELEASE Bilateral 1993   • CERVICAL DISK AND FUSION ANTERIOR  1993    X2   • HYSTERECTOMY, TOTAL ABDOMINAL  1977   • BUNIONECTOMY Bilateral 1980's   • HAMMERTOE CORRECTION Right 1980's    Removed 3rd toe   • ORIF, FINGER Left as child    Index       Social History     Socioeconomic History   • Marital status: Single     Spouse name: Not on file   • Number of children: Not on file   • Years of education: Not on file   • Highest education level: Not on file   Occupational History   • Not on file   Tobacco Use   • Smoking status: Never Smoker   • Smokeless tobacco: Never Used   Vaping Use   • Vaping Use: Never used   Substance and Sexual Activity   • Alcohol use: Not Currently   • Drug use: No   • Sexual activity: Not Currently   Other Topics Concern   • Not on file   Social History Narrative    Retired LVN at .  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  She is currently driving without any accidents.  She does use a walker to ambulate.  Not having any falls.  Does not have hearing aids but is having hearing concerns.  Some increased aphasia especially since her history of CVAs and memory issues.  March 2019 she was hospitalized from her fracture of her left arm and did have home health which she no longer has right now.  She does not have family here but she does have neighbors who help her out.     Social Determinants of Health     Financial Resource Strain: Not on file   Food Insecurity: Not on file   Transportation Needs: Not on file   Physical Activity: Not on file   Stress: Not on file   Social Connections: Not on file   Intimate Partner Violence: Not on file   Housing Stability: Not on file       Family History  "  Problem Relation Age of Onset   • Cancer Mother         vaginal   • Stroke Father    • Hypertension Brother    • Diabetes Brother    • Hypertension Brother    • Hypertension Brother    • Stroke Daughter        Allergies   Allergen Reactions   • Benicar [Olmesartan] Rash     blisters   • Dilaudid  [Hydromorphone Hcl] Vomiting   • Lidocaine    • Norco  [Apap-Fd&C Blue #1-Hydrocodone] Vomiting   • Other Misc Shortness of Breath     Anesthesia with surgery 2016  Pt couldn't breathe while being put under and tapped side of bed to let RN know. \"It was the scariest feeling and I want everyone to know\"     • Percocet  [Apap-Fd&C Red #40 [place]-Oxycodone] Vomiting   • Vicodin  [Apap-Fd&C Yellow #10 [place]-Hydrocodone] Vomiting   • Codeine Vomiting   • Demerol Vomiting   • Hydrocodone Vomiting   • Other Drug Vomiting     \"all pain meds\" per pt can take morphine.   • Oxycodone Vomiting   • Statins [Hmg-Coa-R Inhibitors] Unspecified     Muscle cramping   • Valium Vomiting       Current Facility-Administered Medications   Medication Dose Route Frequency Provider Last Rate Last Admin   • amLODIPine (NORVASC) tablet 10 mg  10 mg Oral Q DAY Rosario Paulino, A.P.R.N.   10 mg at 07/13/22 1220   • dextrose 5 % and 0.45 % NaCl with KCl 20 mEq   Intravenous Continuous Doug Dalton M.D. 50 mL/hr at 07/13/22 1527 50 mL at 07/13/22 1527   • hydrALAZINE (APRESOLINE) injection 20 mg  20 mg Intravenous Q6HRS PRN Doug Dalton M.D.       • senna-docusate (PERICOLACE or SENOKOT S) 8.6-50 MG per tablet 2 Tablet  2 Tablet Oral BID TANNER Rivera.O.   2 Tablet at 07/13/22 1754    And   • polyethylene glycol/lytes (MIRALAX) PACKET 1 Packet  1 Packet Oral QDAY PRN Hugo Lyles D.O.        And   • magnesium hydroxide (MILK OF MAGNESIA) suspension 30 mL  30 mL Oral QDAY PRN Hugo Lyles D.O.        And   • bisacodyl (DULCOLAX) suppository 10 mg  10 mg Rectal QDAY PRN CLAUDETTE RiveraO.       • aspirin (ASA) " chewable tab 81 mg  81 mg Oral DAILY CALUDETTE RiveraOLizbeth   81 mg at 07/13/22 0524   • fenofibrate micronized (LOFIBRA) capsule 134 mg  134 mg Oral DAILY CLAUDETTE RiveraOLizbeth   134 mg at 07/13/22 0525   • irbesartan (AVAPRO) tablet 300 mg  300 mg Oral DAILY CLAUDETTE RiveraOLizbeth   300 mg at 07/13/22 0525   • loratadine (CLARITIN) tablet 10 mg  10 mg Oral QDAY PRN TANNER Rivera.O.       • magnesium oxide tablet 400 mg  400 mg Oral DAILY CLAUDETTE RiveraOLizbeth   400 mg at 07/13/22 0525   • memantine (Namenda) tablet 5 mg  5 mg Oral DAILY CLAUDETTE RiveraOLizbeth   5 mg at 07/13/22 0524   • [Held by provider] enoxaparin (Lovenox) inj 40 mg  40 mg Subcutaneous DAILY AT 1800 Hugo Lyles D.O.           Physical Exam:  Vitals:    07/13/22 1200 07/13/22 1600 07/13/22 1641 07/13/22 1943   BP: (!) 162/86 (!) 170/94 (S) (!) 164/95 (!) 140/90   Pulse:   73 72   Resp:  16 16 18   Temp:  36.4 °C (97.5 °F) 36.1 °C (96.9 °F)    TempSrc:  Temporal Temporal Temporal   SpO2:   95% 96%   Weight:       Height:         General appearance: NAD, conversant   Eyes: anicteric sclerae, moist conjunctivae; no lid-lag; PERRLA  HENT: Atraumatic; oropharynx clear with moist mucous membranes and no mucosal ulcerations; normal hard and soft palate  Neck: Trachea midline; FROM, supple, no thyromegaly or lymphadenopathy  Lungs: CTA, with normal respiratory effort and no intercostal retractions  CV: RRR, no MRGs, no JVD   Abdomen: Soft, non-tender; no masses or HSM  Extremities: No peripheral edema or extremity lymphadenopathy  Skin: Normal temperature, turgor and texture; no rash, ulcers or subcutaneous nodules  Psych: Appropriate affect, alert and oriented to person, place and time    Data:  Lipids:   Lab Results   Component Value Date/Time    CHOLSTRLTOT 149 06/21/2022 03:25 AM    TRIGLYCERIDE 79 06/21/2022 03:25 AM    HDL 53 06/21/2022 03:25 AM    LDL 80 06/21/2022 03:25 AM        BMP:  Lab Results   Component Value Date/Time     SODIUM 139 07/13/2022 0159    POTASSIUM 3.8 07/13/2022 0159    CHLORIDE 106 07/13/2022 0159    CO2 25 07/13/2022 0159    GLUCOSE 89 07/13/2022 0159    BUN 31 (H) 07/13/2022 0159    CREATININE 0.82 07/13/2022 0159    CALCIUM 9.3 07/13/2022 0159    ANION 8.0 07/13/2022 0159        TSH:   Lab Results   Component Value Date/Time    TSHULTRASEN 1.550 06/20/2022 1909        THYROXINE (T4):   No results found for: ASHLIE     CBC:   Lab Results   Component Value Date/Time    WBC 5.0 07/13/2022 01:59 AM    RBC 4.94 07/13/2022 01:59 AM    HEMOGLOBIN 14.6 07/13/2022 01:59 AM    HEMATOCRIT 45.0 07/13/2022 01:59 AM    MCV 91.1 07/13/2022 01:59 AM    MCH 29.6 07/13/2022 01:59 AM    MCHC 32.4 (L) 07/13/2022 01:59 AM    RDW 47.3 07/13/2022 01:59 AM    PLATELETCT 202 07/13/2022 01:59 AM    MPV 12.2 07/13/2022 01:59 AM    NEUTSPOLYS 63.50 07/13/2022 01:59 AM    LYMPHOCYTES 18.90 (L) 07/13/2022 01:59 AM    MONOCYTES 12.20 07/13/2022 01:59 AM    EOSINOPHILS 4.00 07/13/2022 01:59 AM    BASOPHILS 1.00 07/13/2022 01:59 AM    IMMGRAN 0.40 07/13/2022 01:59 AM    NRBC 0.00 07/13/2022 01:59 AM    NEUTS 3.16 07/13/2022 01:59 AM    LYMPHS 0.94 (L) 07/13/2022 01:59 AM    MONOS 0.61 07/13/2022 01:59 AM    EOS 0.20 07/13/2022 01:59 AM    BASO 0.05 07/13/2022 01:59 AM    IMMGRANAB 0.02 07/13/2022 01:59 AM    NRBCAB 0.00 07/13/2022 01:59 AM        CBC w/o DIFF  Lab Results   Component Value Date/Time    WBC 5.0 07/13/2022 01:59 AM    RBC 4.94 07/13/2022 01:59 AM    HEMOGLOBIN 14.6 07/13/2022 01:59 AM    MCV 91.1 07/13/2022 01:59 AM    MCH 29.6 07/13/2022 01:59 AM    MCHC 32.4 (L) 07/13/2022 01:59 AM    RDW 47.3 07/13/2022 01:59 AM    MPV 12.2 07/13/2022 01:59 AM       Prior echo/stress results reviewed: Normal EF    EKG interpreted by me: Sinus arrest with junctional escape and PACs    Impression/Plan:  1. Sinus node dysfunction  2. History of CVA    - I have personally reviewed Zio patch strips. Her sinus pauses occur abruptly without preceding  sinus bradycardia suggesting less likely vagal mediated, more likely sinoatrial exit  Block. She doesn't have a strong history of syncope but given that she felt acutely unwell in-office with sinus arrest and junctional escape, it seems reasonable to implant PPM.  - The risk, benefits, and alternatives to pacemaker placement were discussed in great detail, specific risks mentioned including bleeding, infection, cardiac perforation with possible tamponade requiring pericardiocentesis or open heart surgery.  In addition the possibility of lead dislodgment, pneumothorax, hemothorax were discussed. Also mentioned were the possibility of death, stroke, and myocardial infarction. The patient verbalized understanding of these potential complications and wishes to proceed with this procedure.     Please make NPO p MN for PPM tomorrow.    Dakotah Knowles MD  Cardiac Electrophysiology

## 2022-07-14 NOTE — DISCHARGE PLANNING
*ATTN Discharge Planning team: This patient is currently on service with [facility]. Please submit a referral order and face-to-face prior to discharging the patient home. If you have any questions, contact our Transitional Care Specialist team at x [phone].

## 2022-07-14 NOTE — PROGRESS NOTES
Pt is AXOX4.Pt came back from EP Lab from having her pace maker implanted.She denies any pain.Left arm is in a sling.   educated on importance of keeping her arm in the sling and pain control.Pt verbalized understanding.Call light is within reachPt denies any pain at the moment.Left upper chest dressing is dry and intact.Site has no hematoma.Left finger have good capillary refill.ed is in a low and locked position.Will monitor pt kami

## 2022-07-15 ENCOUNTER — APPOINTMENT (OUTPATIENT)
Dept: RADIOLOGY | Facility: MEDICAL CENTER | Age: 82
DRG: 244 | End: 2022-07-15
Attending: INTERNAL MEDICINE
Payer: MEDICARE

## 2022-07-15 VITALS
WEIGHT: 160.05 LBS | HEIGHT: 60 IN | HEART RATE: 67 BPM | TEMPERATURE: 98.1 F | DIASTOLIC BLOOD PRESSURE: 85 MMHG | SYSTOLIC BLOOD PRESSURE: 132 MMHG | BODY MASS INDEX: 31.42 KG/M2 | RESPIRATION RATE: 15 BRPM | OXYGEN SATURATION: 94 %

## 2022-07-15 LAB
ANION GAP SERPL CALC-SCNC: 7 MMOL/L (ref 7–16)
BUN SERPL-MCNC: 30 MG/DL (ref 8–22)
CALCIUM SERPL-MCNC: 9.4 MG/DL (ref 8.5–10.5)
CHLORIDE SERPL-SCNC: 107 MMOL/L (ref 96–112)
CO2 SERPL-SCNC: 24 MMOL/L (ref 20–33)
CREAT SERPL-MCNC: 1.13 MG/DL (ref 0.5–1.4)
EKG IMPRESSION: NORMAL
GFR SERPLBLD CREATININE-BSD FMLA CKD-EPI: 49 ML/MIN/1.73 M 2
GLUCOSE SERPL-MCNC: 105 MG/DL (ref 65–99)
POTASSIUM SERPL-SCNC: 4.9 MMOL/L (ref 3.6–5.5)
SODIUM SERPL-SCNC: 138 MMOL/L (ref 135–145)

## 2022-07-15 PROCEDURE — 93005 ELECTROCARDIOGRAM TRACING: CPT | Performed by: INTERNAL MEDICINE

## 2022-07-15 PROCEDURE — 80048 BASIC METABOLIC PNL TOTAL CA: CPT

## 2022-07-15 PROCEDURE — 700111 HCHG RX REV CODE 636 W/ 250 OVERRIDE (IP): Performed by: INTERNAL MEDICINE

## 2022-07-15 PROCEDURE — 700102 HCHG RX REV CODE 250 W/ 637 OVERRIDE(OP): Performed by: NURSE PRACTITIONER

## 2022-07-15 PROCEDURE — 99239 HOSP IP/OBS DSCHRG MGMT >30: CPT | Mod: GC | Performed by: INTERNAL MEDICINE

## 2022-07-15 PROCEDURE — A9270 NON-COVERED ITEM OR SERVICE: HCPCS | Performed by: NURSE PRACTITIONER

## 2022-07-15 PROCEDURE — 71045 X-RAY EXAM CHEST 1 VIEW: CPT

## 2022-07-15 PROCEDURE — 36415 COLL VENOUS BLD VENIPUNCTURE: CPT

## 2022-07-15 PROCEDURE — 93010 ELECTROCARDIOGRAM REPORT: CPT | Performed by: INTERNAL MEDICINE

## 2022-07-15 PROCEDURE — 700102 HCHG RX REV CODE 250 W/ 637 OVERRIDE(OP)

## 2022-07-15 PROCEDURE — A9270 NON-COVERED ITEM OR SERVICE: HCPCS

## 2022-07-15 PROCEDURE — 94660 CPAP INITIATION&MGMT: CPT

## 2022-07-15 RX ORDER — ACETAMINOPHEN 500 MG
1000 TABLET ORAL 3 TIMES DAILY PRN
Status: DISCONTINUED | OUTPATIENT
Start: 2022-07-15 | End: 2022-07-15 | Stop reason: HOSPADM

## 2022-07-15 RX ORDER — ONDANSETRON 2 MG/ML
4 INJECTION INTRAMUSCULAR; INTRAVENOUS EVERY 6 HOURS PRN
Status: DISCONTINUED | OUTPATIENT
Start: 2022-07-15 | End: 2022-07-15 | Stop reason: HOSPADM

## 2022-07-15 RX ORDER — AMLODIPINE BESYLATE 10 MG/1
10 TABLET ORAL DAILY
Qty: 30 TABLET | Refills: 0 | Status: SHIPPED | OUTPATIENT
Start: 2022-07-16 | End: 2022-07-28 | Stop reason: SDUPTHER

## 2022-07-15 RX ADMIN — CEFAZOLIN SODIUM 2 G: 2 INJECTION, SOLUTION INTRAVENOUS at 05:35

## 2022-07-15 RX ADMIN — AMLODIPINE BESYLATE 10 MG: 10 TABLET ORAL at 05:36

## 2022-07-15 RX ADMIN — ASPIRIN 81 MG: 81 TABLET, CHEWABLE ORAL at 05:36

## 2022-07-15 RX ADMIN — FENOFIBRATE 134 MG: 67 CAPSULE ORAL at 05:36

## 2022-07-15 RX ADMIN — ACETAMINOPHEN 1000 MG: 500 TABLET ORAL at 02:14

## 2022-07-15 RX ADMIN — LORATADINE 10 MG: 10 TABLET ORAL at 05:37

## 2022-07-15 RX ADMIN — IRBESARTAN 300 MG: 150 TABLET ORAL at 05:37

## 2022-07-15 RX ADMIN — Medication 400 MG: at 05:36

## 2022-07-15 RX ADMIN — MEMANTINE HYDROCHLORIDE 5 MG: 10 TABLET ORAL at 05:36

## 2022-07-15 ASSESSMENT — ENCOUNTER SYMPTOMS
VOICE CHANGE: 1
SHORTNESS OF BREATH: 0
AGITATION: 0
DIZZINESS: 0
CONFUSION: 0
FATIGUE: 0

## 2022-07-15 ASSESSMENT — PAIN DESCRIPTION - PAIN TYPE
TYPE: SURGICAL PAIN
TYPE: SURGICAL PAIN

## 2022-07-15 NOTE — DISCHARGE PLANNING
TCN following. HTH/SCP chart review completed. Collaborated with CM/SW team. Patient currently on service with [facility] , needs referral and FTF prior to discharge home. Note that at time of writing this note pt has dc'd to home with [facility]. Thank you.     Completed today 7/13/2022:  - Choice forms obtained: SNF, HH DME  -[facility] referral ( sent)

## 2022-07-15 NOTE — DISCHARGE PLANNING
ATTN: Case Management  RE: Referral for Home Health    As of 7/15/22, we have accepted the Home Health referral for the patient listed above.    A [facility] clinician will be out to see the patient within 48 hours. If you have any questions or concerns regarding the patient's transition to Home Health, please do not hesitate to contact us at [phone].      We look forward to collaborating with you,  [facility] Team

## 2022-07-15 NOTE — DISCHARGE PLANNING
Agency/Facility Name: [facility] / [facility]  Outcome: Orders are not complete at this time, please upload HH and DME order for referrals to be processed.      1040-  Agency/Facility Name: [facility]  Outcome: DPA sent HH referral to agency for review.

## 2022-07-15 NOTE — FACE TO FACE
Face to Face Supporting Documentation - Home Health    The encounter with this patient was in whole or in part the primary reason for home health admission.    Date of encounter:   Patient:                    MRN:                       YOB: 2020  Irina Traylor  [id number]  1940     Home health to see patient for:  Skilled Nursing care for assessment, interventions & education, Physical Therapy evaluation and treatment and Occupational therapy evaluation and treatment    Skilled need for:  New Onset Medical Diagnosis Syncope and Medication Management post stroke    Skilled nursing interventions to include:  Comment: medication management, disease education    Homebound status evidenced by:  Needs the assistance of another person in order to leave the home. Leaving home requires a considerable and taxing effort. There is a normal inability to leave the home.    Community Physician to provide follow up care: NAYAN Longo     Optional Interventions? Yes, Details: per home health RN assessment      I certify the face to face encounter for this home health care referral meets the CMS requirements and the encounter/clinical assessment with the patient was, in whole, or in part, for the medical condition(s) listed above, which is the primary reason for home health care. Based on my clinical findings: the service(s) are medically necessary, support the need for home health care, and the homebound criteria are met.  I certify that this patient has had a face to face encounter by myself.  Margie Dial M.D. - PILOI: [phone]

## 2022-07-15 NOTE — PROGRESS NOTES
Pt refusing CPAP due to discomfort, saturations on RA in the high 80's while awake, pt placed on 2L NC. Pt reporting mild pain at the surgical site. Notified MD Lyles.

## 2022-07-15 NOTE — PROGRESS NOTES
Pt has beenSinus Familia /Sinus Rhythm.  SC 0.16 QRS 0.10 QT 0.49  Ectopy:Had PVCS     And after she had her pace maker 15:30 implanted Sinus Rhythm/Vpaced.

## 2022-07-15 NOTE — PROGRESS NOTES
Began care at 1845, Report received from Rachel PEREZ. Patient is SR/partially paced on monitor, on RA O2, A&Ox4 expressive aphasia, no pain. Pacer insertion site is covered with gauze, CDIS. Pt instructed to limit movement of LUE. Plan of care updated with the patient, no questions at this time. Initial assessment completed, orders reviewed, call light within reach, and fall precautions are in place.

## 2022-07-15 NOTE — PROGRESS NOTES
Pt discharged home, leaving the unit with hospital staff assistance via . Pt verbally acknowledges all discharge instructions, medications, and medications regimen. Pt educated and understands Left upper extremity ROM limitations pertaining to new pacemaker placement. Pt gathered all personal belongings including home monitor for pacemaker and four wheel walker, Tele box and IV have been removed. All questions and needs have been met at this time.

## 2022-07-15 NOTE — DISCHARGE SUMMARY
"[facility] Discharge Summary    Attending: Stuart Valiente M.d.  Senior Resident: Dr. Dial  Intern:  Dr. Dalton  Contact Number: [phone]    CHIEF COMPLAINT ON ADMISSION  Chief Complaint   Patient presents with   • Dizziness       Reason for Admission  ems     Admission Date  7/12/2022    CODE STATUS  DNAR/DNI    HPI & HOSPITAL COURSE    Per admitting HPI: \" Patient is a 81-year-old female with past medical history of multiple prior strokes/left MCA complicated by aphasia 6/2022, and obstructive sleep apnea on CPAP who sent from cardiology appointment after syncope. Patient was placed on Zio patch monitor after presented with new CVA in June 2022. Result reveal multiple pauses 3-5.1s and with one 6.3s pause, initially attributed to DARIN and inconsistency in CPAP use. On her follow visit, patient was bradycardic and dizzy and therefore sent to ED for evaluation.   In ED, temperature 37.3 degrees Celsius, pulse 70, respiratory rate 18, blood pressure 185/97, oxygen saturation 96% on room air  Labs: Potassium 4.4, magnesium 2.3, creatinine 1.04  EKG: Different rate, 1 with junctional rhythm with bigeminy and any other showing normal sinus rhythm with isolated P2 segment elevation  Troponins 15.  Patient was admitted to telemetry for close monitoring, She was without any further episodes of dizziness, telemetry with SB-SR. She was evaluated by Dr. Knowles, EP, who felt that her sinus pauses were less vagal mediated and more sinoatrial exit block. She underwent pacemaker placement on 7/14 without complications. She was monitor overnight after PPM placement and discharged with resumption of  services     Of note, patient presented with elevated bp to the ED, her diltiazem was discontinued due to bradycardia, amlodipine was increased to 10mg and ibersartan 300mg (home dose) was continued.       Therefore, she is discharged in good and stable condition to home with organized home healthcare and close outpatient " follow-up.    The patient met 2-midnight criteria for an inpatient stay at the time of discharge.    Discharge Date  7/15/2022    Physical Exam on Day of Discharge  Constitutional:       General: She is not in acute distress.     Appearance: Normal appearance.   HENT:      Head: Normocephalic and atraumatic.      Mouth/Throat:      Mouth: Mucous membranes are moist.      Pharynx: Oropharynx is clear. No oropharyngeal exudate or posterior oropharyngeal erythema.   Eyes:      Extraocular Movements: Extraocular movements intact.      Conjunctiva/sclera: Conjunctivae normal.      Pupils: Pupils are equal, round, and reactive to light.   Cardiovascular:      Rate and Rhythm: Normal rate and regular rhythm.      Pulses: Normal pulses.      Heart sounds: Normal heart sounds.   Pulmonary:      Effort: Pulmonary effort is normal.      Breath sounds: Normal breath sounds.   Abdominal:      General: Abdomen is flat.      Palpations: Abdomen is soft.      Tenderness: There is no abdominal tenderness.   Musculoskeletal:         General: Normal range of motion.      Cervical back: Normal range of motion and neck supple.   Skin:     General: Skin is warm and dry.      Capillary Refill: Capillary refill takes less than 2 seconds.   Neurological:      Mental Status: She is alert and oriented to person, place, and time. Mental status is at baseline.      GCS: GCS eye subscore is 4. GCS verbal subscore is 5. GCS motor subscore is 6.      Motor: Weakness present.    Weakness isolated to LLE, 4/5       FOLLOW UP ITEMS POST DISCHARGE  Follow up with cardiology for pacemaker check   Continue keeping home bp log, review home bp log to adjust medications    DISCHARGE DIAGNOSES  Principal Problem:    Symptomatic bradycardia POA: Unknown  Active Problems:    DARIN (obstructive sleep apnea) (Chronic) POA: Yes      Overview: AHI 23.6, minimum saturation 69%, on CPAP 15 cm.    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 [facility] None   8/1/2022 10:00 AM NAYAN Longo [facility]   8/15/2022  1:50 PM NAYAN Haywood [facility] None   8/31/2022  3:00 PM Rosana Wolfe M.D. [facility] None   10/6/2022 11:00 AM NAYAN Longo [facility]     No follow-up provider specified.    MEDICATIONS ON DISCHARGE     Medication List      START taking these medications      Instructions   amLODIPine 10 MG Tabs  Start taking on: July 16, 2022  Commonly known as: NORVASC   Take 1 Tablet by mouth every day.  Dose: 10 mg        CHANGE how you take these medications      Instructions   irbesartan 300 MG Tabs  What changed: additional instructions  Commonly known as: AVAPRO   Take 1 Tablet by mouth every evening.  Dose: 300 mg     loratadine 10 MG Tabs  What changed:   · how much to take  · how to take this  · when to take this  · reasons to take this  Commonly known as: CLARITIN   TAKE ONE TABLET BY MOUTH ONE TIME DAILY AS NEEDED FOR ALLERGY        CONTINUE taking these medications      Instructions   aspirin 81 MG tablet   Take 81 mg by mouth at bedtime.  Dose: 81 mg     fenofibrate 145 MG Tabs  Commonly known as: TRICOR   Take 1 Tablet by mouth every day.  Dose: 145 mg     Magnesium 400 MG Tabs   Take 400 mg by mouth every day.  Dose: 400 mg     memantine 5 MG Tabs  Commonly known as: Richard   Doctor's comments: per patient she takes one tab daily in the am.  Take 5 mg by mouth every day. FOR MEMORY AND PRE ALTZHEIMERS.  Dose: 5 mg        STOP taking these medications    Narcosoft Herbal Lax Caps     Vitamin C 1000 MG Tabs       "      Allergies  Allergies   Allergen Reactions   • Benicar [Olmesartan] Rash     blisters   • Dilaudid  [Hydromorphone Hcl] Vomiting   • Lidocaine    • Norco  [Apap-Fd&C Blue #1-Hydrocodone] Vomiting   • Other Misc Shortness of Breath     Anesthesia with surgery 2016  Pt couldn't breathe while being put under and tapped side of bed to let RN know. \"It was the scariest feeling and I want everyone to know\"     • Percocet  [Apap-Fd&C Red #40 [place]-Oxycodone] Vomiting   • Vicodin  [Apap-Fd&C Yellow #10 [place]-Hydrocodone] Vomiting   • Codeine Vomiting   • Demerol Vomiting   • Hydrocodone Vomiting   • Other Drug Vomiting     \"all pain meds\" per pt can take morphine.   • Oxycodone Vomiting   • Statins [Hmg-Coa-R Inhibitors] Unspecified     Muscle cramping   • Valium Vomiting       DIET  Orders Placed This Encounter   Procedures   • Diet Order Diet: Cardiac     Standing Status:   Standing     Number of Occurrences:   1     Order Specific Question:   Diet:     Answer:   Cardiac [6]       ACTIVITY  As tolerated.  Weight bearing as tolerated    CONSULTATIONS  Cardiology   Electrophysiology     PROCEDURES  DX-CHEST-PORTABLE (1 VIEW)   Final Result      Stable chest with cardiac silhouette enlargement, pacer in place and no pneumothorax      DX-CHEST-PORTABLE (1 VIEW)   Final Result      No acute cardiac or pulmonary abnormalities are identified.      CL-PERMANENT PACEMAKER INSERTION    Dr. Knowles 7/14       LABORATORY  Lab Results   Component Value Date    SODIUM 138 07/15/2022    POTASSIUM 4.9 07/15/2022    CHLORIDE 107 07/15/2022    CO2 24 07/15/2022    GLUCOSE 105 (H) 07/15/2022    BUN 30 (H) 07/15/2022    CREATININE 1.13 07/15/2022        Lab Results   Component Value Date    WBC 5.0 07/13/2022    HEMOGLOBIN 14.6 07/13/2022    HEMATOCRIT 45.0 07/13/2022    PLATELETCT 202 07/13/2022        Total time of the discharge process 30 minutes.  "

## 2022-07-15 NOTE — DISCHARGE PLANNING
"HT/SCP TCN chart review completed. Collaborated with CECY Osman.  Per collaboration with CM and chart review possible CL permanent pacemaker insertion today.  Plan for possible discharge home tomorrow with HH and [facility] referral.   Patient seen at bedside.  Patient had no other concerns at this time, although she could note find her call bell.  This TCN assisted patient in finding it and placing within reach.  \"I love [facility], you have all been really great to me\".  Patient currently on service with [facility] , needs referral and FTF prior to discharge home. TCN will continue to follow and collaborate with discharge planning team as additional post acute needs arise. Thank you.    Completed today 7/13/2022:  - Choice forms obtained: SNF, HH DME  -Requested MD order for PT OT eval  -[facility] referral ( sent)         "

## 2022-07-15 NOTE — PROGRESS NOTES
"Cardiology Follow Up Progress Note    Date of Service  7/15/2022    Attending Physician  Stuart Valiente M.D.    Chief Complaint   Dizziness and sinus bradycardia     HPI  Irina Traylor is a 81 y.o. female admitted 7/12/2022 with per Dr. Gray, \"with history of hypertension and stroke who presented with abnormal event monitor results to cardiology clinic, sent to ER for evaluation of symptomatic bradycardia.     The patient was evaluated in cardiology clinic today by KUSHAL Cardenas, and was found to be in bradycardia and symptomatic towards the end of the visit. As such, the patient was sent to ER for further evaluation.     I personally reviewed the EKG, which showed junctional rhythm with PACs.  The patient was previously evaluated in cardiology clinic by KUSHAL Fisher, on 6/30/2022 for pauses found on Zio patch.  One of the urgent reports was reviewed by Dr. Zhao, with patient having 6.3-second pause around 6 AM.  The patient also has several other pauses ranging from 3.1 to 5.0 seconds occurring in the early morning hours.  The patient was referred to EP, pending appointment with Dr. Wolfe in August.       When the patient presented to clinic today, she reported feeling lightheadedness towards the end of the visit. The patient was recommended to discontinue diltiazem previously, but she had resumed it for high blood pressure.  A strip by HERMINIO showed heart rate ranging 35-40 bpm appears to be junctional with PACs.      On arrival to the ER, the patient remains in sinus rhythm.  She reports feeling well currently.  She denies any current lightheadedness.  She denies any chest pain or shortness of breath on exertion.  No orthopnea, PND, leg swelling.  No palpitations.  No syncope.\"    Interim Events  Patient resting in bed, notable word salad.   Slight pain in the left upper chest from pacemaker insertion  Paced on the monitor    Review of Systems  Review of Systems   Constitutional: Negative for fatigue. "   HENT: Positive for voice change.    Respiratory: Negative for shortness of breath.    Cardiovascular:        Pain over the pacemaker site   Neurological: Negative for dizziness.   Psychiatric/Behavioral: Negative for agitation and confusion.       Vital signs in last 24 hours  Temp:  [36.5 °C (97.7 °F)-36.9 °C (98.4 °F)] 36.7 °C (98.1 °F)  Pulse:  [66-97] 67  Resp:  [15-18] 15  BP: (117-156)/() 151/74  SpO2:  [90 %-97 %] 94 %    Physical Exam  Physical Exam  Vitals and nursing note reviewed.   Constitutional:       Appearance: Normal appearance.   HENT:      Head: Normocephalic and atraumatic.   Eyes:      Pupils: Pupils are equal, round, and reactive to light.   Cardiovascular:      Rate and Rhythm: Normal rate and regular rhythm.      Heart sounds: Normal heart sounds. No murmur heard.     Comments: Pacemaker present in the left upper chest   Pulmonary:      Effort: Pulmonary effort is normal.      Breath sounds: Normal breath sounds.   Abdominal:      General: Abdomen is flat.   Musculoskeletal:      Cervical back: Normal range of motion.   Skin:     General: Skin is warm and dry.   Neurological:      General: No focal deficit present.      Mental Status: She is alert and oriented to person, place, and time.   Psychiatric:         Mood and Affect: Mood normal.         Behavior: Behavior normal.         Thought Content: Thought content normal.         Judgment: Judgment normal.         Lab Review  Lab Results   Component Value Date/Time    WBC 5.0 07/13/2022 01:59 AM    RBC 4.94 07/13/2022 01:59 AM    HEMOGLOBIN 14.6 07/13/2022 01:59 AM    HEMATOCRIT 45.0 07/13/2022 01:59 AM    MCV 91.1 07/13/2022 01:59 AM    MCH 29.6 07/13/2022 01:59 AM    MCHC 32.4 (L) 07/13/2022 01:59 AM    MPV 12.2 07/13/2022 01:59 AM      Lab Results   Component Value Date/Time    SODIUM 138 07/15/2022 04:07 AM    POTASSIUM 4.9 07/15/2022 04:07 AM    CHLORIDE 107 07/15/2022 04:07 AM    CO2 24 07/15/2022 04:07 AM    GLUCOSE 105 (H)  07/15/2022 04:07 AM    BUN 30 (H) 07/15/2022 04:07 AM    CREATININE 1.13 07/15/2022 04:07 AM      Lab Results   Component Value Date/Time    ASTSGOT 20 07/12/2022 05:53 PM    ALTSGPT 17 07/12/2022 05:53 PM     Lab Results   Component Value Date/Time    CHOLSTRLTOT 149 06/21/2022 03:25 AM    LDL 80 06/21/2022 03:25 AM    HDL 53 06/21/2022 03:25 AM    TRIGLYCERIDE 79 06/21/2022 03:25 AM    TROPONINT 15 07/12/2022 05:53 PM       No results for input(s): NTPROBNP in the last 72 hours.    Cardiac Imaging and Procedures Review  Echocardiography Laboratory  6/21/2022  The left ventricular ejection fraction is normal; visually estimated to   be 55%.  Mild concentric left ventricular hypertrophy.  Thickened, calcified mitral valve leaflets.  Mild mitral regurgitation.  Mild aortic insufficiency.  Normal left atrial size.    Imaging  Chest X-Ray:   7/15/2022   Stable chest with cardiac silhouette enlargement, pacer in place and no pneumothorax      Assessment/Plan  No new Assessment & Plan notes have been filed under this hospital service since the last note was generated.  Service: Cardiology    Symptomatic bradycardia  Sinus pauses  Junctional rhythm  - dual chamber [employer] PPM with normal function.  - Device site is uncomplicated.    - CXR without PTX.     Hypertension  -Continue irbesartan 300 mg daily   continue amlodipine to 10 mg daily     History of multiple strokes:  -Continue aspirin 81 mg daily  -Continue fenofibrate     Sleep apnea:  -Continue regular CPAP use as this could be contributing to her pauses.    - EP will sign off, cleared to discharge from our standpoint.  Follow up is arranged in device clinic. Please call with any questions.     Pacer restrictions reviewed with patient. Do not raise affected arm above head or behind back for six weeks. May remove arm sling after 24 hrs, please wear if trouble remembering arm limitation and prn at night. No heavy lifting/pushing with L arm for six weeks. No driving  for first week. No showers first week. Keep dressing on, clean, and dry until seen follow up. Wound care reviewed. Instructed to report s/s of infection such as warmth/redness/drainage/swelling at site or fever/chills.  Patient verbalizes understanding.     Future Appointments   Date Time Provider Department Center   7/29/2022  8:45 AM Gabe Gray M.D. [facility] None   8/1/2022 10:00 AM NAYAN Longo   8/15/2022  1:50 PM NAYAN Haywood [facility] None   8/31/2022  3:00 PM Rosana Wolfe M.D. [facility] None   10/6/2022 11:00 AM NAYAN Long[facility]         Thank you for allowing me to participate in the care of this patient.  Cardiology will sign off on this patient    Please contact me with any questions.    NAYAN Tirado   Cardiologist, [facility]  [phone]

## 2022-07-15 NOTE — DISCHARGE PLANNING
This referral has been escalated to a Clinical Supervisor for review in order to determine Home Health appropriateness.  This issue will be resolved as quickly as possible.

## 2022-07-15 NOTE — CASE COMMUNICATION
I agree with these changes  Sabina Sanabria RN  ----- Message -----  From: Bridget Kennedy R.N.  Sent: 7/14/2022   2:13 PM PDT  To: Sabina Sanabria R.N.      Quality Review Completed for [place] 7/14 OASIS by KYUNG Kennedy RN on 7/15/2022:  Edits completed by KYUNG Kennedy RN:  1.  is NA to depression and yes to PU interventions per care plan

## 2022-07-17 ENCOUNTER — HOME CARE VISIT (OUTPATIENT)
Dept: HOME HEALTH SERVICES | Facility: HOME HEALTHCARE | Age: 82
End: 2022-07-17
Payer: MEDICARE

## 2022-07-17 VITALS
BODY MASS INDEX: 31.41 KG/M2 | WEIGHT: 160 LBS | OXYGEN SATURATION: 9 % | DIASTOLIC BLOOD PRESSURE: 64 MMHG | SYSTOLIC BLOOD PRESSURE: 108 MMHG | HEART RATE: 82 BPM | HEIGHT: 60 IN | TEMPERATURE: 97.8 F | RESPIRATION RATE: 16 BRPM

## 2022-07-17 PROCEDURE — G0493 RN CARE EA 15 MIN HH/HOSPICE: HCPCS

## 2022-07-17 SDOH — ECONOMIC STABILITY: HOUSING INSECURITY
HOME SAFETY: PATIENT EDUCATED ON IMPORTANCE OF REMOVING CLUTTER AND AND OBTAINING A FIRE EXTINGUISHER FOR HOME SAFETY, IF FEASIBLE. PATIENT VERBALIZED UNDERSTANDING.

## 2022-07-17 ASSESSMENT — ENCOUNTER SYMPTOMS
HIGHEST PAIN SEVERITY IN PAST 24 HOURS: 2/10
PAIN: 1
DYSPNEA ACTIVITY LEVEL: AFTER AMBULATING LESS THAN 10 FT
SUBJECTIVE PAIN PROGRESSION: WAXING AND WANING
SEVERE DYSPNEA: 1
SHORTNESS OF BREATH: 1
VOMITING: DENIES
PAIN SEVERITY GOAL: 0/10
PAIN LOCATION - PAIN DURATION: SHORT
NAUSEA: DENIES
PAIN LOCATION - EXACERBATING FACTORS: MOVEMENT
LOWEST PAIN SEVERITY IN PAST 24 HOURS: 0/10
PERSON REPORTING PAIN: PATIENT
PAIN LOCATION - PAIN FREQUENCY: INFREQUENT
PAIN LOCATION - PAIN SEVERITY: 2/10

## 2022-07-17 ASSESSMENT — PATIENT HEALTH QUESTIONNAIRE - PHQ9
1. LITTLE INTEREST OR PLEASURE IN DOING THINGS: 00
CLINICAL INTERPRETATION OF PHQ2 SCORE: 0
2. FEELING DOWN, DEPRESSED, IRRITABLE, OR HOPELESS: 00

## 2022-07-17 ASSESSMENT — FIBROSIS 4 INDEX: FIB4 SCORE: 1.9

## 2022-07-18 ENCOUNTER — HOME CARE VISIT (OUTPATIENT)
Dept: HOME HEALTH SERVICES | Facility: HOME HEALTHCARE | Age: 82
End: 2022-07-18
Payer: MEDICARE

## 2022-07-18 ENCOUNTER — TELEPHONE (OUTPATIENT)
Dept: MEDICAL GROUP | Facility: PHYSICIAN GROUP | Age: 82
End: 2022-07-18
Payer: MEDICARE

## 2022-07-18 ENCOUNTER — DOCUMENTATION (OUTPATIENT)
Dept: MEDICAL GROUP | Facility: PHYSICIAN GROUP | Age: 82
End: 2022-07-18
Payer: MEDICARE

## 2022-07-18 ENCOUNTER — PATIENT OUTREACH (OUTPATIENT)
Dept: MEDICAL GROUP | Facility: PHYSICIAN GROUP | Age: 82
End: 2022-07-18
Payer: MEDICARE

## 2022-07-18 VITALS
SYSTOLIC BLOOD PRESSURE: 148 MMHG | OXYGEN SATURATION: 95 % | DIASTOLIC BLOOD PRESSURE: 80 MMHG | HEART RATE: 69 BPM | RESPIRATION RATE: 16 BRPM | TEMPERATURE: 97.9 F

## 2022-07-18 VITALS
TEMPERATURE: 98.4 F | OXYGEN SATURATION: 96 % | RESPIRATION RATE: 16 BRPM | DIASTOLIC BLOOD PRESSURE: 84 MMHG | SYSTOLIC BLOOD PRESSURE: 132 MMHG | HEART RATE: 80 BPM

## 2022-07-18 PROCEDURE — G0152 HHCP-SERV OF OT,EA 15 MIN: HCPCS

## 2022-07-18 PROCEDURE — G0153 HHCP-SVS OF S/L PATH,EA 15MN: HCPCS

## 2022-07-18 PROCEDURE — G0156 HHCP-SVS OF AIDE,EA 15 MIN: HCPCS

## 2022-07-18 ASSESSMENT — ENCOUNTER SYMPTOMS
PAIN SEVERITY GOAL: 0/10
PAIN LOCATION - PAIN SEVERITY: 2/10
PERSON REPORTING PAIN: PATIENT
HIGHEST PAIN SEVERITY IN PAST 24 HOURS: 2/10
PAIN LOCATION - PAIN QUALITY: ACHY
PAIN: 1
LOWEST PAIN SEVERITY IN PAST 24 HOURS: 0/10
PAIN LOCATION: LEFT ARM

## 2022-07-18 NOTE — PROGRESS NOTES
Medication chart review for [facility] services    Received referral from [facility].   Medications reviewed  compared with discharge summary if available.    Current medication list per [facility]     Current Outpatient Medications:   •  amLODIPine, 10 mg, Oral, DAILY  •  fenofibrate, 145 mg, Oral, DAILY  •  irbesartan, 300 mg, Oral, Nightly (Patient taking differently: 300 mg, Oral, NIGHTLY, pt taking in the morning)  •  memantine, 5 mg, Oral, DAILY  •  Magnesium, 400 mg, Oral, DAILY  •  loratadine, TAKE ONE TABLET BY MOUTH ONE TIME DAILY AS NEEDED FOR ALLERGY (Patient taking differently: 10 mg, Oral, 1 TIME DAILY PRN, Allergies, TAKE ONE TABLET BY MOUTH ONE TIME DAILY AS NEEDED FOR ALLERGY)  •  aspirin, 81 mg, Oral, QHS    Location of hospital, and discharge summary date, if applicable:   [facility], discharge summary date 7/15/2022  Discharge medication summary, per discharge summary:    START taking these medications      Instructions   amLODIPine 10 MG Tabs  Start taking on: July 16, 2022  Commonly known as: NORVASC    Take 1 Tablet by mouth every day.  Dose: 10 mg                  CHANGE how you take these medications      Instructions   irbesartan 300 MG Tabs  What changed: additional instructions  Commonly known as: AVAPRO    Take 1 Tablet by mouth every evening.  Dose: 300 mg      loratadine 10 MG Tabs  What changed:   · how much to take  · how to take this  · when to take this  · reasons to take this  Commonly known as: CLARITIN    TAKE ONE TABLET BY MOUTH ONE TIME DAILY AS NEEDED FOR ALLERGY                  CONTINUE taking these medications      Instructions   aspirin 81 MG tablet    Take 81 mg by mouth at bedtime.  Dose: 81 mg      fenofibrate 145 MG Tabs  Commonly known as: TRICOR    Take 1 Tablet by mouth every day.  Dose: 145 mg      Magnesium 400 MG Tabs    Take 400 mg by mouth every day.  Dose: 400 mg      memantine 5 MG Tabs  Commonly known as: Richard    Doctor's comments:  "per patient she takes one tab daily in the am.  Take 5 mg by mouth every day. FOR MEMORY AND PRE ALTZHEIMERS.  Dose: 5 mg          STOP taking these medications    Narcosoft Herbal Lax Caps      Vitamin C 1000 MG Tabs            Allergies   Allergen Reactions   • Benicar [Olmesartan] Rash     blisters   • Dilaudid  [Hydromorphone Hcl] Vomiting   • Lidocaine    • Norco  [Apap-Fd&C Blue #1-Hydrocodone] Vomiting   • Other Misc Shortness of Breath     Anesthesia with surgery 2016  Pt couldn't breathe while being put under and tapped side of bed to let RN know. \"It was the scariest feeling and I want everyone to know\"     • Percocet  [Apap-Fd&C Red #40 [place]-Oxycodone] Vomiting   • Vicodin  [Apap-Fd&C Yellow #10 [place]-Hydrocodone] Vomiting   • Codeine Vomiting   • Demerol Vomiting   • Hydrocodone Vomiting   • Other Drug Vomiting     \"all pain meds\" per pt can take morphine.   • Oxycodone Vomiting   • Statins [Hmg-Coa-R Inhibitors] Unspecified     Muscle cramping   • Valium Vomiting           Labs     Lab Results   Component Value Date/Time    SODIUM 138 07/15/2022 04:07 AM    POTASSIUM 4.9 07/15/2022 04:07 AM    CHLORIDE 107 07/15/2022 04:07 AM    CO2 24 07/15/2022 04:07 AM    GLUCOSE 105 (H) 07/15/2022 04:07 AM    BUN 30 (H) 07/15/2022 04:07 AM    CREATININE 1.13 07/15/2022 04:07 AM     Lab Results   Component Value Date/Time    ALKPHOSPHAT 51 07/12/2022 05:53 PM    ASTSGOT 20 07/12/2022 05:53 PM    ALTSGPT 17 07/12/2022 05:53 PM    TBILIRUBIN 0.3 07/12/2022 05:53 PM    INR 1.00 06/20/2022 07:09 PM    ALBUMIN 4.3 07/12/2022 05:53 PM        Assessment for clinically significant drug interactions, drug omissions/additions, duplicative therapies.            CC   Sarita Noble, A.P.R.N.  [address] [address]  Fax: [phone]    [facility]  Phone [phone] fax [phone]    This note was created using voice recognition software (Dragon). The accuracy of " the dictation is limited by the abilities of the software. I have reviewed the note prior to signing, however some errors in grammar and context are still possible. If you have any questions related to this note please do not hesitate to contact our office.

## 2022-07-18 NOTE — TELEPHONE ENCOUNTER
ESTABLISHED PATIENT PRE-VISIT PLANNING     Patient was NOT contacted to complete PVP.     Note: Patient will not be contacted if there is no indication to call.     1.  Reviewed notes from the last few office visits within the medical group: Yes, LOV 06/27/2022    2.  If any orders were placed at last visit or intended to be done for this visit (i.e. 6 mos follow-up), do we have Results/Consult Notes?         •  Labs - Labs were not ordered at last office visit.  Note: If patient appointment is for lab review and patient did not complete labs, check with provider if OK to reschedule patient until labs completed.       •  Imaging - Imaging ordered, completed and results are in chart.       •  Referrals - No referrals were ordered at last office visit.    3. Is this appointment scheduled as a Hospital Follow-Up? Yes, visit was at [facility].     4.  Immunizations were updated in Epic using Reconcile Outside Information activity? Yes    5.  Patient is due for the following Health Maintenance Topics:   Health Maintenance Due   Topic Date Due   • IMM DTaP/Tdap/Td Vaccine (1 - Tdap) Never done   • IMM ZOSTER VACCINES (1 of 2) Never done     6.  AHA (Pulse8) form printed for Provider? N/A

## 2022-07-19 ENCOUNTER — HOME CARE VISIT (OUTPATIENT)
Dept: HOME HEALTH SERVICES | Facility: HOME HEALTHCARE | Age: 82
End: 2022-07-19
Payer: MEDICARE

## 2022-07-19 VITALS
DIASTOLIC BLOOD PRESSURE: 80 MMHG | HEART RATE: 69 BPM | SYSTOLIC BLOOD PRESSURE: 148 MMHG | OXYGEN SATURATION: 95 % | TEMPERATURE: 97.9 F | RESPIRATION RATE: 16 BRPM

## 2022-07-19 DIAGNOSIS — R47.01 APHASIA: ICD-10-CM

## 2022-07-19 PROCEDURE — G0155 HHCP-SVS OF CSW,EA 15 MIN: HCPCS

## 2022-07-19 PROCEDURE — G0299 HHS/HOSPICE OF RN EA 15 MIN: HCPCS

## 2022-07-19 ASSESSMENT — ENCOUNTER SYMPTOMS
LOWEST PAIN SEVERITY IN PAST 24 HOURS: 2/10
PAIN: 1
DEPRESSION: 1
SUBJECTIVE PAIN PROGRESSION: WAXING AND WANING
PAIN SEVERITY GOAL: 0/10
PAIN LOCATION - PAIN SEVERITY: 2/10
PERSON REPORTING PAIN: PATIENT
HIGHEST PAIN SEVERITY IN PAST 24 HOURS: 3/10

## 2022-07-19 ASSESSMENT — ACTIVITIES OF DAILY LIVING (ADL)
DRESSING_LB_CURRENT_FUNCTION: CONTACT GUARD ASSIST
CURRENT_FUNCTION: CONTACT GUARD ASSIST
HOUSEKEEPING ASSESSED: 1
SHOPPING: NEEDS ASSISTANCE
LIGHT HOUSEKEEPING: NEEDS ASSISTANCE
PHYSICAL TRANSFERS ASSESSED: 1
CURRENT_FUNCTION: STAND BY ASSIST
DRESSING_LB_CURRENT_FUNCTION: MINIMUM ASSIST
GROOMING ASSESSED: 1
SHOPPING ASSESSED: 1
GROOMING_CURRENT_FUNCTION: SUPERVISION

## 2022-07-19 NOTE — TELEPHONE ENCOUNTER
Requested Prescriptions     Pending Prescriptions Disp Refills   • vitamin D (VITAMIND D3) 1000 UNIT Tab 30 Tablet 0     Sig: Take 1 Tablet by mouth every day for 30 days.       ANDREW Demarco.

## 2022-07-20 ENCOUNTER — HOME CARE VISIT (OUTPATIENT)
Dept: HOME HEALTH SERVICES | Facility: HOME HEALTHCARE | Age: 82
End: 2022-07-20
Payer: MEDICARE

## 2022-07-20 VITALS
DIASTOLIC BLOOD PRESSURE: 68 MMHG | TEMPERATURE: 97.6 F | RESPIRATION RATE: 16 BRPM | HEART RATE: 74 BPM | SYSTOLIC BLOOD PRESSURE: 128 MMHG | OXYGEN SATURATION: 92 %

## 2022-07-20 VITALS
OXYGEN SATURATION: 94 % | RESPIRATION RATE: 16 BRPM | SYSTOLIC BLOOD PRESSURE: 132 MMHG | TEMPERATURE: 98.1 F | HEART RATE: 76 BPM | DIASTOLIC BLOOD PRESSURE: 72 MMHG

## 2022-07-20 VITALS
TEMPERATURE: 98.1 F | HEART RATE: 76 BPM | DIASTOLIC BLOOD PRESSURE: 72 MMHG | SYSTOLIC BLOOD PRESSURE: 132 MMHG | RESPIRATION RATE: 16 BRPM | OXYGEN SATURATION: 94 %

## 2022-07-20 VITALS
DIASTOLIC BLOOD PRESSURE: 72 MMHG | OXYGEN SATURATION: 94 % | RESPIRATION RATE: 16 BRPM | SYSTOLIC BLOOD PRESSURE: 132 MMHG | TEMPERATURE: 98.1 F | HEART RATE: 76 BPM

## 2022-07-20 PROCEDURE — G0152 HHCP-SERV OF OT,EA 15 MIN: HCPCS

## 2022-07-20 PROCEDURE — G0156 HHCP-SVS OF AIDE,EA 15 MIN: HCPCS

## 2022-07-20 PROCEDURE — G0153 HHCP-SVS OF S/L PATH,EA 15MN: HCPCS

## 2022-07-20 PROCEDURE — G0151 HHCP-SERV OF PT,EA 15 MIN: HCPCS

## 2022-07-20 ASSESSMENT — ENCOUNTER SYMPTOMS
PERSON REPORTING PAIN: PATIENT
DENIES PAIN: 1
HIGHEST PAIN SEVERITY IN PAST 24 HOURS: 0/10
PAIN SEVERITY GOAL: 0/10
LOWEST PAIN SEVERITY IN PAST 24 HOURS: 0/10
DENIES PAIN: 1
MUSCLE WEAKNESS: 1
POOR JUDGMENT: 1
LIMITED RANGE OF MOTION: 1

## 2022-07-21 ENCOUNTER — HOME CARE VISIT (OUTPATIENT)
Dept: HOME HEALTH SERVICES | Facility: HOME HEALTHCARE | Age: 82
End: 2022-07-21
Payer: MEDICARE

## 2022-07-21 ENCOUNTER — TELEPHONE (OUTPATIENT)
Dept: CARDIOLOGY | Facility: MEDICAL CENTER | Age: 82
End: 2022-07-21
Payer: MEDICARE

## 2022-07-21 PROCEDURE — 93248 EXT ECG>7D<15D REV&INTERPJ: CPT | Performed by: INTERNAL MEDICINE

## 2022-07-21 PROCEDURE — G0493 RN CARE EA 15 MIN HH/HOSPICE: HCPCS

## 2022-07-21 NOTE — TELEPHONE ENCOUNTER
----- Message from Rosana Wolfe M.D. sent at 7/21/2022 10:09 AM PDT -----  If nocturnal pauses during sleep and asymptomatic, no device necessary    ----- Message -----  From: Laura Zuñiga R.N.  Sent: 7/1/2022   7:57 AM PDT  To: Rosana Wolfe M.D., Dakotah Knowles M.D., #    Please advise.  ----- Message -----  From: MELISSA Tripathi  Sent: 7/1/2022   7:38 AM PDT  To: Laura Zuñiga R.N.    Pt is new to clinic. She had monitor placed in hospital. She has had numerous pauses 3-5.1 seconds and a 6.3 second pause. I saw her yesterday as a new pt. Discussed situation with Dr Freire who wanted me to talk with EP on what to do. Pt has had several CVAs and is somewhat difficult to follow. She has DARIN and states she was wearing CPAP during the 3-5.1 s pauses. The 6.3 s pause I could not get her to answer whether or not she was wearing CPAP. She is completely asymptomatic. Should she be scheduled for PPM?

## 2022-07-22 ENCOUNTER — HOME CARE VISIT (OUTPATIENT)
Dept: HOME HEALTH SERVICES | Facility: HOME HEALTHCARE | Age: 82
End: 2022-07-22
Payer: MEDICARE

## 2022-07-22 ENCOUNTER — NON-PROVIDER VISIT (OUTPATIENT)
Dept: CARDIOLOGY | Facility: MEDICAL CENTER | Age: 82
End: 2022-07-22
Payer: MEDICARE

## 2022-07-22 VITALS
RESPIRATION RATE: 16 BRPM | OXYGEN SATURATION: 93 % | DIASTOLIC BLOOD PRESSURE: 62 MMHG | TEMPERATURE: 98.1 F | HEART RATE: 69 BPM | SYSTOLIC BLOOD PRESSURE: 126 MMHG

## 2022-07-22 VITALS
TEMPERATURE: 97.5 F | HEART RATE: 73 BPM | RESPIRATION RATE: 16 BRPM | SYSTOLIC BLOOD PRESSURE: 134 MMHG | OXYGEN SATURATION: 93 % | DIASTOLIC BLOOD PRESSURE: 70 MMHG

## 2022-07-22 DIAGNOSIS — I49.5 SICK SINUS SYNDROME (HCC): Chronic | ICD-10-CM

## 2022-07-22 DIAGNOSIS — Z95.0 CARDIAC PACEMAKER IN SITU: ICD-10-CM

## 2022-07-22 DIAGNOSIS — R47.01 APHASIA: ICD-10-CM

## 2022-07-22 PROCEDURE — G0156 HHCP-SVS OF AIDE,EA 15 MIN: HCPCS

## 2022-07-22 PROCEDURE — 93280 PM DEVICE PROGR EVAL DUAL: CPT | Performed by: INTERNAL MEDICINE

## 2022-07-22 ASSESSMENT — ENCOUNTER SYMPTOMS
DENIES PAIN: 1
PERSON REPORTING PAIN: PATIENT
NAUSEA: DENIES
MUSCLE WEAKNESS: 1
VOMITING: DENIES

## 2022-07-24 ENCOUNTER — HOME CARE VISIT (OUTPATIENT)
Dept: HOME HEALTH SERVICES | Facility: HOME HEALTHCARE | Age: 82
End: 2022-07-24
Payer: MEDICARE

## 2022-07-24 NOTE — TELEPHONE ENCOUNTER
Requested Prescriptions     Pending Prescriptions Disp Refills   • vitamin D (VITAMIND D3) 1000 UNIT Tab 90 Tablet 0     Sig: Take 1 Tablet by mouth every day.       ANDREW Demarco.

## 2022-07-25 ENCOUNTER — HOME CARE VISIT (OUTPATIENT)
Dept: HOME HEALTH SERVICES | Facility: HOME HEALTHCARE | Age: 82
End: 2022-07-25
Payer: MEDICARE

## 2022-07-25 ENCOUNTER — TELEPHONE (OUTPATIENT)
Dept: MEDICAL GROUP | Facility: PHYSICIAN GROUP | Age: 82
End: 2022-07-25

## 2022-07-25 VITALS
TEMPERATURE: 98.5 F | DIASTOLIC BLOOD PRESSURE: 80 MMHG | OXYGEN SATURATION: 94 % | HEART RATE: 73 BPM | SYSTOLIC BLOOD PRESSURE: 132 MMHG | RESPIRATION RATE: 16 BRPM

## 2022-07-25 VITALS
RESPIRATION RATE: 16 BRPM | OXYGEN SATURATION: 93 % | SYSTOLIC BLOOD PRESSURE: 124 MMHG | DIASTOLIC BLOOD PRESSURE: 64 MMHG | HEART RATE: 83 BPM | TEMPERATURE: 97.7 F

## 2022-07-25 VITALS
RESPIRATION RATE: 16 BRPM | DIASTOLIC BLOOD PRESSURE: 77 MMHG | OXYGEN SATURATION: 94 % | TEMPERATURE: 98.5 F | SYSTOLIC BLOOD PRESSURE: 113 MMHG | HEART RATE: 73 BPM

## 2022-07-25 VITALS
HEART RATE: 73 BPM | RESPIRATION RATE: 16 BRPM | SYSTOLIC BLOOD PRESSURE: 113 MMHG | TEMPERATURE: 98.5 F | DIASTOLIC BLOOD PRESSURE: 77 MMHG | OXYGEN SATURATION: 94 %

## 2022-07-25 VITALS
RESPIRATION RATE: 16 BRPM | DIASTOLIC BLOOD PRESSURE: 68 MMHG | OXYGEN SATURATION: 98 % | SYSTOLIC BLOOD PRESSURE: 134 MMHG | TEMPERATURE: 97.5 F | HEART RATE: 78 BPM

## 2022-07-25 PROCEDURE — G0151 HHCP-SERV OF PT,EA 15 MIN: HCPCS

## 2022-07-25 PROCEDURE — 665001 SOC-HOME HEALTH

## 2022-07-25 PROCEDURE — G0180 MD CERTIFICATION HHA PATIENT: HCPCS | Performed by: NURSE PRACTITIONER

## 2022-07-25 PROCEDURE — G0299 HHS/HOSPICE OF RN EA 15 MIN: HCPCS

## 2022-07-25 PROCEDURE — G0156 HHCP-SVS OF AIDE,EA 15 MIN: HCPCS

## 2022-07-25 PROCEDURE — G0153 HHCP-SVS OF S/L PATH,EA 15MN: HCPCS

## 2022-07-25 ASSESSMENT — ENCOUNTER SYMPTOMS
LOWEST PAIN SEVERITY IN PAST 24 HOURS: 0/10
MUSCLE WEAKNESS: 1
PERSON REPORTING PAIN: PATIENT
PERSON REPORTING PAIN: PATIENT
DENIES PAIN: 1
DENIES PAIN: 1
PAIN SEVERITY GOAL: 0/10
DENIES PAIN: 1
HIGHEST PAIN SEVERITY IN PAST 24 HOURS: 0/10
DENIES PAIN: 1
PERSON REPORTING PAIN: PATIENT
POOR JUDGMENT: 1

## 2022-07-25 ASSESSMENT — ACTIVITIES OF DAILY LIVING (ADL)
AMBULATION_DISTANCE/DURATION_TOLERATED: 120 FT
AMBULATION ASSISTANCE ON FLAT SURFACES: 1
AMBULATION ASSISTANCE: INDEPENDENT
TRANSPORTATION ASSESSED: 1
TOILETING: SUPERVISION
CURRENT_FUNCTION: SUPERVISION
AMBULATION ASSISTANCE: 1
PHYSICAL TRANSFERS ASSESSED: 1
TRANSPORTATION: DEPENDENT
TOILETING: 1
OASIS_M1830: 05

## 2022-07-25 NOTE — TELEPHONE ENCOUNTER
ESTABLISHED PATIENT PRE-VISIT PLANNING     Patient was contacted to complete PVP.     Note: Patient will not be contacted if there is no indication to call.     1.  Reviewed notes from the last few office visits within the medical group: Yes, LOV 06/27/2022    2.  If any orders were placed at last visit or intended to be done for this visit (i.e. 6 mos follow-up), do we have Results/Consult Notes?         •  Labs - Labs ordered, NOT completed. Patient advised to complete prior to next appointment.  Note: If patient appointment is for lab review and patient did not complete labs, check with provider if OK to reschedule patient until labs completed.       •  Imaging - Imaging was not ordered at last office visit.       •  Referrals - No referrals were ordered at last office visit.    3. Is this appointment scheduled as a Hospital Follow-Up? No    4.  Immunizations were updated in Epic using Reconcile Outside Information activity? Yes    5.  Patient is due for the following Health Maintenance Topics:   Health Maintenance Due   Topic Date Due   • IMM DTaP/Tdap/Td Vaccine (1 - Tdap) Never done   • IMM ZOSTER VACCINES (1 of 2) Never done     6.  AHA (Pulse8) form printed for Provider? N/A   Patient reminded of her check in time.

## 2022-07-25 NOTE — CASE COMMUNICATION
Irina Traylor ELYSSA  81 year old Female  MRN: [id number]  7/17/2022 SN - OASIS Resumption of Care Provider: Derrek Whittaker R.N. Department: [facility] LIZZETTE OASIS Encounter    Status Due Assigned Staff/Pools  CDI Review Needed 7/19/2022 11:59 PM  QUALITY REVIEW  Deficiency History  Deficiency Message     7.17.22 SCP. Code status ok. CDI sent med list-GW  Quality Review for 7.17.22 LIZZETTE OASIS performed on by BRIDGER Huynh on 7.25.2022:    Edits completed by BHAVIN Navarrete RN:  1. Changed  to 1  2. Added #3,4 to  per Epic. Added #6 per dx of obesity and BMI  3. Changed  to 3 per OT and SLP evalon 7/18/22. Changed  to 7/18/22 per the OASIS collaboration convention  4. Changed  to 5 per narrative pt is sponge bathing. Changed  to 2 per [id number] G,H response of 3  5. Changed  to 3 per ambulation score   6. Changed  to 21  7 . Removed F2F data  8. Added pacemaker dc instructions to the care plan  9. Changed  to 3 per OT eval

## 2022-07-26 ENCOUNTER — HOME CARE VISIT (OUTPATIENT)
Dept: HOME HEALTH SERVICES | Facility: HOME HEALTHCARE | Age: 82
End: 2022-07-26
Payer: MEDICARE

## 2022-07-26 ENCOUNTER — TELEPHONE (OUTPATIENT)
Dept: MEDICAL GROUP | Facility: PHYSICIAN GROUP | Age: 82
End: 2022-07-26
Payer: MEDICARE

## 2022-07-26 PROCEDURE — G0155 HHCP-SVS OF CSW,EA 15 MIN: HCPCS

## 2022-07-26 NOTE — TELEPHONE ENCOUNTER
VOICEMAIL  1. Caller Name: Irina Tarylor.              Call Back Number: [phone]    2. Message: pt left message asking if she is supposed to be taking irbesartan nightly instead of in the morning. She has been taking it in the morning     3. Patient approves office to leave a detailed voicemail/MyChart message: no and N\A

## 2022-07-27 ENCOUNTER — HOME CARE VISIT (OUTPATIENT)
Dept: HOME HEALTH SERVICES | Facility: HOME HEALTHCARE | Age: 82
End: 2022-07-27
Payer: MEDICARE

## 2022-07-27 VITALS
OXYGEN SATURATION: 96 % | TEMPERATURE: 98.6 F | RESPIRATION RATE: 16 BRPM | SYSTOLIC BLOOD PRESSURE: 122 MMHG | DIASTOLIC BLOOD PRESSURE: 86 MMHG | HEART RATE: 86 BPM

## 2022-07-27 VITALS
DIASTOLIC BLOOD PRESSURE: 68 MMHG | TEMPERATURE: 97.7 F | SYSTOLIC BLOOD PRESSURE: 138 MMHG | HEART RATE: 81 BPM | RESPIRATION RATE: 16 BRPM | OXYGEN SATURATION: 96 %

## 2022-07-27 PROCEDURE — G0156 HHCP-SVS OF AIDE,EA 15 MIN: HCPCS

## 2022-07-27 PROCEDURE — G0153 HHCP-SVS OF S/L PATH,EA 15MN: HCPCS

## 2022-07-27 ASSESSMENT — ENCOUNTER SYMPTOMS
LOWEST PAIN SEVERITY IN PAST 24 HOURS: 0/10
HIGHEST PAIN SEVERITY IN PAST 24 HOURS: 0/10
PAIN SEVERITY GOAL: 0/10
PERSON REPORTING PAIN: PATIENT
POOR JUDGMENT: 1
DENIES PAIN: 1

## 2022-07-28 ENCOUNTER — HOME CARE VISIT (OUTPATIENT)
Dept: HOME HEALTH SERVICES | Facility: HOME HEALTHCARE | Age: 82
End: 2022-07-28
Payer: MEDICARE

## 2022-07-28 ENCOUNTER — OFFICE VISIT (OUTPATIENT)
Dept: MEDICAL GROUP | Facility: PHYSICIAN GROUP | Age: 82
End: 2022-07-28
Payer: MEDICARE

## 2022-07-28 ENCOUNTER — TELEPHONE (OUTPATIENT)
Dept: CARDIOLOGY | Facility: MEDICAL CENTER | Age: 82
End: 2022-07-28

## 2022-07-28 VITALS
SYSTOLIC BLOOD PRESSURE: 122 MMHG | OXYGEN SATURATION: 96 % | DIASTOLIC BLOOD PRESSURE: 62 MMHG | HEIGHT: 60 IN | HEART RATE: 68 BPM | WEIGHT: 166 LBS | TEMPERATURE: 99.2 F | BODY MASS INDEX: 32.59 KG/M2

## 2022-07-28 DIAGNOSIS — E55.9 VITAMIN D DEFICIENCY: ICD-10-CM

## 2022-07-28 DIAGNOSIS — I10 BENIGN ESSENTIAL HTN: ICD-10-CM

## 2022-07-28 DIAGNOSIS — Z09 HOSPITAL DISCHARGE FOLLOW-UP: ICD-10-CM

## 2022-07-28 PROCEDURE — G0152 HHCP-SERV OF OT,EA 15 MIN: HCPCS

## 2022-07-28 PROCEDURE — 99214 OFFICE O/P EST MOD 30 MIN: CPT | Performed by: NURSE PRACTITIONER

## 2022-07-28 RX ORDER — AMLODIPINE BESYLATE 10 MG/1
10 TABLET ORAL DAILY
Qty: 90 TABLET | Refills: 1 | Status: SHIPPED
Start: 2022-07-28 | End: 2022-10-13

## 2022-07-28 ASSESSMENT — FIBROSIS 4 INDEX: FIB4 SCORE: 1.9

## 2022-07-28 NOTE — PROGRESS NOTES
Subjective:     CC: Medication problem    HPI:   Irina presents today with the following:      Hospital discharge follow-up  She was admitted on 7/12/2022 for dizziness and bradycardia. She had a pacemaker was placed 7/14/2022. She was discharged home on  7/15/2022. Her cardiology follow-up appointment was cancelled she states. She was discharged home with amlodipine 10 mg daily. She does need a medication refill on her amlodipine. She continues with irbesartan 300 mg every evening. She states that her sleep has been restful. She has been taking her aspirin and irbesartan at night time and today she states that she feels good.  No recurrent fatigue.    Benign essential HTN  Her blood pressure is at goal today.  She continues with her amlodipine 10 mg daily and irbesartan 300 mg nightly.    Vitamin D deficiency  She has vitamin D 5000 units supplementation at home.  She notes that a recent prescription was sent in for 1000 units daily.  She would like to know if she can continue with her vitamin D 5000 unit dosing.      Past Medical History:   Diagnosis Date   • Anesthesia     States feels like she can't breathe with induction for right hip surgery, gets nausea vomiting with most pain meds.  Uses anti-nausea medicine if takes morphine   • Arthritis     To hips, shoulders, hands   • Benign essential HTN 9/7/2017   • Cataract    • CPAP (continuous positive airway pressure) dependence    • Dyslipidemia 3/5/2019   • Expressive aphasia     Related to CVA   • Family history of CVA    • Heart murmur    • DARIN (obstructive sleep apnea) 1/26/2017    AHI 23.6, minimum saturation 69%, on CPAP 15 cm.   • Post-nasal drip    • Restless leg syndrome     4/25/17-Resolved, states was related to right hip and none S/P replacement.   • Sick sinus syndrome (HCC) 6/29/2022   • Sinus arrest 6/29/2022   • Stroke ([facility]) 2/1993, 8/1993    Frcmwpfz-anmoch-tjcckrjgmb aphasia,mouth forming words, reading, numbers   • Walker as ambulation aid         Social History     Tobacco Use   • Smoking status: Never Smoker   • Smokeless tobacco: Never Used   Vaping Use   • Vaping Use: Never used   Substance Use Topics   • Alcohol use: Not Currently   • Drug use: No       Current Outpatient Medications Ordered in Epic   Medication Sig Dispense Refill   • amLODIPine (NORVASC) 10 MG Tab Take 1 Tablet by mouth every day. 90 Tablet 1   • Ascorbic Acid (VITAMIN C CR) 1000 MG Tab CR Take 1 Tablet by mouth every day.     • ASPIRIN 81 PO Take 1 Tablet by mouth every day.     • fenofibrate (TRICOR) 145 MG Tab Take 1 Tablet by mouth every day. 100 Tablet 3   • irbesartan (AVAPRO) 300 MG Tab Take 1 Tablet by mouth every evening. (Patient taking differently: Take 300 mg by mouth every evening. pt taking in the morning) 100 Tablet 1   • memantine (NAMENDA) 5 MG Tab Take 5 mg by mouth every day. FOR MEMORY AND PRE ALTZHEIMERS.     • Magnesium 400 MG Tab Take 400 mg by mouth every day.     • loratadine (CLARITIN) 10 MG Tab TAKE ONE TABLET BY MOUTH ONE TIME DAILY AS NEEDED FOR ALLERGY (Patient taking differently: Take 10 mg by mouth 1 time a day as needed (Allergies). TAKE ONE TABLET BY MOUTH ONE TIME DAILY AS NEEDED FOR ALLERGY) 90 Tablet 3     No current Epic-ordered facility-administered medications on file.       Allergies:  Benicar [olmesartan], Dilaudid  [hydromorphone hcl], Lidocaine, Norco  [apap-fd&c blue #1-hydrocodone], Other misc, Percocet  [apap-fd&c red #40 [place]-oxycodone], Vicodin  [apap-fd&c yellow #10 [place]-hydrocodone], Codeine, Demerol, Hydrocodone, Other drug, Oxycodone, Statins [hmg-coa-r inhibitors], and Valium    Health Maintenance: Reviewed      Objective:     Vital signs reviewed  Exam:  /62 (BP Location: Right arm, Patient Position: Sitting, BP Cuff Size: Adult)   Pulse 68   Temp 37.3 °C (99.2 °F) (Temporal)   Ht 1.524 m (5')   Wt 75.3 kg (166 lb)   SpO2 96%   BMI 32.42 kg/m²  Body mass index is 32.42 kg/m².    Gen: Alert and oriented, No  apparent distress.  Eyes:   Lids normal. Glasses in place.   Lungs: Normal effort, CTA bilaterally, no wheezes, rhonchi, or rales.  CV: Regular rate and rhythm. No murmurs, rubs, or gallops.  Left upper chest pacemaker in place.  Incision covered with Steri-Strips, incision is clean dry and intact no erythema or discharge present.  Ext: No clubbing, cyanosis, edema.  Using four-wheel walker with ambulation.      Assessment & Plan:     81 y.o. female with the following -     1. Hospital discharge follow-up  Acute uncomplicated problem.  Hospital follow-up completed today.  I refilled her amlodipine and we did discuss at length how amlodipine works.  She was concerned that the amlodipine would cause muscle relaxation as she was reading the pharmacy information.  We did discuss that the blood pressure medication class and this alleviated her concerns.  Encouraged her to schedule follow-up appointment with cardiology.  Keep upcoming [facility] appointments.  I reviewed her admission from 7/12/2022 and her discharge summary.  I answered the patient's questions and she did feel as if all her questions were answered today.    2. Benign essential HTN  Chronic stable problem.  Blood pressure is at goal.  Amlodipine refilled today.  She will continue with her 10 mg of amlodipine daily and irbesartan 300 mg every evening.  We will follow-up her blood pressure at her October appointment.  Follow-up sooner if needed.  - amLODIPine (NORVASC) 10 MG Tab; Take 1 Tablet by mouth every day.  Dispense: 90 Tablet; Refill: 1    3. Vitamin D deficiency  Chronic stable problem.  She does not want to take the new 1000 unit prescription of vitamin D she does have 5000 units vitamin D at home.  We discussed she may take the vitamin D 5000 units 3 times weekly either Monday Wednesday Friday or Tuesday Thursday Saturday.  She verbalized understanding.      Return if symptoms worsen or fail to improve.    Please note that this dictation was  created using voice recognition software. I have made every reasonable attempt to correct obvious errors, but I expect that there are errors of grammar and possibly content that I did not discover before finalizing the note.

## 2022-07-28 NOTE — ASSESSMENT & PLAN NOTE
She was admitted on 7/12/2022 for dizziness and bradycardia. She had a pacemaker was placed 7/14/2022. She was discharged home on  7/15/2022. Her cardiology follow-up appointment was cancelled she states. She was discharged home with amlodipine 10 mg daily. She does need a medication refill on her amlodipine. She continues with irbesartan 300 mg every evening. She states that her sleep has been restful. She has been taking her aspirin and irbesartan at night time and today she states that she feels good.  No recurrent fatigue.

## 2022-07-28 NOTE — TELEPHONE ENCOUNTER
STEPHEN      Caller: Irina Traylor    Medication Name and Dosage: amLODIPine (NORVASC) 10 MG Tab    Did patient contact pharmacy (If no, please call pharmacy first): yes  Medication amount left: 2 weeks left  Preferred Pharmacy: [facility] mail order  Other questions (Topic): n/a  Callback Number (Will only call for issues): [phone] (home) [phone] (work)        Thank you    -Adal MUHAMMAD

## 2022-07-29 ENCOUNTER — TELEPHONE (OUTPATIENT)
Dept: CARDIOLOGY | Facility: MEDICAL CENTER | Age: 82
End: 2022-07-29
Payer: MEDICARE

## 2022-07-29 ENCOUNTER — HOME CARE VISIT (OUTPATIENT)
Dept: HOME HEALTH SERVICES | Facility: HOME HEALTHCARE | Age: 82
End: 2022-07-29
Payer: MEDICARE

## 2022-07-29 VITALS
SYSTOLIC BLOOD PRESSURE: 138 MMHG | OXYGEN SATURATION: 91 % | RESPIRATION RATE: 16 BRPM | DIASTOLIC BLOOD PRESSURE: 70 MMHG | HEART RATE: 75 BPM | TEMPERATURE: 97.1 F

## 2022-07-29 PROCEDURE — G0156 HHCP-SVS OF AIDE,EA 15 MIN: HCPCS

## 2022-07-29 NOTE — TELEPHONE ENCOUNTER
Pharmacy    [facility] [place] [id number] - [place], [place] - [address]   [address] [place] [address]   Phone:  [phone]  Fax:  [phone]   [place] #:  --   TALON Reason: --   Outpatient Medication Detail     Disp Refills Start End    amLODIPine (NORVASC) 10 MG Tab 90 Tablet 1/1 7/28/2022     Sig - Route: Take 1 Tablet by mouth every day. - Oral    Sent to pharmacy as: amLODIPine Besylate 10 MG Oral Tablet (NORVASC)    E-Prescribing Status: Receipt confirmed by pharmacy (7/28/2022  5:07 PM PDT)

## 2022-07-29 NOTE — ASSESSMENT & PLAN NOTE
She has vitamin D 5000 units supplementation at home.  She notes that a recent prescription was sent in for 1000 units daily.  She would like to know if she can continue with her vitamin D 5000 unit dosing.

## 2022-07-29 NOTE — ASSESSMENT & PLAN NOTE
Her blood pressure is at goal today.  She continues with her amlodipine 10 mg daily and irbesartan 300 mg nightly.

## 2022-07-29 NOTE — TELEPHONE ENCOUNTER
ANDREW Cardenas.  Jose Roberto Luna R.N.  Could you help with this, Thanks!     Jeanette             Previous Messages       ----- Message -----   From: Jennifer Baldwin R.N.   Sent: 7/28/2022   2:44 PM PDT   To: NAYAN Cardenas     I saw pt for HH visit earlier this week. Spoke with her on the phone yesterday. Due to aphagia she sometimes has difficulty explaining things and asked I send you a note. She was scheduled for a visit yesterday (not sure with which provider) and it was cancelled. Next available appointment is Oct. and she has a medication question she wants to ask before then. She is taking amlodipine and is cconcerned that is causing her fatigue. She is asking if it matters whether she takes it in the morning or evening. She also wants to know if she could reduce the dose or try a different medication. She would appreciate a call on this issue.   Thanks,   Jennifer RN     S/W pt, has been taking amlodipine at night and is doing better. Stated she can continue to take it at night.

## 2022-07-31 VITALS
SYSTOLIC BLOOD PRESSURE: 128 MMHG | OXYGEN SATURATION: 94 % | RESPIRATION RATE: 17 BRPM | DIASTOLIC BLOOD PRESSURE: 62 MMHG | HEART RATE: 71 BPM | TEMPERATURE: 98.2 F

## 2022-07-31 ASSESSMENT — ACTIVITIES OF DAILY LIVING (ADL)
DRESSING_UB_CURRENT_FUNCTION: INDEPENDENT
GROOMING ASSESSED: 1
GROOMING_CURRENT_FUNCTION: INDEPENDENT
HOUSEKEEPING ASSESSED: 1
TOILETING: 1
TOILETING: INDEPENDENT
LIGHT HOUSEKEEPING: INDEPENDENT
AMBULATION_DISTANCE/DURATION_TOLERATED: 120 FT
AMBULATION ASSISTANCE ON FLAT SURFACES: 1
DRESSING_LB_CURRENT_FUNCTION: INDEPENDENT

## 2022-07-31 ASSESSMENT — ENCOUNTER SYMPTOMS
PERSON REPORTING PAIN: PATIENT
DENIES PAIN: 1
DENIES PAIN: 1
PERSON REPORTING PAIN: PATIENT

## 2022-07-31 NOTE — CASE COMMUNICATION
Agree with changes.    ----- Message -----  From: Marylou Navarrete R.N.  Sent: 7/25/2022  12:08 PM PDT  To: Derrek Whittaker R.NIrina Ghosh  81 year old Female  MRN: [id number]  7/17/2022 SN - OASIS Resumption of Care Provider: Derrek Whittaker R.N. Department: [facility] LIZZETTE OASIS Encounter    Status Due Assigned Staff/Pools  CDI Review Needed 7/19/2022 11:59 PM  QUALITY REVIEW  Deficiency History  Deficie ncy Message     7.17.22 SCP. Code status ok. CDI sent med list-GW  Quality Review for 7.17.22 LIZZETTE OASIS performed on by BHAVIN Navarrete RN on 7.25.2022:    Edits completed by BHAVIN Navarrete RN:  1. Changed  to 1  2. Added #3,4 to  per Epic. Added #6 per dx of obesity and BMI  3. Changed  to 3 per OT and SLP evalon 7/18/22. Changed  to 7/18/22 per the OASIS collaboration convention  4. Changed  to 5 per narrative pt is spon ge bathing. Changed  to 2 per [id number] G,H response of 3  5. Changed  to 3 per ambulation score   6. Changed  to 21  7. Removed F2F data  8. Added pacemaker dc instructions to the care plan  9. Changed  to 3 per OT eval

## 2022-08-01 ENCOUNTER — HOME CARE VISIT (OUTPATIENT)
Dept: HOME HEALTH SERVICES | Facility: HOME HEALTHCARE | Age: 82
End: 2022-08-01
Payer: MEDICARE

## 2022-08-01 VITALS
TEMPERATURE: 97.8 F | HEART RATE: 67 BPM | OXYGEN SATURATION: 96 % | DIASTOLIC BLOOD PRESSURE: 74 MMHG | RESPIRATION RATE: 16 BRPM | SYSTOLIC BLOOD PRESSURE: 122 MMHG

## 2022-08-01 PROCEDURE — G0153 HHCP-SVS OF S/L PATH,EA 15MN: HCPCS

## 2022-08-01 ASSESSMENT — ENCOUNTER SYMPTOMS
PAIN SEVERITY GOAL: 0/10
DENIES PAIN: 1
PERSON REPORTING PAIN: PATIENT
POOR JUDGMENT: 1
HIGHEST PAIN SEVERITY IN PAST 24 HOURS: 0/10
LOWEST PAIN SEVERITY IN PAST 24 HOURS: 0/10

## 2022-08-02 ENCOUNTER — HOME CARE VISIT (OUTPATIENT)
Dept: HOME HEALTH SERVICES | Facility: HOME HEALTHCARE | Age: 82
End: 2022-08-02
Payer: MEDICARE

## 2022-08-02 ENCOUNTER — HOSPITAL ENCOUNTER (OUTPATIENT)
Facility: MEDICAL CENTER | Age: 82
End: 2022-08-02
Attending: NURSE PRACTITIONER
Payer: MEDICARE

## 2022-08-02 VITALS
SYSTOLIC BLOOD PRESSURE: 118 MMHG | HEART RATE: 67 BPM | OXYGEN SATURATION: 97 % | TEMPERATURE: 98.3 F | RESPIRATION RATE: 16 BRPM | DIASTOLIC BLOOD PRESSURE: 80 MMHG

## 2022-08-02 VITALS
OXYGEN SATURATION: 97 % | RESPIRATION RATE: 16 BRPM | SYSTOLIC BLOOD PRESSURE: 118 MMHG | TEMPERATURE: 98.3 F | DIASTOLIC BLOOD PRESSURE: 50 MMHG | HEART RATE: 67 BPM

## 2022-08-02 LAB
25(OH)D3 SERPL-MCNC: 50 NG/ML (ref 30–100)
ALBUMIN SERPL BCP-MCNC: 4.4 G/DL (ref 3.2–4.9)
ALBUMIN/GLOB SERPL: 1.4 G/DL
ALP SERPL-CCNC: 53 U/L (ref 30–99)
ALT SERPL-CCNC: 14 U/L (ref 2–50)
ANION GAP SERPL CALC-SCNC: 12 MMOL/L (ref 7–16)
AST SERPL-CCNC: 29 U/L (ref 12–45)
BASOPHILS # BLD AUTO: 0.8 % (ref 0–1.8)
BASOPHILS # BLD: 0.05 K/UL (ref 0–0.12)
BILIRUB SERPL-MCNC: 0.4 MG/DL (ref 0.1–1.5)
BUN SERPL-MCNC: 39 MG/DL (ref 8–22)
CALCIUM SERPL-MCNC: 10 MG/DL (ref 8.5–10.5)
CHLORIDE SERPL-SCNC: 107 MMOL/L (ref 96–112)
CHOLEST SERPL-MCNC: 165 MG/DL (ref 100–199)
CO2 SERPL-SCNC: 22 MMOL/L (ref 20–33)
CREAT SERPL-MCNC: 0.82 MG/DL (ref 0.5–1.4)
EOSINOPHIL # BLD AUTO: 0.22 K/UL (ref 0–0.51)
EOSINOPHIL NFR BLD: 3.7 % (ref 0–6.9)
ERYTHROCYTE [DISTWIDTH] IN BLOOD BY AUTOMATED COUNT: 51 FL (ref 35.9–50)
FASTING STATUS PATIENT QL REPORTED: NORMAL
GFR SERPLBLD CREATININE-BSD FMLA CKD-EPI: 72 ML/MIN/1.73 M 2
GLOBULIN SER CALC-MCNC: 3.1 G/DL (ref 1.9–3.5)
GLUCOSE SERPL-MCNC: 77 MG/DL (ref 65–99)
HCT VFR BLD AUTO: 46.4 % (ref 37–47)
HDLC SERPL-MCNC: 65 MG/DL
HGB BLD-MCNC: 14.9 G/DL (ref 12–16)
IMM GRANULOCYTES # BLD AUTO: 0.03 K/UL (ref 0–0.11)
IMM GRANULOCYTES NFR BLD AUTO: 0.5 % (ref 0–0.9)
LDLC SERPL CALC-MCNC: 92 MG/DL
LYMPHOCYTES # BLD AUTO: 0.73 K/UL (ref 1–4.8)
LYMPHOCYTES NFR BLD: 12.4 % (ref 22–41)
MCH RBC QN AUTO: 30 PG (ref 27–33)
MCHC RBC AUTO-ENTMCNC: 32.1 G/DL (ref 33.6–35)
MCV RBC AUTO: 93.4 FL (ref 81.4–97.8)
MONOCYTES # BLD AUTO: 0.59 K/UL (ref 0–0.85)
MONOCYTES NFR BLD AUTO: 10 % (ref 0–13.4)
NEUTROPHILS # BLD AUTO: 4.28 K/UL (ref 2–7.15)
NEUTROPHILS NFR BLD: 72.6 % (ref 44–72)
NRBC # BLD AUTO: 0 K/UL
NRBC BLD-RTO: 0 /100 WBC
PLATELET # BLD AUTO: 243 K/UL (ref 164–446)
PMV BLD AUTO: 13.2 FL (ref 9–12.9)
POTASSIUM SERPL-SCNC: 4.7 MMOL/L (ref 3.6–5.5)
PROT SERPL-MCNC: 7.5 G/DL (ref 6–8.2)
RBC # BLD AUTO: 4.97 M/UL (ref 4.2–5.4)
SODIUM SERPL-SCNC: 141 MMOL/L (ref 135–145)
T4 FREE SERPL-MCNC: 1.4 NG/DL (ref 0.93–1.7)
TRIGL SERPL-MCNC: 41 MG/DL (ref 0–149)
TSH SERPL DL<=0.005 MIU/L-ACNC: 2.54 UIU/ML (ref 0.38–5.33)
VIT B12 SERPL-MCNC: 713 PG/ML (ref 211–911)
WBC # BLD AUTO: 5.9 K/UL (ref 4.8–10.8)

## 2022-08-02 PROCEDURE — 84443 ASSAY THYROID STIM HORMONE: CPT

## 2022-08-02 PROCEDURE — 80053 COMPREHEN METABOLIC PANEL: CPT

## 2022-08-02 PROCEDURE — 84439 ASSAY OF FREE THYROXINE: CPT

## 2022-08-02 PROCEDURE — 83036 HEMOGLOBIN GLYCOSYLATED A1C: CPT

## 2022-08-02 PROCEDURE — G0493 RN CARE EA 15 MIN HH/HOSPICE: HCPCS

## 2022-08-02 PROCEDURE — 80061 LIPID PANEL: CPT

## 2022-08-02 PROCEDURE — G0153 HHCP-SVS OF S/L PATH,EA 15MN: HCPCS

## 2022-08-02 PROCEDURE — 82607 VITAMIN B-12: CPT

## 2022-08-02 PROCEDURE — G0151 HHCP-SERV OF PT,EA 15 MIN: HCPCS

## 2022-08-02 PROCEDURE — 82306 VITAMIN D 25 HYDROXY: CPT

## 2022-08-02 PROCEDURE — 85025 COMPLETE CBC W/AUTO DIFF WBC: CPT

## 2022-08-02 ASSESSMENT — ENCOUNTER SYMPTOMS
PAIN SEVERITY GOAL: 0/10
DENIES PAIN: 1
HIGHEST PAIN SEVERITY IN PAST 24 HOURS: 0/10
POOR JUDGMENT: 1
LOWEST PAIN SEVERITY IN PAST 24 HOURS: 0/10
PERSON REPORTING PAIN: PATIENT

## 2022-08-03 ENCOUNTER — HOME CARE VISIT (OUTPATIENT)
Dept: HOME HEALTH SERVICES | Facility: HOME HEALTHCARE | Age: 82
End: 2022-08-03

## 2022-08-03 VITALS
OXYGEN SATURATION: 93 % | RESPIRATION RATE: 18 BRPM | TEMPERATURE: 97.2 F | HEART RATE: 71 BPM | SYSTOLIC BLOOD PRESSURE: 124 MMHG | DIASTOLIC BLOOD PRESSURE: 72 MMHG

## 2022-08-03 VITALS
DIASTOLIC BLOOD PRESSURE: 50 MMHG | SYSTOLIC BLOOD PRESSURE: 118 MMHG | RESPIRATION RATE: 16 BRPM | OXYGEN SATURATION: 97 % | TEMPERATURE: 98.3 F | HEART RATE: 67 BPM

## 2022-08-03 LAB
EST. AVERAGE GLUCOSE BLD GHB EST-MCNC: 108 MG/DL
HBA1C MFR BLD: 5.4 % (ref 4–5.6)

## 2022-08-03 PROCEDURE — G0156 HHCP-SVS OF AIDE,EA 15 MIN: HCPCS

## 2022-08-03 ASSESSMENT — ENCOUNTER SYMPTOMS
DENIES PAIN: 1
NAUSEA: DENIES
PERSON REPORTING PAIN: PATIENT
VOMITING: DENIES

## 2022-08-03 NOTE — RESULT ENCOUNTER NOTE
Please call patient and inform her that she does not have diabetes, vitamin D deficiency, vitamin B12 deficiency, thyroid dysfunction, electrolyte abnormality, liver function abnormality, or anemia.  Her kidney function has improved although one of her kidney labs does remain elevated but has been elevated in the past.  Her cholesterol is controlled.    Thank you,  ANDREW Longo.

## 2022-08-07 ASSESSMENT — ACTIVITIES OF DAILY LIVING (ADL)
AMBULATION ASSISTANCE ON FLAT SURFACES: 1
AMBULATION_DISTANCE/DURATION_TOLERATED: 300+ FT

## 2022-08-07 ASSESSMENT — ENCOUNTER SYMPTOMS
DENIES PAIN: 1
PERSON REPORTING PAIN: PATIENT

## 2022-08-08 ENCOUNTER — HOME CARE VISIT (OUTPATIENT)
Dept: HOME HEALTH SERVICES | Facility: HOME HEALTHCARE | Age: 82
End: 2022-08-08
Payer: MEDICARE

## 2022-08-08 VITALS
HEART RATE: 65 BPM | SYSTOLIC BLOOD PRESSURE: 118 MMHG | RESPIRATION RATE: 18 BRPM | OXYGEN SATURATION: 93 % | TEMPERATURE: 97.5 F | DIASTOLIC BLOOD PRESSURE: 60 MMHG

## 2022-08-08 PROCEDURE — G0153 HHCP-SVS OF S/L PATH,EA 15MN: HCPCS

## 2022-08-08 ASSESSMENT — ENCOUNTER SYMPTOMS
POOR JUDGMENT: 1
HIGHEST PAIN SEVERITY IN PAST 24 HOURS: 0/10
PAIN SEVERITY GOAL: 0/10
DENIES PAIN: 1
PERSON REPORTING PAIN: PATIENT
LOWEST PAIN SEVERITY IN PAST 24 HOURS: 0/10

## 2022-08-09 ENCOUNTER — HOME CARE VISIT (OUTPATIENT)
Dept: HOME HEALTH SERVICES | Facility: HOME HEALTHCARE | Age: 82
End: 2022-08-09
Payer: MEDICARE

## 2022-08-09 VITALS
DIASTOLIC BLOOD PRESSURE: 76 MMHG | HEART RATE: 74 BPM | OXYGEN SATURATION: 94 % | RESPIRATION RATE: 16 BRPM | SYSTOLIC BLOOD PRESSURE: 138 MMHG | TEMPERATURE: 98.6 F

## 2022-08-09 PROCEDURE — G0151 HHCP-SERV OF PT,EA 15 MIN: HCPCS

## 2022-08-09 PROCEDURE — G0493 RN CARE EA 15 MIN HH/HOSPICE: HCPCS

## 2022-08-09 SDOH — ECONOMIC STABILITY: HOUSING INSECURITY: HOME SAFETY: PACEMAKER PRECAUTIONS

## 2022-08-09 ASSESSMENT — ENCOUNTER SYMPTOMS
DENIES PAIN: 1
PERSON REPORTING PAIN: PATIENT

## 2022-08-10 ENCOUNTER — HOME CARE VISIT (OUTPATIENT)
Dept: HOME HEALTH SERVICES | Facility: HOME HEALTHCARE | Age: 82
End: 2022-08-10
Payer: MEDICARE

## 2022-08-10 ENCOUNTER — HOME CARE VISIT (OUTPATIENT)
Dept: HOME HEALTH SERVICES | Facility: HOME HEALTHCARE | Age: 82
End: 2022-08-10

## 2022-08-10 VITALS
TEMPERATURE: 97.8 F | RESPIRATION RATE: 18 BRPM | HEART RATE: 78 BPM | OXYGEN SATURATION: 97 % | SYSTOLIC BLOOD PRESSURE: 125 MMHG | DIASTOLIC BLOOD PRESSURE: 70 MMHG

## 2022-08-10 VITALS
OXYGEN SATURATION: 95 % | TEMPERATURE: 98.9 F | DIASTOLIC BLOOD PRESSURE: 62 MMHG | RESPIRATION RATE: 16 BRPM | SYSTOLIC BLOOD PRESSURE: 110 MMHG | HEART RATE: 77 BPM

## 2022-08-10 VITALS
TEMPERATURE: 98.9 F | OXYGEN SATURATION: 95 % | DIASTOLIC BLOOD PRESSURE: 83 MMHG | HEART RATE: 77 BPM | RESPIRATION RATE: 18 BRPM | SYSTOLIC BLOOD PRESSURE: 122 MMHG

## 2022-08-10 PROCEDURE — G0153 HHCP-SVS OF S/L PATH,EA 15MN: HCPCS

## 2022-08-10 PROCEDURE — G0156 HHCP-SVS OF AIDE,EA 15 MIN: HCPCS

## 2022-08-10 ASSESSMENT — ENCOUNTER SYMPTOMS
HIGHEST PAIN SEVERITY IN PAST 24 HOURS: 0/10
NAUSEA: DENIES
POOR JUDGMENT: 1
DENIES PAIN: 1
PERSON REPORTING PAIN: PATIENT
DENIES PAIN: 1
PAIN SEVERITY GOAL: 0/10
VOMITING: DENIES
PERSON REPORTING PAIN: PATIENT
LOWEST PAIN SEVERITY IN PAST 24 HOURS: 0/10

## 2022-08-10 NOTE — DISCHARGE SUMMARY
PT DISCHARGE SUMMARY:    The patient was seen for 7 home health physical therapy sessions.  Goals met.  The patient was discharged to self care  .  STATUS AT DISCHARGE:  Ambulation and Mobility: Supervised, pt walked a mile w/Rolling walker this week w/friend  Patient Equipment: rolling walker for ambulation.  Transferring: Supervised  Bathing: Min Assist  Dressing: Independent  Medication management: prepared by another person  Special equipment used: none    Frequency of pain interfering with activity or movement: (drop downs)  Patient has pain that does not interfere with activity/movement    When is the patient dyspneic or noticeably Short of Breath: (drop downs)  - Patient is not short of breath    Goals: MET   1.  Patient will be able to perform HEP @ IND level in order to address impaired strength and endurance in 6 visits    2.  Patient will be able to pick something up off the floor using LRAD @ Mod I in order to improve functional independence in 4 visits    3.  Patient will be able to ambulate x >500' using 4WW over even and uneven surfaces in order to access community in 5 visits    4.  Patient will demonstrate knowledge and use of fall prevention techniques in order to decrease risk for falls in 4 visits

## 2022-08-15 ENCOUNTER — HOME CARE VISIT (OUTPATIENT)
Dept: HOME HEALTH SERVICES | Facility: HOME HEALTHCARE | Age: 82
End: 2022-08-15
Payer: MEDICARE

## 2022-08-15 VITALS
DIASTOLIC BLOOD PRESSURE: 80 MMHG | SYSTOLIC BLOOD PRESSURE: 120 MMHG | OXYGEN SATURATION: 97 % | TEMPERATURE: 98.8 F | RESPIRATION RATE: 16 BRPM | HEART RATE: 75 BPM

## 2022-08-15 PROCEDURE — G0153 HHCP-SVS OF S/L PATH,EA 15MN: HCPCS

## 2022-08-15 ASSESSMENT — ENCOUNTER SYMPTOMS
HIGHEST PAIN SEVERITY IN PAST 24 HOURS: 0/10
PERSON REPORTING PAIN: PATIENT
LOWEST PAIN SEVERITY IN PAST 24 HOURS: 0/10
PAIN SEVERITY GOAL: 0/10
POOR JUDGMENT: 1
DENIES PAIN: 1

## 2022-08-16 ENCOUNTER — TELEPHONE (OUTPATIENT)
Dept: MEDICAL GROUP | Facility: PHYSICIAN GROUP | Age: 82
End: 2022-08-16
Payer: MEDICARE

## 2022-08-16 ENCOUNTER — HOME CARE VISIT (OUTPATIENT)
Dept: HOME HEALTH SERVICES | Facility: HOME HEALTHCARE | Age: 82
End: 2022-08-16
Payer: MEDICARE

## 2022-08-16 VITALS
OXYGEN SATURATION: 98 % | HEART RATE: 71 BPM | RESPIRATION RATE: 16 BRPM | SYSTOLIC BLOOD PRESSURE: 130 MMHG | DIASTOLIC BLOOD PRESSURE: 70 MMHG | TEMPERATURE: 97.8 F

## 2022-08-16 DIAGNOSIS — M79.662 PAIN OF LEFT CALF: ICD-10-CM

## 2022-08-16 PROCEDURE — G0299 HHS/HOSPICE OF RN EA 15 MIN: HCPCS

## 2022-08-16 ASSESSMENT — ENCOUNTER SYMPTOMS
HIGHEST PAIN SEVERITY IN PAST 24 HOURS: 4/10
PAIN LOCATION: LEFT LEG
LOWEST PAIN SEVERITY IN PAST 24 HOURS: 0/10
PAIN LOCATION - PAIN SEVERITY: 0/10
VOMITING: PT DENIES ANY EMESIS AT THIS TIME
NAUSEA: PT DENIES ANY NAUSEA AT THIS TIME
PAIN SEVERITY GOAL: 0/10
PERSON REPORTING PAIN: PATIENT
DENIES PAIN: 1
PAIN LOCATION - PAIN QUALITY: SHARP

## 2022-08-16 NOTE — TELEPHONE ENCOUNTER
VOICEMAIL  1. Caller Name: Sabina from [facility]   Call Back Number: [phone]    2. Message: Sabina from [facility] called stating pt has pain in left calf, it is swollen and red. She was seeing if you could order ultrasound for pt. I tried to get ahold of pt for an appt to see you but her voicemail box is not set up on both numbers on file. Please advise.     3. Patient approves office to leave a detailed voicemail/MyChart message: N\A

## 2022-08-17 ENCOUNTER — HOME CARE VISIT (OUTPATIENT)
Dept: HOME HEALTH SERVICES | Facility: HOME HEALTHCARE | Age: 82
End: 2022-08-17
Payer: MEDICARE

## 2022-08-17 ENCOUNTER — HOSPITAL ENCOUNTER (OUTPATIENT)
Dept: RADIOLOGY | Facility: MEDICAL CENTER | Age: 82
End: 2022-08-17
Attending: NURSE PRACTITIONER
Payer: MEDICARE

## 2022-08-17 VITALS
TEMPERATURE: 97.7 F | OXYGEN SATURATION: 97 % | DIASTOLIC BLOOD PRESSURE: 84 MMHG | RESPIRATION RATE: 16 BRPM | SYSTOLIC BLOOD PRESSURE: 130 MMHG | HEART RATE: 65 BPM

## 2022-08-17 DIAGNOSIS — M79.662 PAIN OF LEFT CALF: ICD-10-CM

## 2022-08-17 PROCEDURE — G0153 HHCP-SVS OF S/L PATH,EA 15MN: HCPCS

## 2022-08-17 PROCEDURE — 93971 EXTREMITY STUDY: CPT | Mod: LT

## 2022-08-17 ASSESSMENT — ENCOUNTER SYMPTOMS
HIGHEST PAIN SEVERITY IN PAST 24 HOURS: 0/10
DENIES PAIN: 1
PERSON REPORTING PAIN: PATIENT
LOWEST PAIN SEVERITY IN PAST 24 HOURS: 0/10
PAIN SEVERITY GOAL: 0/10

## 2022-08-22 ENCOUNTER — HOME CARE VISIT (OUTPATIENT)
Dept: HOME HEALTH SERVICES | Facility: HOME HEALTHCARE | Age: 82
End: 2022-08-22
Payer: MEDICARE

## 2022-08-22 VITALS
RESPIRATION RATE: 16 BRPM | TEMPERATURE: 98.4 F | SYSTOLIC BLOOD PRESSURE: 126 MMHG | OXYGEN SATURATION: 96 % | DIASTOLIC BLOOD PRESSURE: 72 MMHG | HEART RATE: 71 BPM

## 2022-08-22 PROCEDURE — G0153 HHCP-SVS OF S/L PATH,EA 15MN: HCPCS

## 2022-08-22 ASSESSMENT — ENCOUNTER SYMPTOMS
PAIN SEVERITY GOAL: 0/10
DENIES PAIN: 1
HIGHEST PAIN SEVERITY IN PAST 24 HOURS: 0/10
PERSON REPORTING PAIN: PATIENT
LOWEST PAIN SEVERITY IN PAST 24 HOURS: 0/10

## 2022-08-23 ENCOUNTER — HOME CARE VISIT (OUTPATIENT)
Dept: HOME HEALTH SERVICES | Facility: HOME HEALTHCARE | Age: 82
End: 2022-08-23
Payer: MEDICARE

## 2022-08-23 VITALS
OXYGEN SATURATION: 99 % | RESPIRATION RATE: 16 BRPM | DIASTOLIC BLOOD PRESSURE: 80 MMHG | TEMPERATURE: 97.4 F | SYSTOLIC BLOOD PRESSURE: 140 MMHG | HEART RATE: 77 BPM

## 2022-08-23 PROCEDURE — G0493 RN CARE EA 15 MIN HH/HOSPICE: HCPCS

## 2022-08-23 ASSESSMENT — PATIENT HEALTH QUESTIONNAIRE - PHQ9: CLINICAL INTERPRETATION OF PHQ2 SCORE: 0

## 2022-08-23 ASSESSMENT — ENCOUNTER SYMPTOMS
PAIN SEVERITY GOAL: 0/10
DENIES PAIN: 1
LOWEST PAIN SEVERITY IN PAST 24 HOURS: 0/10
PERSON REPORTING PAIN: PATIENT
HIGHEST PAIN SEVERITY IN PAST 24 HOURS: 0/10

## 2022-08-23 ASSESSMENT — ACTIVITIES OF DAILY LIVING (ADL): OASIS_M1830: 01

## 2022-08-24 ENCOUNTER — HOME CARE VISIT (OUTPATIENT)
Dept: HOME HEALTH SERVICES | Facility: HOME HEALTHCARE | Age: 82
End: 2022-08-24

## 2022-08-24 ASSESSMENT — ACTIVITIES OF DAILY LIVING (ADL): HOME_HEALTH_OASIS: 01

## 2022-08-24 NOTE — Clinical Note
I agree with these changes  Sabina Sanabria RN  ----- Message -----  From: Bridget Kennedy R.N.  Sent: 8/24/2022   3:05 PM PDT  To: Sabina Sanabria R.N.      Quality Review Completed for [place] OASIS by KYUNG Kennedy RN on 8/24/2022:  Edits completed by KYUNG Kennedy RN:  1.  is yes  to C and E per care plan interventions  2.  changed to a #1 per narrative neighbor assists

## 2022-08-24 NOTE — CASE COMMUNICATION
Quality Review Completed for [place] OASIS by KYUNG Kennedy, RN on 8/24/2022:  Edits completed by KYUNG Kennedy RN:  1.  is yes  to C and E per care plan interventions  2.  changed to a #1 per narrative neighbor assists

## 2022-08-30 ENCOUNTER — NON-PROVIDER VISIT (OUTPATIENT)
Dept: CARDIOLOGY | Facility: MEDICAL CENTER | Age: 82
End: 2022-08-30
Payer: MEDICARE

## 2022-08-30 DIAGNOSIS — Z95.0 CARDIAC PACEMAKER IN SITU: ICD-10-CM

## 2022-08-30 DIAGNOSIS — I49.5 SICK SINUS SYNDROME (HCC): Chronic | ICD-10-CM

## 2022-08-30 PROCEDURE — 93280 PM DEVICE PROGR EVAL DUAL: CPT | Performed by: INTERNAL MEDICINE

## 2022-09-01 ENCOUNTER — HOSPITAL ENCOUNTER (OUTPATIENT)
Dept: RADIOLOGY | Facility: MEDICAL CENTER | Age: 82
End: 2022-09-01
Attending: NURSE PRACTITIONER
Payer: MEDICARE

## 2022-09-01 ENCOUNTER — OFFICE VISIT (OUTPATIENT)
Dept: MEDICAL GROUP | Facility: PHYSICIAN GROUP | Age: 82
End: 2022-09-01
Payer: MEDICARE

## 2022-09-01 VITALS
BODY MASS INDEX: 31.91 KG/M2 | HEART RATE: 74 BPM | TEMPERATURE: 98.1 F | WEIGHT: 169 LBS | OXYGEN SATURATION: 99 % | HEIGHT: 61 IN | DIASTOLIC BLOOD PRESSURE: 60 MMHG | SYSTOLIC BLOOD PRESSURE: 110 MMHG

## 2022-09-01 DIAGNOSIS — M79.672 ACUTE FOOT PAIN, LEFT: ICD-10-CM

## 2022-09-01 DIAGNOSIS — L03.116 CELLULITIS OF LEFT LOWER EXTREMITY: ICD-10-CM

## 2022-09-01 DIAGNOSIS — M25.572 ACUTE LEFT ANKLE PAIN: ICD-10-CM

## 2022-09-01 DIAGNOSIS — M15.9 PRIMARY OSTEOARTHRITIS INVOLVING MULTIPLE JOINTS: ICD-10-CM

## 2022-09-01 PROBLEM — M15.0 PRIMARY OSTEOARTHRITIS INVOLVING MULTIPLE JOINTS: Status: ACTIVE | Noted: 2022-09-01

## 2022-09-01 PROCEDURE — 99214 OFFICE O/P EST MOD 30 MIN: CPT | Performed by: NURSE PRACTITIONER

## 2022-09-01 PROCEDURE — 73610 X-RAY EXAM OF ANKLE: CPT | Mod: LT

## 2022-09-01 PROCEDURE — 73630 X-RAY EXAM OF FOOT: CPT | Mod: LT

## 2022-09-01 RX ORDER — SULFAMETHOXAZOLE AND TRIMETHOPRIM 800; 160 MG/1; MG/1
1 TABLET ORAL 2 TIMES DAILY
Qty: 10 TABLET | Refills: 0 | Status: SHIPPED | OUTPATIENT
Start: 2022-09-01 | End: 2022-09-01 | Stop reason: SDUPTHER

## 2022-09-01 RX ORDER — SULFAMETHOXAZOLE AND TRIMETHOPRIM 800; 160 MG/1; MG/1
1 TABLET ORAL 2 TIMES DAILY
Qty: 10 TABLET | Refills: 0 | Status: SHIPPED | OUTPATIENT
Start: 2022-09-01 | End: 2022-09-07 | Stop reason: SDUPTHER

## 2022-09-01 ASSESSMENT — FIBROSIS 4 INDEX: FIB4 SCORE: 2.52

## 2022-09-01 NOTE — TELEPHONE ENCOUNTER
Received request via: Patient    Was the patient seen in the last year in this department? Yes    Does the patient have an active prescription (recently filled or refills available) for medication(s) requested? Yes. Pt wants new refills sent to [facility] mail order so she doesn't have to drive to go get the meds. Advised pt that it would take longer to get them and she understood.

## 2022-09-01 NOTE — PROGRESS NOTES
Subjective:     CC: foot pain wanting prescription for Voltaren    HPI:   Irina presents today with the following:      Bilateral foot pain  3 days ago the left foot was hurting more especially with the cold weather. She does have swelling and redness. Left foot pain is worse than right foot pain. Aggravated by walking. She is not able to sleep. She has tried Omega XL, extra strength Tylenol and Tumeric that did help. Her brother takes Voltaren generic and she is asking for a prescription. Denies falls, trauma, bruising, fever or chills.    No problem-specific Assessment & Plan notes found for this encounter.      Past Medical History:   Diagnosis Date    Anesthesia     States feels like she can't breathe with induction for right hip surgery, gets nausea vomiting with most pain meds.  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    Post-nasal drip     Restless leg syndrome     4/25/17-Resolved, states was related to right hip and none S/P replacement.    Sick sinus syndrome (HCC) 6/29/2022    Sinus arrest 6/29/2022    Stroke ([facility]) 2/1993, 8/1993    Ntpqfbbm-xedycj-fmvspeltro aphasia,mouth forming words, reading, numbers    Walker as ambulation aid        Social History     Tobacco Use    Smoking status: Never    Smokeless tobacco: Never   Vaping Use    Vaping Use: Never used   Substance Use Topics    Alcohol use: Not Currently    Drug use: No       Current Outpatient Medications Ordered in Epic   Medication Sig Dispense Refill    sulfamethoxazole-trimethoprim (BACTRIM DS) 800-160 MG tablet Take 1 Tablet by mouth 2 times a day for 5 days. 10 Tablet 0    diclofenac DR  "(VOLTAREN) 50 MG Tablet Delayed Response Take 1 Tablet by mouth 2 times a day for 7 days. 14 Tablet 0    amLODIPine (NORVASC) 10 MG Tab Take 1 Tablet by mouth every day. 90 Tablet 1    Ascorbic Acid (VITAMIN C CR) 1000 MG Tab CR Take 1 Tablet by mouth every day.      ASPIRIN 81 PO Take 1 Tablet by mouth every day.      fenofibrate (TRICOR) 145 MG Tab Take 1 Tablet by mouth every day. 100 Tablet 3    irbesartan (AVAPRO) 300 MG Tab Take 1 Tablet by mouth every evening. (Patient taking differently: Take 300 mg by mouth every evening. pt taking in the morning) 100 Tablet 1    memantine (NAMENDA) 5 MG Tab Take 5 mg by mouth every day. FOR MEMORY AND PRE ALTZHEIMERS.      Magnesium 400 MG Tab Take 400 mg by mouth every day.      loratadine (CLARITIN) 10 MG Tab TAKE ONE TABLET BY MOUTH ONE TIME DAILY AS NEEDED FOR ALLERGY (Patient taking differently: Take 10 mg by mouth 1 time a day as needed (Allergies). TAKE ONE TABLET BY MOUTH ONE TIME DAILY AS NEEDED FOR ALLERGY) 90 Tablet 3     No current Epic-ordered facility-administered medications on file.       Allergies:  Benicar [olmesartan], Dilaudid  [hydromorphone hcl], Lidocaine, Norco  [apap-fd&c blue #1-hydrocodone], Other misc, Percocet  [apap-fd&c red #40 [place]-oxycodone], Vicodin  [apap-fd&c yellow #10 [place]-hydrocodone], Codeine, Demerol, Hydrocodone, Other drug, Oxycodone, Statins [hmg-coa-r inhibitors], and Valium    Health Maintenance: deferred      Objective:     Vital signs reviewed  Exam:  /60 (BP Location: Left arm, Patient Position: Sitting, BP Cuff Size: Adult)   Pulse 74   Temp 36.7 °C (98.1 °F) (Temporal)   Ht 1.549 m (5' 1\")   Wt 76.7 kg (169 lb)   SpO2 99%   BMI 31.93 kg/m²  Body mass index is 31.93 kg/m².    Gen: Alert and oriented, No apparent distress. Using four wheel walker with ambulation.   Eyes:   Lids normal. Glasses in place.   Lungs: Normal effort, no audible wheezes  CV: Skin pink, warm and dry. Bilateral pedal pulses 2+ and " capillary refill less than 3 seconds.  Ext: Right foot without pain on palpation, trace edema. No upper leg pain. Left foot with swelling, warmth and redness without obvious wound or deformity. Pain on palpation to all 5 metatarsals and left ankle. LLE with redness, warmth and swelling. There is a small scab to the area in place, denies other wounds and no obvious wounds seen.        Assessment & Plan:     81 y.o. female with the following -     1. Primary osteoarthritis involving multiple joints  Chronic exacerbated problem.  Patient notes history of arthritis in the past.  Imaging today negative for any fractures or dislocations to her left foot and ankle.  Results discussed in clinic today.  She will start diclofenac 50 mg twice daily for next 7 days.  Encourage hydration and taking with food.  Encouraged her to watch her blood pressure.  She verbalized understanding.  - diclofenac DR (VOLTAREN) 50 MG Tablet Delayed Response; Take 1 Tablet by mouth 2 times a day for 7 days.  Dispense: 14 Tablet; Refill: 0    2. Acute foot pain, left  Acute uncomplicated problem.  Reviewed her imaging results today.  See #1 above.  - DX-FOOT-COMPLETE 3+ LEFT; Future    3. Acute left ankle pain  Acute uncomplicated problem.  Reviewed her imaging results today.  See #1 above.  - DX-ANKLE 3+ VIEWS LEFT; Future    4. Cellulitis of left lower extremity  Acute uncomplicated problem.  We did discussed treatment for cellulitis as her left lower extremity is swollen, painful, red and warm to touch.  There is a small scab and no streaking.  We will start with Bactrim for 5-day treatment.  Follow-up as needed.  - sulfamethoxazole-trimethoprim (BACTRIM DS) 800-160 MG tablet; Take 1 Tablet by mouth 2 times a day for 5 days.  Dispense: 10 Tablet; Refill: 0      Return if symptoms worsen or fail to improve.    Please note that this dictation was created using voice recognition software. I have made every reasonable attempt to correct obvious  errors, but I expect that there are errors of grammar and possibly content that I did not discover before finalizing the note.

## 2022-09-02 NOTE — TELEPHONE ENCOUNTER
Requested Prescriptions     Signed Prescriptions Disp Refills    sulfamethoxazole-trimethoprim (BACTRIM DS) 800-160 MG tablet 10 Tablet 0     Sig: Take 1 Tablet by mouth 2 times a day for 5 days.     Authorizing Provider: RHODA BELLA DR (VOLTAREN) 50 MG Tablet Delayed Response 14 Tablet 0     Sig: Take 1 Tablet by mouth 2 times a day for 7 days.     Authorizing Provider: RHODA BELLA     Patient requesting mail order so she does not have to drive.     ANDREW Longo.

## 2022-09-07 DIAGNOSIS — L03.116 CELLULITIS OF LEFT LOWER EXTREMITY: ICD-10-CM

## 2022-09-07 DIAGNOSIS — M15.9 PRIMARY OSTEOARTHRITIS INVOLVING MULTIPLE JOINTS: ICD-10-CM

## 2022-09-07 RX ORDER — SULFAMETHOXAZOLE AND TRIMETHOPRIM 800; 160 MG/1; MG/1
1 TABLET ORAL 2 TIMES DAILY
Qty: 10 TABLET | Refills: 0 | Status: SHIPPED | OUTPATIENT
Start: 2022-09-07 | End: 2022-09-12

## 2022-09-07 NOTE — TELEPHONE ENCOUNTER
"Received request via: Patient    Was the patient seen in the last year in this department? Yes    Does the patient have an active prescription (recently filled or refills available) for medication(s) requested? Yes. Pt called and left message stating she would like prescriptions sent over to [facility]'s instead again since [facility] couldn't do \"one day mail\" per message. Unable to reach pt at phone number she left.     Unable to pend   sulfamethoxazole-trimethoprim (BACTRIM DS) 800-160 MG tablet     "

## 2022-09-08 NOTE — TELEPHONE ENCOUNTER
Requested Prescriptions     Signed Prescriptions Disp Refills    diclofenac DR (VOLTAREN) 50 MG Tablet Delayed Response 14 Tablet 0     Sig: Take 1 Tablet by mouth 2 times a day for 7 days.     Authorizing Provider: RHODA BELLA    sulfamethoxazole-trimethoprim (BACTRIM DS) 800-160 MG tablet 10 Tablet 0     Sig: Take 1 Tablet by mouth 2 times a day for 5 days.     Authorizing Provider: RHODA BELLA     Patient wanting prescription resent to local pharmacy not mail order.     ANDREW Longo.

## 2022-09-29 ENCOUNTER — TELEPHONE (OUTPATIENT)
Dept: MEDICAL GROUP | Facility: PHYSICIAN GROUP | Age: 82
End: 2022-09-29
Payer: MEDICARE

## 2022-09-29 DIAGNOSIS — E55.9 VITAMIN D DEFICIENCY: ICD-10-CM

## 2022-09-29 RX ORDER — MELATONIN
Qty: 90 TABLET | Refills: 1 | Status: SHIPPED
Start: 2022-09-29 | End: 2023-02-17

## 2022-09-29 NOTE — TELEPHONE ENCOUNTER
Requested Prescriptions     Signed Prescriptions Disp Refills    vitamin D (CHOLECALCIFEROL) 1000 Unit Tab 90 Tablet 1     Sig: TAKE ONE TABLET BY MOUTH EVERY DAY     Authorizing Provider: RHODA BELLA A.P.R.N.

## 2022-09-29 NOTE — TELEPHONE ENCOUNTER
Future Appointments         Provider Department Center    10/6/2022 11:00 AM NAYAN Longo [facility] [place] [facility]    10/13/2022 12:40 PM Chandrakant Freire M.D. [facility]           ESTABLISHED PATIENT PRE-VISIT PLANNING     Patient was NOT contacted to complete PVP.     Note: Patient will not be contacted if there is no indication to call.     1.  Reviewed notes from the last few office visits within the medical group: Yes    2.  If any orders were placed at last visit or intended to be done for this visit (i.e. 6 mos follow-up), do we have Results/Consult Notes?           Labs - Labs ordered, completed on 08/02/22  and results are in chart.  Note: If patient appointment is for lab review and patient did not complete labs, check with provider if OK to reschedule patient until labs completed.         Imaging - Imaging was not ordered at last office visit.         Referrals - No referrals were ordered at last office visit.    3. Is this appointment scheduled as a Hospital Follow-Up? No    4.  Immunizations were updated in Epic using Reconcile Outside Information activity? Yes    5.  Patient is due for the following Health Maintenance Topics:   Health Maintenance Due   Topic Date Due    IMM HEP B VACCINE (1 of 3 - 3-dose series) Never done    COVID-19 Vaccine (1) Never done    IMM DTaP/Tdap/Td Vaccine (1 - Tdap) Never done    IMM ZOSTER VACCINES (1 of 2) Never done    IMM PNEUMOCOCCAL VACCINE: 65+ Years (1 - PCV) Never done    IMM INFLUENZA (1) 09/01/2022     6.  AHA (Pulse8) form printed for Provider? N/A

## 2022-10-04 ENCOUNTER — TELEPHONE (OUTPATIENT)
Dept: CARDIOLOGY | Facility: MEDICAL CENTER | Age: 82
End: 2022-10-04
Payer: MEDICARE

## 2022-10-04 RX ORDER — MEMANTINE HYDROCHLORIDE 5 MG/1
5 TABLET ORAL DAILY
Qty: 90 TABLET | Refills: 1 | Status: SHIPPED
Start: 2022-10-04 | End: 2023-02-17

## 2022-10-04 NOTE — TELEPHONE ENCOUNTER
Requested Prescriptions     Signed Prescriptions Disp Refills    memantine (NAMENDA) 5 MG Tab 90 Tablet 1     Sig: Take 1 Tablet by mouth every day. FOR MEMORY AND PRE ALTZHEIMERS.     Authorizing Provider: RHODA BELLA       Recent labs show stable kidney function.       ANDREW Longo.

## 2022-10-04 NOTE — TELEPHONE ENCOUNTER
Chart prep:    Tried to call both # in chart. To confirm if any records are needed for new patient appointment. Unable to contact patient.    [phone] : not set up    [phone] : mailbox full

## 2022-10-06 ENCOUNTER — OFFICE VISIT (OUTPATIENT)
Dept: MEDICAL GROUP | Facility: PHYSICIAN GROUP | Age: 82
End: 2022-10-06
Payer: MEDICARE

## 2022-10-06 VITALS
SYSTOLIC BLOOD PRESSURE: 138 MMHG | HEART RATE: 74 BPM | OXYGEN SATURATION: 95 % | DIASTOLIC BLOOD PRESSURE: 68 MMHG | HEIGHT: 61 IN | TEMPERATURE: 97.7 F | BODY MASS INDEX: 31.91 KG/M2 | WEIGHT: 169 LBS

## 2022-10-06 DIAGNOSIS — G31.84 MILD COGNITIVE IMPAIRMENT: ICD-10-CM

## 2022-10-06 DIAGNOSIS — H61.23 EXCESSIVE CERUMEN IN EAR CANAL, BILATERAL: ICD-10-CM

## 2022-10-06 DIAGNOSIS — I10 BENIGN ESSENTIAL HTN: ICD-10-CM

## 2022-10-06 DIAGNOSIS — Z23 NEED FOR VACCINATION: ICD-10-CM

## 2022-10-06 PROCEDURE — G0008 ADMIN INFLUENZA VIRUS VAC: HCPCS | Performed by: NURSE PRACTITIONER

## 2022-10-06 PROCEDURE — 99214 OFFICE O/P EST MOD 30 MIN: CPT | Mod: 25 | Performed by: NURSE PRACTITIONER

## 2022-10-06 PROCEDURE — 90662 IIV NO PRSV INCREASED AG IM: CPT | Performed by: NURSE PRACTITIONER

## 2022-10-06 PROCEDURE — 69210 REMOVE IMPACTED EAR WAX UNI: CPT | Performed by: NURSE PRACTITIONER

## 2022-10-06 ASSESSMENT — FIBROSIS 4 INDEX: FIB4 SCORE: 2.52

## 2022-10-06 NOTE — ASSESSMENT & PLAN NOTE
Her initial blood pressure today is 142/68.  She continues with her amlodipine 10 mg in the evening and irbesartan 300 mg in the evening. She had her doses last night. She is tolerating the medication.

## 2022-10-06 NOTE — ASSESSMENT & PLAN NOTE
She continues with memantine 5 mg daily. She is asking for a prescription for Prevagen. She has been taking Prevagen that has been helping. She does not do reading, cross words,or word searches. She is walking and bicycling for 30 minutes. She is eating healthy.

## 2022-10-06 NOTE — PROGRESS NOTES
Subjective:     CC: poor memory and decreased right hearing    HPI:   Irina presents today with the following:    Decreased right hearing  She is having decreased hearing in right ear. She would like me to look in her ear. No ear pain, sore throat, fevers or chills today.     Mild cognitive impairment  She continues with memantine 5 mg daily. She is asking for a prescription for Prevagen. She has been taking Prevagen that has been helping. She does not do reading, cross words,or word searches. She is walking and bicycling for 30 minutes. She is eating healthy.     Benign essential HTN  Her initial blood pressure today is 142/68.  She continues with her amlodipine 10 mg in the evening and irbesartan 300 mg in the evening. She had her doses last night. She is tolerating the medication.     Past Medical History:   Diagnosis Date    Anesthesia     States feels like she can't breathe with induction for right hip surgery, gets nausea vomiting with most pain meds.  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    Post-nasal drip     Restless leg syndrome     4/25/17-Resolved, states was related to right hip and none S/P replacement.    Sick sinus syndrome (HCC) 6/29/2022    Sinus arrest 6/29/2022    Stroke ([facility]) 2/1993, 8/1993    Jkvzsuzk-wodzhr-vvqpmmxrzd aphasia,mouth forming words, reading, numbers    Walker as ambulation aid        Social History     Tobacco Use    Smoking status: Never    Smokeless tobacco: Never   Vaping Use    Vaping Use: Never used   Substance Use Topics    Alcohol use: Not Currently    Drug use: No       Current Outpatient Medications Ordered in Epic  "  Medication Sig Dispense Refill    memantine (NAMENDA) 5 MG Tab Take 1 Tablet by mouth every day. FOR MEMORY AND PRE ALTZHEIMERS. 90 Tablet 1    vitamin D (CHOLECALCIFEROL) 1000 Unit Tab TAKE ONE TABLET BY MOUTH EVERY DAY 90 Tablet 1    amLODIPine (NORVASC) 10 MG Tab Take 1 Tablet by mouth every day. 90 Tablet 1    Ascorbic Acid (VITAMIN C CR) 1000 MG Tab CR Take 1 Tablet by mouth every day.      ASPIRIN 81 PO Take 1 Tablet by mouth every day.      fenofibrate (TRICOR) 145 MG Tab Take 1 Tablet by mouth every day. 100 Tablet 3    irbesartan (AVAPRO) 300 MG Tab Take 1 Tablet by mouth every evening. (Patient taking differently: Take 300 mg by mouth every evening. pt taking in the morning) 100 Tablet 1    Magnesium 400 MG Tab Take 400 mg by mouth every day.      loratadine (CLARITIN) 10 MG Tab TAKE ONE TABLET BY MOUTH ONE TIME DAILY AS NEEDED FOR ALLERGY (Patient taking differently: Take 10 mg by mouth 1 time a day as needed (Allergies). TAKE ONE TABLET BY MOUTH ONE TIME DAILY AS NEEDED FOR ALLERGY) 90 Tablet 3     No current Epic-ordered facility-administered medications on file.       Allergies:  Benicar [olmesartan], Dilaudid  [hydromorphone hcl], Lidocaine, Norco  [apap-fd&c blue #1-hydrocodone], Other misc, Percocet  [apap-fd&c red #40 [place]-oxycodone], Vicodin  [apap-fd&c yellow #10 [place]-hydrocodone], Codeine, Demerol, Hydrocodone, Other drug, Oxycodone, Statins [hmg-coa-r inhibitors], and Valium    Health Maintenance: Reviewed. She would like influenza, declines other immunizations.       Objective:     Vital signs reviewed  Exam:  /68 (BP Location: Right arm, Patient Position: Sitting)   Pulse 74   Temp 36.5 °C (97.7 °F) (Temporal)   Ht 1.549 m (5' 1\")   Wt 76.7 kg (169 lb)   SpO2 95%   BMI 31.93 kg/m²  Body mass index is 31.93 kg/m².    Gen: Alert and oriented, No apparent distress.  Ears:    Bilateral ear canals with excessive cerumen and unable to visualize TM. Cerumen removed with lighted " curette by me today, patient tolerated well, TM intact and pearly gray. Some minimal bleeding to ear canals after cerumen removal.   Lungs: Normal effort, CTA bilaterally, no wheezes, rhonchi, or rales  CV: Regular rate and rhythm. No murmurs, rubs, or gallops.  Ext: No clubbing, cyanosis, edema. Left lower leg coloring improved without any erythema or swelling. Using four wheel walker with ambulation.       Assessment & Plan:     81 y.o. female with the following -     1. Mild cognitive impairment  Chronic stable problem.  After clarification she was interested in a prescription strength Prevagen and at this time discussed that this is only over-the-counter.  She verbalized understanding.  She will continue with her memantine 5 mg daily.  Continue with healthy diet and exercise.  We did discuss that she can start doing some crossword puzzles, word finding, coloring or reading to stimulate her mind as well.  She verbalized understanding.    2. Benign essential HTN  Chronic stable problem.  On repeat by me today her blood pressure is 138/68.  She will continue with her amlodipine 10 mg every evening and irbesartan 300 mg nightly.    3. Excessive cerumen in ear canal, bilateral  Acute uncomplicated problem.  Bilateral ear cerumen removed by me.  Patient tolerated well.    4. Need for vaccination  Acute uncomplicated problem.  She would like to have her influenza vaccine today. I have placed the below orders and discussed them with an approved delegating provider. The MA is performing the below orders under the direction of Dr. Quiñones.  - INFLUENZA VACCINE, HIGH DOSE (65+ ONLY)        Return in about 4 months (around 2/6/2023) for Hypertension.    Please note that this dictation was created using voice recognition software. I have made every reasonable attempt to correct obvious errors, but I expect that there are errors of grammar and possibly content that I did not discover before finalizing the note.

## 2022-10-06 NOTE — PROGRESS NOTES
CARDIOLOGY CONSULTATION NOTE      Date of Visit: 10/13/2022    Primary Care Provider: NAYAN Longo    Patient Name: Irina Traylor  YOB: 1940  MRN: [id number]     Reason for Visit:   [facility] care     Patient Story:   Irina Traylor is a 81 year-old gentleman with a past medical history of prior embolic stroke with resultant aphasia, hypertension, obesity, obstructive sleep apnea, peripheral vascular disease, and sick sinus syndrome.  Briefly, she has a history of multiple strokes in the past and most recently was admitted on 6/20/2022 after experiencing dysphagia and slurred speech.  She was ultimately found to have an acute infarct in the left posterior temporal/occipital cortex.  Following this event, she was ordered for cardiac monitoring to assess for atrial fibrillation, which did not demonstrate any evidence of atrial fibrillation, however, she did have a 6.3 second sinus pause that was associated with near syncope.  She was sent to the emergency department and ultimately underwent permanent pacemaker implantation.    She presents today for post hospital follow-up.  Overall she reports doing well, although she does have residual partial aphasia as result of her most recent stroke.  She reports that she had her for stroke and 1981 and had another stroke about 2 years ago.  She states that she had a severe skin reaction to a statin medication about 20 years ago that left her left leg scarred.  She notes that she was started on amlodipine during her admission in July and has noticed new lower extremity edema since starting this.  Otherwise, she has tolerated her other medications and she thinks memantine has helped her memory.     Medications and Allergies:     Current Outpatient Medications   Medication Sig Dispense Refill    memantine (NAMENDA) 5 MG Tab Take 1 Tablet by mouth every day. FOR MEMORY AND PRE ALTZHEIMERS. 90 Tablet 1    vitamin D (CHOLECALCIFEROL) 1000  "Unit Tab TAKE ONE TABLET BY MOUTH EVERY DAY 90 Tablet 1    amLODIPine (NORVASC) 10 MG Tab Take 1 Tablet by mouth every day. 90 Tablet 1    Ascorbic Acid (VITAMIN C CR) 1000 MG Tab CR Take 1 Tablet by mouth every day.      ASPIRIN 81 PO Take 1 Tablet by mouth every day.      fenofibrate (TRICOR) 145 MG Tab Take 1 Tablet by mouth every day. 100 Tablet 3    irbesartan (AVAPRO) 300 MG Tab Take 1 Tablet by mouth every evening. (Patient taking differently: Take 300 mg by mouth every evening. pt taking in the morning) 100 Tablet 1    Magnesium 400 MG Tab Take 400 mg by mouth every day.      loratadine (CLARITIN) 10 MG Tab TAKE ONE TABLET BY MOUTH ONE TIME DAILY AS NEEDED FOR ALLERGY (Patient taking differently: Take 10 mg by mouth 1 time a day as needed (Allergies). TAKE ONE TABLET BY MOUTH ONE TIME DAILY AS NEEDED FOR ALLERGY) 90 Tablet 3     No current facility-administered medications for this visit.       Allergies   Allergen Reactions    Benicar [Olmesartan] Rash     blisters    Dilaudid  [Hydromorphone Hcl] Vomiting    Lidocaine     Norco  [Apap-Fd&C Blue #1-Hydrocodone] Vomiting    Other Misc Shortness of Breath     Anesthesia with surgery 2016  Pt couldn't breathe while being put under and tapped side of bed to let RN know. \"It was the scariest feeling and I want everyone to know\"      Percocet  [Apap-Fd&C Red #40 [place]-Oxycodone] Vomiting    Vicodin  [Apap-Fd&C Yellow #10 [place]-Hydrocodone] Vomiting    Codeine Vomiting    Demerol Vomiting    Hydrocodone Vomiting    Other Drug Vomiting     \"all pain meds\" per pt can take morphine.    Oxycodone Vomiting    Statins [Hmg-Coa-R Inhibitors] Unspecified     Muscle cramping    Valium Vomiting        Medical Decision Making:   # Sick sinus syndrome (s/p permanent pacemaker implantation): Remote monitoring from today demonstrated normal device function with 9.5% atrial pacing, less than 1% ventricular pacing, and no detected atrial fibrillation.  -Continue follow-up per " "Device Clinic    # History of recurrent CVA: She has not had detected atrial fibrillation thus far and most likely this is atherosclerotic in etiology.  She is certainly high risk for recurrent events, but reports a history of statin intolerance due to skin blistering.  Therefore, we will discontinue fenofibrate and start ezetimibe.  We will also refer to Clinical Pharmacy for evaluation for bempedoic acid or a PCSK9 inhibitor.  -Referral to Clinical Pharmacy  -Continue antiplatelet therapy per Neurology guidance  -Long-term AF monitoring can be performed through her device    # Hypertension: Reasonably controlled today for her age, however, she has had significant lower extremity edema that is likely a side effect of amlodipine.  -Discontinue amlodipine  -Start HCTZ 25 mg daily  -Continue irbesartan 300 mg nightly  -Obtain urgent BMP in 1-2 weeks to assess for potentially serious side effect/toxicities of starting diuretic therapy with HCTZ    # Carotid artery disease, peripheral vascular disease: This was only mild on her recent CTA of the head/neck.  -Medical management as above    Care Time  A total of 36 minutes was spent on this patient encounter.    Follow Up  3 months     Cardiac Studies and Procedures:   Echocardiography  TTE (6/21/2021)  The left ventricular ejection fraction is normal; visually estimated to be 55%.  Mild concentric left ventricular hypertrophy.  At least grade 1 diastolic dysfunction, difficult assessment due to mitral valve calcification.  Thickened, calcified mitral valve leaflets.  Mild mitral regurgitation.  Mild aortic insufficiency.  Normal left atrial size.  Normal IVC.    CT/MRI  CTA head/neck (6/20/2022)  Mild bilateral internal carotid artery atherosclerotic plaque without significant stenosis    Electrophysiology  2-week cardiac monitor (7/21/2021)  Predominantly sinus rhythm.   Significant sinus arrest.   Asymptomatic SVT and rare ectopy.      Vital Signs:   Ht 1.549 m (5' 1\") "   Wt 75.8 kg (167 lb)    BP Readings from Last 4 Encounters:   10/06/22 138/68   09/01/22 110/60   08/23/22 (!) 140/80   08/22/22 126/72     Wt Readings from Last 4 Encounters:   10/13/22 75.8 kg (167 lb)   10/06/22 76.7 kg (169 lb)   09/01/22 76.7 kg (169 lb)   07/28/22 75.3 kg (166 lb)     Body mass index is 31.55 kg/m².     Laboratories:   Lipids  Lab Results   Component Value Date/Time    LDL 92 08/02/2022 1040    LDL 80 06/21/2022 0325    LDL 86 07/08/2021 1034     Lab Results   Component Value Date/Time    HDL 65 08/02/2022 1040    HDL 53 06/21/2022 0325    HDL 81 07/08/2021 1034       Lab Results   Component Value Date/Time    TRIGLYCERIDE 41 08/02/2022 1040    TRIGLYCERIDE 79 06/21/2022 0325    TRIGLYCERIDE 48 07/08/2021 1034       Lab Results   Component Value Date/Time    CHOLSTRLTOT 165 08/02/2022 1040    CHOLSTRLTOT 149 06/21/2022 0325    CHOLSTRLTOT 177 07/08/2021 1034     GFR  Lab Results   Component Value Date/Time    IFNOTAFR >60 07/08/2021 1034    IFNOTAFR >60 02/04/2020 1142    IFNOTAFR 56 (A) 03/20/2019 0452    IFNOTAFR 59 (A) 03/19/2019 1813    IFNOTAFR 46 (A) 09/25/2018 0800     Lab Results   Component Value Date/Time    IFAFRICA >60 07/08/2021 1034    IFAFRICA >60 02/04/2020 1142    IFAFRICA >60 03/20/2019 0452    IFAFRICA >60 03/19/2019 1813    IFAFRICA 56 (A) 09/25/2018 0800     Chemistries  Lab Results   Component Value Date/Time    CREATININE 0.82 08/02/2022 1040    CREATININE 1.13 07/15/2022 0407    CREATININE 0.86 07/14/2022 0155    CREATININE 0.82 07/13/2022 0159    CREATININE 1.04 07/12/2022 1753     Lab Results   Component Value Date/Time    BUN 39 (H) 08/02/2022 1040    BUN 30 (H) 07/15/2022 0407    BUN 32 (H) 07/14/2022 0155    BUN 31 (H) 07/13/2022 0159    BUN 33 (H) 07/12/2022 1753     Lab Results   Component Value Date/Time    POTASSIUM 4.7 08/02/2022 1040    POTASSIUM 4.9 07/15/2022 0407    POTASSIUM 4.4 07/14/2022 0155     Lab Results   Component Value Date/Time    SODIUM 141  08/02/2022 1040    SODIUM 138 07/15/2022 0407    SODIUM 138 07/14/2022 0155     Lab Results   Component Value Date/Time    GLUCOSE 77 08/02/2022 1040    GLUCOSE 105 (H) 07/15/2022 0407    GLUCOSE 103 (H) 07/14/2022 0155     Lab Results   Component Value Date/Time    ASTSGOT 29 08/02/2022 1040    ASTSGOT 20 07/12/2022 1753    ASTSGOT 25 06/22/2022 0144     Lab Results   Component Value Date/Time    ALTSGPT 14 08/02/2022 1040    ALTSGPT 17 07/12/2022 1753    ALTSGPT 17 06/22/2022 0144     Lab Results   Component Value Date/Time    ALKPHOSPHAT 53 08/02/2022 1040    ALKPHOSPHAT 51 07/12/2022 1753    ALKPHOSPHAT 53 06/22/2022 0144     Lab Results   Component Value Date/Time    HBA1C 5.4 08/02/2022 1040    HBA1C 5.5 06/20/2022 2125    HBA1C 5.7 (H) 07/08/2021 1034     No results found for: NTPROBNP  Lab Results   Component Value Date/Time    TROPONINT 15 07/12/2022 1753    TROPONINT 12 06/20/2022 1909     TSH   Date Value Ref Range Status   08/02/2022 2.540 0.380 - 5.330 uIU/mL Final     Comment:     Reference Range:    Pregnant Females, 1st Trimester  0.050-3.700  Pregnant Females, 2nd Trimester  0.310-4.350  Pregnant Females, 3rd Trimester  0.410-5.180       Blood Counts  Lab Results   Component Value Date/Time    HEMOGLOBIN 14.9 08/02/2022 1040    HEMOGLOBIN 14.6 07/13/2022 0159    HEMOGLOBIN 15.0 07/12/2022 1753     Lab Results   Component Value Date/Time    PLATELETCT 243 08/02/2022 1040    PLATELETCT 202 07/13/2022 0159    PLATELETCT 225 07/12/2022 1753     Lab Results   Component Value Date/Time    WBC 5.9 08/02/2022 1040    WBC 5.0 07/13/2022 0159    WBC 6.1 07/12/2022 1753      Physical Examination:   General: In wheelchair, no acute distress  Eyes: Extraocular movements intact, anicteric  Neck: Full range of motion, no gross jugular venous distension  Pulmonary: Normal respiratory effort, no distress  Cardiovascular: Regular rate and rhythm  Extremities: Warm and well perfused, 1-2+ bilateral pitting lower  extremity edema  Neurological: Alert and oriented, mild aphasia  Psychiatric: Normal affect, normal judgment     Past History:   Past Medical History  The patient's past medical history was reviewed.  See HPI and self-reported patient medical history form for pertinent medical history to consultation.    Past Social History  The patient's social history was reviewed.  See HPI self-reported patient medical history form for pertinent social history to consultation.    Past Family History  The patient's family history was reviewed.  See HPI self-reported patient medical history form for pertinent family history to consultation.    Review of Systems  A pertinent cardiac review of systems was performed and was otherwise unremarkable except as per HPI and self-reported patient medical history form.        Chandrakant Freire MD, [facility]  Interventional Cardiology  [facility], [address]  [address] [place] [address]  Phone: [phone]  Fax: [phone]

## 2022-10-06 NOTE — ASSESSMENT & PLAN NOTE
Her initial blood pressure today is 142/68.  She continues with her amlodipine 10 mg daily and irbesartan 300 mg in the morning.

## 2022-10-11 ENCOUNTER — TELEPHONE (OUTPATIENT)
Dept: CARDIOLOGY | Facility: MEDICAL CENTER | Age: 82
End: 2022-10-11
Payer: MEDICARE

## 2022-10-11 NOTE — TELEPHONE ENCOUNTER
Spoke to patient in regards to obtaining records for NP appointment with Dr. Freire . Per patient has never been treated by a previous cardiologist. Confirmed all recent notes, labs, and cardiac imaging are in Epic. Confirmed with patient appointment time, location, and date. Scheduled on 10/13/22

## 2022-10-13 ENCOUNTER — NON-PROVIDER VISIT (OUTPATIENT)
Dept: CARDIOLOGY | Facility: MEDICAL CENTER | Age: 82
End: 2022-10-13
Payer: MEDICARE

## 2022-10-13 ENCOUNTER — OFFICE VISIT (OUTPATIENT)
Dept: CARDIOLOGY | Facility: MEDICAL CENTER | Age: 82
End: 2022-10-13
Attending: NURSE PRACTITIONER
Payer: MEDICARE

## 2022-10-13 VITALS
WEIGHT: 167 LBS | DIASTOLIC BLOOD PRESSURE: 56 MMHG | OXYGEN SATURATION: 98 % | RESPIRATION RATE: 16 BRPM | HEART RATE: 68 BPM | BODY MASS INDEX: 31.53 KG/M2 | SYSTOLIC BLOOD PRESSURE: 140 MMHG | HEIGHT: 61 IN

## 2022-10-13 DIAGNOSIS — I63.9 RECURRENT CEREBROVASCULAR ACCIDENTS (CVAS) (HCC): ICD-10-CM

## 2022-10-13 DIAGNOSIS — I45.5 SINUS ARREST: Chronic | ICD-10-CM

## 2022-10-13 DIAGNOSIS — I73.9 PVD (PERIPHERAL VASCULAR DISEASE) (HCC): ICD-10-CM

## 2022-10-13 DIAGNOSIS — I10 HYPERTENSION, ESSENTIAL: ICD-10-CM

## 2022-10-13 DIAGNOSIS — I49.5 SICK SINUS SYNDROME (HCC): Chronic | ICD-10-CM

## 2022-10-13 PROBLEM — I69.320: Status: RESOLVED | Noted: 2022-03-25 | Resolved: 2022-10-13

## 2022-10-13 PROBLEM — Z09 HOSPITAL DISCHARGE FOLLOW-UP: Status: RESOLVED | Noted: 2022-07-28 | Resolved: 2022-10-13

## 2022-10-13 PROBLEM — E78.5 DYSLIPIDEMIA: Status: RESOLVED | Noted: 2019-03-05 | Resolved: 2022-10-13

## 2022-10-13 PROBLEM — R47.01 APHASIA: Status: RESOLVED | Noted: 2019-12-03 | Resolved: 2022-10-13

## 2022-10-13 PROBLEM — Z89.421 ACQUIRED ABSENCE OF OTHER RIGHT TOE(S) (HCC): Status: ACTIVE | Noted: 2022-10-13

## 2022-10-13 PROBLEM — R00.1 SYMPTOMATIC BRADYCARDIA: Status: RESOLVED | Noted: 2022-07-12 | Resolved: 2022-10-13

## 2022-10-13 PROCEDURE — 93294 REM INTERROG EVL PM/LDLS PM: CPT | Performed by: INTERNAL MEDICINE

## 2022-10-13 PROCEDURE — 99214 OFFICE O/P EST MOD 30 MIN: CPT | Performed by: INTERNAL MEDICINE

## 2022-10-13 RX ORDER — EZETIMIBE 10 MG/1
10 TABLET ORAL EVERY EVENING
Qty: 14 TABLET | Refills: 0 | Status: SHIPPED | OUTPATIENT
Start: 2022-10-13 | End: 2022-10-26 | Stop reason: SDUPTHER

## 2022-10-13 RX ORDER — HYDROCHLOROTHIAZIDE 25 MG/1
25 TABLET ORAL DAILY
Qty: 14 TABLET | Refills: 0 | Status: SHIPPED | OUTPATIENT
Start: 2022-10-13 | End: 2022-10-26 | Stop reason: SDUPTHER

## 2022-10-13 ASSESSMENT — FIBROSIS 4 INDEX: FIB4 SCORE: 2.52

## 2022-10-13 NOTE — CARDIAC REMOTE MONITOR - SCAN
Device transmission reviewed. Device demonstrated appropriate function.       Electronically Signed by: Dakotah Knowles M.D.    10/14/2022  10:11 AM

## 2022-10-13 NOTE — PATIENT INSTRUCTIONS
Stop amlodipine  Start hydrochlorothiazide (HCTZ) 25 mg in the morning  Continue taking irbesartan 300 mg in the evening  Stop taking fenofibrate (Tricor)  Start taking ezetimibe (Zetia) 10 mg in the evening  Have your non-fasting labs checked about 1-2 weeks after making these changes  A referral has been placed to Clinical Pharmacy to review your cholesterol medications  Follow up in 3 months

## 2022-10-14 ENCOUNTER — TELEPHONE (OUTPATIENT)
Dept: VASCULAR LAB | Facility: MEDICAL CENTER | Age: 82
End: 2022-10-14
Payer: MEDICARE

## 2022-10-14 NOTE — TELEPHONE ENCOUNTER
[facility] and Pharmacotherapy [facility]    Received pharmacotherapy referral for lipids from Dr. Freire on 10/13/22    Scheduled pt for initial visit on 11/8.    Insurance: [facility]  PCP: [facility]  Locations to be seen: Any    [facility] Anticoagulation/Pharmacotherapy Clinic at [phone], fax [phone]    Smason Christianson, GualbertoD, BCACP

## 2022-10-24 ENCOUNTER — TELEPHONE (OUTPATIENT)
Dept: CARDIOLOGY | Facility: MEDICAL CENTER | Age: 82
End: 2022-10-24
Payer: MEDICARE

## 2022-10-24 NOTE — TELEPHONE ENCOUNTER
BN    Caller: - vidhi    Medication Name and Dosage:    ezetimibe (ZETIA) 10 MG Tab (Order #[id number]) on 10/13/22    hydroCHLOROthiazide (HYDRODIURIL) 25 MG Tab (Order #[id number]) on 10/13/22    Medication amount left: 4    Preferred Pharmacy:   [facility]   [place]     Other questions (Topic):  Please send here, then send to [facility] for refills.     Callback Number (Will only call for issues):  [phone]    Thank you,   Letty SEO

## 2022-10-24 NOTE — TELEPHONE ENCOUNTER
Is the patient due for a refill? No    Was the patient seen the past year? Yes    Date of last office visit: 10/13/22    Does the patient have an upcoming appointment?  No   If yes, When?     Provider to refill:BN    Does the patients insurance require a 100 day supply?  Yes

## 2022-10-26 NOTE — TELEPHONE ENCOUNTER
BN    Caller: [facility]    Topic/issue: [facility] called and was following up from the pt's request to send her scripts for ezetimibe (ZETIA) 10 MG Tab (Order #[id number]) on 10/13/22     hydroCHLOROthiazide (HYDRODIURIL) 25 MG Tab (Order #[id number]) on 10/13/22    over to [facility] instead of [facility] that went out on 10-.    Callback Number:   [facility] -    [address] [address]   Phone:  [phone]  Fax:  [phone]

## 2022-10-26 NOTE — TELEPHONE ENCOUNTER
Is the patient due for a refill? No    Was the patient seen the past year? Yes    Date of last office visit: 10/13/2022    Does the patient have an upcoming appointment?  No   If yes, When?     Provider to refill:BN    Does the patients insurance require a 100 day supply?  Yes

## 2022-10-27 RX ORDER — EZETIMIBE 10 MG/1
10 TABLET ORAL EVERY EVENING
Qty: 100 TABLET | Refills: 3 | Status: SHIPPED
Start: 2022-10-27 | End: 2023-05-23

## 2022-10-27 RX ORDER — HYDROCHLOROTHIAZIDE 25 MG/1
25 TABLET ORAL DAILY
Qty: 100 TABLET | Refills: 3 | Status: SHIPPED
Start: 2022-10-27 | End: 2023-02-08

## 2022-10-27 NOTE — TELEPHONE ENCOUNTER
Return in about 3 months (around 1/13/2023).  1. Stop amlodipine  2. Start hydrochlorothiazide (HCTZ) 25 mg in the morning  3. Continue taking irbesartan 300 mg in the evening  4. Stop taking fenofibrate (Tricor)  5. Start taking ezetimibe (Zetia) 10 mg in the evening  6. Have your non-fasting labs checked about 1-2 weeks after making these changes  7. A referral has been placed to Clinical Pharmacy to review your cholesterol medications  8. Follow up in 3 months

## 2022-11-05 RX ORDER — HYDROCHLOROTHIAZIDE 25 MG/1
25 TABLET ORAL DAILY
Qty: 100 TABLET | Refills: 3 | OUTPATIENT
Start: 2022-11-05

## 2022-11-05 RX ORDER — EZETIMIBE 10 MG/1
10 TABLET ORAL EVERY EVENING
Qty: 100 TABLET | Refills: 3 | OUTPATIENT
Start: 2022-11-05

## 2022-11-07 ENCOUNTER — TELEPHONE (OUTPATIENT)
Dept: CARDIOLOGY | Facility: MEDICAL CENTER | Age: 82
End: 2022-11-07
Payer: MEDICARE

## 2022-11-07 DIAGNOSIS — Z79.899 ON POTASSIUM WASTING DIURETIC THERAPY: ICD-10-CM

## 2022-11-07 DIAGNOSIS — I10 HYPERTENSION, ESSENTIAL: ICD-10-CM

## 2022-11-07 NOTE — TELEPHONE ENCOUNTER
LEONILA    Caller: Irina Traylor     Topic/issue: FYI: Pt will be staying the night in a hotel on 11- and was not sure if she has to take her machine with her while she is there.for a night to monitor her pacer. Please advise    Callback Number: [phone] (home)   Thank you    Sabina HIGGINBOTHAM

## 2022-11-07 NOTE — TELEPHONE ENCOUNTER
LEONILA    Caller: Irina Traylor     Where labs will be completed: [facility]    Fax/Phone number for lab:n/a    Upcoming Appointment Date: N/A- Pt states at last appt.  wanted her to complete labs. Is asking for them to be mailed to her and placed in the system    Callback Number: [phone] (home) [phone] (work)      Thank you    Sabina Nieves

## 2022-11-09 ENCOUNTER — TELEPHONE (OUTPATIENT)
Dept: VASCULAR LAB | Facility: MEDICAL CENTER | Age: 82
End: 2022-11-09
Payer: MEDICARE

## 2022-11-09 NOTE — TELEPHONE ENCOUNTER
Called pt and LVM advising that she did not need to take her monitor for an overnight stay less than two weeks.

## 2022-11-09 NOTE — LETTER
November 9, 2022        Irina Traylor  [address] [place] [facility] [address]            Dear Irina Traylor ,    We have been unsuccessful in our attempts to contact you regarding your Lipid Clinical Pharmacist referral.  Please contact us if you would like to schedule an appointment for help managing your cholesterol.    Please contact our clinic so we may assist you.  We are open Monday-Friday 8 am until 5 pm.  You may reach our Service at [phone].        Sincerely,    Franco Lee, PharmD, [facility]  Clinic Supervisor  [facility]  Outpatient Anticoagulation Service

## 2022-11-09 NOTE — TELEPHONE ENCOUNTER
[facility] Pharmac[facility]     Received pharmacotherapy referral for lipids from Dr. Freire on 10/13/22     2nd call  Pt no showed initial visit on 11/8.  Unable to LM as vm has not been set up  Will send letter     Insurance: [facility]  PCP: [facility]  Locations to be seen: Any     [facility] Anticoagulation/Pharmacotherapy Clinic at [phone], fax [phone]     Candida Joseph, PharmD

## 2022-11-15 NOTE — TELEPHONE ENCOUNTER
"To [facility] please print labs and mail to pt mailing address, thank you!    ===================    Upon chart review, per MD last OV note, \"-Obtain urgent BMP in 1-2 weeks to assess for potentially serious side effect/toxicities of starting diuretic therapy with HCTZ\"    BMP ordered.      "

## 2022-11-16 ENCOUNTER — DOCUMENTATION (OUTPATIENT)
Dept: VASCULAR LAB | Facility: MEDICAL CENTER | Age: 82
End: 2022-11-16
Payer: MEDICARE

## 2022-11-16 NOTE — PROGRESS NOTES
S/w patient today regarding missed pharmacotherapy appt for lipids.  NP appt rescheduled for 12-13-22.  Newton Noriega, PharmD, BCACP

## 2022-11-21 ENCOUNTER — DOCUMENTATION (OUTPATIENT)
Dept: HEALTH INFORMATION MANAGEMENT | Facility: OTHER | Age: 82
End: 2022-11-21
Payer: MEDICARE

## 2022-11-28 DIAGNOSIS — Z91.09 ALLERGY TO ENVIRONMENTAL FACTORS: ICD-10-CM

## 2022-11-28 RX ORDER — LORATADINE 10 MG/1
TABLET ORAL
Qty: 90 TABLET | Refills: 3 | Status: SHIPPED | OUTPATIENT
Start: 2022-11-28 | End: 2023-11-20

## 2022-11-29 NOTE — TELEPHONE ENCOUNTER
Requested Prescriptions     Signed Prescriptions Disp Refills    loratadine (CLARITIN) 10 MG Tab 90 Tablet 3     Sig: TAKE ONE TABLET BY MOUTH EVERY DAY AS NEEDED FOR ALLERGY     Authorizing Provider: RHODA BELLA A.P.R.N.

## 2022-11-29 NOTE — TELEPHONE ENCOUNTER
Received request via: Pharmacy    Was the patient seen in the last year in this department? Yes    Does the patient have an active prescription (recently filled or refills available) for medication(s) requested? No    Does the patient have long term Plus and need 100 day supply (blood pressure, diabetes and cholesterol meds only)? Medication is not for cholesterol, blood pressure or diabetes

## 2022-12-13 ENCOUNTER — NON-PROVIDER VISIT (OUTPATIENT)
Dept: MEDICAL GROUP | Facility: PHYSICIAN GROUP | Age: 82
End: 2022-12-13
Payer: MEDICARE

## 2022-12-13 DIAGNOSIS — E78.5 DYSLIPIDEMIA: ICD-10-CM

## 2022-12-13 PROCEDURE — 99211 OFF/OP EST MAY X REQ PHY/QHP: CPT | Performed by: NURSE PRACTITIONER

## 2022-12-13 RX ORDER — BEMPEDOIC ACID 180 MG/1
1 TABLET, FILM COATED ORAL DAILY
Qty: 30 TABLET | Refills: 3 | Status: SHIPPED | OUTPATIENT
Start: 2022-12-13 | End: 2022-12-20

## 2022-12-13 NOTE — PROGRESS NOTES
"[facility] - Initial Visit  Date of Service: 12/13/22    Time IN:  1:52pm  Time OUT:  2:31pm    Irina Traylor has been referred for evaluation and management of dyslipidemia    Referral Source: Cardiologist     Subjective    HPI  History of ASCVD: Yes, Details: Multiple ischemic stroke, most recent 6/2022  Other Established (non-atherosclerotic) Vascular Disease, if Present: Peripheral Vascular Disease  Age at Initial Diagnosis of Dyslipidemia: Unknown, very poor historian    Current Prescription Lipid Lowering Medications - including dose:   Statin: None  Non-Statin: Ezetimibe 10mg QD, Fenofibrate 145mg QD  Current Lipid Lowering and Related Supplements:   None  Any Current Side Effects Potentially Related to Lipid Lowering therapy?   No  Current Adherence to Lipid Lowering Therapies   Complete  Previously Attempted Interventions for Lipids - including outcome  Statin: Unknown statin    Outcome:  Severe allergy, possible myalgias, with recent stroke and continued dysarthria, difficult to determine if myalgias were truly a side effect.  She states on multiple occasions that initial statin therapy caused \"blisters that left a scar\"  Non-Statin: None   Outcome: N/A  Any Previous History of Statin Intolerance?   Yes, Details: As above, seems to have had an allergic reaction to statin >10 years ago, also some documentation in chart regarding muscle pains and weakness associated with statin use.  Baseline Lipids Prior to Treatment:   Difficult to determine true baseline, lipid panel below correlates best to period when no lipid lowering medications in place per chart.   Latest Reference Range & Units 11/29/16 11:40   Cholesterol,Tot 100 - 199 mg/dL 234 (H)   Triglycerides 0 - 149 mg/dL 110   HDL >=40 mg/dL 66   LDL <100 mg/dL 146 (H)   (H): Data is abnormally high  Other Pertinent History: None  History of other CV risk factors: HTN, PVD    PAST MEDICAL HISTORY:  has a past medical history of Anesthesia, " Arthritis, Benign essential HTN (9/7/2017), Cataract, CPAP (continuous positive airway pressure) dependence, Dyslipidemia (3/5/2019), Expressive aphasia, Family history of CVA, Heart murmur, DARIN (obstructive sleep apnea) (1/26/2017), Post-nasal drip, Restless leg syndrome, Sick sinus syndrome (HCC) (6/29/2022), Sinus arrest (6/29/2022), Stroke (HCC) (2/1993, 8/1993), and Walker as ambulation aid.    PAST SURGICAL HISTORY:  has a past surgical history that includes hysterectomy, total abdominal (1977); fusion, spine, lumbar, plif (9/26/2016); lumbar laminectomy diskectomy (N/A, 9/26/2016); lumbar laminectomy diskectomy (1993); carpal tunnel release (Bilateral, 1993); orif, finger (Left, as child); hammertoe correction (Left, 11/12/2007); cervical disk and fusion anterior (1993); hammertoe correction (Right, 1980's); bunionectomy (Bilateral, 1980's); colonoscopy (2/2016); cervical fusion posterior (8/8/2017); cervical laminectomy posterior (8/8/2017); pr reconstr total shoulder implant (Left, 3/21/2019); orif, fracture, humerus (Left, 3/21/2019); shoulder arthroplasty total (Left, 8/11/2015); knee arthroplasty total (6/23/2014); hip arthroplasty total (Right, 12/12/2016); hip arthroplasty total (Left, 5/8/2017); and knee arthroplasty total (Left, 2/12/2018).    CURRENT MEDICATIONS:   Current Outpatient Medications:     loratadine, TAKE ONE TABLET BY MOUTH EVERY DAY AS NEEDED FOR ALLERGY    hydroCHLOROthiazide, 25 mg, Oral, DAILY    ezetimibe, 10 mg, Oral, Q EVENING    memantine, 5 mg, Oral, DAILY    vitamin D, TAKE ONE TABLET BY MOUTH EVERY DAY    Vitamin C CR, 1 Tablet, Oral, DAILY    ASPIRIN 81 PO, 1 Tablet, Oral, DAILY    fenofibrate, 145 mg, Oral, DAILY    irbesartan, 300 mg, Oral, Nightly (Patient taking differently: 300 mg, Oral, NIGHTLY, pt taking in the morning)    Magnesium, 400 mg, Oral, DAILY    ALLERGIES: Benicar [olmesartan], Dilaudid  [hydromorphone hcl], Lidocaine, Norco  [apap-fd&c blue  #1-hydrocodone], Other misc, Percocet  [apap-fd&c red #40 [place]-oxycodone], Vicodin  [apap-fd&c yellow #10 [place]-hydrocodone], Codeine, Demerol, Hydrocodone, Other drug, Oxycodone, Statins [hmg-coa-r inhibitors], and Valium    FAMILY HISTORY: Unknown, patient unable to recall    SOCIAL HISTORY   Social History     Tobacco Use   Smoking Status Never   Smokeless Tobacco Never     Change in weight: Stable  Exercise habits: no regular exercise program - still recovering from latest stroke.  She plans to begin using home treadmill for slow walking once cleared by PT/OT.  Diet: common adult, low carbohydrate, low sodium - per patient, has rather clean diet, eats mainly lean protein, vegetables and fruits with occasional indulges with carbs and sweets.      Objective      There were no vitals filed for this visit.   Physical Exam    DATA REVIEW:  Most Recent Lipid Panel:   Lab Results   Component Value Date    CHOLSTRLTOT 165 08/02/2022    TRIGLYCERIDE 41 08/02/2022    HDL 65 08/02/2022    LDL 92 08/02/2022       Other Pertinent Blood Work:   Lab Results   Component Value Date    SODIUM 141 08/02/2022    POTASSIUM 4.7 08/02/2022    CHLORIDE 107 08/02/2022    CO2 22 08/02/2022    ANION 12.0 08/02/2022    GLUCOSE 77 08/02/2022    BUN 39 (H) 08/02/2022    CREATININE 0.82 08/02/2022    CALCIUM 10.0 08/02/2022    ASTSGOT 29 08/02/2022    ALTSGPT 14 08/02/2022    ALKPHOSPHAT 53 08/02/2022    TBILIRUBIN 0.4 08/02/2022    ALBUMIN 4.4 08/02/2022    AGRATIO 1.4 08/02/2022    TSHULTRASEN 2.540 08/02/2022       Other: NA    Recent Imaging Studies:    None since last visit        ASSESSMENT AND PLAN  Patient Type, check all that apply:   Secondary Prevention  Established Atherosclerotic Cardiovascular Disease (ASCVD)  Yes, Details: Multiple ischemic stroke  Other Established (non-atherosclerotic) Vascular Disease, if Present:  Peripheral vascular disease  Evidence of Heterozygous Familial Hypercholesterolemia (FH):   No (If yes,  enter details of how diagnosis was made  ACC/AHA Indication for Statin Therapy, edenilson all that apply:  Established ASCVD: Indication for High intensity statin   Calculated Risk for ASCVD, if applicable    N/A  Other Significant Risk Markers, if any, edenilson all that apply   None  Goal LDL-C and nonHDL-C based on Clinic Protocol  LDL-C:   <70 mg/dL  Lifestyle Recommendations From Today’s Visit:    Eating Plan: Concentrate on  Low sat/Trans fat, Low simple carb, and DASH/Med Style diet - continue with current dietary habits, minimize saturated fats and simple carbs, continue with lean proteins and regular vegetables.  Exercise: Increase as tolerated and per proper gait.  Statin Therapy Recommendations from Today’s Visit:   None, d/t hx of possible allergic reaction and intolerance to therapy.  Non-Statin Medications Recommendations from Today’s Visit:   Start Nexletol 180mg QD  Continue ezetimibe 10mg QD - would consider combination product Nexlizet if Nexletol well tolerated.  Continue Fenofibrate 145mg QD for now, may consider stopping if lipids well to goal with above meds.  Indication for PCSK9 Inhibitor, if applicable:  ASCVD with suboptimal control of LDL-C despite maximally tolerated statin  Supplements Recommended at this visit:   None   Recommendations for Other Cardiovascular Risk Factors, edenilson all that apply:   Hypertension- Well controlled, managed by cardiology and PCP      Patient seen today for initial visit, referred by cardiology.  Longstanding hx of ASCVD, multiple strokes over the last 20+ years.  She continues to have difficulty with speech and relaying information, so difficult to determine if lipids were treated prior to initial stroke.  Was started on ezetimibe by cardiology couple months ago.  Discussed with patient need for aggressive lipid lowering given hx, therefore, will add Nexletol to current medications.  This can be paired with ezetimibe in the future, but to avoid confusion for patient  and  tolerability, will keep medications separate for now.    Studies Ordered at Todays Visit: None  Blood Work Ordered At Today’s visit:   Lipid Panel   Follow-Up: 7 weeks    Newton Noriega, PharmD, BCACP    CC:  Marisa R. Traversie, A.P.R.N. Michael Bloch, M.D. Bradley Nelson, M.D.

## 2022-12-15 ENCOUNTER — TELEPHONE (OUTPATIENT)
Dept: MEDICAL GROUP | Facility: PHYSICIAN GROUP | Age: 82
End: 2022-12-15
Payer: MEDICARE

## 2022-12-16 NOTE — TELEPHONE ENCOUNTER
"Message: pt left message stating she was having chest pain/blurred vision after starting new medication Bempedoic Acid. Called pt and she states she took one pill in the morning on 12/14/22 and woke up at 5 am today with chest pain and dizziness. Pt went to [facility] and still felt the symptoms. Advised ER since she was still having chest pain today and pt stated its to cold out right now and I offered to call an ambulance but pt declined and stated \"it will go away\". Advised pt to stop medication and she understood. Made pt a follow up with you on 12/20/22. Advised pt go straight to ER/call 911 if symptoms worsen. Pt understood.       "

## 2022-12-20 ENCOUNTER — OFFICE VISIT (OUTPATIENT)
Dept: MEDICAL GROUP | Facility: PHYSICIAN GROUP | Age: 82
End: 2022-12-20
Payer: MEDICARE

## 2022-12-20 ENCOUNTER — TELEPHONE (OUTPATIENT)
Dept: CARDIOLOGY | Facility: MEDICAL CENTER | Age: 82
End: 2022-12-20
Payer: MEDICARE

## 2022-12-20 ENCOUNTER — HOSPITAL ENCOUNTER (OUTPATIENT)
Dept: LAB | Facility: MEDICAL CENTER | Age: 82
End: 2022-12-20
Attending: INTERNAL MEDICINE
Payer: MEDICARE

## 2022-12-20 VITALS
HEART RATE: 87 BPM | HEIGHT: 60 IN | BODY MASS INDEX: 33.18 KG/M2 | SYSTOLIC BLOOD PRESSURE: 162 MMHG | OXYGEN SATURATION: 97 % | DIASTOLIC BLOOD PRESSURE: 74 MMHG | WEIGHT: 169 LBS | TEMPERATURE: 96.5 F

## 2022-12-20 DIAGNOSIS — I10 HYPERTENSION, ESSENTIAL: ICD-10-CM

## 2022-12-20 DIAGNOSIS — Z79.899 ON POTASSIUM WASTING DIURETIC THERAPY: ICD-10-CM

## 2022-12-20 DIAGNOSIS — E78.5 DYSLIPIDEMIA: ICD-10-CM

## 2022-12-20 PROCEDURE — 36415 COLL VENOUS BLD VENIPUNCTURE: CPT

## 2022-12-20 PROCEDURE — 80048 BASIC METABOLIC PNL TOTAL CA: CPT

## 2022-12-20 PROCEDURE — 99214 OFFICE O/P EST MOD 30 MIN: CPT | Performed by: NURSE PRACTITIONER

## 2022-12-20 ASSESSMENT — FIBROSIS 4 INDEX: FIB4 SCORE: 2.55

## 2022-12-20 NOTE — TELEPHONE ENCOUNTER
Chart prep:    Spoke with patient. She has not done labs yet. Stated she had went to lab and there was nothing ordered. Looks like order in chart now as of 11/14/22. Mailed lab slip.

## 2022-12-20 NOTE — ASSESSMENT & PLAN NOTE
Her initial blood pressure today is 166/82.  She was late to her appointment and needed to use the restroom as well prior to the appointment starting.  She continues with irbesartan 300 mg in the morning and hydrochlorothiazide 25 mg daily.

## 2022-12-20 NOTE — PROGRESS NOTES
Subjective:     CC: Medication reaction    HPI:   Irina presents today with the following:      Dyslipidemia  She has history of statin intolerance.  Was recently prescribed bempedoic acid. She took one dose of bempedoic acid 180 mg in the morning on 12/15/22 and experienced chest pain and palpations. She stopped the medication after the 1 dose.  She called our office and we did inform her to go to the ER to be evaluated.  She did decline.  We also offered to call an ambulance for her and she declined again.  She did not go to the ER. Her symptoms resolved the next evening but did have fatigue. She went to [facility] the next day and had dizziness around 10:30 A.M. As of today no recurrent symptoms.  Currently on fenofibrate 145 mg daily and Zetia 10 mg every evening.    Hypertension, essential  Her initial blood pressure today is 166/82.  She was late to her appointment and needed to use the restroom as well prior to the appointment starting.  She continues with irbesartan 300 mg in the morning and hydrochlorothiazide 25 mg daily.      Past Medical History:   Diagnosis Date    Anesthesia     States feels like she can't breathe with induction for right hip surgery, gets nausea vomiting with most pain meds.  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    Post-nasal drip     Restless leg syndrome     4/25/17-Resolved, states was related to right hip and none S/P replacement.    Sick sinus syndrome (HCC) 6/29/2022    Sinus arrest 6/29/2022    Stroke ([facility]) 2/1993, 8/1993    Zbbpssym-gsawhk-rjgtjzoeju aphasia,mouth forming words, reading, numbers    Walker as ambulation aid         Social History     Tobacco Use    Smoking status: Never    Smokeless tobacco: Never   Vaping Use    Vaping Use: Never used   Substance Use Topics    Alcohol use: Not Currently    Drug use: No       Current Outpatient Medications Ordered in Epic   Medication Sig Dispense Refill    loratadine (CLARITIN) 10 MG Tab TAKE ONE TABLET BY MOUTH EVERY DAY AS NEEDED FOR ALLERGY 90 Tablet 3    hydroCHLOROthiazide (HYDRODIURIL) 25 MG Tab Take 1 Tablet by mouth every day. 100 Tablet 3    ezetimibe (ZETIA) 10 MG Tab Take 1 Tablet by mouth every evening. 100 Tablet 3    memantine (NAMENDA) 5 MG Tab Take 1 Tablet by mouth every day. FOR MEMORY AND PRE ALTZHEIMERS. 90 Tablet 1    vitamin D (CHOLECALCIFEROL) 1000 Unit Tab TAKE ONE TABLET BY MOUTH EVERY DAY 90 Tablet 1    Ascorbic Acid (VITAMIN C CR) 1000 MG Tab CR Take 1 Tablet by mouth every day.      ASPIRIN 81 PO Take 1 Tablet by mouth every day.      fenofibrate (TRICOR) 145 MG Tab Take 1 Tablet by mouth every day. 100 Tablet 3    irbesartan (AVAPRO) 300 MG Tab Take 1 Tablet by mouth every evening. (Patient taking differently: Take 300 mg by mouth every evening. pt taking in the morning) 100 Tablet 1    Magnesium 400 MG Tab Take 400 mg by mouth every day.       No current [facility]-ordered facility-administered medications on file.       Allergies:  Benicar [olmesartan], Dilaudid  [hydromorphone hcl], Lidocaine, Norco  [apap-fd&c blue #1-hydrocodone], Other misc, Percocet  [apap-fd&c red #40 [place]-oxycodone], Vicodin  [apap-fd&c yellow #10 [place]-hydrocodone], Codeine, Demerol, Hydrocodone, Other drug, Oxycodone, Statins [hmg-coa-r inhibitors], and Valium    Health Maintenance: Reviewed.      Objective:     Vital signs reviewed  Exam:  BP (!) 162/74 (BP Location: Right arm, Patient Position: Sitting, BP Cuff Size: Adult)   Pulse 87   Temp 35.8 °C (96.5 °F) (Temporal)   Ht 1.524 m (5')   Wt 76.7 kg (169 lb)   SpO2 97%   BMI 33.01 kg/m²  Body mass index is 33.01  kg/m².    Gen: Alert and oriented, No apparent distress.  Eyes:   Lids normal. Glasses in place.   Lungs: Normal effort, CTA bilaterally, no wheezes, rhonchi, or rales  CV: Regular rate and rhythm. No murmurs, rubs, or gallops.  Ext: No clubbing, cyanosis, edema.  Using four-wheel walker with ambulation.      Assessment & Plan:     82 y.o. female with the following -     1. Dyslipidemia  Chronic exacerbated problem.  Stop bempedoic acid.  Allergy list updated.  Continue with Zetia 10 mg every evening and fenofibrate 145 mg daily.  I informed patient that I will inform the pharmacist as she is required pharmacotherapy for cholesterol control.  We did reprint her BMP that was ordered so she can complete today.  She again notes resolution of her symptoms and no recurrence since the 1 dose of the bempedoic acid.  I informed her that I would let the pharmacist know as well.  Patient has upcoming 2/8/2023 appointment with me.    2. Hypertension, essential  Chronic exacerbated problem.  On repeat by the MA today her blood pressure is 162/74.  Patient did arrive late to her appointment and we did see her as she was having a medication reaction.  She did have some anxiety due to being rushed today.  She has upcoming January appointment with cardiology.  She will keep that appointment.  Continue with irbesartan 3 mg every morning and hydrochlorothiazide 25 mg daily.      Return if symptoms worsen or fail to improve.    Please note that this dictation was created using voice recognition software. I have made every reasonable attempt to correct obvious errors, but I expect that there are errors of grammar and possibly content that I did not discover before finalizing the note.

## 2022-12-20 NOTE — ASSESSMENT & PLAN NOTE
She has history of statin intolerance.  Was recently prescribed bempedoic acid. She took one dose of bempedoic acid 180 mg in the morning on 12/15/22 and experienced chest pain and palpations. She stopped the medication after the 1 dose.  She called our office and we did inform her to go to the ER to be evaluated.  She did decline.  We also offered to call an ambulance for her and she declined again.  She did not go to the ER. Her symptoms resolved the next evening but did have fatigue. She went to [facility] the next day and had dizziness around 10:30 A.M. As of today no recurrent symptoms.  Currently on fenofibrate 145 mg daily and Zetia 10 mg every evening.

## 2022-12-21 LAB
ANION GAP SERPL CALC-SCNC: 10 MMOL/L (ref 7–16)
BUN SERPL-MCNC: 52 MG/DL (ref 8–22)
CALCIUM SERPL-MCNC: 10.2 MG/DL (ref 8.5–10.5)
CHLORIDE SERPL-SCNC: 104 MMOL/L (ref 96–112)
CO2 SERPL-SCNC: 27 MMOL/L (ref 20–33)
CREAT SERPL-MCNC: 0.92 MG/DL (ref 0.5–1.4)
GFR SERPLBLD CREATININE-BSD FMLA CKD-EPI: 63 ML/MIN/1.73 M 2
GLUCOSE SERPL-MCNC: 74 MG/DL (ref 65–99)
POTASSIUM SERPL-SCNC: 4.8 MMOL/L (ref 3.6–5.5)
SODIUM SERPL-SCNC: 141 MMOL/L (ref 135–145)

## 2022-12-23 ENCOUNTER — TELEPHONE (OUTPATIENT)
Dept: CARDIOLOGY | Facility: MEDICAL CENTER | Age: 82
End: 2022-12-23
Payer: MEDICARE

## 2022-12-23 DIAGNOSIS — Z79.02 ANTIPLATELET OR ANTITHROMBOTIC LONG-TERM USE: ICD-10-CM

## 2022-12-24 NOTE — TELEPHONE ENCOUNTER
----- Message from Chandrakant Freire M.D. sent at 12/21/2022  7:24 PM PST -----  Her Cr and K are stable and I would continue irbesartan and HCTZ. However, her BUN increased, which can be seen with GI bleeding. Can you check if she has had any signs of GI bleeding and have her check a CBC and FOBT? Thanks.

## 2022-12-24 NOTE — TELEPHONE ENCOUNTER
"To BN, please does not have symptoms of GI bleed.  Did you still want to order CBC and FOBT?      To Device Clinic, have we received any transmission from pt device?      ================    Called pt, [phone], to review MD recommendations.  Lengthy conversation noted.  She states after starting Bempedoic Acid 180 she \"Started pain in my heart and fluttering having a heard time and I wake up with the pain.  I wanted to call you. My PCP I saw yesterday stopped it yesterday.  Well I stopped it bymyself.  Noted last OV note signed by PCP 12/20/22.  She states she has a pacemaker.  Reassurance given if we received transmission that is abnormal, notification would be received.  Last notification device clinic was 11/2022.        Pt states she does not have blood in her stool or black stool.      Call duration: 13 minutes 48 seconds  "

## 2023-01-03 NOTE — TELEPHONE ENCOUNTER
Caller: Irina Traylor    Topic/issue: RETURNING RN CALL    Patient states that it takes her a while to get up from the chair to answer the phone. She would like to let the RN to call twice in a row to give her time to answer the phone. Please advise.    Thank you,  Edgar VILLASEÑOR    Callback Number: [phone] (home)

## 2023-01-03 NOTE — TELEPHONE ENCOUNTER
Chandrakant Freire M.D.  You 11 days ago     She should get at least a CBC. She trends toward a higher than normal BUN, however, it was much higher than baseline. An elevated BUN out of proportion to Cr is often seen in cryptic GI bleeding. Mild bleeding is not noticeable in the stools and I would like to make sure she is not anemic as she is on blood thinners.     If she thinks she is not tolerating bempedoic acid, then she may need to be placed on a PCSK-9 inhibitor. These rarely have side effects, but require injection.      Returned pt call, [phone], to review MD recommendations, unable to reach.  Left voicemail to return this call at their earliest convenience.

## 2023-01-04 NOTE — TELEPHONE ENCOUNTER
2nd & 3rd attempt to return pt call, [phone], and reviewed MD recommendations.  She states she will have labs drawn tomorrow.  She is requesting for low cholesterol diet to be printed and mailed to her mailing address.  She states she changed her diet incorporating oatmeal in the morning and more veggies.  Pt states she will follow up with pharmacy and advised to follow up with insurance regarding cost of PCSK-9 inhibitor and once decision is made to return this call.  Pat states no other concerns or questions at this time and is appreciative of information given.    CBC ordered,

## 2023-01-12 ENCOUNTER — NON-PROVIDER VISIT (OUTPATIENT)
Dept: CARDIOLOGY | Facility: MEDICAL CENTER | Age: 83
End: 2023-01-12
Payer: MEDICARE

## 2023-01-12 PROCEDURE — 93294 REM INTERROG EVL PM/LDLS PM: CPT | Performed by: INTERNAL MEDICINE

## 2023-01-13 NOTE — CARDIAC REMOTE MONITOR - SCAN
Device transmission reviewed. Device demonstrated appropriate function.       Electronically Signed by: Dakotah Knowles M.D.    1/13/2023  10:23 AM

## 2023-01-31 ENCOUNTER — TELEPHONE (OUTPATIENT)
Dept: MEDICAL GROUP | Facility: PHYSICIAN GROUP | Age: 83
End: 2023-01-31
Payer: MEDICARE

## 2023-01-31 DIAGNOSIS — H53.8 CLOUDY VISION: ICD-10-CM

## 2023-01-31 NOTE — TELEPHONE ENCOUNTER
VOICEMAIL  1. Caller Name: Pat Johnson  Call Back Number: [phone]    2. Message: Patient left voicemail asking for a referral to new eye doctor. She has cloudy vision and wants to see if they will get her in for eye surgery. Please advise.  [facility] cant get her in until august she states in the message.

## 2023-02-01 ENCOUNTER — TELEPHONE (OUTPATIENT)
Dept: MEDICAL GROUP | Facility: PHYSICIAN GROUP | Age: 83
End: 2023-02-01
Payer: MEDICARE

## 2023-02-01 NOTE — TELEPHONE ENCOUNTER
Future Appointments         Provider Department Center    2/8/2023 11:20 AM (Arrive by 11:05 AM) [facility] [place]    2/17/2023 10:00 AM Chandrakant Freire M.D. [facility]           ESTABLISHED PATIENT PRE-VISIT PLANNING     Patient was NOT contacted to complete PVP.     Note: Patient will not be contacted if there is no indication to call.     1.  Reviewed notes from the last few office visits within the medical group: Yes, LOV 12/20/2022    2.  If any orders were placed at last visit or intended to be done for this visit (i.e. 6 mos follow-up), do we have Results/Consult Notes?           Labs - Labs ordered, completed on 12/20/2022 and results are in chart.  Note: If patient appointment is for lab review and patient did not complete labs, check with provider if OK to reschedule patient until labs completed.         Imaging - Imaging was not ordered at last office visit.         Referrals - No referrals were ordered at last office visit.    3. Is this appointment scheduled as a Hospital Follow-Up? No    4.  Immunizations were updated in Epic using Reconcile Outside Information activity? Yes    5.  Patient is due for the following Health Maintenance Topics:   Health Maintenance Due   Topic Date Due    COVID-19 Vaccine (1) Never done    IMM DTaP/Tdap/Td Vaccine (1 - Tdap) Never done    IMM ZOSTER VACCINES (1 of 2) Never done    IMM PNEUMOCOCCAL VACCINE: 65+ Years (1 - PCV) Never done     6.  AHA (Pulse8) form printed for Provider? N/A

## 2023-02-07 ENCOUNTER — TELEPHONE (OUTPATIENT)
Dept: HEALTH INFORMATION MANAGEMENT | Facility: OTHER | Age: 83
End: 2023-02-07
Payer: MEDICARE

## 2023-02-08 ENCOUNTER — OFFICE VISIT (OUTPATIENT)
Dept: MEDICAL GROUP | Facility: PHYSICIAN GROUP | Age: 83
End: 2023-02-08
Payer: MEDICARE

## 2023-02-08 ENCOUNTER — DOCUMENTATION (OUTPATIENT)
Dept: VASCULAR LAB | Facility: MEDICAL CENTER | Age: 83
End: 2023-02-08

## 2023-02-08 ENCOUNTER — HOSPITAL ENCOUNTER (OUTPATIENT)
Dept: LAB | Facility: MEDICAL CENTER | Age: 83
End: 2023-02-08
Attending: NURSE PRACTITIONER
Payer: MEDICARE

## 2023-02-08 VITALS
HEIGHT: 60 IN | BODY MASS INDEX: 32.79 KG/M2 | DIASTOLIC BLOOD PRESSURE: 88 MMHG | HEART RATE: 74 BPM | OXYGEN SATURATION: 96 % | TEMPERATURE: 98.2 F | WEIGHT: 167 LBS | SYSTOLIC BLOOD PRESSURE: 162 MMHG

## 2023-02-08 DIAGNOSIS — H53.8 CLOUDY VISION: ICD-10-CM

## 2023-02-08 DIAGNOSIS — I73.9 PVD (PERIPHERAL VASCULAR DISEASE) (HCC): ICD-10-CM

## 2023-02-08 DIAGNOSIS — E78.5 DYSLIPIDEMIA: ICD-10-CM

## 2023-02-08 DIAGNOSIS — Z76.89 ENCOUNTER TO ESTABLISH CARE: ICD-10-CM

## 2023-02-08 DIAGNOSIS — Z89.422 ACQUIRED ABSENCE OF OTHER LEFT TOE(S) (HCC): ICD-10-CM

## 2023-02-08 DIAGNOSIS — Z89.421 ACQUIRED ABSENCE OF OTHER RIGHT TOE(S) (HCC): ICD-10-CM

## 2023-02-08 DIAGNOSIS — I49.5 SICK SINUS SYNDROME (HCC): Chronic | ICD-10-CM

## 2023-02-08 DIAGNOSIS — I10 HYPERTENSION, ESSENTIAL: ICD-10-CM

## 2023-02-08 LAB
CHOLEST SERPL-MCNC: 177 MG/DL (ref 100–199)
FASTING STATUS PATIENT QL REPORTED: NORMAL
HDLC SERPL-MCNC: 58 MG/DL
LDLC SERPL CALC-MCNC: 106 MG/DL
TRIGL SERPL-MCNC: 67 MG/DL (ref 0–149)

## 2023-02-08 PROCEDURE — 80061 LIPID PANEL: CPT

## 2023-02-08 PROCEDURE — 36415 COLL VENOUS BLD VENIPUNCTURE: CPT

## 2023-02-08 PROCEDURE — 99214 OFFICE O/P EST MOD 30 MIN: CPT | Performed by: NURSE PRACTITIONER

## 2023-02-08 RX ORDER — IRBESARTAN 300 MG/1
300 TABLET ORAL NIGHTLY
Qty: 100 TABLET | Refills: 1 | Status: SHIPPED | OUTPATIENT
Start: 2023-02-08 | End: 2023-09-13

## 2023-02-08 ASSESSMENT — PATIENT HEALTH QUESTIONNAIRE - PHQ9: CLINICAL INTERPRETATION OF PHQ2 SCORE: 0

## 2023-02-08 ASSESSMENT — FIBROSIS 4 INDEX: FIB4 SCORE: 2.55

## 2023-02-08 NOTE — ASSESSMENT & PLAN NOTE
She continues with Zetia 10 mg every evening and fenofibrate 145 mg daily.  She has made diet changes.  She completed recent labs this morning and is awaiting results.

## 2023-02-08 NOTE — PROGRESS NOTES
"S/w patient regarding cancelled pharmacotherapy f/u.  She does not wish to be seen or make any future f/u appts as she states \"I'm done with pills, I'm not taking any more\".  Will defer back to PCP and cardiology pending further contact.  Newton Noriega, GualbertoD, BCACP    "

## 2023-02-08 NOTE — ASSESSMENT & PLAN NOTE
Has pacemaker present and continues with pacemaker checks during the year.  Followed by cardiology.

## 2023-02-08 NOTE — ASSESSMENT & PLAN NOTE
Blood pressure not at goal today.  Adding on verapamil to her irbesartan dosing.  She is on Zetia and had recent labs this morning.  She has made diet changes.  Activities as tolerated.  Using a four-wheel walker with ambulation.

## 2023-02-08 NOTE — PROGRESS NOTES
Subjective:     CC: Hypertension follow-up and referral to dentistry     HPI:   Irina presents today with the following:    Hypertension, essential  Her blood pressure today is 160/86.  She continues with irbesartan 300 mg in the morning. She stopped her hydrochlorothiazide because she was not aware it was for blood pressure.  She did not like that the hydrochlorothiazide increased her urination.  Her amlodipine 10 mg dosing was stopped with cardiology at her last cardiology appointment due to leg swelling.    Cloudy vision  Has appointment tomorrow with [facility].    Dyslipidemia  She continues with Zetia 10 mg every evening and fenofibrate 145 mg daily.  She has made diet changes.  She completed recent labs this morning and is awaiting results.    Sick sinus syndrome (HCC)  Has pacemaker present and continues with pacemaker checks during the year.  Followed by cardiology.    PVD (peripheral vascular disease) ([facility])  Blood pressure not at goal today.  Adding on verapamil to her irbesartan dosing.  She is on Zetia and had recent labs this morning.  She has made diet changes.  Activities as tolerated.  Using a four-wheel walker with ambulation.    Past Medical History:   Diagnosis Date    Anesthesia     States feels like she can't breathe with induction for right hip surgery, gets nausea vomiting with most pain meds.  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    Post-nasal drip     Restless leg syndrome     4/25/17-Resolved, states was related to right hip and none S/P replacement.    Sick sinus syndrome (HCC) 6/29/2022    Sinus arrest 6/29/2022     Stroke ([facility]) 2/1993, 8/1993    Ynqwecaq-zqfbjd-tjfjpauiyy aphasia,mouth forming words, reading, numbers    Walker as ambulation aid        Social History     Tobacco Use    Smoking status: Never    Smokeless tobacco: Never   Vaping Use    Vaping Use: Never used   Substance Use Topics    Alcohol use: Not Currently    Drug use: No       Current Outpatient Medications Ordered in Epic   Medication Sig Dispense Refill    verapamil SR (CALAN-SR) 120 MG CR tablet Take 1 Tablet by mouth every day. 100 Tablet 1    irbesartan (AVAPRO) 300 MG Tab Take 1 Tablet by mouth every evening. 100 Tablet 1    loratadine (CLARITIN) 10 MG Tab TAKE ONE TABLET BY MOUTH EVERY DAY AS NEEDED FOR ALLERGY 90 Tablet 3    ezetimibe (ZETIA) 10 MG Tab Take 1 Tablet by mouth every evening. 100 Tablet 3    Ascorbic Acid (VITAMIN C CR) 1000 MG Tab CR Take 1 Tablet by mouth every day.      ASPIRIN 81 PO Take 1 Tablet by mouth every day.      Magnesium 400 MG Tab Take 400 mg by mouth every day.      memantine (NAMENDA) 5 MG Tab Take 1 Tablet by mouth every day. FOR MEMORY AND PRE ALTZHEIMERS. (Patient not taking: Reported on 2/8/2023) 90 Tablet 1    vitamin D (CHOLECALCIFEROL) 1000 Unit Tab TAKE ONE TABLET BY MOUTH EVERY DAY (Patient not taking: Reported on 2/8/2023) 90 Tablet 1    fenofibrate (TRICOR) 145 MG Tab Take 1 Tablet by mouth every day. 100 Tablet 3     No current Epic-ordered facility-administered medications on file.       Allergies:  Benicar [olmesartan], Dilaudid  [hydromorphone hcl], Lidocaine, Norco  [apap-fd&c blue #1-hydrocodone], Other misc, Percocet  [apap-fd&c red #40 [place]-oxycodone], Vicodin  [apap-fd&c yellow #10 [place]-hydrocodone], Codeine, Demerol, Hydrocodone, Other drug, Oxycodone, Statins [hmg-coa-r inhibitors], Valium, and Nexletol [bempedoic acid]    Health Maintenance: Declines PCV-20.       Objective:     Vital signs reviewed  Exam:  BP (!) 162/88 (BP Location: Right arm, Patient Position: Sitting)   Pulse 74   Temp  36.8 °C (98.2 °F) (Temporal)   Ht 1.524 m (5')   Wt 75.8 kg (167 lb)   SpO2 96%   BMI 32.61 kg/m²  Body mass index is 32.61 kg/m².    Gen: Alert and oriented, No apparent distress.  Lungs: Normal effort, CTA bilaterally, no wheezes, rhonchi, or rales  CV: Regular rate and rhythm. No murmurs, rubs, or gallops. Pacemaker present left upper chest.   Ext: Using four wheel walker with ambulation.       Assessment & Plan:     82 y.o. female with the following -     1. Hypertension, essential  Chronic exacerbated problem.  On repeat by me today her blood pressure is 162/88.  She does not wish to be on the hydrochlorothiazide and amlodipine caused leg swelling.  We discussed switching calcium channel blockers and she is in agreement.  She will start verapamil  mg daily.  She will continue with irbesartan 300 mg every evening.  She will return in 1 month for follow-up.  - verapamil SR (CALAN-SR) 120 MG CR tablet; Take 1 Tablet by mouth every day.  Dispense: 100 Tablet; Refill: 1  - irbesartan (AVAPRO) 300 MG Tab; Take 1 Tablet by mouth every evening.  Dispense: 100 Tablet; Refill: 1    2. Dyslipidemia  Chronic stable problem.  Awaiting updated labs.  She will continue with Zetia 10 mg every evening and fenofibrate 145 mg daily.  Continue with healthy diet and exercise as tolerated.    3. Sick sinus syndrome (HCC)  Chronic stable problem.  Continue with pacemaker checks.  Continue follow-up with cardiology.    4. PVD (peripheral vascular disease) (HCC)  Chronic stable problem.  Continue with blood pressure control cholesterol control.  Continue with verapamil, irbesartan, Zetia and fenofibrate.    5. Cloudy vision  Chronic stable problem.  Keep upcoming ophthalmology appointment.    6. Encounter to establish care  Acute uncomplicated problem.  She would like assistance with finding a dentist.  I placed a referral today and also informed her that she would probably need to reach out to her insurance for list of  dentists.  - Referral to Dentistry    7. 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  Continue with four-wheel walker with ambulation and wearing well fitted shoes.  Follow-up annually    8. 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  Continue with four-wheel walker with ambulation and wearing well fitted shoes.  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 Continue with current defined treatment plan: Using four-wheel walker with ambulation and wears well fitted shoes. Follow-up at least annually.  Diagnosis: Z89.421 - Acquired absence of other right toe(s) (HCC)  Assessment and plan: Chronic, stable. Continue with current defined treatment plan: Using four-wheel walker with ambulation and wears well fitted shoes. Follow-up at least annually.  Last edited 02/08/23 17:51 PST by Sarita Noble A.P.R.N.           Return in about 4 weeks (around 3/8/2023) for Hypertension.    Please note that this dictation was created using voice recognition software. I have made every reasonable attempt to correct obvious errors, but I expect that there are errors of grammar and possibly content that I did not discover before finalizing the note.

## 2023-02-08 NOTE — ASSESSMENT & PLAN NOTE
Her blood pressure today is 160/86.  She continues with irbesartan 300 mg in the morning. She stopped her hydrochlorothiazide because she was not aware it was for blood pressure.  She did not like that the hydrochlorothiazide increased her urination.  Her amlodipine 10 mg dosing was stopped with cardiology at her last cardiology appointment due to leg swelling.

## 2023-02-17 ENCOUNTER — OFFICE VISIT (OUTPATIENT)
Dept: CARDIOLOGY | Facility: MEDICAL CENTER | Age: 83
End: 2023-02-17
Payer: MEDICARE

## 2023-02-17 VITALS
OXYGEN SATURATION: 97 % | HEIGHT: 61 IN | HEART RATE: 80 BPM | SYSTOLIC BLOOD PRESSURE: 174 MMHG | BODY MASS INDEX: 31.72 KG/M2 | RESPIRATION RATE: 16 BRPM | DIASTOLIC BLOOD PRESSURE: 82 MMHG | WEIGHT: 168 LBS

## 2023-02-17 DIAGNOSIS — I73.9 PVD (PERIPHERAL VASCULAR DISEASE) (HCC): ICD-10-CM

## 2023-02-17 DIAGNOSIS — E78.5 DYSLIPIDEMIA: ICD-10-CM

## 2023-02-17 DIAGNOSIS — E78.00 PURE HYPERCHOLESTEROLEMIA: ICD-10-CM

## 2023-02-17 DIAGNOSIS — I10 HYPERTENSION, ESSENTIAL: ICD-10-CM

## 2023-02-17 DIAGNOSIS — I49.5 SICK SINUS SYNDROME (HCC): Chronic | ICD-10-CM

## 2023-02-17 DIAGNOSIS — I63.9 RECURRENT CEREBROVASCULAR ACCIDENTS (CVAS) (HCC): ICD-10-CM

## 2023-02-17 PROBLEM — Z86.73 HISTORY OF ISCHEMIC LEFT MCA STROKE: Status: RESOLVED | Noted: 2019-12-03 | Resolved: 2023-02-17

## 2023-02-17 PROCEDURE — 99214 OFFICE O/P EST MOD 30 MIN: CPT | Performed by: INTERNAL MEDICINE

## 2023-02-17 ASSESSMENT — FIBROSIS 4 INDEX: FIB4 SCORE: 2.55

## 2023-02-17 NOTE — PROGRESS NOTES
CARDIOLOGY CONSULTATION NOTE      Date of Visit: 2/17/2023    Primary Care Provider: NAYAN Longo    Patient Name: Irina Traylor  YOB: 1940  MRN: [id number]     Reason for Visit:   [facility] care     Patient Story:   Irina Traylor is a 81 year-old gentleman with a past medical history of prior embolic stroke with resultant aphasia, hypertension, obesity, obstructive sleep apnea, peripheral vascular disease, and sick sinus syndrome.  Briefly, she has a history of multiple strokes in the past and most recently was admitted on 6/20/2022 after experiencing dysphagia and slurred speech.  She was ultimately found to have an acute infarct in the left posterior temporal/occipital cortex.  Following this event, she was ordered for cardiac monitoring to assess for atrial fibrillation, which did not demonstrate any evidence of atrial fibrillation, however, she did have a 6.3 second sinus pause that was associated with near syncope.  She was sent to the emergency department and ultimately underwent permanent pacemaker implantation.    At her initial clinic visit with me, she was noted to have significant lower extremity edema and her amlodipine was stopped.  She was started on HCTZ, but did not tolerate this due to frequent urination and was recently prescribed verapamil by her PCP, which she has not yet started.    Today, she reports doing better and notes that her lower extremity edema mostly resolved after stopping amlodipine.  She has yet to start verapamil and her blood pressures have remained high at home.  She does note that she is eating mostly salads and fish now and is taking her cholesterol levels very seriously.  She remains uninterested in an injectable medication at this time and would like to start with dietary modifications to get her LDL below 70.     Medications and Allergies:     Current Outpatient Medications   Medication Sig Dispense Refill    loratadine  "(CLARITIN) 10 MG Tab TAKE ONE TABLET BY MOUTH EVERY DAY AS NEEDED FOR ALLERGY 90 Tablet 3    ezetimibe (ZETIA) 10 MG Tab Take 1 Tablet by mouth every evening. 100 Tablet 3    Ascorbic Acid (VITAMIN C CR) 1000 MG Tab CR Take 1 Tablet by mouth every day.      ASPIRIN 81 PO Take 1 Tablet by mouth every day.      Magnesium 400 MG Tab Take 400 mg by mouth every day.      verapamil SR (CALAN-SR) 120 MG CR tablet Take 1 Tablet by mouth every day. 100 Tablet 1    irbesartan (AVAPRO) 300 MG Tab Take 1 Tablet by mouth every evening. 100 Tablet 1     No current facility-administered medications for this visit.     Allergies   Allergen Reactions    Benicar [Olmesartan] Rash     blisters    Dilaudid  [Hydromorphone Hcl] Vomiting    Lidocaine     Norco  [Apap-Fd&C Blue #1-Hydrocodone] Vomiting    Other Misc Shortness of Breath     Anesthesia with surgery 2016  Pt couldn't breathe while being put under and tapped side of bed to let RN know. \"It was the scariest feeling and I want everyone to know\"      Percocet  [Apap-Fd&C Red #40 [place]-Oxycodone] Vomiting    Vicodin  [Apap-Fd&C Yellow #10 [place]-Hydrocodone] Vomiting    Codeine Vomiting    Demerol Vomiting    Hydrocodone Vomiting    Other Drug Vomiting     \"all pain meds\" per pt can take morphine.    Oxycodone Vomiting    Statins [Hmg-Coa-R Inhibitors] Unspecified     Muscle cramping    Valium Vomiting    Amlodipine Swelling    Nexletol [Bempedoic Acid] Palpitations     Chest pain and dizziness    Hydrochlorothiazide      Frequent urination      Medical Decision Making:   # History of recurrent CVA, hyperlipidemia: She has not had detected atrial fibrillation thus far and most likely this is atherosclerotic in etiology.  She is certainly high risk for recurrent events, but has been intolerant of statins and bempedoic acid.  She has declined to meet with our pharmacist to discuss a PCSK9 inhibitor or inclisiran and for now would like to try dietary modifications  -Continue " "aspirin 81 mg daily indefinitely  -We will repeat lipids in 6 months following further dietary modifications and discuss options for therapy pending results  -Long-term AF monitoring can be performed through her device    # Hypertension: Uncontrolled today, but she has not restarted verapamil  -Continue irbesartan 300 mg nightly  -Patient to start verapamil 120 mg daily and will follow-up with her PCP in a few weeks    # Carotid artery disease, peripheral vascular disease: This was only mild on her recent CTA of the head/neck.  -Medical management as above    # Sick sinus syndrome (s/p permanent pacemaker implantation): Remote monitoring from today demonstrated normal device function with 9.5% atrial pacing, less than 1% ventricular pacing, and no detected atrial fibrillation.  -Continue follow-up per [facility]    Follow Up  3 months     Cardiac Studies and Procedures:   Echocardiography  TTE (6/21/2021)  The left ventricular ejection fraction is normal; visually estimated to be 55%.  Mild concentric left ventricular hypertrophy.  At least grade 1 diastolic dysfunction, difficult assessment due to mitral valve calcification.  Thickened, calcified mitral valve leaflets.  Mild mitral regurgitation.  Mild aortic insufficiency.  Normal left atrial size.  Normal IVC.    CT/MRI  CTA head/neck (6/20/2022)  Mild bilateral internal carotid artery atherosclerotic plaque without significant stenosis    Electrophysiology  2-week cardiac monitor (7/21/2021)  Predominantly sinus rhythm.   Significant sinus arrest.   Asymptomatic SVT and rare ectopy.      Vital Signs:   BP (!) 174/82 (BP Location: Left arm, Patient Position: Sitting)   Pulse 80   Resp 16   Ht 1.549 m (5' 1\")   Wt 76.2 kg (168 lb)   SpO2 97%    BP Readings from Last 4 Encounters:   02/17/23 (!) 174/82   02/08/23 (!) 162/88   12/20/22 (!) 162/74   10/13/22 (!) 140/56     Wt Readings from Last 4 Encounters:   02/17/23 76.2 kg (168 lb)   02/08/23 75.8 kg (167 " lb)   12/20/22 76.7 kg (169 lb)   10/13/22 75.8 kg (167 lb)     Body mass index is 31.74 kg/m².     Laboratories:   Lipids  Lab Results   Component Value Date/Time     (H) 02/08/2023 10:36 AM    LDL 92 08/02/2022 10:40 AM    LDL 80 06/21/2022 03:25 AM    LDL 86 07/08/2021 10:34 AM    LDL 88 02/04/2020 11:42 AM       No results found for: LDLCALC  Lab Results   Component Value Date/Time    HDL 58 02/08/2023 10:36 AM    HDL 65 08/02/2022 10:40 AM    HDL 53 06/21/2022 03:25 AM    HDL 81 07/08/2021 10:34 AM    HDL 65 02/04/2020 11:42 AM       Lab Results   Component Value Date/Time    TRIGLYCERIDE 67 02/08/2023 10:36 AM    TRIGLYCERIDE 41 08/02/2022 10:40 AM    TRIGLYCERIDE 79 06/21/2022 03:25 AM    TRIGLYCERIDE 48 07/08/2021 10:34 AM    TRIGLYCERIDE 80 02/04/2020 11:42 AM       Lab Results   Component Value Date/Time    CHOLSTRLTOT 177 02/08/2023 10:36 AM    CHOLSTRLTOT 165 08/02/2022 10:40 AM    CHOLSTRLTOT 149 06/21/2022 03:25 AM    CHOLSTRLTOT 177 07/08/2021 10:34 AM    CHOLSTRLTOT 169 02/04/2020 11:42 AM       No results found for: LIPOPROTA      Chemistries  Lab Results   Component Value Date/Time    CREATININE 0.92 12/20/2022 03:01 PM    CREATININE 0.82 08/02/2022 10:40 AM    CREATININE 1.13 07/15/2022 04:07 AM    CREATININE 0.86 07/14/2022 01:55 AM    CREATININE 0.82 07/13/2022 01:59 AM     Lab Results   Component Value Date/Time    BUN 52 (H) 12/20/2022 03:01 PM    BUN 39 (H) 08/02/2022 10:40 AM    BUN 30 (H) 07/15/2022 04:07 AM    BUN 32 (H) 07/14/2022 01:55 AM    BUN 31 (H) 07/13/2022 01:59 AM     Lab Results   Component Value Date/Time    POTASSIUM 4.8 12/20/2022 03:01 PM    POTASSIUM 4.7 08/02/2022 10:40 AM    POTASSIUM 4.9 07/15/2022 04:07 AM     Lab Results   Component Value Date/Time    SODIUM 141 12/20/2022 03:01 PM    SODIUM 141 08/02/2022 10:40 AM    SODIUM 138 07/15/2022 04:07 AM     Lab Results   Component Value Date/Time    GLUCOSE 74 12/20/2022 03:01 PM    GLUCOSE 77 08/02/2022 10:40 AM     GLUCOSE 105 (H) 07/15/2022 04:07 AM     Lab Results   Component Value Date/Time    ASTSGOT 29 08/02/2022 10:40 AM    ASTSGOT 20 07/12/2022 05:53 PM    ASTSGOT 25 06/22/2022 01:44 AM     Lab Results   Component Value Date/Time    ALTSGPT 14 08/02/2022 10:40 AM    ALTSGPT 17 07/12/2022 05:53 PM    ALTSGPT 17 06/22/2022 01:44 AM     Lab Results   Component Value Date/Time    ALKPHOSPHAT 53 08/02/2022 10:40 AM    ALKPHOSPHAT 51 07/12/2022 05:53 PM    ALKPHOSPHAT 53 06/22/2022 01:44 AM     Lab Results   Component Value Date/Time    HBA1C 5.4 08/02/2022 10:40 AM    HBA1C 5.5 06/20/2022 09:25 PM    HBA1C 5.7 (H) 07/08/2021 10:34 AM     No results found for: TSH  No results found for: NTPROBNP  Lab Results   Component Value Date/Time    TROPONINT 15 07/12/2022 05:53 PM    TROPONINT 12 06/20/2022 07:09 PM       Blood Counts  Lab Results   Component Value Date/Time    HEMOGLOBIN 14.9 08/02/2022 10:40 AM    HEMOGLOBIN 14.6 07/13/2022 01:59 AM    HEMOGLOBIN 15.0 07/12/2022 05:53 PM     Lab Results   Component Value Date/Time    PLATELETCT 243 08/02/2022 10:40 AM    PLATELETCT 202 07/13/2022 01:59 AM    PLATELETCT 225 07/12/2022 05:53 PM     Lab Results   Component Value Date/Time    WBC 5.9 08/02/2022 10:40 AM    WBC 5.0 07/13/2022 01:59 AM    WBC 6.1 07/12/2022 05:53 PM        Physical Examination:   General: In wheelchair, no acute distress  Eyes: Extraocular movements intact, anicteric  Neck: Full range of motion, no gross jugular venous distension  Pulmonary: Normal respiratory effort, no distress  Cardiovascular: Regular rate and rhythm  Extremities: Warm and well perfused, trace residual bilateral lower extreme edema  Neurological: Alert and oriented, mild aphasia  Psychiatric: Normal affect, normal judgment     Past History:   Past Medical History  The patient's past medical history was reviewed.  See HPI and self-reported patient medical history form for pertinent medical history to consultation.    Past Social  History  The patient's social history was reviewed.  See [facility] self-reported patient medical history form for pertinent social history to consultation.    Past Family History  The patient's family history was reviewed.  See [facility] self-reported patient medical history form for pertinent family history to consultation.    Review of Systems  A pertinent cardiac review of systems was performed and was otherwise unremarkable except as per HPI and self-reported patient medical history form.        Chandrakant Freire MD, [facility]  Interventional Cardiology  [facility], [address]  [address] [address]  Phone: [phone]  Fax: [phone]

## 2023-03-01 ENCOUNTER — TELEPHONE (OUTPATIENT)
Dept: MEDICAL GROUP | Facility: PHYSICIAN GROUP | Age: 83
End: 2023-03-01
Payer: MEDICARE

## 2023-03-01 NOTE — TELEPHONE ENCOUNTER
Future Appointments         Provider Department Center    3/8/2023 11:20 AM (Arrive by 11:05 AM) NAYAN Longo [facility] [place] [facility]    5/23/2023 10:20 AM Chandrakant Freire M.D. [facility]           ESTABLISHED PATIENT PRE-VISIT PLANNING     Patient was NOT contacted to complete PVP.     Note: Patient will not be contacted if there is no indication to call.     1.  Reviewed notes from the last few office visits within the medical group: Yes, LOV 02/08/2023    2.  If any orders were placed at last visit or intended to be done for this visit (i.e. 6 mos follow-up), do we have Results/Consult Notes?           Labs - Labs ordered, completed on 02/08/2023 and results are in chart.  Note: If patient appointment is for lab review and patient did not complete labs, check with provider if OK to reschedule patient until labs completed.         Imaging - Imaging was not ordered at last office visit.         Referrals - No referrals were ordered at last office visit.    3. Is this appointment scheduled as a Hospital Follow-Up? No    4.  Immunizations were updated in Epic using Reconcile Outside Information activity? Yes    5.  Patient is due for the following Health Maintenance Topics:   Health Maintenance Due   Topic Date Due    COVID-19 Vaccine (1) Never done    IMM DTaP/Tdap/Td Vaccine (1 - Tdap) Never done    IMM ZOSTER VACCINES (1 of 2) Never done     6.  AHA (Pulse8) form printed for Provider? N/A

## 2023-03-08 ENCOUNTER — OFFICE VISIT (OUTPATIENT)
Dept: MEDICAL GROUP | Facility: PHYSICIAN GROUP | Age: 83
End: 2023-03-08
Payer: MEDICARE

## 2023-03-08 VITALS
HEIGHT: 60 IN | BODY MASS INDEX: 32.79 KG/M2 | HEART RATE: 60 BPM | DIASTOLIC BLOOD PRESSURE: 70 MMHG | WEIGHT: 167 LBS | TEMPERATURE: 98.5 F | SYSTOLIC BLOOD PRESSURE: 132 MMHG | OXYGEN SATURATION: 93 %

## 2023-03-08 DIAGNOSIS — I10 HYPERTENSION, ESSENTIAL: ICD-10-CM

## 2023-03-08 DIAGNOSIS — E78.00 PURE HYPERCHOLESTEROLEMIA: ICD-10-CM

## 2023-03-08 PROCEDURE — 99214 OFFICE O/P EST MOD 30 MIN: CPT | Performed by: NURSE PRACTITIONER

## 2023-03-08 ASSESSMENT — FIBROSIS 4 INDEX: FIB4 SCORE: 2.55

## 2023-03-08 NOTE — ASSESSMENT & PLAN NOTE
Her blood pressure today is 132/70.  She continues with irbesartan 300 mg daily and has started her verapamil  mg daily.  At her recent cardiology appointment on 2/17/2023 her blood pressure was elevated 174/82 and at that time she had not started the verapamil. She took her medications today at 6:30 a.m. and she took her blood pressure right after and her reading was 144/88. Encouraged to wait at least 1 hour after taking medications before checking blood pressure. Denies chest pain, shortness of breath, dizziness or headaches.  She does note that 3-month supply because of both verapamil and irbesartan each cost $100 which she is on the higher end of her cost.  We have reached out to [facility] and they recommended doing a PA to see if we can get her lower cost.  Patient informed and she is happy to hear.  She has an upcoming appointment on 3/24/23 with [facility] for bilateral cataracts.  She has upcoming dentist appointment at the end of the month.

## 2023-03-08 NOTE — PROGRESS NOTES
Subjective:     CC: Hypertension follow-up    HPI:   Irina presents today with the following:    Hypertension, essential  Her blood pressure today is 132/70.  She continues with irbesartan 300 mg daily and has started her verapamil  mg daily.  At her recent cardiology appointment on 2/17/2023 her blood pressure was elevated 174/82 and at that time she had not started the verapamil. She took her medications today at 6:30 a.m. and she took her blood pressure right after and her reading was 144/88. Encouraged to wait at least 1 hour after taking medications before checking blood pressure. Denies chest pain, shortness of breath, dizziness or headaches.  She does note that 3-month supply because of both verapamil and irbesartan each cost $100 which she is on the higher end of her cost.  We have reached out to [facility] and they recommended doing a PA to see if we can get her lower cost.  Patient informed and she is happy to hear.  She has an upcoming appointment on 3/24/23 with [facility] for bilateral cataracts.  She has upcoming dentist appointment at the end of the month.    Pure hypercholesterolemia  She continues with zetia 10 mg every evening and daily fish oil. She does not tolerate statin and did not tolerate bempedoic acid as she had reactions. She watches her diet and does eat lots of vegetables. Recent labs show LDL of 106. Has upcoming cardiology appointment on 5/23/2023. Chart review shows she did not want further follow-up with pharmacotherapy, pharmacotherapy deferred back to PCP and cardiology for now.          Past Medical History:   Diagnosis Date    Anesthesia     States feels like she can't breathe with induction for right hip surgery, gets nausea vomiting with most pain meds.  Uses anti-nausea medicine if takes morphine    Arthritis     To hips, shoulders, hands    Benign essential HTN 9/7/2017    Cataract     CPAP (continuous positive airway pressure) dependence      Dyslipidemia 3/5/2019    Expressive aphasia     Related to CVA    Family history of CVA     Heart murmur     DARIN (obstructive sleep apnea) 1/26/2017    AHI 23.6, minimum saturation 69%, on CPAP 15 cm.    Post-nasal drip     Restless leg syndrome     4/25/17-Resolved, states was related to right hip and none S/P replacement.    Sick sinus syndrome (HCC) 6/29/2022    Sinus arrest 6/29/2022    Stroke ([facility]) 2/1993, 8/1993    Udlhjfqp-mwrmqi-oqhhtvwgxj aphasia,mouth forming words, reading, numbers    Walker as ambulation aid        Social History     Tobacco Use    Smoking status: Never    Smokeless tobacco: Never   Vaping Use    Vaping Use: Never used   Substance Use Topics    Alcohol use: Not Currently    Drug use: No       Current Outpatient Medications Ordered in Epic   Medication Sig Dispense Refill    verapamil SR (CALAN-SR) 120 MG CR tablet Take 1 Tablet by mouth every day. 100 Tablet 1    irbesartan (AVAPRO) 300 MG Tab Take 1 Tablet by mouth every evening. 100 Tablet 1    loratadine (CLARITIN) 10 MG Tab TAKE ONE TABLET BY MOUTH EVERY DAY AS NEEDED FOR ALLERGY 90 Tablet 3    ezetimibe (ZETIA) 10 MG Tab Take 1 Tablet by mouth every evening. 100 Tablet 3    Ascorbic Acid (VITAMIN C CR) 1000 MG Tab CR Take 1 Tablet by mouth every day.      ASPIRIN 81 PO Take 1 Tablet by mouth every day.      Magnesium 400 MG Tab Take 400 mg by mouth every day.       No current [facility]-ordered facility-administered medications on file.       Allergies:  Benicar [olmesartan], Dilaudid  [hydromorphone hcl], Lidocaine, Norco  [apap-fd&c blue #1-hydrocodone], Other misc, Percocet  [apap-fd&c red #40 [place]-oxycodone], Vicodin  [apap-fd&c yellow #10 [place]-hydrocodone], Codeine, Demerol, Hydrocodone, Other drug, Oxycodone, Statins [hmg-coa-r inhibitors], Valium, Amlodipine, Nexletol [bempedoic acid], and Hydrochlorothiazide    Health Maintenance: Reviewed      Objective:     Vital signs reviewed  Exam:  /70 (BP Location: Left arm,  Patient Position: Sitting, BP Cuff Size: Adult)   Pulse 60   Temp 36.9 °C (98.5 °F) (Temporal)   Ht 1.524 m (5')   Wt 75.8 kg (167 lb)   SpO2 93%   BMI 32.61 kg/m²  Body mass index is 32.61 kg/m².    Gen: Alert and oriented, No apparent distress.  Eyes:   Lids normal. Glasses in place.   Lungs: Normal effort, CTA bilaterally, no wheezes, rhonchi, or rales  CV: Regular rate and rhythm. No murmurs, rubs, or gallops.  Ext: Using four-wheel walker with ambulation. No edema.     Assessment & Plan:     82 y.o. female with the following -     1. Hypertension, essential  Chronic stable problem.  Blood pressure at goal today.  Continue with irbesartan 300 mg daily and verapamil  mg daily.  We will do the PA for both of her medications and let her know about the cost.  Keep upcoming cardiology appointment.  Continue healthy diet and exercise as tolerated.    2. Pure hypercholesterolemia  Chronic stable problem.  Continue follow-up with cardiology.  Continue with Zetia 10 mg every evening and daily fish oil.  Continue healthy diet and exercise as tolerated.        Return in about 4 months (around 7/8/2023) for Hypertension, cholesterol.    Please note that this dictation was created using voice recognition software. I have made every reasonable attempt to correct obvious errors, but I expect that there are errors of grammar and possibly content that I did not discover before finalizing the note.

## 2023-03-08 NOTE — ASSESSMENT & PLAN NOTE
She continues with zetia 10 mg every evening and daily fish oil. She does not tolerate statin and did not tolerate bempedoic acid as she had reactions. She watches her diet and does eat lots of vegetables. Recent labs show LDL of 106. Has upcoming cardiology appointment on 5/23/2023. Chart review shows she did not want further follow-up with pharmacotherapy, pharmacotherapy deferred back to PCP and cardiology for now.

## 2023-03-14 ENCOUNTER — TELEPHONE (OUTPATIENT)
Dept: MEDICAL GROUP | Facility: PHYSICIAN GROUP | Age: 83
End: 2023-03-14
Payer: MEDICARE

## 2023-03-14 NOTE — TELEPHONE ENCOUNTER
I called the pharmacy help desk for this patient [phone]. Pt had expressed concern for cost of these two medications. I spoke to help desk they checked on the cost and said that for the Verapamil a 100 day supply is $24.00 and the Irbesartan is 0. I checked to see if she needed to switch pharmacies, [facility] of the preferred pharmacy so no reason to switch. I tried to call Pat, both numbers listed her mailbox was not set up or full.

## 2023-04-14 ENCOUNTER — NON-PROVIDER VISIT (OUTPATIENT)
Dept: CARDIOLOGY | Facility: MEDICAL CENTER | Age: 83
End: 2023-04-14
Payer: MEDICARE

## 2023-04-14 PROCEDURE — 93294 REM INTERROG EVL PM/LDLS PM: CPT | Performed by: INTERNAL MEDICINE

## 2023-04-14 NOTE — CARDIAC REMOTE MONITOR - SCAN
Device transmission reviewed. Device demonstrated appropriate function.       Electronically Signed by: Reji Carl M.D.    4/14/2023  5:03 PM

## 2023-05-11 ENCOUNTER — TELEPHONE (OUTPATIENT)
Dept: CARDIOLOGY | Facility: MEDICAL CENTER | Age: 83
End: 2023-05-11
Payer: MEDICARE

## 2023-05-11 NOTE — TELEPHONE ENCOUNTER
Chart prep:    Lvm asking if patient had labs done yet. To call with information if so. And if not then she can go to any [facility] lab even without the lab slip. Just to be sure she is fasting at least 8 hours for the blood work.

## 2023-05-12 NOTE — TELEPHONE ENCOUNTER
Caller: Irina Traylor     Topic/issue: Pt called back in regards to message above, as I was asking for the office name, she couldn't tell me only saying it was done at [facility] office, and the location was [address], I kept telling I need the name of the office but she just kept telling me the same information, I apologize.     Callback Number: [phone] (home)       Thank you,     Jagjit TURNER

## 2023-05-19 NOTE — PROGRESS NOTES
CARDIOLOGY CLINIC NOTE      Date of Visit: 5/23/2023    Primary Care Provider: NAYAN Longo    Patient Name: Irina Traylor  YOB: 1940  MRN: [id number]     Reason for Visit:   Follow-up     Patient Story:   Irina Traylor is an 82 year-old woman with a past medical history of prior embolic stroke with resultant aphasia, hypertension, obesity, obstructive sleep apnea, peripheral vascular disease, and sick sinus syndrome.  Briefly, she has a history of multiple strokes in the past and most recently was admitted on 6/20/2022 after experiencing dysphagia and slurred speech.  She was ultimately found to have an acute infarct in the left posterior temporal/occipital cortex.  Following this event, she was ordered for cardiac monitoring to assess for atrial fibrillation, which did not demonstrate any evidence of atrial fibrillation, however, she did have a 6.3 second sinus pause that was associated with near syncope.  She was sent to the emergency department and ultimately underwent permanent pacemaker implantation.    At her initial clinic visit with me, she was noted to have significant lower extremity edema and her amlodipine was stopped.  She was started on HCTZ, but did not tolerate this due to frequent urination.  After stopping amlodipine, her edema essentially resolved.    At her last clinic visit, I recommended a referral to Clinical Pharmacy for initiation of a PCSK9 inhibitor, however, but she was hesitant to do an injectable medication wished to trial more aggressive lifestyle modifications first.  Follow-up lipids showed a slight improvement in her LDL, but remained above goal less than 70.    Today, she overall reports feeling well.  History taking is somewhat limited by aphasia.  She notices that she has chronic pains due to intermittent leg cramping and arthritis.  She cannot specifically remember what her side effects were with statins in the past.     Medications  "and Allergies:     Current Outpatient Medications   Medication Sig Dispense Refill    Apoaequorin (PREVAGEN PO) Take  by mouth.      vitamin E 180 MG (400 UNIT) Cap Take 180 mg by mouth 2 times a day.      KRILL OIL PO Take  by mouth.      ezetimibe-simvastatin (VYTORIN) 10-10 MG per tablet Take 1 Tablet by mouth every evening. 30 Tablet 3    verapamil SR (CALAN-SR) 120 MG CR tablet Take 1 Tablet by mouth every day. 100 Tablet 1    irbesartan (AVAPRO) 300 MG Tab Take 1 Tablet by mouth every evening. 100 Tablet 1    loratadine (CLARITIN) 10 MG Tab TAKE ONE TABLET BY MOUTH EVERY DAY AS NEEDED FOR ALLERGY 90 Tablet 3    Ascorbic Acid (VITAMIN C CR) 1000 MG Tab CR Take 1 Tablet by mouth every day.      ASPIRIN 81 PO Take 1 Tablet by mouth every day.      Magnesium 400 MG Tab Take 400 mg by mouth every day.       No current facility-administered medications for this visit.     Allergies   Allergen Reactions    Benicar [Olmesartan] Rash     blisters    Dilaudid  [Hydromorphone Hcl] Vomiting    Lidocaine     Norco  [Apap-Fd&C Blue #1-Hydrocodone] Vomiting    Other Misc Shortness of Breath     Anesthesia with surgery 2016  Pt couldn't breathe while being put under and tapped side of bed to let RN know. \"It was the scariest feeling and I want everyone to know\"      Percocet  [Apap-Fd&C Red #40 [place]-Oxycodone] Vomiting    Vicodin  [Apap-Fd&C Yellow #10 [place]-Hydrocodone] Vomiting    Codeine Vomiting    Demerol Vomiting    Hydrocodone Vomiting    Other Drug Vomiting     \"all pain meds\" per pt can take morphine.    Oxycodone Vomiting    Statins [Hmg-Coa-R Inhibitors] Unspecified     Muscle cramping    Valium Vomiting    Amlodipine Swelling    Nexletol [Bempedoic Acid] Palpitations     Chest pain and dizziness    Hydrochlorothiazide      Frequent urination      Medical Decision Making:   # History of recurrent CVA, hyperlipidemia: She has not had detected atrial fibrillation on her PPM and her CVAs are most atherosclerotic " in etiology.  She has a reported history of intolerance to statins and benefit of, but has tolerated ezetimibe.  She has been hesitant to try an injectable medication.  -Continue aspirin 81 mg daily indefinitely  -We will trial very low-dose simvastatin-ezetimibe 10-10 mg  -Return to ezetimibe 10 mg daily if she does not tolerate this  -Repeat fasting lipids in 3 months  -Long-term AF monitoring can be performed through her PPM    # Hypertension: Remains uncontrolled today.  Given intolerance to multiple medications, we will adjust her cholesterol medications as above and then work on her blood pressure control at next visit.  -Continue irbesartan 300 mg nightly  -Continue verapamil 120 mg daily    # Carotid artery disease, peripheral vascular disease: This was only mild on her recent CTA of the head/neck.  -Medical management as above    # Sick sinus syndrome (s/p permanent pacemaker implantation): Remote monitoring is shown normal device function with minimal pacemaking lead.  -Continue follow-up per [facility]    # Mild mitral regurgitation, mild aortic insufficiency:  -Obtain surveillance echocardiogram around 6/2024    Follow Up  3 months with repeat lipids     Cardiac Studies and Procedures:   Echocardiography  TTE (6/21/2021)  The left ventricular ejection fraction is normal; visually estimated to be 55%.  Mild concentric left ventricular hypertrophy.  At least grade 1 diastolic dysfunction, difficult assessment due to mitral valve calcification.  Thickened, calcified mitral valve leaflets.  Mild mitral regurgitation.  Mild aortic insufficiency.  Normal left atrial size.  Normal IVC.    CT/MRI  CTA head/neck (6/20/2022)  Mild bilateral internal carotid artery atherosclerotic plaque without significant stenosis    Electrophysiology  2-week cardiac monitor (7/21/2021)  Predominantly sinus rhythm.   Significant sinus arrest.   Asymptomatic SVT and rare ectopy.      Vital Signs:   BP (!) 150/60 (BP Location:  "Left arm, Patient Position: Sitting)   Pulse 62   Resp 16   Ht 1.549 m (5' 1\")   Wt 76.7 kg (169 lb)   SpO2 96%    BP Readings from Last 4 Encounters:   05/23/23 (!) 150/60   03/08/23 132/70   02/17/23 (!) 174/82   02/08/23 (!) 162/88     Wt Readings from Last 4 Encounters:   05/23/23 76.7 kg (169 lb)   03/08/23 75.8 kg (167 lb)   02/17/23 76.2 kg (168 lb)   02/08/23 75.8 kg (167 lb)     Body mass index is 31.93 kg/m².     Laboratories:   Lipids  Lab Results   Component Value Date/Time    LDL 89 05/22/2023 09:45 AM     (H) 02/08/2023 10:36 AM    LDL 92 08/02/2022 10:40 AM    LDL 80 06/21/2022 03:25 AM    LDL 86 07/08/2021 10:34 AM       No results found for: LDLCALC  Lab Results   Component Value Date/Time    HDL 70 05/22/2023 09:45 AM    HDL 58 02/08/2023 10:36 AM    HDL 65 08/02/2022 10:40 AM    HDL 53 06/21/2022 03:25 AM    HDL 81 07/08/2021 10:34 AM       Lab Results   Component Value Date/Time    TRIGLYCERIDE 54 05/22/2023 09:45 AM    TRIGLYCERIDE 67 02/08/2023 10:36 AM    TRIGLYCERIDE 41 08/02/2022 10:40 AM    TRIGLYCERIDE 79 06/21/2022 03:25 AM    TRIGLYCERIDE 48 07/08/2021 10:34 AM       Lab Results   Component Value Date/Time    CHOLSTRLTOT 170 05/22/2023 09:45 AM    CHOLSTRLTOT 177 02/08/2023 10:36 AM    CHOLSTRLTOT 165 08/02/2022 10:40 AM    CHOLSTRLTOT 149 06/21/2022 03:25 AM    CHOLSTRLTOT 177 07/08/2021 10:34 AM       No results found for: LIPOPROTA      Chemistries  Lab Results   Component Value Date/Time    CREATININE 0.61 05/22/2023 09:45 AM    CREATININE 0.92 12/20/2022 03:01 PM    CREATININE 0.82 08/02/2022 10:40 AM    CREATININE 1.13 07/15/2022 04:07 AM    CREATININE 0.86 07/14/2022 01:55 AM     Lab Results   Component Value Date/Time    BUN 23 (H) 05/22/2023 09:45 AM    BUN 52 (H) 12/20/2022 03:01 PM    BUN 39 (H) 08/02/2022 10:40 AM    BUN 30 (H) 07/15/2022 04:07 AM    BUN 32 (H) 07/14/2022 01:55 AM     Lab Results   Component Value Date/Time    POTASSIUM 4.3 05/22/2023 09:45 AM    " POTASSIUM 4.8 12/20/2022 03:01 PM    POTASSIUM 4.7 08/02/2022 10:40 AM     Lab Results   Component Value Date/Time    SODIUM 139 05/22/2023 09:45 AM    SODIUM 141 12/20/2022 03:01 PM    SODIUM 141 08/02/2022 10:40 AM     Lab Results   Component Value Date/Time    GLUCOSE 90 05/22/2023 09:45 AM    GLUCOSE 74 12/20/2022 03:01 PM    GLUCOSE 77 08/02/2022 10:40 AM     Lab Results   Component Value Date/Time    ASTSGOT 29 08/02/2022 10:40 AM    ASTSGOT 20 07/12/2022 05:53 PM    ASTSGOT 25 06/22/2022 01:44 AM     Lab Results   Component Value Date/Time    ALTSGPT 14 08/02/2022 10:40 AM    ALTSGPT 17 07/12/2022 05:53 PM    ALTSGPT 17 06/22/2022 01:44 AM     Lab Results   Component Value Date/Time    ALKPHOSPHAT 53 08/02/2022 10:40 AM    ALKPHOSPHAT 51 07/12/2022 05:53 PM    ALKPHOSPHAT 53 06/22/2022 01:44 AM     Lab Results   Component Value Date/Time    HBA1C 5.4 08/02/2022 10:40 AM    HBA1C 5.5 06/20/2022 09:25 PM    HBA1C 5.7 (H) 07/08/2021 10:34 AM     No results found for: TSH  No results found for: NTPROBNP  Lab Results   Component Value Date/Time    TROPONINT 15 07/12/2022 05:53 PM    TROPONINT 12 06/20/2022 07:09 PM       Blood Counts  Lab Results   Component Value Date/Time    HEMOGLOBIN 16.0 05/22/2023 09:45 AM    HEMOGLOBIN 14.9 08/02/2022 10:40 AM    HEMOGLOBIN 14.6 07/13/2022 01:59 AM     Lab Results   Component Value Date/Time    PLATELETCT 178 05/22/2023 09:45 AM    PLATELETCT 243 08/02/2022 10:40 AM    PLATELETCT 202 07/13/2022 01:59 AM     Lab Results   Component Value Date/Time    WBC 5.5 05/22/2023 09:45 AM    WBC 5.9 08/02/2022 10:40 AM    WBC 5.0 07/13/2022 01:59 AM        Physical Examination:   General: Chronically ill-appearing, but in no acute distress  Eyes: Extraocular movements intact, anicteric  Neck: Full range of motion, no gross jugular venous distension  Pulmonary: Normal respiratory effort, no distress  Cardiovascular: Regular rate and rhythm  Extremities: Warm and well perfused, trace  residual bilateral lower extreme edema with some chronic venous stasis changes  Neurological: Alert and oriented, aphasia, uses walker for ambulation  Psychiatric: Normal affect, normal judgment     Past History:   Past Medical History  The patient's past medical history was reviewed.  See HPI and self-reported patient medical history form for pertinent medical history to consultation.    Past Social History  The patient's social history was reviewed.  See HPI self-reported patient medical history form for pertinent social history to consultation.    Past Family History  The patient's family history was reviewed.  See HPI self-reported patient medical history form for pertinent family history to consultation.    Review of Systems  A pertinent cardiac review of systems was performed and was otherwise unremarkable except as per HPI and self-reported patient medical history form.        Chandrakant Freire MD, [facility]  Interventional Cardiology  [facility], [facility]  [address] [address]  Phone: [phone]  Fax: [phone]

## 2023-05-22 ENCOUNTER — HOSPITAL ENCOUNTER (OUTPATIENT)
Dept: LAB | Facility: MEDICAL CENTER | Age: 83
End: 2023-05-22
Attending: INTERNAL MEDICINE
Payer: MEDICARE

## 2023-05-22 DIAGNOSIS — Z79.02 ANTIPLATELET OR ANTITHROMBOTIC LONG-TERM USE: ICD-10-CM

## 2023-05-22 DIAGNOSIS — I10 HYPERTENSION, ESSENTIAL: ICD-10-CM

## 2023-05-22 DIAGNOSIS — E78.5 DYSLIPIDEMIA: ICD-10-CM

## 2023-05-22 LAB
ANION GAP SERPL CALC-SCNC: 10 MMOL/L (ref 7–16)
BUN SERPL-MCNC: 23 MG/DL (ref 8–22)
CALCIUM SERPL-MCNC: 9.5 MG/DL (ref 8.5–10.5)
CHLORIDE SERPL-SCNC: 106 MMOL/L (ref 96–112)
CHOLEST SERPL-MCNC: 170 MG/DL (ref 100–199)
CO2 SERPL-SCNC: 23 MMOL/L (ref 20–33)
CREAT SERPL-MCNC: 0.61 MG/DL (ref 0.5–1.4)
ERYTHROCYTE [DISTWIDTH] IN BLOOD BY AUTOMATED COUNT: 47.2 FL (ref 35.9–50)
FASTING STATUS PATIENT QL REPORTED: NORMAL
GFR SERPLBLD CREATININE-BSD FMLA CKD-EPI: 90 ML/MIN/1.73 M 2
GLUCOSE SERPL-MCNC: 90 MG/DL (ref 65–99)
HCT VFR BLD AUTO: 49.5 % (ref 37–47)
HDLC SERPL-MCNC: 70 MG/DL
HGB BLD-MCNC: 16 G/DL (ref 12–16)
LDLC SERPL CALC-MCNC: 89 MG/DL
MCH RBC QN AUTO: 28.8 PG (ref 27–33)
MCHC RBC AUTO-ENTMCNC: 32.3 G/DL (ref 32.2–35.5)
MCV RBC AUTO: 89.2 FL (ref 81.4–97.8)
PLATELET # BLD AUTO: 178 K/UL (ref 164–446)
PMV BLD AUTO: 12.1 FL (ref 9–12.9)
POTASSIUM SERPL-SCNC: 4.3 MMOL/L (ref 3.6–5.5)
RBC # BLD AUTO: 5.55 M/UL (ref 4.2–5.4)
SODIUM SERPL-SCNC: 139 MMOL/L (ref 135–145)
TRIGL SERPL-MCNC: 54 MG/DL (ref 0–149)
WBC # BLD AUTO: 5.5 K/UL (ref 4.8–10.8)

## 2023-05-22 PROCEDURE — 80061 LIPID PANEL: CPT

## 2023-05-22 PROCEDURE — 80048 BASIC METABOLIC PNL TOTAL CA: CPT

## 2023-05-22 PROCEDURE — 36415 COLL VENOUS BLD VENIPUNCTURE: CPT

## 2023-05-22 PROCEDURE — 85027 COMPLETE CBC AUTOMATED: CPT

## 2023-05-23 ENCOUNTER — OFFICE VISIT (OUTPATIENT)
Dept: CARDIOLOGY | Facility: MEDICAL CENTER | Age: 83
End: 2023-05-23
Attending: INTERNAL MEDICINE
Payer: MEDICARE

## 2023-05-23 VITALS
HEIGHT: 61 IN | BODY MASS INDEX: 31.91 KG/M2 | DIASTOLIC BLOOD PRESSURE: 60 MMHG | WEIGHT: 169 LBS | RESPIRATION RATE: 16 BRPM | HEART RATE: 62 BPM | OXYGEN SATURATION: 96 % | SYSTOLIC BLOOD PRESSURE: 150 MMHG

## 2023-05-23 DIAGNOSIS — I10 HYPERTENSION, ESSENTIAL: ICD-10-CM

## 2023-05-23 DIAGNOSIS — I63.9 RECURRENT CEREBROVASCULAR ACCIDENTS (CVAS) (HCC): ICD-10-CM

## 2023-05-23 DIAGNOSIS — I49.5 SICK SINUS SYNDROME (HCC): Chronic | ICD-10-CM

## 2023-05-23 DIAGNOSIS — E78.00 PURE HYPERCHOLESTEROLEMIA: ICD-10-CM

## 2023-05-23 PROCEDURE — 3078F DIAST BP <80 MM HG: CPT | Performed by: INTERNAL MEDICINE

## 2023-05-23 PROCEDURE — 99214 OFFICE O/P EST MOD 30 MIN: CPT | Performed by: INTERNAL MEDICINE

## 2023-05-23 PROCEDURE — 99212 OFFICE O/P EST SF 10 MIN: CPT | Performed by: INTERNAL MEDICINE

## 2023-05-23 PROCEDURE — 3077F SYST BP >= 140 MM HG: CPT | Performed by: INTERNAL MEDICINE

## 2023-05-23 RX ORDER — EZETIMIBE AND SIMVASTATIN 10; 10 MG/1; MG/1
1 TABLET ORAL NIGHTLY
Qty: 30 TABLET | Refills: 3 | Status: SHIPPED | OUTPATIENT
Start: 2023-05-23 | End: 2023-05-26 | Stop reason: SDUPTHER

## 2023-05-23 RX ORDER — VITAMIN E 268 MG
180 CAPSULE ORAL 2 TIMES DAILY
COMMUNITY

## 2023-05-23 ASSESSMENT — FIBROSIS 4 INDEX: FIB4 SCORE: 3.48

## 2023-05-23 NOTE — PATIENT INSTRUCTIONS
Start Vytorin (simvastatin + ezetimibe) 10 mg daily  If you have side effects from this medication, then go back to ezetimibe 10 mg only  Follow up in 3 months, have fasting cholesterol checked 1 week prior to appointment

## 2023-05-23 NOTE — TELEPHONE ENCOUNTER
Received request via: Pharmacy    Was the patient seen in the last year in this department? Yes    Does the patient have an active prescription (recently filled or refills available) for medication(s) requested? No pharmacy is request new prescription     Does the patient have retirement Plus and need 100 day supply (blood pressure, diabetes and cholesterol meds only)? Yes, quantity updated to 100 days

## 2023-05-23 NOTE — TELEPHONE ENCOUNTER
Requested Prescriptions     Signed Prescriptions Disp Refills    verapamil SR (CALAN-SR) 120 MG CR tablet 100 Tablet 3     Sig: Take 1 Tablet by mouth every day.     Authorizing Provider: RHODA BELLA A.P.R.N.

## 2023-05-26 ENCOUNTER — TELEPHONE (OUTPATIENT)
Dept: CARDIOLOGY | Facility: MEDICAL CENTER | Age: 83
End: 2023-05-26
Payer: MEDICARE

## 2023-05-26 DIAGNOSIS — E78.00 PURE HYPERCHOLESTEROLEMIA: ICD-10-CM

## 2023-05-26 RX ORDER — EZETIMIBE AND SIMVASTATIN 10; 10 MG/1; MG/1
1 TABLET ORAL NIGHTLY
Qty: 100 TABLET | Refills: 0 | Status: SHIPPED
Start: 2023-05-26 | End: 2023-06-14

## 2023-05-26 NOTE — TELEPHONE ENCOUNTER
BN    Caller: Oscar with [facility]    Topic/issue: Oscar states the insurance denied the request for ezetimibe-simvastatin (VYTORIN) 10-10 MG per tablet because it needs to be written for a 100 day supply so Oscar is asking if we can re-write it for that amount and send it back in.    Callback Number: [phone]    Thank you,  -Katie MUHAMMAD

## 2023-05-30 NOTE — TELEPHONE ENCOUNTER
Caller:   [facility]    Topic/issue: has questions on the script:   ezetimibe-simvastatin (VYTORIN) 10-10 MG per tablet [[id number]]    Callback Number: [phone]   [facility] Pharmacist    Thank you,   Letty SEO

## 2023-05-31 NOTE — TELEPHONE ENCOUNTER
Phone Number Called: [facility]    Call outcome: Spoke with Katie Pharmacist    Message: Called to discuss prescription. [facility] stated they must fill 100 day supply. Told them that was done.  They then stated pt had order for ezetimibe 100 day supply that was filled in April and they want to make sure patient is not taking both. They are now aware pt will discontinue ezetimibe and start combo medication.  [facility] will fill prescription

## 2023-06-08 ENCOUNTER — TELEPHONE (OUTPATIENT)
Dept: CARDIOLOGY | Facility: MEDICAL CENTER | Age: 83
End: 2023-06-08
Payer: MEDICARE

## 2023-06-08 NOTE — TELEPHONE ENCOUNTER
Caller: Irina Traylor      Topic/issue: Patient was calling about her pacer was working correctly and she was wanting to verify it and was asking for a call back      Callback Number: [phone]        Thank you    -Adal MUHAMMAD

## 2023-06-14 ENCOUNTER — TELEPHONE (OUTPATIENT)
Dept: CARDIOLOGY | Facility: MEDICAL CENTER | Age: 83
End: 2023-06-14
Payer: MEDICARE

## 2023-06-14 DIAGNOSIS — E78.5 DYSLIPIDEMIA: ICD-10-CM

## 2023-06-14 RX ORDER — EZETIMIBE 10 MG/1
10 TABLET ORAL DAILY
Qty: 90 TABLET | Refills: 3 | Status: SHIPPED
Start: 2023-06-14 | End: 2023-09-12 | Stop reason: SDUPTHER

## 2023-06-14 NOTE — TELEPHONE ENCOUNTER
Patient called, [phone], and states she feels very foggy and had had a lot of GI upset/nausea/bad taste in mouth while on the medication.     Per BN's last OV:      Medication list updated back to prior medication. All questions/concerns answered at this time, patient appreciative of information given.      To BN: CELESTINA

## 2023-06-14 NOTE — TELEPHONE ENCOUNTER
Okay, if she is not tolerating this, then she can go back to just ezetimibe 10 mg daily.  However, I would like her to see our lipid pharmacy program to discuss a PCSK9 inhibitor for inclisiran.

## 2023-06-14 NOTE — TELEPHONE ENCOUNTER
Noted BN's referral and patient updated that it was placed. All questions/concerns answered at this time, patient appreciative of information given.

## 2023-06-14 NOTE — TELEPHONE ENCOUNTER
BN    Caller: Irina Traylor     Topic/issue: Pt called in regards to the medication ezetimibe-simvastatin (VYTORIN) 10-10 MG, Pt states that since taking this medication she hasn't been feeling good, and called to let BN know she is no longer taking the medication, please advise     Callback Number: [phone] (home)       Thank you,     Jagjit TURNER

## 2023-06-15 ENCOUNTER — TELEPHONE (OUTPATIENT)
Dept: VASCULAR LAB | Facility: MEDICAL CENTER | Age: 83
End: 2023-06-15
Payer: MEDICARE

## 2023-06-15 ENCOUNTER — TELEPHONE (OUTPATIENT)
Dept: MEDICAL GROUP | Facility: PHYSICIAN GROUP | Age: 83
End: 2023-06-15
Payer: MEDICARE

## 2023-06-15 DIAGNOSIS — Z91.09 ALLERGY TO ENVIRONMENTAL FACTORS: ICD-10-CM

## 2023-06-15 RX ORDER — FLUTICASONE PROPIONATE 50 MCG
2 SPRAY, SUSPENSION (ML) NASAL DAILY
Qty: 16 G | Refills: 3 | Status: SHIPPED | OUTPATIENT
Start: 2023-06-15 | End: 2024-01-22

## 2023-06-15 NOTE — TELEPHONE ENCOUNTER
"[facility] V[facility] Health and Pharmacotherapy Programs    Received pharmacotherapy referral for lipids from Dr. Freire on 6/14/23    Per referral: \"Statin intolerant, consider PCSK9 inhibitor or inclisiran, possibly bempedoic acid depending on affordability\"    Scheduled pt for initial visit on 7/5/23.    Insurance: Health system  PCP: [facility]  Locations to be seen: Any    [facility] at [phone], fax [phone]    Samson Christianson, GualbertoD, BCACP    "

## 2023-06-15 NOTE — TELEPHONE ENCOUNTER
Received request via: Pharmacy    Was the patient seen in the last year in this department? Yes    Does the patient have an active prescription (recently filled or refills available) for medication(s) requested? No    Does the patient have nursing home Plus and need 100 day supply (blood pressure, diabetes and cholesterol meds only)? Medication is not for cholesterol, blood pressure or diabetes    PHARMACY IS REQUESTING NEW RX OF FLONASE, UNABLE TO PEND.

## 2023-06-16 NOTE — TELEPHONE ENCOUNTER
Requested Prescriptions     Signed Prescriptions Disp Refills    fluticasone (FLONASE) 50 MCG/ACT nasal spray 16 g 3     Sig: Administer 2 Sprays into affected nostril(S) every day.     Authorizing Provider: RHODA BELLA A.P.R.N.

## 2023-06-20 ENCOUNTER — OFFICE VISIT (OUTPATIENT)
Dept: MEDICAL GROUP | Facility: PHYSICIAN GROUP | Age: 83
End: 2023-06-20
Payer: MEDICARE

## 2023-06-20 VITALS
DIASTOLIC BLOOD PRESSURE: 100 MMHG | TEMPERATURE: 98 F | OXYGEN SATURATION: 94 % | BODY MASS INDEX: 30.99 KG/M2 | SYSTOLIC BLOOD PRESSURE: 150 MMHG | WEIGHT: 164 LBS | HEART RATE: 106 BPM

## 2023-06-20 DIAGNOSIS — I10 HYPERTENSION, ESSENTIAL: ICD-10-CM

## 2023-06-20 PROCEDURE — 99214 OFFICE O/P EST MOD 30 MIN: CPT | Performed by: STUDENT IN AN ORGANIZED HEALTH CARE EDUCATION/TRAINING PROGRAM

## 2023-06-20 PROCEDURE — 3077F SYST BP >= 140 MM HG: CPT | Performed by: STUDENT IN AN ORGANIZED HEALTH CARE EDUCATION/TRAINING PROGRAM

## 2023-06-20 PROCEDURE — 3080F DIAST BP >= 90 MM HG: CPT | Performed by: STUDENT IN AN ORGANIZED HEALTH CARE EDUCATION/TRAINING PROGRAM

## 2023-06-20 RX ORDER — AMLODIPINE BESYLATE 10 MG/1
TABLET ORAL
COMMUNITY
End: 2023-07-17

## 2023-06-20 RX ORDER — POLYETHYLENE GLYCOL 3350
POWDER (GRAM) MISCELLANEOUS
COMMUNITY
End: 2023-06-20

## 2023-06-20 RX ORDER — ROSUVASTATIN CALCIUM 10 MG/1
TABLET, COATED ORAL
COMMUNITY
End: 2023-06-20

## 2023-06-20 RX ORDER — AMOXICILLIN 500 MG/1
TABLET, FILM COATED ORAL
COMMUNITY
End: 2023-06-20

## 2023-06-20 RX ORDER — BEMPEDOIC ACID 180 MG/1
TABLET, FILM COATED ORAL
COMMUNITY
End: 2023-06-20

## 2023-06-20 RX ORDER — MEMANTINE HYDROCHLORIDE 5 MG/1
TABLET ORAL
COMMUNITY
End: 2023-06-20

## 2023-06-20 RX ORDER — HYDROCHLOROTHIAZIDE 25 MG/1
TABLET ORAL
COMMUNITY
End: 2023-07-17

## 2023-06-20 RX ORDER — AMOXICILLIN 500 MG/1
TABLET, FILM COATED ORAL
COMMUNITY
Start: 2023-04-25 | End: 2023-06-20

## 2023-06-20 ASSESSMENT — FIBROSIS 4 INDEX: FIB4 SCORE: 3.48

## 2023-06-20 NOTE — PROGRESS NOTES
Patient back in ED room      Ferdinand Harrison RN  03/07/23 1013 Subjective:     Chief Complaint   Patient presents with    Hypertension Follow-up     3 days high for sys and diastolic , no sx        HPI:   Irina presents today with    Hypertension, essential  /100 today, repeat same.  Follows up with Cardiology and last seen 5/23.  Amlodipine was discontinued due to lower extremity edema.  She was started on HCTZ but could not tolerate the frequent urination.  Cardiology recommended continuing irbesartan 300 mg nightly and verapamil  mg daily.      Health Maintenance: Completed    ROS:  Constitutional:  Negative for chills, fever, fatigue, weight loss.  HEENT:  Negative for blurred vision, hearing loss, sore throat.  Respiratory:  Negative for cough, sputum production and shortness of breath.  Cardiovascular:  Negative for chest pain, palpitations and leg swelling.  Gastrointestinal:  Negative for abdominal pain, blood in stool, constipation, diarrhea and vomiting.  Skin:  Negative for rash.   Neurological:  Negative for dizziness, seizures, weakness and headaches.  Endo/Heme/Allergies:  Does not bruise/bleed easily.  Psychiatric/Behavioral:  Negative for depression, anxiety and suicidal thoughts.      Objective:     Exam:  BP (!) 150/100   Pulse (!) 106   Temp 36.7 °C (98 °F) (Temporal)   Wt 74.4 kg (164 lb)   SpO2 94%   BMI 30.99 kg/m²  Body mass index is 30.99 kg/m².    Physical Exam    Gen: Alert and oriented, no acute distress.  Lungs: Normal effort, CTAB, no wheezing / rhonchi / rales.  CV: RRR, normal S1 and S2.      Assessment & Plan:     82 y.o. female with the following -     1. Hypertension, essential  Chronic, uncontrolled.  Follows up with Cardiology.  /100 today, repeat same.  Verapamil SR increased to 180 mg daily.  Continue irbesartan 300 mg daily.  Patient has appointment with PCP on 7/13 and was advised to bring BP monitor to that visit.  - verapamil SR (CALAN-SR) 180 MG Tab CR; Take 1 Tablet by mouth every day.  Dispense: 30 Tablet;  Refill: 0        Return if symptoms worsen or fail to improve.    Please note that this dictation was created using voice recognition software. I have made every reasonable attempt to correct obvious errors, but I expect that there are errors of grammar and possibly content that I did not discover before finalizing the note.

## 2023-06-20 NOTE — ASSESSMENT & PLAN NOTE
/100 today, repeat same.  Follows up with Cardiology and last seen 5/23.  Amlodipine was discontinued due to lower extremity edema.  She was started on HCTZ but could not tolerate the frequent urination.  Cardiology recommended continuing irbesartan 300 mg nightly and verapamil  mg daily.

## 2023-06-20 NOTE — Clinical Note
Vasquez Stein, I saw your patient today for elevated BP.  Her next appt with Cardio isnt until Sept but she's scheduled to see you 7/13.  I increased her verapamil to 180 mg daily.  She's also on losartan 300 mg daily.  I asked her to bring her BP cuff to the next visit because her home readings were very high.

## 2023-07-05 ENCOUNTER — HOSPITAL ENCOUNTER (OUTPATIENT)
Dept: LAB | Facility: MEDICAL CENTER | Age: 83
End: 2023-07-05
Attending: INTERNAL MEDICINE
Payer: MEDICARE

## 2023-07-05 DIAGNOSIS — E78.00 PURE HYPERCHOLESTEROLEMIA: ICD-10-CM

## 2023-07-05 LAB
CHOLEST SERPL-MCNC: 168 MG/DL (ref 100–199)
FASTING STATUS PATIENT QL REPORTED: NORMAL
HDLC SERPL-MCNC: 65 MG/DL
LDLC SERPL CALC-MCNC: 86 MG/DL
TRIGL SERPL-MCNC: 83 MG/DL (ref 0–149)

## 2023-07-05 PROCEDURE — 36415 COLL VENOUS BLD VENIPUNCTURE: CPT

## 2023-07-05 PROCEDURE — 80061 LIPID PANEL: CPT

## 2023-07-06 ENCOUNTER — TELEPHONE (OUTPATIENT)
Dept: VASCULAR LAB | Facility: MEDICAL CENTER | Age: 83
End: 2023-07-06
Payer: MEDICARE

## 2023-07-06 ENCOUNTER — TELEPHONE (OUTPATIENT)
Dept: CARDIOLOGY | Facility: MEDICAL CENTER | Age: 83
End: 2023-07-06
Payer: MEDICARE

## 2023-07-06 DIAGNOSIS — E78.5 DYSLIPIDEMIA: ICD-10-CM

## 2023-07-06 NOTE — TELEPHONE ENCOUNTER
[facility]    Pt referred for lipid management.  Pt missed initial apt yesterday and today she refuses to reschedule.  She does not want to address her cholesterol despite my best efforts to educate her by phone of the importance to address this topic.    Pt declines our services, will defer back to referring provider.     Franoc Lee, PharmD    CC  Dr Freire

## 2023-07-06 NOTE — TELEPHONE ENCOUNTER
Phone Number Called: [phone]    Call outcome: Did not leave a detailed message. Requested patient to call back.    Message: Called to inform patient of BN recommendations. Unable to reach. Left VM for patient to call back when able.       ----- Message from Chandrakant Freire M.D. sent at 7/5/2023  8:55 PM PDT -----  Please let her know that her cholesterol levels are stable, but not quite where we would like them given your history of stroke. I would recommend a referral to our lipid clinic if she is willing to go to discuss a PCSK-9 inhibitor.

## 2023-07-06 NOTE — TELEPHONE ENCOUNTER
LEONILA    Caller: Irina Traylor     Topic/issue: Pt is returning a call    Callback Number: [phone] (home)      Thank You    Sabina HIGGINBOTHAM

## 2023-07-10 ENCOUNTER — TELEPHONE (OUTPATIENT)
Dept: MEDICAL GROUP | Facility: PHYSICIAN GROUP | Age: 83
End: 2023-07-10
Payer: MEDICARE

## 2023-07-10 NOTE — TELEPHONE ENCOUNTER
Future Appointments         Provider Department Center    7/13/2023 11:00 AM (Arrive by 10:45 AM) NAYAN Longo [facility]    9/5/2023 11:45 AM [facility]     9/12/2023 10:00 AM Chandrakant Freire M.D. [facility] - Oper[facility]  Arrive at: Cardiology RM - Arrival           ESTABLISHED PATIENT PRE-VISIT PLANNING     Patient was NOT contacted to complete PVP.     Note: Patient will not be contacted if there is no indication to call.     1.  Reviewed notes from the last few office visits within the medical group: Yes, LOV 06/20/2023    2.  If any orders were placed at last visit or intended to be done for this visit (i.e. 6 mos follow-up), do we have Results/Consult Notes?           Labs - Labs ordered, completed on 07/05/2023 and results are in chart.  Note: If patient appointment is for lab review and patient did not complete labs, check with provider if OK to reschedule patient until labs completed.         Imaging - Imaging ordered, NOT completed. Patient advised to complete prior to next appointment.         Referrals - No referrals were ordered at last office visit.    3. Is this appointment scheduled as a Hospital Follow-Up? No    4.  Immunizations were updated in Epic using Reconcile Outside Information activity? Yes    5.  Patient is due for the following Health Maintenance Topics:   Health Maintenance Due   Topic Date Due    COVID-19 Vaccine (1) Never done    IMM DTaP/Tdap/Td Vaccine (1 - Tdap) Never done    IMM ZOSTER VACCINES (1 of 2) Never done    Annual Wellness Visit  03/26/2023     6.  AHA (Pulse8) form printed for Provider? N/A

## 2023-07-12 NOTE — TELEPHONE ENCOUNTER
Phone Number Called: [phone]     Call outcome: Spoke to patient regarding message below.    Message: Called to inform patient of BN recommendations. Patient verbalized understanding. Patient open to lipid clinic referral. Referral placed.

## 2023-07-13 ENCOUNTER — TELEPHONE (OUTPATIENT)
Dept: MEDICAL GROUP | Facility: PHYSICIAN GROUP | Age: 83
End: 2023-07-13
Payer: MEDICARE

## 2023-07-13 NOTE — TELEPHONE ENCOUNTER
[facility]    Received Lipid referral from Chandrakant Freire MD on 7/11/23    Chart states pt is now open to seeing us to manage lipids.     Insurance: [facility]  PCP: Dr Noble  Locations to be seen: Any [facility] location with a pharmacist, pt needs pharmacotherapy or non-provider type visit with pharmacist for 1 hr.     [facility] Anticoagulation/Pharmacotherapy Clinic at [phone], fax [phone]    Franco Lee, PharmD

## 2023-07-14 ENCOUNTER — NON-PROVIDER VISIT (OUTPATIENT)
Dept: CARDIOLOGY | Facility: MEDICAL CENTER | Age: 83
End: 2023-07-14
Payer: MEDICARE

## 2023-07-14 PROCEDURE — 93294 REM INTERROG EVL PM/LDLS PM: CPT | Performed by: INTERNAL MEDICINE

## 2023-07-14 NOTE — CARDIAC REMOTE MONITOR - SCAN
Device transmission reviewed. Device demonstrated appropriate function.       Electronically Signed by: Rosana Wolfe M.D.    7/22/2023  6:17 PM

## 2023-07-17 ENCOUNTER — OFFICE VISIT (OUTPATIENT)
Dept: MEDICAL GROUP | Facility: PHYSICIAN GROUP | Age: 83
End: 2023-07-17
Payer: MEDICARE

## 2023-07-17 VITALS
TEMPERATURE: 97.5 F | OXYGEN SATURATION: 96 % | DIASTOLIC BLOOD PRESSURE: 70 MMHG | HEIGHT: 65 IN | BODY MASS INDEX: 27.49 KG/M2 | SYSTOLIC BLOOD PRESSURE: 136 MMHG | HEART RATE: 60 BPM | WEIGHT: 165 LBS

## 2023-07-17 DIAGNOSIS — I10 HYPERTENSION, ESSENTIAL: ICD-10-CM

## 2023-07-17 DIAGNOSIS — E78.00 PURE HYPERCHOLESTEROLEMIA: ICD-10-CM

## 2023-07-17 PROCEDURE — 3075F SYST BP GE 130 - 139MM HG: CPT | Performed by: NURSE PRACTITIONER

## 2023-07-17 PROCEDURE — 3078F DIAST BP <80 MM HG: CPT | Performed by: NURSE PRACTITIONER

## 2023-07-17 PROCEDURE — 99214 OFFICE O/P EST MOD 30 MIN: CPT | Performed by: NURSE PRACTITIONER

## 2023-07-17 ASSESSMENT — FIBROSIS 4 INDEX: FIB4 SCORE: 3.48

## 2023-07-17 NOTE — ASSESSMENT & PLAN NOTE
Her verapamil was increased to 180 mg daily after her blood pressure was 150/100. She continues with irbesartan 300 mg nightly and her verapamil. Blood pressure today is 136/70 and her home blood pressure cuff today is 125/70. Her home blood pressures are:  132/85  152/97 before medication  128/86  Denies chest pain, shortness of breath, or dizziness.

## 2023-07-17 NOTE — PROGRESS NOTES
Subjective:     CC: Hypertension follow-up    HPI:   Irina presents today with the following:    Hypertension, essential  Her verapamil was increased to 180 mg daily after her blood pressure was 150/100. She continues with irbesartan 300 mg nightly and her verapamil. Blood pressure today is 136/70 and her home blood pressure cuff today is 125/70. Her home blood pressures are:  132/85  152/97 before medication  128/86  Denies chest pain, shortness of breath, or dizziness.         Pure hypercholesterolemia  She continues with zetia 10 mg daily and daily fish oil. She is watching her diet.  Recent July labs show controlled cholesterol.  She did not tolerate statins.      Past Medical History:   Diagnosis Date    Anesthesia     States feels like she can't breathe with induction for right hip surgery, gets nausea vomiting with most pain meds.  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    Post-nasal drip     Restless leg syndrome     4/25/17-Resolved, states was related to right hip and none S/P replacement.    Sick sinus syndrome (HCC) 6/29/2022    Sinus arrest 6/29/2022    Stroke ([facility]) 2/1993, 8/1993    Jovpfdzq-qubigh-vbuxwzoaaz aphasia,mouth forming words, reading, numbers    Walker as ambulation aid        Social History     Tobacco Use    Smoking status: Never    Smokeless tobacco: Never   Vaping Use    Vaping Use: Never used   Substance Use Topics    Alcohol use: Not Currently    Drug use: No       Current Outpatient Medications Ordered in Epic   Medication Sig Dispense Refill    Apoaequorin (PREVAGEN EXTRA STRENGTH PO) Take  by mouth.      B Complex Vitamins (B  "COMPLEX PO) Take  by mouth.      verapamil ER (CALAN SR) 180 MG Tab CR Take 1 Tablet by mouth every day. 100 Tablet 1    verapamil ER (CALAN SR) 180 MG Tab CR Take 1 Tablet by mouth every day for 7 days. 7 Tablet 0    fluticasone (FLONASE) 50 MCG/ACT nasal spray Administer 2 Sprays into affected nostril(S) every day. 16 g 3    ezetimibe (ZETIA) 10 MG Tab Take 1 Tablet by mouth every day. 90 Tablet 3    vitamin E 180 MG (400 UNIT) Cap Take 180 mg by mouth 2 times a day.      irbesartan (AVAPRO) 300 MG Tab Take 1 Tablet by mouth every evening. 100 Tablet 1    loratadine (CLARITIN) 10 MG Tab TAKE ONE TABLET BY MOUTH EVERY DAY AS NEEDED FOR ALLERGY 90 Tablet 3    Ascorbic Acid (VITAMIN C CR) 1000 MG Tab CR Take 1 Tablet by mouth every day.      ASPIRIN 81 PO Take 1 Tablet by mouth every day.      Magnesium 400 MG Tab Take 400 mg by mouth every day.       No current Epic-ordered facility-administered medications on file.       Allergies:  Benicar [olmesartan], Dilaudid  [hydromorphone hcl], Lidocaine, Norco  [apap-fd&c blue #1-hydrocodone], Other misc, Percocet  [apap-fd&c red #40 [place]-oxycodone], Vicodin  [apap-fd&c yellow #10 [place]-hydrocodone], Codeine, Demerol, Hydrocodone, Other drug, Oxycodone, Statins [hmg-coa-r inhibitors], Valium, Amlodipine, Diazepam, Nexletol [bempedoic acid], and Hydrochlorothiazide    Health Maintenance: reviewed and discussed that she can obtain the Tdap here for $47 or go to the pharmacy.      Objective:     Vital signs reviewed  Exam:  /70 (BP Location: Left arm, Patient Position: Sitting, BP Cuff Size: Adult)   Pulse 60   Temp 36.4 °C (97.5 °F) (Temporal)   Ht 1.651 m (5' 5\")   Wt 74.8 kg (165 lb)   SpO2 96%   BMI 27.46 kg/m²  Body mass index is 27.46 kg/m².    Gen: Alert and oriented, No apparent distress.  Eyes:   Lids normal. Glasses in place.   Lungs: Normal effort, CTA bilaterally, no wheezes, rhonchi, or rales  CV: Regular rate and rhythm. No murmurs, rubs, or " gallops.  Ext: No clubbing, cyanosis, edema.  Using four-wheel walker with ambulation.      Assessment & Plan:     82 y.o. female with the following -     1. Hypertension, essential  Chronic stable problem.  Blood pressure is at goal today.  Home blood pressure cuff does correlate.  Continue verapamil 180 mg daily and irbesartan 300 mg nightly.  The 3-month supply was sent to the mail order, she does have 1 week left with her current verapamil supply.  I did send over an additional week supply if she does not obtain the mail order by the end of the week.  Return in 3 months for follow-up.  - verapamil ER (CALAN SR) 180 MG Tab CR; Take 1 Tablet by mouth every day.  Dispense: 100 Tablet; Refill: 1  - verapamil ER (CALAN SR) 180 MG Tab CR; Take 1 Tablet by mouth every day for 7 days.  Dispense: 7 Tablet; Refill: 0    2. Pure hypercholesterolemia  Chronic stable problem.  Continue follow-up with cardiology.  Continue daily fish oil and Zetia 10 mg daily.  Continue healthy diet and exercise as tolerated. Patient asking for copy of her recent lab results.        Return in about 3 months (around 10/17/2023) for annual wellness visit.    Please note that this dictation was created using voice recognition software. I have made every reasonable attempt to correct obvious errors, but I expect that there are errors of grammar and possibly content that I did not discover before finalizing the note.

## 2023-07-17 NOTE — ASSESSMENT & PLAN NOTE
She continues with zetia 10 mg daily and daily fish oil. She is watching her diet.  Recent July labs show controlled cholesterol.  She did not tolerate statins.

## 2023-07-18 NOTE — TELEPHONE ENCOUNTER
Requested Prescriptions     Refused Prescriptions Disp Refills    verapamil ER (CALAN SR) 180 MG Tab CR [Pharmacy Med Name: VERAPAMIL ER 180MG TABLETS] 30 Tablet 0     Sig: TAKE 1 TABLET BY MOUTH EVERY DAY     Refused By: RHODA BELLA     Reason for Refusal: Request already responded to by other means (e.g. phone or fax)     New rx sent during today's appointment, see note.     ANDREW Longo.

## 2023-08-03 DIAGNOSIS — E78.5 DYSLIPIDEMIA: ICD-10-CM

## 2023-08-04 RX ORDER — EZETIMIBE AND SIMVASTATIN 10; 10 MG/1; MG/1
1 TABLET ORAL EVERY EVENING
Qty: 100 TABLET | Refills: 2 | Status: SHIPPED
Start: 2023-08-04 | End: 2023-09-01

## 2023-08-04 NOTE — TELEPHONE ENCOUNTER
Is the patient due for a refill? Yes    Was the patient seen the past year? Yes    Date of last office visit: 5/23/2023    Does the patient have an upcoming appointment?  Yes   If yes, When? 9/12/2023    Provider to refill:LEONILA PRIDE out of office    Does the patients insurance require a 100 day supply?  Yes

## 2023-08-23 ENCOUNTER — TELEPHONE (OUTPATIENT)
Dept: CARDIOLOGY | Facility: MEDICAL CENTER | Age: 83
End: 2023-08-23
Payer: MEDICARE

## 2023-08-23 NOTE — TELEPHONE ENCOUNTER
LEONILA    Caller: Irina Traylor     Medication Questions: Patient has recently had a stroke and is confused on all her medications. She is inquiring for a callback for some assistance in going over her medications. Please advise.     ezetimibe-simvastatin (VYTORIN) 10-10 MG per tablet    verapamil ER (CALAN SR) 180 MG Tab ANTONY     Callback Number: [phone] (home)       Thank you,     Nissa WILKINS

## 2023-08-25 NOTE — TELEPHONE ENCOUNTER
Phone Number Called: [phone]     Call outcome: Spoke to patient regarding message below.    Message: Called to return patient's phone call.     Patient needed clarification on medications. RN to reach out to BN for clarification on Zetia and ezetimibe-simvistatin. Both medications are listed daily. RN also reviewed all medications with patient via phone. No further questions at this time.     RN provided scheduling line for patient to make an appointment with pharmacist to go over medications. [phone].

## 2023-08-30 ENCOUNTER — TELEPHONE (OUTPATIENT)
Dept: CARDIOLOGY | Facility: MEDICAL CENTER | Age: 83
End: 2023-08-30

## 2023-08-30 ENCOUNTER — NON-PROVIDER VISIT (OUTPATIENT)
Dept: VASCULAR LAB | Facility: MEDICAL CENTER | Age: 83
End: 2023-08-30
Attending: INTERNAL MEDICINE
Payer: MEDICARE

## 2023-08-30 VITALS — HEART RATE: 67 BPM | SYSTOLIC BLOOD PRESSURE: 172 MMHG | DIASTOLIC BLOOD PRESSURE: 86 MMHG

## 2023-08-30 DIAGNOSIS — E78.00 PURE HYPERCHOLESTEROLEMIA: ICD-10-CM

## 2023-08-30 PROCEDURE — 99213 OFFICE O/P EST LOW 20 MIN: CPT | Performed by: PHARMACIST

## 2023-08-30 NOTE — TELEPHONE ENCOUNTER
Phone Number Called: [phone]    Call outcome: Did not leave a detailed message. Requested patient to call back.    Message: Called to inform patient of BN recommendations. Unable to reach. Left VM for patient to call back when able.       ----------------------------    Chandrakant Freire M.D.  You 19 hours ago (2:21 PM)    BN  If she is tolerating the combination pill, I would strongly prefer that she remain on this.

## 2023-08-30 NOTE — TELEPHONE ENCOUNTER
Caller:  -  Irina    Topic/issue: Irina is returning your call.     Callback Number: [phone]    Thank you,   Letty SEO

## 2023-08-30 NOTE — PROGRESS NOTES
"[facility] Lipid Clinic - FollowUp Visit  Date of Service: 08/30/23    Irina Traylor is here for follow up of dyslipidemia.    Subjective    HPI  Pertinent Interval History since last visit:   Patient here to reestablish care, quite adamant about not wanting any new medications, but willing to complete thorough med rec and discuss other options  Current Prescription Lipid Lowering Medications - including dose:   Statin: None  Non-Statin: Ezetimibe 10mg daily (possibly taking Vytorin, but she is extremely poor historian and cannot remember)  Current Lipid Lowering and Related Supplements:   None  Any Current Side Effects Potentially Related to Lipid Lowering therapy?   Yes, Details: headache, fatigue   Current Adherence to Lipid Lowering Therapies:  Completely non-adherent  Any Previous History of Statin Intolerance?   Outcome:  Severe allergy, possible myalgias, with recent stroke and continued dysarthria, difficult to determine if myalgias were truly a side effect.  She states on multiple occasions that initial statin therapy caused \"blisters that left a scar\"  Non-Statin: None              Outcome: N/A  Baseline Lipids Prior to Treatment:   Difficult to determine true baseline, lipid panel below correlates best to period when no lipid lowering medications in place per chart.    Latest Reference Range & Units 11/29/16 11:40   Cholesterol,Tot 100 - 199 mg/dL 234 (H)   Triglycerides 0 - 149 mg/dL 110   HDL >=40 mg/dL 66   LDL <100 mg/dL 146 (H)       SOCIAL HISTORY  Social History     Tobacco Use   Smoking Status Never   Smokeless Tobacco Never      Change in weight: Stable  Exercise habits: no regular exercise program   Diet: common adult, eats mainly veggies, salads, and soups. Not much meat other fish and salmon. No sweets, coffee, alcohol (tequila)       Objective    Vitals:    08/30/23 1444   BP: (!) 172/86   Pulse: 67      Physical Exam    DATA REVIEW  Most Recent Lipid Panel:   Lab Results   Component " Value Date    CHOLSTRLTOT 168 07/05/2023    TRIGLYCERIDE 83 07/05/2023    HDL 65 07/05/2023    LDL 86 07/05/2023       Other Pertinent Blood Work:   Lab Results   Component Value Date    SODIUM 139 05/22/2023    POTASSIUM 4.3 05/22/2023    CHLORIDE 106 05/22/2023    CO2 23 05/22/2023    ANION 10.0 05/22/2023    GLUCOSE 90 05/22/2023    BUN 23 (H) 05/22/2023    CREATININE 0.61 05/22/2023    CALCIUM 9.5 05/22/2023    ASTSGOT 29 08/02/2022    ALTSGPT 14 08/02/2022    ALKPHOSPHAT 53 08/02/2022    TBILIRUBIN 0.4 08/02/2022    ALBUMIN 4.4 08/02/2022    AGRATIO 1.4 08/02/2022    TSHULTRASEN 2.540 08/02/2022     Recent Imaging Studies:    None since last visit    ASSESSMENT AND PLAN  Patient Type, check all that apply:   Secondary Prevention  Established Atherosclerotic Cardiovascular Disease (ASCVD)  Yes, Details: Multiple ischemic stroke  Other Established (non-atherosclerotic) Vascular Disease, if Present:  Peripheral vascular disease  Evidence of Heterozygous Familial Hypercholesterolemia (FH):   No (If yes, enter details of how diagnosis was made  ACC/AHA Indication for Statin Therapy, edenilson all that apply:  Established ASCVD: Indication for High intensity statin   Calculated Risk for ASCVD, if applicable    N/A  Other Significant Risk Markers, if any, edenilson all that apply   None  Goal LDL-C and nonHDL-C based on Clinic Protocol  LDL-C:   <70 mg/dL  Lifestyle Recommendations From Today’s Visit:    Eating Plan: Concentrate on  Low sat/Trans fat, Low simple carb, and DASH/Med Style diet - continue with current dietary habits, minimize saturated fats and simple carbs, continue with lean proteins and regular vegetables.  Exercise: Increase as tolerated and per proper gait.  Statin Therapy Recommendations from Today’s Visit:   None, d/t hx of possible allergic reaction and intolerance to therapy.  Non-Statin Medications Recommendations from Today’s Visit:     Indication for PCSK9 Inhibitor, if applicable:  ASCVD with  suboptimal control of LDL-C despite maximally tolerated statin  Supplements Recommended at this visit:   None   Recommendations for Other Cardiovascular Risk Factors, edenilson all that apply:   Hypertension- Well controlled, managed by cardiology and PCP    Other Issues:    Patient seen today to re-establish care, referred by cardiology.   Longstanding hx of ASCVD, multiple strokes over the last 20+ years.   She continues to have difficulty with speech and relaying information, difficult to determine which medications she is taking and adherence.     Discussed at length with patient the reason for today's visit and referral.  She was open to discussion regarding PCSK9i therapy, but does not wish to start at this time.  Also discussed option of Leqvio to decrease dosing increment.  She voices fear of allergic reaction and does not wish to start at this time.     Patient is prescribed Vytorin and Zetia, unsure if she is taking the Vytorin or Zetia, or both.   Discussed the importance of not taking both together, patient is going to trial Vytorin for several days to determine whether she has side effects from the simvastatin.   Will reach out by phone next week to determine tolerability.    Studies Ordered at Todays Visit:  None   Blood Work Ordered At Today’s visit:   Lipid Panel  CMP  Follow-Up:   4 weeks    Newton Noriega, PharmD, BCACP    CC:  NAYAN Longo, Chandrakant TRACEY M.D.

## 2023-09-01 NOTE — TELEPHONE ENCOUNTER
Phone Number Called: [phone]    Call outcome: Spoke to patient regarding message below.    Message: Called to inform patient of BN recommendations.     Patient reports that she tried the combination pill Vytorin is causing her headaches and fatigue. She can tolerate the Ezetimibe without the Simvastatin. No further questions at this time. RN informed BN.           --------------------------    Note      Phone Number Called: [phone]     Call outcome: Did not leave a detailed message. Requested patient to call back.     Message: Called to inform patient of BN recommendations. Unable to reach. Left VM for patient to call back when able.         ----------------------------     Chandrakant Freire M.D.  You 19 hours ago (2:21 PM)     BN  If she is tolerating the combination pill, I would strongly prefer that she remain on this.

## 2023-09-05 ENCOUNTER — HOSPITAL ENCOUNTER (OUTPATIENT)
Dept: LAB | Facility: MEDICAL CENTER | Age: 83
End: 2023-09-05
Attending: INTERNAL MEDICINE
Payer: MEDICARE

## 2023-09-05 DIAGNOSIS — E78.00 PURE HYPERCHOLESTEROLEMIA: ICD-10-CM

## 2023-09-05 LAB
ALBUMIN SERPL BCP-MCNC: 4.4 G/DL (ref 3.2–4.9)
ALBUMIN/GLOB SERPL: 1.4 G/DL
ALP SERPL-CCNC: 111 U/L (ref 30–99)
ALT SERPL-CCNC: 22 U/L (ref 2–50)
ANION GAP SERPL CALC-SCNC: 10 MMOL/L (ref 7–16)
AST SERPL-CCNC: 29 U/L (ref 12–45)
BILIRUB SERPL-MCNC: 0.9 MG/DL (ref 0.1–1.5)
BUN SERPL-MCNC: 28 MG/DL (ref 8–22)
CALCIUM ALBUM COR SERPL-MCNC: 9.4 MG/DL (ref 8.5–10.5)
CALCIUM SERPL-MCNC: 9.7 MG/DL (ref 8.5–10.5)
CHLORIDE SERPL-SCNC: 107 MMOL/L (ref 96–112)
CHOLEST SERPL-MCNC: 169 MG/DL (ref 100–199)
CO2 SERPL-SCNC: 26 MMOL/L (ref 20–33)
CREAT SERPL-MCNC: 0.65 MG/DL (ref 0.5–1.4)
FASTING STATUS PATIENT QL REPORTED: NORMAL
GFR SERPLBLD CREATININE-BSD FMLA CKD-EPI: 89 ML/MIN/1.73 M 2
GLOBULIN SER CALC-MCNC: 3.2 G/DL (ref 1.9–3.5)
GLUCOSE SERPL-MCNC: 94 MG/DL (ref 65–99)
HDLC SERPL-MCNC: 57 MG/DL
LDLC SERPL CALC-MCNC: 100 MG/DL
POTASSIUM SERPL-SCNC: 4.2 MMOL/L (ref 3.6–5.5)
PROT SERPL-MCNC: 7.6 G/DL (ref 6–8.2)
SODIUM SERPL-SCNC: 143 MMOL/L (ref 135–145)
TRIGL SERPL-MCNC: 58 MG/DL (ref 0–149)

## 2023-09-05 PROCEDURE — 80053 COMPREHEN METABOLIC PANEL: CPT

## 2023-09-05 PROCEDURE — 80061 LIPID PANEL: CPT

## 2023-09-05 PROCEDURE — 36415 COLL VENOUS BLD VENIPUNCTURE: CPT

## 2023-09-05 NOTE — TELEPHONE ENCOUNTER
Received request via: Pharmacy    Was the patient seen in the last year in this department? Yes    Does the patient have an active prescription (recently filled or refills available) for medication(s) requested? No     [facility] has sent in a 30 day supply and [facility] Mail order needs a 100 day supply.  
Requested Prescriptions     Signed Prescriptions Disp Refills   • fenofibrate (TRICOR) 145 MG Tab 100 Tablet 3     Sig: Take 1 Tablet by mouth every day.     Authorizing Provider: RHODA BELLA A.P.R.N.   
show

## 2023-09-12 ENCOUNTER — OFFICE VISIT (OUTPATIENT)
Dept: CARDIOLOGY | Facility: MEDICAL CENTER | Age: 83
End: 2023-09-12
Attending: INTERNAL MEDICINE
Payer: MEDICARE

## 2023-09-12 ENCOUNTER — TELEPHONE (OUTPATIENT)
Dept: CARDIOLOGY | Facility: MEDICAL CENTER | Age: 83
End: 2023-09-12

## 2023-09-12 VITALS
BODY MASS INDEX: 26.66 KG/M2 | SYSTOLIC BLOOD PRESSURE: 148 MMHG | HEART RATE: 78 BPM | WEIGHT: 160 LBS | RESPIRATION RATE: 16 BRPM | HEIGHT: 65 IN | OXYGEN SATURATION: 95 % | DIASTOLIC BLOOD PRESSURE: 98 MMHG

## 2023-09-12 DIAGNOSIS — I49.5 SICK SINUS SYNDROME (HCC): Chronic | ICD-10-CM

## 2023-09-12 DIAGNOSIS — E78.5 DYSLIPIDEMIA: ICD-10-CM

## 2023-09-12 DIAGNOSIS — I10 HYPERTENSION, ESSENTIAL: ICD-10-CM

## 2023-09-12 DIAGNOSIS — E78.00 PURE HYPERCHOLESTEROLEMIA: ICD-10-CM

## 2023-09-12 DIAGNOSIS — I73.9 PVD (PERIPHERAL VASCULAR DISEASE) (HCC): ICD-10-CM

## 2023-09-12 DIAGNOSIS — I63.9 RECURRENT CEREBROVASCULAR ACCIDENTS (CVAS) (HCC): ICD-10-CM

## 2023-09-12 PROBLEM — Z71.89 ACP (ADVANCE CARE PLANNING): Status: RESOLVED | Noted: 2019-12-03 | Resolved: 2023-09-12

## 2023-09-12 PROBLEM — H53.8 CLOUDY VISION: Status: RESOLVED | Noted: 2023-02-08 | Resolved: 2023-09-12

## 2023-09-12 PROCEDURE — 3077F SYST BP >= 140 MM HG: CPT | Performed by: INTERNAL MEDICINE

## 2023-09-12 PROCEDURE — 99214 OFFICE O/P EST MOD 30 MIN: CPT | Performed by: INTERNAL MEDICINE

## 2023-09-12 PROCEDURE — 99213 OFFICE O/P EST LOW 20 MIN: CPT | Performed by: INTERNAL MEDICINE

## 2023-09-12 PROCEDURE — 3080F DIAST BP >= 90 MM HG: CPT | Performed by: INTERNAL MEDICINE

## 2023-09-12 RX ORDER — EZETIMIBE 10 MG/1
10 TABLET ORAL DAILY
Qty: 100 TABLET | Refills: 3 | Status: SHIPPED | OUTPATIENT
Start: 2023-09-12 | End: 2024-01-22

## 2023-09-12 RX ORDER — VERAPAMIL HYDROCHLORIDE 240 MG/1
240 TABLET, FILM COATED, EXTENDED RELEASE ORAL DAILY
Qty: 90 TABLET | Refills: 3 | Status: SHIPPED | OUTPATIENT
Start: 2023-09-12

## 2023-09-12 ASSESSMENT — FIBROSIS 4 INDEX: FIB4 SCORE: 2.78

## 2023-09-12 NOTE — TELEPHONE ENCOUNTER
Is the patient due for a refill? Yes- original not sent electronically     Was the patient seen the past year? Yes    Date of last office visit: 9/12/2023    Does the patient have an upcoming appointment?  No   If yes, When?     Provider to refill:BN    Does the patients insurance require a 100 day supply?  Yes

## 2023-09-12 NOTE — PROGRESS NOTES
CARDIOLOGY CLINIC NOTE      Date of Visit: 9/12/2023    Primary Care Provider: NAYAN Longo    Patient Name: Irina Traylor  YOB: 1940  MRN: [id number]     Reason for Visit:   Follow-up     Patient Story:   Irina Traylor is an 82 year-old woman with a past medical history of prior embolic stroke with resultant aphasia, hypertension, obesity, obstructive sleep apnea, peripheral vascular disease, and sick sinus syndrome.  Briefly, she has a history of multiple strokes in the past and most recently was admitted on 6/20/2022 after experiencing dysphagia and slurred speech.  She was ultimately found to have an acute infarct in the left posterior temporal/occipital cortex.  Following this event, she was ordered for cardiac monitoring to assess for atrial fibrillation, which did not demonstrate any evidence of atrial fibrillation, however, she did have a 6.3 second sinus pause that was associated with near syncope.  She was sent to the emergency department and ultimately underwent permanent pacemaker implantation.    At her initial clinic visit with me, she was noted to have significant lower extremity edema and her amlodipine was stopped.  She was started on HCTZ, but did not tolerate this due to frequent urination.  After stopping amlodipine, her edema essentially resolved.  With regard to her lipid control, she was previously recommended to start a PCSK9 inhibitor, however, these medication were unaffordable for her.  Therefore, she was tried on simvastatin-ezetimibe combination therapy, which she also did not tolerate.  Therefore, she has only been taking ezetimibe.    Today, she has no acute complaints, although she has been frustrated with not walking recently because her neighbors who assist her have been on vacation.  She has not been taking her blood pressure routinely.     Medications and Allergies:     Current Outpatient Medications   Medication Sig Dispense Refill     "verapamil ER (CALAN SR) 240 MG Tab CR Take 1 Tablet by mouth every day. 90 Tablet 3    Apoaequorin (PREVAGEN EXTRA STRENGTH PO) Take  by mouth.      B Complex Vitamins (B COMPLEX PO) Take  by mouth every 48 hours.      fluticasone (FLONASE) 50 MCG/ACT nasal spray Administer 2 Sprays into affected nostril(S) every day. 16 g 3    ezetimibe (ZETIA) 10 MG Tab Take 1 Tablet by mouth every day. 90 Tablet 3    vitamin E 180 MG (400 UNIT) Cap Take 180 mg by mouth 2 times a day.      irbesartan (AVAPRO) 300 MG Tab Take 1 Tablet by mouth every evening. 100 Tablet 1    loratadine (CLARITIN) 10 MG Tab TAKE ONE TABLET BY MOUTH EVERY DAY AS NEEDED FOR ALLERGY 90 Tablet 3    Ascorbic Acid (VITAMIN C CR) 1000 MG Tab CR Take 1 Tablet by mouth every day.      ASPIRIN 81 PO Take 1 Tablet by mouth every day.      Magnesium 400 MG Tab Take 400 mg by mouth every day.       No current facility-administered medications for this visit.     Allergies   Allergen Reactions    Benicar [Olmesartan] Rash     blisters    Dilaudid  [Hydromorphone Hcl] Vomiting    Lidocaine     Norco  [Apap-Fd&C Blue #1-Hydrocodone] Vomiting    Other Misc Shortness of Breath     Anesthesia with surgery 2016  Pt couldn't breathe while being put under and tapped side of bed to let RN know. \"It was the scariest feeling and I want everyone to know\"      Percocet  [Apap-Fd&C Red #40 [place]-Oxycodone] Vomiting    Vicodin  [Apap-Fd&C Yellow #10 [place]-Hydrocodone] Vomiting    Codeine Vomiting    Demerol Vomiting    Hydrocodone Vomiting    Other Drug Vomiting     \"all pain meds\" per pt can take morphine.    Oxycodone Vomiting    Statins [Hmg-Coa-R Inhibitors] Unspecified     Muscle cramping    Valium Vomiting    Amlodipine Swelling    Diazepam     Nexletol [Bempedoic Acid] Palpitations     Chest pain and dizziness    Simvastatin Myalgia and Unspecified     Headache, fatigue    Hydrochlorothiazide      Frequent urination      Medical Decision Making:   # History of " "recurrent CVA, hyperlipidemia: She has not had detected atrial fibrillation on her PPM and her CVAs are most atherosclerotic in etiology.  She has not been able to tolerate multiple statins and is not willing to try these medications again due to intolerable side effects.  -Continue aspirin 81 mg daily  -Continue ezetimibe 10 mg daily  -We will discuss with Lipid Clinic if inclisiran would be affordable for her if she was a candidate  -Long-term AF monitoring can be performed through her PPM    # Hypertension: Remains uncontrolled today.  -Continue irbesartan 300 mg nightly  -Increase verapamil to 240 mg daily    # Carotid artery disease, peripheral vascular disease: This was only mild on her recent CTA of the head/neck.  -Medical management as above    # Sick sinus syndrome (s/p permanent pacemaker implantation): Remote monitoring has shown normal device function with minimal pacer requirement.  -Continue follow-up per Device Clinic    # Mild mitral regurgitation, mild aortic insufficiency:  -Obtain surveillance echocardiogram around 6/2024    Follow Up  4 months     Cardiac Studies and Procedures:   Echocardiography  TTE (6/21/2021)  The left ventricular ejection fraction is normal; visually estimated to be 55%.  Mild concentric left ventricular hypertrophy.  At least grade 1 diastolic dysfunction, difficult assessment due to mitral valve calcification.  Thickened, calcified mitral valve leaflets.  Mild mitral regurgitation.  Mild aortic insufficiency.  Normal left atrial size.  Normal IVC.    CT/MRI  CTA head/neck (6/20/2022)  Mild bilateral internal carotid artery atherosclerotic plaque without significant stenosis    Electrophysiology  2-week cardiac monitor (7/21/2021)  Predominantly sinus rhythm.   Significant sinus arrest.   Asymptomatic SVT and rare ectopy.      Vital Signs:   BP (!) 148/98 (BP Location: Right arm, Patient Position: Sitting)   Pulse 78   Resp 16   Ht 1.651 m (5' 5\")   Wt 72.6 kg (160 " "lb)   SpO2 95%    BP Readings from Last 4 Encounters:   09/12/23 (!) 148/98   08/30/23 (!) 172/86   07/17/23 136/70   06/20/23 (!) 150/100     Wt Readings from Last 4 Encounters:   09/12/23 72.6 kg (160 lb)   07/17/23 74.8 kg (165 lb)   06/20/23 74.4 kg (164 lb)   05/23/23 76.7 kg (169 lb)     Body mass index is 26.63 kg/m².     Laboratories:   Lipids  Lab Results   Component Value Date/Time     (H) 09/05/2023 11:44 AM    LDL 86 07/05/2023 09:49 AM    LDL 89 05/22/2023 09:45 AM     (H) 02/08/2023 10:36 AM    LDL 92 08/02/2022 10:40 AM       No results found for: \"LDLCALC\"  Lab Results   Component Value Date/Time    HDL 57 09/05/2023 11:44 AM    HDL 65 07/05/2023 09:49 AM    HDL 70 05/22/2023 09:45 AM    HDL 58 02/08/2023 10:36 AM    HDL 65 08/02/2022 10:40 AM       Lab Results   Component Value Date/Time    TRIGLYCERIDE 58 09/05/2023 11:44 AM    TRIGLYCERIDE 83 07/05/2023 09:49 AM    TRIGLYCERIDE 54 05/22/2023 09:45 AM    TRIGLYCERIDE 67 02/08/2023 10:36 AM    TRIGLYCERIDE 41 08/02/2022 10:40 AM       Lab Results   Component Value Date/Time    CHOLSTRLTOT 169 09/05/2023 11:44 AM    CHOLSTRLTOT 168 07/05/2023 09:49 AM    CHOLSTRLTOT 170 05/22/2023 09:45 AM    CHOLSTRLTOT 177 02/08/2023 10:36 AM    CHOLSTRLTOT 165 08/02/2022 10:40 AM       No results found for: \"LIPOPROTA\"      Chemistries  Lab Results   Component Value Date/Time    CREATININE 0.65 09/05/2023 11:44 AM    CREATININE 0.61 05/22/2023 09:45 AM    CREATININE 0.92 12/20/2022 03:01 PM    CREATININE 0.82 08/02/2022 10:40 AM    CREATININE 1.13 07/15/2022 04:07 AM     Lab Results   Component Value Date/Time    BUN 28 (H) 09/05/2023 11:44 AM    BUN 23 (H) 05/22/2023 09:45 AM    BUN 52 (H) 12/20/2022 03:01 PM    BUN 39 (H) 08/02/2022 10:40 AM    BUN 30 (H) 07/15/2022 04:07 AM     Lab Results   Component Value Date/Time    POTASSIUM 4.2 09/05/2023 11:44 AM    POTASSIUM 4.3 05/22/2023 09:45 AM    POTASSIUM 4.8 12/20/2022 03:01 PM     Lab Results " "  Component Value Date/Time    SODIUM 143 09/05/2023 11:44 AM    SODIUM 139 05/22/2023 09:45 AM    SODIUM 141 12/20/2022 03:01 PM     Lab Results   Component Value Date/Time    GLUCOSE 94 09/05/2023 11:44 AM    GLUCOSE 90 05/22/2023 09:45 AM    GLUCOSE 74 12/20/2022 03:01 PM     Lab Results   Component Value Date/Time    ASTSGOT 29 09/05/2023 11:44 AM    ASTSGOT 29 08/02/2022 10:40 AM    ASTSGOT 20 07/12/2022 05:53 PM     Lab Results   Component Value Date/Time    ALTSGPT 22 09/05/2023 11:44 AM    ALTSGPT 14 08/02/2022 10:40 AM    ALTSGPT 17 07/12/2022 05:53 PM     Lab Results   Component Value Date/Time    ALKPHOSPHAT 111 (H) 09/05/2023 11:44 AM    ALKPHOSPHAT 53 08/02/2022 10:40 AM    ALKPHOSPHAT 51 07/12/2022 05:53 PM     Lab Results   Component Value Date/Time    HBA1C 5.4 08/02/2022 10:40 AM    HBA1C 5.5 06/20/2022 09:25 PM    HBA1C 5.7 (H) 07/08/2021 10:34 AM     No results found for: \"TSH\"  No results found for: \"NTPROBNP\"  Lab Results   Component Value Date/Time    TROPONINT 15 07/12/2022 05:53 PM    TROPONINT 12 06/20/2022 07:09 PM       Blood Counts  Lab Results   Component Value Date/Time    HEMOGLOBIN 16.0 05/22/2023 09:45 AM    HEMOGLOBIN 14.9 08/02/2022 10:40 AM    HEMOGLOBIN 14.6 07/13/2022 01:59 AM     Lab Results   Component Value Date/Time    PLATELETCT 178 05/22/2023 09:45 AM    PLATELETCT 243 08/02/2022 10:40 AM    PLATELETCT 202 07/13/2022 01:59 AM     Lab Results   Component Value Date/Time    WBC 5.5 05/22/2023 09:45 AM    WBC 5.9 08/02/2022 10:40 AM    WBC 5.0 07/13/2022 01:59 AM        Physical Examination:   General: Uses walker, somewhat frail, but in no acute distress  Eyes: Extraocular movements intact, anicteric  Neck: Full range of motion, no gross jugular venous distension  Pulmonary: Normal respiratory effort, no distress  Cardiovascular: Regular rate and rhythm  Extremities: Warm and well perfused, trace residual bilateral lower extreme edema with some chronic venous stasis " changes  Neurological: Alert and oriented, moderate aphasia, uses walker for ambulation  Psychiatric: Normal affect, normal judgment     Past History:   Past Medical History  The patient's past medical history was reviewed.  See HPI and self-reported patient medical history form for pertinent medical history to consultation.    Past Social History  The patient's social history was reviewed.  See HPI self-reported patient medical history form for pertinent social history to consultation.    Past Family History  The patient's family history was reviewed.  See HPI self-reported patient medical history form for pertinent family history to consultation.    Review of Systems  A pertinent cardiac review of systems was performed and was otherwise unremarkable except as per HPI and self-reported patient medical history form.        Chandrakant Freire MD, [facility]  Interventional Cardiology  [facility], [address]  [address] [address]  Phone: [phone]  Fax: [phone]

## 2023-09-12 NOTE — TELEPHONE ENCOUNTER
BN    Caller: Phylicia from [facility]    Topic/issue: Phylicia states that patient would like to request for their ezetimibe (ZETIA) 10 MG Tab medication to go through them. Their information is:     [facility] [place] [id number] - [place], [place] - [address] [place] [address]   Phone: [phone]  Fax: [phone]    Callback Number: [phone]    Thank you,  -Katie MUHAMMAD

## 2023-09-13 DIAGNOSIS — I10 HYPERTENSION, ESSENTIAL: ICD-10-CM

## 2023-09-13 RX ORDER — IRBESARTAN 300 MG/1
300 TABLET ORAL EVERY EVENING
Qty: 100 TABLET | Refills: 3 | Status: SHIPPED | OUTPATIENT
Start: 2023-09-13

## 2023-09-14 ENCOUNTER — TELEPHONE (OUTPATIENT)
Dept: VASCULAR LAB | Facility: MEDICAL CENTER | Age: 83
End: 2023-09-14
Payer: MEDICARE

## 2023-09-14 NOTE — TELEPHONE ENCOUNTER
Requested Prescriptions     Signed Prescriptions Disp Refills    irbesartan (AVAPRO) 300 MG Tab 100 Tablet 3     Sig: TAKE ONE TABLET BY MOUTH EVERY EVENING     Authorizing Provider: RHODA BELLA A.P.R.N.

## 2023-09-14 NOTE — TELEPHONE ENCOUNTER
Phone Number Called: [phone]    Call outcome: Spoke to patient.     Message: Called to inform patient that prescription for Zetia has been sent to [facility] per her request. No further questions at this time.

## 2023-09-14 NOTE — TELEPHONE ENCOUNTER
[facility]    S/W pt concerning PCSK9 therapy and leqvio.  Our RxCoordinator ran a test claim for PCSK9 and found:     Repatha and Praluent Co-pays: $117.50/84DS ; $47/28DS     Pt would like to wait until seeing Newton Monroy on 9/26 to discuss maybe trying PCSK9 again.  Looking at her insurance, She will not qualify for leqvio unless she has a contraindication or intolerance to PCSK9.    Franco Lee, PharmD

## 2023-09-15 ENCOUNTER — TELEPHONE (OUTPATIENT)
Dept: CARDIOLOGY | Facility: MEDICAL CENTER | Age: 83
End: 2023-09-15
Payer: MEDICARE

## 2023-09-15 NOTE — TELEPHONE ENCOUNTER
BN     Caller: Irina Traylor    Topic/issue: Patient called back and was seen in office on 9/12/23 at a later time. Patient states she does not need to schedule another appointment at this time. Please advise.     Callback Number: [phone] (home)     Thank you,   Li BERGERON

## 2023-09-29 ENCOUNTER — TELEPHONE (OUTPATIENT)
Dept: VASCULAR LAB | Facility: MEDICAL CENTER | Age: 83
End: 2023-09-29
Payer: MEDICARE

## 2023-09-29 NOTE — TELEPHONE ENCOUNTER
[facility]    Pt missed their last appointment to discuss lipids (possible leqvio?) Left VM for pt to reschedule.    Franco Lee, PharmD

## 2023-10-04 ENCOUNTER — TELEPHONE (OUTPATIENT)
Dept: VASCULAR LAB | Facility: MEDICAL CENTER | Age: 83
End: 2023-10-04
Payer: MEDICARE

## 2023-10-04 NOTE — TELEPHONE ENCOUNTER
[facility]    Pt missed apt to discuss lipid management.   Likely she would be moving towards PCSK9i or Leqvio.  Pt would not like to move forward with seeing our pharmacists or our doctors to help with HLD.  Provided education as to why it would be important but pt continues to decline.  Will notify referring provider.    Franco Lee, PharmD     Dr Bloch Dr [facility]

## 2023-10-04 NOTE — TELEPHONE ENCOUNTER
Thanks, we tried our best and gave it multiple times.  At this point, I think she has shown us where she wants to get with regard to her cholesterol and future risk for stroke.

## 2023-10-13 ENCOUNTER — NON-PROVIDER VISIT (OUTPATIENT)
Dept: CARDIOLOGY | Facility: MEDICAL CENTER | Age: 83
End: 2023-10-13
Payer: MEDICARE

## 2023-10-13 PROCEDURE — 93294 REM INTERROG EVL PM/LDLS PM: CPT | Performed by: INTERNAL MEDICINE

## 2023-10-13 NOTE — CARDIAC REMOTE MONITOR - SCAN
Device transmission reviewed. Device demonstrated appropriate function.       Electronically Signed by: Rosana Wolfe M.D.    10/25/2023  12:44 PM

## 2023-10-24 ENCOUNTER — OFFICE VISIT (OUTPATIENT)
Dept: MEDICAL GROUP | Facility: PHYSICIAN GROUP | Age: 83
End: 2023-10-24
Payer: MEDICARE

## 2023-10-24 VITALS
BODY MASS INDEX: 30.82 KG/M2 | WEIGHT: 157 LBS | HEART RATE: 68 BPM | DIASTOLIC BLOOD PRESSURE: 70 MMHG | SYSTOLIC BLOOD PRESSURE: 152 MMHG | OXYGEN SATURATION: 95 % | TEMPERATURE: 97.9 F | HEIGHT: 60 IN

## 2023-10-24 DIAGNOSIS — I73.9 PVD (PERIPHERAL VASCULAR DISEASE) (HCC): ICD-10-CM

## 2023-10-24 DIAGNOSIS — Z89.421 ACQUIRED ABSENCE OF OTHER RIGHT TOE(S) (HCC): ICD-10-CM

## 2023-10-24 DIAGNOSIS — Z89.422 ACQUIRED ABSENCE OF OTHER LEFT TOE(S) (HCC): ICD-10-CM

## 2023-10-24 DIAGNOSIS — I10 HYPERTENSION, ESSENTIAL: ICD-10-CM

## 2023-10-24 DIAGNOSIS — I49.5 SICK SINUS SYNDROME (HCC): Chronic | ICD-10-CM

## 2023-10-24 DIAGNOSIS — G47.33 OSA (OBSTRUCTIVE SLEEP APNEA): Chronic | ICD-10-CM

## 2023-10-24 DIAGNOSIS — E66.9 OBESITY (BMI 30-39.9): ICD-10-CM

## 2023-10-24 DIAGNOSIS — M15.9 PRIMARY OSTEOARTHRITIS INVOLVING MULTIPLE JOINTS: ICD-10-CM

## 2023-10-24 DIAGNOSIS — G47.33 OSA ON CPAP: ICD-10-CM

## 2023-10-24 DIAGNOSIS — Z00.00 MEDICARE ANNUAL WELLNESS VISIT, SUBSEQUENT: Primary | ICD-10-CM

## 2023-10-24 DIAGNOSIS — E78.00 PURE HYPERCHOLESTEROLEMIA: ICD-10-CM

## 2023-10-24 PROCEDURE — G0439 PPPS, SUBSEQ VISIT: HCPCS | Performed by: NURSE PRACTITIONER

## 2023-10-24 PROCEDURE — 3077F SYST BP >= 140 MM HG: CPT | Performed by: NURSE PRACTITIONER

## 2023-10-24 PROCEDURE — 3078F DIAST BP <80 MM HG: CPT | Performed by: NURSE PRACTITIONER

## 2023-10-24 ASSESSMENT — FIBROSIS 4 INDEX: FIB4 SCORE: 2.78

## 2023-10-24 ASSESSMENT — PATIENT HEALTH QUESTIONNAIRE - PHQ9: CLINICAL INTERPRETATION OF PHQ2 SCORE: 0

## 2023-10-24 ASSESSMENT — ENCOUNTER SYMPTOMS: GENERAL WELL-BEING: GOOD

## 2023-10-24 ASSESSMENT — ACTIVITIES OF DAILY LIVING (ADL): BATHING_REQUIRES_ASSISTANCE: 0
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
## 2023-10-24 NOTE — ASSESSMENT & PLAN NOTE
Has amputation of right middle toe.  Using four-wheel walker with ambulation.   Inflammation Suggestive Of Cancer Camouflage Histology Text: There was a dense lymphocytic infiltrate which prevented adequate histologic evaluation of adjacent structures.
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
## 2023-10-24 NOTE — ASSESSMENT & PLAN NOTE
She followed up with cardiology.  Continues Zetia 10 mg daily and aspirin 81 mg daily.  Cardiology considering lipid clinic as patient has been intolerant to statins due to side effects.

## 2023-11-01 ENCOUNTER — TELEPHONE (OUTPATIENT)
Dept: HEALTH INFORMATION MANAGEMENT | Facility: OTHER | Age: 83
End: 2023-11-01
Payer: MEDICARE

## 2023-11-07 ENCOUNTER — OFFICE VISIT (OUTPATIENT)
Dept: MEDICAL GROUP | Facility: PHYSICIAN GROUP | Age: 83
End: 2023-11-07
Payer: MEDICARE

## 2023-11-07 VITALS
WEIGHT: 161 LBS | HEIGHT: 60 IN | HEART RATE: 70 BPM | DIASTOLIC BLOOD PRESSURE: 62 MMHG | SYSTOLIC BLOOD PRESSURE: 126 MMHG | OXYGEN SATURATION: 93 % | BODY MASS INDEX: 31.61 KG/M2 | TEMPERATURE: 97 F

## 2023-11-07 DIAGNOSIS — I10 HYPERTENSION, ESSENTIAL: ICD-10-CM

## 2023-11-07 PROCEDURE — 3074F SYST BP LT 130 MM HG: CPT | Performed by: STUDENT IN AN ORGANIZED HEALTH CARE EDUCATION/TRAINING PROGRAM

## 2023-11-07 PROCEDURE — 99213 OFFICE O/P EST LOW 20 MIN: CPT | Performed by: STUDENT IN AN ORGANIZED HEALTH CARE EDUCATION/TRAINING PROGRAM

## 2023-11-07 PROCEDURE — 3078F DIAST BP <80 MM HG: CPT | Performed by: STUDENT IN AN ORGANIZED HEALTH CARE EDUCATION/TRAINING PROGRAM

## 2023-11-07 ASSESSMENT — FIBROSIS 4 INDEX: FIB4 SCORE: 2.78

## 2023-11-07 NOTE — PROGRESS NOTES
Subjective:     Chief Complaint   Patient presents with    Blood Pressure Problem     High blood pressure. Needs refill for verapamil 120           HPI:   Irina is a patient of Sarita Noble that presents today for BP.  Currently on irbesartan 300 mg daily and verapamil  mg daily (recently increased by Cardiology).  Patient reports she was taking verapamil  mg twice daily.  Home BP log was reviewed and -148/70-90.  BP taken by 2 MAs was 118/80 and 126/62 today.  Repeat /60.  Patient reports she already brought in her BP machine for comparison.        ROS:  Negative except as stated above.      Objective:     Exam:  /62   Pulse 70   Temp 36.1 °C (97 °F) (Tympanic)   Ht 1.524 m (5')   Wt 73 kg (161 lb)   SpO2 93%   BMI 31.44 kg/m²  Body mass index is 31.44 kg/m².    Physical Exam    Gen: Alert and oriented, no acute distress.  Lungs: Normal effort, CTAB, no wheezing / rhonchi / rales.  CV: RRR, normal S1 and S2.      Assessment & Plan:     82 y.o. female with the following -     1. Hypertension, essential  Chronic, controlled.  Follows up with cardiology.  Patient brought in her medications today and she was advised to take verapamil  mg daily and continue irbesartan 300 mg daily.        Return if symptoms worsen or fail to improve.    Please note that this dictation was created using voice recognition software. I have made every reasonable attempt to correct obvious errors, but I expect that there are errors of grammar and possibly content that I did not discover before finalizing the note.

## 2023-11-17 DIAGNOSIS — Z91.09 ALLERGY TO ENVIRONMENTAL FACTORS: ICD-10-CM

## 2023-11-20 RX ORDER — LORATADINE 10 MG/1
TABLET ORAL
Qty: 90 TABLET | Refills: 0 | Status: SHIPPED | OUTPATIENT
Start: 2023-11-20 | End: 2024-01-22

## 2023-11-20 NOTE — TELEPHONE ENCOUNTER
Requested Prescriptions     Pending Prescriptions Disp Refills    loratadine (CLARITIN) 10 MG Tab [Pharmacy Med Name: Loratadine Oral Tablet 10 MG] 90 Tablet 0     Sig: TAKE ONE TABLET BY MOUTH EVERY DAY AS NEEDED FOR ALLERGY       ANDREW Demarco.

## 2023-11-28 ENCOUNTER — OFFICE VISIT (OUTPATIENT)
Dept: MEDICAL GROUP | Facility: PHYSICIAN GROUP | Age: 83
End: 2023-11-28
Payer: MEDICARE

## 2023-11-28 VITALS
HEART RATE: 63 BPM | DIASTOLIC BLOOD PRESSURE: 62 MMHG | RESPIRATION RATE: 14 BRPM | SYSTOLIC BLOOD PRESSURE: 116 MMHG | WEIGHT: 162 LBS | HEIGHT: 60 IN | OXYGEN SATURATION: 97 % | TEMPERATURE: 97.2 F | BODY MASS INDEX: 31.8 KG/M2

## 2023-11-28 DIAGNOSIS — K92.1 BLOODY STOOL: ICD-10-CM

## 2023-11-28 DIAGNOSIS — K59.04 CHRONIC IDIOPATHIC CONSTIPATION: ICD-10-CM

## 2023-11-28 PROCEDURE — 99213 OFFICE O/P EST LOW 20 MIN: CPT | Performed by: NURSE PRACTITIONER

## 2023-11-28 PROCEDURE — 3078F DIAST BP <80 MM HG: CPT | Performed by: NURSE PRACTITIONER

## 2023-11-28 PROCEDURE — 3074F SYST BP LT 130 MM HG: CPT | Performed by: NURSE PRACTITIONER

## 2023-11-28 ASSESSMENT — FIBROSIS 4 INDEX: FIB4 SCORE: 2.78

## 2023-11-29 ENCOUNTER — HOSPITAL ENCOUNTER (OUTPATIENT)
Facility: MEDICAL CENTER | Age: 83
End: 2023-11-29
Attending: NURSE PRACTITIONER
Payer: MEDICARE

## 2023-11-29 ENCOUNTER — HOSPITAL ENCOUNTER (OUTPATIENT)
Dept: LAB | Facility: MEDICAL CENTER | Age: 83
End: 2023-11-29
Attending: NURSE PRACTITIONER
Payer: MEDICARE

## 2023-11-29 LAB
BASOPHILS # BLD AUTO: 1.4 % (ref 0–1.8)
BASOPHILS # BLD: 0.07 K/UL (ref 0–0.12)
EOSINOPHIL # BLD AUTO: 0.13 K/UL (ref 0–0.51)
EOSINOPHIL NFR BLD: 2.7 % (ref 0–6.9)
ERYTHROCYTE [DISTWIDTH] IN BLOOD BY AUTOMATED COUNT: 42.8 FL (ref 35.9–50)
HCT VFR BLD AUTO: 50.7 % (ref 37–47)
HGB BLD-MCNC: 16.5 G/DL (ref 12–16)
IMM GRANULOCYTES # BLD AUTO: 0.07 K/UL (ref 0–0.11)
IMM GRANULOCYTES NFR BLD AUTO: 1.4 % (ref 0–0.9)
LYMPHOCYTES # BLD AUTO: 0.75 K/UL (ref 1–4.8)
LYMPHOCYTES NFR BLD: 15.4 % (ref 22–41)
MCH RBC QN AUTO: 29.9 PG (ref 27–33)
MCHC RBC AUTO-ENTMCNC: 32.5 G/DL (ref 32.2–35.5)
MCV RBC AUTO: 91.8 FL (ref 81.4–97.8)
MONOCYTES # BLD AUTO: 0.47 K/UL (ref 0–0.85)
MONOCYTES NFR BLD AUTO: 9.6 % (ref 0–13.4)
NEUTROPHILS # BLD AUTO: 3.39 K/UL (ref 1.82–7.42)
NEUTROPHILS NFR BLD: 69.5 % (ref 44–72)
NRBC # BLD AUTO: 0 K/UL
NRBC BLD-RTO: 0 /100 WBC (ref 0–0.2)
PLATELET # BLD AUTO: 328 K/UL (ref 164–446)
PMV BLD AUTO: 11.1 FL (ref 9–12.9)
RBC # BLD AUTO: 5.52 M/UL (ref 4.2–5.4)
WBC # BLD AUTO: 4.9 K/UL (ref 4.8–10.8)

## 2023-11-29 PROCEDURE — 85025 COMPLETE CBC W/AUTO DIFF WBC: CPT

## 2023-11-29 PROCEDURE — 82274 ASSAY TEST FOR BLOOD FECAL: CPT

## 2023-11-29 PROCEDURE — 36415 COLL VENOUS BLD VENIPUNCTURE: CPT

## 2023-11-29 NOTE — PROGRESS NOTES
Chief Complaint   Patient presents with    Bloody Stools     A couple days ago, redness, slight bloody on toilet paper, no other symptoms       HISTORY OF PRESENT ILLNESS: Irina Traylor is a 82 y.o. female established patient of PILO Noble who presents today to discuss:  - noticed scant blood on tissue paper when she wiped a few days ago. She did not see any blood today. No abdominal pain, nausea, dizziness. She has past history of hemorrhoids. She takes laxative pill every night for chronic constipation. Sometimes she eats grapes to elicit bowel movement.     Current Outpatient Medications on File Prior to Visit   Medication Sig Dispense Refill    loratadine (CLARITIN) 10 MG Tab TAKE ONE TABLET BY MOUTH EVERY DAY AS NEEDED FOR ALLERGY 90 Tablet 0    irbesartan (AVAPRO) 300 MG Tab TAKE ONE TABLET BY MOUTH EVERY EVENING 100 Tablet 3    verapamil ER (CALAN SR) 240 MG Tab CR Take 1 Tablet by mouth every day. 90 Tablet 3    ezetimibe (ZETIA) 10 MG Tab Take 1 Tablet by mouth every day. 100 Tablet 3    Apoaequorin (PREVAGEN EXTRA STRENGTH PO) Take  by mouth.      B Complex Vitamins (B COMPLEX PO) Take  by mouth every 48 hours.      fluticasone (FLONASE) 50 MCG/ACT nasal spray Administer 2 Sprays into affected nostril(S) every day. 16 g 3    vitamin E 180 MG (400 UNIT) Cap Take 180 mg by mouth 2 times a day.      Ascorbic Acid (VITAMIN C CR) 1000 MG Tab CR Take 1 Tablet by mouth every day.      ASPIRIN 81 PO Take 1 Tablet by mouth every day.      Magnesium 400 MG Tab Take 400 mg by mouth every day.       No current facility-administered medications on file prior to visit.       has a past medical history of Anesthesia, Arthritis, Benign essential HTN (9/7/2017), Cataract, CPAP (continuous positive airway pressure) dependence, Dyslipidemia (3/5/2019), Expressive aphasia, Family history of CVA, Heart murmur, DARIN (obstructive sleep apnea) (1/26/2017), Post-nasal drip, Restless leg syndrome, Sick sinus syndrome  ([facility]) (6/29/2022), Sinus arrest (6/29/2022), Stroke ([facility]) (2/1993, 8/1993), and Walker as ambulation aid.     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)    PVD (peripheral vascular disease) ([facility])    Recurrent cerebrovascular accidents (CVAs) ([facility])    Sick sinus syndrome ([facility])    Hypertension, essential    Vitamin D deficiency    Primary osteoarthritis involving multiple joints    Acquired absence of other right toe(s) ([facility])    Pure hypercholesterolemia        Allergies:Benicar [olmesartan], Dilaudid  [hydromorphone hcl], Lidocaine, Norco  [apap-fd&c blue #1-hydrocodone], Other misc, Percocet  [apap-fd&c red #40 [place]-oxycodone], Vicodin  [apap-fd&c yellow #10 [place]-hydrocodone], Codeine, Demerol, Hydrocodone, Other drug, Oxycodone, Statins [hmg-coa-r inhibitors], Valium, Amlodipine, Diazepam, Nexletol [bempedoic acid], Simvastatin, and Hydrochlorothiazide    Health Maintenance: deferred  Review of Systems -included above  Exam:   /62   Pulse 63   Temp 36.2 °C (97.2 °F) (Temporal)   Resp 14   Ht 1.524 m (5')   Wt 73.5 kg (162 lb)   SpO2 97%   Body mass index is 31.64 kg/m².   Physical Exam  Constitutional:       Appearance: Normal appearance.   Abdominal:      General: Bowel sounds are normal.      Palpations: Abdomen is soft.      Tenderness: There is no abdominal tenderness. There is no guarding.   Neurological:      Mental Status: She is alert.       Assessment/Plan:  1. Chronic idiopathic constipation  2. Bloody stool  83 y/o F with hx chronic  constipation, hemorrhoids here today with concerns of blood on TP when she wiped this past Sunday. No blood in the toilet, did not see any more blood today after using the bathroom. Abdominal exam is normal, no pain or tenderness with palpation. She usually takes laxative daily and sometimes eats grapes to have BM. Recommend adding daily fiber as well for more regular stools. Will check CBC, and stool for blood. She would like to see GI if needed. Will follow up via phone with results after she drops off stool specimen.     - OCCULT BLOOD FECES IMMUNOASSAY; Future  - CBC WITH DIFFERENTIAL; Future     Follow up:  Return if symptoms worsen or fail to improve.    Educated in proper administration of medication(s) ordered today including safety, possible SE, risks, benefits, rationale and alternatives to therapy.       Please note that this dictation was created using voice recognition software. I have made every reasonable attempt to correct obvious errors, but I expect that there are errors of grammar and possibly content that I did not discover before finalizing the note.

## 2023-11-30 DIAGNOSIS — K92.1 BLOODY STOOL: ICD-10-CM

## 2023-11-30 LAB — AMBIGUOUS SPECIMEN AMBIS: NORMAL

## 2023-12-02 LAB — IMM ASSAY OCC BLD FITOB: NEGATIVE

## 2024-01-12 ENCOUNTER — NON-PROVIDER VISIT (OUTPATIENT)
Dept: CARDIOLOGY | Facility: MEDICAL CENTER | Age: 84
End: 2024-01-12
Payer: MEDICARE

## 2024-01-12 PROCEDURE — 93294 REM INTERROG EVL PM/LDLS PM: CPT | Performed by: INTERNAL MEDICINE

## 2024-01-12 NOTE — CARDIAC REMOTE MONITOR - SCAN
Device transmission reviewed. Device demonstrated appropriate function.       Electronically Signed by: Dakotah Knowles M.D.    1/14/2024  11:50 AM

## 2024-01-19 DIAGNOSIS — Z91.09 ALLERGY TO ENVIRONMENTAL FACTORS: ICD-10-CM

## 2024-01-19 DIAGNOSIS — E78.5 DYSLIPIDEMIA: ICD-10-CM

## 2024-01-22 RX ORDER — LORATADINE 10 MG/1
TABLET ORAL
Qty: 90 TABLET | Refills: 3 | Status: SHIPPED | OUTPATIENT
Start: 2024-01-22

## 2024-01-22 RX ORDER — EZETIMIBE 10 MG/1
10 TABLET ORAL
Qty: 100 TABLET | Refills: 2 | Status: SHIPPED | OUTPATIENT
Start: 2024-01-22

## 2024-01-22 RX ORDER — FLUTICASONE PROPIONATE 50 MCG
SPRAY, SUSPENSION (ML) NASAL
Qty: 16 G | Refills: 3 | Status: SHIPPED | OUTPATIENT
Start: 2024-01-22

## 2024-01-22 NOTE — TELEPHONE ENCOUNTER
Received request via: Pharmacy    Was the patient seen in the last year in this department? Yes    Does the patient have an active prescription (recently filled or refills available) for medication(s) requested? No    [facility]    Does the patient have [facility] and need 100 day supply (blood pressure, diabetes and cholesterol meds only)? Medication is not for cholesterol, blood pressure or diabetes

## 2024-01-22 NOTE — TELEPHONE ENCOUNTER
Requested Prescriptions     Signed Prescriptions Disp Refills    loratadine (CLARITIN) 10 MG Tab 90 Tablet 3     Sig: TAKE ONE TABLET BY MOUTH DAILY AS NEEDED FOR ALLERGY.     Authorizing Provider: RHODA BELLA A.P.R.N.

## 2024-01-22 NOTE — TELEPHONE ENCOUNTER
Requested Prescriptions     Signed Prescriptions Disp Refills    fluticasone (FLONASE) 50 MCG/ACT nasal spray 16 g 3     Sig: ADMINISTER 2 SPRAYS INTO AFFECTED NOSTRILS EVERY DAY     Authorizing Provider: RHODA BELLA A.P.R.N.

## 2024-01-22 NOTE — TELEPHONE ENCOUNTER
Is the patient due for a refill? Pharmacy change    Was the patient seen the past year? Yes    Date of last office visit: 09/12/2023    Does the patient have an upcoming appointment?  No   If yes, When?     Provider to refill:BN    Does the patients insurance require a 100 day supply?  Yes

## 2024-01-22 NOTE — TELEPHONE ENCOUNTER
Received request via: Patient    Was the patient seen in the last year in this department? Yes    Does the patient have an active prescription (recently filled or refills available) for medication(s) requested?  NO    Pharmacy Name: [facility]    Does the patient have [facility] and need 100 day supply (blood pressure, diabetes and cholesterol meds only)? NO

## 2024-01-31 ENCOUNTER — OFFICE VISIT (OUTPATIENT)
Dept: SLEEP MEDICINE | Facility: MEDICAL CENTER | Age: 84
End: 2024-01-31
Attending: NURSE PRACTITIONER
Payer: MEDICARE

## 2024-01-31 VITALS
OXYGEN SATURATION: 94 % | RESPIRATION RATE: 16 BRPM | DIASTOLIC BLOOD PRESSURE: 76 MMHG | HEIGHT: 60 IN | BODY MASS INDEX: 31.8 KG/M2 | WEIGHT: 162 LBS | SYSTOLIC BLOOD PRESSURE: 128 MMHG | HEART RATE: 61 BPM

## 2024-01-31 DIAGNOSIS — Z78.9 NONSMOKER: ICD-10-CM

## 2024-01-31 DIAGNOSIS — G47.33 OSA (OBSTRUCTIVE SLEEP APNEA): Chronic | ICD-10-CM

## 2024-01-31 PROCEDURE — 3074F SYST BP LT 130 MM HG: CPT | Performed by: NURSE PRACTITIONER

## 2024-01-31 PROCEDURE — 3078F DIAST BP <80 MM HG: CPT | Performed by: NURSE PRACTITIONER

## 2024-01-31 PROCEDURE — 99213 OFFICE O/P EST LOW 20 MIN: CPT | Performed by: NURSE PRACTITIONER

## 2024-01-31 RX ORDER — AZELASTINE 1 MG/ML
1 SPRAY, METERED NASAL 2 TIMES DAILY
Qty: 90 ML | Refills: 3 | Status: SHIPPED | OUTPATIENT
Start: 2024-01-31

## 2024-01-31 ASSESSMENT — PATIENT HEALTH QUESTIONNAIRE - PHQ9: CLINICAL INTERPRETATION OF PHQ2 SCORE: 0

## 2024-01-31 ASSESSMENT — FIBROSIS 4 INDEX: FIB4 SCORE: 1.53

## 2024-01-31 NOTE — PATIENT INSTRUCTIONS
Start using claritin (generic form ) once daily    Orders for new CPAP machine - call DME : [facility] Phone. [phone] Fax. [phone]  To make appt. To get new device

## 2024-01-31 NOTE — PROGRESS NOTES
Chief Complaint   Patient presents with    Follow-Up     Apnea // Last Seen 5/11/2021       HPI:  Irina Traylor is a 83 y.o. year old female here today for follow-up on DARIN.  Last OV 5/11/21     Currently using CPAP @ 15cm H20 nightly; [employer]; device obtained >5yrs?.  Compliance report 7/1/2020 noted 100% compliance, no symptomatically reduce AHI of 0.7/h.  Device would not give more recent data.  Patient's device is greater than 5 years old and recommend updating therapy.  He presents today with a battery pack and needs instruction on how to use it.  He notes significant sinus congestion.  No recent colds.    Sleep hx:  PSG indicates moderate obstructive sleep apnea with an AHI of 23.6, minimum oxygen saturation of 69 %.      Overnight oximetry 7/6/2015 using CPAP 15 cm noted average saturation 93% and patient spent 11 minutes less than 88% ox saturation.      ROS: As per HPI and otherwise negative if not stated.    Past Medical History:   Diagnosis Date    Anesthesia     States feels like she can't breathe with induction for right hip surgery, gets nausea vomiting with most pain meds.  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    Post-nasal drip     Restless leg syndrome     4/25/17-Resolved, states was related to right hip and none S/P replacement.    Sick sinus syndrome (HCC) 6/29/2022    Sinus arrest 6/29/2022    Stroke ([facility]) 2/1993, 8/1993    Tyxwpzlc-xmfzno-lpezecjpgx aphasia,mouth forming words, reading, numbers    Walker as ambulation aid        Past Surgical History:   Procedure Laterality Date    PB RECONSTR TOTAL SHOULDER IMPLANT Left 3/21/2019     Procedure: ARTHROPLASTY, SHOULDER, TOTAL-  REVISION REVERSE;  Surgeon: Yoshi Gamble M.D.;  Location: [facility];  Service: Orthopedics    ORIF, FRACTURE, HUMERUS Left 3/21/2019    Procedure: ORIF, FRACTURE, HUMERUS;  Surgeon: Yoshi Gamble M.D.;  Location: [facility];  Service: Orthopedics    KNEE ARTHROPLASTY TOTAL Left 2/12/2018    Procedure: KNEE ARTHROPLASTY TOTAL;  Surgeon: Earle Jacobsen M.D.;  Location: [facility];  Service: Orthopedics    CERVICAL FUSION POSTERIOR  8/8/2017    Procedure: CERVICAL FUSION POSTERIOR- C3-5, INSTRUMENTED;  Surgeon: Florentino Pike M.D.;  Location: [facility];  Service:     CERVICAL LAMINECTOMY POSTERIOR  8/8/2017    Procedure: CERVICAL LAMINECTOMY POSTERIOR- C3-5;  Surgeon: Florentino Pike M.D.;  Location: [facility];  Service:     HIP ARTHROPLASTY TOTAL Left 5/8/2017    Procedure: HIP ARTHROPLASTY TOTAL;  Surgeon: Earle Jacobsen M.D.;  Location: [facility];  Service:     HIP ARTHROPLASTY TOTAL Right 12/12/2016    Procedure: HIP ARTHROPLASTY TOTAL;  Surgeon: Earle Jacobsen M.D.;  Location: [facility];  Service:     FUSION, SPINE, LUMBAR, PLIF  9/26/2016    Procedure: LUMBAR FUSION POSTERIOR L2-S1 onlay ;  Surgeon: Florentino Pike M.D.;  Location: [facility];  Service:     LUMBAR LAMINECTOMY DISKECTOMY N/A 9/26/2016    Procedure: LUMBAR LAMINECTOMY DISKECTOMY L2-S1;  Surgeon: Florentino Pike M.D.;  Location: [facility];  Service:     COLONOSCOPY  2/2016    Every 10 years    SHOULDER ARTHROPLASTY TOTAL Left 8/11/2015    Procedure: SHOULDER ARTHROPLASTY REVERSE TOTAL;  Surgeon: Yoshi Gamble M.D.;  Location: [facility];  Service:     KNEE ARTHROPLASTY TOTAL  6/23/2014    Performed by Earle Jacobsen M.D. at [facility]    HAMMERTOE CORRECTION Left 11/12/2007    Removed 3rd toe    LUMBAR  LAMINECTOMY DISKECTOMY  1993    Lumbar    CARPAL TUNNEL RELEASE Bilateral 1993    CERVICAL DISK AND FUSION ANTERIOR  1993    X2    HYSTERECTOMY, TOTAL ABDOMINAL  1977    BUNIONECTOMY Bilateral 1980's    HAMMERTOE CORRECTION Right 1980's    Removed 3rd toe    ORIF, FINGER Left as child    Index       Family History   Problem Relation Age of Onset    Cancer Mother         vaginal    Stroke Father     Hypertension Brother     Diabetes Brother     Hypertension Brother     Hypertension Brother     Stroke Daughter        Social History     Socioeconomic History    Marital status: Single     Spouse name: Not on file    Number of children: Not on file    Years of education: Not on file    Highest education level: Not on file   Occupational History    Not on file   Tobacco Use    Smoking status: Never    Smokeless tobacco: Never   Vaping Use    Vaping Use: Never used   Substance and Sexual Activity    Alcohol use: Not Currently    Drug use: No    Sexual activity: Not Currently   Other Topics Concern    Not on file   Social History Narrative    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  She is currently driving without any accidents.  She does use a walker to ambulate.  Not having any falls.  Does not have hearing aids but is having hearing concerns.  Some increased aphasia especially since her history of CVAs and memory issues.  March 2019 she was hospitalized from her fracture of her left arm and did have home health which she no longer has right now.  She does not have family here but she does have neighbors who help her out.     Social Determinants of Health     Financial Resource Strain: Not on file   Food Insecurity: Not on file   Transportation Needs: Not on file   Physical Activity: Not on file   Stress: Not on file   Social Connections: Not on file   Intimate Partner Violence: Not on file   Housing Stability: Not on file       Allergies as of 01/31/2024 - Reviewed 01/31/2024   Allergen Reaction Noted    Benicar  [olmesartan] Rash 06/09/2014    Dilaudid  [hydromorphone hcl] Vomiting 12/03/2019    Lidocaine  09/09/2014    Norco  [apap-fd&c blue #1-hydrocodone] Vomiting 12/03/2019    Other misc Shortness of Breath 08/08/2017    Percocet  [apap-fd&c red #40 [place]-oxycodone] Vomiting 12/03/2019    Vicodin  [apap-fd&c yellow #10 [place]-hydrocodone] Vomiting 12/03/2019    Codeine Vomiting 05/10/2013    Demerol Vomiting 05/10/2013    Hydrocodone Vomiting 03/20/2019    Other drug Vomiting 06/09/2014    Oxycodone Vomiting 03/20/2019    Statins [hmg-coa-r inhibitors] Unspecified 05/10/2013    Valium Vomiting 06/09/2014    Amlodipine Swelling 02/17/2023    Diazepam  06/20/2023    Nexletol [bempedoic acid] Palpitations 12/20/2022    Simvastatin Myalgia and Unspecified 09/01/2023    Hydrochlorothiazide  02/17/2023        Vitals:  /76 (BP Location: Left arm, Patient Position: Sitting, BP Cuff Size: Adult)   Pulse 61   Resp 16   Ht 1.524 m (5')   Wt 73.5 kg (162 lb)   SpO2 94%     Current medications as of today   Current Outpatient Medications   Medication Sig Dispense Refill    loratadine (CLARITIN) 10 MG Tab TAKE ONE TABLET BY MOUTH DAILY AS NEEDED FOR ALLERGY. 90 Tablet 3    fluticasone (FLONASE) 50 MCG/ACT nasal spray ADMINISTER 2 SPRAYS INTO AFFECTED NOSTRILS EVERY DAY 16 g 3    ezetimibe (ZETIA) 10 MG Tab TAKE ONE TABLET BY MOUTH EVERY  Tablet 2    irbesartan (AVAPRO) 300 MG Tab TAKE ONE TABLET BY MOUTH EVERY EVENING 100 Tablet 3    verapamil ER (CALAN SR) 240 MG Tab CR Take 1 Tablet by mouth every day. 90 Tablet 3    Apoaequorin (PREVAGEN EXTRA STRENGTH PO) Take  by mouth.      B Complex Vitamins (B COMPLEX PO) Take  by mouth every 48 hours.      vitamin E 180 MG (400 UNIT) Cap Take 180 mg by mouth 2 times a day.      Ascorbic Acid (VITAMIN C CR) 1000 MG Tab CR Take 1 Tablet by mouth every day.      ASPIRIN 81 PO Take 1 Tablet by mouth every day.      Magnesium 400 MG Tab Take 400 mg by mouth every day.        No current facility-administered medications for this visit.         Physical Exam:   Gen:           Alert and oriented, No apparent distress. Mood and affect appropriate, normal interaction with examiner.  Eyes:          PERRL, EOM intact, sclere white, conjunctive moist.  Ears:          Not examined.   Hearing:     Grossly intact.  Nose:          Normal, no lesions or deformities.  Dentition:    Not examined.   Oropharynx:   Not examined.   Mallampati Classification: Not examined.   Neck:        Supple, trachea midline, no masses.  Respiratory Effort: No intercostal retractions or use of accessory muscles.   Lung Auscultation:      Clear to auscultation bilaterally; no rales, rhonchi or wheezing.  CV:            Regular rate and rhythm. No murmurs, rubs or gallops.  Abd:           Not examined.   Lymphadenopathy: Not examined.  Gait and Station: Walker  Digits and Nails: No clubbing, cyanosis, petechiae, or nodes.   Cranial Nerves: II-XII grossly intact.  Skin:        No rashes, lesions or ulcers noted.               Ext:           No cyanosis or edema.      Assessment:  1. DARIN (obstructive sleep apnea)  DME CPAP    DME Mask and Supplies      2. BMI 31.0-31.9,adult  HEIGHT AND WEIGHT      3. Nonsmoker              Immunizations:    Flu:recommend  Pneumovax 23:not due  Prevnar 13:not due  PCV 20: recommend  COVID-19: recommend    Plan:  Patient notes consistent nightly use of her device but she will have breaks in her sleep pattern due to her sinus congestion.  Recommend continue use of CPAP 15 cm on a nightly basis.  We will send orders for a new device since it is greater than 5 years old.  Patient given instruction how to use her battery pack in office.  It is compatible with [employer] AirSense 10 and [employer]othera 1&2 devices.   DME CPAP; >5yrs old   DME mask/supplies  Rx for azelastine to use with her Flonase to improve sinus congestion.  Recommend using 1 squirt each nostril twice daily followed by Flonase  with same dosing.  Encourage weight loss or healthy diet and activity.  Patient does do a stationary bike and walking every day.  Follow-up primary care for the health concerns.  Follow-up in 3 months for compliance check on new device, sooner if needed.    Please note that this dictation was created using voice recognition software. I have made every reasonable attempt to correct obvious errors, but it is possible there are errors of grammar and possibly content that I did not discover before finalizing the note.

## 2024-04-03 ENCOUNTER — TELEPHONE (OUTPATIENT)
Dept: HEALTH INFORMATION MANAGEMENT | Facility: OTHER | Age: 84
End: 2024-04-03
Payer: MEDICARE

## 2024-04-05 ENCOUNTER — OFFICE VISIT (OUTPATIENT)
Dept: MEDICAL GROUP | Facility: PHYSICIAN GROUP | Age: 84
End: 2024-04-05
Payer: MEDICARE

## 2024-04-05 VITALS
HEART RATE: 57 BPM | OXYGEN SATURATION: 99 % | BODY MASS INDEX: 29.45 KG/M2 | HEIGHT: 61 IN | TEMPERATURE: 98.8 F | SYSTOLIC BLOOD PRESSURE: 100 MMHG | DIASTOLIC BLOOD PRESSURE: 60 MMHG | WEIGHT: 156 LBS

## 2024-04-05 DIAGNOSIS — S69.92XA INJURY OF NAIL BED OF FINGER OF LEFT HAND, INITIAL ENCOUNTER: ICD-10-CM

## 2024-04-05 DIAGNOSIS — K42.9 UMBILICAL HERNIA WITHOUT OBSTRUCTION AND WITHOUT GANGRENE: ICD-10-CM

## 2024-04-05 PROCEDURE — 99213 OFFICE O/P EST LOW 20 MIN: CPT | Performed by: NURSE PRACTITIONER

## 2024-04-05 PROCEDURE — 3078F DIAST BP <80 MM HG: CPT | Performed by: NURSE PRACTITIONER

## 2024-04-05 PROCEDURE — 3074F SYST BP LT 130 MM HG: CPT | Performed by: NURSE PRACTITIONER

## 2024-04-05 RX ORDER — CHLORAL HYDRATE 500 MG
1000 CAPSULE ORAL EVERY EVENING
COMMUNITY

## 2024-04-05 ASSESSMENT — FIBROSIS 4 INDEX: FIB4 SCORE: 1.53

## 2024-04-05 NOTE — PROGRESS NOTES
Subjective       CC:   Chief Complaint   Patient presents with    Hernia     Hernia on belly button x 1-2 months     Nail Problem     Left thumb nail, feels like something is under her thumb nail x 6 months         HPI:   Patient is a 83 y.o. established female patient of [facility] with medical history listed below here today with concerns of bulge around her belly button. States appeared about 2 months ago. It is not painful but sometimes she feels nauseous if she presses on it. She denies any abdominal pain, vomiting, fever, bloody stools. She never had hernia before.    She also has concern with pain and bleeding under her left thumb nail bed. States it feels like there is a splinter stuck under her nail bed. She has tried cleaning it out a few times but blood scab still forms. She also tried soaking her hand and this helped with the pain momentarily.     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)    PVD (peripheral vascular disease) (HCC)    Recurrent cerebrovascular accidents (CVAs) (HCC)    Sick sinus syndrome (HCC)    Hypertension, essential    Vitamin D deficiency    Primary osteoarthritis involving multiple joints    Acquired absence of other right toe(s) (HCC)    Pure hypercholesterolemia       Past Medical History:   Diagnosis Date    Anesthesia     States feels like she can't breathe with induction for right hip surgery, gets nausea vomiting with most pain meds.  Uses anti-nausea medicine if takes morphine    Arthritis     To hips, shoulders, hands     Benign essential HTN 9/7/2017    Cataract     CPAP (continuous positive airway pressure) dependence     Dyslipidemia 3/5/2019    Expressive aphasia     Related to CVA    Family history of CVA     Heart murmur     DARIN (obstructive sleep apnea) 1/26/2017    AHI 23.6, minimum saturation 69%, on CPAP 15 cm.    Post-nasal drip     Restless leg syndrome     4/25/17-Resolved, states was related to right hip and none S/P replacement.    Sick sinus syndrome (HCC) 6/29/2022    Sinus arrest 6/29/2022    Stroke (Formerly [facility]) 2/1993, 8/1993    Twawpmhp-tlhlpe-xnuasnhwdw aphasia,mouth forming words, reading, numbers    Walker as ambulation aid         Past Surgical History:   Procedure Laterality Date    PB RECONSTR TOTAL SHOULDER IMPLANT Left 3/21/2019    Procedure: ARTHROPLASTY, SHOULDER, TOTAL-  REVISION REVERSE;  Surgeon: Yoshi Gamble M.D.;  Location: [facility];  Service: Orthopedics    ORIF, FRACTURE, HUMERUS Left 3/21/2019    Procedure: ORIF, FRACTURE, HUMERUS;  Surgeon: Yoshi Gamble M.D.;  Location: [facility];  Service: Orthopedics    KNEE ARTHROPLASTY TOTAL Left 2/12/2018    Procedure: KNEE ARTHROPLASTY TOTAL;  Surgeon: Earle Jacobsen M.D.;  Location: [facility];  Service: Orthopedics    CERVICAL FUSION POSTERIOR  8/8/2017    Procedure: CERVICAL FUSION POSTERIOR- C3-5, INSTRUMENTED;  Surgeon: Florentino Pike M.D.;  Location: [facility];  Service:     CERVICAL LAMINECTOMY POSTERIOR  8/8/2017    Procedure: CERVICAL LAMINECTOMY POSTERIOR- C3-5;  Surgeon: Florentino Pike M.D.;  Location: [facility];  Service:     HIP ARTHROPLASTY TOTAL Left 5/8/2017    Procedure: HIP ARTHROPLASTY TOTAL;  Surgeon: Earle Jacobsen M.D.;  Location: [facility];  Service:     HIP ARTHROPLASTY TOTAL Right 12/12/2016    Procedure: HIP ARTHROPLASTY TOTAL;  Surgeon: Earle Jacobsen M.D.;  Location: [facility];  Service:      FUSION, SPINE, LUMBAR, PLIF  9/26/2016    Procedure: LUMBAR FUSION POSTERIOR L2-S1 onlay ;  Surgeon: Florentino Pike M.D.;  Location: [facility];  Service:     LUMBAR LAMINECTOMY DISKECTOMY N/A 9/26/2016    Procedure: LUMBAR LAMINECTOMY DISKECTOMY L2-S1;  Surgeon: Florentino Pike M.D.;  Location: [facility];  Service:     COLONOSCOPY  2/2016    Every 10 years    SHOULDER ARTHROPLASTY TOTAL Left 8/11/2015    Procedure: SHOULDER ARTHROPLASTY REVERSE TOTAL;  Surgeon: Yoshi Gamble M.D.;  Location: [facility];  Service:     KNEE ARTHROPLASTY TOTAL  6/23/2014    Performed by Earle Jacobsen M.D. at SURGERY [facility]    HAMMERTOE CORRECTION Left 11/12/2007    Removed 3rd toe    LUMBAR LAMINECTOMY DISKECTOMY  1993    Lumbar    CARPAL TUNNEL RELEASE Bilateral 1993    CERVICAL DISK AND FUSION ANTERIOR  1993    X2    HYSTERECTOMY, TOTAL ABDOMINAL  1977    BUNIONECTOMY Bilateral 1980's    HAMMERTOE CORRECTION Right 1980's    Removed 3rd toe    ORIF, FINGER Left as child    Index        Current Outpatient Medications on File Prior to Visit   Medication Sig Dispense Refill    Omega-3 Fatty Acids (FISH OIL) 1000 MG Cap capsule Take 1,200 mg by mouth 3 times a day with meals.      KRILL OIL PO Take  by mouth.      azelastine (ASTELIN) 137 MCG/SPRAY nasal spray Administer 1 Spray into affected nostril(S) 2 times a day. 90 mL 3    loratadine (CLARITIN) 10 MG Tab TAKE ONE TABLET BY MOUTH DAILY AS NEEDED FOR ALLERGY. 90 Tablet 3    fluticasone (FLONASE) 50 MCG/ACT nasal spray ADMINISTER 2 SPRAYS INTO AFFECTED NOSTRILS EVERY DAY 16 g 3    ezetimibe (ZETIA) 10 MG Tab TAKE ONE TABLET BY MOUTH EVERY  Tablet 2    irbesartan (AVAPRO) 300 MG Tab TAKE ONE TABLET BY MOUTH EVERY EVENING 100 Tablet 3    verapamil ER (CALAN SR) 240 MG Tab CR Take 1 Tablet by mouth every day. 90 Tablet 3    Apoaequorin (PREVAGEN EXTRA STRENGTH PO) Take  by mouth.      B Complex Vitamins (B COMPLEX PO)  "Take  by mouth every 48 hours.      vitamin E 180 MG (400 UNIT) Cap Take 180 mg by mouth 2 times a day.      Ascorbic Acid (VITAMIN C CR) 1000 MG Tab CR Take 1 Tablet by mouth every day.      ASPIRIN 81 PO Take 1 Tablet by mouth every day.      Magnesium 400 MG Tab Take 400 mg by mouth every day.       No current facility-administered medications on file prior to visit.        Objective       Exam:  /60 (BP Location: Right arm, Patient Position: Sitting, BP Cuff Size: Adult)   Pulse (!) 57   Temp 37.1 °C (98.8 °F) (Temporal)   Ht 1.549 m (5' 1\")   Wt 70.8 kg (156 lb)   SpO2 99%   BMI 29.48 kg/m²  Body mass index is 29.48 kg/m².    Physical Exam  Abdominal:      General: Bowel sounds are normal.      Palpations: Abdomen is soft.      Tenderness: There is no abdominal tenderness.      Hernia: A hernia is present. Hernia is present in the umbilical area.             Assessment & Plan       83 y.o. female with the following -   1. Umbilical hernia without obstruction and without gangrene  Hernia x2 months. Soft, slightly reducible. Non tender but is having infrequent nausea when she strains or bends over. We will get US to r/o strangulation, will most likely need to see surgery for repair as she is having some symptoms with this. Denies constipation, bloody stools, fever.    - US-HERNIA ABDOMEN; Future    2. Injury of nail bed of finger of left hand, initial encounter  Cleaned off some debri from under left thumb finger nail; it was very painful preventing further probing under her nail. She feels like something may be stuck under her nail. Will send her to see hand specialist for nail removal/debridement.     - Referral to Hand Surgery      Educated in proper administration of medication(s) ordered today including safety, possible SE, risks, benefits, rationale and alternatives to therapy.     Return in about 4 weeks (around 5/3/2024) for hernia, thumb pain.    Please note that this dictation was created " using voice recognition software. I have made every reasonable attempt to correct obvious errors, but I expect that there are errors of grammar and possibly content that I did not discover before finalizing the note.

## 2024-04-08 ENCOUNTER — HOSPITAL ENCOUNTER (INPATIENT)
Facility: MEDICAL CENTER | Age: 84
LOS: 5 days | End: 2024-04-13
Attending: EMERGENCY MEDICINE | Admitting: STUDENT IN AN ORGANIZED HEALTH CARE EDUCATION/TRAINING PROGRAM
Payer: MEDICARE

## 2024-04-08 ENCOUNTER — APPOINTMENT (OUTPATIENT)
Dept: RADIOLOGY | Facility: MEDICAL CENTER | Age: 84
End: 2024-04-08
Attending: STUDENT IN AN ORGANIZED HEALTH CARE EDUCATION/TRAINING PROGRAM
Payer: MEDICARE

## 2024-04-08 ENCOUNTER — APPOINTMENT (OUTPATIENT)
Dept: RADIOLOGY | Facility: MEDICAL CENTER | Age: 84
End: 2024-04-08
Attending: EMERGENCY MEDICINE
Payer: MEDICARE

## 2024-04-08 DIAGNOSIS — N17.9 ACUTE KIDNEY INJURY (HCC): ICD-10-CM

## 2024-04-08 DIAGNOSIS — M19.041 PRIMARY OSTEOARTHRITIS OF BOTH HANDS: ICD-10-CM

## 2024-04-08 DIAGNOSIS — M86.9 OSTEOMYELITIS OF LEFT HAND, UNSPECIFIED TYPE (HCC): ICD-10-CM

## 2024-04-08 DIAGNOSIS — M19.042 PRIMARY OSTEOARTHRITIS OF BOTH HANDS: ICD-10-CM

## 2024-04-08 DIAGNOSIS — R79.89 ELEVATED BRAIN NATRIURETIC PEPTIDE (BNP) LEVEL: ICD-10-CM

## 2024-04-08 DIAGNOSIS — E78.00 PURE HYPERCHOLESTEROLEMIA: ICD-10-CM

## 2024-04-08 DIAGNOSIS — R60.0 BILATERAL LOWER EXTREMITY EDEMA: ICD-10-CM

## 2024-04-08 DIAGNOSIS — K42.9 UMBILICAL HERNIA WITHOUT OBSTRUCTION AND WITHOUT GANGRENE: ICD-10-CM

## 2024-04-08 LAB
ALBUMIN SERPL BCP-MCNC: 4.2 G/DL (ref 3.2–4.9)
ALBUMIN/GLOB SERPL: 1.3 G/DL
ALP SERPL-CCNC: 91 U/L (ref 30–99)
ALT SERPL-CCNC: 16 U/L (ref 2–50)
ANION GAP SERPL CALC-SCNC: 14 MMOL/L (ref 7–16)
APPEARANCE UR: CLEAR
APPEARANCE UR: CLEAR
AST SERPL-CCNC: 29 U/L (ref 12–45)
BACTERIA #/AREA URNS HPF: ABNORMAL /HPF
BACTERIA #/AREA URNS HPF: NEGATIVE /HPF
BASOPHILS # BLD AUTO: 0.5 % (ref 0–1.8)
BASOPHILS # BLD: 0.03 K/UL (ref 0–0.12)
BILIRUB SERPL-MCNC: 0.9 MG/DL (ref 0.1–1.5)
BILIRUB UR QL STRIP.AUTO: NEGATIVE
BILIRUB UR QL STRIP.AUTO: NEGATIVE
BUN SERPL-MCNC: 79 MG/DL (ref 8–22)
CALCIUM ALBUM COR SERPL-MCNC: 9.1 MG/DL (ref 8.5–10.5)
CALCIUM SERPL-MCNC: 9.3 MG/DL (ref 8.5–10.5)
CAOX CRY #/AREA URNS HPF: ABNORMAL /HPF
CHLORIDE SERPL-SCNC: 102 MMOL/L (ref 96–112)
CO2 SERPL-SCNC: 23 MMOL/L (ref 20–33)
COLOR UR: YELLOW
COLOR UR: YELLOW
CREAT SERPL-MCNC: 1.45 MG/DL (ref 0.5–1.4)
CREAT UR-MCNC: 76.02 MG/DL
CRP SERPL HS-MCNC: 1.54 MG/DL (ref 0–0.75)
EOSINOPHIL # BLD AUTO: 0.06 K/UL (ref 0–0.51)
EOSINOPHIL NFR BLD: 1 % (ref 0–6.9)
EPI CELLS #/AREA URNS HPF: ABNORMAL /HPF
EPI CELLS #/AREA URNS HPF: NORMAL /HPF
ERYTHROCYTE [DISTWIDTH] IN BLOOD BY AUTOMATED COUNT: 53.9 FL (ref 35.9–50)
ERYTHROCYTE [SEDIMENTATION RATE] IN BLOOD BY WESTERGREN METHOD: 4 MM/HOUR (ref 0–25)
GFR SERPLBLD CREATININE-BSD FMLA CKD-EPI: 36 ML/MIN/1.73 M 2
GLOBULIN SER CALC-MCNC: 3.2 G/DL (ref 1.9–3.5)
GLUCOSE SERPL-MCNC: 85 MG/DL (ref 65–99)
GLUCOSE UR STRIP.AUTO-MCNC: NEGATIVE MG/DL
GLUCOSE UR STRIP.AUTO-MCNC: NEGATIVE MG/DL
HCT VFR BLD AUTO: 47.6 % (ref 37–47)
HGB BLD-MCNC: 15.3 G/DL (ref 12–16)
HYALINE CASTS #/AREA URNS LPF: ABNORMAL /LPF
HYALINE CASTS #/AREA URNS LPF: NORMAL /LPF
IMM GRANULOCYTES # BLD AUTO: 0.05 K/UL (ref 0–0.11)
IMM GRANULOCYTES NFR BLD AUTO: 0.8 % (ref 0–0.9)
KETONES UR STRIP.AUTO-MCNC: NEGATIVE MG/DL
KETONES UR STRIP.AUTO-MCNC: NEGATIVE MG/DL
LACTATE SERPL-SCNC: 1.2 MMOL/L (ref 0.5–2)
LEUKOCYTE ESTERASE UR QL STRIP.AUTO: NEGATIVE
LEUKOCYTE ESTERASE UR QL STRIP.AUTO: NEGATIVE
LYMPHOCYTES # BLD AUTO: 0.89 K/UL (ref 1–4.8)
LYMPHOCYTES NFR BLD: 14.1 % (ref 22–41)
MCH RBC QN AUTO: 28 PG (ref 27–33)
MCHC RBC AUTO-ENTMCNC: 32.1 G/DL (ref 32.2–35.5)
MCV RBC AUTO: 87.2 FL (ref 81.4–97.8)
MICRO URNS: ABNORMAL
MICRO URNS: ABNORMAL
MONOCYTES # BLD AUTO: 0.63 K/UL (ref 0–0.85)
MONOCYTES NFR BLD AUTO: 10 % (ref 0–13.4)
MUCOUS THREADS #/AREA URNS HPF: ABNORMAL /HPF
MUCOUS THREADS #/AREA URNS HPF: NORMAL /HPF
NEUTROPHILS # BLD AUTO: 4.64 K/UL (ref 1.82–7.42)
NEUTROPHILS NFR BLD: 73.6 % (ref 44–72)
NITRITE UR QL STRIP.AUTO: NEGATIVE
NITRITE UR QL STRIP.AUTO: NEGATIVE
NRBC # BLD AUTO: 0 K/UL
NRBC BLD-RTO: 0 /100 WBC (ref 0–0.2)
PH UR STRIP.AUTO: 6 [PH] (ref 5–8)
PH UR STRIP.AUTO: 6 [PH] (ref 5–8)
PLATELET # BLD AUTO: 326 K/UL (ref 164–446)
PMV BLD AUTO: 11.1 FL (ref 9–12.9)
POTASSIUM SERPL-SCNC: 5.5 MMOL/L (ref 3.6–5.5)
PROT SERPL-MCNC: 7.4 G/DL (ref 6–8.2)
PROT UR QL STRIP: 30 MG/DL
PROT UR QL STRIP: 30 MG/DL
RBC # BLD AUTO: 5.46 M/UL (ref 4.2–5.4)
RBC # URNS HPF: ABNORMAL /HPF
RBC # URNS HPF: NORMAL /HPF
RBC UR QL AUTO: NEGATIVE
RBC UR QL AUTO: NEGATIVE
SCCMEC + MECA PNL NOSE NAA+PROBE: NEGATIVE
SODIUM SERPL-SCNC: 139 MMOL/L (ref 135–145)
SODIUM UR-SCNC: 34 MMOL/L
SP GR UR STRIP.AUTO: 1.02
SP GR UR STRIP.AUTO: 1.02
UROBILINOGEN UR STRIP.AUTO-MCNC: 0.2 MG/DL
UROBILINOGEN UR STRIP.AUTO-MCNC: 0.2 MG/DL
WBC # BLD AUTO: 6.3 K/UL (ref 4.8–10.8)
WBC #/AREA URNS HPF: ABNORMAL /HPF
WBC #/AREA URNS HPF: NORMAL /HPF

## 2024-04-08 PROCEDURE — A9270 NON-COVERED ITEM OR SERVICE: HCPCS | Performed by: STUDENT IN AN ORGANIZED HEALTH CARE EDUCATION/TRAINING PROGRAM

## 2024-04-08 PROCEDURE — 770020 HCHG ROOM/CARE - TELE (206)

## 2024-04-08 PROCEDURE — 71045 X-RAY EXAM CHEST 1 VIEW: CPT

## 2024-04-08 PROCEDURE — 700102 HCHG RX REV CODE 250 W/ 637 OVERRIDE(OP): Performed by: STUDENT IN AN ORGANIZED HEALTH CARE EDUCATION/TRAINING PROGRAM

## 2024-04-08 PROCEDURE — 700111 HCHG RX REV CODE 636 W/ 250 OVERRIDE (IP): Performed by: STUDENT IN AN ORGANIZED HEALTH CARE EDUCATION/TRAINING PROGRAM

## 2024-04-08 PROCEDURE — 73201 CT UPPER EXTREMITY W/DYE: CPT | Mod: LT

## 2024-04-08 PROCEDURE — 86140 C-REACTIVE PROTEIN: CPT

## 2024-04-08 PROCEDURE — 87641 MR-STAPH DNA AMP PROBE: CPT

## 2024-04-08 PROCEDURE — 87086 URINE CULTURE/COLONY COUNT: CPT

## 2024-04-08 PROCEDURE — 96372 THER/PROPH/DIAG INJ SC/IM: CPT

## 2024-04-08 PROCEDURE — 81001 URINALYSIS AUTO W/SCOPE: CPT

## 2024-04-08 PROCEDURE — 80053 COMPREHEN METABOLIC PANEL: CPT

## 2024-04-08 PROCEDURE — 36415 COLL VENOUS BLD VENIPUNCTURE: CPT

## 2024-04-08 PROCEDURE — 82570 ASSAY OF URINE CREATININE: CPT

## 2024-04-08 PROCEDURE — 99285 EMERGENCY DEPT VISIT HI MDM: CPT

## 2024-04-08 PROCEDURE — 700105 HCHG RX REV CODE 258: Performed by: STUDENT IN AN ORGANIZED HEALTH CARE EDUCATION/TRAINING PROGRAM

## 2024-04-08 PROCEDURE — 85652 RBC SED RATE AUTOMATED: CPT

## 2024-04-08 PROCEDURE — 87040 BLOOD CULTURE FOR BACTERIA: CPT

## 2024-04-08 PROCEDURE — 700117 HCHG RX CONTRAST REV CODE 255: Performed by: STUDENT IN AN ORGANIZED HEALTH CARE EDUCATION/TRAINING PROGRAM

## 2024-04-08 PROCEDURE — 700105 HCHG RX REV CODE 258: Performed by: EMERGENCY MEDICINE

## 2024-04-08 PROCEDURE — 96365 THER/PROPH/DIAG IV INF INIT: CPT

## 2024-04-08 PROCEDURE — 700111 HCHG RX REV CODE 636 W/ 250 OVERRIDE (IP): Mod: JZ | Performed by: EMERGENCY MEDICINE

## 2024-04-08 PROCEDURE — 99221 1ST HOSP IP/OBS SF/LOW 40: CPT | Performed by: STUDENT IN AN ORGANIZED HEALTH CARE EDUCATION/TRAINING PROGRAM

## 2024-04-08 PROCEDURE — 99223 1ST HOSP IP/OBS HIGH 75: CPT | Mod: AI | Performed by: STUDENT IN AN ORGANIZED HEALTH CARE EDUCATION/TRAINING PROGRAM

## 2024-04-08 PROCEDURE — 84300 ASSAY OF URINE SODIUM: CPT

## 2024-04-08 PROCEDURE — 83605 ASSAY OF LACTIC ACID: CPT

## 2024-04-08 PROCEDURE — 73140 X-RAY EXAM OF FINGER(S): CPT | Mod: LT

## 2024-04-08 PROCEDURE — 85025 COMPLETE CBC W/AUTO DIFF WBC: CPT

## 2024-04-08 PROCEDURE — 96367 TX/PROPH/DG ADDL SEQ IV INF: CPT

## 2024-04-08 RX ORDER — ASPIRIN 81 MG/1
81 TABLET ORAL DAILY
Status: DISCONTINUED | OUTPATIENT
Start: 2024-04-09 | End: 2024-04-13 | Stop reason: HOSPADM

## 2024-04-08 RX ORDER — VERAPAMIL HYDROCHLORIDE 240 MG/1
240 TABLET, FILM COATED, EXTENDED RELEASE ORAL DAILY
Status: DISCONTINUED | OUTPATIENT
Start: 2024-04-09 | End: 2024-04-13 | Stop reason: HOSPADM

## 2024-04-08 RX ORDER — EZETIMIBE 10 MG/1
10 TABLET ORAL EVERY EVENING
Status: DISCONTINUED | OUTPATIENT
Start: 2024-04-08 | End: 2024-04-13 | Stop reason: HOSPADM

## 2024-04-08 RX ORDER — HEPARIN SODIUM 5000 [USP'U]/ML
5000 INJECTION, SOLUTION INTRAVENOUS; SUBCUTANEOUS EVERY 8 HOURS
Status: DISCONTINUED | OUTPATIENT
Start: 2024-04-08 | End: 2024-04-13 | Stop reason: HOSPADM

## 2024-04-08 RX ORDER — SODIUM CHLORIDE 9 MG/ML
INJECTION, SOLUTION INTRAVENOUS CONTINUOUS
Status: ACTIVE | OUTPATIENT
Start: 2024-04-08 | End: 2024-04-09

## 2024-04-08 RX ORDER — ONDANSETRON 2 MG/ML
4 INJECTION INTRAMUSCULAR; INTRAVENOUS EVERY 4 HOURS PRN
Status: DISCONTINUED | OUTPATIENT
Start: 2024-04-08 | End: 2024-04-13 | Stop reason: HOSPADM

## 2024-04-08 RX ORDER — LINEZOLID 600 MG/1
600 TABLET, FILM COATED ORAL EVERY 12 HOURS
Status: DISCONTINUED | OUTPATIENT
Start: 2024-04-09 | End: 2024-04-10

## 2024-04-08 RX ORDER — SODIUM CHLORIDE 9 MG/ML
1000 INJECTION, SOLUTION INTRAVENOUS ONCE
Status: COMPLETED | OUTPATIENT
Start: 2024-04-08 | End: 2024-04-08

## 2024-04-08 RX ORDER — ACETAMINOPHEN 325 MG/1
650 TABLET ORAL EVERY 6 HOURS PRN
Status: DISCONTINUED | OUTPATIENT
Start: 2024-04-08 | End: 2024-04-13 | Stop reason: HOSPADM

## 2024-04-08 RX ORDER — HYDRALAZINE HYDROCHLORIDE 20 MG/ML
10 INJECTION INTRAMUSCULAR; INTRAVENOUS EVERY 4 HOURS PRN
Status: DISCONTINUED | OUTPATIENT
Start: 2024-04-08 | End: 2024-04-13 | Stop reason: HOSPADM

## 2024-04-08 RX ORDER — ONDANSETRON 4 MG/1
4 TABLET, ORALLY DISINTEGRATING ORAL EVERY 4 HOURS PRN
Status: DISCONTINUED | OUTPATIENT
Start: 2024-04-08 | End: 2024-04-13 | Stop reason: HOSPADM

## 2024-04-08 RX ADMIN — SODIUM CHLORIDE: 9 INJECTION, SOLUTION INTRAVENOUS at 23:03

## 2024-04-08 RX ADMIN — SODIUM CHLORIDE 1000 ML: 9 INJECTION, SOLUTION INTRAVENOUS at 20:00

## 2024-04-08 RX ADMIN — VANCOMYCIN HYDROCHLORIDE 1750 MG: 5 INJECTION, POWDER, LYOPHILIZED, FOR SOLUTION INTRAVENOUS at 19:00

## 2024-04-08 RX ADMIN — AMPICILLIN AND SULBACTAM 3 G: 1; 2 INJECTION, POWDER, FOR SOLUTION INTRAMUSCULAR; INTRAVENOUS at 17:53

## 2024-04-08 RX ADMIN — IOHEXOL 80 ML: 350 INJECTION, SOLUTION INTRAVENOUS at 22:10

## 2024-04-08 RX ADMIN — HEPARIN SODIUM 5000 UNITS: 5000 INJECTION, SOLUTION INTRAVENOUS; SUBCUTANEOUS at 22:08

## 2024-04-08 RX ADMIN — EZETIMIBE 10 MG: 10 TABLET ORAL at 22:10

## 2024-04-08 ASSESSMENT — COGNITIVE AND FUNCTIONAL STATUS - GENERAL
DRESSING REGULAR UPPER BODY CLOTHING: A LITTLE
DAILY ACTIVITIY SCORE: 18
EATING MEALS: A LITTLE
MOVING FROM LYING ON BACK TO SITTING ON SIDE OF FLAT BED: A LITTLE
STANDING UP FROM CHAIR USING ARMS: A LITTLE
MOBILITY SCORE: 14
SUGGESTED CMS G CODE MODIFIER MOBILITY: CL
SUGGESTED CMS G CODE MODIFIER DAILY ACTIVITY: CK
TOILETING: A LITTLE
MOVING TO AND FROM BED TO CHAIR: A LITTLE
WALKING IN HOSPITAL ROOM: TOTAL
CLIMB 3 TO 5 STEPS WITH RAILING: TOTAL
DRESSING REGULAR LOWER BODY CLOTHING: A LITTLE
PERSONAL GROOMING: A LITTLE
TURNING FROM BACK TO SIDE WHILE IN FLAT BAD: A LITTLE
HELP NEEDED FOR BATHING: A LITTLE

## 2024-04-08 ASSESSMENT — ENCOUNTER SYMPTOMS
DOUBLE VISION: 0
BACK PAIN: 0
NAUSEA: 0
DIARRHEA: 0
HEADACHES: 0
TREMORS: 0
SINUS PAIN: 0
HEMOPTYSIS: 0
ORTHOPNEA: 0
SPEECH CHANGE: 1
CHILLS: 0
FEVER: 0
SENSORY CHANGE: 0
PALPITATIONS: 0
TINGLING: 0
COUGH: 0
DIZZINESS: 0
STRIDOR: 0
SHORTNESS OF BREATH: 0
SPUTUM PRODUCTION: 0
PHOTOPHOBIA: 0
ABDOMINAL PAIN: 0
EYE PAIN: 0
VOMITING: 0
EYE DISCHARGE: 0
WEAKNESS: 1
NECK PAIN: 0

## 2024-04-08 ASSESSMENT — PAIN DESCRIPTION - PAIN TYPE
TYPE: ACUTE PAIN
TYPE: ACUTE PAIN

## 2024-04-08 ASSESSMENT — FIBROSIS 4 INDEX: FIB4 SCORE: 1.53

## 2024-04-08 NOTE — ED TRIAGE NOTES
Chief Complaint   Patient presents with    Digit Pain     Left thumb pain x6 months. Patient sent from [facility]. Patient states she was told she has a bone infection and was told to come to the ED for further evaluation. 10/10 pain.       Pt has hx of stroke with residual expressive aphasia.     Patient ambulatory to triage for above complaint. Patient A&Ox4, GCS 15, patient speaking in full sentences. Equal and unlabored respirations. Patient educated on triage process and encouraged to notify staff if condition worsens. Appropriate protocols ordered. Patient returned to the lobby in stable condition.      No.

## 2024-04-09 LAB
ALBUMIN SERPL BCP-MCNC: 3.5 G/DL (ref 3.2–4.9)
ALBUMIN/GLOB SERPL: 1.2 G/DL
ALP SERPL-CCNC: 79 U/L (ref 30–99)
ALT SERPL-CCNC: 13 U/L (ref 2–50)
ANION GAP SERPL CALC-SCNC: 12 MMOL/L (ref 7–16)
AST SERPL-CCNC: 24 U/L (ref 12–45)
BILIRUB SERPL-MCNC: 0.8 MG/DL (ref 0.1–1.5)
BUN SERPL-MCNC: 54 MG/DL (ref 8–22)
CALCIUM ALBUM COR SERPL-MCNC: 8.8 MG/DL (ref 8.5–10.5)
CALCIUM SERPL-MCNC: 8.4 MG/DL (ref 8.5–10.5)
CHLORIDE SERPL-SCNC: 108 MMOL/L (ref 96–112)
CO2 SERPL-SCNC: 22 MMOL/L (ref 20–33)
CREAT SERPL-MCNC: 0.82 MG/DL (ref 0.5–1.4)
ERYTHROCYTE [DISTWIDTH] IN BLOOD BY AUTOMATED COUNT: 53.7 FL (ref 35.9–50)
GFR SERPLBLD CREATININE-BSD FMLA CKD-EPI: 72 ML/MIN/1.73 M 2
GLOBULIN SER CALC-MCNC: 3 G/DL (ref 1.9–3.5)
GLUCOSE SERPL-MCNC: 81 MG/DL (ref 65–99)
HCT VFR BLD AUTO: 45.1 % (ref 37–47)
HGB BLD-MCNC: 14.4 G/DL (ref 12–16)
MCH RBC QN AUTO: 28 PG (ref 27–33)
MCHC RBC AUTO-ENTMCNC: 31.9 G/DL (ref 32.2–35.5)
MCV RBC AUTO: 87.7 FL (ref 81.4–97.8)
PLATELET # BLD AUTO: 262 K/UL (ref 164–446)
PMV BLD AUTO: 11 FL (ref 9–12.9)
POTASSIUM SERPL-SCNC: 4.2 MMOL/L (ref 3.6–5.5)
PROT SERPL-MCNC: 6.5 G/DL (ref 6–8.2)
RBC # BLD AUTO: 5.14 M/UL (ref 4.2–5.4)
SODIUM SERPL-SCNC: 142 MMOL/L (ref 135–145)
WBC # BLD AUTO: 4.2 K/UL (ref 4.8–10.8)

## 2024-04-09 PROCEDURE — 97166 OT EVAL MOD COMPLEX 45 MIN: CPT

## 2024-04-09 PROCEDURE — A9270 NON-COVERED ITEM OR SERVICE: HCPCS | Performed by: STUDENT IN AN ORGANIZED HEALTH CARE EDUCATION/TRAINING PROGRAM

## 2024-04-09 PROCEDURE — 700102 HCHG RX REV CODE 250 W/ 637 OVERRIDE(OP): Performed by: STUDENT IN AN ORGANIZED HEALTH CARE EDUCATION/TRAINING PROGRAM

## 2024-04-09 PROCEDURE — 80053 COMPREHEN METABOLIC PANEL: CPT

## 2024-04-09 PROCEDURE — 700111 HCHG RX REV CODE 636 W/ 250 OVERRIDE (IP): Mod: JZ | Performed by: STUDENT IN AN ORGANIZED HEALTH CARE EDUCATION/TRAINING PROGRAM

## 2024-04-09 PROCEDURE — 85027 COMPLETE CBC AUTOMATED: CPT

## 2024-04-09 PROCEDURE — 700105 HCHG RX REV CODE 258: Performed by: STUDENT IN AN ORGANIZED HEALTH CARE EDUCATION/TRAINING PROGRAM

## 2024-04-09 PROCEDURE — 99233 SBSQ HOSP IP/OBS HIGH 50: CPT | Performed by: STUDENT IN AN ORGANIZED HEALTH CARE EDUCATION/TRAINING PROGRAM

## 2024-04-09 PROCEDURE — 770020 HCHG ROOM/CARE - TELE (206)

## 2024-04-09 PROCEDURE — 97535 SELF CARE MNGMENT TRAINING: CPT

## 2024-04-09 PROCEDURE — 36415 COLL VENOUS BLD VENIPUNCTURE: CPT

## 2024-04-09 PROCEDURE — 97162 PT EVAL MOD COMPLEX 30 MIN: CPT

## 2024-04-09 RX ORDER — POLYETHYLENE GLYCOL 3350 17 G/17G
1 POWDER, FOR SOLUTION ORAL DAILY
Status: DISCONTINUED | OUTPATIENT
Start: 2024-04-10 | End: 2024-04-13 | Stop reason: HOSPADM

## 2024-04-09 RX ADMIN — EZETIMIBE 10 MG: 10 TABLET ORAL at 17:46

## 2024-04-09 RX ADMIN — AMPICILLIN AND SULBACTAM 3 G: 1; 2 INJECTION, POWDER, FOR SOLUTION INTRAMUSCULAR; INTRAVENOUS at 17:46

## 2024-04-09 RX ADMIN — LINEZOLID 600 MG: 600 TABLET, FILM COATED ORAL at 17:46

## 2024-04-09 RX ADMIN — LINEZOLID 600 MG: 600 TABLET, FILM COATED ORAL at 05:32

## 2024-04-09 RX ADMIN — AMPICILLIN AND SULBACTAM 3 G: 1; 2 INJECTION, POWDER, FOR SOLUTION INTRAMUSCULAR; INTRAVENOUS at 13:13

## 2024-04-09 RX ADMIN — ASPIRIN 81 MG: 81 TABLET, COATED ORAL at 05:32

## 2024-04-09 RX ADMIN — HEPARIN SODIUM 5000 UNITS: 5000 INJECTION, SOLUTION INTRAVENOUS; SUBCUTANEOUS at 21:06

## 2024-04-09 RX ADMIN — SODIUM CHLORIDE: 9 INJECTION, SOLUTION INTRAVENOUS at 01:36

## 2024-04-09 RX ADMIN — HEPARIN SODIUM 5000 UNITS: 5000 INJECTION, SOLUTION INTRAVENOUS; SUBCUTANEOUS at 05:32

## 2024-04-09 RX ADMIN — AMPICILLIN AND SULBACTAM 3 G: 1; 2 INJECTION, POWDER, FOR SOLUTION INTRAMUSCULAR; INTRAVENOUS at 05:34

## 2024-04-09 RX ADMIN — HEPARIN SODIUM 5000 UNITS: 5000 INJECTION, SOLUTION INTRAVENOUS; SUBCUTANEOUS at 13:11

## 2024-04-09 RX ADMIN — VERAPAMIL HYDROCHLORIDE 240 MG: 240 TABLET, FILM COATED, EXTENDED RELEASE ORAL at 05:32

## 2024-04-09 ASSESSMENT — COGNITIVE AND FUNCTIONAL STATUS - GENERAL
DRESSING REGULAR LOWER BODY CLOTHING: A LOT
MOVING TO AND FROM BED TO CHAIR: A LITTLE
EATING MEALS: A LITTLE
TURNING FROM BACK TO SIDE WHILE IN FLAT BAD: A LOT
DRESSING REGULAR UPPER BODY CLOTHING: A LITTLE
DRESSING REGULAR LOWER BODY CLOTHING: A LITTLE
TOILETING: A LITTLE
WALKING IN HOSPITAL ROOM: A LITTLE
MOVING FROM LYING ON BACK TO SITTING ON SIDE OF FLAT BED: A LITTLE
DAILY ACTIVITIY SCORE: 19
CLIMB 3 TO 5 STEPS WITH RAILING: A LITTLE
DAILY ACTIVITIY SCORE: 14
TURNING FROM BACK TO SIDE WHILE IN FLAT BAD: A LITTLE
PERSONAL GROOMING: A LOT
MOVING TO AND FROM BED TO CHAIR: A LITTLE
MOBILITY SCORE: 18
TOILETING: A LITTLE
CLIMB 3 TO 5 STEPS WITH RAILING: A LITTLE
DRESSING REGULAR UPPER BODY CLOTHING: A LOT
HELP NEEDED FOR BATHING: A LITTLE
SUGGESTED CMS G CODE MODIFIER DAILY ACTIVITY: CK
SUGGESTED CMS G CODE MODIFIER MOBILITY: CK
STANDING UP FROM CHAIR USING ARMS: A LITTLE
WALKING IN HOSPITAL ROOM: A LITTLE
MOVING FROM LYING ON BACK TO SITTING ON SIDE OF FLAT BED: A LOT
HELP NEEDED FOR BATHING: A LOT
SUGGESTED CMS G CODE MODIFIER MOBILITY: CK
STANDING UP FROM CHAIR USING ARMS: A LITTLE
MOBILITY SCORE: 16
PERSONAL GROOMING: A LITTLE
SUGGESTED CMS G CODE MODIFIER DAILY ACTIVITY: CK

## 2024-04-09 ASSESSMENT — LIFESTYLE VARIABLES
TOTAL SCORE: 0
ON A TYPICAL DAY WHEN YOU DRINK ALCOHOL HOW MANY DRINKS DO YOU HAVE: 0
ALCOHOL_USE: NO
EVER FELT BAD OR GUILTY ABOUT YOUR DRINKING: NO
HAVE YOU EVER FELT YOU SHOULD CUT DOWN ON YOUR DRINKING: NO
CONSUMPTION TOTAL: NEGATIVE
TOTAL SCORE: 0
HOW MANY TIMES IN THE PAST YEAR HAVE YOU HAD 5 OR MORE DRINKS IN A DAY: 0
DOES PATIENT WANT TO STOP DRINKING: NO
HAVE PEOPLE ANNOYED YOU BY CRITICIZING YOUR DRINKING: NO
EVER HAD A DRINK FIRST THING IN THE MORNING TO STEADY YOUR NERVES TO GET RID OF A HANGOVER: NO
AVERAGE NUMBER OF DAYS PER WEEK YOU HAVE A DRINK CONTAINING ALCOHOL: 0
TOTAL SCORE: 0

## 2024-04-09 ASSESSMENT — PAIN DESCRIPTION - PAIN TYPE
TYPE: ACUTE PAIN

## 2024-04-09 ASSESSMENT — GAIT ASSESSMENTS
GAIT LEVEL OF ASSIST: STANDBY ASSIST
DEVIATION: BRADYKINETIC;DECREASED HEEL STRIKE;DECREASED TOE OFF
ASSISTIVE DEVICE: 4 WHEEL WALKER
DISTANCE (FEET): 100

## 2024-04-09 ASSESSMENT — FIBROSIS 4 INDEX
FIB4 SCORE: 2.06
FIB4 SCORE: 1.8

## 2024-04-09 ASSESSMENT — ACTIVITIES OF DAILY LIVING (ADL): TOILETING: INDEPENDENT

## 2024-04-09 NOTE — WOUND TEAM
[facility]  Inpatient Services  Wound and Skin Care Brief Evaluation    Admission Date: 4/8/2024     Last order of IP CONSULT TO WOUND CARE was found on 4/9/2024 from Hospital Encounter on 4/8/2024     HPI, PMH, SH: Reviewed    Chief Complaint   Patient presents with    Digit Pain     Left thumb pain x6 months. Patient sent from [facility]. Patient states she was told she has a bone infection and was told to come to the ED for further evaluation. 10/10 pain.      Diagnosis: Osteomyelitis (HCC) [M86.9]    Unit where seen by Wound Team: T803/02     Wound consult placed regarding toes. Chart and images reviewed. This discussed with bedside RN. This clinician in to assess patient. Patient pleasant and agreeable. Patient with red intact toes, heels are intact and blanching. Sacrum intact and blanching, small mole noted to right sacrum.   No pressure injuries or advanced wound care needs identified. Wound consult completed. No further follow up unless indicated and consulted.                                PREVENTATIVE INTERVENTIONS:    Q shift Sriram - performed per nursing policy  Q shift pressure point assessments - performed per nursing policy    Surface/Positioning  Standard/trauma mattress - Currently in Place    Mobilization      Edge of Bed

## 2024-04-09 NOTE — PROGRESS NOTES
Bedside report received from off going RN/tech: [email or web address] Noguera care of [email or web address].     Fall Risk Score: MODERATE RISK  Fall risk interventions in place: Place yellow fall risk ID band on patient, Provide patient/family education based on risk assessment, Educate patient/family to call staff for assistance when getting out of bed, Place fall precaution signage outside patient door, Place patient in room close to nursing station, Utilize bed/chair fall alarm, and Bed alarm connected correctly  Bed type: Regular (Sriram Score less than 17 interventions in place)  Patient on cardiac monitor: Yes  IVF/IV medications: Not Applicable   Oxygen: How many liters 2L  Bedside sitter: Not Applicable   Isolation: Not applicable

## 2024-04-09 NOTE — CARE PLAN
The patient is Watcher - Medium risk of patient condition declining or worsening    Shift Goals  Clinical Goals: NPO at midnight , pain control  Patient Goals: rest    Progress made toward(s) clinical / shift goals:    Problem: Pain - Standard  Goal: Alleviation of pain or a reduction in pain to the patient’s comfort goal  Outcome: Progressing     Problem: Knowledge Deficit - Standard  Goal: Patient and family/care givers will demonstrate understanding of plan of care, disease process/condition, diagnostic tests and medications  Outcome: Progressing     Problem: Fall Risk  Goal: Patient will remain free from falls  Outcome: Progressing       Patient is not progressing towards the following goals:

## 2024-04-09 NOTE — DISCHARGE PLANNING
HTH/SCP TCN chart review completed. Collaborated with CECY Cueto prior to meeting with the pt. The most current review of medical record, knowledge of pt's PLOF and social support, LACE+ score of 69, 6 clicks scores of 14 ADL's and 18 mobility were considered.  Ortho consult pending.     Pt seen at bedside. Introduced TCN program. Provided education regarding post acute levels of care. Education provided regarding case management policy for blanket SNF referrals. Discussed HTH/SCP plan benefits. Pt verbalizes understanding.     Patient states she lives in a one level home with a ramp in front and in back of the house and states she was independent with ADL's, iIADL's, mobility (with use of a 4WW) at her baseline.  She states she was independent with medication management.  She utilizes [facility] [employer] for medical appointments and her neighbor/friend provides transportation as needed.  This TCN also provided patient for number to [employer] for additional transportation resources.  She has 3 4WW's at home, shower chair, grab bars and a [employer] services.  She uses a CPAP at night, no O2 at her baseline.  She is currently on 2 L/min O2 via [place]. She states she is not at her baseline level of function and is agreeable to Home Health if recommended by therapy.      Choice proactively obtained for HH ([facility] ) & DME ([facility]) if indicated, faxed to [facility] and given to CECY.  TCN will continue to follow and collaborate with discharge planning team as additional post acute needs arise. Thank you.     Completed today:  PT/OT recommendations pending.    Choice obtained:  HH ([facility] ) & DME ([facility]) if indicated.  ([facility] referral sent out per CM protocol as patient may need SNF if iV ABX needed).    SCP with [facility] PCP.  Patient states she wants to schedule her own PCP F/U appointment.      Addendum:  1258- Per chart review, PT recommends Home Health.

## 2024-04-09 NOTE — PROGRESS NOTES
IV fluids started. Pt c/o 4/10 pain to left thumb. Denies need for tylenol. Heat packs given. 2L oxygen placed.

## 2024-04-09 NOTE — H&P
Hospital Medicine History & Physical Note    Date of Service  4/8/2024    Primary Care Physician  NAYAN Longo    Consultants  orthopedics    Specialist Names: Layton    Code Status  Full Code    Chief Complaint  Chief Complaint   Patient presents with    Digit Pain     Left thumb pain x6 months. Patient sent from [facility]. Patient states she was told she has a bone infection and was told to come to the ED for further evaluation. 10/10 pain.        History of Presenting Illness  Irina Traylor is a 83 y.o. female who presented 4/8/2024 with left first digit pain.  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  Patient states that she is experiencing pain over the last 6 months.  She is unsure about how the pain started.  However she does note on and off purulent drainage.  Patient states that she went to the [facility] express clinic today, and she was told that she had an infection down to the bone.  She was subsequently instructed to come to the emergency department.  Imaging was obtained and was concerning for osteomyelitis.  Case discussed with orthopedic surgeon on-call, who recommends continue with IV antibiotics.  No need for emergent surgery at this time.    I discussed the plan of care with patient.    Review of Systems  Review of Systems   Constitutional:  Negative for chills and fever.   HENT:  Negative for congestion, ear discharge, ear pain, nosebleeds and sinus pain.    Eyes:  Negative for double vision, photophobia, pain and discharge.   Respiratory:  Negative for cough, hemoptysis, sputum production, shortness of breath and stridor.    Cardiovascular:  Negative for chest pain, palpitations and orthopnea.   Gastrointestinal:  Negative for abdominal pain, diarrhea, nausea and vomiting.   Genitourinary:  Negative for dysuria,  frequency, hematuria and urgency.   Musculoskeletal:  Negative for back pain and neck pain.   Neurological:  Positive for speech change and weakness. Negative for dizziness, tingling, tremors, sensory change and headaches.       Past Medical History   has a past medical history of Anesthesia, Arthritis, Benign essential HTN (9/7/2017), Cataract, CPAP (continuous positive airway pressure) dependence, Dyslipidemia (3/5/2019), Expressive aphasia, Family history of CVA, Heart murmur, DARIN (obstructive sleep apnea) (1/26/2017), Post-nasal drip, Restless leg syndrome, Sick sinus syndrome (HCC) (6/29/2022), Sinus arrest (6/29/2022), Stroke ([facility]) (2/1993, 8/1993), and Walker as ambulation aid.    Surgical History   has a past surgical history that includes hysterectomy, total abdominal (1977); fusion, spine, lumbar, plif (9/26/2016); lumbar laminectomy diskectomy (N/A, 9/26/2016); lumbar laminectomy diskectomy (1993); carpal tunnel release (Bilateral, 1993); orif, finger (Left, as child); hammertoe correction (Left, 11/12/2007); cervical disk and fusion anterior (1993); hammertoe correction (Right, 1980's); bunionectomy (Bilateral, 1980's); colonoscopy (2/2016); cervical fusion posterior (8/8/2017); cervical laminectomy posterior (8/8/2017); pr reconstr total shoulder implant (Left, 3/21/2019); orif, fracture, humerus (Left, 3/21/2019); shoulder arthroplasty total (Left, 8/11/2015); knee arthroplasty total (6/23/2014); hip arthroplasty total (Right, 12/12/2016); hip arthroplasty total (Left, 5/8/2017); and knee arthroplasty total (Left, 2/12/2018).     Family History  family history includes Cancer in her mother; Diabetes in her brother; Hypertension in her brother, brother, and brother; Stroke in her daughter and father.   Family history reviewed with patient. There is no family history that is pertinent to the chief complaint.     Social History   reports that she has never smoked. She has never used smokeless tobacco. She  "reports that she does not currently use alcohol. She reports that she does not use drugs.    Allergies  Allergies   Allergen Reactions    Benicar [Olmesartan] Rash     blisters    Dilaudid  [Hydromorphone Hcl] Vomiting    Lidocaine     Norco  [Apap-Fd&C Blue #1-Hydrocodone] Vomiting    Other Misc Shortness of Breath     Anesthesia with surgery 2016  Pt couldn't breathe while being put under and tapped side of bed to let RN know. \"It was the scariest feeling and I want everyone to know\"      Percocet  [Apap-Fd&C Red #40 [place]-Oxycodone] Vomiting    Vicodin  [Apap-Fd&C Yellow #10 [place]-Hydrocodone] Vomiting    Codeine Vomiting    Demerol Vomiting    Hydrocodone Vomiting    Other Drug Vomiting     \"all pain meds\" per pt can take morphine.    Oxycodone Vomiting    Statins [Hmg-Coa-R Inhibitors] Unspecified     Muscle cramping    Valium Vomiting    Amlodipine Swelling    Diazepam     Nexletol [Bempedoic Acid] Palpitations     Chest pain and dizziness    Simvastatin Myalgia and Unspecified     Headache, fatigue    Hydrochlorothiazide      Frequent urination       Medications  Prior to Admission Medications   Prescriptions Last Dose Informant Patient Reported? Taking?   ASPIRIN 81 PO   Yes No   Sig: Take 1 Tablet by mouth every day.   Apoaequorin (PREVAGEN EXTRA STRENGTH PO)   Yes No   Sig: Take  by mouth.   Ascorbic Acid (VITAMIN C CR) 1000 MG Tab CR   Yes No   Sig: Take 1 Tablet by mouth every day.   B Complex Vitamins (B COMPLEX PO)   Yes No   Sig: Take  by mouth every 48 hours.   KRILL OIL PO   Yes No   Sig: Take  by mouth.   Magnesium 400 MG Tab   Yes No   Sig: Take 400 mg by mouth every day.   Omega-3 Fatty Acids (FISH OIL) 1000 MG Cap capsule   Yes No   Sig: Take 1,200 mg by mouth 3 times a day with meals.   azelastine (ASTELIN) 137 MCG/SPRAY nasal spray   No No   Sig: Administer 1 Spray into affected nostril(S) 2 times a day.   ezetimibe (ZETIA) 10 MG Tab   No No   Sig: TAKE ONE TABLET BY MOUTH EVERY DAY "   fluticasone (FLONASE) 50 MCG/ACT nasal spray   No No   Sig: ADMINISTER 2 SPRAYS INTO AFFECTED NOSTRILS EVERY DAY   irbesartan (AVAPRO) 300 MG Tab   No No   Sig: TAKE ONE TABLET BY MOUTH EVERY EVENING   loratadine (CLARITIN) 10 MG Tab   No No   Sig: TAKE ONE TABLET BY MOUTH DAILY AS NEEDED FOR ALLERGY.   verapamil ER (CALAN SR) 240 MG Tab CR   No No   Sig: Take 1 Tablet by mouth every day.   vitamin E 180 MG (400 UNIT) Cap   Yes No   Sig: Take 180 mg by mouth 2 times a day.      Facility-Administered Medications: None       Physical Exam  Temp:  [36.1 °C (97 °F)] 36.1 °C (97 °F)  Pulse:  [55-60] 60  Resp:  [16-20] 20  BP: (108-126)/(61-77) 123/77  SpO2:  [92 %-96 %] 93 %  Blood Pressure : 123/77   Temperature: 36.1 °C (97 °F)   Pulse: 60   Respiration: 20   Pulse Oximetry: 93 %       Physical Exam  Constitutional:       General: She is not in acute distress.     Appearance: Normal appearance. She is normal weight. She is not ill-appearing, toxic-appearing or diaphoretic.   HENT:      Head: Normocephalic and atraumatic.      Nose: Nose normal.      Mouth/Throat:      Mouth: Mucous membranes are moist.   Eyes:      Extraocular Movements: Extraocular movements intact.      Pupils: Pupils are equal, round, and reactive to light.   Cardiovascular:      Rate and Rhythm: Normal rate and regular rhythm.      Pulses: Normal pulses.      Heart sounds: Normal heart sounds. No murmur heard.     No friction rub. No gallop.   Pulmonary:      Effort: Pulmonary effort is normal. No respiratory distress.      Breath sounds: No stridor. No wheezing, rhonchi or rales.   Chest:      Chest wall: No tenderness.   Abdominal:      General: Abdomen is flat. There is no distension.      Palpations: Abdomen is soft. There is no mass.      Tenderness: There is no abdominal tenderness. There is no guarding or rebound.      Hernia: No hernia is present.   Musculoskeletal:         General: No swelling, tenderness, deformity or signs of injury.  "     Comments: Left first digit with some ecchymosis, what appears to be some collection under the nail.  It is extremely tender to palpation.  Mild erythema and edema    Skin:     General: Skin is warm and dry.      Capillary Refill: Capillary refill takes less than 2 seconds.      Coloration: Skin is not jaundiced or pale.      Findings: No bruising, erythema, lesion or rash.   Neurological:      General: No focal deficit present.      Mental Status: She is alert and oriented to person, place, and time. Mental status is at baseline.      Cranial Nerves: No cranial nerve deficit.      Motor: Weakness present.      Coordination: Coordination normal.   Psychiatric:         Mood and Affect: Mood normal.         Behavior: Behavior normal.         Laboratory:  Recent Labs     04/08/24  1715   WBC 6.3   RBC 5.46*   HEMOGLOBIN 15.3   HEMATOCRIT 47.6*   MCV 87.2   MCH 28.0   MCHC 32.1*   RDW 53.9*   PLATELETCT 326   MPV 11.1     Recent Labs     04/08/24  1715   SODIUM 139   POTASSIUM 5.5   CHLORIDE 102   CO2 23   GLUCOSE 85   BUN 79*   CREATININE 1.45*   CALCIUM 9.3     Recent Labs     04/08/24  1715   ALTSGPT 16   ASTSGOT 29   ALKPHOSPHAT 91   TBILIRUBIN 0.9   GLUCOSE 85         No results for input(s): \"NTPROBNP\" in the last 72 hours.      No results for input(s): \"TROPONINT\" in the last 72 hours.    Imaging:  DX-FINGER(S) 2+ LEFT   Final Result      1.  Cortical erosion of the thumb distal phalangeal tuft concerning for acute osteomyelitis.   2.  Scattered degenerative changes, greatest at the base of the thumb and the index finger PIP joint.   3.  There is some collapse of the carpal joints.   4.  Osteopenia.      DX-CHEST-PORTABLE (1 VIEW)   Final Result      1.  Cardiomegaly. No overt failure or pneumonia.      CT-HAND WITH & W/O LEFT    (Results Pending)       X-Ray:  I have personally reviewed the images and compared with prior images.  EKG:  I have personally reviewed the images and compared with prior " images.    Assessment/Plan:  Justification for Admission Status  I anticipate this patient will require at least two midnights for appropriate medical management, necessitating inpatient admission because osteomyelitis    Patient will need a Telemetry bed on MEDICAL service .  The need is secondary to osteomyelitis.    * Osteomyelitis (HCC)- (present on admission)  Assessment & Plan  Xray of left hand: Cortical erosion of the thumb distal phalangeal tuft concerning for acute osteomyelitis. Scattered degenerative changes, greatest at the base of the thumb and the index finger PIP joint.  Orthopedic surgery consulted  Recommending continue with IV antibiotics  Started patient on IV Unasyn and vancomycin  Orthopedic surgery to evaluate the patient.  No need for emergent surgery.  Keep patient n.p.o. at midnight    Acute kidney failure (HCC)  Assessment & Plan  Patient presents with creatinine level of 1.45.  This is elevated compared to her baseline  Likely prerenal in nature  Given IV fluids in the emergency department.  Continue with low dose maintenance fluids  Follow-up urinalysis and urine lites  Holding off on renal ultrasound at this time.  Avoid NSAIDs and nephrotoxic medications  Holding ACE/ARB    Sick sinus syndrome (HCC)- (present on admission)  Assessment & Plan  Patient has pacemaker in place.  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  Patient appears to be at her baseline        VTE prophylaxis: heparin ppx

## 2024-04-09 NOTE — PROGRESS NOTES
83F left thumb pain 6m's  No obvious drainage today  Swelling and erythema  Clinical pictures reviewed         Recommend abx per ID/IM  Will attempt to avoid amputation if possible due to loss of thumb will likely significantly alter hand function  No plan for surgery at this time  If clinically worsens (pain, purulent drainage) recommend MRI      Johnathon Traylor MD  Orthopedic Trauma Surgery

## 2024-04-09 NOTE — THERAPY
"Physical Therapy   Initial Evaluation     Patient Name: Irina Traylor  Age:  83 y.o., Sex:  female  Medical Record #: [id number]  Today's Date: 4/9/2024     Precautions  Precautions: Fall Risk  Comments: expressive aphasia    Assessment  Patient is 83 y.o. female admitted with complaints of left thumb pain found to have osteomyelitis, treated with antibiotics.  PMH includes multiple CVAs, sick sinus syndrome status post pacemaker placement, hypertension, expressive aphasia.     Patient received in bed and agreeable to PT session. Pt was able to mobilize with SPV-SBA with 4WW. Pt is limited by decreased functional strength and activity tolerance, however pt appears to be and reports she is mobilizing near baseline. Anticipate pt will be able to return home with HHPT. No further acute care PT needs.     Plan    Physical Therapy Initial Treatment Plan   Treatment Plan : Gait Training, Neuro Re-Education / Balance, Self Care / Home Evaluation, Therapeutic Activities, Therapeutic Exercise  Treatment Frequency: 4 Times per Week  Duration: Until Therapy Goals Met    DC Equipment Recommendations: None  Discharge Recommendations: Recommend home health for continued physical therapy services       Subjective    \"I go to [facility] every Sunday\".      Objective       04/09/24 0916   Initial Contact Note    Initial Contact Note Order Received and Verified, Evaluation Only - Patient Does Not Require Further Acute Physical Therapy at this Time.  However, May Benefit from Post Acute Therapy for Higher Level Functional Deficits.   Precautions   Precautions Fall Risk   Comments expressive aphasia   Vitals   O2 (LPM) 0   O2 Delivery Device None - Room Air   Pain 0 - 10 Group   Therapist Pain Assessment Post Activity Pain Same as Prior to Activity;Nurse Notified  (pain not rated)   Prior Living Situation   Prior Services Home-Independent   Housing / Facility 1 Story House   Steps Into Home 0  (ramps)   Equipment Owned 4-Wheel Walker "   Lives with - Patient's Self Care Capacity Alone and Able to Care For Self   Prior Level of Functional Mobility   Bed Mobility Independent   Transfer Status Independent   Ambulation Independent   Ambulation Distance community   Assistive Devices Used 4-Wheel Walker   Stairs Independent   History of Falls   History of Falls No  (pt reports no recent falls)   Cognition    Cognition / Consciousness X   Speech/ Communication Expressive Aphasia   Level of Consciousness Alert   Comments pleasant and cooperative   Active ROM Lower Body    Active ROM Lower Body  WDL   Strength Lower Body   Lower Body Strength  X   Comments generalized weakness, equal bilaterally, functional for ambulation   Lower Body Muscle Tone   Lower Body Muscle Tone  WDL   Balance Assessment   Sitting Balance (Static) Fair +   Sitting Balance (Dynamic) Fair +   Standing Balance (Static) Fair   Standing Balance (Dynamic) Fair   Weight Shift Sitting Good   Weight Shift Standing Good   Comments w/FWW   Bed Mobility    Supine to Sit Supervised   Scooting Supervised   Comments HOB flat   Gait Analysis   Gait Level Of Assist Standby Assist   Assistive Device 4 Wheel Walker   Distance (Feet) 100   # of Times Distance was Traveled 2   Deviation Bradykinetic;Decreased Heel Strike;Decreased Toe Off   # of Stairs Climbed 0   Weight Bearing Status no restrictions   Functional Mobility   Sit to Stand Supervised   Bed, Chair, Wheelchair Transfer Supervised   Toilet Transfers Supervised   Mobility bed > bathroom > ambulate in hallway > up with OT   6 Clicks Assessment - How much HELP from from another person do you currently need... (If the patient hasn't done an activity recently, how much help from another person do you think he/she would need if he/she tried?)   Turning from your back to your side while in a flat bed without using bedrails? 3   Moving from lying on your back to sitting on the side of a flat bed without using bedrails? 3   Moving to and from a bed  to a chair (including a wheelchair)? 3   Standing up from a chair using your arms (e.g., wheelchair, or bedside chair)? 3   Walking in hospital room? 3   Climbing 3-5 steps with a railing? 3   6 clicks Mobility Score 18   Problem List    Problems Decreased Activity Tolerance   Anticipated Discharge Equipment and Recommendations   DC Equipment Recommendations None   Discharge Recommendations Recommend home health for continued physical therapy services   Interdisciplinary Plan of Care Collaboration   IDT Collaboration with  Nursing   Patient Position at End of Therapy Other (Comments)  (up with OT in room)   Collaboration Comments RN updated   Session Information   Date / Session Number  4/9 - eval only

## 2024-04-09 NOTE — PROGRESS NOTES
4 Eyes Skin Assessment Completed by CHRIS Antonio and Frankee, RN.    Head WDL  Ears WDL  Nose WDL  Mouth WDL  Neck WDL  Breast/Chest Redness and Blanching  Shoulder Blades WDL  Spine scar  (R) Arm/Elbow/Hand Redness and Blanching  (L) Arm/Elbow/Hand Redness and Blanching,Left thumb swelling   Abdomen hernia  Groin WDL  Scrotum/Coccyx/Buttocks redness blanching  (R) Leg Redness, Blanching, Scar, Bruising, and Edema  (L) Leg Redness, Blanching, Bruising, and Edema  (R) Heel/Foot/Toe redness slow to derik  (L) Heel/Foot/Toe redness slow to derik            Devices In Places Tele Box, Blood Pressure Cuff, and Pulse Ox      Interventions In Place Gray Ear Foams, Heel Mepilex, Sacral Mepilex, Pillows, Q2 Turns, and Heels Loaded W/Pillows    Possible Skin Injury yes    Pictures Uploaded Into Epic Yes  Wound Consult Placed Yes  RN Wound Prevention Protocol Ordered Yes

## 2024-04-09 NOTE — ASSESSMENT & PLAN NOTE
Patient presents with creatinine level of 1.45.  This is elevated compared to her baseline  Likely prerenal in nature  Given IV fluids in the emergency department.  Continue with low dose maintenance fluids  Follow-up urinalysis and urine lites  Holding off on renal ultrasound at this time.  Avoid NSAIDs and nephrotoxic medications  Holding ACE/ARB    4/10/2024  Resolved  Resume low dose losartan tomorrow    4/11/2024  Starting losartan 25 mg daily    4/12/2024  Continues to remain stable.

## 2024-04-09 NOTE — DISCHARGE PLANNING
Case Management Discharge Planning    Admission Date: 4/8/2024  GMLOS: 4.4  ALOS: 1    6-Clicks ADL Score: 14  6-Clicks Mobility Score: 16  PT and/or OT Eval ordered: Yes  Post-acute Referrals Ordered: No  Post-acute Choice Obtained: Yes  Has referral(s) been sent to post-acute provider:  Yes  Due to 6 clicks of 16 and possibility of long term IV antibiotics, a SNF blanket has been done    Anticipated Discharge Dispo:  SNF    DME Needed: No    Action(s) Taken: Updated Provider/Nurse on Discharge Plan    Escalations Completed: None    Medically Clear: No    Next Steps: Choice for HH and DME obtained by TCN for SCP.  HH #1 [facility]; DME #1 [facility]    Barriers to Discharge: Medical clearance

## 2024-04-09 NOTE — ED NOTES
Bedside report received from off going RN/tech: Fanny, assumed care of patient.  POC discussed with patient. Call light within reach, all needs addressed at this time.       Fall risk interventions in place: Patient's personal possessions are with in their safe reach, Place socks on patient, Place fall risk sign on patient's door, Keep floor surfaces clean and dry, and Accompanied to restroom (all applicable per [facility] Fall risk assessment)   Continuous monitoring: Cardiac Leads, Pulse Ox, or Blood Pressure  IVF/IV medications: Infusion per MAR (List Med(s)) NS @ bolus  Oxygen: Room Air  Bedside sitter: Not Applicable   Isolation: Not Applicable

## 2024-04-09 NOTE — ED NOTES
Med rec complete per patient  Allergies reviewed.   Outpatient antibiotics in the last 30 days? No   Anticoagulants taken in the last 14 days? No   Patient takes Aspirin 81 mg daily, last dose 48/24 AM    Susan Portillo CPhT

## 2024-04-09 NOTE — ASSESSMENT & PLAN NOTE
Xray of left hand: Cortical erosion of the thumb distal phalangeal tuft concerning for acute osteomyelitis. Scattered degenerative changes, greatest at the base of the thumb and the index finger PIP joint.  Orthopedic surgery consulted  Recommending continue with IV antibiotics  Started patient on IV Unasyn and vancomycin  Orthopedic surgery to evaluate the patient.  No need for emergent surgery.  Keep patient n.p.o. at midnight    4/10/2024  Patient is awaiting MRI of the left hand.  MRI technician with awaiting pacemaker representative clearance.  MRSA is negative so Zyvox has been discontinued.  Continues on Unasyn.    4/11/2024  MRI of the left hand showing osteomyelitis of the left thumb.  I have consulted infectious diseases.  Continuing Unasyn.  Left thumb plain improving    4/12/2024  Discussed Dr. Rodriguez of infectious disease.  Recommending 6-week course of IV daptomycin.  Midline placed.  Planning to discharge to [facility] nursing [facility].

## 2024-04-09 NOTE — ED PROVIDER NOTES
ED Provider Note    CHIEF COMPLAINT  Chief Complaint   Patient presents with    Digit Pain     Left thumb pain x6 months. Patient sent from [facility]. Patient states she was told she has a bone infection and was told to come to the ED for further evaluation. 10/10 pain.        EXTERNAL RECORDS REVIEWED  Outpatient Notes Family medicine office note 4/5/24 when the patient was evaluated for an umbilical hernia and an injury to the nail bed of a finger    HPI/ROS  LIMITATION TO HISTORY   Select: : None  OUTSIDE HISTORIAN(S):  None    Irina Traylor is a 83 y.o. female who presents to the emergency department for evaluation of digit pain.  The patient states that over the last 6 months she has been having intermittent pain, redness, and swelling to the left thumb.  She states that initially she had a splinter in it but was able to take the splinter out.  She states that intermittently it will drain purulent appearing discharge.  She states that she has not had any fevers but did have some vomiting yesterday.  She states that she followed up with the [facility] today and was told that she has an infection of the bone and instructed to come here.  She is right-hand dominant.  She denies any chest pain, shortness of breath, abdominal pain, or other complaints at this point.    PAST MEDICAL HISTORY   has a past medical history of Anesthesia, Arthritis, Benign essential HTN (9/7/2017), Cataract, CPAP (continuous positive airway pressure) dependence, Dyslipidemia (3/5/2019), Expressive aphasia, Family history of CVA, Heart murmur, DARIN (obstructive sleep apnea) (1/26/2017), Post-nasal drip, Restless leg syndrome, Sick sinus syndrome (HCC) (6/29/2022), Sinus arrest (6/29/2022), Stroke (Formerly [facility]) (2/1993, 8/1993), and Walker as ambulation aid.    SURGICAL HISTORY   has a past surgical history that includes hysterectomy, total abdominal (1977); fusion, spine, lumbar, plif (9/26/2016); lumbar laminectomy diskectomy (N/A,  9/26/2016); lumbar laminectomy diskectomy (1993); carpal tunnel release (Bilateral, 1993); orif, finger (Left, as child); hammertoe correction (Left, 11/12/2007); cervical disk and fusion anterior (1993); hammertoe correction (Right, 1980's); bunionectomy (Bilateral, 1980's); colonoscopy (2/2016); cervical fusion posterior (8/8/2017); cervical laminectomy posterior (8/8/2017); reconstr total shoulder implant (Left, 3/21/2019); orif, fracture, humerus (Left, 3/21/2019); shoulder arthroplasty total (Left, 8/11/2015); knee arthroplasty total (6/23/2014); hip arthroplasty total (Right, 12/12/2016); hip arthroplasty total (Left, 5/8/2017); and knee arthroplasty total (Left, 2/12/2018).    FAMILY HISTORY  Family History   Problem Relation Age of Onset    Cancer Mother         vaginal    Stroke Father     Hypertension Brother     Diabetes Brother     Hypertension Brother     Hypertension Brother     Stroke Daughter        SOCIAL HISTORY  Social History     Tobacco Use    Smoking status: Never    Smokeless tobacco: Never   Vaping Use    Vaping Use: Never used   Substance and Sexual Activity    Alcohol use: Not Currently    Drug use: No    Sexual activity: Not Currently       CURRENT MEDICATIONS  Home Medications       Reviewed by Nikko Santiago (Pharmacy Tech) on 04/08/24 at 2024  Med List Status: Complete     Medication Last Dose Status   Apoaequorin (PREVAGEN EXTRA STRENGTH PO) 4/8/2024 Active   Ascorbic Acid (VITAMIN C CR) 1000 MG Tab CR 4/8/2024 Active   ASPIRIN 81 PO 4/8/2024 Active   azelastine (ASTELIN) 137 MCG/SPRAY nasal spray 4/7/2024 Active   B Complex Vitamins (B COMPLEX PO) 4/8/2024 Active   ezetimibe (ZETIA) 10 MG Tab 4/7/2024 Active   fluticasone (FLONASE) 50 MCG/ACT nasal spray 4/7/2024 Active   irbesartan (AVAPRO) 300 MG Tab 4/7/2024 Active   KRILL OIL PO 4/7/2024 Active   loratadine (CLARITIN) 10 MG Tab 4/8/2024 Active   Magnesium 400 MG Tab 4/8/2024 Active   NON SPECIFIED 4/8/2024 Active   Omega-3  "Fatty Acids (FISH OIL) 1000 MG Cap capsule 4/7/2024 Active   verapamil ER (CALAN SR) 240 MG Tab CR 4/8/2024 Active   vitamin E 180 MG (400 UNIT) Cap 4/8/2024 Active                    ALLERGIES  Allergies   Allergen Reactions    Benicar [Olmesartan] Rash     blisters    Dilaudid  [Hydromorphone Hcl] Vomiting    Lidocaine     Norco  [Apap-Fd&C Blue #1-Hydrocodone] Vomiting    Other Misc Shortness of Breath     Anesthesia with surgery 2016  Pt couldn't breathe while being put under and tapped side of bed to let RN know. \"It was the scariest feeling and I want everyone to know\"      Percocet  [Apap-Fd&C Red #40 [place]-Oxycodone] Vomiting    Vicodin  [Apap-Fd&C Yellow #10 [place]-Hydrocodone] Vomiting    Codeine Vomiting    Demerol Vomiting    Hydrocodone Vomiting    Other Drug Vomiting     \"all pain meds\" per pt can take morphine.    Oxycodone Vomiting    Statins [Hmg-Coa-R Inhibitors] Unspecified     Muscle cramping    Valium Vomiting    Amlodipine Swelling    Diazepam     Nexletol [Bempedoic Acid] Palpitations     Chest pain and dizziness    Simvastatin Myalgia and Unspecified     Headache, fatigue    Hydrochlorothiazide      Frequent urination       PHYSICAL EXAM  VITAL SIGNS: /57   Pulse 60   Temp 36.1 °C (97 °F) (Temporal)   Resp 18   Ht 1.524 m (5')   Wt 73 kg (160 lb 15 oz)   SpO2 91%   BMI 31.43 kg/m²   Constitutional: Alert and in no apparent distress.  HENT: Normocephalic atraumatic. Bilateral external ears normal. Nose normal. Mucous membranes are moist.  Eyes: Pupils are equal and reactive. Conjunctiva normal. Non-icteric sclera.   Neck: Normal range of motion without tenderness. Supple. No meningeal signs.  Cardiovascular: Regular rate and rhythm. No murmurs, gallops or rubs.  Thorax & Lungs: No increased work of breathing. Breath sounds are clear to auscultation bilaterally. No wheezing, rhonchi or rales.  Abdomen: Soft, nontender and nondistended. No hepatosplenomegaly.  Skin: Warm and dry. " No rashes are noted.  Back: No bony tenderness, No CVA tenderness.   Extremities: 2+ peripheral pulses. Cap refill is less than 2 seconds. No edema, cyanosis, or clubbing.  Musculoskeletal: Good range of motion in all major joints. No tenderness to palpation or major deformities noted.  There is swelling, erythema, and tenderness of the distal left thumb distal to the IP joint.  No purulent discharge is noted.  The nail is discolored.      Neurologic: Alert and appropriate for age. The patient moves all 4 extremities without obvious deficits.      EKG/LABS  Results for orders placed or performed during the hospital encounter of 04/08/24   Lactic Acid   Result Value Ref Range    Lactic Acid 1.2 0.5 - 2.0 mmol/L   CBC with Differential   Result Value Ref Range    WBC 6.3 4.8 - 10.8 K/uL    RBC 5.46 (H) 4.20 - 5.40 M/uL    Hemoglobin 15.3 12.0 - 16.0 g/dL    Hematocrit 47.6 (H) 37.0 - 47.0 %    MCV 87.2 81.4 - 97.8 fL    MCH 28.0 27.0 - 33.0 pg    MCHC 32.1 (L) 32.2 - 35.5 g/dL    RDW 53.9 (H) 35.9 - 50.0 fL    Platelet Count 326 164 - 446 K/uL    MPV 11.1 9.0 - 12.9 fL    Neutrophils-Polys 73.60 (H) 44.00 - 72.00 %    Lymphocytes 14.10 (L) 22.00 - 41.00 %    Monocytes 10.00 0.00 - 13.40 %    Eosinophils 1.00 0.00 - 6.90 %    Basophils 0.50 0.00 - 1.80 %    Immature Granulocytes 0.80 0.00 - 0.90 %    Nucleated RBC 0.00 0.00 - 0.20 /100 WBC    Neutrophils (Absolute) 4.64 1.82 - 7.42 K/uL    Lymphs (Absolute) 0.89 (L) 1.00 - 4.80 K/uL    Monos (Absolute) 0.63 0.00 - 0.85 K/uL    Eos (Absolute) 0.06 0.00 - 0.51 K/uL    Baso (Absolute) 0.03 0.00 - 0.12 K/uL    Immature Granulocytes (abs) 0.05 0.00 - 0.11 K/uL    NRBC (Absolute) 0.00 K/uL   Complete Metabolic Panel   Result Value Ref Range    Sodium 139 135 - 145 mmol/L    Potassium 5.5 3.6 - 5.5 mmol/L    Chloride 102 96 - 112 mmol/L    Co2 23 20 - 33 mmol/L    Anion Gap 14.0 7.0 - 16.0    Glucose 85 65 - 99 mg/dL    Bun 79 (H) 8 - 22 mg/dL    Creatinine 1.45 (H) 0.50 -  1.40 mg/dL    Calcium 9.3 8.5 - 10.5 mg/dL    Correct Calcium 9.1 8.5 - 10.5 mg/dL    AST(SGOT) 29 12 - 45 U/L    ALT(SGPT) 16 2 - 50 U/L    Alkaline Phosphatase 91 30 - 99 U/L    Total Bilirubin 0.9 0.1 - 1.5 mg/dL    Albumin 4.2 3.2 - 4.9 g/dL    Total Protein 7.4 6.0 - 8.2 g/dL    Globulin 3.2 1.9 - 3.5 g/dL    A-G Ratio 1.3 g/dL   Urinalysis    Specimen: Urine   Result Value Ref Range    Color Yellow     Character Clear     Specific Gravity 1.020 <1.035    Ph 6.0 5.0 - 8.0    Glucose Negative Negative mg/dL    Ketones Negative Negative mg/dL    Protein 30 (A) Negative mg/dL    Bilirubin Negative Negative    Urobilinogen, Urine 0.2 Negative    Nitrite Negative Negative    Leukocyte Esterase Negative Negative    Occult Blood Negative Negative    Micro Urine Req Microscopic    MRSA By PCR (Amp)    Specimen: Nares; Respirate   Result Value Ref Range    MRSA by PCR Negative Negative   CRP QUANTITIVE (NON-CARDIAC)   Result Value Ref Range    Stat C-Reactive Protein 1.54 (H) 0.00 - 0.75 mg/dL   Sed Rate   Result Value Ref Range    Sed Rate Westergren 4 0 - 25 mm/hour   ESTIMATED GFR   Result Value Ref Range    GFR (CKD-EPI) 36 (A) >60 mL/min/1.73 m 2   URINALYSIS    Specimen: Urine, Clean Catch   Result Value Ref Range    Color Yellow     Character Clear     Specific Gravity 1.020 <1.035    Ph 6.0 5.0 - 8.0    Glucose Negative Negative mg/dL    Ketones Negative Negative mg/dL    Protein 30 (A) Negative mg/dL    Bilirubin Negative Negative    Urobilinogen, Urine 0.2 Negative    Nitrite Negative Negative    Leukocyte Esterase Negative Negative    Occult Blood Negative Negative    Micro Urine Req Microscopic    URINE SODIUM RANDOM   Result Value Ref Range    Sodium, Urine -per volume 34 mmol/L   URINE CREATININE RANDOM   Result Value Ref Range    Creatinine, Random Urine 76.02 mg/dL   URINE MICROSCOPIC (W/UA)   Result Value Ref Range    WBC 0-2 /hpf    RBC 0-2 /hpf    Bacteria Few (A) None /hpf    Epithelial Cells Few  /hpf    Mucous Threads Rare /hpf    Ca Oxalate Crystal Rare /hpf    Hyaline Cast 0-2 /lpf   URINE MICROSCOPIC (W/UA)   Result Value Ref Range    WBC 0-2 /hpf    RBC 0-2 /hpf    Bacteria Negative None /hpf    Epithelial Cells Rare /hpf    Mucous Threads Rare /hpf    Hyaline Cast 0-2 /lpf     I have independently interpreted this EKG    RADIOLOGY  I have independently interpreted the diagnostic imaging associated with this visit and am waiting the final reading from the radiologist.   My preliminary interpretation is as follows: No obvious fracture noted.    Radiologist interpretation:  CT-HAND WITH PLUS RECONS LEFT   Final Result      1.  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  No abscess      3.  Cannot accurately assess for nor rule out osteomyelitis with CT         DX-FINGER(S) 2+ LEFT   Final Result      1.  Cortical erosion of the thumb distal phalangeal tuft concerning for acute osteomyelitis.   2.  Scattered degenerative changes, greatest at the base of the thumb and the index finger PIP joint.   3.  There is some collapse of the carpal joints.   4.  Osteopenia.      DX-CHEST-PORTABLE (1 VIEW)   Final Result      1.  Cardiomegaly. No overt failure or pneumonia.        COURSE & MEDICAL DECISION MAKING    ASSESSMENT, COURSE AND PLAN  Care Narrative: This is an 83-year-old female presenting to the emergency department for evaluation of digit pain.  On initial evaluation, the patient did not appear to be in any acute distress.  Vital signs are reassuring.  Physical exam was notable for swelling, erythema, and tenderness to palpation of the left distal thumb.  No crepitus was noted.  Given the concern for osteomyelitis, an IV was established and labs including blood cultures were sent.    Plain films were obtained and notable for cortical erosion of the thumb distal phalangeal tuft concerning for acute osteomyelitis.    CRP was elevated at 1.54 but sed rate was normal.   White count was normal.  Cultures are pending.    Metabolic panel was notable for a BUN of 79 and creatinine of 1.45.  This is new upon review of her previous labs.  She was given IVFs.    7:32 PM - I discussed the case with fernanda Triplett. He agreed with the plan for IV antibiotics at this time and does not recommend operative intervention at this time.     7:33 PM - I discussed the case Dr Coffman, hospitalist.  He agreed with the plan and accepted the patient.    Hydration: HYDRATION: Based on the patient's presentation of Acute Kidney Injury the patient was given IV fluids. IV Hydration was used because oral hydration was not adequate alone. Upon recheck following hydration, the patient was stable.    ADDITIONAL PROBLEMS MANAGED  Osteomyelitis, acute kidney injury    DISPOSITION AND DISCUSSIONS  I have discussed management of the patient with the following physicians and CORRY's:  fernanda Triplett    Discussion of management with other [facility] or appropriate source(s): None     FINAL IMPRESSION  1. Osteomyelitis of left hand, unspecified type (HCC)    2. Acute kidney injury (HCC)      -ADMIT-    Electronically signed by: Nicki Cee D.O., 4/8/2024 5:03 PM

## 2024-04-09 NOTE — THERAPY
Occupational Therapy   Initial Evaluation     Patient Name: Irina Traylor  Age:  83 y.o., Sex:  female  Medical Record #: [id number]  Today's Date: 4/9/2024     Precautions  Precautions: (P) Fall Risk  Comments: expressive aphasia    Assessment    Patient is 83 y.o. female admitted for L thumb possible osteomyelitis ~6 months no surgery per ortho. Pt normally fairly independent with functional mobility and ADLs living in a [facility] alone has helpful neighbors who assist. Pt jared post-PT evaluation, pt with self reported improved function of left thumb with less overall pain and redness, required CGA for functional mobility and ADLs using 4WW, pt uses reacher and sock aide at home due to chronic deficits. Would anticipate pt is close to functional baseline, will recommend home health at discharge.      Plan    DC Equipment Recommendations: (P) None (has all equipment at home)  Discharge Recommendations: (P) Recommend home health for continued occupational therapy services     Objective       04/09/24 0940   Prior Living Situation   Prior Services Home-Independent   Housing / Facility 1 Story House   Steps Into Home 0   Bathroom Set up Walk In Shower;Shower Chair;Grab Bars   Equipment Owned 4-Wheel Walker;Tub / Shower Seat;Grab Bar(s) In Tub / Shower;Grab Bar(s) By Toilet   Lives with - Patient's Self Care Capacity Alone and Able to Care For Self   Prior Level of ADL Function   Self Feeding Independent   Grooming / Hygiene Independent   Bathing Independent   Dressing Independent   Toileting Independent   Prior Level of IADL Function   Medication Management Independent   Laundry Independent   Kitchen Mobility Independent   Finances Independent   Home Management Independent   Shopping Independent   Prior Level Of Mobility Independent With Device in Community   Driving / Transportation Utilizes [employer] for Transportation;Relatives / Others Provide Transportation   Occupation (Pre-Hospital Vocational) Retired Due To  Age   History of Falls   History of Falls No   Precautions   Precautions Fall Risk   Pain 0 - 10 Group   Therapist Pain Assessment Post Activity Pain Same as Prior to Activity;Nurse Notified   Cognition    Cognition / Consciousness X   Speech/ Communication Expressive Aphasia   Level of Consciousness Alert   Comments chronic aphasia from previous stroke, very pleasant, cooperative   Active ROM Upper Body   Active ROM Upper Body  WDL   Comments pt able to functionally use BUEs, chronic arthritic deformities noted   Strength Upper Body   Comments chronic arthritic deformities noted, strength testing contraindicated   Sensation Upper Body   Upper Extremity Sensation  WDL   Upper Body Muscle Tone   Upper Body Muscle Tone  WDL   Neurological Concerns   Neurological Concerns No   Coordination Upper Body   Coordination WDL   Balance Assessment   Sitting Balance (Static) Fair +   Sitting Balance (Dynamic) Fair +   Standing Balance (Static) Fair   Standing Balance (Dynamic) Fair   Weight Shift Sitting Good   Weight Shift Standing Good   Comments w/ 4WW   Bed Mobility    Scooting Supervised   Comments received from PT in hallway, left seated EOB   ADL Assessment   Grooming Supervision;Standing   Upper Body Dressing Supervision   Lower Body Dressing Contact Guard Assist   Toileting Supervision   How much help from another person does the patient currently need...   Putting on and taking off regular lower body clothing? 3   Bathing (including washing, rinsing, and drying)? 3   Toileting, which includes using a toilet, bedpan, or urinal? 3   Putting on and taking off regular upper body clothing? 3   Taking care of personal grooming such as brushing teeth? 3   Eating meals? 4   6 Clicks Daily Activity Score 19   Functional Mobility   Sit to Stand Supervised   Bed, Chair, Wheelchair Transfer Supervised   Toilet Transfers Supervised   Mobility received from PT in hallway, bathroom mobility, up in room, left EOB   Comments w/ 4WW    Activity Tolerance   Sitting Edge of Bed left seated EOB   Standing 15 min   Education Group   Education Provided Role of Occupational Therapist;Adaptive Equipment   Role of Occupational Therapist Patient Response Patient;Acceptance;Explanation   Adaptive Equipment Patient Response Patient;Acceptance;Explanation;Action Demonstration   Problem List   Problem List None   Anticipated Discharge Equipment and Recommendations   DC Equipment Recommendations None  (has all equipment at home)   Discharge Recommendations Recommend home health for continued occupational therapy services   Interdisciplinary Plan of Care Collaboration   IDT Collaboration with  Nursing   Patient Position at End of Therapy Seated;Edge of Bed;Bed Alarm On;Call Light within Reach;Tray Table within Reach;Phone within Reach   Collaboration Comments RN updated

## 2024-04-10 ENCOUNTER — HOME HEALTH ADMISSION (OUTPATIENT)
Dept: HOME HEALTH SERVICES | Facility: HOME HEALTHCARE | Age: 84
End: 2024-04-10
Payer: MEDICARE

## 2024-04-10 ENCOUNTER — APPOINTMENT (OUTPATIENT)
Dept: RADIOLOGY | Facility: MEDICAL CENTER | Age: 84
End: 2024-04-10
Attending: STUDENT IN AN ORGANIZED HEALTH CARE EDUCATION/TRAINING PROGRAM
Payer: MEDICARE

## 2024-04-10 PROBLEM — R79.89 ELEVATED BRAIN NATRIURETIC PEPTIDE (BNP) LEVEL: Status: ACTIVE | Noted: 2024-04-10

## 2024-04-10 PROBLEM — R09.02 HYPOXIA: Status: ACTIVE | Noted: 2024-04-10

## 2024-04-10 LAB — NT-PROBNP SERPL IA-MCNC: 1117 PG/ML (ref 0–125)

## 2024-04-10 PROCEDURE — 700111 HCHG RX REV CODE 636 W/ 250 OVERRIDE (IP): Mod: JZ | Performed by: STUDENT IN AN ORGANIZED HEALTH CARE EDUCATION/TRAINING PROGRAM

## 2024-04-10 PROCEDURE — 700111 HCHG RX REV CODE 636 W/ 250 OVERRIDE (IP): Performed by: STUDENT IN AN ORGANIZED HEALTH CARE EDUCATION/TRAINING PROGRAM

## 2024-04-10 PROCEDURE — 700105 HCHG RX REV CODE 258: Performed by: STUDENT IN AN ORGANIZED HEALTH CARE EDUCATION/TRAINING PROGRAM

## 2024-04-10 PROCEDURE — 36415 COLL VENOUS BLD VENIPUNCTURE: CPT

## 2024-04-10 PROCEDURE — 700102 HCHG RX REV CODE 250 W/ 637 OVERRIDE(OP): Performed by: STUDENT IN AN ORGANIZED HEALTH CARE EDUCATION/TRAINING PROGRAM

## 2024-04-10 PROCEDURE — 700102 HCHG RX REV CODE 250 W/ 637 OVERRIDE(OP)

## 2024-04-10 PROCEDURE — 770020 HCHG ROOM/CARE - TELE (206)

## 2024-04-10 PROCEDURE — A9270 NON-COVERED ITEM OR SERVICE: HCPCS | Performed by: STUDENT IN AN ORGANIZED HEALTH CARE EDUCATION/TRAINING PROGRAM

## 2024-04-10 PROCEDURE — 99233 SBSQ HOSP IP/OBS HIGH 50: CPT | Performed by: STUDENT IN AN ORGANIZED HEALTH CARE EDUCATION/TRAINING PROGRAM

## 2024-04-10 PROCEDURE — A9270 NON-COVERED ITEM OR SERVICE: HCPCS

## 2024-04-10 PROCEDURE — 93971 EXTREMITY STUDY: CPT | Mod: LT

## 2024-04-10 PROCEDURE — 83880 ASSAY OF NATRIURETIC PEPTIDE: CPT

## 2024-04-10 RX ORDER — LOSARTAN POTASSIUM 25 MG/1
25 TABLET ORAL
Status: DISCONTINUED | OUTPATIENT
Start: 2024-04-11 | End: 2024-04-13 | Stop reason: HOSPADM

## 2024-04-10 RX ORDER — FUROSEMIDE 10 MG/ML
20 INJECTION INTRAMUSCULAR; INTRAVENOUS
Status: DISCONTINUED | OUTPATIENT
Start: 2024-04-10 | End: 2024-04-12

## 2024-04-10 RX ADMIN — AMPICILLIN AND SULBACTAM 3 G: 1; 2 INJECTION, POWDER, FOR SOLUTION INTRAMUSCULAR; INTRAVENOUS at 16:22

## 2024-04-10 RX ADMIN — HEPARIN SODIUM 5000 UNITS: 5000 INJECTION, SOLUTION INTRAVENOUS; SUBCUTANEOUS at 05:29

## 2024-04-10 RX ADMIN — VERAPAMIL HYDROCHLORIDE 240 MG: 240 TABLET, FILM COATED, EXTENDED RELEASE ORAL at 05:29

## 2024-04-10 RX ADMIN — LINEZOLID 600 MG: 600 TABLET, FILM COATED ORAL at 05:33

## 2024-04-10 RX ADMIN — EZETIMIBE 10 MG: 10 TABLET ORAL at 16:24

## 2024-04-10 RX ADMIN — POLYETHYLENE GLYCOL 3350 1 PACKET: 17 POWDER, FOR SOLUTION ORAL at 06:00

## 2024-04-10 RX ADMIN — HEPARIN SODIUM 5000 UNITS: 5000 INJECTION, SOLUTION INTRAVENOUS; SUBCUTANEOUS at 13:56

## 2024-04-10 RX ADMIN — AMPICILLIN AND SULBACTAM 3 G: 1; 2 INJECTION, POWDER, FOR SOLUTION INTRAMUSCULAR; INTRAVENOUS at 00:15

## 2024-04-10 RX ADMIN — ASPIRIN 81 MG: 81 TABLET, COATED ORAL at 05:33

## 2024-04-10 RX ADMIN — AMPICILLIN AND SULBACTAM 3 G: 1; 2 INJECTION, POWDER, FOR SOLUTION INTRAMUSCULAR; INTRAVENOUS at 23:14

## 2024-04-10 RX ADMIN — AMPICILLIN AND SULBACTAM 3 G: 1; 2 INJECTION, POWDER, FOR SOLUTION INTRAMUSCULAR; INTRAVENOUS at 14:16

## 2024-04-10 RX ADMIN — ONDANSETRON 4 MG: 4 TABLET, ORALLY DISINTEGRATING ORAL at 10:32

## 2024-04-10 RX ADMIN — HEPARIN SODIUM 5000 UNITS: 5000 INJECTION, SOLUTION INTRAVENOUS; SUBCUTANEOUS at 22:02

## 2024-04-10 RX ADMIN — FUROSEMIDE 20 MG: 10 INJECTION INTRAMUSCULAR; INTRAVENOUS at 23:11

## 2024-04-10 RX ADMIN — AMPICILLIN AND SULBACTAM 3 G: 1; 2 INJECTION, POWDER, FOR SOLUTION INTRAMUSCULAR; INTRAVENOUS at 05:43

## 2024-04-10 ASSESSMENT — PAIN DESCRIPTION - PAIN TYPE
TYPE: ACUTE PAIN;CHRONIC PAIN
TYPE: ACUTE PAIN;CHRONIC PAIN

## 2024-04-10 ASSESSMENT — ENCOUNTER SYMPTOMS
PALPITATIONS: 0
MYALGIAS: 0
NAUSEA: 0
VOMITING: 0
ABDOMINAL PAIN: 0
DIZZINESS: 0
HEADACHES: 0
CHILLS: 0
SHORTNESS OF BREATH: 0
FEVER: 0
COUGH: 0

## 2024-04-10 ASSESSMENT — FIBROSIS 4 INDEX: FIB4 SCORE: 2.06

## 2024-04-10 NOTE — PROGRESS NOTES
Hospital Medicine Daily Progress Note    Date of Service  4/9/2024    Chief Complaint  Irina Traylor is a 83 y.o. female admitted 4/8/2024 with thumb infection    Hospital Course  Irina Traylor is a 83 y.o. female who presented 4/8/2024 with left first digit pain.  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  Patient states that she is experiencing pain over the last 6 months.  She is unsure about how the pain started.  However she does note on and off purulent drainage.  Patient states that she went to the [facility] express clinic today, and she was told that she had an infection down to the bone.  She was subsequently instructed to come to the emergency department.  Imaging was obtained and was concerning for osteomyelitis.  Case discussed with orthopedic surgeon on-call, who recommends continue with IV antibiotics.  No need for emergent surgery at this time.     Interval Problem Update  4/10  Afebrile pulse in the 60s 70s respiratory rate 17-20 systolic blood pressure 112-150, pulse ox 92 to 99% on 2 L nasal cannula.  Mild leukopenia, normal hemoglobin and platelet count, CMP BUN 32 for otherwise unremarkable.  Right thumb ordered due to painful infection that is at times draining.  Wound reportedly has been present for few months, concern for osteomyelitis.    I have discussed this patient's plan of care and discharge plan at IDT rounds today with Case Management, Nursing, Nursing leadership, and other members of the IDT team.    Consultants/Specialty  orthopedics    Code Status  Full Code    Disposition  The patient is not medically cleared for discharge to home or a post-acute facility.      I have placed the appropriate orders for post-discharge needs.    Review of Systems  ROS   Review of Systems   Constitutional:  Negative for chills and  fever.   HENT:  Negative for congestion, ear discharge, ear pain, nosebleeds and sinus pain.    Eyes:  Negative for double vision, photophobia, pain and discharge.   Respiratory:  Negative for cough, hemoptysis, sputum production, shortness of breath and stridor.    Cardiovascular:  Negative for chest pain, palpitations and orthopnea.   Gastrointestinal:  Negative for abdominal pain, diarrhea, nausea and vomiting.   Genitourinary:  Negative for dysuria, frequency, hematuria and urgency.   Musculoskeletal:  Negative for back pain and neck pain.   Neurological:  Positive for speech change and weakness. Negative for dizziness, tingling, tremors, sensory change and headaches.    Physical Exam  Temp:  [36.2 °C (97.2 °F)-37.2 °C (99 °F)] 36.7 °C (98.1 °F)  Pulse:  [60-78] 73  Resp:  [17-20] 17  BP: (112-150)/(67-85) 150/85  SpO2:  [91 %-99 %] 98 %    Physical Exam  Vitals and nursing note reviewed. Exam conducted with a chaperone present.   Constitutional:       General: She is not in acute distress.     Appearance: She is not toxic-appearing.   HENT:      Head: Normocephalic and atraumatic.      Nose: Nose normal. No rhinorrhea.      Mouth/Throat:      Mouth: Mucous membranes are moist.      Pharynx: Oropharynx is clear.   Eyes:      General: No scleral icterus.     Extraocular Movements: Extraocular movements intact.      Conjunctiva/sclera: Conjunctivae normal.   Cardiovascular:      Rate and Rhythm: Normal rate and regular rhythm.      Pulses: Normal pulses.   Pulmonary:      Effort: Pulmonary effort is normal. No respiratory distress.      Breath sounds: Normal breath sounds. No wheezing, rhonchi or rales.   Abdominal:      General: There is no distension.      Palpations: Abdomen is soft.      Tenderness: There is no abdominal tenderness. There is no guarding or rebound.   Musculoskeletal:         General: Tenderness (left thumb) present. Normal range of motion.      Cervical back: Normal range of motion and neck  supple. No rigidity.      Right lower leg: No edema.      Left lower leg: No edema.   Skin:     General: Skin is warm and dry.      Capillary Refill: Capillary refill takes less than 2 seconds.      Findings: Erythema (left thumb) and lesion present.   Neurological:      General: No focal deficit present.      Mental Status: She is alert and oriented to person, place, and time. Mental status is at baseline.      Cranial Nerves: No cranial nerve deficit.      Sensory: No sensory deficit.      Motor: No weakness.      Coordination: Coordination normal.   Psychiatric:         Mood and Affect: Mood normal.         Behavior: Behavior normal.         Thought Content: Thought content normal.         Judgment: Judgment normal.         Fluids    Intake/Output Summary (Last 24 hours) at 4/10/2024 0506  Last data filed at 4/10/2024 0200  Gross per 24 hour   Intake 480 ml   Output 1000 ml   Net -520 ml       Laboratory  Recent Labs     04/08/24  1715 04/09/24  0620   WBC 6.3 4.2*   RBC 5.46* 5.14   HEMOGLOBIN 15.3 14.4   HEMATOCRIT 47.6* 45.1   MCV 87.2 87.7   MCH 28.0 28.0   MCHC 32.1* 31.9*   RDW 53.9* 53.7*   PLATELETCT 326 262   MPV 11.1 11.0     Recent Labs     04/08/24  1715 04/09/24  0620   SODIUM 139 142   POTASSIUM 5.5 4.2   CHLORIDE 102 108   CO2 23 22   GLUCOSE 85 81   BUN 79* 54*   CREATININE 1.45* 0.82   CALCIUM 9.3 8.4*                   Imaging  CT-HAND WITH PLUS RECONS LEFT   Final Result      1.  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  No abscess      3.  Cannot accurately assess for nor rule out osteomyelitis with CT         DX-FINGER(S) 2+ LEFT   Final Result      1.  Cortical erosion of the thumb distal phalangeal tuft concerning for acute osteomyelitis.   2.  Scattered degenerative changes, greatest at the base of the thumb and the index finger PIP joint.   3.  There is some collapse of the carpal joints.   4.  Osteopenia.      DX-CHEST-PORTABLE (1 VIEW)   Final  Result      1.  Cardiomegaly. No overt failure or pneumonia.      MR-HAND - WITH & W/O LEFT    (Results Pending)        Assessment/Plan  * Osteomyelitis (HCC)- (present on admission)  Assessment & Plan  Xray of left hand: Cortical erosion of the thumb distal phalangeal tuft concerning for acute osteomyelitis. Scattered degenerative changes, greatest at the base of the thumb and the index finger PIP joint.  Orthopedic surgery consulted  Recommending continue with IV antibiotics  Started patient on IV Unasyn and vancomycin  Orthopedic surgery to evaluate the patient.  No need for emergent surgery.  Keep patient n.p.o. at midnight    Acute kidney failure (HCC)  Assessment & Plan  Patient presents with creatinine level of 1.45.  This is elevated compared to her baseline  Likely prerenal in nature  Given IV fluids in the emergency department.  Continue with low dose maintenance fluids  Follow-up urinalysis and urine lites  Holding off on renal ultrasound at this time.  Avoid NSAIDs and nephrotoxic medications  Holding ACE/ARB    Sick sinus syndrome (HCC)- (present on admission)  Assessment & Plan  Patient has pacemaker in place.  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  Patient appears to be at her baseline         VTE prophylaxis: VTE Selection    I have performed a physical exam and reviewed and updated ROS and Plan today (4/10/2024). In review of yesterday's note (4/9/2024), there are no changes except as documented above.

## 2024-04-10 NOTE — CARE PLAN
The patient is Stable - Low risk of patient condition declining or worsening    Shift Goals  Clinical Goals: abx, safety,MRI  Patient Goals: comfort  Family Goals: ut    Progress made toward(s) clinical / shift goals:    Problem: Knowledge Deficit - Standard  Goal: Patient and family/care givers will demonstrate understanding of plan of care, disease process/condition, diagnostic tests and medications  Outcome: Progressing  Note: Pt educated on POC and disease processes. Pt verbalized understanding of medication regimen and interventions.     Problem: Fall Risk  Goal: Patient will remain free from falls  Outcome: Progressing  Note: Pt educated on importance of calling nurse before getting up. Fall precautions in place. Bed locked in lowest position. Call light and belongings within reach. Wristband and proper sign placed. Bed alarm on. No skid socks applied. Room free of clutter.

## 2024-04-10 NOTE — PROGRESS NOTES
Assumed care of patient and received report from RN. Tele monitor in place and patient in paced. VS stable. A&Ox4. Pain 0/10. POC discussed with patient and verbalized understanding. Call light in reach. Fall precautions in place. Bed locked in lowest position.     Fall Risk Score: MODERATE RISK  Fall risk interventions in place: Place yellow fall risk ID band on patient, Provide patient/family education based on risk assessment, Educate patient/family to call staff for assistance when getting out of bed, Place fall precaution signage outside patient door, Place patient in room close to nursing station, Utilize bed/chair fall alarm, and Bed alarm connected correctly  Bed type: Regular (Sriram Score less than 17 interventions in place)  Patient on cardiac monitor: Yes  IVF/IV medications: Not Applicable   Oxygen: How many liters 2L and Traced the line to wall oxygen  Bedside sitter: Not Applicable   Isolation: Not applicable

## 2024-04-10 NOTE — DISCHARGE PLANNING
H/SCP TCN chart review completed. Collaborated with CECY Real.  Per collaboration with CM, MRI of hand pending, may need ID consult for ABX therapy if MRI shows osteomyelitis.  Current discharge considerations are for Home with HH with outpatient infusion (possible LT IV ABX) if indicated vs. SNF.  PT/OT recommends Home with HH.  Patient seen at bedside.  Infusion choice ([facility]) if indicated, proactively obtained, faxed to [facility] and given to CM.  TCN will continue to follow and collaborate with discharge planning team as additional post acute needs arise. Thank you.    Completed:  PT/OT recommends Home Health on 4/9/24.   Choice obtained:  Infusion ([facility] for possible IV ABX), HH ([facility]) & DME ([facility]) if indicated.  ([place] referral sent out per CM protocol as patient may need SNF if iV ABX needed).    SCP with [facility] PCP.  Patient states she wants to schedule her own PCP F/U appointment.

## 2024-04-10 NOTE — DISCHARGE PLANNING
Case Management Discharge Planning    Admission Date: 4/8/2024  GMLOS: 4.4  ALOS: 2    6-Clicks ADL Score: 19  6-Clicks Mobility Score: 18      Anticipated Discharge Dispo: Discharge Disposition: D/T to home under HHA care in anticipation of covered skilled care (06)    DME Needed: No    Action(s) Taken: DC Assessment Complete (See below) and Referral(s) sent    RN CECY met with patient to complete assessment and discuss discharge planning. Pt is alert and oriented and able to confirm demographics.     Patients she lives alone in  house with a ramp in front, she uses a FWW for ambulation and also has a wheelchair for long distances. Her PCP is Sarita FATIMA.    Pt states she is independent with ADL's/IADLs at baseline and has the support of her friend and neighbors.     Pt does not use O2 at baseline, PT/OT recommending HH. TCN obtained choice for HH; RN CM sent HH referral to pt's choice of [facility].     Pt denied smoking, recreational drug use and alcohol abuse. No concerns for MH/SA reported by patient. Pt states she gets prison each month.    Patient has friends who can provide transportation home when medically cleared for discharge.    Patient is pending MRI of the hand, may need ID consult for abx therapy if MRI shows osteomyelitis. CM will follow to assist with DCP.     Escalations Completed: None    Medically Clear: No    Next Steps: F/U with HH referral. F/U with medical team regarding D/C needs/ barriers.     Barriers to Discharge: Medical clearance and Outpatient referrals pending    Is the patient up for discharge tomorrow: No       Care Transition Team Assessment    Information Source  Orientation Level: Oriented X4  Information Given By: Patient  Informant's Name: Tenisha Traylor  Who is responsible for making decisions for patient? : Patient    Readmission Evaluation  Is this a readmission?: No    Elopement Risk  Legal Hold: No  Ambulatory or Self Mobile in Wheelchair: Yes  Disoriented:  No  Psychiatric Symptoms: None  History of Wandering: No  Elopement this Admit: No  Vocalizing Wanting to Leave: No  Displays Behaviors, Body Language Wanting to Leave: No-Not at Risk for Elopement  Elopement Risk: Not at Risk for Elopement    Interdisciplinary Discharge Planning  Lives with - Patient's Self Care Capacity: Alone and Able to Care For Self  Patient or legal guardian wants to designate a caregiver: No  Support Systems: [employer]  Housing / Facility: [address] [facility]  Prior Services: Home-Independent  Durable Medical Equipment: Walker    Discharge Preparedness  What is your plan after discharge?: Home health care  What are your discharge supports?: Sibling (Friend, neighbor)  Prior Functional Level: Uses Walker, Uses Wheelchair, Independent with Activities of Daily Living, Independent with Medication Management    Functional Assesment  Prior Functional Level: Uses Walker, Uses Wheelchair, Independent with Activities of Daily Living, Independent with Medication Management    Finances  Financial Barriers to Discharge: No  Prescription Coverage: Yes    Vision / Hearing Impairment  Vision Impairment : Yes  Right Eye Vision: Impaired, Wears Glasses  Left Eye Vision: Impaired, Wears Glasses  Hearing Impairment : No    Advance Directive  Advance Directive?: POLST, DPOA for Health Care    Domestic Abuse  Have you ever been the victim of abuse or violence?: No  Physical Abuse or Sexual Abuse: No  Verbal Abuse or Emotional Abuse: No  Possible Abuse/Neglect Reported to:: Not Applicable    Psychological Assessment  History of Substance Abuse: None  History of Psychiatric Problems: No    Discharge Risks or Barriers  Discharge risks or barriers?: Complex medical needs  Patient risk factors: Complex medical needs    Anticipated Discharge Information  Discharge Disposition: D/T to home under A care in anticipation of covered skilled care (06)

## 2024-04-10 NOTE — PROGRESS NOTES
Hospital Medicine Daily Progress Note    Date of Service  4/9/2024    Chief Complaint  Irina Traylor is a 83 y.o. female admitted 4/8/2024 with thumb infection    Hospital Course  Irina Traylor is a 83 y.o. female who presented 4/8/2024 with left first digit pain.  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  Patient states that she is experiencing pain over the last 6 months.  She is unsure about how the pain started.  However she does note on and off purulent drainage.  Patient states that she went to the [facility] express clinic today, and she was told that she had an infection down to the bone.  She was subsequently instructed to come to the emergency department.  Imaging was obtained and was concerning for osteomyelitis.  Case discussed with orthopedic surgeon on-call, who recommends continue with IV antibiotics.  No need for emergent surgery at this time.     Interval Problem Update  4/9  Afebrile pulse in the 60s 70s respiratory rate 17-20 systolic blood pressure 112-150, pulse ox 92 to 99% on 2 L nasal cannula.  Mild leukopenia, normal hemoglobin and platelet count, CMP BUN 32 for otherwise unremarkable.  MRI left thumb ordered due to painful infection that is at draining.  Wound reportedly has been present for few months, concern for osteomyelitis.    I have discussed this patient's plan of care and discharge plan at IDT rounds today with Case Management, Nursing, Nursing leadership, and other members of the IDT team.    Consultants/Specialty  orthopedics    Code Status  Full Code    Disposition  The patient is not medically cleared for discharge to home or a post-acute facility.      I have placed the appropriate orders for post-discharge needs.    Review of Systems  ROS   Review of Systems   Constitutional:  Negative for chills and  fever.   HENT:  Negative for congestion, ear discharge, ear pain, nosebleeds and sinus pain.    Eyes:  Negative for double vision, photophobia, pain and discharge.   Respiratory:  Negative for cough, hemoptysis, sputum production, shortness of breath and stridor.    Cardiovascular:  Negative for chest pain, palpitations and orthopnea.   Gastrointestinal:  Negative for abdominal pain, diarrhea, nausea and vomiting.   Genitourinary:  Negative for dysuria, frequency, hematuria and urgency.   Musculoskeletal:  Negative for back pain and neck pain.   Neurological:  Positive for speech change and weakness. Negative for dizziness, tingling, tremors, sensory change and headaches.    Physical Exam  Temp:  [36.2 °C (97.2 °F)-37.2 °C (99 °F)] 36.7 °C (98.1 °F)  Pulse:  [60-78] 73  Resp:  [17-20] 17  BP: (112-150)/(67-85) 150/85  SpO2:  [91 %-99 %] 98 %    Physical Exam  Vitals and nursing note reviewed. Exam conducted with a chaperone present.   Constitutional:       General: She is not in acute distress.     Appearance: She is not toxic-appearing.   HENT:      Head: Normocephalic and atraumatic.      Nose: Nose normal. No rhinorrhea.      Mouth/Throat:      Mouth: Mucous membranes are moist.      Pharynx: Oropharynx is clear.   Eyes:      General: No scleral icterus.     Extraocular Movements: Extraocular movements intact.      Conjunctiva/sclera: Conjunctivae normal.   Cardiovascular:      Rate and Rhythm: Normal rate and regular rhythm.      Pulses: Normal pulses.   Pulmonary:      Effort: Pulmonary effort is normal. No respiratory distress.      Breath sounds: Normal breath sounds. No wheezing, rhonchi or rales.   Abdominal:      General: There is no distension.      Palpations: Abdomen is soft.      Tenderness: There is no abdominal tenderness. There is no guarding or rebound.   Musculoskeletal:         General: Tenderness (left thumb) present. Normal range of motion.      Cervical back: Normal range of motion and neck  supple. No rigidity.      Right lower leg: No edema.      Left lower leg: No edema.   Skin:     General: Skin is warm and dry.      Capillary Refill: Capillary refill takes less than 2 seconds.      Findings: Erythema (left thumb) and lesion present.   Neurological:      General: No focal deficit present.      Mental Status: She is alert and oriented to person, place, and time. Mental status is at baseline.      Cranial Nerves: No cranial nerve deficit.      Sensory: No sensory deficit.      Motor: No weakness.      Coordination: Coordination normal.   Psychiatric:         Mood and Affect: Mood normal.         Behavior: Behavior normal.         Thought Content: Thought content normal.         Judgment: Judgment normal.         Fluids    Intake/Output Summary (Last 24 hours) at 4/10/2024 0516  Last data filed at 4/10/2024 0200  Gross per 24 hour   Intake 480 ml   Output 1000 ml   Net -520 ml       Laboratory  Recent Labs     04/08/24  1715 04/09/24  0620   WBC 6.3 4.2*   RBC 5.46* 5.14   HEMOGLOBIN 15.3 14.4   HEMATOCRIT 47.6* 45.1   MCV 87.2 87.7   MCH 28.0 28.0   MCHC 32.1* 31.9*   RDW 53.9* 53.7*   PLATELETCT 326 262   MPV 11.1 11.0     Recent Labs     04/08/24  1715 04/09/24  0620   SODIUM 139 142   POTASSIUM 5.5 4.2   CHLORIDE 102 108   CO2 23 22   GLUCOSE 85 81   BUN 79* 54*   CREATININE 1.45* 0.82   CALCIUM 9.3 8.4*                   Imaging  CT-HAND WITH PLUS RECONS LEFT   Final Result      1.  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  No abscess      3.  Cannot accurately assess for nor rule out osteomyelitis with CT         DX-FINGER(S) 2+ LEFT   Final Result      1.  Cortical erosion of the thumb distal phalangeal tuft concerning for acute osteomyelitis.   2.  Scattered degenerative changes, greatest at the base of the thumb and the index finger PIP joint.   3.  There is some collapse of the carpal joints.   4.  Osteopenia.      DX-CHEST-PORTABLE (1 VIEW)   Final  Result      1.  Cardiomegaly. No overt failure or pneumonia.      MR-HAND - WITH & W/O LEFT    (Results Pending)        Assessment/Plan  * Osteomyelitis (HCC)- (present on admission)  Assessment & Plan  Xray of left hand: Cortical erosion of the thumb distal phalangeal tuft concerning for acute osteomyelitis. Scattered degenerative changes, greatest at the base of the thumb and the index finger PIP joint.  Orthopedic surgery consulted  Recommending continue with IV antibiotics  Started patient on IV Unasyn and vancomycin  Orthopedic surgery to evaluate the patient.  No need for emergent surgery.  Keep patient n.p.o. at midnight    Acute kidney failure (HCC)  Assessment & Plan  Patient presents with creatinine level of 1.45.  This is elevated compared to her baseline  Likely prerenal in nature  Given IV fluids in the emergency department.  Continue with low dose maintenance fluids  Follow-up urinalysis and urine lites  Holding off on renal ultrasound at this time.  Avoid NSAIDs and nephrotoxic medications  Holding ACE/ARB    Sick sinus syndrome (HCC)- (present on admission)  Assessment & Plan  Patient has pacemaker in place.  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  Patient appears to be at her baseline         VTE prophylaxis: VTE Selection    I have performed a physical exam and reviewed and updated ROS and Plan today (4/9/2024). In review of yesterday's note (4/8/2024), there are no changes except as documented above.  Total time spent 52 minutes. I spent greater than 50% of the time for patient care, counseling, and coordination on this date, including unit/floor time, and face-to-face time with the patient as per interval events, my own review of patient's imaging and lab analysis and developing my assessment and plan above.

## 2024-04-10 NOTE — PROGRESS NOTES
Monitor Summary:   Rhythm: paced  Rate: 60-71  Measurement: .20/.11/.41  Ectopy: pvc (r)

## 2024-04-10 NOTE — PROGRESS NOTES
Bedside report received from off going RN/tech: Tatiana, assumed care of patient.     Fall Risk Score: MODERATE RISK  Fall risk interventions in place: Place yellow fall risk ID band on patient, Provide patient/family education based on risk assessment, Educate patient/family to call staff for assistance when getting out of bed, Place fall precaution signage outside patient door, Utilize bed/chair fall alarm, and Notify charge of high risk for huddle  Bed type: Regular (Sriram Score less than 17 interventions in place)  Patient on cardiac monitor: Yes  IVF/IV medications: Infusion per MAR (List Med(s)) KVO  Oxygen: Room Air and How many liters 2L  Bedside sitter: Not Applicable   Isolation: Not applicable

## 2024-04-10 NOTE — DISCHARGE PLANNING
Thank you for alerting [facility] to this referral. Please clarify whether patient will be going home with IV abx. If so, please add to the order and provide infusion orders. Referral will remain on hold.

## 2024-04-10 NOTE — HOSPITAL COURSE
Irina Traylor is a 83 y.o. female who presented 4/8/2024 with left first digit pain.  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  Patient states that she is experiencing pain over the last 6 months.  She is unsure about how the pain started.  However she does note on and off purulent drainage.  Patient states that she went to the [facility] express clinic today, and she was told that she had an infection down to the bone.  She was subsequently instructed to come to the emergency department.  Imaging was obtained and was concerning for osteomyelitis.  Case discussed with orthopedic surgeon on-call, who recommends continue with IV antibiotics.  No need for emergent surgery at this time.    Office Visit-Follow up    Chief Complaint: Jf Díaz is a 61 year old male who is being seen for   Chief Complaint   Patient presents with     RECHECK     left medial malleolus with routine healing, Left calf strain-DOI;2/10/2018       History of Present Illness:   4/12/18 visit:  Returns to clinic.  Follow up from previous.  Feels the ankle/fracture pain is completely resolved.  States walking is going fine.  States no foot pain.  States the pain behind his foot and up the calf is better, but the weakness persists.  States that he cannot stand on one leg and raise himself up with the ankle.  He can on the other leg.  States the weakness is really no better, but pain has improved.  No real pain, tenderness now.  Feels that his gait is off.  No numbness/tingling.  Has been doing at home exercises to strengthen the Cedric's tendon without improvement.  Stopped the fracture boot previous.  States no new injuries.  Unsure if the Cedric's symptoms started at the 2/12/18 injury or the 1/7/18 injury.   2/12/18 visit  On February 10, 2018 he slipped on the ice.  He had his fracture boot on when this occurred.  In the process of getting up he felt pain in the posterior calf area.  It is since improved.  February 7, 2018 visit:  Returns for follow-up.  Very minimal pain.  He has been compliant with instructions.  January 17 2018 visit:  Returns to clinic.  States pain improving.  He also noted that after the injury he had an immediate pain to posterior calf near ankle, this pain is improving.  Swelling improving to ankle and calf.  Pain tolerable.  Back to work.  States compliant with fracture boot, crutches, and non-weight bearing.    Patient recommended for BID 325mg ASA at last visit due to a CVA following a previous hx of contralateral ankle fracture.  Has not started aspirin yet.  Stated he will be.  Did not want an Rx for this.   1/11/18 visit:  On January 7, 2018 had a twisting event injuring his left ankle.   Subsequently evaluated and diagnosed with a medial malleolus fracture.  He is placed in a splint and given crutches.  He has been taking ibuprofen for pain.  No pre-existing injury.   History of a contralateral right tibia fracture requiring surgery.    Physical Exam:  Vitals: /82 (BP Location: Right arm, Patient Position: Chair, Cuff Size: Adult Large)  Temp 97.7  F (36.5  C) (Temporal)  Ht 1.829 m (6')  Wt 112 kg (247 lb)  BMI 33.5 kg/m2  BMI= Body mass index is 33.5 kg/(m^2).  Constitutional: healthy, alert and no acute distress   Psychiatric: mentation appears normal and affect normal/bright  NEURO: no focal deficits  RESP: Normal with easy respirations and no use of accessory muscles to breathe, no audible wheezing or retractions  CV: LLE:  no edema         Regular rate and rhythm by palpation  SKIN: No erythema, rashes, excoriation, or breakdown. No evidence of infection.   JOINT/EXTREMITIES:left Ankle Exam:   Lower leg:possible mild deformity around koby's tendon insertion. No tenderness with palpation throughout ankle/foot/posterior calf. No swelling, erythema, heat.  Able to df/pf against resistance without pain. 5-/5 on left for df.  While doing one legged stand on left, unable to lift himself up with DF motion. Able to do this on right. Slightly limping gait on left.  No pain with testing.  Rest of ankle/foot normal ROM. No tenderness at med/lat malleolus, no tenderness at mortise.  Negative drawer.  No tenderness to foot and metatarsals.   2+ distal radius pulse, cap refill <2, sensation intact to foot.    Diagnostic Modalities:  None today.  Previous imaging reviewed.  Independent visualization of the images was performed.      Impression: left side Eudora's weakness.    Plan:  All of the above pertinent physical exam and imaging modalities findings was reviewed with Jf.  Given the weakness and possible subtle deformity recommended MRI for full evaluation of Koby's tendon.  Symptomatic and by exam the fracture is no longer bothersome.     Return to clinic to discuss test results, or sooner as needed for changes.  Re-x-ray on return: No    Scribed by:  David Rosales APRN, CNP, DNP  10:12 AM  4/12/2018    I attest I have seen and evaluated the patient.  I agree with above impression and plan.  Antolin Nguyễn D.O.

## 2024-04-11 ENCOUNTER — APPOINTMENT (OUTPATIENT)
Dept: CARDIOLOGY | Facility: MEDICAL CENTER | Age: 84
End: 2024-04-11
Attending: STUDENT IN AN ORGANIZED HEALTH CARE EDUCATION/TRAINING PROGRAM
Payer: MEDICARE

## 2024-04-11 ENCOUNTER — APPOINTMENT (OUTPATIENT)
Dept: RADIOLOGY | Facility: MEDICAL CENTER | Age: 84
End: 2024-04-11
Attending: STUDENT IN AN ORGANIZED HEALTH CARE EDUCATION/TRAINING PROGRAM
Payer: MEDICARE

## 2024-04-11 LAB
ALBUMIN SERPL BCP-MCNC: 3.8 G/DL (ref 3.2–4.9)
BACTERIA UR CULT: NORMAL
BUN SERPL-MCNC: 36 MG/DL (ref 8–22)
CALCIUM ALBUM COR SERPL-MCNC: 9.1 MG/DL (ref 8.5–10.5)
CALCIUM SERPL-MCNC: 8.9 MG/DL (ref 8.5–10.5)
CHLORIDE SERPL-SCNC: 104 MMOL/L (ref 96–112)
CO2 SERPL-SCNC: 24 MMOL/L (ref 20–33)
CREAT SERPL-MCNC: 0.89 MG/DL (ref 0.5–1.4)
GFR SERPLBLD CREATININE-BSD FMLA CKD-EPI: 65 ML/MIN/1.73 M 2
GLUCOSE SERPL-MCNC: 78 MG/DL (ref 65–99)
LV EJECT FRACT  99904: 55
LV EJECT FRACT MOD 2C 99903: 57.93
LV EJECT FRACT MOD 4C 99902: 57.8
LV EJECT FRACT MOD BP 99901: 57.23
MAGNESIUM SERPL-MCNC: 2 MG/DL (ref 1.5–2.5)
PHOSPHATE SERPL-MCNC: 2.8 MG/DL (ref 2.5–4.5)
POTASSIUM SERPL-SCNC: 4.3 MMOL/L (ref 3.6–5.5)
SIGNIFICANT IND 70042: NORMAL
SITE SITE: NORMAL
SODIUM SERPL-SCNC: 139 MMOL/L (ref 135–145)
SOURCE SOURCE: NORMAL

## 2024-04-11 PROCEDURE — 700111 HCHG RX REV CODE 636 W/ 250 OVERRIDE (IP): Performed by: STUDENT IN AN ORGANIZED HEALTH CARE EDUCATION/TRAINING PROGRAM

## 2024-04-11 PROCEDURE — 73220 MRI UPPR EXTREMITY W/O&W/DYE: CPT | Mod: LT

## 2024-04-11 PROCEDURE — 700117 HCHG RX CONTRAST REV CODE 255: Performed by: STUDENT IN AN ORGANIZED HEALTH CARE EDUCATION/TRAINING PROGRAM

## 2024-04-11 PROCEDURE — 83735 ASSAY OF MAGNESIUM: CPT

## 2024-04-11 PROCEDURE — A9270 NON-COVERED ITEM OR SERVICE: HCPCS | Performed by: STUDENT IN AN ORGANIZED HEALTH CARE EDUCATION/TRAINING PROGRAM

## 2024-04-11 PROCEDURE — 80069 RENAL FUNCTION PANEL: CPT

## 2024-04-11 PROCEDURE — 99233 SBSQ HOSP IP/OBS HIGH 50: CPT | Performed by: STUDENT IN AN ORGANIZED HEALTH CARE EDUCATION/TRAINING PROGRAM

## 2024-04-11 PROCEDURE — 93306 TTE W/DOPPLER COMPLETE: CPT | Mod: 26 | Performed by: INTERNAL MEDICINE

## 2024-04-11 PROCEDURE — 700105 HCHG RX REV CODE 258: Performed by: STUDENT IN AN ORGANIZED HEALTH CARE EDUCATION/TRAINING PROGRAM

## 2024-04-11 PROCEDURE — A9270 NON-COVERED ITEM OR SERVICE: HCPCS

## 2024-04-11 PROCEDURE — A9579 GAD-BASE MR CONTRAST NOS,1ML: HCPCS | Performed by: STUDENT IN AN ORGANIZED HEALTH CARE EDUCATION/TRAINING PROGRAM

## 2024-04-11 PROCEDURE — 700102 HCHG RX REV CODE 250 W/ 637 OVERRIDE(OP): Performed by: STUDENT IN AN ORGANIZED HEALTH CARE EDUCATION/TRAINING PROGRAM

## 2024-04-11 PROCEDURE — 700102 HCHG RX REV CODE 250 W/ 637 OVERRIDE(OP)

## 2024-04-11 PROCEDURE — 93306 TTE W/DOPPLER COMPLETE: CPT

## 2024-04-11 PROCEDURE — 36415 COLL VENOUS BLD VENIPUNCTURE: CPT

## 2024-04-11 PROCEDURE — 700111 HCHG RX REV CODE 636 W/ 250 OVERRIDE (IP): Mod: JZ | Performed by: STUDENT IN AN ORGANIZED HEALTH CARE EDUCATION/TRAINING PROGRAM

## 2024-04-11 PROCEDURE — 770020 HCHG ROOM/CARE - TELE (206)

## 2024-04-11 RX ORDER — HYDROXYZINE HYDROCHLORIDE 25 MG/1
25 TABLET, FILM COATED ORAL 3 TIMES DAILY PRN
Status: ACTIVE | OUTPATIENT
Start: 2024-04-11 | End: 2024-04-12

## 2024-04-11 RX ORDER — LORAZEPAM 1 MG/1
1 TABLET ORAL
Status: DISCONTINUED | OUTPATIENT
Start: 2024-04-11 | End: 2024-04-11

## 2024-04-11 RX ADMIN — AMPICILLIN AND SULBACTAM 3 G: 1; 2 INJECTION, POWDER, FOR SOLUTION INTRAMUSCULAR; INTRAVENOUS at 17:23

## 2024-04-11 RX ADMIN — HEPARIN SODIUM 5000 UNITS: 5000 INJECTION, SOLUTION INTRAVENOUS; SUBCUTANEOUS at 13:55

## 2024-04-11 RX ADMIN — EZETIMIBE 10 MG: 10 TABLET ORAL at 17:21

## 2024-04-11 RX ADMIN — VERAPAMIL HYDROCHLORIDE 240 MG: 240 TABLET, FILM COATED, EXTENDED RELEASE ORAL at 05:12

## 2024-04-11 RX ADMIN — AMPICILLIN AND SULBACTAM 3 G: 1; 2 INJECTION, POWDER, FOR SOLUTION INTRAMUSCULAR; INTRAVENOUS at 05:16

## 2024-04-11 RX ADMIN — AMPICILLIN AND SULBACTAM 3 G: 1; 2 INJECTION, POWDER, FOR SOLUTION INTRAMUSCULAR; INTRAVENOUS at 12:04

## 2024-04-11 RX ADMIN — GADOTERIDOL 15 ML: 279.3 INJECTION, SOLUTION INTRAVENOUS at 15:50

## 2024-04-11 RX ADMIN — HEPARIN SODIUM 5000 UNITS: 5000 INJECTION, SOLUTION INTRAVENOUS; SUBCUTANEOUS at 05:12

## 2024-04-11 RX ADMIN — LOSARTAN POTASSIUM 25 MG: 25 TABLET, FILM COATED ORAL at 05:12

## 2024-04-11 RX ADMIN — FUROSEMIDE 20 MG: 10 INJECTION INTRAMUSCULAR; INTRAVENOUS at 05:12

## 2024-04-11 RX ADMIN — FUROSEMIDE 20 MG: 10 INJECTION INTRAMUSCULAR; INTRAVENOUS at 16:32

## 2024-04-11 RX ADMIN — POLYETHYLENE GLYCOL 3350 1 PACKET: 17 POWDER, FOR SOLUTION ORAL at 05:13

## 2024-04-11 RX ADMIN — ASPIRIN 81 MG: 81 TABLET, COATED ORAL at 05:13

## 2024-04-11 RX ADMIN — HEPARIN SODIUM 5000 UNITS: 5000 INJECTION, SOLUTION INTRAVENOUS; SUBCUTANEOUS at 21:19

## 2024-04-11 ASSESSMENT — ENCOUNTER SYMPTOMS
NAUSEA: 0
DIZZINESS: 0
ABDOMINAL PAIN: 0
COUGH: 0
HEADACHES: 0
FEVER: 0
CHILLS: 0
VOMITING: 0
MYALGIAS: 0
SHORTNESS OF BREATH: 0
PALPITATIONS: 0

## 2024-04-11 ASSESSMENT — PATIENT HEALTH QUESTIONNAIRE - PHQ9
2. FEELING DOWN, DEPRESSED, IRRITABLE, OR HOPELESS: NOT AT ALL
SUM OF ALL RESPONSES TO PHQ9 QUESTIONS 1 AND 2: 0
1. LITTLE INTEREST OR PLEASURE IN DOING THINGS: NOT AT ALL

## 2024-04-11 ASSESSMENT — PAIN DESCRIPTION - PAIN TYPE: TYPE: ACUTE PAIN

## 2024-04-11 ASSESSMENT — FIBROSIS 4 INDEX: FIB4 SCORE: 2.06

## 2024-04-11 NOTE — ASSESSMENT & PLAN NOTE
4/10/2024  SpO2 of 85% on room air.  Elevated NT proBNP suggestive of fluid overload.  Start furosemide 20 mg IV twice daily  Continuous telemetry monitoring during forced diuresis  Continuous pulse oximetry monitoring  I have ordered an echocardiogram    4/11/2024  Weaned down to room air with forced diuresis  Continue furosemide 20 mg IV twice daily    4/12/2024  Discontinue IV furosemide.  Currently on room air.

## 2024-04-11 NOTE — PROGRESS NOTES
MRI NURSING NOTE:      Patient arrived for MRI Left Hand With and Without IV Contrast. Patient states that she has a [employer] Pacemaker. Patient is educated on how her pacemaker will be placed into MRI Safe Scan Mode by the [employer] Representative. Once that the patient is safe to enter the MRI scan room, she will be placed on our cardiac monitors and we will continuously monitor her HR, BP, Oxygen saturation and cardiac rhythm throughout the MRI scan. Patient is also educated on immediately informing the RN staff if she begins to experience any Chest Pain, Shortness of Breath, Burning sensation in her chest, Back or Jaw pain or any abnormal sensations or discomfort throughout the scan. Patient states understanding of instructions and is able to teach instructions back to RN. Patient Pacemaker is placed into MRI safe mode and patient scan completed. Patient denies any of the above symptoms throughout scan. Patient completed scan without incident. Patient is transferred via hospital transporter to her hospital room # T803/02.

## 2024-04-11 NOTE — ASSESSMENT & PLAN NOTE
4/10/2024  NT-proBNP elevated at 1117.  Start furosemide 20 mg subcu twice daily  Have ordered echocardiogram for further evaluation.  Last echocardiogram was in 2022.    4/11/2024  Echo:  CONCLUSIONS  Compared to the prior study on 6/21/22, there is worsening aortic   regurgitation.  Normal left ventricular systolic function.   Structurally normal mitral valve without significant stenosis or   regurgitation.  Moderate posterior mitral leaflet calcification.  Moderate aortic insufficiency    Continue furosemide 20 mg IV twice daily  Continuous cardiac monitoring during forced diuresis    4/12/2024  Discontinue IV furosemide

## 2024-04-11 NOTE — CARE PLAN
The patient is Watcher - Medium risk of patient condition declining or worsening    Shift Goals  Clinical Goals: safety, MRI  Patient Goals: comfort, sit in chair  Family Goals: ut    Progress made toward(s) clinical / shift goals:    Problem: Pain - Standard  Goal: Alleviation of pain or a reduction in pain to the patient’s comfort goal  Outcome: Progressing     Problem: Knowledge Deficit - Standard  Goal: Patient and family/care givers will demonstrate understanding of plan of care, disease process/condition, diagnostic tests and medications  Outcome: Progressing     Problem: Fall Risk  Goal: Patient will remain free from falls  Outcome: Progressing       Patient is not progressing towards the following goals:

## 2024-04-11 NOTE — DISCHARGE PLANNING
"TCN following. HTH/SCP chart reviewed. No new TCN needs identified. Noted patient on RA and per chart remains ambulatory without assist, and with 4WW that she owns.  Noted per prior TCN, patient may need home infusions, as per CM note on 4/10 at 10:00 AM, \"Patient is pending MRI of the hand, may need ID consult for abx therapy if MRI shows osteomyelitis.\" Noted choice obtained by prior TCN.     Please see prior TCN note from 4/10 for most recent discharge planning considerations if indicated.     Completed:  PT/OT recommends Home Health on 4/9/24.   Choice obtained:  Infusion ([facility] for possible IV ABX), HH ([facility]) & DME ([facility]) if indicated.  ([place] referral sent out per CM protocol as patient may need SNF if iV ABX needed).    SCP with [facility] PCP.  Patient states she wants to schedule her own PCP F/U appointment.    "

## 2024-04-11 NOTE — CARE PLAN
Problem: Pain - Standard  Goal: Alleviation of pain or a reduction in pain to the patient’s comfort goal  Outcome: Progressing     Problem: Knowledge Deficit - Standard  Goal: Patient and family/care givers will demonstrate understanding of plan of care, disease process/condition, diagnostic tests and medications  Outcome: Progressing   The patient is Stable - Low risk of patient condition declining or worsening    Shift Goals  Clinical Goals: ABx MRI  Patient Goals: Comfort, Move around  Family Goals: ut    Progress made toward(s) clinical / shift goals:  MRI    Patient is not progressing towards the following goals:

## 2024-04-11 NOTE — PROGRESS NOTES
Hospital Medicine Daily Progress Note    Date of Service  4/10/2024    Chief Complaint  Irina Traylor is a 83 y.o. female admitted 4/8/2024 with left thumb pain.    Hospital Course  Irina Traylor is a 83 y.o. female who presented 4/8/2024 with left first digit pain.  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  Patient states that she is experiencing pain over the last 6 months.  She is unsure about how the pain started.  However she does note on and off purulent drainage.  Patient states that she went to the [facility] express clinic today, and she was told that she had an infection down to the bone.  She was subsequently instructed to come to the emergency department.  Imaging was obtained and was concerning for osteomyelitis.  Case discussed with orthopedic surgeon on-call, who recommends continue with IV antibiotics.  No need for emergent surgery at this time.     Interval Problem Update  4/10/2024  Patient was seen and examined on the telemetry floor.  Continues on continuous cardiac monitoring.  On 2 LPM oxygen by nasal cannula.  Hypoxic down to 85% on room air.  NT-proBNP elevated at 1117.  Starting furosemide 20 mg IV twice daily.  I have ordered an echocardiogram since last echocardiogram was in 2022.  MRSA is negative so Zyvox has been discontinued.  Continues on Unasyn.  Complaining of left lower extremity pain and edema.  Left lower extremity ultrasound negative for DVT.  Edema noted.  Patient is awaiting MRI of the left hand.  MRI technician with awaiting pacemaker representative clearance.  Left thumb pain is improving.    I have discussed this patient's plan of care and discharge plan at IDT rounds today with Case Management, Nursing, Nursing leadership, and other members of the IDT team.    Consultants/Specialty      Code  Status  Full Code    Disposition  The patient is not medically cleared for discharge to home or a post-acute facility.  Anticipate discharge to: home with close outpatient follow-up    I have placed the appropriate orders for post-discharge needs.    Review of Systems  Review of Systems   Constitutional:  Negative for chills and fever.   Respiratory:  Negative for cough and shortness of breath.    Cardiovascular:  Negative for chest pain and palpitations.   Gastrointestinal:  Negative for abdominal pain, nausea and vomiting.   Musculoskeletal:  Positive for joint pain. Negative for myalgias.   Neurological:  Negative for dizziness and headaches.        Physical Exam  Temp:  [36.7 °C (98.1 °F)-37 °C (98.6 °F)] 36.9 °C (98.4 °F)  Pulse:  [60-78] 60  Resp:  [17-18] 18  BP: (125-150)/(69-85) 130/69  SpO2:  [93 %-98 %] 96 %    Physical Exam  Vitals and nursing note reviewed.   Constitutional:       Appearance: Normal appearance. She is normal weight. She is not ill-appearing or diaphoretic.   HENT:      Head: Normocephalic and atraumatic.      Mouth/Throat:      Mouth: Mucous membranes are moist.      Pharynx: Oropharynx is clear. No oropharyngeal exudate.   Eyes:      General:         Right eye: No discharge.         Left eye: No discharge.      Conjunctiva/sclera: Conjunctivae normal.      Pupils: Pupils are equal, round, and reactive to light.   Cardiovascular:      Rate and Rhythm: Normal rate and regular rhythm.      Pulses: Normal pulses.      Heart sounds: Normal heart sounds. No murmur heard.  Pulmonary:      Effort: Pulmonary effort is normal. No respiratory distress.      Breath sounds: Normal breath sounds.   Abdominal:      General: Abdomen is flat. Bowel sounds are normal. There is no distension.      Palpations: Abdomen is soft.      Tenderness: There is no abdominal tenderness.   Musculoskeletal:         General: Tenderness present.      Cervical back: Neck supple. No tenderness.      Right lower leg: No  edema.      Left lower leg: No edema.      Comments: Left thumb with discoloration, blackened fingernail   Neurological:      Mental Status: She is alert and oriented to person, place, and time.      Motor: No weakness.   Psychiatric:         Thought Content: Thought content normal.         Judgment: Judgment normal.         Fluids    Intake/Output Summary (Last 24 hours) at 4/10/2024 2204  Last data filed at 4/10/2024 2000  Gross per 24 hour   Intake 240 ml   Output 400 ml   Net -160 ml       Laboratory  Recent Labs     04/08/24  1715 04/09/24  0620   WBC 6.3 4.2*   RBC 5.46* 5.14   HEMOGLOBIN 15.3 14.4   HEMATOCRIT 47.6* 45.1   MCV 87.2 87.7   MCH 28.0 28.0   MCHC 32.1* 31.9*   RDW 53.9* 53.7*   PLATELETCT 326 262   MPV 11.1 11.0     Recent Labs     04/08/24  1715 04/09/24  0620   SODIUM 139 142   POTASSIUM 5.5 4.2   CHLORIDE 102 108   CO2 23 22   GLUCOSE 85 81   BUN 79* 54*   CREATININE 1.45* 0.82   CALCIUM 9.3 8.4*                   Imaging  US-EXTREMITY VENOUS LOWER UNILAT LEFT   Final Result      CT-HAND WITH PLUS RECONS LEFT   Final Result      1.  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  No abscess      3.  Cannot accurately assess for nor rule out osteomyelitis with CT         DX-FINGER(S) 2+ LEFT   Final Result      1.  Cortical erosion of the thumb distal phalangeal tuft concerning for acute osteomyelitis.   2.  Scattered degenerative changes, greatest at the base of the thumb and the index finger PIP joint.   3.  There is some collapse of the carpal joints.   4.  Osteopenia.      DX-CHEST-PORTABLE (1 VIEW)   Final Result      1.  Cardiomegaly. No overt failure or pneumonia.      MR-HAND - WITH & W/O LEFT    (Results Pending)   EC-ECHOCARDIOGRAM COMPLETE W/O CONT    (Results Pending)        Assessment/Plan  * Osteomyelitis (HCC)- (present on admission)  Assessment & Plan  Xray of left hand: Cortical erosion of the thumb distal phalangeal tuft concerning for acute  osteomyelitis. Scattered degenerative changes, greatest at the base of the thumb and the index finger PIP joint.  Orthopedic surgery consulted  Recommending continue with IV antibiotics  Started patient on IV Unasyn and vancomycin  Orthopedic surgery to evaluate the patient.  No need for emergent surgery.  Keep patient n.p.o. at midnight    4/10/2024  Patient is awaiting MRI of the left hand.  MRI technician with awaiting pacemaker representative clearance.  MRSA is negative so Zyvox has been discontinued.  Continues on Unasyn.    Elevated brain natriuretic peptide (BNP) level- (present on admission)  Assessment & Plan  4/10/2024  NT-proBNP elevated at 1117.  Start furosemide 20 mg subcu twice daily  Have ordered echocardiogram for further evaluation.  Last echocardiogram was in 2022.    Hypoxia- (present on admission)  Assessment & Plan  4/10/2024  SpO2 of 85% on room air.  Elevated NT proBNP suggestive of fluid overload.  Start furosemide 20 mg IV twice daily  Continuous telemetry monitoring during forced diuresis  Continuous pulse oximetry monitoring  I have ordered an echocardiogram    Acute kidney failure (HCC)  Assessment & Plan  Patient presents with creatinine level of 1.45.  This is elevated compared to her baseline  Likely prerenal in nature  Given IV fluids in the emergency department.  Continue with low dose maintenance fluids  Follow-up urinalysis and urine lites  Holding off on renal ultrasound at this time.  Avoid NSAIDs and nephrotoxic medications  Holding ACE/ARB    4/10/2024  Resolved  Resume low dose losartan tomorrow    Sick sinus syndrome (HCC)- (present on admission)  Assessment & Plan  Patient has pacemaker in place.  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  Patient appears to be at her baseline         VTE prophylaxis:    heparin ppx      I have performed a physical exam and reviewed and updated ROS and Plan today (4/10/2024). In  review of yesterday's note (4/9/2024), there are no changes except as documented above.

## 2024-04-11 NOTE — PROGRESS NOTES
Bedside report received from off going RN/tech: Zaki, assumed care of patient.     Fall Risk Score: LOW RISK  Fall risk interventions in place: Provide patient/family education based on risk assessment, Educate patient/family to call staff for assistance when getting out of bed, Place fall precaution signage outside patient door, Place patient in room close to nursing station, Utilize bed/chair fall alarm, and Bed alarm connected correctly  Bed type: Regular (Sriram Score less than 17 interventions in place)  Patient on cardiac monitor: Yes  IVF/IV medications: Not Applicable   Oxygen: Room Air  Bedside sitter: Not Applicable   Isolation: Not applicable

## 2024-04-11 NOTE — PROGRESS NOTES
Bedside report received from off going RN/tech: rukhsana Noguera care of patient.     Fall Risk Score: LOW RISK  Fall risk interventions in place: Place yellow fall risk ID band on patient, Provide patient/family education based on risk assessment, Educate patient/family to call staff for assistance when getting out of bed, Place fall precaution signage outside patient door, Place patient in room close to nursing station, Utilize bed/chair fall alarm, and Bed alarm connected correctly  Bed type: Regular (Sriram Score less than 17 interventions in place)  Patient on cardiac monitor: Yes  IVF/IV medications: Not Applicable   Oxygen: Room Air  Bedside sitter: Not Applicable   Isolation: Not applicable

## 2024-04-11 NOTE — CARE PLAN
Problem: Pain - Standard  Goal: Alleviation of pain or a reduction in pain to the patient’s comfort goal  Outcome: Progressing     Problem: Knowledge Deficit - Standard  Goal: Patient and family/care givers will demonstrate understanding of plan of care, disease process/condition, diagnostic tests and medications  Outcome: Progressing     Problem: Fall Risk  Goal: Patient will remain free from falls  Outcome: Progressing   The patient is Stable - Low risk of patient condition declining or worsening    Shift Goals  Clinical Goals: Monitor VS and prep for test and procedure; safety from fall  Patient Goals: get better  Family Goals: ut    Progress made toward(s) clinical / shift goals:    Patient remains free from fall, discussed plan of care and patient verbalized understanding, no complains of pain was reported during rounding, awaiting MRI results, call light within reach and bed alarm kept on,/70   Pulse 68   Temp 36.5 °C (97.7 °F) (Temporal)   Resp 18   Ht 1.524 m (5')   Wt 72.6 kg (160 lb 0.9 oz)   SpO2 98%

## 2024-04-11 NOTE — DISCHARGE PLANNING
Case Management Discharge Planning    Admission Date: 4/8/2024  GMLOS: 4.4  ALOS: 3    6-Clicks ADL Score: 19  6-Clicks Mobility Score: 18      Anticipated Discharge Dispo: Discharge Disposition: D/T to home under HHA care in anticipation of covered skilled care (06)    DME Needed: No    Action(s) Taken: Pt discussed in rounds, still pending hand MRI. Home Health referral on hold pending clarification whether patient will be going home with IV Abx.     Escalations Completed: None    Medically Clear: No    Next Steps: F/U with medical team regarding D/C needs/ barriers.     Barriers to Discharge: Medical clearance and Outpatient referrals pending    Is the patient up for discharge tomorrow: No

## 2024-04-12 ENCOUNTER — APPOINTMENT (OUTPATIENT)
Dept: RADIOLOGY | Facility: MEDICAL CENTER | Age: 84
End: 2024-04-12
Attending: INTERNAL MEDICINE
Payer: MEDICARE

## 2024-04-12 ENCOUNTER — APPOINTMENT (OUTPATIENT)
Dept: RADIOLOGY | Facility: MEDICAL CENTER | Age: 84
End: 2024-04-12
Attending: STUDENT IN AN ORGANIZED HEALTH CARE EDUCATION/TRAINING PROGRAM
Payer: MEDICARE

## 2024-04-12 PROBLEM — R09.02 HYPOXIA: Status: RESOLVED | Noted: 2024-04-10 | Resolved: 2024-04-12

## 2024-04-12 PROBLEM — N17.9 ACUTE KIDNEY FAILURE (HCC): Status: RESOLVED | Noted: 2024-04-08 | Resolved: 2024-04-12

## 2024-04-12 LAB
ALBUMIN SERPL BCP-MCNC: 3.6 G/DL (ref 3.2–4.9)
ANION GAP SERPL CALC-SCNC: 15 MMOL/L (ref 7–16)
BASOPHILS # BLD AUTO: 1 % (ref 0–1.8)
BASOPHILS # BLD: 0.04 K/UL (ref 0–0.12)
BUN SERPL-MCNC: 36 MG/DL (ref 8–22)
BUN SERPL-MCNC: 36 MG/DL (ref 8–22)
CALCIUM ALBUM COR SERPL-MCNC: 9 MG/DL (ref 8.5–10.5)
CALCIUM SERPL-MCNC: 8.7 MG/DL (ref 8.5–10.5)
CALCIUM SERPL-MCNC: 9.3 MG/DL (ref 8.5–10.5)
CHLORIDE SERPL-SCNC: 101 MMOL/L (ref 96–112)
CHLORIDE SERPL-SCNC: 99 MMOL/L (ref 96–112)
CO2 SERPL-SCNC: 24 MMOL/L (ref 20–33)
CO2 SERPL-SCNC: 25 MMOL/L (ref 20–33)
CREAT SERPL-MCNC: 1.02 MG/DL (ref 0.5–1.4)
CREAT SERPL-MCNC: 1.14 MG/DL (ref 0.5–1.4)
EOSINOPHIL # BLD AUTO: 0.09 K/UL (ref 0–0.51)
EOSINOPHIL NFR BLD: 2.2 % (ref 0–6.9)
ERYTHROCYTE [DISTWIDTH] IN BLOOD BY AUTOMATED COUNT: 50.5 FL (ref 35.9–50)
GFR SERPLBLD CREATININE-BSD FMLA CKD-EPI: 48 ML/MIN/1.73 M 2
GFR SERPLBLD CREATININE-BSD FMLA CKD-EPI: 55 ML/MIN/1.73 M 2
GLUCOSE SERPL-MCNC: 68 MG/DL (ref 65–99)
GLUCOSE SERPL-MCNC: 77 MG/DL (ref 65–99)
HCT VFR BLD AUTO: 47.3 % (ref 37–47)
HGB BLD-MCNC: 15.4 G/DL (ref 12–16)
IMM GRANULOCYTES # BLD AUTO: 0.05 K/UL (ref 0–0.11)
IMM GRANULOCYTES NFR BLD AUTO: 1.2 % (ref 0–0.9)
LYMPHOCYTES # BLD AUTO: 0.74 K/UL (ref 1–4.8)
LYMPHOCYTES NFR BLD: 18 % (ref 22–41)
MCH RBC QN AUTO: 28.1 PG (ref 27–33)
MCHC RBC AUTO-ENTMCNC: 32.6 G/DL (ref 32.2–35.5)
MCV RBC AUTO: 86.2 FL (ref 81.4–97.8)
MONOCYTES # BLD AUTO: 0.57 K/UL (ref 0–0.85)
MONOCYTES NFR BLD AUTO: 13.8 % (ref 0–13.4)
NEUTROPHILS # BLD AUTO: 2.63 K/UL (ref 1.82–7.42)
NEUTROPHILS NFR BLD: 63.8 % (ref 44–72)
NRBC # BLD AUTO: 0 K/UL
NRBC BLD-RTO: 0 /100 WBC (ref 0–0.2)
PHOSPHATE SERPL-MCNC: 3.1 MG/DL (ref 2.5–4.5)
PLATELET # BLD AUTO: 287 K/UL (ref 164–446)
PMV BLD AUTO: 10.9 FL (ref 9–12.9)
POTASSIUM SERPL-SCNC: 4.1 MMOL/L (ref 3.6–5.5)
POTASSIUM SERPL-SCNC: 4.2 MMOL/L (ref 3.6–5.5)
RBC # BLD AUTO: 5.49 M/UL (ref 4.2–5.4)
SODIUM SERPL-SCNC: 139 MMOL/L (ref 135–145)
SODIUM SERPL-SCNC: 139 MMOL/L (ref 135–145)
WBC # BLD AUTO: 4.1 K/UL (ref 4.8–10.8)

## 2024-04-12 PROCEDURE — 700102 HCHG RX REV CODE 250 W/ 637 OVERRIDE(OP): Performed by: STUDENT IN AN ORGANIZED HEALTH CARE EDUCATION/TRAINING PROGRAM

## 2024-04-12 PROCEDURE — 700105 HCHG RX REV CODE 258: Performed by: STUDENT IN AN ORGANIZED HEALTH CARE EDUCATION/TRAINING PROGRAM

## 2024-04-12 PROCEDURE — A9270 NON-COVERED ITEM OR SERVICE: HCPCS | Performed by: STUDENT IN AN ORGANIZED HEALTH CARE EDUCATION/TRAINING PROGRAM

## 2024-04-12 PROCEDURE — 99233 SBSQ HOSP IP/OBS HIGH 50: CPT | Performed by: STUDENT IN AN ORGANIZED HEALTH CARE EDUCATION/TRAINING PROGRAM

## 2024-04-12 PROCEDURE — 80069 RENAL FUNCTION PANEL: CPT

## 2024-04-12 PROCEDURE — 05HD33Z INSERTION OF INFUSION DEVICE INTO RIGHT CEPHALIC VEIN, PERCUTANEOUS APPROACH: ICD-10-PCS

## 2024-04-12 PROCEDURE — 99223 1ST HOSP IP/OBS HIGH 75: CPT | Performed by: INTERNAL MEDICINE

## 2024-04-12 PROCEDURE — 80048 BASIC METABOLIC PNL TOTAL CA: CPT

## 2024-04-12 PROCEDURE — A9270 NON-COVERED ITEM OR SERVICE: HCPCS

## 2024-04-12 PROCEDURE — 700111 HCHG RX REV CODE 636 W/ 250 OVERRIDE (IP): Mod: JZ | Performed by: INTERNAL MEDICINE

## 2024-04-12 PROCEDURE — 36415 COLL VENOUS BLD VENIPUNCTURE: CPT

## 2024-04-12 PROCEDURE — 700105 HCHG RX REV CODE 258: Performed by: INTERNAL MEDICINE

## 2024-04-12 PROCEDURE — C1751 CATH, INF, PER/CENT/MIDLINE: HCPCS

## 2024-04-12 PROCEDURE — 700111 HCHG RX REV CODE 636 W/ 250 OVERRIDE (IP): Mod: JZ | Performed by: STUDENT IN AN ORGANIZED HEALTH CARE EDUCATION/TRAINING PROGRAM

## 2024-04-12 PROCEDURE — 700102 HCHG RX REV CODE 250 W/ 637 OVERRIDE(OP)

## 2024-04-12 PROCEDURE — 700111 HCHG RX REV CODE 636 W/ 250 OVERRIDE (IP): Performed by: STUDENT IN AN ORGANIZED HEALTH CARE EDUCATION/TRAINING PROGRAM

## 2024-04-12 PROCEDURE — 85025 COMPLETE CBC W/AUTO DIFF WBC: CPT

## 2024-04-12 PROCEDURE — 770006 HCHG ROOM/CARE - MED/SURG/GYN SEMI*

## 2024-04-12 RX ORDER — LOSARTAN POTASSIUM 25 MG/1
25 TABLET ORAL DAILY
Status: SHIPPED
Start: 2024-04-13 | End: 2024-04-13

## 2024-04-12 RX ORDER — DOXYCYCLINE 100 MG/1
100 CAPSULE ORAL 2 TIMES DAILY
Status: ACTIVE | DISCHARGE
Start: 2024-05-06 | End: 2024-04-26

## 2024-04-12 RX ADMIN — DAPTOMYCIN 600 MG: 500 INJECTION, POWDER, LYOPHILIZED, FOR SOLUTION INTRAVENOUS at 11:57

## 2024-04-12 RX ADMIN — VERAPAMIL HYDROCHLORIDE 240 MG: 240 TABLET, FILM COATED, EXTENDED RELEASE ORAL at 05:25

## 2024-04-12 RX ADMIN — POLYETHYLENE GLYCOL 3350 1 PACKET: 17 POWDER, FOR SOLUTION ORAL at 05:25

## 2024-04-12 RX ADMIN — FUROSEMIDE 20 MG: 10 INJECTION INTRAMUSCULAR; INTRAVENOUS at 16:23

## 2024-04-12 RX ADMIN — ASPIRIN 81 MG: 81 TABLET, COATED ORAL at 05:25

## 2024-04-12 RX ADMIN — HEPARIN SODIUM 5000 UNITS: 5000 INJECTION, SOLUTION INTRAVENOUS; SUBCUTANEOUS at 05:25

## 2024-04-12 RX ADMIN — AMPICILLIN AND SULBACTAM 3 G: 1; 2 INJECTION, POWDER, FOR SOLUTION INTRAMUSCULAR; INTRAVENOUS at 05:25

## 2024-04-12 RX ADMIN — HEPARIN SODIUM 5000 UNITS: 5000 INJECTION, SOLUTION INTRAVENOUS; SUBCUTANEOUS at 14:08

## 2024-04-12 RX ADMIN — HEPARIN SODIUM 5000 UNITS: 5000 INJECTION, SOLUTION INTRAVENOUS; SUBCUTANEOUS at 21:33

## 2024-04-12 RX ADMIN — AMPICILLIN AND SULBACTAM 3 G: 1; 2 INJECTION, POWDER, FOR SOLUTION INTRAMUSCULAR; INTRAVENOUS at 00:01

## 2024-04-12 RX ADMIN — EZETIMIBE 10 MG: 10 TABLET ORAL at 17:03

## 2024-04-12 RX ADMIN — LOSARTAN POTASSIUM 25 MG: 25 TABLET, FILM COATED ORAL at 05:25

## 2024-04-12 RX ADMIN — FUROSEMIDE 20 MG: 10 INJECTION INTRAMUSCULAR; INTRAVENOUS at 05:24

## 2024-04-12 ASSESSMENT — ENCOUNTER SYMPTOMS
PALPITATIONS: 0
ABDOMINAL PAIN: 0
CHILLS: 0
SHORTNESS OF BREATH: 0
COUGH: 0
HEADACHES: 0
VOMITING: 0
FEVER: 0
MYALGIAS: 0
NAUSEA: 0
DIZZINESS: 0

## 2024-04-12 ASSESSMENT — FIBROSIS 4 INDEX: FIB4 SCORE: 1.88

## 2024-04-12 ASSESSMENT — PAIN DESCRIPTION - PAIN TYPE
TYPE: ACUTE PAIN
TYPE: ACUTE PAIN

## 2024-04-12 NOTE — DISCHARGE PLANNING
Case Management Discharge Planning    Admission Date: 4/8/2024  GMLOS: 4.4  ALOS: 4    6-Clicks ADL Score: 19  6-Clicks Mobility Score: 18      Anticipated Discharge Dispo: Discharge Disposition: D/T to SNF with Medicare cert in anticipation of skilled care (03)    DME Needed: No    Action(s) Taken: Choice obtained Patient discussed in rounds, patient will need 6 weeks of IV Dapto per ID recommendations.     RN CM met with patient at bedside to discuss discharge plan. Pt is agreeable to SNF placement for 6 wks of IV ABX. Pt choose [facility] and they can accept patient tomorrow at noon.     Patient is pending PICC line placement. [facility] # [id number].    Escalations Completed: None    Medically Clear: No    Next Steps: F/U with medical team regarding D/C needs/ barriers.     Barriers to Discharge: Pending Placement    Is the patient up for discharge tomorrow: No

## 2024-04-12 NOTE — DISCHARGE PLANNING
TCN following. HTH/SCP chart reviewed. No new TCN needs identified. Please see prior TCN note from 4/11 for most recent discharge planning considerations if indicated. Anticipate discharge home with home health, however discharge consideration / location are pending medical work up as patient may need IV ABX post discharge.    Completed:  PT/OT recommends Home Health on 4/9/24.   Choice obtained:  Infusion ([facility] for possible IV ABX), HH ([facility]) & DME ([facility])  ([facility] referral sent out per CM protocol)  SCP with [facility] PCP.  Patient states she wants to schedule her own PCP F/U appointment.

## 2024-04-12 NOTE — PROCEDURES
Vascular Access Team    Date of Insertion: 4/12/24  Arm Circumference: 32  Internal length: 12  External Length: 0  Vein Occupancy %: 37  Reason for Midline: abx  Labs: WBC 4.1 , , BUN 36, Cr 1.02, GFR 55 , INR na    Orders confirmed, vessel patency confirmed with ultrasound. Risks and benefits of procedure explained to patient and education regarding line associated bloodstream infections provided. Questions answered.     Power Midline placed in RUE per licensed provider order with ultrasound guidance. 4  Fr, single lumen Power Midline placed in cephalic vein after 1 attempt(s). 2 mL of 1% lidocaine injected intradermally, 21 gauge microintroducer needle was visualized entering the vein and modified Seldinger technique used. 12 cm catheter inserted with good blood return. Secured at 0 cm marker. Internal positioning stylet removed and verified to be intact. Each lumen flushed without resistance with 10 mL 0.9% normal saline. Midline secured with Biopatch and Tegaderm.     Midline placement is confirmed by nurse using ultrasound and ability to flush and draw blood. Midline is appropriate for use at this time.  Patient tolerated procedure well, without complications.  No X-ray is needed for placement confirmation.  Patient condition relayed to unit RN or ordering physician via this post procedure note in the EMR.     Ultrasound images uploaded to PACS and viewable in the EMR - yes  Ultrasound imaged printed and placed in paper chart - no     BARD Power Midline ref # [id number], Lot # [id number], Expiration Date 7/31/25

## 2024-04-12 NOTE — CARE PLAN
Problem: Pain - Standard  Goal: Alleviation of pain or a reduction in pain to the patient’s comfort goal  Outcome: Progressing     Problem: Knowledge Deficit - Standard  Goal: Patient and family/care givers will demonstrate understanding of plan of care, disease process/condition, diagnostic tests and medications  Outcome: Progressing     Problem: Fall Risk  Goal: Patient will remain free from falls  Outcome: Progressing   The patient is Stable - Low risk of patient condition declining or worsening    Shift Goals  Clinical Goals: Monitor VS and safe ambulation  Patient Goals: walk  Family Goals: ut    Progress made toward(s) clinical / shift goals:   patient ambulated in the hallway and tolerated well, had a bowel movement today, call light within reacha nd bed alarm kept on,/68   Pulse 63   Temp 36.1 °C (97 °F) (Temporal)   Resp 18   Ht 1.524 m (5')   Wt 72.8 kg (160 lb 7.9 oz)   SpO2 96%

## 2024-04-12 NOTE — PROGRESS NOTES
Bedside report received from off going RN/tech: SINAN, assumed care of patient.     Fall Risk Score: LOW RISK  Fall risk interventions in place: Provide patient/family education based on risk assessment, Educate patient/family to call staff for assistance when getting out of bed, Place fall precaution signage outside patient door, Place patient in room close to nursing station, Utilize bed/chair fall alarm, and Bed alarm connected correctly  Bed type: Regular (Sriram Score less than 17 interventions in place)  Patient on cardiac monitor: Yes  IVF/IV medications: Not Applicable   Oxygen: Room Air  Bedside sitter: Not Applicable   Isolation: Not applicable

## 2024-04-12 NOTE — DISCHARGE PLANNING
-1345  DPA spoke with Cinthia at [facility] regarding pt acceptance. Facility can accept pt with [facility], but cannot take her today because their nurse leaves at 1700. Facility can take pt tomorrow, DPA/CM will need to contact facility when discharge date is confirmed.     -1500  Per Cinthia at [facility], facility can accept pt tomorrow 04/13 but the earliest they can take her is 1200. Follow up with facility tomorrow morning to confirm a transport time with them.

## 2024-04-12 NOTE — PROGRESS NOTES
[facility] Medicine Daily Progress Note    Date of Service  4/11/2024    Chief Complaint  Irina Traylor is a 83 y.o. female admitted 4/8/2024 with left thumb pain.    Hospital Course  Irina Traylor is a 83 y.o. female who presented 4/8/2024 with left first digit pain.  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  Patient states that she is experiencing pain over the last 6 months.  She is unsure about how the pain started.  However she does note on and off purulent drainage.  Patient states that she went to the [facility] express clinic today, and she was told that she had an infection down to the bone.  She was subsequently instructed to come to the emergency department.  Imaging was obtained and was concerning for osteomyelitis.  Case discussed with orthopedic surgeon on-call, who recommends continue with IV antibiotics.  No need for emergent surgery at this time.     Interval Problem Update  4/10/2024  Patient was seen and examined on the telemetry floor.  Continues on continuous cardiac monitoring.  On 2 LPM oxygen by nasal cannula.  Hypoxic down to 85% on room air.  NT-proBNP elevated at 1117.  Starting furosemide 20 mg IV twice daily.  I have ordered an echocardiogram since last echocardiogram was in 2022.  MRSA is negative so Zyvox has been discontinued.  Continues on Unasyn.  Complaining of left lower extremity pain and edema.  Left lower extremity ultrasound negative for DVT.  Edema noted.  Patient is awaiting MRI of the left hand.  MRI technician with awaiting pacemaker representative clearance.  Left thumb pain is improving.    4/11/2024  Patient was seen and examined on the telemetry floor.  Continues on continuous cardiac monitoring.  Continues on furosemide 20 mg IV twice daily.  Weaning down to room air.  Continues to have 1+ pitting  edema.  MRI of the left hand showing left thumb osteomyelitis.  I have consulted infectious diseases.  Left thumb pain continue to improve.  Continuing on Unasyn.    I have discussed this patient's plan of care and discharge plan at IDT rounds today with Case Management, Nursing, Nursing leadership, and other members of the IDT team.    Consultants/Specialty      Code Status  Full Code    Disposition  The patient is not medically cleared for discharge to home or a post-acute facility.  Anticipate discharge to: home with close outpatient follow-up    I have placed the appropriate orders for post-discharge needs.    Review of Systems  Review of Systems   Constitutional:  Negative for chills and fever.   Respiratory:  Negative for cough and shortness of breath.    Cardiovascular:  Positive for leg swelling. Negative for chest pain and palpitations.   Gastrointestinal:  Negative for abdominal pain, nausea and vomiting.   Musculoskeletal:  Negative for joint pain and myalgias.   Neurological:  Negative for dizziness and headaches.        Physical Exam  Temp:  [36.5 °C (97.7 °F)-36.9 °C (98.4 °F)] 36.6 °C (97.9 °F)  Pulse:  [57-73] 69  Resp:  [17-20] 17  BP: (120-148)/(56-86) 147/86  SpO2:  [90 %-99 %] 93 %    Physical Exam  Vitals and nursing note reviewed.   Constitutional:       Appearance: Normal appearance. She is normal weight. She is not ill-appearing or diaphoretic.   HENT:      Head: Normocephalic and atraumatic.      Mouth/Throat:      Mouth: Mucous membranes are moist.      Pharynx: Oropharynx is clear. No oropharyngeal exudate.   Eyes:      General:         Right eye: No discharge.         Left eye: No discharge.      Conjunctiva/sclera: Conjunctivae normal.      Pupils: Pupils are equal, round, and reactive to light.   Cardiovascular:      Rate and Rhythm: Normal rate and regular rhythm.      Pulses: Normal pulses.      Heart sounds: Normal heart sounds. No murmur heard.  Pulmonary:      Effort: Pulmonary  effort is normal. No respiratory distress.      Breath sounds: Normal breath sounds.   Abdominal:      General: Abdomen is flat. Bowel sounds are normal. There is no distension.      Palpations: Abdomen is soft.      Tenderness: There is no abdominal tenderness.   Musculoskeletal:         General: Tenderness present.      Cervical back: Neck supple. No tenderness.      Right lower le+ Pitting Edema present.      Left lower le+ Pitting Edema present.      Comments: Left thumb with discoloration, blackened fingernail   Neurological:      Mental Status: She is alert and oriented to person, place, and time.      Motor: No weakness.   Psychiatric:         Thought Content: Thought content normal.         Judgment: Judgment normal.         Fluids    Intake/Output Summary (Last 24 hours) at 2024 1731  Last data filed at 2024 0730  Gross per 24 hour   Intake 240 ml   Output 2200 ml   Net -1960 ml       Laboratory  Recent Labs     24  0620   WBC 4.2*   RBC 5.14   HEMOGLOBIN 14.4   HEMATOCRIT 45.1   MCV 87.7   MCH 28.0   MCHC 31.9*   RDW 53.7*   PLATELETCT 262   MPV 11.0     Recent Labs     24  0620 24  0048   SODIUM 142 139   POTASSIUM 4.2 4.3   CHLORIDE 108 104   CO2 22 24   GLUCOSE 81 78   BUN 54* 36*   CREATININE 0.82 0.89   CALCIUM 8.4* 8.9                   Imaging  MR-HAND - WITH & W/O LEFT   Final Result      1.  Marrow edema and abnormal enhancement of the first distal phalanx consistent with osteomyelitis. There is surrounding cellulitis.      2.  Multifocal osteoarthritis of the carpus.      EC-ECHOCARDIOGRAM COMPLETE W/O CONT   Final Result      US-EXTREMITY VENOUS LOWER UNILAT LEFT   Final Result      CT-HAND WITH PLUS RECONS LEFT   Final Result      1.  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  No abscess      3.  Cannot accurately assess for nor rule out osteomyelitis with CT         DX-FINGER(S) 2+ LEFT   Final Result      1.  Cortical  erosion of the thumb distal phalangeal tuft concerning for acute osteomyelitis.   2.  Scattered degenerative changes, greatest at the base of the thumb and the index finger PIP joint.   3.  There is some collapse of the carpal joints.   4.  Osteopenia.      DX-CHEST-PORTABLE (1 VIEW)   Final Result      1.  Cardiomegaly. No overt failure or pneumonia.           Assessment/Plan  * Osteomyelitis (HCC)- (present on admission)  Assessment & Plan  Xray of left hand: Cortical erosion of the thumb distal phalangeal tuft concerning for acute osteomyelitis. Scattered degenerative changes, greatest at the base of the thumb and the index finger PIP joint.  Orthopedic surgery consulted  Recommending continue with IV antibiotics  Started patient on IV Unasyn and vancomycin  Orthopedic surgery to evaluate the patient.  No need for emergent surgery.  Keep patient n.p.o. at midnight    4/10/2024  Patient is awaiting MRI of the left hand.  MRI technician with awaiting pacemaker representative clearance.  MRSA is negative so Zyvox has been discontinued.  Continues on Unasyn.    4/11/2024  MRI of the left hand showing osteomyelitis of the left thumb.  I have consulted infectious diseases.  Continuing Unasyn.  Left thumb plain improving    Elevated brain natriuretic peptide (BNP) level- (present on admission)  Assessment & Plan  4/10/2024  NT-proBNP elevated at 1117.  Start furosemide 20 mg subcu twice daily  Have ordered echocardiogram for further evaluation.  Last echocardiogram was in 2022.    4/11/2024  Echo:  CONCLUSIONS  Compared to the prior study on 6/21/22, there is worsening aortic   regurgitation.  Normal left ventricular systolic function.   Structurally normal mitral valve without significant stenosis or   regurgitation.  Moderate posterior mitral leaflet calcification.  Moderate aortic insufficiency    Continue furosemide 20 mg IV twice daily  Continuous cardiac monitoring during forced diuresis    Hypoxia- (present on  admission)  Assessment & Plan  4/10/2024  SpO2 of 85% on room air.  Elevated NT proBNP suggestive of fluid overload.  Start furosemide 20 mg IV twice daily  Continuous telemetry monitoring during forced diuresis  Continuous pulse oximetry monitoring  I have ordered an echocardiogram    4/11/2024  Weaned down to room air with forced diuresis  Continue furosemide 20 mg IV twice daily    Acute kidney failure (HCC)  Assessment & Plan  Patient presents with creatinine level of 1.45.  This is elevated compared to her baseline  Likely prerenal in nature  Given IV fluids in the emergency department.  Continue with low dose maintenance fluids  Follow-up urinalysis and urine lites  Holding off on renal ultrasound at this time.  Avoid NSAIDs and nephrotoxic medications  Holding ACE/ARB    4/10/2024  Resolved  Resume low dose losartan tomorrow    4/11/2024  Starting losartan 25 mg daily    Primary hypertension- (present on admission)  Assessment & Plan  4/11/2024  Starting to increase.  Start losartan 25 mg daily  Continue furosemide 20 mg IV twice daily    Sick sinus syndrome (HCC)- (present on admission)  Assessment & Plan  Patient has pacemaker in place.  Follow by cardiology    PVD (peripheral vascular disease) ([facility])- (present on admission)  Assessment & Plan  As per history.    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  Patient appears to be at her baseline         VTE prophylaxis:    heparin ppx      I have performed a physical exam and reviewed and updated ROS and Plan today (4/11/2024). In review of yesterday's note (4/10/2024), there are no changes except as documented above.

## 2024-04-12 NOTE — ASSESSMENT & PLAN NOTE
4/11/2024  Starting to increase.  Start losartan 25 mg daily  Continue furosemide 20 mg IV twice daily    4/12/2024  Blood pressure goal less than 150 systolic.  Currently controlled.  Continue losartan 25 mg daily  Discontinue IV furosemide

## 2024-04-12 NOTE — PROGRESS NOTES
Bedside report received from off going RN/tech: [email or web address] assumed care of patient.     Fall Risk Score: LOW RISK  Fall risk interventions in place: Place yellow fall risk ID band on patient, Provide patient/family education based on risk assessment, Educate patient/family to call staff for assistance when getting out of bed, Place fall precaution signage outside patient door, Place patient in room close to nursing station, Utilize bed/chair fall alarm, Notify charge of high risk for huddle, and Bed alarm connected correctly  Bed type: Regular (Sriram Score less than 17 interventions in place)  Patient on cardiac monitor: Yes  IVF/IV medications: Not Applicable   Oxygen: Room Air  Bedside sitter: Not Applicable   Isolation: Not applicable

## 2024-04-12 NOTE — CARE PLAN
The patient is Watcher - Medium risk of patient condition declining or worsening    Shift Goals  Clinical Goals: safety, abx  Patient Goals: rest  Family Goals: ut    Progress made toward(s) clinical / shift goals:    Problem: Pain - Standard  Goal: Alleviation of pain or a reduction in pain to the patient’s comfort goal  Outcome: Progressing     Problem: Knowledge Deficit - Standard  Goal: Patient and family/care givers will demonstrate understanding of plan of care, disease process/condition, diagnostic tests and medications  Outcome: Progressing     Problem: Fall Risk  Goal: Patient will remain free from falls  Outcome: Progressing       Patient is not progressing towards the following goals:

## 2024-04-12 NOTE — CONSULTS
"INFECTIOUS DISEASES INPATIENT CONSULT NOTE     Date of Service:4/12/24    Consult Requested By: Blanco Rm M.D.    Reason for Consultation: thumb osteo    History of Present Illness:   Irina Traylor is a 83 y.o. female admitted 4/8/2024 for above X 6 months  Ortho consulted and recommending antibiotics-no surgery  From admit \"known history of multiple CVAs in the past, sick sinus syndrome status post pacemaker placement, hypertension, who presents to the emergency department complaining of left first digit pain. Patient states that she is experiencing pain over the last 6 months. She is unsure about how the pain started. However she does note on and off purulent drainage. Patient states that she went to the [facility] express clinic today, and she was told that she had an infection down to the bone. She was subsequently instructed to come to the emergency department. Imaging was obtained and was concerning for osteomyelitis. Case discussed with orthopedic surgeon on-call, who recommends continue with IV antibiotics. \"  On vanco then Zyvox then Unasyn-thumb improved  Infectious Diseases consulted for antibiotic recommendation and management  Afebrile no leukocytosis      Review Of Systems:  All other systems are reviewed and are negative     PMH:   Past Medical History:   Diagnosis Date    Anesthesia     States feels like she can't breathe with induction for right hip surgery, gets nausea vomiting with most pain meds.  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    Post-nasal drip     Restless leg syndrome     4/25/17-Resolved, states was related to right hip and none " S/P replacement.    Sick sinus syndrome (Formerly [facility]) 6/29/2022    Sinus arrest 6/29/2022    Stroke (Formerly [facility]) 2/1993, 8/1993    Mtqapnre-smwjny-wnjkwfoaur aphasia,mouth forming words, reading, numbers    Walker as ambulation aid          PSH:  Past Surgical History:   Procedure Laterality Date    PB RECONSTR TOTAL SHOULDER IMPLANT Left 3/21/2019    Procedure: ARTHROPLASTY, SHOULDER, TOTAL-  REVISION REVERSE;  Surgeon: Yoshi Gamble M.D.;  Location: [facility];  Service: Orthopedics    ORIF, FRACTURE, HUMERUS Left 3/21/2019    Procedure: ORIF, FRACTURE, HUMERUS;  Surgeon: Yoshi Gamble M.D.;  Location: [facility];  Service: Orthopedics    KNEE ARTHROPLASTY TOTAL Left 2/12/2018    Procedure: KNEE ARTHROPLASTY TOTAL;  Surgeon: Earle Jacobsen M.D.;  Location: [facility];  Service: Orthopedics    CERVICAL FUSION POSTERIOR  8/8/2017    Procedure: CERVICAL FUSION POSTERIOR- C3-5, INSTRUMENTED;  Surgeon: Florentino Pike M.D.;  Location: [facility];  Service:     CERVICAL LAMINECTOMY POSTERIOR  8/8/2017    Procedure: CERVICAL LAMINECTOMY POSTERIOR- C3-5;  Surgeon: Florentino Pike M.D.;  Location: [facility];  Service:     HIP ARTHROPLASTY TOTAL Left 5/8/2017    Procedure: HIP ARTHROPLASTY TOTAL;  Surgeon: Earle Jacobsen M.D.;  Location: [facility];  Service:     HIP ARTHROPLASTY TOTAL Right 12/12/2016    Procedure: HIP ARTHROPLASTY TOTAL;  Surgeon: Earle Jacobsen M.D.;  Location: [facility];  Service:     FUSION, SPINE, LUMBAR, PLIF  9/26/2016    Procedure: LUMBAR FUSION POSTERIOR L2-S1 onlay ;  Surgeon: Florentino Pike M.D.;  Location: [facility];  Service:     LUMBAR LAMINECTOMY DISKECTOMY N/A 9/26/2016    Procedure: LUMBAR LAMINECTOMY DISKECTOMY L2-S1;  Surgeon: Florentino Pike M.D.;  Location: [facility];  Service:     COLONOSCOPY  2/2016    Every 10 years    SHOULDER ARTHROPLASTY  TOTAL Left 8/11/2015    Procedure: SHOULDER ARTHROPLASTY REVERSE TOTAL;  Surgeon: Yoshi Gamble M.D.;  Location: SURGERY [facility];  Service:     KNEE ARTHROPLASTY TOTAL  6/23/2014    Performed by Earle Jacobsen M.D. at SURGERY [facility]    HAMMERTOE CORRECTION Left 11/12/2007    Removed 3rd toe    LUMBAR LAMINECTOMY DISKECTOMY  1993    Lumbar    CARPAL TUNNEL RELEASE Bilateral 1993    CERVICAL DISK AND FUSION ANTERIOR  1993    X2    HYSTERECTOMY, TOTAL ABDOMINAL  1977    BUNIONECTOMY Bilateral 1980's    HAMMERTOE CORRECTION Right 1980's    Removed 3rd toe    ORIF, FINGER Left as child    Index       FAMILY HX:  Family History   Problem Relation Age of Onset    Cancer Mother         vaginal    Stroke Father     Hypertension Brother     Diabetes Brother     Hypertension Brother     Hypertension Brother     Stroke Daughter        SOCIAL HX:  Social History     Socioeconomic History    Marital status: Single     Spouse name: Not on file    Number of children: Not on file    Years of education: Not on file    Highest education level: Not on file   Occupational History    Not on file   Tobacco Use    Smoking status: Never    Smokeless tobacco: Never   Vaping Use    Vaping Use: Never used   Substance and Sexual Activity    Alcohol use: Not Currently    Drug use: No    Sexual activity: Not Currently   Other Topics Concern    Not on file   Social History Narrative    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  She is currently driving without any accidents.  She does use a walker to ambulate.  Not having any falls.  Does not have hearing aids but is having hearing concerns.  Some increased aphasia especially since her history of CVAs and memory issues.  March 2019 she was hospitalized from her fracture of her left arm and did have home health which she no longer has right now.  She does not have family here but she does have neighbors who help her out.     Social Determinants of Health     Financial  "Resource Strain: Not on file   Food Insecurity: Not on file   Transportation Needs: Not on file   Physical Activity: Not on file   Stress: Not on file   Social Connections: Not on file   Intimate Partner Violence: Not on file   Housing Stability: Not on file     Social History     Tobacco Use   Smoking Status Never   Smokeless Tobacco Never     Social History     Substance and Sexual Activity   Alcohol Use Not Currently       Allergies/Intolerances:  Allergies   Allergen Reactions    Benicar [Olmesartan] Rash     Blisters    Tolerated losartan 4/12/24.    Dilaudid  [Hydromorphone Hcl] Vomiting    Lidocaine     Norco  [Apap-Fd&C Blue #1-Hydrocodone] Vomiting    Other Misc Shortness of Breath     Anesthesia with surgery 2016  Pt couldn't breathe while being put under and tapped side of bed to let RN know. \"It was the scariest feeling and I want everyone to know\"      Percocet  [Apap-Fd&C Red #40 [place]-Oxycodone] Vomiting    Vicodin  [Apap-Fd&C Yellow #10 [place]-Hydrocodone] Vomiting    Codeine Vomiting    Demerol Vomiting    Hydrocodone Vomiting    Other Drug Vomiting     \"all pain meds\" per pt can take morphine.    Oxycodone Vomiting    Statins [Hmg-Coa-R Inhibitors] Unspecified     Muscle cramping    Valium Vomiting    Amlodipine Swelling    Diazepam     Nexletol [Bempedoic Acid] Palpitations     Chest pain and dizziness    Simvastatin Myalgia and Unspecified     Headache, fatigue    Hydrochlorothiazide      Frequent urination         Other Current Medications:    Current Facility-Administered Medications:     DAPTOmycin (Cubicin) 600 mg in NS 50 mL IVPB, 8 mg/kg, Intravenous, Q24HRS, Katie Rodriguez M.D., Last Rate: 100 mL/hr at 04/12/24 1157, 600 mg at 04/12/24 1157    furosemide (Lasix) injection 20 mg, 20 mg, Intravenous, BID DIURETIC, Blanco Rm M.D., 20 mg at 04/12/24 0524    losartan (Cozaar) tablet 25 mg, 25 mg, Oral, Q DAY, Blanco Rm M.D., 25 mg at 04/12/24 0525    polyethylene glycol/lytes " (Miralax) Packet 1 Packet, 1 Packet, Oral, DAILY, Reji Sampson P.A.-C., 1 Packet at 24 0525    heparin injection 5,000 Units, 5,000 Units, Subcutaneous, Q8HRS, Prasanna Coffman M.D., 5,000 Units at 24 0525    acetaminophen (Tylenol) tablet 650 mg, 650 mg, Oral, Q6HRS PRN, Prasanna Coffman M.D.    ondansetron (Zofran) syringe/vial injection 4 mg, 4 mg, Intravenous, Q4HRS PRN, Prasanna Coffman M.D.    ondansetron (Zofran ODT) dispertab 4 mg, 4 mg, Oral, Q4HRS PRN, Prasanna Coffman M.D., 4 mg at 04/10/24 1032    hydrALAZINE (Apresoline) injection 10 mg, 10 mg, Intravenous, Q4HRS PRN, Prasanna Coffman M.D.    aspirin EC tablet 81 mg, 81 mg, Oral, DAILY, Prasanna Coffman M.D., 81 mg at 24 0525    ezetimibe (Zetia) tablet 10 mg, 10 mg, Oral, Q EVENING, Prasanna Coffman M.D., 10 mg at 24 1721    verapamil ER (Calan SR) tablet 240 mg, 240 mg, Oral, DAILY, Prasanna Coffman M.D., 240 mg at 24 0525      Most Recent Vital Signs:  /68   Pulse 63   Temp 36.1 °C (97 °F) (Temporal)   Resp 18   Ht 1.524 m (5')   Wt 72.8 kg (160 lb 7.9 oz)   SpO2 96%   BMI 31.34 kg/m²   Temp  Av.7 °C (98 °F)  Min: 36.1 °C (97 °F)  Max: 37.2 °C (99 °F)    Physical Exam:  General: Elderly and frail no acute distress  HEENT: NCAT, PERRLA, sclera anicteric  Neck: supple, no lymphadenopathy  Chest: CTAB, unlabored.  Cardiac: RRR  Abdomen: + bowel sounds, soft, non-tender, non-distended  Extremities: No cyanosis, clubbing. no edema, 2+ pulses  Left thumb resolving edema/erythe,a-no drainage tender to touch thickened and discolored thumbnail ext arthritic changes  Neuro: Alert and oriented times 3, Speech fluent CN intact VARGAS  Psych: Mood normal Affect normal    Pertinent Lab Results:  Recent Labs     24  0015   WBC 4.1*      Recent Labs     24  0015   HEMOGLOBIN 15.4   HEMATOCRIT 47.3*   MCV 86.2   MCH 28.1   PLATELETCT 287         Recent Labs     24  0048 24  0015   SODIUM 139 139    POTASSIUM 4.3 4.1   CHLORIDE 104 101   CO2 24 24   CREATININE 0.89 1.02        Recent Labs     04/11/24  0048 04/12/24  0015   ALBUMIN 3.8 3.6        Pertinent Micro:  Results       Procedure Component Value Units Date/Time    Urine Culture (New) [[id number]] Collected: 04/08/24 2137    Order Status: Completed Specimen: Urine Updated: 04/11/24 1128     Significant Indicator NEG     Source UR     Site -     Culture Result No growth at 48 hours.    Narrative:      Indication for culture:->Emergency Room Patient    Blood Culture - Draw one from central line and one from peripheral site [[id number]] Collected: 04/08/24 1745    Order Status: Completed Specimen: Blood from Peripheral Updated: 04/09/24 0732     Significant Indicator NEG     Source BLD     Site PERIPHERAL     Culture Result No Growth  Note: Blood cultures are incubated for 5 days and  are monitored continuously.Positive blood cultures  are called to the RN and reported as soon as  they are identified.      Narrative:      Right Forearm/Arm    Blood Culture - Draw one from central line and one from peripheral site [[id number]] Collected: 04/08/24 1715    Order Status: Completed Specimen: Blood from Line Updated: 04/09/24 0732     Significant Indicator NEG     Source BLD     Site Periphera     Culture Result No Growth  Note: Blood cultures are incubated for 5 days and  are monitored continuously.Positive blood cultures  are called to the RN and reported as soon as  they are identified.      Narrative:      No site indicated    URINALYSIS [[id number]]  (Abnormal) Collected: 04/08/24 2137    Order Status: Completed Specimen: Urine, Clean Catch Updated: 04/08/24 2312     Color Yellow     Character Clear     Specific Gravity 1.020     Ph 6.0     Glucose Negative mg/dL      Ketones Negative mg/dL      Protein 30 mg/dL      Bilirubin Negative     Urobilinogen, Urine 0.2     Nitrite Negative     Leukocyte Esterase Negative     Occult Blood Negative     Micro Urine Req  "Microscopic    Urinalysis [[id number]]  (Abnormal) Collected: 04/08/24 2137    Order Status: Completed Specimen: Urine Updated: 04/08/24 2305     Color Yellow     Character Clear     Specific Gravity 1.020     Ph 6.0     Glucose Negative mg/dL      Ketones Negative mg/dL      Protein 30 mg/dL      Bilirubin Negative     Urobilinogen, Urine 0.2     Nitrite Negative     Leukocyte Esterase Negative     Occult Blood Negative     Micro Urine Req Microscopic    MRSA By PCR (Amp) [[id number]] Collected: 04/08/24 1959    Order Status: Completed Specimen: Respirate from Nares Updated: 04/08/24 2217     MRSA by PCR Negative          No results found for: \"BLOODCULTU\", \"BLDCULT\", \"BCHOLD\"     Studies:  IMPRESSION:     1.  Marrow edema and abnormal enhancement of the first distal phalanx consistent with osteomyelitis. There is surrounding cellulitis.     2.  Multifocal osteoarthritis of the carpus.  IMPRESSION:   Chronic infection left thumb-from her description sounds like paronychia/felon that progressed. Ongoing for 6 months with purulent drainage from under thumbnail. Pain, swelling, and drainage have improved every time she gets antibiotics but recur about a week after stopping.  States no cultures have been done  MRI +osteo  Onchomycosis  Arthritis    PLAN:   Improving on antibiotics  Most likely pathogen is staph aureus given history  Advised 6 weeks of IV antibiotics will not cure chronic osteomyelitis, especially in the absence of debridement of any necrotic tissues/bone that will serve as a continued nidus of infection.    There is potential need for surgery after IV antibiotics, risk for Cdiff colitis and PICC/midline line  Other option of surgical cure OM by amputation   High risk for antibiotic failure and need for debridement and/or amputation    Midline yes  Stop date IV daptoycin 600 mg daily 5/5/24 then follow with 2 weeks oral doxycycline 100 mg PO BID  Weekly labs    FU ID clinic 2 weeks after discharge  OK to " see Yasmani FATIMA  Placement    Plan of care discussed with SHIVANI Rm M.D.. Will continue to follow    Katie Rodriguez M.D.

## 2024-04-13 VITALS
HEIGHT: 60 IN | DIASTOLIC BLOOD PRESSURE: 62 MMHG | RESPIRATION RATE: 18 BRPM | SYSTOLIC BLOOD PRESSURE: 137 MMHG | HEART RATE: 71 BPM | OXYGEN SATURATION: 93 % | WEIGHT: 160.5 LBS | TEMPERATURE: 98.5 F | BODY MASS INDEX: 31.51 KG/M2

## 2024-04-13 PROBLEM — I50.33 ACUTE ON CHRONIC DIASTOLIC HEART FAILURE (HCC): Status: ACTIVE | Noted: 2024-04-13

## 2024-04-13 PROBLEM — M19.042 PRIMARY OSTEOARTHRITIS OF BOTH HANDS: Status: ACTIVE | Noted: 2024-04-13

## 2024-04-13 PROBLEM — R60.0 BILATERAL LOWER EXTREMITY EDEMA: Status: ACTIVE | Noted: 2024-04-13

## 2024-04-13 PROBLEM — K42.9 UMBILICAL HERNIA WITHOUT OBSTRUCTION AND WITHOUT GANGRENE: Status: ACTIVE | Noted: 2024-04-13

## 2024-04-13 PROBLEM — M19.041 PRIMARY OSTEOARTHRITIS OF BOTH HANDS: Status: ACTIVE | Noted: 2024-04-13

## 2024-04-13 PROBLEM — R60.0 BILATERAL LOWER EXTREMITY EDEMA: Status: RESOLVED | Noted: 2024-04-13 | Resolved: 2024-04-13

## 2024-04-13 PROBLEM — I50.33 ACUTE ON CHRONIC DIASTOLIC HEART FAILURE (HCC): Status: RESOLVED | Noted: 2024-04-13 | Resolved: 2024-04-13

## 2024-04-13 LAB
BACTERIA BLD CULT: NORMAL
BACTERIA BLD CULT: NORMAL
CK SERPL-CCNC: 30 U/L (ref 0–154)
SIGNIFICANT IND 70042: NORMAL
SIGNIFICANT IND 70042: NORMAL
SITE SITE: NORMAL
SITE SITE: NORMAL
SOURCE SOURCE: NORMAL
SOURCE SOURCE: NORMAL

## 2024-04-13 PROCEDURE — 36415 COLL VENOUS BLD VENIPUNCTURE: CPT

## 2024-04-13 PROCEDURE — A9270 NON-COVERED ITEM OR SERVICE: HCPCS | Performed by: STUDENT IN AN ORGANIZED HEALTH CARE EDUCATION/TRAINING PROGRAM

## 2024-04-13 PROCEDURE — 700105 HCHG RX REV CODE 258: Performed by: INTERNAL MEDICINE

## 2024-04-13 PROCEDURE — 700102 HCHG RX REV CODE 250 W/ 637 OVERRIDE(OP): Performed by: STUDENT IN AN ORGANIZED HEALTH CARE EDUCATION/TRAINING PROGRAM

## 2024-04-13 PROCEDURE — 700111 HCHG RX REV CODE 636 W/ 250 OVERRIDE (IP): Performed by: STUDENT IN AN ORGANIZED HEALTH CARE EDUCATION/TRAINING PROGRAM

## 2024-04-13 PROCEDURE — 82550 ASSAY OF CK (CPK): CPT

## 2024-04-13 PROCEDURE — 99233 SBSQ HOSP IP/OBS HIGH 50: CPT | Performed by: INTERNAL MEDICINE

## 2024-04-13 PROCEDURE — 99239 HOSP IP/OBS DSCHRG MGMT >30: CPT | Performed by: STUDENT IN AN ORGANIZED HEALTH CARE EDUCATION/TRAINING PROGRAM

## 2024-04-13 PROCEDURE — 700111 HCHG RX REV CODE 636 W/ 250 OVERRIDE (IP): Mod: JZ | Performed by: INTERNAL MEDICINE

## 2024-04-13 RX ORDER — LOSARTAN POTASSIUM 25 MG/1
50 TABLET ORAL DAILY
Status: SHIPPED
Start: 2024-04-13 | End: 2024-04-13

## 2024-04-13 RX ORDER — FUROSEMIDE 20 MG/1
20 TABLET ORAL
Status: SHIPPED
Start: 2024-04-13 | End: 2024-04-26

## 2024-04-13 RX ORDER — LOSARTAN POTASSIUM 25 MG/1
25 TABLET ORAL DAILY
Status: SHIPPED
Start: 2024-04-13 | End: 2024-04-26

## 2024-04-13 RX ADMIN — VERAPAMIL HYDROCHLORIDE 240 MG: 240 TABLET, FILM COATED, EXTENDED RELEASE ORAL at 05:16

## 2024-04-13 RX ADMIN — DAPTOMYCIN 600 MG: 500 INJECTION, POWDER, LYOPHILIZED, FOR SOLUTION INTRAVENOUS at 05:23

## 2024-04-13 RX ADMIN — ASPIRIN 81 MG: 81 TABLET, COATED ORAL at 05:09

## 2024-04-13 RX ADMIN — HEPARIN SODIUM 5000 UNITS: 5000 INJECTION, SOLUTION INTRAVENOUS; SUBCUTANEOUS at 05:09

## 2024-04-13 RX ADMIN — LOSARTAN POTASSIUM 25 MG: 25 TABLET, FILM COATED ORAL at 05:09

## 2024-04-13 ASSESSMENT — ENCOUNTER SYMPTOMS: FEVER: 0

## 2024-04-13 ASSESSMENT — PAIN DESCRIPTION - PAIN TYPE
TYPE: ACUTE PAIN

## 2024-04-13 NOTE — PROGRESS NOTES
Report given to RN at [facility]. Pt transport coming at 1300. Will leave with midline in RUE.

## 2024-04-13 NOTE — DISCHARGE SUMMARY
Discharge Summary    CHIEF COMPLAINT ON ADMISSION  Chief Complaint   Patient presents with    Digit Pain     Left thumb pain x6 months. Patient sent from [facility]. Patient states she was told she has a bone infection and was told to come to the ED for further evaluation. 10/10 pain.        Reason for Admission  Concern for left thumb osteomyelitis    Admission Date  4/8/2024     CODE STATUS  Full Code    HPI & HOSPITAL COURSE  Irina Traylor is a 83 y.o. female who presented 4/8/2024 with left first digit pain.  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  The patient had been progressing over the last 6 months.  She was unsure about how the pain started.  However, she did note on and off purulent drainage.  Patient stated that she went to the [facility] express clinic on the day of her presentation, and she was told that she had an infection down to the bone.  She was subsequently instructed to come to the emergency department.  Imaging was obtained and was concerning for osteomyelitis.  Case discussed with orthopedic surgeon on-call, who recommends continue with IV antibiotics.  No need for emergent surgery at this time.     Patient was admitted to the telemetry floor for further care and management.  Patient became hypoxic down to 85% on room air requiring 2 LPM oxygen by nasal cannula.  NT proBNP was elevated at 1117 and had 1+ pitting edema.  She was subsequently started on intravenous furosemide 20 mg IV twice daily.  Echocardiogram was performed, which showed ejection fraction of 55% with grade 2 diastolic dysfunction.  Patient was able to wean off oxygen with IV diuresis.    MRI of the left hand revealed marrow edema and abnormal enhancement of the first digital phalanx consistent with osteomyelitis.  Also present was surrounding cellulitis.  Multifocal  arthritis of the carpus.  There were no cultures available since no drainage was present on presentation.    Infectious disease (Dr. Katie Rodriguez) was consulted and recommended intravenous daptomycin 600 mg daily with end date of 5/5/2024 followed by 2-weeks of oral doxycycline 100 mg by mouth twice daily with weekly labs.  Recommended outpatient follow-up with infectious disease clinic in 2 weeks.  KUSHAL Humphrey.    Patient was evaluated by occupational and physical therapy, recommended home health services.  However, due to ongoing need for intravenous antibiotics, patient was accepted to [facility] nursing [facility].    Prior to discharge, patient started to complain of worsening pain in the left proximal joints including the metacarpal joint, likely related to patient's osteoarthritis.  She was started on diclofenac gel for her joint pain.    During the course of patient's hospitalization, patient is at restart and was transition to losartan with adequate blood pressure control with systolic blood pressures in the 130s to 140s.    Therefore, she is discharged in good and stable condition to skilled nursing facility.    The patient met 2-midnight criteria for an inpatient stay at the time of discharge.    Discharge Date  4/13/2024     FOLLOW UP ITEMS POST DISCHARGE  -As per [facility] skilled nursing and rehabilitation.  -Follow-up with primary care provider upon discharge from skilled nursing facility.  -Follow-up with [facility] in 2 weeks.      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)    PVD (peripheral vascular disease) (HCC) (POA: Yes)    Sick sinus syndrome (HCC) (Chronic) (POA: Yes)    Primary hypertension (POA: Yes)    Elevated brain natriuretic peptide (BNP) level (POA: Yes)    Umbilical hernia without obstruction and without gangrene (POA: Yes)    Primary osteoarthritis of both hands  (POA: Yes)  Resolved Problems:    Acute on chronic diastolic heart failure (HCC) (POA: Yes)    Acute kidney failure (HCC) (POA: Yes)    Hypoxia (POA: Yes)    Bilateral lower extremity edema (POA: Yes)      FOLLOW UP  Future Appointments   Date Time Provider Department Center   4/23/2024  1:00 PM NAYAN Go [facility] None   4/30/2024  1:50 PM NAYAN Blanca [facility] None   4/30/2024  4:30 PM [facility]     CAROLE Longo[place]ENN[place]  [address]  [place] [id number] [phone]    Schedule an appointment as soon as possible for a visit in 1 week(s)  For hospitalization follow-up    [facility]SING [facility]  [address] [place] [address]  [phone]        [facility]  [address]  [place] [address]  [phone]  Follow up in 2 week(s)        MEDICATIONS ON DISCHARGE     Medication List        START taking these medications        Instructions   diclofenac sodium 1 % Gel  Commonly known as: Voltaren   Apply 2 g topically 4 times a day as needed (To hand joints for pain).  Dose: 2 g     doxycycline 100 MG capsule  Start taking on: May 6, 2024  Commonly known as: Monodox   Take 1 Capsule by mouth 2 times a day.  Dose: 100 mg     furosemide 20 MG Tabs  Commonly known as: Lasix   Take 1 Tablet by mouth 1 time a day as needed (leg swelling).  Dose: 20 mg     losartan 25 MG Tabs  Commonly known as: Cozaar   Take 1 Tablet by mouth every day.  Dose: 25 mg     NS SOLN 50 mL with DAPTOmycin 500 MG SOLR 580 mg   Doctor's comments: Stop date IV daptoycin 600 mg daily 5/5/24 then follow with 2 weeks oral doxycycline 100 mg PO BID  Weekly labs  Infuse 580 mg into a venous catheter every 24 hours for 22 days.  Dose: 8 mg/kg            CHANGE how you take these medications        Instructions   fluticasone 50 MCG/ACT nasal spray  What changed: See the new instructions.  Commonly known as: Flonase   ADMINISTER 2  "SPRAYS INTO AFFECTED NOSTRILS EVERY DAY     loratadine 10 MG Tabs  What changed:   how much to take  how to take this  when to take this  reasons to take this  Commonly known as: Claritin   TAKE ONE TABLET BY MOUTH DAILY AS NEEDED FOR ALLERGY.            CONTINUE taking these medications        Instructions   ASPIRIN 81 PO   Take 81 mg by mouth every day.  Dose: 81 mg     azelastine 137 MCG/SPRAY nasal spray  Commonly known as: Astelin   Administer 1 Spray into affected nostril(S) 2 times a day.  Dose: 1 Spray     B COMPLEX PO   Take 1 Tablet by mouth every day.  Dose: 1 Tablet     ezetimibe 10 MG Tabs  Commonly known as: Zetia   TAKE ONE TABLET BY MOUTH EVERY DAY  Dose: 10 mg     fish oil 1000 MG Caps capsule   Take 1,000 mg by mouth every evening.  Dose: 1,000 mg     KRILL OIL PO   Take 1 Capsule by mouth every day.  Dose: 1 Capsule     Magnesium 400 MG Tabs   Take 400 mg by mouth every day.  Dose: 400 mg     NON SPECIFIED   Take 1 Tablet by mouth as needed. Unknown Stool softener  Dose: 1 Tablet     PREVAGEN EXTRA STRENGTH PO   Take 1 Tablet by mouth every day.  Dose: 1 Tablet     verapamil  MG Tbcr  Commonly known as: Calan SR   Take 1 Tablet by mouth every day.  Dose: 240 mg     Vitamin C CR 1000 MG Tbcr   Take 1 Tablet by mouth 2 times a day.  Dose: 1 Tablet     vitamin E 180 MG (400 UNIT) Caps   Take 180 mg by mouth every day.  Dose: 180 mg            STOP taking these medications      irbesartan 300 MG Tabs  Commonly known as: Avapro              Allergies  Allergies   Allergen Reactions    Benicar [Olmesartan] Rash     Blisters    Tolerated losartan 4/12/24.    Dilaudid  [Hydromorphone Hcl] Vomiting    Lidocaine     Norco  [Apap-Fd&C Blue #1-Hydrocodone] Vomiting    Other Misc Shortness of Breath     Anesthesia with surgery 2016  Pt couldn't breathe while being put under and tapped side of bed to let RN know. \"It was the scariest feeling and I want everyone to know\"      Percocet  [Apap-Fd&C Red #40 " "[place]-Oxycodone] Vomiting    Vicodin  [Apap-Fd&C Yellow #10 [place]-Hydrocodone] Vomiting    Codeine Vomiting    Demerol Vomiting    Hydrocodone Vomiting    Other Drug Vomiting     \"all pain meds\" per pt can take morphine.    Oxycodone Vomiting    Statins [Hmg-Coa-R Inhibitors] Unspecified     Muscle cramping    Valium Vomiting    Amlodipine Swelling    Diazepam     Nexletol [Bempedoic Acid] Palpitations     Chest pain and dizziness    Simvastatin Myalgia and Unspecified     Headache, fatigue    Hydrochlorothiazide      Frequent urination       DIET  Orders Placed This Encounter   Procedures    Diet Order Diet: Regular     Standing Status:   Standing     Number of Occurrences:   1     Order Specific Question:   Diet:     Answer:   Regular [1]       ACTIVITY  As tolerated and directed by skilled nursing.  Weight bearing as tolerated    LINES, DRAINS, AND WOUNDS  This is an automated list. Peripheral IVs will be removed prior to discharge.  Midline IV 04/12/24 Anterior;Right Upper arm (Active)   Site Assessment Clean;Dry;Intact 04/12/24 2142   Dressing Type Biopatch;Occlusive;Securing device;Skin barrier;Transparent 04/12/24 2142   Line Status Blood return noted;Flushed;Saline locked 04/12/24 2142   Dressing Status Clean;Dry;Intact 04/12/24 2142   Dressing Intervention N/A 04/12/24 2142   Dressing Change Due 04/13/24 04/12/24 2142   Infiltration Grading ([facility]) 0 04/12/24 2142   Phlebitis Scale ([facility] Only) 0 04/12/24 2142       Peripheral IV 04/11/24 20 G Anterior;Distal;Left Forearm (Active)   Site Assessment Clean;Dry;Intact 04/12/24 2142   Dressing Type Transparent 04/12/24 2142   Line Status Scrubbed the hub prior to access;Flushed;Saline locked 04/12/24 2142   Dressing Status Clean;Dry;Intact 04/12/24 2142   Dressing Intervention N/A 04/12/24 2142   Dressing Change Due 04/18/24 04/12/24 2142   Infiltration Grading ([facility]) 0 04/12/24 2142   Phlebitis Scale ([facility] Only) 0 04/12/24 2142        "   Peripheral IV 04/11/24 20 G Anterior;Distal;Left Forearm (Active)   Site Assessment Clean;Dry;Intact 04/12/24 2142   Dressing Type Transparent 04/12/24 2142   Line Status Scrubbed the hub prior to access;Flushed;Saline locked 04/12/24 2142   Dressing Status Clean;Dry;Intact 04/12/24 2142   Dressing Intervention N/A 04/12/24 2142   Dressing Change Due 04/18/24 04/12/24 2142   Infiltration Grading ([facility]) 0 04/12/24 2142   Phlebitis Scale ([facility] Only) 0 04/12/24 2142               MENTAL STATUS ON TRANSFER  Alert and oriented x 3.  Appropriately conversational.       CONSULTATIONS  Infectious diseases    PROCEDURES  None    LABORATORY  Lab Results   Component Value Date    SODIUM 139 04/12/2024    POTASSIUM 4.2 04/12/2024    CHLORIDE 99 04/12/2024    CO2 25 04/12/2024    GLUCOSE 77 04/12/2024    BUN 36 (H) 04/12/2024    CREATININE 1.14 04/12/2024        Lab Results   Component Value Date    WBC 4.1 (L) 04/12/2024    HEMOGLOBIN 15.4 04/12/2024    HEMATOCRIT 47.3 (H) 04/12/2024    PLATELETCT 287 04/12/2024        Total time of the discharge process 36 minutes.

## 2024-04-13 NOTE — DISCHARGE PLANNING
TCN following. HTH/SCP chart reviewed. Initially anticipated discharge home with home health, however discharge consideration / location were pending medical work up given possible need for IV ABX post discharge. Noted per AM review, this appears to be the case and per review, plan is now for dc to [facility] today for continued IV ABX. Noted per review, [facility] has accepted and transport set. However noted auth for SNF has not yet been obtained and TCN reached out to [facility] team and obtained auth for dc to [facility] today. Also noted transport was set through [employer] and [facility] is not contracted through this company. Alerted CM and note transport has now been changed to T. At time of writing this note, pt has dc'd to [facility].      Completed:  PT/OT recommends Home Health on 4/9/24.   Choice obtained:  Infusion ([facility] for possible IV ABX), HH ([facility]) & DME ([facility])  ([place] referral sent out per CM protocol); see above  SCP with [facility].  Patient states she wants to schedule her own PCP F/U appointment.

## 2024-04-13 NOTE — PROGRESS NOTES
Report received from ED RN and assumed patient care. Patient is A&Ox 4, on room air, and bed alarm on.  Patient declines pain. Call light within reach and bed in lowest position. Reinforced the need to call for assistance. Plan of care discussed and patient does not have any further needs at this time.

## 2024-04-13 NOTE — CARE PLAN
The patient is Watcher - Medium risk of patient condition declining or worsening    Shift Goals  Clinical Goals: monitor VS and ambulate  Patient Goals: Sleep comfortably  Family Goals: MAGDY    Progress made toward(s) clinical / shift goals:    Patient was able to sleep comfortably for 6 hours over night.     Patient is not progressing towards the following goals:

## 2024-04-13 NOTE — PROGRESS NOTES
Infectious Disease Progress Note    Author: Katie Rodriguez M.D. Date & Time of service: 2024  11:56 AM    Chief Complaint:  Distal thumb osteo, left  Dystrophic nail    Interval History:  83 y.o. female admitted 2024 for above X 6 months   AF planned for SNF today-hops asked me to re-eval thumb due to reported c/o increased pain  She states site of infection much better-no pain today.  Has pain at base of thumb-not new. She has medication at home that she takes for it  Labs Reviewed, Medications Reviewed, and Wound Reviewed.    Review of Systems:  Review of Systems   Constitutional:  Negative for fever.   Musculoskeletal:  Positive for joint pain.   All other systems reviewed and are negative.      Hemodynamics:  Temp (24hrs), Av.7 °C (98.1 °F), Min:36.6 °C (97.8 °F), Max:36.9 °C (98.5 °F)  Temperature: 36.9 °C (98.5 °F)  Pulse  Av.7  Min: 55  Max: 80   Blood Pressure : 137/62       Physical Exam:  Physical Exam  Vitals and nursing note reviewed.   Constitutional:       General: She is not in acute distress.     Appearance: She is not ill-appearing, toxic-appearing or diaphoretic.   HENT:      Mouth/Throat:      Pharynx: No oropharyngeal exudate.   Eyes:      General: No scleral icterus.     Extraocular Movements: Extraocular movements intact.      Pupils: Pupils are equal, round, and reactive to light.   Cardiovascular:      Rate and Rhythm: Normal rate.   Pulmonary:      Effort: Pulmonary effort is normal. No respiratory distress.      Breath sounds: No stridor.   Abdominal:      General: There is no distension.      Palpations: Abdomen is soft.   Musculoskeletal:         General: Deformity present.      Cervical back: No rigidity.      Comments: Erythema and swelling distal thumb nearly resolved  Thickened and discolored nail  No drainage  1st MCP with severe arthritic changes-no erythema or swelling    RUE midline   Skin:     Coloration: Skin is not jaundiced.      Findings: Bruising  present.   Neurological:      General: No focal deficit present.      Mental Status: She is alert and oriented to person, place, and time.   Psychiatric:         Mood and Affect: Mood normal.         Behavior: Behavior normal.         Meds:    Current Facility-Administered Medications:     diclofenac sodium    DAPTOmycin    losartan    polyethylene glycol/lytes    heparin    acetaminophen    ondansetron    ondansetron    hydrALAZINE    aspirin    ezetimibe    verapamil ER    Labs:  Recent Labs     04/12/24  0015   WBC 4.1*   RBC 5.49*   HEMOGLOBIN 15.4   HEMATOCRIT 47.3*   MCV 86.2   MCH 28.1   RDW 50.5*   PLATELETCT 287   MPV 10.9   NEUTSPOLYS 63.80   LYMPHOCYTES 18.00*   MONOCYTES 13.80*   EOSINOPHILS 2.20   BASOPHILS 1.00     Recent Labs     04/11/24  0048 04/12/24  0015 04/12/24  1840 04/13/24  0623   SODIUM 139 139 139  --    POTASSIUM 4.3 4.1 4.2  --    CHLORIDE 104 101 99  --    CO2 24 24 25  --    GLUCOSE 78 68 77  --    BUN 36* 36* 36*  --    CPKTOTAL  --   --   --  30     Recent Labs     04/11/24  0048 04/12/24  0015 04/12/24  1840   ALBUMIN 3.8 3.6  --    CREATININE 0.89 1.02 1.14       Imaging:  IR-MIDLINE CATHETER INSERTION WO GUIDANCE > AGE 3    Result Date: 4/12/2024  HISTORY/REASON FOR EXAM:  Midline Placement   TECHNIQUE/EXAM DESCRIPTION AND NUMBER OF VIEWS: Midline insertion with ultrasound guidance.  FINDINGS: Midline insertion with Ultrasound Guidance was performed by qualified nursing staff without the assistance of a Radiologist. Midline positioning as measured by RN or as appropriate length of catheter selected.              Ultrasound-guided midline placement performed by qualified nursing staff as above.     MR-HAND - WITH & W/O LEFT    Result Date: 4/11/2024 4/11/2024 2:36 PM HISTORY/REASON FOR EXAM:  Left thumb infection. TECHNIQUE/EXAM DESCRIPTION: MRI of the LEFT hand and wrist without and with contrast. The study was performed on a Tamara 1.5 Yesenia MRI scanner. T1 and T2  fat-suppressed precontrast followed by T1 fat suppressed postcontrast images were obtained of the hand. 15mL mL ProHance contrast was administered intravenously. COMPARISON:  None. FINDINGS: Examination is limited by failure of fat saturation in the area of the thumb and some areas of signal loss. Osseous structures/cartilaginous surfaces: There is marrow edema and enhancement involving the first distal phalanx. No other evidence of abnormal marrow signal. There is multifocal osteoarthritis of the carpus to include the first through fifth carpometacarpal articulations and the intercarpal articulations. Soft tissues: There is edema and enhancement of the soft tissues of the thumb distally consistent with cellulitis. No soft tissue abscess.     1.  Marrow edema and abnormal enhancement of the first distal phalanx consistent with osteomyelitis. There is surrounding cellulitis. 2.  Multifocal osteoarthritis of the carpus.    EC-ECHOCARDIOGRAM COMPLETE W/O CONT    Result Date: 2024  Transthoracic Echo Report Echocardiography Laboratory CONCLUSIONS Compared to the prior study on 22, there is worsening aortic regurgitation. Normal left ventricular systolic function. Structurally normal mitral valve without significant stenosis or regurgitation.  Moderate posterior mitral leaflet calcification. Moderate aortic insufficiency. JACEK VELASQUEZ Exam Date:         2024                    08:25 Exam Location:     Inpatient Priority:          Routine Ordering Physician:        DAYTON SULLIVAN Referring Physician:       [id number] Gavin Sonographer:               Torri BUTTERFIELD Age:    83     Gender:    F MRN:    [id number] :    1940 BSA:    1.7    Ht (in):    60     Wt (lb):    161 Exam Type:     Complete Indications:     Shortness of breath ICD Codes:       786.05 CPT Codes:       63720 BP:   130    /   69     HR:   60 Technical Quality:       Fair MEASUREMENTS  (Male / Female) Normal Values 2D ECHO LV  Diastolic Diameter PLAX        5.2 cm                4.2 - 5.9 / 3.9 - 5.3 cm LV Systolic Diameter PLAX         3.5 cm                2.1 - 4.0 cm IVS Diastolic Thickness           1.3 cm                LVPW Diastolic Thickness          1.3 cm                LVOT Diameter                     2 cm                  Estimated LV Ejection Fraction    55 %                  LV Ejection Fraction MOD BP       57.2 %                >= 55  % LV Ejection Fraction MOD 4C       57.8 %                LV Ejection Fraction MOD 2C       57.9 %                IVC Diameter                      1.7 cm                DOPPLER AV Peak Velocity                  2.1 m/s               AV Peak Gradient                  17.1 mmHg             AV Mean Gradient                  8 mmHg                AI Pressure Half Time             476 ms                LVOT Peak Velocity                1.3 m/s               AV Area Cont Eq vti               2 cm2                 MV Velocity Time Integral         51.2 cm               Mitral E Point Velocity           1.2 m/s               Mitral E to A Ratio               0.86                  MV Pressure Half Time             76 ms                 MV Area PHT                       2.9 cm2               MV Deceleration Time              262 ms                PV Peak Velocity                  1 m/s                 PV Peak Gradient                  4.1 mmHg              RVOT Peak Velocity                0.75 m/s              MV E' Velocity                    6.4 cm/s              * Indicates values subject to auto-interpretation LV EF:  55    % FINDINGS Left Ventricle The left ventricle is normal in size.Mild concentric left ventricular hypertrophy. Normal left ventricular systolic function. The left ventricular ejection fraction is visually estimated to be 55%. The ejection fraction is measured to be 57 % by Ty's biplane. Grade II diastolic dysfunction. Right Ventricle The right ventricle is not well  visualized. Right Atrium The right atrium is normal in size. Normal inferior vena cava size and inspiratory collapse. Left Atrium Moderate to severely dilated  left atrium. Left atrial volume index is 48  mL/sq m. Mitral Valve Structurally normal mitral valve without significant stenosis or regurgitation.  Moderate posterior mitral leaflet calcification. Aortic Valve Structurally normal aortic valve without significant stenosis or regurgitation.  Mild aortic leaflet and annular calcification. Moderate aortic insufficiency. Tricuspid Valve Structurally normal tricuspid valve without significant stenosis.Trace tricuspid regurgitation. Unable to estimate pulmonary artery pressure due to an inadequate tricuspid regurgitant jet. Pulmonic Valve Structurally normal pulmonic valve without significant stenosis .mild pulmonic insufficiency. Pericardium No pericardial effusion. Aorta Normal aortic root for body surface area. The ascending aorta diameter is 3.6 cm. Taty SEO To (Electronically Signed) Final Date:     2024                 11:28    DX-HAND 3+ LEFT    Result Date: 2024  4 views of left hand including PA, oblique, lateral PA of thumb show decreased joint spaces and degenerative changes throughout phalanx and wrist with presumable osteomyelitis in the distal phalanx of thumb with soft tissue swelling around the thumb no obvious fractures or dislocations noted    US-EXTREMITY VENOUS LOWER UNILAT LEFT    Result Date: 4/10/2024   Vascular Laboratory  CONCLUSIONS  No evidence of DVT in the submitted images.  EVA JACEK  Exam Date:     04/10/2024 10:04  Room #:     Inpatient  Priority:     Routine  Ht (in):             Wt (lb):  Ordering Physician:        DAYTON SULLIVAN  Referring Physician:       [id number]  Sonographer:               Mera Carl RVELIEZER  Study Type:                Complete Unilateral  Technical Quality:         Adequate  Age:    83    Gender:     F  MRN:    [id number]  :     1940      BSA:  Indications:     Pain in left leg, Pain in left lower leg  CPT Codes:       13543  ICD Codes:       M79.605  M79.662  History:         Pain in left calf with pressure.  Limitations:  PROCEDURES:  Left lower extremity venous duplex imaging.  The following venous structures were evaluated: common femoral, deep  femoral, proximal great saphenous, femoral, popliteal, peroneal, and  posterior tibial veins.  Serial compression, color, and spectral Doppler flow evaluations were  performed.  FINDINGS:  Left lower extremity.  The peroneal and posterior tibial veins are difficult to assess for  compressibility, but flow response to augmentation is demonstrated.  All other veins demonstrate complete color filling and compressibility with  normal venous flow dynamics including spontaneous flow and respiratory  phasicity.  No evidence of deep venous thrombosis.  Flow was evaluated in the contralateral common femoral vein and normal  venous flow dynamics including spontaneous flow and respiratory phasic  variation were demonstrated.  Florentino Diallo MD  (Electronically Signed)  Final Date:      10 April 2024                   11:34    CT-HAND WITH PLUS RECONS LEFT    Result Date: 4/8/2024 4/8/2024 9:40 PM HISTORY/REASON FOR EXAM:  DIGIT PAIN; osteo. Left thumb pain, infection, evaluate for osteomyelitis TECHNIQUE/ EXAM DESCRIPTION AND NUMBER OF VIEWS: CT scan of the LEFT hand with contrast, with reconstructions. Thin-section noncontrast helical images were obtained from the distal radius/ulna through the proximal metacarpals. Coronal and sagittal reconstructions were generated from the axial images. A total of 80 mL of Omnipaque 350 nonionic contrast was administered IV without complication. Up to date radiation dose reduction adjustments have been utilized to meet ALARA standards for radiation dose reduction. COMPARISON:  None. FINDINGS: Imaging assessment is very limited as the hand was scanned at the  patient's side and there is motion and large field of view. There is osteoarthritis at the radiocarpal joint and 1st carpal metacarpal joint. Whether there is osteomyelitis cannot be accurately assessed nor excluded with CT There is no fracture. Alignment is normal. No degenerative changes are present.     1.  Osteoarthritis at the radiocarpal joint and 1st carpal metacarpal joint 2.  No abscess 3.  Cannot accurately assess for nor rule out osteomyelitis with CT     DX-FINGER(S) 2+ LEFT    Result Date: 4/8/2024 4/8/2024 5:20 PM HISTORY/REASON FOR EXAM:  Atraumatic Pain/Swelling/Deformity. TECHNIQUE/EXAM DESCRIPTION AND NUMBER OF VIEWS:  3 views of the  LEFT thumb. COMPARISON: None FINDINGS: MINERALIZATION: Decreased. INJURY: Cortical erosion of the thumb distal phalangeal tuft. JOINTS: Scattered degenerative changes, greatest at the base of the thumb and the index finger PIP joint. There is some collapse of the carpal joints.     1.  Cortical erosion of the thumb distal phalangeal tuft concerning for acute osteomyelitis. 2.  Scattered degenerative changes, greatest at the base of the thumb and the index finger PIP joint. 3.  There is some collapse of the carpal joints. 4.  Osteopenia.    DX-CHEST-PORTABLE (1 VIEW)    Result Date: 4/8/2024 4/8/2024 4:56 PM HISTORY/REASON FOR EXAM:  Sepsis; sepsis TECHNIQUE/EXAM DESCRIPTION AND NUMBER OF VIEWS: Single portable view of the chest. COMPARISON: 7/13/2022 FINDINGS: Hardware is stably positioned in its visualized extent. HEART: Enlarged. There is atherosclerotic calcification in the aortic arch. LUNGS: No areas of air space disease are demonstrated. PLEURA: No effusion or pneumothorax. Left femur hardware.     1.  Cardiomegaly. No overt failure or pneumonia.      Micro:  Results       Procedure Component Value Units Date/Time    Urine Culture (New) [[id number]] Collected: 04/08/24 2137    Order Status: Completed Specimen: Urine Updated: 04/11/24 1128     Significant  Indicator NEG     Source UR     Site -     Culture Result No growth at 48 hours.    Narrative:      Indication for culture:->Emergency Room Patient    Blood Culture - Draw one from central line and one from peripheral site [[id number]] Collected: 04/08/24 1745    Order Status: Completed Specimen: Blood from Peripheral Updated: 04/09/24 0732     Significant Indicator NEG     Source BLD     Site PERIPHERAL     Culture Result No Growth  Note: Blood cultures are incubated for 5 days and  are monitored continuously.Positive blood cultures  are called to the RN and reported as soon as  they are identified.      Narrative:      Right Forearm/Arm    Blood Culture - Draw one from central line and one from peripheral site [[id number]] Collected: 04/08/24 1715    Order Status: Completed Specimen: Blood from Line Updated: 04/09/24 0732     Significant Indicator NEG     Source BLD     Site Periphera     Culture Result No Growth  Note: Blood cultures are incubated for 5 days and  are monitored continuously.Positive blood cultures  are called to the RN and reported as soon as  they are identified.      Narrative:      No site indicated    URINALYSIS [[id number]]  (Abnormal) Collected: 04/08/24 2137    Order Status: Completed Specimen: Urine, Clean Catch Updated: 04/08/24 2312     Color Yellow     Character Clear     Specific Gravity 1.020     Ph 6.0     Glucose Negative mg/dL      Ketones Negative mg/dL      Protein 30 mg/dL      Bilirubin Negative     Urobilinogen, Urine 0.2     Nitrite Negative     Leukocyte Esterase Negative     Occult Blood Negative     Micro Urine Req Microscopic    Urinalysis [[id number]]  (Abnormal) Collected: 04/08/24 2137    Order Status: Completed Specimen: Urine Updated: 04/08/24 2305     Color Yellow     Character Clear     Specific Gravity 1.020     Ph 6.0     Glucose Negative mg/dL      Ketones Negative mg/dL      Protein 30 mg/dL      Bilirubin Negative     Urobilinogen, Urine 0.2     Nitrite Negative      Leukocyte Esterase Negative     Occult Blood Negative     Micro Urine Req Microscopic    MRSA By PCR (Amp) [[id number]] Collected: 04/08/24 1959    Order Status: Completed Specimen: Respirate from Nares Updated: 04/08/24 2217     MRSA by PCR Negative            Assessment:  Active Hospital Problems    Diagnosis     *Osteomyelitis (HCC) [M86.9]     Umbilical hernia without obstruction and without gangrene [K42.9]     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 Ongoing for 6 months with purulent drainage from under thumbnail. Pain, swelling, and drainage have improved every time she gets antibiotics but recur about a week after stopping.  States no cultures have been done  MRI +osteo  Onchomycosis  Arthritis     PLAN:   Continues to improve on antibiotics-no pain distal thumb today  Most likely pathogen is staph aureus given history  Advised 6 weeks of IV antibiotics will not cure chronic osteomyelitis, especially in the absence of debridement of any necrotic tissues/bone that will serve as a continued nidus of infection.    There is potential need for surgery after IV antibiotics, risk for Cdiff colitis and PICC/midline line  Other option of surgical cure OM by amputation   High risk for antibiotic failure and need for debridement and/or amputation     Midline yes  Stop date IV daptoycin 600 mg daily 5/5/24 then follow with 2 weeks oral doxycycline 100 mg PO BID  Weekly labs: CPK, BMP   Continue usual meds for arthritis pain    FU ID clinic 2 weeks after discharge  OK to see Yasmani APRN  Placement     Plan of care discussed with SHIVANI Rm M.D.

## 2024-04-13 NOTE — DISCHARGE PLANNING
Spoke with Cinthia at [facility], she said she needs the d/c summary to place order for dapto. If it is not finalized in time, pt will have to wait for bed until Monday per Cinthia.   Let CHRIS SAM know.

## 2024-04-13 NOTE — PROGRESS NOTES
Gave report CHRIS Hodge from [facility]. All questions answered. Transport set up. All belongings sent to patient.

## 2024-04-13 NOTE — DISCHARGE PLANNING
Requested by CHRIS SAM to set up transport to [facility] today at 1245 by  padmini Bravo.  Confirmed with Cinthia @ [facility], transport time.     Spoke with Martha Bravo to set up transport.     1026  Changed transport to [facility] due to insurance contract and req of CHRIS SAM.   Spoke padmini Harry to set up transport at 1245 by . Reservation #[id number].  $176.90 cost.  Called Martha Bravo to cancel.

## 2024-04-13 NOTE — DISCHARGE PLANNING
Case Management Discharge Planning    Admission Date: 4/8/2024  GMLOS: 4.4  ALOS: 5    6-Clicks ADL Score: 19  6-Clicks Mobility Score: 18      Anticipated Discharge Dispo: Discharge Disposition: D/T to SNF with Medicare cert in anticipation of skilled care (03)    DME Needed: No    Action(s) Taken: Updated Provider/Nurse on Discharge Plan, Patient Conference, Transport Arranged , and Family Conference    Escalations Completed: None    Medically Clear: Yes    Barriers to Discharge: None    CM spoke with Cinthia at [facility] to notify her that transportation has been arranged for 1300 today going to the facility. Patient and her brother Eddie to update them on the transport time. Care team updated via Voalte.

## 2024-04-13 NOTE — PROGRESS NOTES
4 Eyes Skin Assessment Completed by CHRIS Hodge and CHRIS Chu.    Head WDL  Ears WDL  Nose WDL  Mouth WDL  Neck WDL  Breast/Chest WDL  Shoulder Blades WDL  Spine WDL  (R) Arm/Elbow/Hand WDL  (L) Arm/Elbow/Hand WDL  Abdomen WDL  Groin pink  Scrotum/Coccyx/Buttocks Redness and Blanching (left buttock)  (R) Leg WDL  (L) Leg WDL  (R) Heel/Foot/Toe callous under 2nd digit/toe  (L) Heel/Foot/Toe WDL          Devices In Places       Interventions In Place Pillows, Q2 Turns, Heels Loaded W/Pillows, and Pressure Redistribution Mattress    Possible Skin Injury No    Pictures Uploaded Into Epic N/A  Wound Consult Placed N/A  RN Wound Prevention Protocol Ordered No

## 2024-04-13 NOTE — PROGRESS NOTES
[facility] Medicine Daily Progress Note    Date of Service  4/12/2024    Chief Complaint  Irina Traylor is a 83 y.o. female admitted 4/8/2024 with left thumb pain.    Hospital Course  Irina Traylor is a 83 y.o. female who presented 4/8/2024 with left first digit pain.  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  Patient states that she is experiencing pain over the last 6 months.  She is unsure about how the pain started.  However she does note on and off purulent drainage.  Patient states that she went to the [facility] express clinic today, and she was told that she had an infection down to the bone.  She was subsequently instructed to come to the emergency department.  Imaging was obtained and was concerning for osteomyelitis.  Case discussed with orthopedic surgeon on-call, who recommends continue with IV antibiotics.  No need for emergent surgery at this time.     Interval Problem Update  4/10/2024  Patient was seen and examined on the telemetry floor.  Continues on continuous cardiac monitoring.  On 2 LPM oxygen by nasal cannula.  Hypoxic down to 85% on room air.  NT-proBNP elevated at 1117.  Starting furosemide 20 mg IV twice daily.  I have ordered an echocardiogram since last echocardiogram was in 2022.  MRSA is negative so Zyvox has been discontinued.  Continues on Unasyn.  Complaining of left lower extremity pain and edema.  Left lower extremity ultrasound negative for DVT.  Edema noted.  Patient is awaiting MRI of the left hand.  MRI technician with awaiting pacemaker representative clearance.  Left thumb pain is improving.    4/11/2024  Patient was seen and examined on the telemetry floor.  Continues on continuous cardiac monitoring.  Continues on furosemide 20 mg IV twice daily.  Weaning down to room air.  Continues to have 1+ pitting  edema.  MRI of the left hand showing left thumb osteomyelitis.  I have consulted infectious diseases.  Left thumb pain continue to improve.  Continuing on Unasyn.    4/12/2024  Discussed with Dr. Rodriguez of infectious diseases.  Recommending 6-week course of IV daptomycin.  Midline placed.  Patient is agreeable to going to a skilled nursing facility.  She has been accepted by [facility] nursing [facility].  Shortness of breath and lower extremity edema has improved.  Discontinuing IV furosemide    I have discussed this patient's plan of care and discharge plan at IDT rounds today with Case Management, Nursing, Nursing leadership, and other members of the IDT team.    Consultants/Specialty      Code Status  Full Code    Disposition  The patient is not medically cleared for discharge to home or a post-acute facility.  Anticipate discharge to: skilled nursing facility    I have placed the appropriate orders for post-discharge needs.    Review of Systems  Review of Systems   Constitutional:  Negative for chills and fever.   Respiratory:  Negative for cough and shortness of breath.    Cardiovascular:  Positive for leg swelling. Negative for chest pain and palpitations.   Gastrointestinal:  Negative for abdominal pain, nausea and vomiting.   Musculoskeletal:  Negative for joint pain and myalgias.   Neurological:  Negative for dizziness and headaches.        Physical Exam  Temp:  [36.1 °C (97 °F)-37.1 °C (98.8 °F)] 36.7 °C (98.1 °F)  Pulse:  [63-80] 68  Resp:  [16-20] 16  BP: (134-144)/(68-85) 143/74  SpO2:  [93 %-96 %] 94 %    Physical Exam  Vitals and nursing note reviewed.   Constitutional:       Appearance: Normal appearance. She is normal weight. She is not ill-appearing or diaphoretic.   HENT:      Head: Normocephalic and atraumatic.      Mouth/Throat:      Mouth: Mucous membranes are moist.      Pharynx: Oropharynx is clear. No oropharyngeal exudate.   Eyes:      General:         Right eye: No discharge.          Left eye: No discharge.      Conjunctiva/sclera: Conjunctivae normal.      Pupils: Pupils are equal, round, and reactive to light.   Cardiovascular:      Rate and Rhythm: Normal rate and regular rhythm.      Pulses: Normal pulses.      Heart sounds: Normal heart sounds. No murmur heard.  Pulmonary:      Effort: Pulmonary effort is normal. No respiratory distress.      Breath sounds: Normal breath sounds.   Abdominal:      General: Abdomen is flat. Bowel sounds are normal. There is no distension.      Palpations: Abdomen is soft.      Tenderness: There is no abdominal tenderness.   Musculoskeletal:         General: Tenderness present.      Cervical back: Neck supple. No tenderness.      Right lower leg: No edema.      Left lower leg: No edema.      Comments: Left thumb with discoloration, blackened fingernail   Neurological:      Mental Status: She is alert and oriented to person, place, and time.      Motor: No weakness.   Psychiatric:         Thought Content: Thought content normal.         Judgment: Judgment normal.         Fluids    Intake/Output Summary (Last 24 hours) at 4/12/2024 1727  Last data filed at 4/12/2024 0800  Gross per 24 hour   Intake 340 ml   Output 1550 ml   Net -1210 ml       Laboratory  Recent Labs     04/12/24  0015   WBC 4.1*   RBC 5.49*   HEMOGLOBIN 15.4   HEMATOCRIT 47.3*   MCV 86.2   MCH 28.1   MCHC 32.6   RDW 50.5*   PLATELETCT 287   MPV 10.9     Recent Labs     04/11/24  0048 04/12/24  0015   SODIUM 139 139   POTASSIUM 4.3 4.1   CHLORIDE 104 101   CO2 24 24   GLUCOSE 78 68   BUN 36* 36*   CREATININE 0.89 1.02   CALCIUM 8.9 8.7                   Imaging  MR-HAND - WITH & W/O LEFT   Final Result      1.  Marrow edema and abnormal enhancement of the first distal phalanx consistent with osteomyelitis. There is surrounding cellulitis.      2.  Multifocal osteoarthritis of the carpus.      EC-ECHOCARDIOGRAM COMPLETE W/O CONT   Final Result      US-EXTREMITY VENOUS LOWER UNILAT LEFT   Final  Result      CT-HAND WITH PLUS RECONS LEFT   Final Result      1.  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  No abscess      3.  Cannot accurately assess for nor rule out osteomyelitis with CT         DX-FINGER(S) 2+ LEFT   Final Result      1.  Cortical erosion of the thumb distal phalangeal tuft concerning for acute osteomyelitis.   2.  Scattered degenerative changes, greatest at the base of the thumb and the index finger PIP joint.   3.  There is some collapse of the carpal joints.   4.  Osteopenia.      DX-CHEST-PORTABLE (1 VIEW)   Final Result      1.  Cardiomegaly. No overt failure or pneumonia.      IR-MIDLINE CATHETER INSERTION WO GUIDANCE > AGE 3    (Results Pending)        Assessment/Plan  * Osteomyelitis (HCC)- (present on admission)  Assessment & Plan  Xray of left hand: Cortical erosion of the thumb distal phalangeal tuft concerning for acute osteomyelitis. Scattered degenerative changes, greatest at the base of the thumb and the index finger PIP joint.  Orthopedic surgery consulted  Recommending continue with IV antibiotics  Started patient on IV Unasyn and vancomycin  Orthopedic surgery to evaluate the patient.  No need for emergent surgery.  Keep patient n.p.o. at midnight    4/10/2024  Patient is awaiting MRI of the left hand.  MRI technician with awaiting pacemaker representative clearance.  MRSA is negative so Zyvox has been discontinued.  Continues on Unasyn.    4/11/2024  MRI of the left hand showing osteomyelitis of the left thumb.  I have consulted infectious diseases.  Continuing Unasyn.  Left thumb plain improving    4/12/2024  Discussed Dr. Rodriguez of infectious disease.  Recommending 6-week course of IV daptomycin.  Midline placed.  Planning to discharge to [facility] nursing [facility].    Elevated brain natriuretic peptide (BNP) level- (present on admission)  Assessment & Plan  4/10/2024  NT-proBNP elevated at 1117.  Start furosemide 20 mg subcu  twice daily  Have ordered echocardiogram for further evaluation.  Last echocardiogram was in 2022.    4/11/2024  Echo:  CONCLUSIONS  Compared to the prior study on 6/21/22, there is worsening aortic   regurgitation.  Normal left ventricular systolic function.   Structurally normal mitral valve without significant stenosis or   regurgitation.  Moderate posterior mitral leaflet calcification.  Moderate aortic insufficiency    Continue furosemide 20 mg IV twice daily  Continuous cardiac monitoring during forced diuresis    4/12/2024  Discontinue IV furosemide    Hypoxia- (present on admission)  Assessment & Plan  4/10/2024  SpO2 of 85% on room air.  Elevated NT proBNP suggestive of fluid overload.  Start furosemide 20 mg IV twice daily  Continuous telemetry monitoring during forced diuresis  Continuous pulse oximetry monitoring  I have ordered an echocardiogram    4/11/2024  Weaned down to room air with forced diuresis  Continue furosemide 20 mg IV twice daily    4/12/2024  Discontinue IV furosemide.  Currently on room air.      Acute kidney failure (HCC)  Assessment & Plan  Patient presents with creatinine level of 1.45.  This is elevated compared to her baseline  Likely prerenal in nature  Given IV fluids in the emergency department.  Continue with low dose maintenance fluids  Follow-up urinalysis and urine lites  Holding off on renal ultrasound at this time.  Avoid NSAIDs and nephrotoxic medications  Holding ACE/ARB    4/10/2024  Resolved  Resume low dose losartan tomorrow    4/11/2024  Starting losartan 25 mg daily    4/12/2024  Continues to remain stable.    Primary hypertension- (present on admission)  Assessment & Plan  4/11/2024  Starting to increase.  Start losartan 25 mg daily  Continue furosemide 20 mg IV twice daily    4/12/2024  Blood pressure goal less than 150 systolic.  Currently controlled.  Continue losartan 25 mg daily  Discontinue IV furosemide    Sick sinus syndrome (HCC)- (present on  admission)  Assessment & Plan  Patient has pacemaker in place.  Follow by cardiology    PVD (peripheral vascular disease) (HCC)- (present on admission)  Assessment & Plan  As per history.    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  Patient appears to be at her baseline         VTE prophylaxis:    heparin ppx      I have performed a physical exam and reviewed and updated ROS and Plan today (4/12/2024). In review of yesterday's note (4/11/2024), there are no changes except as documented above.

## 2024-04-15 NOTE — DISCHARGE PLANNING
ATTN: Case Management  RE: Referral for Home Health    Reason for referral denial: Patient transferred to SNF              Unfortunately, we are not able to accept this referral for the reason listed above. If further clarity is needed, our Transitional Care Specialists are available to discuss any barriers to service at [phone].      We look forward to collaborating with you in the future,  [facility] Team

## 2024-04-22 ENCOUNTER — HOSPITAL ENCOUNTER (OUTPATIENT)
Facility: MEDICAL CENTER | Age: 84
End: 2024-04-22
Attending: INTERNAL MEDICINE
Payer: COMMERCIAL

## 2024-04-23 LAB — ERYTHROCYTE [SEDIMENTATION RATE] IN BLOOD BY WESTERGREN METHOD: 5 MM/HOUR (ref 0–25)

## 2024-04-24 ENCOUNTER — HOSPITAL ENCOUNTER (OUTPATIENT)
Facility: MEDICAL CENTER | Age: 84
End: 2024-04-24
Attending: INTERNAL MEDICINE
Payer: COMMERCIAL

## 2024-04-24 LAB
AMBIGUOUS DTTM AMBI4: NORMAL
CK SERPL-CCNC: 7346 U/L (ref 0–154)

## 2024-04-26 ENCOUNTER — APPOINTMENT (OUTPATIENT)
Dept: RADIOLOGY | Facility: MEDICAL CENTER | Age: 84
End: 2024-04-26
Attending: STUDENT IN AN ORGANIZED HEALTH CARE EDUCATION/TRAINING PROGRAM
Payer: MEDICARE

## 2024-04-26 ENCOUNTER — DOCUMENTATION (OUTPATIENT)
Dept: INFECTIOUS DISEASES | Facility: MEDICAL CENTER | Age: 84
End: 2024-04-26

## 2024-04-26 ENCOUNTER — TELEPHONE (OUTPATIENT)
Dept: INFECTIOUS DISEASES | Facility: MEDICAL CENTER | Age: 84
End: 2024-04-26

## 2024-04-26 ENCOUNTER — HOSPITAL ENCOUNTER (INPATIENT)
Facility: MEDICAL CENTER | Age: 84
LOS: 2 days | End: 2024-04-28
Attending: STUDENT IN AN ORGANIZED HEALTH CARE EDUCATION/TRAINING PROGRAM | Admitting: HOSPITALIST
Payer: MEDICARE

## 2024-04-26 DIAGNOSIS — M86.642 OTHER CHRONIC OSTEOMYELITIS OF LEFT HAND (HCC): Primary | ICD-10-CM

## 2024-04-26 DIAGNOSIS — M86.142 ACUTE OSTEOMYELITIS OF PHALANX OF LEFT HAND (HCC): ICD-10-CM

## 2024-04-26 DIAGNOSIS — T88.7XXA NON-DOSE-RELATED ADVERSE EFFECT OF MEDICATION, INITIAL ENCOUNTER: ICD-10-CM

## 2024-04-26 DIAGNOSIS — M19.041 PRIMARY OSTEOARTHRITIS OF BOTH HANDS: ICD-10-CM

## 2024-04-26 DIAGNOSIS — E87.5 HYPERKALEMIA: ICD-10-CM

## 2024-04-26 DIAGNOSIS — I10 PRIMARY HYPERTENSION: ICD-10-CM

## 2024-04-26 DIAGNOSIS — M19.042 PRIMARY OSTEOARTHRITIS OF BOTH HANDS: ICD-10-CM

## 2024-04-26 DIAGNOSIS — M62.82 NON-TRAUMATIC RHABDOMYOLYSIS: ICD-10-CM

## 2024-04-26 PROBLEM — I63.9 RECURRENT CEREBROVASCULAR ACCIDENTS (CVAS) (HCC): Status: RESOLVED | Noted: 2022-06-20 | Resolved: 2024-04-26

## 2024-04-26 LAB
ALBUMIN SERPL BCP-MCNC: 3.9 G/DL (ref 3.2–4.9)
ALBUMIN/GLOB SERPL: 1.2 G/DL
ALP SERPL-CCNC: 88 U/L (ref 30–99)
ALT SERPL-CCNC: 207 U/L (ref 2–50)
ANION GAP SERPL CALC-SCNC: 11 MMOL/L (ref 7–16)
APPEARANCE UR: CLEAR
AST SERPL-CCNC: 334 U/L (ref 12–45)
BACTERIA #/AREA URNS HPF: NEGATIVE /HPF
BASOPHILS # BLD AUTO: 0.9 % (ref 0–1.8)
BASOPHILS # BLD: 0.05 K/UL (ref 0–0.12)
BILIRUB SERPL-MCNC: 0.4 MG/DL (ref 0.1–1.5)
BILIRUB UR QL STRIP.AUTO: NEGATIVE
BUN SERPL-MCNC: 45 MG/DL (ref 8–22)
CALCIUM ALBUM COR SERPL-MCNC: 9.2 MG/DL (ref 8.5–10.5)
CALCIUM SERPL-MCNC: 9.1 MG/DL (ref 8.5–10.5)
CHLORIDE SERPL-SCNC: 103 MMOL/L (ref 96–112)
CK MB SERPL-MCNC: 239 NG/ML (ref 0–4.3)
CK SERPL-CCNC: 5684 U/L (ref 0–154)
CO2 SERPL-SCNC: 22 MMOL/L (ref 20–33)
COLOR UR: YELLOW
CREAT SERPL-MCNC: 0.72 MG/DL (ref 0.5–1.4)
EKG IMPRESSION: NORMAL
EOSINOPHIL # BLD AUTO: 0.1 K/UL (ref 0–0.51)
EOSINOPHIL NFR BLD: 1.8 % (ref 0–6.9)
EPI CELLS #/AREA URNS HPF: NEGATIVE /HPF
ERYTHROCYTE [DISTWIDTH] IN BLOOD BY AUTOMATED COUNT: 52.8 FL (ref 35.9–50)
GFR SERPLBLD CREATININE-BSD FMLA CKD-EPI: 84 ML/MIN/1.73 M 2
GLOBULIN SER CALC-MCNC: 3.2 G/DL (ref 1.9–3.5)
GLUCOSE SERPL-MCNC: 83 MG/DL (ref 65–99)
GLUCOSE UR STRIP.AUTO-MCNC: NEGATIVE MG/DL
HCT VFR BLD AUTO: 47.4 % (ref 37–47)
HGB BLD-MCNC: 15.1 G/DL (ref 12–16)
HYALINE CASTS #/AREA URNS LPF: NORMAL /LPF
IMM GRANULOCYTES # BLD AUTO: 0.07 K/UL (ref 0–0.11)
IMM GRANULOCYTES NFR BLD AUTO: 1.3 % (ref 0–0.9)
KETONES UR STRIP.AUTO-MCNC: NEGATIVE MG/DL
LEUKOCYTE ESTERASE UR QL STRIP.AUTO: NEGATIVE
LYMPHOCYTES # BLD AUTO: 0.64 K/UL (ref 1–4.8)
LYMPHOCYTES NFR BLD: 11.7 % (ref 22–41)
MAGNESIUM SERPL-MCNC: 2.2 MG/DL (ref 1.5–2.5)
MCH RBC QN AUTO: 28.3 PG (ref 27–33)
MCHC RBC AUTO-ENTMCNC: 31.9 G/DL (ref 32.2–35.5)
MCV RBC AUTO: 88.8 FL (ref 81.4–97.8)
MICRO URNS: ABNORMAL
MONOCYTES # BLD AUTO: 0.56 K/UL (ref 0–0.85)
MONOCYTES NFR BLD AUTO: 10.3 % (ref 0–13.4)
NEUTROPHILS # BLD AUTO: 4.03 K/UL (ref 1.82–7.42)
NEUTROPHILS NFR BLD: 74 % (ref 44–72)
NITRITE UR QL STRIP.AUTO: NEGATIVE
NRBC # BLD AUTO: 0 K/UL
NRBC BLD-RTO: 0 /100 WBC (ref 0–0.2)
NT-PROBNP SERPL IA-MCNC: 1306 PG/ML (ref 0–125)
PH UR STRIP.AUTO: 7 [PH] (ref 5–8)
PLATELET # BLD AUTO: 404 K/UL (ref 164–446)
PMV BLD AUTO: 11.1 FL (ref 9–12.9)
POTASSIUM SERPL-SCNC: 5.6 MMOL/L (ref 3.6–5.5)
PROT SERPL-MCNC: 7.1 G/DL (ref 6–8.2)
PROT UR QL STRIP: NEGATIVE MG/DL
RBC # BLD AUTO: 5.34 M/UL (ref 4.2–5.4)
RBC # URNS HPF: NORMAL /HPF
RBC UR QL AUTO: ABNORMAL
SODIUM SERPL-SCNC: 136 MMOL/L (ref 135–145)
SP GR UR STRIP.AUTO: 1
TROPONIN T SERPL-MCNC: 29 NG/L (ref 6–19)
UROBILINOGEN UR STRIP.AUTO-MCNC: 0.2 MG/DL
WBC # BLD AUTO: 5.5 K/UL (ref 4.8–10.8)
WBC #/AREA URNS HPF: NORMAL /HPF

## 2024-04-26 PROCEDURE — 84484 ASSAY OF TROPONIN QUANT: CPT

## 2024-04-26 PROCEDURE — 81001 URINALYSIS AUTO W/SCOPE: CPT

## 2024-04-26 PROCEDURE — 83735 ASSAY OF MAGNESIUM: CPT

## 2024-04-26 PROCEDURE — 99285 EMERGENCY DEPT VISIT HI MDM: CPT

## 2024-04-26 PROCEDURE — 80053 COMPREHEN METABOLIC PANEL: CPT

## 2024-04-26 PROCEDURE — 700102 HCHG RX REV CODE 250 W/ 637 OVERRIDE(OP): Performed by: HOSPITALIST

## 2024-04-26 PROCEDURE — 71045 X-RAY EXAM CHEST 1 VIEW: CPT

## 2024-04-26 PROCEDURE — 770020 HCHG ROOM/CARE - TELE (206)

## 2024-04-26 PROCEDURE — 83880 ASSAY OF NATRIURETIC PEPTIDE: CPT

## 2024-04-26 PROCEDURE — 96365 THER/PROPH/DIAG IV INF INIT: CPT

## 2024-04-26 PROCEDURE — 700105 HCHG RX REV CODE 258: Performed by: HOSPITALIST

## 2024-04-26 PROCEDURE — 700111 HCHG RX REV CODE 636 W/ 250 OVERRIDE (IP): Performed by: STUDENT IN AN ORGANIZED HEALTH CARE EDUCATION/TRAINING PROGRAM

## 2024-04-26 PROCEDURE — 700105 HCHG RX REV CODE 258: Performed by: STUDENT IN AN ORGANIZED HEALTH CARE EDUCATION/TRAINING PROGRAM

## 2024-04-26 PROCEDURE — 85025 COMPLETE CBC W/AUTO DIFF WBC: CPT

## 2024-04-26 PROCEDURE — 96375 TX/PRO/DX INJ NEW DRUG ADDON: CPT

## 2024-04-26 PROCEDURE — 99222 1ST HOSP IP/OBS MODERATE 55: CPT | Mod: AI | Performed by: HOSPITALIST

## 2024-04-26 PROCEDURE — A9270 NON-COVERED ITEM OR SERVICE: HCPCS | Performed by: HOSPITALIST

## 2024-04-26 PROCEDURE — 36415 COLL VENOUS BLD VENIPUNCTURE: CPT

## 2024-04-26 PROCEDURE — 93005 ELECTROCARDIOGRAM TRACING: CPT | Performed by: STUDENT IN AN ORGANIZED HEALTH CARE EDUCATION/TRAINING PROGRAM

## 2024-04-26 PROCEDURE — 82550 ASSAY OF CK (CPK): CPT

## 2024-04-26 RX ORDER — LORATADINE 10 MG/1
10 TABLET ORAL 2 TIMES DAILY
COMMUNITY

## 2024-04-26 RX ORDER — ACETAMINOPHEN 325 MG/1
650 TABLET ORAL EVERY 6 HOURS PRN
Status: DISCONTINUED | OUTPATIENT
Start: 2024-04-26 | End: 2024-04-28 | Stop reason: HOSPADM

## 2024-04-26 RX ORDER — DOXYCYCLINE 100 MG/1
100 TABLET ORAL EVERY 12 HOURS
Status: DISCONTINUED | OUTPATIENT
Start: 2024-04-26 | End: 2024-04-28 | Stop reason: HOSPADM

## 2024-04-26 RX ORDER — MAGNESIUM OXIDE 400 MG/1
400 TABLET ORAL EVERY MORNING
COMMUNITY

## 2024-04-26 RX ORDER — LOSARTAN POTASSIUM 25 MG/1
25 TABLET ORAL 2 TIMES DAILY
COMMUNITY
End: 2024-05-01

## 2024-04-26 RX ORDER — ACETAMINOPHEN 325 MG/1
650 TABLET ORAL EVERY 4 HOURS PRN
COMMUNITY
End: 2024-05-02

## 2024-04-26 RX ORDER — SODIUM CHLORIDE, SODIUM LACTATE, POTASSIUM CHLORIDE, CALCIUM CHLORIDE 600; 310; 30; 20 MG/100ML; MG/100ML; MG/100ML; MG/100ML
INJECTION, SOLUTION INTRAVENOUS CONTINUOUS
Status: DISCONTINUED | OUTPATIENT
Start: 2024-04-26 | End: 2024-04-28 | Stop reason: HOSPADM

## 2024-04-26 RX ORDER — CALCIUM GLUCONATE 20 MG/ML
2 INJECTION, SOLUTION INTRAVENOUS ONCE
Status: COMPLETED | OUTPATIENT
Start: 2024-04-26 | End: 2024-04-26

## 2024-04-26 RX ORDER — DAPTOMYCIN 50 MG/ML
600 INJECTION, POWDER, LYOPHILIZED, FOR SOLUTION INTRAVENOUS EVERY 24 HOURS
Status: ON HOLD | COMMUNITY
End: 2024-04-28

## 2024-04-26 RX ORDER — AZELASTINE 1 MG/ML
1 SPRAY, METERED NASAL 2 TIMES DAILY
COMMUNITY
End: 2024-04-30 | Stop reason: SDUPTHER

## 2024-04-26 RX ORDER — FLUTICASONE PROPIONATE 50 MCG
2 SPRAY, SUSPENSION (ML) NASAL EVERY MORNING
COMMUNITY
End: 2024-04-30 | Stop reason: SDUPTHER

## 2024-04-26 RX ORDER — VERAPAMIL HYDROCHLORIDE 240 MG/1
240 TABLET, FILM COATED, EXTENDED RELEASE ORAL DAILY
Status: DISCONTINUED | OUTPATIENT
Start: 2024-04-27 | End: 2024-04-28 | Stop reason: HOSPADM

## 2024-04-26 RX ORDER — SODIUM CHLORIDE 9 MG/ML
500 INJECTION, SOLUTION INTRAVENOUS ONCE
Status: COMPLETED | OUTPATIENT
Start: 2024-04-26 | End: 2024-04-26

## 2024-04-26 RX ORDER — FUROSEMIDE 10 MG/ML
20 INJECTION INTRAMUSCULAR; INTRAVENOUS ONCE
Status: COMPLETED | OUTPATIENT
Start: 2024-04-26 | End: 2024-04-26

## 2024-04-26 RX ORDER — ASCORBIC ACID 500 MG
1000 TABLET ORAL 2 TIMES DAILY
COMMUNITY

## 2024-04-26 RX ADMIN — FUROSEMIDE 20 MG: 10 INJECTION INTRAMUSCULAR; INTRAVENOUS at 20:26

## 2024-04-26 RX ADMIN — CALCIUM GLUCONATE 2 G: 20 INJECTION, SOLUTION INTRAVENOUS at 20:29

## 2024-04-26 RX ADMIN — SODIUM CHLORIDE 500 ML: 9 INJECTION, SOLUTION INTRAVENOUS at 16:37

## 2024-04-26 RX ADMIN — SODIUM CHLORIDE, POTASSIUM CHLORIDE, SODIUM LACTATE AND CALCIUM CHLORIDE: 600; 310; 30; 20 INJECTION, SOLUTION INTRAVENOUS at 22:12

## 2024-04-26 RX ADMIN — DOXYCYCLINE 100 MG: 100 TABLET, FILM COATED ORAL at 22:05

## 2024-04-26 ASSESSMENT — ENCOUNTER SYMPTOMS
VOMITING: 0
POLYDIPSIA: 0
HEARTBURN: 0
SINUS PAIN: 0
BRUISES/BLEEDS EASILY: 0
DIAPHORESIS: 0
MYALGIAS: 1
FALLS: 0
WEAKNESS: 0
BACK PAIN: 0
HEADACHES: 0
PHOTOPHOBIA: 0
SORE THROAT: 0
CLAUDICATION: 0
ABDOMINAL PAIN: 0
FEVER: 0
DOUBLE VISION: 0
TREMORS: 0
COUGH: 0
SHORTNESS OF BREATH: 0
CONSTIPATION: 0
PALPITATIONS: 0
DEPRESSION: 0
ORTHOPNEA: 0
DIARRHEA: 0
BLOOD IN STOOL: 0
NAUSEA: 0
HALLUCINATIONS: 0
WHEEZING: 0
HEMOPTYSIS: 0
BLURRED VISION: 0
DIZZINESS: 0
STRIDOR: 0
SPUTUM PRODUCTION: 0
FLANK PAIN: 0
EYE PAIN: 0
NECK PAIN: 0
CHILLS: 0
PND: 0
TINGLING: 0

## 2024-04-26 ASSESSMENT — COGNITIVE AND FUNCTIONAL STATUS - GENERAL
MOBILITY SCORE: 17
SUGGESTED CMS G CODE MODIFIER MOBILITY: CK
MOVING TO AND FROM BED TO CHAIR: A LITTLE
WALKING IN HOSPITAL ROOM: A LOT
SUGGESTED CMS G CODE MODIFIER DAILY ACTIVITY: CH
STANDING UP FROM CHAIR USING ARMS: A LITTLE
DAILY ACTIVITIY SCORE: 24
CLIMB 3 TO 5 STEPS WITH RAILING: A LOT
MOVING FROM LYING ON BACK TO SITTING ON SIDE OF FLAT BED: A LITTLE

## 2024-04-26 ASSESSMENT — LIFESTYLE VARIABLES
AVERAGE NUMBER OF DAYS PER WEEK YOU HAVE A DRINK CONTAINING ALCOHOL: 0
HOW MANY TIMES IN THE PAST YEAR HAVE YOU HAD 5 OR MORE DRINKS IN A DAY: 0
EVER FELT BAD OR GUILTY ABOUT YOUR DRINKING: NO
EVER HAD A DRINK FIRST THING IN THE MORNING TO STEADY YOUR NERVES TO GET RID OF A HANGOVER: NO
HAVE PEOPLE ANNOYED YOU BY CRITICIZING YOUR DRINKING: NO
CONSUMPTION TOTAL: NEGATIVE
HAVE YOU EVER FELT YOU SHOULD CUT DOWN ON YOUR DRINKING: NO
ALCOHOL_USE: NO
TOTAL SCORE: 0
SUBSTANCE_ABUSE: 0
ON A TYPICAL DAY WHEN YOU DRINK ALCOHOL HOW MANY DRINKS DO YOU HAVE: 0
TOTAL SCORE: 0
DOES PATIENT WANT TO STOP DRINKING: NO
TOTAL SCORE: 0

## 2024-04-26 ASSESSMENT — FIBROSIS 4 INDEX
FIB4 SCORE: 4.65
FIB4 SCORE: 4.65
FIB4 SCORE: 1.88

## 2024-04-26 ASSESSMENT — PAIN DESCRIPTION - PAIN TYPE: TYPE: ACUTE PAIN

## 2024-04-26 NOTE — PROGRESS NOTES
Patient is 83-year-old history of chronic infection of left thumb.  She was recently admitted and seen by ID and then discharged on daptomycin.  Unfortunately CPK levels with some elevation to 7346.     Concern for rhabdomyolysis and risk for kidney injury.  Recommend patient admission for repeat labs, fluids and monitoring of kidney function.    Will stop daptomycin and transition to doxycycline 100 mg twice daily    Luba Blackwell MD    Repair Anesthesia Method: local infiltration

## 2024-04-26 NOTE — ED TRIAGE NOTES
Chief Complaint   Patient presents with    Abnormal Labs     CPK elevated     Pt BIBA from [facility] for elevated CPK level. Pt receives IV antibiotics for osteomyelitis of L thumb.

## 2024-04-26 NOTE — TELEPHONE ENCOUNTER
Received message from [facility] reporting CPK level at 7346 on IV Dapto. Dr Blackwell paged on Voalte per Dr Blackwell needs to send patient to hospital levels are dangerously high risk for myosis and kidney failure. Called Winnie PEREZ from [facility] and relayed message and recommendations.

## 2024-04-27 ENCOUNTER — APPOINTMENT (OUTPATIENT)
Dept: RADIOLOGY | Facility: MEDICAL CENTER | Age: 84
End: 2024-04-27
Attending: INTERNAL MEDICINE
Payer: MEDICARE

## 2024-04-27 LAB
ALBUMIN SERPL BCP-MCNC: 3.9 G/DL (ref 3.2–4.9)
ALBUMIN/GLOB SERPL: 1.1 G/DL
ALP SERPL-CCNC: 93 U/L (ref 30–99)
ALT SERPL-CCNC: 214 U/L (ref 2–50)
ANION GAP SERPL CALC-SCNC: 13 MMOL/L (ref 7–16)
AST SERPL-CCNC: 320 U/L (ref 12–45)
BASOPHILS # BLD AUTO: 1.1 % (ref 0–1.8)
BASOPHILS # BLD: 0.05 K/UL (ref 0–0.12)
BILIRUB SERPL-MCNC: 0.6 MG/DL (ref 0.1–1.5)
BUN SERPL-MCNC: 37 MG/DL (ref 8–22)
CALCIUM ALBUM COR SERPL-MCNC: 10 MG/DL (ref 8.5–10.5)
CALCIUM SERPL-MCNC: 9.9 MG/DL (ref 8.5–10.5)
CHLORIDE SERPL-SCNC: 100 MMOL/L (ref 96–112)
CK SERPL-CCNC: 4783 U/L (ref 0–154)
CO2 SERPL-SCNC: 25 MMOL/L (ref 20–33)
CREAT SERPL-MCNC: 0.68 MG/DL (ref 0.5–1.4)
EOSINOPHIL # BLD AUTO: 0.08 K/UL (ref 0–0.51)
EOSINOPHIL NFR BLD: 1.7 % (ref 0–6.9)
ERYTHROCYTE [DISTWIDTH] IN BLOOD BY AUTOMATED COUNT: 50.8 FL (ref 35.9–50)
GFR SERPLBLD CREATININE-BSD FMLA CKD-EPI: 87 ML/MIN/1.73 M 2
GLOBULIN SER CALC-MCNC: 3.4 G/DL (ref 1.9–3.5)
GLUCOSE SERPL-MCNC: 96 MG/DL (ref 65–99)
HCT VFR BLD AUTO: 49.3 % (ref 37–47)
HGB BLD-MCNC: 16.1 G/DL (ref 12–16)
IMM GRANULOCYTES # BLD AUTO: 0.07 K/UL (ref 0–0.11)
IMM GRANULOCYTES NFR BLD AUTO: 1.5 % (ref 0–0.9)
LYMPHOCYTES # BLD AUTO: 0.63 K/UL (ref 1–4.8)
LYMPHOCYTES NFR BLD: 13.4 % (ref 22–41)
MCH RBC QN AUTO: 28 PG (ref 27–33)
MCHC RBC AUTO-ENTMCNC: 32.7 G/DL (ref 32.2–35.5)
MCV RBC AUTO: 85.7 FL (ref 81.4–97.8)
MONOCYTES # BLD AUTO: 0.49 K/UL (ref 0–0.85)
MONOCYTES NFR BLD AUTO: 10.4 % (ref 0–13.4)
NEUTROPHILS # BLD AUTO: 3.37 K/UL (ref 1.82–7.42)
NEUTROPHILS NFR BLD: 71.9 % (ref 44–72)
NRBC # BLD AUTO: 0 K/UL
NRBC BLD-RTO: 0 /100 WBC (ref 0–0.2)
PLATELET # BLD AUTO: 381 K/UL (ref 164–446)
PMV BLD AUTO: 10.2 FL (ref 9–12.9)
POTASSIUM SERPL-SCNC: 4.4 MMOL/L (ref 3.6–5.5)
PROT SERPL-MCNC: 7.3 G/DL (ref 6–8.2)
RBC # BLD AUTO: 5.75 M/UL (ref 4.2–5.4)
SODIUM SERPL-SCNC: 138 MMOL/L (ref 135–145)
WBC # BLD AUTO: 4.7 K/UL (ref 4.8–10.8)

## 2024-04-27 PROCEDURE — 80053 COMPREHEN METABOLIC PANEL: CPT

## 2024-04-27 PROCEDURE — 700102 HCHG RX REV CODE 250 W/ 637 OVERRIDE(OP): Performed by: HOSPITALIST

## 2024-04-27 PROCEDURE — 700105 HCHG RX REV CODE 258: Performed by: INTERNAL MEDICINE

## 2024-04-27 PROCEDURE — 82550 ASSAY OF CK (CPK): CPT

## 2024-04-27 PROCEDURE — 700102 HCHG RX REV CODE 250 W/ 637 OVERRIDE(OP)

## 2024-04-27 PROCEDURE — A9270 NON-COVERED ITEM OR SERVICE: HCPCS | Performed by: INTERNAL MEDICINE

## 2024-04-27 PROCEDURE — 36415 COLL VENOUS BLD VENIPUNCTURE: CPT

## 2024-04-27 PROCEDURE — A9270 NON-COVERED ITEM OR SERVICE: HCPCS

## 2024-04-27 PROCEDURE — 770020 HCHG ROOM/CARE - TELE (206)

## 2024-04-27 PROCEDURE — 99233 SBSQ HOSP IP/OBS HIGH 50: CPT | Performed by: INTERNAL MEDICINE

## 2024-04-27 PROCEDURE — 700102 HCHG RX REV CODE 250 W/ 637 OVERRIDE(OP): Performed by: INTERNAL MEDICINE

## 2024-04-27 PROCEDURE — 76705 ECHO EXAM OF ABDOMEN: CPT

## 2024-04-27 PROCEDURE — 700105 HCHG RX REV CODE 258: Performed by: HOSPITALIST

## 2024-04-27 PROCEDURE — 99223 1ST HOSP IP/OBS HIGH 75: CPT | Performed by: INTERNAL MEDICINE

## 2024-04-27 PROCEDURE — A9270 NON-COVERED ITEM OR SERVICE: HCPCS | Performed by: HOSPITALIST

## 2024-04-27 PROCEDURE — 85025 COMPLETE CBC W/AUTO DIFF WBC: CPT

## 2024-04-27 PROCEDURE — 700111 HCHG RX REV CODE 636 W/ 250 OVERRIDE (IP): Mod: JZ | Performed by: INTERNAL MEDICINE

## 2024-04-27 RX ORDER — OXYMETAZOLINE HYDROCHLORIDE 0.05 G/100ML
2 SPRAY NASAL 2 TIMES DAILY
Status: DISCONTINUED | OUTPATIENT
Start: 2024-04-27 | End: 2024-04-28 | Stop reason: HOSPADM

## 2024-04-27 RX ORDER — LORATADINE 10 MG/1
10 TABLET ORAL 2 TIMES DAILY
Status: DISCONTINUED | OUTPATIENT
Start: 2024-04-27 | End: 2024-04-28 | Stop reason: HOSPADM

## 2024-04-27 RX ORDER — ENOXAPARIN SODIUM 100 MG/ML
40 INJECTION SUBCUTANEOUS DAILY
Status: DISCONTINUED | OUTPATIENT
Start: 2024-04-27 | End: 2024-04-28 | Stop reason: HOSPADM

## 2024-04-27 RX ORDER — POLYETHYLENE GLYCOL 3350 17 G/17G
1 POWDER, FOR SOLUTION ORAL DAILY
Status: DISCONTINUED | OUTPATIENT
Start: 2024-04-27 | End: 2024-04-28 | Stop reason: HOSPADM

## 2024-04-27 RX ORDER — AZELASTINE HYDROCHLORIDE 137 UG/1
2 SPRAY, METERED NASAL 2 TIMES DAILY
Status: DISCONTINUED | OUTPATIENT
Start: 2024-04-27 | End: 2024-04-27

## 2024-04-27 RX ADMIN — DOXYCYCLINE 100 MG: 100 TABLET, FILM COATED ORAL at 05:52

## 2024-04-27 RX ADMIN — ENOXAPARIN SODIUM 40 MG: 100 INJECTION SUBCUTANEOUS at 17:23

## 2024-04-27 RX ADMIN — SODIUM CHLORIDE, POTASSIUM CHLORIDE, SODIUM LACTATE AND CALCIUM CHLORIDE: 600; 310; 30; 20 INJECTION, SOLUTION INTRAVENOUS at 10:23

## 2024-04-27 RX ADMIN — LORATADINE 10 MG: 10 TABLET ORAL at 21:54

## 2024-04-27 RX ADMIN — SODIUM CHLORIDE, POTASSIUM CHLORIDE, SODIUM LACTATE AND CALCIUM CHLORIDE: 600; 310; 30; 20 INJECTION, SOLUTION INTRAVENOUS at 18:46

## 2024-04-27 RX ADMIN — DOXYCYCLINE 100 MG: 100 TABLET, FILM COATED ORAL at 17:23

## 2024-04-27 RX ADMIN — OXYMETAZOLINE HCL 2 SPRAY: 0.05 SPRAY NASAL at 22:29

## 2024-04-27 RX ADMIN — POLYETHYLENE GLYCOL 3350 1 PACKET: 17 POWDER, FOR SOLUTION ORAL at 12:30

## 2024-04-27 RX ADMIN — VERAPAMIL HYDROCHLORIDE 240 MG: 240 TABLET, FILM COATED, EXTENDED RELEASE ORAL at 05:52

## 2024-04-27 ASSESSMENT — ENCOUNTER SYMPTOMS
MYALGIAS: 0
NECK PAIN: 0
COUGH: 0
CONSTIPATION: 0
DIZZINESS: 0
SHORTNESS OF BREATH: 0
WEAKNESS: 1
FEVER: 0
CHILLS: 0
DIARRHEA: 0
PHOTOPHOBIA: 0
DOUBLE VISION: 0
NERVOUS/ANXIOUS: 0
SPUTUM PRODUCTION: 0
CLAUDICATION: 0
BACK PAIN: 0
VOMITING: 0
SPEECH CHANGE: 0
PALPITATIONS: 0
NAUSEA: 0
BLURRED VISION: 0
ORTHOPNEA: 0
ABDOMINAL PAIN: 0
WEIGHT LOSS: 0
HEMOPTYSIS: 0

## 2024-04-27 ASSESSMENT — PAIN DESCRIPTION - PAIN TYPE
TYPE: ACUTE PAIN
TYPE: ACUTE PAIN

## 2024-04-27 ASSESSMENT — FIBROSIS 4 INDEX: FIB4 SCORE: 4.65

## 2024-04-27 NOTE — NON-PROVIDER
History and Physical    Date of Service: 4/26/2024  Attending: Lee Ann Gonzales D.O.   Medical Student: Jesika Dai, MS3    Chief Complaint:  Elevated CPK    HPI   Tenisha Traylor is a 84 YO female with hx of HTN, dyslipidemia, stroke BIBA from [facility] for CPK of 7346. She was recently discharged from [facility] on 4/13 for osteomyelitis of the left thumb and was discharged with Daptomycin 600 mg/day (end date of 5/5/24) followed by 2 weeks of Doxycycline 100 mg PO BID. Dr. Blackwell with ID discontinued Daptomycin and transitioned pt to Doxycycline 100 mg BID today. Pt notes she has been exercising 15 minutes everyday and walks daily. Pt reports myalgia with soreness to BLE and BLL and dark colored urine. No weakness, fever, n/v, chest pain, palpitations, or flank pain. Pt is eating and drinking normally.    Past Medical History:    has a past medical history of Anesthesia, Arthritis, Benign essential HTN (9/7/2017), Cataract, CPAP (continuous positive airway pressure) dependence, Dyslipidemia (3/5/2019), Expressive aphasia, Family history of CVA, Heart murmur, DARIN (obstructive sleep apnea) (1/26/2017), Post-nasal drip, Restless leg syndrome, Sick sinus syndrome (HCC) (6/29/2022), Sinus arrest (6/29/2022), Stroke (HCC) (2/1993, 8/1993), and Walker as ambulation aid.    Past Surgical History:   has a past surgical history that includes hysterectomy, total abdominal (1977); fusion, spine, lumbar, plif (9/26/2016); lumbar laminectomy diskectomy (N/A, 9/26/2016); lumbar laminectomy diskectomy (1993); carpal tunnel release (Bilateral, 1993); orif, finger (Left, as child); hammertoe correction (Left, 11/12/2007); cervical disk and fusion anterior (1993); hammertoe correction (Right, 1980's); bunionectomy (Bilateral, 1980's); colonoscopy (2/2016); cervical fusion posterior (8/8/2017); cervical laminectomy posterior (8/8/2017); pr reconstr total shoulder implant (Left, 3/21/2019); orif, fracture, humerus  (Left, 3/21/2019); shoulder arthroplasty total (Left, 8/11/2015); knee arthroplasty total (6/23/2014); hip arthroplasty total (Right, 12/12/2016); hip arthroplasty total (Left, 5/8/2017); and knee arthroplasty total (Left, 2/12/2018).    Medications:   Prior to Admission Medications   Prescriptions Last Dose Informant Patient Reported? Taking?   ASPIRIN 81 PO  Patient Yes No   Sig: Take 81 mg by mouth every day.   Apoaequorin (PREVAGEN EXTRA STRENGTH PO)  Patient Yes No   Sig: Take 1 Tablet by mouth every day.   Ascorbic Acid (VITAMIN C CR) 1000 MG Tab CR  Patient Yes No   Sig: Take 1 Tablet by mouth 2 times a day.   B Complex Vitamins (B COMPLEX PO)  Patient Yes No   Sig: Take 1 Tablet by mouth every day.   KRILL OIL PO  Patient Yes No   Sig: Take 1 Capsule by mouth every day.   Magnesium 400 MG Tab  Patient Yes No   Sig: Take 400 mg by mouth every day.   NON SPECIFIED  Patient Yes No   Sig: Take 1 Tablet by mouth as needed. Unknown Stool softener   NS SOLN 50 mL with DAPTOmycin 500 MG SOLR 580 mg   No No   Sig: Infuse 580 mg into a venous catheter every 24 hours for 22 days.   Omega-3 Fatty Acids (FISH OIL) 1000 MG Cap capsule  Patient Yes No   Sig: Take 1,000 mg by mouth every evening.   azelastine (ASTELIN) 137 MCG/SPRAY nasal spray  Patient No No   Sig: Administer 1 Spray into affected nostril(S) 2 times a day.   diclofenac sodium (VOLTAREN) 1 % Gel   No No   Sig: Apply 2 g topically 4 times a day as needed (To hand joints for pain).   doxycycline (MONODOX) 100 MG capsule   No No   Sig: Take 1 Capsule by mouth 2 times a day.   ezetimibe (ZETIA) 10 MG Tab  Patient No No   Sig: TAKE ONE TABLET BY MOUTH EVERY DAY   fluticasone (FLONASE) 50 MCG/ACT nasal spray  Patient No No   Sig: ADMINISTER 2 SPRAYS INTO AFFECTED NOSTRILS EVERY DAY   Patient taking differently: Administer 2 Sprays into affected nostril(S) every day.   furosemide (LASIX) 20 MG Tab   No No   Sig: Take 1 Tablet by mouth 1 time a day as needed (leg  "swelling).   loratadine (CLARITIN) 10 MG Tab  Patient No No   Sig: TAKE ONE TABLET BY MOUTH DAILY AS NEEDED FOR ALLERGY.   Patient taking differently: Take 10 mg by mouth 1 time a day as needed. TAKE ONE TABLET BY MOUTH DAILY AS NEEDED FOR ALLERGY.   losartan (COZAAR) 25 MG Tab   No No   Sig: Take 1 Tablet by mouth every day.   verapamil ER (CALAN SR) 240 MG Tab CR  Patient No No   Sig: Take 1 Tablet by mouth every day.   vitamin E 180 MG (400 UNIT) Cap  Patient Yes No   Sig: Take 180 mg by mouth every day.      Facility-Administered Medications: None        Allergies:   Allergies   Allergen Reactions    Benicar [Olmesartan] Rash     Blisters    Tolerated losartan 4/12/24.    Dilaudid  [Hydromorphone Hcl] Vomiting    Lidocaine     Norco  [Apap-Fd&C Blue #1-Hydrocodone] Vomiting    Other Misc Shortness of Breath     Anesthesia with surgery 2016  Pt couldn't breathe while being put under and tapped side of bed to let RN know. \"It was the scariest feeling and I want everyone to know\"      Percocet  [Apap-Fd&C Red #40 [place]-Oxycodone] Vomiting    Vicodin  [Apap-Fd&C Yellow #10 [place]-Hydrocodone] Vomiting    Codeine Vomiting    Demerol Vomiting    Hydrocodone Vomiting    Other Drug Vomiting     \"all pain meds\" per pt can take morphine.    Oxycodone Vomiting    Statins [Hmg-Coa-R Inhibitors] Unspecified     Muscle cramping    Valium Vomiting    Amlodipine Swelling    Diazepam     Nexletol [Bempedoic Acid] Palpitations     Chest pain and dizziness    Simvastatin Myalgia and Unspecified     Headache, fatigue    Hydrochlorothiazide      Frequent urination       Family History:  family history includes Cancer in her mother; Diabetes in her brother; Hypertension in her brother, brother, and brother; Stroke in her daughter and father.     Social History:   Activity Level:  Uses walker, exercises 15 minutes everyday  Living situation:  [facility]      Review of Systems:  General: reports myalgia; denies fever, " chills  HEENT: denies headache  Respiratory: denies shortness of breath  Cardiovascular: denies chest pain, palpitations  GI: denies n/v/d  Neuro: denies weakness, numbness    Physical Exam:    Vitals:   Temp:  [36.8 °C (98.2 °F)] 36.8 °C (98.2 °F)  Pulse:  [60] 60  Resp:  [16] 16  BP: (150)/(82) 150/82  SpO2:  [94 %] 94 %    Physical Exam:  General: Well developed, appears stated age, appears to be in no acute distress.  HEENT: Normocephalic, atraumatic. Mucous membranes moist.   Cardio: Normal S1 and S2. Regular rate and rhythm. No murmurs, rubs, or gallops.  Pulmonary: Lungs are clear to auscultation bilaterally. No wheezes, rales, or rhonchi.  Abdomen: Periumbilical hernia. Normoactive bowel sounds. Abdomen is soft and nondistended. No masses.   MSK: Hypertrophy of left thumb nail. 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 Normal ROM. Extremities well-perfused. Capillary refill <2 seconds. No LE edema.  Neuro: A&Ox3. CN II-XII grossly intact. Strength and sensation intact throughout.   Skin: No rash, lesions, or skin ulcers. No jaundice, ecchymoses, or petechiae.   Psych: Appropriate mood and affect.      Labs:   Lab Results   Component Value Date/Time    WBC 5.5 04/26/2024 04:30 PM    RBC 5.34 04/26/2024 04:30 PM    HEMOGLOBIN 15.1 04/26/2024 04:30 PM    HEMATOCRIT 47.4 (H) 04/26/2024 04:30 PM    MCV 88.8 04/26/2024 04:30 PM    MCH 28.3 04/26/2024 04:30 PM    MCHC 31.9 (L) 04/26/2024 04:30 PM    MPV 11.1 04/26/2024 04:30 PM    NEUTSPOLYS 74.00 (H) 04/26/2024 04:30 PM    LYMPHOCYTES 11.70 (L) 04/26/2024 04:30 PM    MONOCYTES 10.30 04/26/2024 04:30 PM    EOSINOPHILS 1.80 04/26/2024 04:30 PM    BASOPHILS 0.90 04/26/2024 04:30 PM        Lab Results   Component Value Date/Time    SODIUM 139 04/12/2024 06:40 PM    POTASSIUM 4.2 04/12/2024 06:40 PM    CHLORIDE 99 04/12/2024 06:40 PM    CO2 25 04/12/2024 06:40 PM    GLUCOSE 77 04/12/2024 06:40 PM    BUN 36 (H) 04/12/2024 06:40 PM    CREATININE 1.14 04/12/2024 06:40  PM        Lab Results   Component Value Date/Time    PROTHROMBTM 12.9 06/20/2022 07:09 PM    INR 1.00 06/20/2022 07:09 PM        Imaging:   HISTORY/REASON FOR EXAM:  Shortness of Breath.        TECHNIQUE/EXAM DESCRIPTION AND NUMBER OF VIEWS:  Single portable view of the chest.     COMPARISON: Chest radiograph 4/8/2024     FINDINGS:  Mild cardiac enlargement. Mild left basilar opacity which may be atelectasis or infiltrate.     Cardiac pacer.     Left shoulder arthroplasty. Severe right shoulder degenerative change.        IMPRESSION:     1.  Mild left basilar opacity which may be atelectasis or infiltrate.  2.  Mild cardiac enlargement.    Assessment/Plan:  Tenisha Traylor is an 84 YO female referred to ED for CPK of 7346. Pt has been recently discharged from the hospital for osteomyelitis and has been receiving Daptomycin 600 mg/day. Elevated CPK likely due to Daptomycin side effect. No recent trauma/crush injury or recreational drug use. ID transitioned pt to Doxycycline. No fever, weakness, n/v, arrhythmia. Ordered EKG, CMP, CBC, CK, GFR, troponin, BNP, magnesium, UA, and CXR in the ED. Potassium 5.6, BUN 45, creatinine 0.72, , , troponin T 29, BNP 1306. Will admit pt to tele for IV fluids, cardiac and CPK monitoring.     Disposition: Admit to telemetry    Jesika Dai, Student, MS3, 4/26/2024 5:39 PM

## 2024-04-27 NOTE — H&P
Hospital Medicine History & Physical Note    Date of Service  4/26/2024    Primary Care Physician  NAYAN Longo    Consultants      Specialist Names:     Code Status  DNAR/DNI    Chief Complaint  Chief Complaint   Patient presents with    Abnormal Labs     CPK elevated       History of Presenting Illness  Irina Traylor is a 83 y.o. female who presented 4/26/2024 with past medical history of multiple CVAs, sick sinus syndrome status post pacemaker, hypertension, valvular heart disease, diastolic heart failure who presents to the hospital for having myalgias.  Patient states that she was exercising on a bike when the started require.  She denies any dark-colored urine, fevers, nausea, vomiting, lightheadedness.  Patient was taking daptomycin for left thumb osteomyelitis.  She was placed on daptomycin for 6 weeks and then asked to be placed on doxycycline.  According to the ID notes there is potential need for surgery after antibiotic as she is a high risk for antibiotic failure and need for debridement/amputation.     Chest x-ray interpreted by me found mild bilateral pulm edema    I discussed the plan of care with patient.    Review of Systems  Review of Systems   Constitutional:  Negative for chills, diaphoresis, fever and malaise/fatigue.   HENT:  Negative for congestion, ear discharge, ear pain, hearing loss, nosebleeds, sinus pain, sore throat and tinnitus.    Eyes:  Negative for blurred vision, double vision, photophobia and pain.   Respiratory:  Negative for cough, hemoptysis, sputum production, shortness of breath, wheezing and stridor.    Cardiovascular:  Negative for chest pain, palpitations, orthopnea, claudication, leg swelling and PND.   Gastrointestinal:  Negative for abdominal pain, blood in stool, constipation, diarrhea, heartburn, melena, nausea and vomiting.   Genitourinary:  Negative for dysuria, flank pain, frequency, hematuria and urgency.   Musculoskeletal:  Positive for  myalgias. Negative for back pain, falls, joint pain and neck pain.   Skin:  Negative for itching and rash.   Neurological:  Negative for dizziness, tingling, tremors, weakness and headaches.   Endo/Heme/Allergies:  Negative for environmental allergies and polydipsia. Does not bruise/bleed easily.   Psychiatric/Behavioral:  Negative for depression, hallucinations, substance abuse and suicidal ideas.        Past Medical History   has a past medical history of Anesthesia, Arthritis, Benign essential HTN (9/7/2017), Cataract, CPAP (continuous positive airway pressure) dependence, Dyslipidemia (3/5/2019), Expressive aphasia, Family history of CVA, Heart murmur, DARIN (obstructive sleep apnea) (1/26/2017), Post-nasal drip, Restless leg syndrome, Sick sinus syndrome (HCC) (6/29/2022), Sinus arrest (6/29/2022), Stroke ([facility]) (2/1993, 8/1993), and Walker as ambulation aid.    Surgical History   has a past surgical history that includes hysterectomy, total abdominal (1977); fusion, spine, lumbar, plif (9/26/2016); lumbar laminectomy diskectomy (N/A, 9/26/2016); lumbar laminectomy diskectomy (1993); carpal tunnel release (Bilateral, 1993); orif, finger (Left, as child); hammertoe correction (Left, 11/12/2007); cervical disk and fusion anterior (1993); hammertoe correction (Right, 1980's); bunionectomy (Bilateral, 1980's); colonoscopy (2/2016); cervical fusion posterior (8/8/2017); cervical laminectomy posterior (8/8/2017); pr reconstr total shoulder implant (Left, 3/21/2019); orif, fracture, humerus (Left, 3/21/2019); shoulder arthroplasty total (Left, 8/11/2015); knee arthroplasty total (6/23/2014); hip arthroplasty total (Right, 12/12/2016); hip arthroplasty total (Left, 5/8/2017); and knee arthroplasty total (Left, 2/12/2018).     Family History  family history includes Cancer in her mother; Diabetes in her brother; Hypertension in her brother, brother, and brother; Stroke in her daughter and father.   Family history reviewed  "with patient. There is no family history that is pertinent to the chief complaint.     Social History   reports that she has never smoked. She has never used smokeless tobacco. She reports that she does not currently use alcohol. She reports that she does not use drugs.    Allergies  Allergies   Allergen Reactions    Benicar [Olmesartan] Rash     Blisters    Tolerated losartan 4/12/24.    Dilaudid  [Hydromorphone Hcl] Vomiting    Lidocaine     Other Misc Shortness of Breath     Anesthesia with surgery 2016  Pt couldn't breathe while being put under and tapped side of bed to let RN know. \"It was the scariest feeling and I want everyone to know\"      Codeine Vomiting    Demerol Vomiting    Hydrocodone Vomiting    Other Drug Vomiting     \"all pain meds\" per pt can take morphine.    Oxycodone Vomiting    Statins [Hmg-Coa-R Inhibitors] Unspecified     Muscle cramping    Valium Vomiting    Amlodipine Swelling    Diazepam     Nexletol [Bempedoic Acid] Palpitations     Chest pain and dizziness    Simvastatin Myalgia and Unspecified     Headache, fatigue    Hydrochlorothiazide      Frequent urination       Medications  Prior to Admission Medications   Prescriptions Last Dose Informant Patient Reported? Taking?   B Complex-Biotin-FA Tab 4/26/2024 at AM MAR from Other Facility Yes Yes   Sig: Take 1 Tablet by mouth every morning. Indications: Supplement   DAPTOmycin (CUBICIN) 500 MG Recon Soln 4/26/2024 at 0500 MAR from Other Facility Yes Yes   Sig: Infuse 600 mg into a venous catheter every 24 hours. 22 day course started 4/14/2024.  Indications: Infection of Bone and Bone Marrow, Left Thumb   Omega-3 Fatty Acids (FISH OIL) 1000 MG Cap capsule 4/25/2024 at PM MAR from Other Facility Yes Yes   Sig: Take 1,000 mg by mouth every evening. Indications: Supplement   acetaminophen (TYLENOL) 325 MG Tab 4/24/2024 at 1915 MAR from Other Facility Yes Yes   Sig: Take 650 mg by mouth every four hours as needed for Mild Pain. 2 tablets = " 650 mg.   ascorbic acid (ASCORBIC ACID) 500 MG Tab 4/26/2024 at AM MAR from Other Facility Yes Yes   Sig: Take 1,000 mg by mouth 2 times a day. 2 tablets = 1,000 mg.   aspirin (ASA) 81 MG Chew Tab chewable tablet 4/26/2024 at AM MAR from Other Facility Yes Yes   Sig: Chew 81 mg every morning.   azelastine (ASTELIN) 137 MCG/SPRAY nasal spray 4/26/2024 at AM MAR from Other Facility Yes Yes   Sig: Administer 1 Spray into each nostril 2 times a day.   diclofenac sodium (VOLTAREN) 1 % Gel 4/20/2024 at 0005 MAR from Other Facility No No   Sig: Apply 2 g topically 4 times a day as needed (To hand joints for pain).   ezetimibe (ZETIA) 10 MG Tab 4/25/2024 at HS MAR from Other Facility No Yes   Sig: TAKE ONE TABLET BY MOUTH EVERY DAY   fluticasone (FLONASE) 50 MCG/ACT nasal spray 4/26/2024 at AM MAR from Other Facility Yes Yes   Sig: Administer 2 Sprays into each nostril every morning.   loratadine (CLARITIN) 10 MG Tab 4/26/2024 at 0800 MAR from Other Facility Yes Yes   Sig: Take 10 mg by mouth 2 times a day.   losartan (COZAAR) 25 MG Tab 4/26/2024 at 0800 MAR from Other Facility Yes Yes   Sig: Take 25 mg by mouth 2 times a day. Indications: High Blood Pressure Disorder   magnesium oxide (MAG-OX) 400 MG Tab tablet 4/26/2024 at AM MAR from Other Facility Yes Yes   Sig: Take 400 mg by mouth every morning. Indications: Supplement   verapamil ER (CALAN SR) 240 MG Tab CR 4/26/2024 at AM MAR from Other Facility No Yes   Sig: Take 1 Tablet by mouth every day.      Facility-Administered Medications: None       Physical Exam  Temp:  [36.8 °C (98.2 °F)] 36.8 °C (98.2 °F)  Pulse:  [59-60] 60  Resp:  [16] 16  BP: (150-169)/(73-82) 169/79  SpO2:  [90 %-94 %] 91 %  Blood Pressure : (!) 169/79   Temperature: 36.8 °C (98.2 °F)   Pulse: 60   Respiration: 16   Pulse Oximetry: 91 %       Physical Exam  Vitals and nursing note reviewed.   Constitutional:       General: She is not in acute distress.     Appearance: Normal appearance. She is not  ill-appearing, toxic-appearing or diaphoretic.   HENT:      Head: Normocephalic and atraumatic.      Nose: No congestion or rhinorrhea.      Mouth/Throat:      Pharynx: No oropharyngeal exudate or posterior oropharyngeal erythema.   Eyes:      General: No scleral icterus.  Neck:      Vascular: No carotid bruit or JVD.   Cardiovascular:      Rate and Rhythm: Normal rate and regular rhythm.      Pulses: Normal pulses.      Heart sounds: Murmur heard.      No friction rub. No gallop.   Pulmonary:      Effort: Pulmonary effort is normal. No respiratory distress.      Breath sounds: No stridor. No wheezing, rhonchi or rales.   Abdominal:      General: Abdomen is flat. There is no distension.      Palpations: There is no mass.      Tenderness: There is no abdominal tenderness. There is no left CVA tenderness, guarding or rebound.      Hernia: No hernia is present.   Musculoskeletal:         General: No swelling. Normal range of motion.      Cervical back: No rigidity. No muscular tenderness.      Right lower leg: Edema present.      Left lower leg: Edema present.   Lymphadenopathy:      Cervical: No cervical adenopathy.   Skin:     General: Skin is warm and dry.      Capillary Refill: Capillary refill takes more than 3 seconds.      Coloration: Skin is not jaundiced or pale.      Findings: No bruising or erythema.   Neurological:      Mental Status: She is alert.         Laboratory:  Recent Labs     04/26/24  1630   WBC 5.5   RBC 5.34   HEMOGLOBIN 15.1   HEMATOCRIT 47.4*   MCV 88.8   MCH 28.3   MCHC 31.9*   RDW 52.8*   PLATELETCT 404   MPV 11.1     Recent Labs     04/26/24  1732   SODIUM 136   POTASSIUM 5.6*   CHLORIDE 103   CO2 22   GLUCOSE 83   BUN 45*   CREATININE 0.72   CALCIUM 9.1     Recent Labs     04/26/24  1732   ALTSGPT 207*   ASTSGOT 334*   ALKPHOSPHAT 88   TBILIRUBIN 0.4   GLUCOSE 83         Recent Labs     04/26/24  1732   NTPROBNP 1306*         Recent Labs     04/26/24  1732   TROPONINT 29*        Imaging:  DX-CHEST-PORTABLE (1 VIEW)   Final Result      1.  Mild left basilar opacity which may be atelectasis or infiltrate.   2.  Mild cardiac enlargement.          X-Ray:  I have personally reviewed the images and compared with prior images.    Assessment/Plan:  Justification for Admission Status  I anticipate this patient will require at least two midnights for appropriate medical management, necessitating inpatient admission because rhabdomyolysis    Patient will need a Telemetry bed on MEDICAL service .  The need is secondary to rhabdomyolysis.    * Rhabdomyolysis- (present on admission)  Assessment & Plan  Start gentle IV hydration  Monitor kidney function    Osteomyelitis (HCC)- (present on admission)  Assessment & Plan  I will discontinue daptomycin and continue doxycycline for now according to the ID notes that were placed today.  Wound consult  Assess the need for debridement/amputation    Primary hypertension- (present on admission)  Assessment & Plan  Hold losartan for now        VTE prophylaxis: SCDs/TEDs

## 2024-04-27 NOTE — FACE TO FACE
Face to Face Supporting Documentation - Home Health    The encounter with this patient was in whole or in part the primary reason for home health admission.    Date of encounter:   Patient:                    MRN:                       YOB: 2015  Irina Traylor  [id number]  1940     Home health to see patient for:  Skilled Nursing care for assessment, interventions & education, Home health aide and Physical Therapy evaluation and treatment    Skilled need for:  Surgical Aftercare s/p posterior cervical fusion    Skilled nursing interventions to include:  Wound Care    Homebound status evidenced by:  Need the aid of supportive devices such as crutches, canes, wheelchairs or walkers, Require the use of special transportation or Needs the assistance of another person in order to leave the home. Leaving home requires a considerable and taxing effort. There is a normal inability to leave the home.    Community Physician to provide follow up care: Kailee Coyne D.O.     Optional Interventions? No      I certify the face to face encounter for this home health care referral meets the CMS requirements and the encounter/clinical assessment with the patient was, in whole, or in part, for the medical condition(s) listed above, which is the primary reason for home health care. Based on my clinical findings: the service(s) are medically necessary, support the need for home health care, and the homebound criteria are met.  I certify that this patient has had a face to face encounter by the nurse practitioner working collaboratively with me.  DAMIR SpicerP. - NPI: [id number]    
no

## 2024-04-27 NOTE — CARE PLAN
The patient is Stable - Low risk of patient condition declining or worsening    Shift Goals  Clinical Goals: VSS, diurese with fluid recuscitation, monitor labs, reat  Patient Goals: Sleep and go home tomorrow    Progress made toward(s) clinical / shift goals:    Problem: Knowledge Deficit - Standard  Goal: Patient and family/care givers will demonstrate understanding of plan of care, disease process/condition, diagnostic tests and medications  Outcome: Progressing   Patient verbalized an understanding of the disease process  Problem: Fall Risk  Goal: Patient will remain free from falls  Outcome: Progressing   Patient remained free of falls  Problem: Urinary Elimination  Goal: Establish and maintain regular urinary output  Outcome: Progressing   Patient produced adequate urine output    Patient is not progressing towards the following goals:

## 2024-04-27 NOTE — HOSPITAL COURSE
83-year-old female with history of multiple CVA, sick sinus syndrome s/p pacemaker, hypertension and diastolic heart failure who presented 4/26 from SNF due to body ache.  Patient started having generalized weakness with shoulder pain and dark-colored urine, with nausea and vomiting.  Patient was taken daptomycin for left stump osteomyelitis and was discharged from the hospital on daptomycin.  ID recommended to continue IV daptomycin 600 mg daily until 5/5 and then follow with 2 weeks of oral doxycycline 100 mg twice daily.  On admission patient was found to have CPK more than 5000, with elevated liver enzymes, daptomycin was discontinued and start with IV fluid.  ID was consulted.

## 2024-04-27 NOTE — ASSESSMENT & PLAN NOTE
Related to daptomycin  With elevated liver enzymes and CPK more than 5000  Kidney function is stable, with no metabolic acidosis  Continue IV fluid and monitor CPK daily  Discontinue daptomycin

## 2024-04-27 NOTE — CONSULTS
Consults  INFECTIOUS DISEASES INPATIENT CONSULT NOTE     Date of Service: 4/27/2024    Consult Requested By: Frederick Nava M.D.    Reason for Consultation: Thumb infection, rhabdomyolysis    History of Present Illness:   Irina Traylor is a 83 y.o.  admitted 4/26/2024. Pt has a past medical history of multiple CVAs, sick sinus syndrome with pacemaker and HTN who has recurrent infection of the last thumb with purulent fluid from under nail which would improve with antibiotics and then recurred once antibiotics were stopped.  She was seen by ID earlier this month who recommended a 2-week course of daptomycin and then transition to doxycycline.  She was discharged on daptomycin but unfortunately developed significantly elevated CPK (7346) concerning for rhabdomyolysis and was instructed to come back into the ER.    Hospital Course:   No fevers, no leukocytosis, some mild leukopenia.  No acute kidney injury.  CPK levels trended and are improving.  Transaminitis noted and stable.    Review Of Systems:  Review of Systems   Constitutional:  Negative for chills, fever and malaise/fatigue.   HENT:  Negative for hearing loss.    Eyes:  Negative for blurred vision and double vision.   Respiratory:  Negative for cough, sputum production and shortness of breath.    Cardiovascular:  Negative for chest pain.   Gastrointestinal:  Negative for abdominal pain, diarrhea, nausea and vomiting.   Genitourinary:  Negative for dysuria.   Musculoskeletal:  Negative for back pain, joint pain, myalgias and neck pain.   Skin:  Negative for rash.   Neurological:  Positive for weakness.   Psychiatric/Behavioral:  The patient is not nervous/anxious.        PMH:   Past Medical History:   Diagnosis Date    Anesthesia     States feels like she can't breathe with induction for right hip surgery, gets nausea vomiting with most pain meds.  Uses anti-nausea medicine if takes morphine    Arthritis     To hips, shoulders, hands    Benign  essential HTN 9/7/2017    Cataract     CPAP (continuous positive airway pressure) dependence     Dyslipidemia 3/5/2019    Expressive aphasia     Related to CVA    Family history of CVA     Heart murmur     DARIN (obstructive sleep apnea) 1/26/2017    AHI 23.6, minimum saturation 69%, on CPAP 15 cm.    Post-nasal drip     Restless leg syndrome     4/25/17-Resolved, states was related to right hip and none S/P replacement.    Sick sinus syndrome (HCC) 6/29/2022    Sinus arrest 6/29/2022    Stroke ([facility]) 2/1993, 8/1993    Xdcbczdz-xtsjcz-tzuftzozeq aphasia,mouth forming words, reading, numbers    Walker as ambulation aid        PSH:  Past Surgical History:   Procedure Laterality Date    PB RECONSTR TOTAL SHOULDER IMPLANT Left 3/21/2019    Procedure: ARTHROPLASTY, SHOULDER, TOTAL-  REVISION REVERSE;  Surgeon: Yoshi Gamble M.D.;  Location: [facility];  Service: Orthopedics    ORIF, FRACTURE, HUMERUS Left 3/21/2019    Procedure: ORIF, FRACTURE, HUMERUS;  Surgeon: Yoshi Gamble M.D.;  Location: [facility];  Service: Orthopedics    KNEE ARTHROPLASTY TOTAL Left 2/12/2018    Procedure: KNEE ARTHROPLASTY TOTAL;  Surgeon: Earle Jacobsen M.D.;  Location: [facility];  Service: Orthopedics    CERVICAL FUSION POSTERIOR  8/8/2017    Procedure: CERVICAL FUSION POSTERIOR- C3-5, INSTRUMENTED;  Surgeon: Florentino Pike M.D.;  Location: [facility];  Service:     CERVICAL LAMINECTOMY POSTERIOR  8/8/2017    Procedure: CERVICAL LAMINECTOMY POSTERIOR- C3-5;  Surgeon: Florentino Pike M.D.;  Location: [facility];  Service:     HIP ARTHROPLASTY TOTAL Left 5/8/2017    Procedure: HIP ARTHROPLASTY TOTAL;  Surgeon: Earle Jacobsen M.D.;  Location: [facility];  Service:     HIP ARTHROPLASTY TOTAL Right 12/12/2016    Procedure: HIP ARTHROPLASTY TOTAL;  Surgeon: Earle Jacobsen M.D.;  Location: [facility];  Service:     FUSION,  SPINE, LUMBAR, PLIF  9/26/2016    Procedure: LUMBAR FUSION POSTERIOR L2-S1 onlay ;  Surgeon: Florentino Pike M.D.;  Location: SURGERY [facility];  Service:     LUMBAR LAMINECTOMY DISKECTOMY N/A 9/26/2016    Procedure: LUMBAR LAMINECTOMY DISKECTOMY L2-S1;  Surgeon: Florentino Pike M.D.;  Location: SURGERY [facility];  Service:     COLONOSCOPY  2/2016    Every 10 years    SHOULDER ARTHROPLASTY TOTAL Left 8/11/2015    Procedure: SHOULDER ARTHROPLASTY REVERSE TOTAL;  Surgeon: Yoshi Gamble M.D.;  Location: SURGERY [facility];  Service:     KNEE ARTHROPLASTY TOTAL  6/23/2014    Performed by Earle Jacobsen M.D. at SURGERY [facility]    HAMMERTOE CORRECTION Left 11/12/2007    Removed 3rd toe    LUMBAR LAMINECTOMY DISKECTOMY  1993    Lumbar    CARPAL TUNNEL RELEASE Bilateral 1993    CERVICAL DISK AND FUSION ANTERIOR  1993    X2    HYSTERECTOMY, TOTAL ABDOMINAL  1977    BUNIONECTOMY Bilateral 1980's    HAMMERTOE CORRECTION Right 1980's    Removed 3rd toe    ORIF, FINGER Left as child    Index       FAMILY HX:  Family History   Problem Relation Age of Onset    Cancer Mother         vaginal    Stroke Father     Hypertension Brother     Diabetes Brother     Hypertension Brother     Hypertension Brother     Stroke Daughter      Reviewed family history. No pertinent family history.     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  She is currently driving without any  "accidents.  She does use a walker to ambulate.  Not having any falls.  Does not have hearing aids but is having hearing concerns.  Some increased aphasia especially since her history of CVAs and memory issues.  March 2019 she was hospitalized from her fracture of her left arm and did have home health which she no longer has right now.  She does not have family here but she does have neighbors who help her out.     Social Determinants of Health     Financial Resource Strain: Not on file   Food Insecurity: Not on file   Transportation Needs: Not on file   Physical Activity: Not on file   Stress: Not on file   Social Connections: Not on file   Intimate Partner Violence: Not on file   Housing Stability: Not on file     Social History     Tobacco Use   Smoking Status Never   Smokeless Tobacco Never     Social History     Substance and Sexual Activity   Alcohol Use Not Currently       Allergies/Intolerances:  Allergies   Allergen Reactions    Benicar [Olmesartan] Rash     Blisters    Tolerated losartan 4/12/24.    Dilaudid  [Hydromorphone Hcl] Vomiting    Lidocaine     Other Misc Shortness of Breath     Anesthesia with surgery 2016  Pt couldn't breathe while being put under and tapped side of bed to let RN know. \"It was the scariest feeling and I want everyone to know\"      Codeine Vomiting    Demerol Vomiting    Hydrocodone Vomiting    Other Drug Vomiting     \"all pain meds\" per pt can take morphine.    Oxycodone Vomiting    Statins [Hmg-Coa-R Inhibitors] Unspecified     Muscle cramping    Valium Vomiting    Amlodipine Swelling    Diazepam     Nexletol [Bempedoic Acid] Palpitations     Chest pain and dizziness    Simvastatin Myalgia and Unspecified     Headache, fatigue    Hydrochlorothiazide      Frequent urination       History reviewed with the patient and /or family member, chart & primary care team    Other Current Medications:    Current Facility-Administered Medications:     acetaminophen (Tylenol) tablet 650 mg, " 650 mg, Oral, Q6HRS PRN, Anahi Rivera M.D.    verapamil ER (Calan SR) tablet 240 mg, 240 mg, Oral, DAILY, Anahi Rivera M.D., 240 mg at 24 0552    doxycycline monohydrate (Adoxa) tablet 100 mg, 100 mg, Oral, Q12HRS, Anahi Rivera M.D., 100 mg at 2452    lactated ringers infusion, , Intravenous, Continuous, Anahi Rivera M.D., Last Rate: 75 mL/hr at 24, New Bag at 24 2212  [unfilled]    Most Recent Vital Signs:  /80   Pulse 81   Temp 36.2 °C (97.2 °F) (Temporal)   Resp 17   Ht 1.524 m (5')   Wt 72 kg (158 lb 11.7 oz)   SpO2 93%   BMI 31.00 kg/m²   Temp  Av.6 °C (97.8 °F)  Min: 36.2 °C (97.2 °F)  Max: 36.8 °C (98.2 °F)    Physical Exam:  Physical Exam  Constitutional:       Appearance: Normal appearance.   HENT:      Head: Normocephalic and atraumatic.      Right Ear: External ear normal.      Left Ear: External ear normal.      Nose: Nose normal.   Eyes:      Extraocular Movements: Extraocular movements intact.      Conjunctiva/sclera: Conjunctivae normal.      Pupils: Pupils are equal, round, and reactive to light.   Cardiovascular:      Rate and Rhythm: Normal rate and regular rhythm.      Heart sounds: Normal heart sounds.   Pulmonary:      Effort: Pulmonary effort is normal.      Breath sounds: Normal breath sounds.   Abdominal:      General: Abdomen is flat. Bowel sounds are normal.      Palpations: Abdomen is soft.   Musculoskeletal:      Cervical back: Normal range of motion and neck supple.      Comments: Left thumb with mild erythema, onychomycosis under nail   Skin:     General: Skin is warm and dry.   Neurological:      Mental Status: She is alert and oriented to person, place, and time.      Comments: Alert and conversant, appears to be understanding well, some chronic difficulty with speech   Psychiatric:         Mood and Affect: Mood normal.         Behavior: Behavior normal.           Pertinent Lab Results:  Recent Labs     24  1630  "04/27/24  0230   WBC 5.5 4.7*      Recent Labs     04/26/24  1630 04/27/24  0230   HEMOGLOBIN 15.1 16.1*   HEMATOCRIT 47.4* 49.3*   MCV 88.8 85.7   MCH 28.3 28.0   PLATELETCT 404 381         Recent Labs     04/26/24  1732 04/27/24  0230   SODIUM 136 138   POTASSIUM 5.6* 4.4   CHLORIDE 103 100   CO2 22 25   CREATININE 0.72 0.68        Recent Labs     04/26/24  1732 04/27/24  0230   ALBUMIN 3.9 3.9        Pertinent Micro:  Results       Procedure Component Value Units Date/Time    URINALYSIS,CULTURE IF INDICATED [[id number]]  (Abnormal) Collected: 04/26/24 1856    Order Status: Completed Specimen: Urine, Clean Catch Updated: 04/26/24 1917     Color Yellow     Character Clear     Specific Gravity 1.005     Ph 7.0     Glucose Negative mg/dL      Ketones Negative mg/dL      Protein Negative mg/dL      Bilirubin Negative     Urobilinogen, Urine 0.2     Nitrite Negative     Leukocyte Esterase Negative     Occult Blood Trace     Micro Urine Req Microscopic          No results found for: \"BLOODCULTU\", \"BLDCULT\", \"BCHOLD\"     Studies:  DX-CHEST-PORTABLE (1 VIEW)    Result Date: 4/26/2024 4/26/2024 4:35 PM HISTORY/REASON FOR EXAM:  Shortness of Breath. TECHNIQUE/EXAM DESCRIPTION AND NUMBER OF VIEWS: Single portable view of the chest. COMPARISON: Chest radiograph 4/8/2024 FINDINGS: Mild cardiac enlargement. Mild left basilar opacity which may be atelectasis or infiltrate. Cardiac pacer. Left shoulder arthroplasty. Severe right shoulder degenerative change.     1.  Mild left basilar opacity which may be atelectasis or infiltrate. 2.  Mild cardiac enlargement.    IR-MIDLINE CATHETER INSERTION WO GUIDANCE > AGE 5    Result Date: 4/12/2024  HISTORY/REASON FOR EXAM:  Midline Placement   TECHNIQUE/EXAM DESCRIPTION AND NUMBER OF VIEWS: Midline insertion with ultrasound guidance.  FINDINGS: Midline insertion with Ultrasound Guidance was performed by qualified nursing staff without the assistance of a Radiologist. Midline positioning as " measured by RN or as appropriate length of catheter selected.              Ultrasound-guided midline placement performed by qualified nursing staff as above.     MR-HAND - WITH & W/O LEFT    Result Date: 4/11/2024 4/11/2024 2:36 PM HISTORY/REASON FOR EXAM:  Left thumb infection. TECHNIQUE/EXAM DESCRIPTION: MRI of the LEFT hand and wrist without and with contrast. The study was performed on a Tamara 1.5 Yesenia MRI scanner. T1 and T2 fat-suppressed precontrast followed by T1 fat suppressed postcontrast images were obtained of the hand. 15mL mL ProHance contrast was administered intravenously. COMPARISON:  None. FINDINGS: Examination is limited by failure of fat saturation in the area of the thumb and some areas of signal loss. Osseous structures/cartilaginous surfaces: There is marrow edema and enhancement involving the first distal phalanx. No other evidence of abnormal marrow signal. There is multifocal osteoarthritis of the carpus to include the first through fifth carpometacarpal articulations and the intercarpal articulations. Soft tissues: There is edema and enhancement of the soft tissues of the thumb distally consistent with cellulitis. No soft tissue abscess.     1.  Marrow edema and abnormal enhancement of the first distal phalanx consistent with osteomyelitis. There is surrounding cellulitis. 2.  Multifocal osteoarthritis of the carpus.    EC-ECHOCARDIOGRAM COMPLETE W/O CONT    Result Date: 4/11/2024  Transthoracic Echo Report Echocardiography Laboratory CONCLUSIONS Compared to the prior study on 6/21/22, there is worsening aortic regurgitation. Normal left ventricular systolic function. Structurally normal mitral valve without significant stenosis or regurgitation.  Moderate posterior mitral leaflet calcification. Moderate aortic insufficiency. JACEK VELASQUEZ Exam Date:         04/11/2024                    08:25 Exam Location:     Inpatient Priority:          Routine Ordering Physician:        NATE  DAYTON HIGGINBOTHAM Referring Physician:       [id number] Gavin Sonographer:               Torri BUTTERFIELD Age:    83     Gender:    F MRN:    [id number] :    1940 BSA:    1.7    Ht (in):    60     Wt (lb):    161 Exam Type:     Complete Indications:     Shortness of breath ICD Codes:       786.05 CPT Codes:       76718 BP:   130    /   69     HR:   60 Technical Quality:       Fair MEASUREMENTS  (Male / Female) Normal Values 2D ECHO LV Diastolic Diameter PLAX        5.2 cm                4.2 - 5.9 / 3.9 - 5.3 cm LV Systolic Diameter PLAX         3.5 cm                2.1 - 4.0 cm IVS Diastolic Thickness           1.3 cm                LVPW Diastolic Thickness          1.3 cm                LVOT Diameter                     2 cm                  Estimated LV Ejection Fraction    55 %                  LV Ejection Fraction MOD BP       57.2 %                >= 55  % LV Ejection Fraction MOD 4C       57.8 %                LV Ejection Fraction MOD 2C       57.9 %                IVC Diameter                      1.7 cm                DOPPLER AV Peak Velocity                  2.1 m/s               AV Peak Gradient                  17.1 mmHg             AV Mean Gradient                  8 mmHg                AI Pressure Half Time             476 ms                LVOT Peak Velocity                1.3 m/s               AV Area Cont Eq vti               2 cm2                 MV Velocity Time Integral         51.2 cm               Mitral E Point Velocity           1.2 m/s               Mitral E to A Ratio               0.86                  MV Pressure Half Time             76 ms                 MV Area PHT                       2.9 cm2               MV Deceleration Time              262 ms                PV Peak Velocity                  1 m/s                 PV Peak Gradient                  4.1 mmHg              RVOT Peak Velocity                0.75 m/s              MV E' Velocity                    6.4 cm/s              *  Indicates values subject to auto-interpretation LV EF:  55    % FINDINGS Left Ventricle The left ventricle is normal in size.Mild concentric left ventricular hypertrophy. Normal left ventricular systolic function. The left ventricular ejection fraction is visually estimated to be 55%. The ejection fraction is measured to be 57 % by Ty's biplane. Grade II diastolic dysfunction. Right Ventricle The right ventricle is not well visualized. Right Atrium The right atrium is normal in size. Normal inferior vena cava size and inspiratory collapse. Left Atrium Moderate to severely dilated  left atrium. Left atrial volume index is 48  mL/sq m. Mitral Valve Structurally normal mitral valve without significant stenosis or regurgitation.  Moderate posterior mitral leaflet calcification. Aortic Valve Structurally normal aortic valve without significant stenosis or regurgitation.  Mild aortic leaflet and annular calcification. Moderate aortic insufficiency. Tricuspid Valve Structurally normal tricuspid valve without significant stenosis.Trace tricuspid regurgitation. Unable to estimate pulmonary artery pressure due to an inadequate tricuspid regurgitant jet. Pulmonic Valve Structurally normal pulmonic valve without significant stenosis .mild pulmonic insufficiency. Pericardium No pericardial effusion. Aorta Normal aortic root for body surface area. The ascending aorta diameter is 3.6 cm. Taty SEO To (Electronically Signed) Final Date:     11 April 2024                 11:28    DX-HAND 3+ LEFT    Result Date: 4/11/2024  4 views of left hand including PA, oblique, lateral PA of thumb show decreased joint spaces and degenerative changes throughout phalanx and wrist with presumable osteomyelitis in the distal phalanx of thumb with soft tissue swelling around the thumb no obvious fractures or dislocations noted    US-EXTREMITY VENOUS LOWER UNILAT LEFT    Result Date: 4/10/2024   Vascular Laboratory  CONCLUSIONS  No  evidence of DVT in the submitted images.  JACEK VELASQUEZ  Exam Date:     04/10/2024 10:04  Room #:     Inpatient  Priority:     Routine  Ht (in):             Wt (lb):  Ordering Physician:        DAYTON SULLIVAN  Referring Physician:       [id number]  Sonographer:               Mera Carl RVELIEZER  Study Type:                Complete Unilateral  Technical Quality:         Adequate  Age:    83    Gender:     F  MRN:    [id number]  :    1940      BSA:  Indications:     Pain in left leg, Pain in left lower leg  CPT Codes:       97032  ICD Codes:       M79.605  M79.662  History:         Pain in left calf with pressure.  Limitations:  PROCEDURES:  Left lower extremity venous duplex imaging.  The following venous structures were evaluated: common femoral, deep  femoral, proximal great saphenous, femoral, popliteal, peroneal, and  posterior tibial veins.  Serial compression, color, and spectral Doppler flow evaluations were  performed.  FINDINGS:  Left lower extremity.  The peroneal and posterior tibial veins are difficult to assess for  compressibility, but flow response to augmentation is demonstrated.  All other veins demonstrate complete color filling and compressibility with  normal venous flow dynamics including spontaneous flow and respiratory  phasicity.  No evidence of deep venous thrombosis.  Flow was evaluated in the contralateral common femoral vein and normal  venous flow dynamics including spontaneous flow and respiratory phasic  variation were demonstrated.  Florentino Diallo MD  (Electronically Signed)  Final Date:      10 April 2024                   11:34    CT-HAND WITH PLUS RECONS LEFT    Result Date: 2024 9:40 PM HISTORY/REASON FOR EXAM:  DIGIT PAIN; osteo. Left thumb pain, infection, evaluate for osteomyelitis TECHNIQUE/ EXAM DESCRIPTION AND NUMBER OF VIEWS: CT scan of the LEFT hand with contrast, with reconstructions. Thin-section noncontrast helical images were obtained from the distal  radius/ulna through the proximal metacarpals. Coronal and sagittal reconstructions were generated from the axial images. A total of 80 mL of Omnipaque 350 nonionic contrast was administered IV without complication. Up to date radiation dose reduction adjustments have been utilized to meet ALARA standards for radiation dose reduction. COMPARISON:  None. FINDINGS: Imaging assessment is very limited as the hand was scanned at the patient's side and there is motion and large field of view. There is osteoarthritis at the radiocarpal joint and 1st carpal metacarpal joint. Whether there is osteomyelitis cannot be accurately assessed nor excluded with CT There is no fracture. Alignment is normal. No degenerative changes are present.     1.  Osteoarthritis at the radiocarpal joint and 1st carpal metacarpal joint 2.  No abscess 3.  Cannot accurately assess for nor rule out osteomyelitis with CT     DX-FINGER(S) 2+ LEFT    Result Date: 4/8/2024 4/8/2024 5:20 PM HISTORY/REASON FOR EXAM:  Atraumatic Pain/Swelling/Deformity. TECHNIQUE/EXAM DESCRIPTION AND NUMBER OF VIEWS:  3 views of the  LEFT thumb. COMPARISON: None FINDINGS: MINERALIZATION: Decreased. INJURY: Cortical erosion of the thumb distal phalangeal tuft. JOINTS: Scattered degenerative changes, greatest at the base of the thumb and the index finger PIP joint. There is some collapse of the carpal joints.     1.  Cortical erosion of the thumb distal phalangeal tuft concerning for acute osteomyelitis. 2.  Scattered degenerative changes, greatest at the base of the thumb and the index finger PIP joint. 3.  There is some collapse of the carpal joints. 4.  Osteopenia.    DX-CHEST-PORTABLE (1 VIEW)    Result Date: 4/8/2024 4/8/2024 4:56 PM HISTORY/REASON FOR EXAM:  Sepsis; sepsis TECHNIQUE/EXAM DESCRIPTION AND NUMBER OF VIEWS: Single portable view of the chest. COMPARISON: 7/13/2022 FINDINGS: Hardware is stably positioned in its visualized extent. HEART: Enlarged. There is  atherosclerotic calcification in the aortic arch. LUNGS: No areas of air space disease are demonstrated. PLEURA: No effusion or pneumothorax. Left femur hardware.     1.  Cardiomegaly. No overt failure or pneumonia.      ASSESSMENT/PLAN:     83 y.o.  admitted 4/26/2024. Pt has a past medical history of multiple CVAs, sick sinus syndrome with pacemaker and HTN who has recurrent infection of the last thumb with purulent fluid from under nail which would improve with antibiotics and then recurred once antibiotics were stopped.  She was seen by ID earlier this month who recommended a 2-week course of daptomycin and then transition to doxycycline.  She was discharged on daptomycin but unfortunately developed significantly elevated CPK (7346) concerning for rhabdomyolysis and was instructed to come back into the ER.    Hospital Course:   No fevers, no leukocytosis, some mild leukopenia.  No acute kidney injury.  CPK levels trended and are improving.  Transaminitis noted and stable.    Problem List    Rhabdomyolysis, elevated CPK secondary to daptomycin therapy  Chronic/recurrent left thumb, osteomyelitis  -No cultures obtained  -MRI of the hand on 4/11 notes marrow edema and enhancement of the first distal phalanx consistent with osteomyelitis and surrounding cellulitis.  Transaminitis, ongoing, unclear etiology potentially due to daptomycin toxicity but this would be unusual  History of multiple CVAs  Sick sinus syndrome with pacemaker in place    Assessment:      -Notes were reviewed from primary team, radiology, ED, surgery, specialists, etc.   -Reviewed labs to date, microbiology for current admit and prior  -Imaging was independently reviewed and interpreted    Plan:    Recommendations for antibiotics:   --- Will leave the patient off daptomycin given her reaction and continue doxycycline 100 mg twice daily for another 4 weeks  --- No culture results to guide therapy so if she worsens on doxycycline will attempt  obtain cultures to better guide antibiotic    Recommendations for further/ongoing work-up, monitoring of clinical status and drug toxicity:   --- Continue to monitor labs and if she continues to have improving CPK and no new kidney function okay to discharge to home   --- Follow-up in ID clinic in 2 weeks, please place order for outpatient ID follow-up  --- Okay to remove PICC line if not already done  --- Discussed doxycycline with the patient, recommended avoiding multivitamins for now, recommended taking precautions as she will be more sun sensitive    Dispo: See above     Plan of care discussed with SHIVANI Nava M.D..  ID will follow peripherally.      Luba Blackwell M.D.

## 2024-04-27 NOTE — ASSESSMENT & PLAN NOTE
On her stump   Did not get any surgery   Continue antibiotics by ID recommendation continue doxycycline for total 6 weeks   Wound care  Close monitoring and consider debridement if needed

## 2024-04-27 NOTE — PROGRESS NOTES
4 Eyes Skin Assessment Completed by CHRIS Wong and JOSEP Rankin.    Head WDL  Ears WDL  Nose WDL  Mouth WDL  Neck WDL  Breast/Chest WDL  Shoulder Blades WDL  Spine scar midline on lumbar spine  (R) Arm/Elbow/Hand WDL  (L) Arm/Elbow/Hand Osteomyelitis oh the thumb   Abdomen Hernia superior to umbilicus   Groin WDL  Scrotum/Coccyx/Buttocks WDL  (R) Leg WDL  (L) Leg WDL  (R) Heel/Foot/Toe Missing `toe, calloused heels  (L) Heel/Foot/Toe missing toe, calloused heel. Calloused plantar foor under big toe          Devices In Places Tele Box      Interventions In Place TAP System    Possible Skin Injury No    Pictures Uploaded Into Epic Yes  Wound Consult Placed N/A  RN Wound Prevention Protocol Ordered Yes

## 2024-04-27 NOTE — PROGRESS NOTES
Monitor Summary  Rhythm: SR  Rate: 73-78  Ectopy:   Measurements: 0.12/0.13/0.38  ---12 hr Chart Review---

## 2024-04-27 NOTE — ED NOTES
Med rec completed per MAR sent with patient from [facility] and [facility].    Allergies reviewed per MAR.    Outpatient antibiotics within the last 30 days: patient is on a 22 day course of IV daptomycin started on 4/14/2024.    ANTICOAGULATION: none; patient does take antiplatelet medication, aspirin 81 mg x 1 tablet every day.

## 2024-04-27 NOTE — ASSESSMENT & PLAN NOTE
Mildly elevated liver enzymes with benign abdominal exam  Likely related to rhabdo  Check ultrasound and viral hepatitis panel  Labs daily

## 2024-04-27 NOTE — ED NOTES
Lab called with critical result of CPK 5684 at 1911. Critical lab result read back.   Dr. Gonzales notified of critical lab result at 1913.  Critical lab result read back by

## 2024-04-27 NOTE — ED PROVIDER NOTES
ED Provider Note    CHIEF COMPLAINT  Chief Complaint   Patient presents with    Abnormal Labs     CPK elevated       EXTERNAL RECORDS REVIEWED  Inpatient Notes admission April 8 for osteomyelitis.  Patient was started on antibiotics and ultimately was discharged on daptomycin to skilled facility.    HPI/ROS  LIMITATION TO HISTORY   Select: : None  OUTSIDE HISTORIAN(S):  none    Irina Traylor is a 83 y.o. female who presents due to abnormal CK level.  Patient is on an skilled nursing facility for treatment of osteomyelitis of the left thumb.  She has been on doxycycline and daptomycin.  They are monitoring her CK levels and discharge was normal.  Today.  CK level was over 7000 and she was sent here for evaluation.  She is otherwise not noting any acute complaints.  She has been progressing well at her skilled facility and doing physical therapy daily.  She does have some myalgias and does report her urine has been dark.  Fevers, no chest pain or shortness of breath, no abdominal pain, no lower extremity swelling.    PAST MEDICAL HISTORY   has a past medical history of Anesthesia, Arthritis, Benign essential HTN (9/7/2017), Cataract, CPAP (continuous positive airway pressure) dependence, Dyslipidemia (3/5/2019), Expressive aphasia, Family history of CVA, Heart murmur, DARIN (obstructive sleep apnea) (1/26/2017), Post-nasal drip, Restless leg syndrome, Sick sinus syndrome (HCC) (6/29/2022), Sinus arrest (6/29/2022), Stroke (HCC) (2/1993, 8/1993), and Walker as ambulation aid.    SURGICAL HISTORY   has a past surgical history that includes hysterectomy, total abdominal (1977); fusion, spine, lumbar, plif (9/26/2016); lumbar laminectomy diskectomy (N/A, 9/26/2016); lumbar laminectomy diskectomy (1993); carpal tunnel release (Bilateral, 1993); orif, finger (Left, as child); hammertoe correction (Left, 11/12/2007); cervical disk and fusion anterior (1993); hammertoe correction (Right, 1980's); bunionectomy  (Bilateral, 1980's); colonoscopy (2/2016); cervical fusion posterior (8/8/2017); cervical laminectomy posterior (8/8/2017); reconstr total shoulder implant (Left, 3/21/2019); orif, fracture, humerus (Left, 3/21/2019); shoulder arthroplasty total (Left, 8/11/2015); knee arthroplasty total (6/23/2014); hip arthroplasty total (Right, 12/12/2016); hip arthroplasty total (Left, 5/8/2017); and knee arthroplasty total (Left, 2/12/2018).    FAMILY HISTORY  Family History   Problem Relation Age of Onset    Cancer Mother         vaginal    Stroke Father     Hypertension Brother     Diabetes Brother     Hypertension Brother     Hypertension Brother     Stroke Daughter        SOCIAL HISTORY  Social History     Tobacco Use    Smoking status: Never    Smokeless tobacco: Never   Vaping Use    Vaping Use: Never used   Substance and Sexual Activity    Alcohol use: Not Currently    Drug use: No    Sexual activity: Not Currently       CURRENT MEDICATIONS  Home Medications       Reviewed by Nikko Thomas (Pharmacy Tech) on 04/26/24 at 1839  Med List Status: Complete     Medication Last Dose Status   acetaminophen (TYLENOL) 325 MG Tab 4/24/2024 Active   ascorbic acid (ASCORBIC ACID) 500 MG Tab 4/26/2024 Active   aspirin (ASA) 81 MG Chew Tab chewable tablet 4/26/2024 Active   azelastine (ASTELIN) 137 MCG/SPRAY nasal spray 4/26/2024 Active   B Complex-Biotin-FA Tab 4/26/2024 Active   DAPTOmycin (CUBICIN) 500 MG Recon Soln 4/26/2024 Active   diclofenac sodium (VOLTAREN) 1 % Gel 4/20/2024 Active   ezetimibe (ZETIA) 10 MG Tab 4/25/2024 Active   fluticasone (FLONASE) 50 MCG/ACT nasal spray 4/26/2024 Active   loratadine (CLARITIN) 10 MG Tab 4/26/2024 Active   losartan (COZAAR) 25 MG Tab 4/26/2024 Active   magnesium oxide (MAG-OX) 400 MG Tab tablet 4/26/2024 Active   Omega-3 Fatty Acids (FISH OIL) 1000 MG Cap capsule 4/25/2024 Active   verapamil ER (CALAN SR) 240 MG Tab CR 4/26/2024 Active                    ALLERGIES  Allergies   Allergen  "Reactions    Benicar [Olmesartan] Rash     Blisters    Tolerated losartan 4/12/24.    Dilaudid  [Hydromorphone Hcl] Vomiting    Lidocaine     Other Misc Shortness of Breath     Anesthesia with surgery 2016  Pt couldn't breathe while being put under and tapped side of bed to let RN know. \"It was the scariest feeling and I want everyone to know\"      Codeine Vomiting    Demerol Vomiting    Hydrocodone Vomiting    Other Drug Vomiting     \"all pain meds\" per pt can take morphine.    Oxycodone Vomiting    Statins [Hmg-Coa-R Inhibitors] Unspecified     Muscle cramping    Valium Vomiting    Amlodipine Swelling    Diazepam     Nexletol [Bempedoic Acid] Palpitations     Chest pain and dizziness    Simvastatin Myalgia and Unspecified     Headache, fatigue    Hydrochlorothiazide      Frequent urination       PHYSICAL EXAM  VITAL SIGNS: BP (!) 169/79   Pulse 60   Temp 36.8 °C (98.2 °F) (Temporal)   Resp 16   Wt 72.6 kg (160 lb)   SpO2 91%   BMI 31.25 kg/m²    Constitutional: Awake and alert. No acute distress.  Head: NCAT.  HEENT: Normal Conjunctiva. PERRLA.  Neck: Grossly normal range of motion. Airway midline.  Cardiovascular: Normal heart rate, Normal rhythm.  Thorax & Lungs: No respiratory distress. Clear to Auscultation bilaterally.  Abdomen: Normal inspection. Nontender. Nondistended  Skin: No obvious rash.  Back: No tenderness, No CVA tenderness.   Musculoskeletal: No obvious deformity. Moves all extremities Well.  Neurologic: A&Ox3.   Psychiatric: Mood and affect are appropriate for situation.    EKG/LABS  60bpm.  Atrially paced.  No acute ST or T wave changes.  I have independently interpreted this EKG    Results for orders placed or performed during the hospital encounter of 04/26/24   CBC WITH DIFFERENTIAL   Result Value Ref Range    WBC 5.5 4.8 - 10.8 K/uL    RBC 5.34 4.20 - 5.40 M/uL    Hemoglobin 15.1 12.0 - 16.0 g/dL    Hematocrit 47.4 (H) 37.0 - 47.0 %    MCV 88.8 81.4 - 97.8 fL    MCH 28.3 27.0 - 33.0 pg "    MCHC 31.9 (L) 32.2 - 35.5 g/dL    RDW 52.8 (H) 35.9 - 50.0 fL    Platelet Count 404 164 - 446 K/uL    MPV 11.1 9.0 - 12.9 fL    Neutrophils-Polys 74.00 (H) 44.00 - 72.00 %    Lymphocytes 11.70 (L) 22.00 - 41.00 %    Monocytes 10.30 0.00 - 13.40 %    Eosinophils 1.80 0.00 - 6.90 %    Basophils 0.90 0.00 - 1.80 %    Immature Granulocytes 1.30 (H) 0.00 - 0.90 %    Nucleated RBC 0.00 0.00 - 0.20 /100 WBC    Neutrophils (Absolute) 4.03 1.82 - 7.42 K/uL    Lymphs (Absolute) 0.64 (L) 1.00 - 4.80 K/uL    Monos (Absolute) 0.56 0.00 - 0.85 K/uL    Eos (Absolute) 0.10 0.00 - 0.51 K/uL    Baso (Absolute) 0.05 0.00 - 0.12 K/uL    Immature Granulocytes (abs) 0.07 0.00 - 0.11 K/uL    NRBC (Absolute) 0.00 K/uL   Comp Metabolic Panel   Result Value Ref Range    Sodium 136 135 - 145 mmol/L    Potassium 5.6 (H) 3.6 - 5.5 mmol/L    Chloride 103 96 - 112 mmol/L    Co2 22 20 - 33 mmol/L    Anion Gap 11.0 7.0 - 16.0    Glucose 83 65 - 99 mg/dL    Bun 45 (H) 8 - 22 mg/dL    Creatinine 0.72 0.50 - 1.40 mg/dL    Calcium 9.1 8.5 - 10.5 mg/dL    Correct Calcium 9.2 8.5 - 10.5 mg/dL    AST(SGOT) 334 (H) 12 - 45 U/L    ALT(SGPT) 207 (H) 2 - 50 U/L    Alkaline Phosphatase 88 30 - 99 U/L    Total Bilirubin 0.4 0.1 - 1.5 mg/dL    Albumin 3.9 3.2 - 4.9 g/dL    Total Protein 7.1 6.0 - 8.2 g/dL    Globulin 3.2 1.9 - 3.5 g/dL    A-G Ratio 1.2 g/dL   TROPONIN   Result Value Ref Range    Troponin T 29 (H) 6 - 19 ng/L   proBrain Natriuretic Peptide, NT   Result Value Ref Range    NT-proBNP 1306 (H) 0 - 125 pg/mL   CREATINE KINASE   Result Value Ref Range    CPK Total 5684 (HH) 0 - 154 U/L   MAGNESIUM   Result Value Ref Range    Magnesium 2.2 1.5 - 2.5 mg/dL   URINALYSIS,CULTURE IF INDICATED    Specimen: Urine, Clean Catch   Result Value Ref Range    Color Yellow     Character Clear     Specific Gravity 1.005 <1.035    Ph 7.0 5.0 - 8.0    Glucose Negative Negative mg/dL    Ketones Negative Negative mg/dL    Protein Negative Negative mg/dL    Bilirubin  Negative Negative    Urobilinogen, Urine 0.2 Negative    Nitrite Negative Negative    Leukocyte Esterase Negative Negative    Occult Blood Trace (A) Negative    Micro Urine Req Microscopic    ESTIMATED GFR   Result Value Ref Range    GFR (CKD-EPI) 84 >60 mL/min/1.73 m 2   URINE MICROSCOPIC (W/UA)   Result Value Ref Range    WBC 0-2 /hpf    RBC 0-2 /hpf    Bacteria Negative None /hpf    Epithelial Cells Negative /hpf    Hyaline Cast 0-2 /lpf   EKG (NOW)   Result Value Ref Range    Report       [facility] Emergency Dept.    Test Date:  2024  Pt Name:    JACEK VELASQUEZ             Department: ER  MRN:        [id number]                      Room:       [facility]  Gender:     Female                       Technician: [id number]  :        [id number]                   Requested By:KAUSHAL STATON  Order #:    [id number]                    Reading MD:    Measurements  Intervals                                Axis  Rate:       60                           P:          0  CO:         148                          QRS:        -66  QRSD:       135                          T:          70  QT:         460  QTc:        460    Interpretive Statements  Atrial-paced rhythm  Nonspecific IVCD with LAD  Left ventricular hypertrophy  Anterior Q waves, possibly due to LVH  ST elevation, consider inferior injury  Compared to ECG 07/15/2022 05:16:10  Intraventricular conduction delay now present  Left ventricular hypertrophy now present  Q waves now present  ST (T wave) deviation  now present  Sinus rhythm no longer present  Atrial abnormality no longer present  Left anterior fascicular block no longer present  Myocardial infarct finding still present           RADIOLOGY/PROCEDURES   I have independently interpreted the diagnostic imaging associated with this visit and am waiting the final reading from the radiologist.   My preliminary interpretation is as follows: no opacity, no effusion    Radiologist  interpretation:  DX-CHEST-PORTABLE (1 VIEW)   Final Result      1.  Mild left basilar opacity which may be atelectasis or infiltrate.   2.  Mild cardiac enlargement.          COURSE & MEDICAL DECISION MAKING    ASSESSMENT, COURSE AND PLAN  Care Narrative:   83-year-old female history notable for osteomyelitis and heart failure, currently on daptomycin for osteomyelitis here due to elevated CK level  Afebrile and hypertensive  On exam soft nontender abdomen, no murmur, clear lungs, no distress overall.  Do suspect daptomycin caused CK elevation.  We will repeat labs here plan for discontinuation of daptomycin.  We will assess for electrolyte derangement, acute kidney injury in the setting of rhabdomyolysis  EKG is nonischemic  Chest x-ray without acute findings  Potassium minimally elevated but will give Lasix.  She is also receiving 1 L of fluid however will be cautious given her history of heart failure and EF of 55%.  Labs otherwise notable for elevated proBNP of 1300, CK here is 5600 likely downtrending.  Given need for judicious fluids, repeating of labs and ensuring of no acute kidney injury patient will be admitted to the hospitalist for further management.  She will also need transition from daptomycin given adverse reaction.  Hydration: Based on the patient's presentation of Dehydration and Other Rhabdomyolysis the patient was given IV fluids. IV Hydration was used because oral hydration was not as rapid as required. Upon recheck following hydration, the patient was stbale.    ADDITIONAL PROBLEMS MANAGED    Heart failure with elevated pro bnp  Hyperkalemia    DISPOSITION AND DISCUSSIONS  I have discussed management of the patient with the following physicians and CORRY's:  Hospitalist - admission    Discussion of management with other Q or appropriate source(s): None     Barriers to care at this time, including but not limited to:  none .     Decision tools and prescription drugs considered including, but  not limited to: Antibiotics will need transition from daptomycin .    FINAL DIAGNOSIS  1. Non-traumatic rhabdomyolysis    2. Non-dose-related adverse effect of medication, initial encounter    3. Hyperkalemia    4. Acute osteomyelitis of phalanx of left hand (HCC)    5. Primary hypertension           Electronically signed by: Lee Ann Gonzales D.O., 4/26/2024 8:30 PM

## 2024-04-27 NOTE — PROGRESS NOTES
Hospital Medicine Daily Progress Note    Date of Service  4/27/2024    Chief Complaint  Irina Traylor is a 83 y.o. female admitted 4/26/2024 with body ache    Hospital Course:    83-year-old female with history of multiple CVA, sick sinus syndrome s/p pacemaker, hypertension and diastolic heart failure who presented 4/26 from SNF due to body ache.  Patient started having generalized weakness with shoulder pain and dark-colored urine, with nausea and vomiting.  Patient was taken daptomycin for left stump osteomyelitis and was discharged from the hospital on daptomycin.  ID recommended to continue IV daptomycin 600 mg daily until 5/5 and then follow with 2 weeks of oral doxycycline 100 mg twice daily.  On admission patient was found to have CPK more than 5000, with elevated liver enzymes, daptomycin was discontinued and start with IV fluid.  ID was consulted.    Interval Problem Update  -Evaluated examined the patient bedside, patient is alert oriented x 4, chronic dysarthria, no fever or chills however states still having muscles pain.  -Ultrasound for elevated liver enzymes.  Improving on her CPK, continue IV fluid with close monitoring-  -Case was discussed with ID, continue doxycycline.  Patient wants to go home, will evaluate her with PT and OT-and possible discharge the patient with home health.    I have discussed this patient's plan of care and discharge plan at IDT rounds today with Case Management, Nursing, Nursing leadership, and other members of the IDT team.    Consultants/Specialty  infectious disease    Code Status  DNAR/DNI    Disposition  The patient is not medically cleared for discharge to home or a post-acute facility.  Anticipate discharge to: home with organized home healthcare and close outpatient follow-up    I have placed the appropriate orders for post-discharge needs.    Review of Systems  Review of Systems   Constitutional:  Positive for malaise/fatigue. Negative for chills, fever  and weight loss.   HENT:  Negative for ear pain, hearing loss and tinnitus.    Eyes:  Negative for blurred vision, double vision and photophobia.   Respiratory:  Negative for cough and hemoptysis.    Cardiovascular:  Negative for chest pain, palpitations, orthopnea and claudication.   Gastrointestinal:  Negative for abdominal pain, constipation, diarrhea, nausea and vomiting.   Genitourinary:  Negative for dysuria, frequency and urgency.   Musculoskeletal:  Negative for myalgias and neck pain.   Skin:  Negative for rash.   Neurological:  Positive for weakness. Negative for dizziness and speech change.        Physical Exam  Temp:  [36.2 °C (97.2 °F)-36.8 °C (98.2 °F)] 36.3 °C (97.3 °F)  Pulse:  [58-88] 60  Resp:  [16-18] 18  BP: (120-179)/(67-95) 122/67  SpO2:  [90 %-97 %] 90 %    Physical Exam  Constitutional:       General: She is not in acute distress.     Appearance: She is not ill-appearing.   HENT:      Head: Normocephalic and atraumatic.   Cardiovascular:      Rate and Rhythm: Normal rate.      Heart sounds: No murmur heard.  Pulmonary:      Effort: No respiratory distress.      Breath sounds: No wheezing or rales.   Abdominal:      General: There is no distension.      Palpations: Abdomen is soft. There is no mass.      Tenderness: There is no abdominal tenderness. There is no guarding.   Musculoskeletal:         General: No deformity.      Right lower leg: No edema.      Left lower leg: No edema.   Skin:     Coloration: Skin is not jaundiced or pale.      Findings: No bruising or lesion.   Neurological:      Mental Status: She is oriented to person, place, and time. Mental status is at baseline.      Comments: Dysarthria         Fluids    Intake/Output Summary (Last 24 hours) at 4/27/2024 1149  Last data filed at 4/27/2024 1000  Gross per 24 hour   Intake 740 ml   Output 2000 ml   Net -1260 ml       Laboratory  Recent Labs     04/26/24  1630 04/27/24  0230   WBC 5.5 4.7*   RBC 5.34 5.75*   HEMOGLOBIN 15.1  16.1*   HEMATOCRIT 47.4* 49.3*   MCV 88.8 85.7   MCH 28.3 28.0   MCHC 31.9* 32.7   RDW 52.8* 50.8*   PLATELETCT 404 381   MPV 11.1 10.2     Recent Labs     04/26/24  1732 04/27/24  0230   SODIUM 136 138   POTASSIUM 5.6* 4.4   CHLORIDE 103 100   CO2 22 25   GLUCOSE 83 96   BUN 45* 37*   CREATININE 0.72 0.68   CALCIUM 9.1 9.9                   Imaging  DX-CHEST-PORTABLE (1 VIEW)   Final Result      1.  Mild left basilar opacity which may be atelectasis or infiltrate.   2.  Mild cardiac enlargement.      US-RUQ    (Results Pending)        Assessment/Plan  * Rhabdomyolysis- (present on admission)  Assessment & Plan  Related to daptomycin  With elevated liver enzymes and CPK more than 5000  Kidney function is stable, with no metabolic acidosis  Continue IV fluid and monitor CPK daily  Discontinue daptomycin    Osteomyelitis (HCC)- (present on admission)  Assessment & Plan  On her stump   Did not get any surgery   Continue antibiotics by ID recommendation continue doxycycline for total 6 weeks   Wound care  Close monitoring and consider debridement if needed    Primary hypertension- (present on admission)  Assessment & Plan  Continue verapamil ER    Elevated liver enzymes- (present on admission)  Assessment & Plan  Mildly elevated liver enzymes with benign abdominal exam  Likely related to rhabdo  Check ultrasound and viral hepatitis panel  Labs daily    Mild cognitive impairment- (present on admission)  Assessment & Plan  Patient is alert oriented x 4  High risk for delirium  Short memory deficit    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 In review of yesterday's note (4/26/2024), there are no changes except as documented above.      Greater than 51 minutes spent prepping to see patient (e.g. review of tests) obtaining and/or reviewing separately obtained history. Performing a medically appropriate examination and/ evaluation.  Counseling and educating the patient/family/caregiver.  Ordering medications, tests, or procedures.  Referring and communicating with other health care professionals.  Documenting clinical information in EPIC.  Independently interpreting results and communicating results to patient/family/caregiver.  Care coordination

## 2024-04-27 NOTE — PROGRESS NOTES
Irene report via volt phone from ER RN. Patient arrived on unit via zoll and transported by [facility], ACT. Physical assessment completed, skin check completed, tele box on and monitors notified. Patient denies having pain at this time. Personal belongings and call light are within reach. Plan of care is ongoing.

## 2024-04-28 ENCOUNTER — PHARMACY VISIT (OUTPATIENT)
Dept: PHARMACY | Facility: MEDICAL CENTER | Age: 84
End: 2024-04-28
Payer: COMMERCIAL

## 2024-04-28 ENCOUNTER — HOME HEALTH ADMISSION (OUTPATIENT)
Dept: HOME HEALTH SERVICES | Facility: HOME HEALTHCARE | Age: 84
End: 2024-04-28
Payer: MEDICARE

## 2024-04-28 VITALS
OXYGEN SATURATION: 95 % | HEART RATE: 62 BPM | TEMPERATURE: 97.9 F | DIASTOLIC BLOOD PRESSURE: 78 MMHG | SYSTOLIC BLOOD PRESSURE: 145 MMHG | WEIGHT: 149.47 LBS | RESPIRATION RATE: 18 BRPM | HEIGHT: 60 IN | BODY MASS INDEX: 29.35 KG/M2

## 2024-04-28 LAB
ALBUMIN SERPL BCP-MCNC: 3.6 G/DL (ref 3.2–4.9)
ALBUMIN/GLOB SERPL: 1.1 G/DL
ALP SERPL-CCNC: 92 U/L (ref 30–99)
ALT SERPL-CCNC: 175 U/L (ref 2–50)
ANION GAP SERPL CALC-SCNC: 14 MMOL/L (ref 7–16)
AST SERPL-CCNC: 202 U/L (ref 12–45)
BASOPHILS # BLD AUTO: 1.4 % (ref 0–1.8)
BASOPHILS # BLD: 0.06 K/UL (ref 0–0.12)
BILIRUB SERPL-MCNC: 0.6 MG/DL (ref 0.1–1.5)
BUN SERPL-MCNC: 27 MG/DL (ref 8–22)
CALCIUM ALBUM COR SERPL-MCNC: 9.9 MG/DL (ref 8.5–10.5)
CALCIUM SERPL-MCNC: 9.6 MG/DL (ref 8.5–10.5)
CHLORIDE SERPL-SCNC: 98 MMOL/L (ref 96–112)
CK SERPL-CCNC: 1818 U/L (ref 0–154)
CO2 SERPL-SCNC: 23 MMOL/L (ref 20–33)
CREAT SERPL-MCNC: 0.71 MG/DL (ref 0.5–1.4)
CRP SERPL HS-MCNC: 1.47 MG/DL (ref 0–0.75)
EOSINOPHIL # BLD AUTO: 0.08 K/UL (ref 0–0.51)
EOSINOPHIL NFR BLD: 1.9 % (ref 0–6.9)
ERYTHROCYTE [DISTWIDTH] IN BLOOD BY AUTOMATED COUNT: 50.6 FL (ref 35.9–50)
GFR SERPLBLD CREATININE-BSD FMLA CKD-EPI: 85 ML/MIN/1.73 M 2
GLOBULIN SER CALC-MCNC: 3.4 G/DL (ref 1.9–3.5)
GLUCOSE SERPL-MCNC: 114 MG/DL (ref 65–99)
HAV IGM SERPL QL IA: NORMAL
HBV CORE IGM SER QL: NORMAL
HBV SURFACE AG SER QL: NORMAL
HCT VFR BLD AUTO: 50.3 % (ref 37–47)
HCV AB SER QL: NORMAL
HGB BLD-MCNC: 16 G/DL (ref 12–16)
IMM GRANULOCYTES # BLD AUTO: 0.1 K/UL (ref 0–0.11)
IMM GRANULOCYTES NFR BLD AUTO: 2.4 % (ref 0–0.9)
LYMPHOCYTES # BLD AUTO: 0.63 K/UL (ref 1–4.8)
LYMPHOCYTES NFR BLD: 15.1 % (ref 22–41)
MCH RBC QN AUTO: 27.9 PG (ref 27–33)
MCHC RBC AUTO-ENTMCNC: 31.8 G/DL (ref 32.2–35.5)
MCV RBC AUTO: 87.8 FL (ref 81.4–97.8)
MONOCYTES # BLD AUTO: 0.48 K/UL (ref 0–0.85)
MONOCYTES NFR BLD AUTO: 11.5 % (ref 0–13.4)
NEUTROPHILS # BLD AUTO: 2.81 K/UL (ref 1.82–7.42)
NEUTROPHILS NFR BLD: 67.7 % (ref 44–72)
NRBC # BLD AUTO: 0 K/UL
NRBC BLD-RTO: 0 /100 WBC (ref 0–0.2)
PLATELET # BLD AUTO: 373 K/UL (ref 164–446)
PMV BLD AUTO: 10 FL (ref 9–12.9)
POTASSIUM SERPL-SCNC: 4.6 MMOL/L (ref 3.6–5.5)
PROCALCITONIN SERPL-MCNC: 0.06 NG/ML
PROT SERPL-MCNC: 7 G/DL (ref 6–8.2)
RBC # BLD AUTO: 5.73 M/UL (ref 4.2–5.4)
SODIUM SERPL-SCNC: 135 MMOL/L (ref 135–145)
TSH SERPL DL<=0.005 MIU/L-ACNC: 3.68 UIU/ML (ref 0.38–5.33)
VIT B12 SERPL-MCNC: 1606 PG/ML (ref 211–911)
WBC # BLD AUTO: 4.2 K/UL (ref 4.8–10.8)

## 2024-04-28 PROCEDURE — 80074 ACUTE HEPATITIS PANEL: CPT

## 2024-04-28 PROCEDURE — A9270 NON-COVERED ITEM OR SERVICE: HCPCS | Performed by: INTERNAL MEDICINE

## 2024-04-28 PROCEDURE — 85025 COMPLETE CBC W/AUTO DIFF WBC: CPT

## 2024-04-28 PROCEDURE — 84145 PROCALCITONIN (PCT): CPT

## 2024-04-28 PROCEDURE — 700102 HCHG RX REV CODE 250 W/ 637 OVERRIDE(OP)

## 2024-04-28 PROCEDURE — RXMED WILLOW AMBULATORY MEDICATION CHARGE: Performed by: INTERNAL MEDICINE

## 2024-04-28 PROCEDURE — 82550 ASSAY OF CK (CPK): CPT

## 2024-04-28 PROCEDURE — 82607 VITAMIN B-12: CPT

## 2024-04-28 PROCEDURE — 700102 HCHG RX REV CODE 250 W/ 637 OVERRIDE(OP): Performed by: INTERNAL MEDICINE

## 2024-04-28 PROCEDURE — 86140 C-REACTIVE PROTEIN: CPT

## 2024-04-28 PROCEDURE — 97535 SELF CARE MNGMENT TRAINING: CPT

## 2024-04-28 PROCEDURE — A9270 NON-COVERED ITEM OR SERVICE: HCPCS

## 2024-04-28 PROCEDURE — 97161 PT EVAL LOW COMPLEX 20 MIN: CPT

## 2024-04-28 PROCEDURE — A9270 NON-COVERED ITEM OR SERVICE: HCPCS | Performed by: HOSPITALIST

## 2024-04-28 PROCEDURE — 700105 HCHG RX REV CODE 258: Performed by: INTERNAL MEDICINE

## 2024-04-28 PROCEDURE — 80053 COMPREHEN METABOLIC PANEL: CPT

## 2024-04-28 PROCEDURE — 99239 HOSP IP/OBS DSCHRG MGMT >30: CPT | Performed by: INTERNAL MEDICINE

## 2024-04-28 PROCEDURE — 700102 HCHG RX REV CODE 250 W/ 637 OVERRIDE(OP): Performed by: HOSPITALIST

## 2024-04-28 PROCEDURE — 84443 ASSAY THYROID STIM HORMONE: CPT

## 2024-04-28 PROCEDURE — 36415 COLL VENOUS BLD VENIPUNCTURE: CPT

## 2024-04-28 RX ORDER — FLUTICASONE PROPIONATE 50 MCG
2 SPRAY, SUSPENSION (ML) NASAL EVERY MORNING
Status: DISCONTINUED | OUTPATIENT
Start: 2024-04-28 | End: 2024-04-28

## 2024-04-28 RX ORDER — LOSARTAN POTASSIUM 25 MG/1
25 TABLET ORAL 2 TIMES DAILY
Status: DISCONTINUED | OUTPATIENT
Start: 2024-04-28 | End: 2024-04-28 | Stop reason: HOSPADM

## 2024-04-28 RX ORDER — DOXYCYCLINE 100 MG/1
100 TABLET ORAL EVERY 12 HOURS
Qty: 56 TABLET | Refills: 0 | Status: ACTIVE | OUTPATIENT
Start: 2024-04-28 | End: 2024-05-01 | Stop reason: SDUPTHER

## 2024-04-28 RX ORDER — ASPIRIN 81 MG/1
81 TABLET, CHEWABLE ORAL EVERY MORNING
Status: DISCONTINUED | OUTPATIENT
Start: 2024-04-28 | End: 2024-04-28 | Stop reason: HOSPADM

## 2024-04-28 RX ADMIN — LOSARTAN POTASSIUM 25 MG: 25 TABLET, FILM COATED ORAL at 11:17

## 2024-04-28 RX ADMIN — VERAPAMIL HYDROCHLORIDE 240 MG: 240 TABLET, FILM COATED, EXTENDED RELEASE ORAL at 05:10

## 2024-04-28 RX ADMIN — ASPIRIN 81 MG: 81 TABLET, CHEWABLE ORAL at 11:17

## 2024-04-28 RX ADMIN — LORATADINE 10 MG: 10 TABLET ORAL at 05:10

## 2024-04-28 RX ADMIN — OXYMETAZOLINE HCL 2 SPRAY: 0.05 SPRAY NASAL at 05:10

## 2024-04-28 RX ADMIN — DOXYCYCLINE 100 MG: 100 TABLET, FILM COATED ORAL at 05:10

## 2024-04-28 RX ADMIN — SODIUM CHLORIDE, POTASSIUM CHLORIDE, SODIUM LACTATE AND CALCIUM CHLORIDE: 600; 310; 30; 20 INJECTION, SOLUTION INTRAVENOUS at 07:26

## 2024-04-28 RX ADMIN — POLYETHYLENE GLYCOL 3350 1 PACKET: 17 POWDER, FOR SOLUTION ORAL at 05:10

## 2024-04-28 ASSESSMENT — COGNITIVE AND FUNCTIONAL STATUS - GENERAL
WALKING IN HOSPITAL ROOM: A LITTLE
STANDING UP FROM CHAIR USING ARMS: A LITTLE
CLIMB 3 TO 5 STEPS WITH RAILING: A LOT
MOBILITY SCORE: 20
SUGGESTED CMS G CODE MODIFIER MOBILITY: CJ

## 2024-04-28 ASSESSMENT — GAIT ASSESSMENTS
ASSISTIVE DEVICE: 4 WHEEL WALKER
DEVIATION: DECREASED BASE OF SUPPORT;SHUFFLED GAIT;DECREASED HEEL STRIKE;DECREASED TOE OFF
GAIT LEVEL OF ASSIST: SUPERVISED
DISTANCE (FEET): 300

## 2024-04-28 ASSESSMENT — PAIN DESCRIPTION - PAIN TYPE: TYPE: ACUTE PAIN

## 2024-04-28 ASSESSMENT — FIBROSIS 4 INDEX: FIB4 SCORE: 4.65

## 2024-04-28 NOTE — PROGRESS NOTES
Monitor summary:        Rhythm: paced/SR  Rate: 67-75  Ectopy: (R)PVC  Measurements: 15./.14/.45        12hr chart check

## 2024-04-28 NOTE — CARE PLAN
The patient is Stable - Low risk of patient condition declining or worsening    Shift Goals  Clinical Goals: VSS, IVF  Patient Goals: comfort    Progress made toward(s) clinical / shift goals:      Patient is not progressing towards the following goals:    Problem: Knowledge Deficit - Standard  Goal: Patient and family/care givers will demonstrate understanding of plan of care, disease process/condition, diagnostic tests and medications  Outcome: Progressing     Problem: Fall Risk  Goal: Patient will remain free from falls  Outcome: Progressing     Problem: Fluid Volume  Goal: Fluid volume balance will be maintained  Outcome: Progressing     Problem: Urinary Elimination  Goal: Establish and maintain regular urinary output  Outcome: Progressing     Problem: Self Care  Goal: Patient will have the ability to perform ADLs independently or with assistance (bathe, groom, dress, toilet and feed)  Outcome: Progressing

## 2024-04-28 NOTE — DISCHARGE SUMMARY
Discharge Summary    CHIEF COMPLAINT ON ADMISSION  Chief Complaint   Patient presents with    Abnormal Labs     CPK elevated       Reason for Admission  Rhabdomyolysis due to daptomycin    Admission Date  4/26/2024    CODE STATUS  DNAR/DNI    HPI & HOSPITAL COURSE    83-year-old female with history of multiple CVA, sick sinus syndrome s/p pacemaker, hypertension and diastolic heart failure who presented 4/26 from SNF due to body ache.  Patient started having generalized weakness with shoulder pain and dark-colored urine, with nausea and vomiting.  Patient was taken daptomycin for left stump osteomyelitis and was discharged from the hospital on daptomycin.  ID recommended to continue IV daptomycin 600 mg daily until 5/5 and then follow with 2 weeks of oral doxycycline 100 mg twice daily.  On admission patient was found to have CPK more than 5000, with elevated liver enzymes, daptomycin was discontinued and start with IV fluid.  ID was consulted.  Her CPK was improved, kidney function was stable also improving on her liver enzymes, ultrasound for liver did not show any acute finding except fatty liver.     On the day of discharge the patient was alert oriented x 4, denied any new symptoms, improving on her CP discharge K, kidney functions are stable, improving on her liver enzymes, patient denied any new symptoms, PT and OT cleared her for discharge with home health, plan of care was discussed in detail with the patient, patient will be discharged on doxycycline for 4 weeks, referral to outpatient infectious disease was placed, encouraged the patient to follow-up with PCP and infectious disease clinic, also stay hydrated, she understood and agreed.    Therefore, she is discharged in good and stable condition to home with organized home healthcare and close outpatient follow-up.    The patient met 2-midnight criteria for an inpatient stay at the time of discharge.    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   6/3/2024 10:40 AM NAYAN Go CHALRUSTY None     [place]  [address] [address]  [phone]    Follow up in 1 week(s)        MEDICATIONS ON DISCHARGE     Medication List        START taking these medications        Instructions   doxycycline monohydrate 100 MG tablet  Commonly known as: Adoxa   Take 1 Tablet by mouth every 12 hours for 28 days.  Dose: 100 mg            CONTINUE taking these medications        Instructions   acetaminophen 325 MG Tabs  Commonly known as: Tylenol   Take 650 mg by mouth every four hours as needed for Mild Pain. 2 tablets = 650 mg.  Dose: 650 mg     ascorbic acid 500 MG Tabs  Commonly known as: Ascorbic Acid   Take 1,000 mg by mouth 2 times a day. 2 tablets = 1,000 mg.  Dose: 1,000 mg     aspirin 81 MG Chew chewable tablet  Commonly known as: Asa   Chew 81 mg every morning.  Dose: 81 mg     azelastine 137 MCG/SPRAY nasal spray  Commonly known as: Astelin   Administer 1 Spray into each nostril 2 times a day.  Dose: 1 Spray     B Complex-Biotin-FA Tabs   Take 1 Tablet by mouth every morning. Indications: Supplement  Dose: 1 Tablet     diclofenac  "sodium 1 % Gel  Commonly known as: Voltaren   Apply 2 g topically 4 times a day as needed (To hand joints for pain).  Dose: 2 g     ezetimibe 10 MG Tabs  Commonly known as: Zetia   TAKE ONE TABLET BY MOUTH EVERY DAY  Dose: 10 mg     fish oil 1000 MG Caps capsule   Take 1,000 mg by mouth every evening. Indications: Supplement  Dose: 1,000 mg     fluticasone 50 MCG/ACT nasal spray  Commonly known as: Flonase   Administer 2 Sprays into each nostril every morning.  Dose: 2 Spray     loratadine 10 MG Tabs  Commonly known as: Claritin   Take 10 mg by mouth 2 times a day.  Dose: 10 mg     losartan 25 MG Tabs  Commonly known as: Cozaar   Take 25 mg by mouth 2 times a day. Indications: High Blood Pressure Disorder  Dose: 25 mg     magnesium oxide 400 MG Tabs tablet  Commonly known as: Mag-Ox   Take 400 mg by mouth every morning. Indications: Supplement  Dose: 400 mg     verapamil  MG Tbcr  Commonly known as: Calan SR   Take 1 Tablet by mouth every day.  Dose: 240 mg            STOP taking these medications      DAPTOmycin 500 MG Solr  Commonly known as: Cubicin              Allergies  Allergies   Allergen Reactions    Benicar [Olmesartan] Rash     Blisters    Tolerated losartan 4/12/24.    Dilaudid  [Hydromorphone Hcl] Vomiting    Lidocaine     Other Misc Shortness of Breath     Anesthesia with surgery 2016  Pt couldn't breathe while being put under and tapped side of bed to let RN know. \"It was the scariest feeling and I want everyone to know\"      Codeine Vomiting    Demerol Vomiting    Hydrocodone Vomiting    Other Drug Vomiting     \"all pain meds\" per pt can take morphine.    Oxycodone Vomiting    Statins [Hmg-Coa-R Inhibitors] Unspecified     Muscle cramping    Valium Vomiting    Amlodipine Swelling    Diazepam     Nexletol [Bempedoic Acid] Palpitations     Chest pain and dizziness    Simvastatin Myalgia and Unspecified     Headache, fatigue    Hydrochlorothiazide      Frequent urination       DIET  Orders Placed " This Encounter   Procedures    Diet Order Diet: Regular     Standing Status:   Standing     Number of Occurrences:   1     Order Specific Question:   Diet:     Answer:   Regular [1]       ACTIVITY  As tolerated.  Weight bearing as tolerated    CONSULTATIONS  ID    PROCEDURES  None    LABORATORY  Lab Results   Component Value Date    SODIUM 135 04/28/2024    POTASSIUM 4.6 04/28/2024    CHLORIDE 98 04/28/2024    CO2 23 04/28/2024    GLUCOSE 114 (H) 04/28/2024    BUN 27 (H) 04/28/2024    CREATININE 0.71 04/28/2024        Lab Results   Component Value Date    WBC 4.2 (L) 04/28/2024    HEMOGLOBIN 16.0 04/28/2024    HEMATOCRIT 50.3 (H) 04/28/2024    PLATELETCT 373 04/28/2024        Total time of the discharge process exceeds 35 minutes.

## 2024-04-28 NOTE — WOUND TEAM
Assisted Jose De Jesus LU (Wound RN), with wound care, non-selective debridement performed using wound cleanser/NS and gauze. Please see Jose De Jesus LU (Wound RN) wound note for further wound care details.

## 2024-04-28 NOTE — WOUND TEAM
[facility]  Inpatient Services  Wound and Skin Care Brief Evaluation    Admission Date: 4/26/2024     Last order of IP CONSULT TO WOUND CARE was found on 4/26/2024 from Hospital Encounter on 4/26/2024     HPI, PMH, SH: Reviewed    Chief Complaint   Patient presents with    Abnormal Labs     CPK elevated     Diagnosis: Rhabdomyolysis [M62.82]    Unit where seen by Wound Team: T727/02     Wound consult placed regarding Left thumb. Chart and images reviewed. This discussed with bedside RNRoby. This clinician in to assess patient. Patient pleasant and agreeable. Pt needing to go to BR. This RN assisted pt stand by with home FWW to BR. Sacrum and bilateral heels assessed. Area intact, thumb/all fingers intact.     No pressure injuries or advanced wound care needs identified. Wound consult completed. No further follow up unless indicated and consulted.

## 2024-04-28 NOTE — THERAPY
Physical Therapy   Initial Evaluation     Patient Name: Irina Traylor  Age:  83 y.o., Sex:  female  Medical Record #: [id number]  Today's Date: 4/28/2024     Precautions  Precautions: (P) Fall Risk    Assessment    Patient is 83 y.o. female  admitted 4/26/2024 with body ache. multiple CVA, sick sinus syndrome s/p pacemaker, hypertension and diastolic heart failure who presented 4/26 from SNF due to body ache. recurrent infection of the left thumb. Pt was in SNF x 2 weeks and was walking daily. Pt cleared for home per PT stand point. Recommend HH therapy services. See below flow sheet for eval details.       Plan    Physical Therapy Initial Treatment Plan   Treatment Plan : (P) Gait Training, Bed Mobility, Neuro Re-Education / Balance, Self Care / Home Evaluation, Therapeutic Activities, Therapeutic Exercise  Treatment Frequency: (P) Other (See Comments)  Duration: (P) Evaluation only    DC Equipment Recommendations: (P) None  Discharge Recommendations: (P) Recommend home health for continued physical therapy services            04/28/24 1042   Charge Group   PT Evaluation PT Evaluation Low   PT Self Care / Home Evaluation (Units) 1   Total Time Spent   PT Evaluation Time Spent (Mins) 30   PT Self Care/Home Evaluation Time Spent (Mins) 15    Services   Is patient using  services for this encounter? No   Initial Contact Note    Initial Contact Note Order Received and Verified, Evaluation Only - Patient Does Not Require Further Acute Physical Therapy at this Time.  However, May Benefit from Post Acute Therapy for Higher Level Functional Deficits.   Precautions   Precautions Fall Risk   Vitals   O2 Delivery Device None - Room Air   Pain 0 - 10 Group   Location Head;Back   Location Orientation Mid   Therapist Pain Assessment 2;Nurse Notified;Post Activity Pain Same as Prior to Activity   Prior Living Situation   Prior Services None   Housing / Facility 1 Story House   Steps Into Home 0   Steps  In Home 0   Equipment Owned Ramp;Front-Wheel Walker;4-Wheel Walker   Lives with - Patient's Self Care Capacity Alone and Able to Care For Self   Comments pt has neighbors to assist and friend that wases her hair once a week. neighbors to assist with rides and shopping.   Prior Level of Functional Mobility   Bed Mobility Independent   Transfer Status Independent   Ambulation Independent   Ambulation Distance household and community   Assistive Devices Used 4-Wheel Walker   Comments inep with adL's and yard work   History of Falls   History of Falls No  (remote 5 years ago)   Cognition    Cognition / Consciousness WDL   Level of Consciousness Alert   Comments slurred speech from prior CVA   Passive ROM Lower Body   Passive ROM Lower Body WDL   Active ROM Lower Body    Active ROM Lower Body  X   Comments stiff L>R LE when first getting out of bed   Strength Lower Body   Lower Body Strength  WDL   Comments for mobility   Sensation Lower Body   Lower Extremity Sensation   WDL   Lower Body Muscle Tone   Lower Body Muscle Tone  WDL   Comments slight increased tone L LE from prior CVA   Neurological Concerns   Neurological Concerns Yes   Comments hx of CVA   Coordination Lower Body    Coordination Lower Body  WDL   Comments functional   Vision   Vision Comments wears glasses   Other Treatments   Other Treatments Provided home safety discussed, review hEP   Balance Assessment   Sitting Balance (Static) Good   Sitting Balance (Dynamic) Fair +   Standing Balance (Static) Fair +   Standing Balance (Dynamic) Fair   Weight Shift Sitting Fair   Weight Shift Standing Fair   Comments no overt LOB   Bed Mobility    Supine to Sit Supervised   Sit to Supine Supervised   Scooting Independent   Rolling Independent   Gait Analysis   Gait Level Of Assist Supervised   Assistive Device 4 Wheel Walker   Distance (Feet) 300   # of Times Distance was Traveled 1   Deviation Decreased Base Of Support;Shuffled Gait;Decreased Heel Strike;Decreased  Toe Off   # of Stairs Climbed 0   Weight Bearing Status full   Vision Deficits Impacting Mobility denies   Comments occasional standing rest to correct for erect posture   Functional Mobility   Sit to Stand Standby Assist   Bed, Chair, Wheelchair Transfer Standby Assist   Toilet Transfers Standby Assist   6 Clicks Assessment - How much HELP from from another person do you currently need... (If the patient hasn't done an activity recently, how much help from another person do you think he/she would need if he/she tried?)   Turning from your back to your side while in a flat bed without using bedrails? 4   Moving from lying on your back to sitting on the side of a flat bed without using bedrails? 4   Moving to and from a bed to a chair (including a wheelchair)? 4   Standing up from a chair using your arms (e.g., wheelchair, or bedside chair)? 3   Walking in hospital room? 3   Climbing 3-5 steps with a railing? 2   6 clicks Mobility Score 20   Activity Tolerance   Sitting Edge of Bed 10 mins   Standing 10 mins, plus 15 mins   Edema / Skin Assessment   Edema / Skin  Not Assessed   Education Group   Education Provided Role of Physical Therapist;Exercises - Supine   Role of Physical Therapist Patient Response Patient;Acceptance;Explanation;Verbal Demonstration   Exercises - Supine Patient Response Patient;Acceptance;Explanation;Verbal Demonstration   Physical Therapy Initial Treatment Plan    Treatment Plan  Gait Training;Bed Mobility;Neuro Re-Education / Balance;Self Care / Home Evaluation;Therapeutic Activities;Therapeutic Exercise   Treatment Frequency Other (See Comments)   Duration Evaluation only   Problem List    Problems Decreased Activity Tolerance   Anticipated Discharge Equipment and Recommendations   DC Equipment Recommendations None   Discharge Recommendations Recommend home health for continued physical therapy services   Interdisciplinary Plan of Care Collaboration   IDT Collaboration with   Nursing;Physician   Patient Position at End of Therapy In Bed;Bed Alarm On;Phone within Reach;Tray Table within Reach;Call Light within Reach   Collaboration Comments re; eval   Session Information   Date / Session Number  4/28/24- one time only

## 2024-04-28 NOTE — DISCHARGE PLANNING
HTH/SCP TCN chart review completed. Collaborated with CECY Solorio prior to meeting with the pt. The most current review of medical record, knowledge of pt's PLOF and social support, LACE+ score of 74, 6 clicks scores of 24 ADL's and 20 mobility were considered.  Per chart review, patient has a discharge order.      Pt seen at bedside. Introduced TCN program. Provided education regarding post acute levels of care. Education provided regarding case management policy for blanket SNF referrals. Discussed HTH/SCP plan benefits. Pt verbalizes understanding.     Per chart review, PT recommends Home Health on 4/28/24.  OT recommendations are pending.      Patient states she lives alone in a one level home with 2 ramps and was Modified Independent with ADL's, IADL's, and mobility (with use of 4WW) at her baseline.  She reports having 3 4WW's and states having good support from family/friends.  She has a Senior service that provides transportation to [employer]/store as needed.  Friends also drive as needed.  She is on RA and denies any concerns with food, housing or transportation.  She uses a CPAP at night.  She is agreeable to HH services.  [facility] previously obtained HH choice.     TCN will continue to follow and collaborate with discharge planning team as additional post acute needs arise. Thank you.     Completed today:  [facility] has accepted.  (At time of writing this note.    PT with recommends HH ob 4/28/24.  OT pending.    Choice obtained: HH ([facility]) obtained by CECY on 4/28.    SCP with [facility] PCP.   Referred to  for Follow up appointment.

## 2024-04-28 NOTE — CARE PLAN
The patient is Stable - Low risk of patient condition declining or worsening    Shift Goals  Clinical Goals: monitor Vs and labs  Patient Goals: rest, sleep    Progress made toward(s) clinical / shift goals:      Problem: Knowledge Deficit - Standard  Goal: Patient and family/care givers will demonstrate understanding of plan of care, disease process/condition, diagnostic tests and medications  Outcome: Progressing     Problem: Fall Risk  Goal: Patient will remain free from falls  Outcome: Progressing     Problem: Urinary Elimination  Goal: Establish and maintain regular urinary output  Outcome: Progressing       Patient is not progressing towards the following goals:

## 2024-04-28 NOTE — DISCHARGE PLANNING
Received choice form @: 1145  Agency/Facility name: [facility]  Sent referral per choice form @: 1288

## 2024-04-28 NOTE — DISCHARGE PLANNING
"Care Transition Team Assessment    At 1140, CM RN met with patient at bedside to complete assessment. Patient lives alone and has good support from neighbors and friend. Address and phone numbers for patient and Tensiha Sun, friend, verified with patient. Patient also expressed a strong Y[employer]dan[employer] keenan and wanted Father Rashawn with [employer] listed as one of her contacts. Patient was adamant she did not want her brother contacted for any information. I marked his contact to not discuss treatment, but I left his contact information in place, in case of future need.   She has a POLST in place with two DPOA's listed.     Patient choice obtained for [facility] . She has been on service with them previously.   Patient has a 4WW at home, tub bench, grab bars. \"I have everything.\"   No DME recommended by OT.    PCP is KUSHAL Montiel.     Neighbors will transport her home. She is waiting for them to get out of [employer].  Bedside RN aware I obtained home health choice.   IMM signed by patient.     At 1615 - Patient is still here with no way home and no key to get into her house. None of her contacts are answering. I left [facility] for Tenisha Sun, KwanNorma reina, Martha [phone].  Kwan and Norma are the only ones that have a dawson.   I called Ngozi Simental, but she is in [place]. Ms. Simental has no contact for any of her friends.   1635 Tenisha Sun called back. She has no key and did not want to transport patient home.   Patient will remain inpatient until patient has a way to get into her house.   Voalte to attending, bedside RN and discharge nurse to notify them of delay in discharge.     Information Source  Orientation Level: Oriented X4  Information Given By: Patient  Who is responsible for making decisions for patient? : Patient    Readmission Evaluation  Is this a readmission?: No    Elopement Risk  Legal Hold: No  Ambulatory or Self Mobile in Wheelchair: Yes  Disoriented: No  Psychiatric Symptoms: " None  History of Wandering: No  Elopement this Admit: No  Vocalizing Wanting to Leave: No  Displays Behaviors, Body Language Wanting to Leave: No-Not at Risk for Elopement  Elopement Risk: Not at Risk for Elopement    Interdisciplinary Discharge Planning  Lives with - Patient's Self Care Capacity: Alone and Able to Care For Self  Patient or legal guardian wants to designate a caregiver: No  Support Systems: Family Member(s), [employer], Friends / Neighbors  Housing / Facility: [address] [facility]  Prior Services: None  Durable Medical Equipment: Walker    Discharge Preparedness  What is your plan after discharge?: Home with help  What are your discharge supports?: Other (comment) (Neighbors; friends)  Prior Functional Level: Ambulatory  Difficulity with IADLs: Driving, Shopping    Functional Assesment  Prior Functional Level: Ambulatory    Finances  Financial Barriers to Discharge: No  Prescription Coverage: Yes    Vision / Hearing Impairment  Vision Impairment : Yes  Right Eye Vision: Wears Glasses  Left Eye Vision: Wears Glasses  Hearing Impairment : No         Advance Directive  Advance Directive?: DPOA for Health Care, POLST  Durable Power of  Name and Contact : Tenisha Sun [phone]    Domestic Abuse  Have you ever been the victim of abuse or violence?: No  Physical Abuse or Sexual Abuse: No  Verbal Abuse or Emotional Abuse: No  Possible Abuse/Neglect Reported to:: Not Applicable    Psychological Assessment  History of Substance Abuse: None  History of Psychiatric Problems: No  Non-compliant with Treatment: No  Newly Diagnosed Illness: Yes    Discharge Risks or Barriers  Discharge risks or barriers?: No    Anticipated Discharge Information  Discharge Disposition: D/T to home under A care in anticipation of covered skilled care (06)  Discharge Address: [address]; [place] [address]  Discharge Contact Phone Number: [phone]

## 2024-04-28 NOTE — FACE TO FACE
Face to Face Supporting Documentation - Home Health    The encounter with this patient was in whole or in part the primary reason for home health admission.    Date of encounter:   Patient:                    MRN:                       YOB: 2022  Irina Traylor  [id number]  1940     Home health to see patient for:  Skilled Nursing care for assessment, interventions & education, Physical Therapy evaluation and treatment, and Occupational therapy evaluation and treatment    Skilled need for:  Exacerbation of Chronic Disease State osteo    Skilled nursing interventions to include:  Venous access care, Wound Care, and Line/Drain/Airway education and care    Homebound status evidenced by:  Need the aid of supportive devices such as crutches, canes, wheelchairs or walkers. Leaving home requires a considerable and taxing effort. There is a normal inability to leave the home.    Community Physician to provide follow up care: NAYAN Longo     Optional Interventions? No      I certify the face to face encounter for this home health care referral meets the CMS requirements and the encounter/clinical assessment with the patient was, in whole, or in part, for the medical condition(s) listed above, which is the primary reason for home health care. Based on my clinical findings: the service(s) are medically necessary, support the need for home health care, and the homebound criteria are met.  I certify that this patient has had a face to face encounter by myself.  Frederick Nava M.D. - NPI: [id number]

## 2024-04-28 NOTE — DISCHARGE PLANNING
ATTN: Case Management  RE: Referral for Home Health    As of 4/28/2024, we have accepted the Home Health referral for the patient listed above.    A [facility] clinician will be out to see the patient within 48 hours. If you have any questions or concerns regarding the patient's transition to Home Health, please do not hesitate to contact us at [phone].      We look forward to collaborating with you,  [facility] Home Health Team

## 2024-04-28 NOTE — PROGRESS NOTES
Handoff report received from night shift nurse. Pt care assumed. Pt is currently resting in bed. POC discussed with Pt and Pt verbalizes no questions at this time. Pt is AAOx4, on room air, on Tele monitoring, and VSS. Call light and belongings within reach, bed in lowest and locked position, and Pt educated on use of call light.

## 2024-04-29 ENCOUNTER — NON-PROVIDER VISIT (OUTPATIENT)
Dept: CARDIOLOGY | Facility: MEDICAL CENTER | Age: 84
End: 2024-04-29
Payer: MEDICARE

## 2024-04-29 ENCOUNTER — PATIENT OUTREACH (OUTPATIENT)
Dept: MEDICAL GROUP | Facility: PHYSICIAN GROUP | Age: 84
End: 2024-04-29
Payer: MEDICARE

## 2024-04-29 NOTE — PROGRESS NOTES
Transitional Care Management  [facility] Outreach Date and Time: Filed (4/29/2024 11:25 AM)    Discharge Questions  Actual Discharge Date: 04/28/24  Now that you are home, how are you feeling?: Good  Did you receive any new prescriptions?: Yes  Were you able to get them filled?: Yes  Meds to Bed or Pharmacy filled?: Meds to Bed  Do you have any questions about your current medications or new medications (Review Med Rec)?: No  Did you have any durable medical equipment ordered?: No  Do you have a follow up appointment scheduled with your PCP?: No  Was an appointment scheduled for the patient?: Yes  Appointment Date: 05/01/24  Appointment Time: 0900  Any issues or paperwork you wish to discuss with your PCP?: No  Are you (patient) able to get to the appointment?: Yes  If Home Health was ordered, have they contacted you (Patient): Yes  Did you have enough support after your last discharge?: Yes  Does this patient qualify for the CCM program?: Yes    Transitional Care  Number of attempts made to contact patient: 1  Current or previous attempts completed within two business days of discharge? : Yes  Provided education regarding treatment plan, medications, self-management, ADLs?: No  Has patient completed an Advanced Directive?: Yes  Is the patient's advanced directive on file?: Yes  Has the Care Manager's phone number provided?: No  Is there anything else I can help you with?: No    Discharge Summary  Chief Complaint: body aches  Admitting Diagnosis: Rhabdomyolysis  Discharge Diagnosis: Rhabdomyolysis

## 2024-04-29 NOTE — CARDIAC REMOTE MONITOR - SCAN
Device transmission reviewed. Device demonstrated appropriate function.       Electronically Signed by: Reji Carl M.D.    4/30/2024  1:05 PM

## 2024-04-29 NOTE — PROGRESS NOTES
Discharge instructions given to patient at bedside, verbalizes understanding and states plans for follow-up with PCP. New and home medication review, post-discharge activity level and worsening of symptoms needing follow-up care discussed. Telemetry monitor/IV cathlon removed. All belongings accounted for, all questions answered at this time. Patient and belongings escorted downstairs via wheal chair to friend waiting in the car. Patient safety in car.

## 2024-04-30 ENCOUNTER — HOSPITAL ENCOUNTER (OUTPATIENT)
Dept: RADIOLOGY | Facility: MEDICAL CENTER | Age: 84
End: 2024-04-30
Attending: NURSE PRACTITIONER
Payer: MEDICARE

## 2024-04-30 ENCOUNTER — OFFICE VISIT (OUTPATIENT)
Dept: SLEEP MEDICINE | Facility: MEDICAL CENTER | Age: 84
End: 2024-04-30
Attending: NURSE PRACTITIONER
Payer: MEDICARE

## 2024-04-30 VITALS
WEIGHT: 148 LBS | DIASTOLIC BLOOD PRESSURE: 72 MMHG | BODY MASS INDEX: 29.06 KG/M2 | RESPIRATION RATE: 16 BRPM | SYSTOLIC BLOOD PRESSURE: 114 MMHG | HEART RATE: 58 BPM | OXYGEN SATURATION: 92 % | HEIGHT: 60 IN

## 2024-04-30 DIAGNOSIS — G47.33 OSA (OBSTRUCTIVE SLEEP APNEA): Chronic | ICD-10-CM

## 2024-04-30 DIAGNOSIS — K42.9 UMBILICAL HERNIA WITHOUT OBSTRUCTION AND WITHOUT GANGRENE: ICD-10-CM

## 2024-04-30 DIAGNOSIS — I10 PRIMARY HYPERTENSION: ICD-10-CM

## 2024-04-30 DIAGNOSIS — J32.9 CHRONIC SINUSITIS, UNSPECIFIED LOCATION: ICD-10-CM

## 2024-04-30 DIAGNOSIS — Z78.9 NONSMOKER: ICD-10-CM

## 2024-04-30 PROBLEM — E66.9 OBESITY (BMI 30-39.9): Status: RESOLVED | Noted: 2021-04-08 | Resolved: 2024-04-30

## 2024-04-30 PROCEDURE — 99213 OFFICE O/P EST LOW 20 MIN: CPT | Performed by: NURSE PRACTITIONER

## 2024-04-30 RX ORDER — AZELASTINE 1 MG/ML
1 SPRAY, METERED NASAL 2 TIMES DAILY
Qty: 90 ML | Refills: 3 | Status: SHIPPED | OUTPATIENT
Start: 2024-04-30

## 2024-04-30 RX ORDER — FLUTICASONE PROPIONATE 50 MCG
1 SPRAY, SUSPENSION (ML) NASAL 2 TIMES DAILY
Qty: 16 G | Refills: 3 | Status: SHIPPED | OUTPATIENT
Start: 2024-04-30

## 2024-04-30 ASSESSMENT — ENCOUNTER SYMPTOMS
VOMITING: 0
BRUISES/BLEEDS EASILY: 0
BLURRED VISION: 0
NAUSEA: 0
DOUBLE VISION: 0
DIZZINESS: 0
CHILLS: 0
PALPITATIONS: 0
MYALGIAS: 0
WHEEZING: 0
WEIGHT LOSS: 0
COUGH: 0
FOCAL WEAKNESS: 0
HEADACHES: 0
SHORTNESS OF BREATH: 0
FEVER: 0
NERVOUS/ANXIOUS: 0
ABDOMINAL PAIN: 0
SPUTUM PRODUCTION: 0

## 2024-04-30 ASSESSMENT — FIBROSIS 4 INDEX: FIB4 SCORE: 3.32

## 2024-04-30 NOTE — PROGRESS NOTES
INFECTIOUS  DISEASE  OUTPATIENT CLINIC  NOTE   Subjective   Primary care provider: NAYAN Longo.     Reason for Follow Up:   Follow-up for   1. Other chronic osteomyelitis of left hand (HCC)  CBC WITH DIFFERENTIAL    Comp Metabolic Panel      2. History of CVA (cerebrovascular accident)        3. Transaminitis            HPI: Patient previously seen and treated by ID team as inpatient during hospital admission.   Irina Traylor is a 83 y.o. female with a history of multiple CVAs in the past, sick sinus syndrome status post pacemaker placement, hypertension, who presents to the emergency department complaining of left hand first digit pain.  The patient had been progressing over the last 6 months. Recurrent infection of the last thumb with purulent fluid from under nail which would improve with antibiotics and then recurred once antibiotics were stopped.  Admitted 4/8/2024 after patient was seen in the [facility] and was told that she had a bone infection and report to the ER.  MRI 4/11 enhancement of the left hand first distal phalanx consistent with osteomyelitis and surrounding cellulitis.  Orthopedic surgeon recommended IV antibiotics and declined amputation at this time. No cultures available since no drainage was present on presentation.  Patient was discharged on IV daptomycin but developed elevated CPK levels and concerns for rhabdomyolysis.  Patient was readmitted on 4/26/2024 for frequent monitoring and labs.  Transition to p.o. doxycycline 100 mg twice daily for additional 4 weeks with end date of 5/26/2024.  Total of 6 weeks of antibiotic therapy.    5/1/24- Today Patient reports feeling well. Pt stating that the left thumb has healed well.  Chronically deformed nailbed.  Left hand thumb without any surrounding erythema, swelling or drainage. Wound pictures reviewed in EPIC. Denies drainage, pungent odor, redness, pain. Denies feeling generally ill, fevers/chills, general  malaise, headache, n/v/d.  Tolerating doxycycline with no complaints of side effects.  Most recent labs reviewed from 4/28/2024, mild leukopenia 4.2, neutrophils WNL, CMP with mild elevated BUN trending down to 27, elevated LFTs 202/175, trending down.  Repeat labs today and again in 2 weeks for close monitoring.  Standing order to count.  Recommended to avoid calcium products at least 2 to 3 hours before and after taking doxycycline and wear sunscreen while outside.    Multiple allergies.  Elevated CPK while on IV daptomycin  Current Antimicrobials: P.o. doxycycline 100 mg twice daily, 4-week course with end date of 5/26/2024.  Total of 6 weeks of antibiotic therapy.  Previous Antimicrobials: Daptomycin    Other Current Medications:  Home Medications    Medication Sig Taking? Last Dose Authorizing Provider   Apoaequorin (PREVAGEN EXTRA STRENGTH) 20 MG Cap Take  by mouth. Yes Taking Physician Outpatient   irbesartan (AVAPRO) 300 MG Tab Take 1 Tablet by mouth every day. Yes Taking RUSTY Longo.P.R.NLizbeth   azelastine (ASTELIN) 137 MCG/SPRAY nasal spray Administer 1 Spray into affected nostril(S) 2 times a day. Yes Taking DIYA BlancaP.R.NLizbeth   doxycycline monohydrate (ADOXA) 100 MG tablet Take 1 Tablet by mouth every 12 hours for 28 days. Yes Taking Frederick Nava M.D.   B Complex-Biotin-FA Tab Take 1 Tablet by mouth every morning. Indications: Supplement Yes Taking Physician Outpatient   magnesium oxide (MAG-OX) 400 MG Tab tablet Take 400 mg by mouth every morning. Indications: Supplement Yes Taking Physician Outpatient   ascorbic acid (ASCORBIC ACID) 500 MG Tab Take 1,000 mg by mouth 2 times a day. 2 tablets = 1,000 mg. Yes Taking Physician Outpatient   loratadine (CLARITIN) 10 MG Tab Take 10 mg by mouth 2 times a day. Yes Taking Physician Outpatient   acetaminophen (TYLENOL) 325 MG Tab Take 650 mg by mouth every four hours as needed for Mild Pain. 2 tablets = 650 mg. Yes Taking Physician Outpatient    Omega-3 Fatty Acids (FISH OIL) 1000 MG Cap capsule Take 1,000 mg by mouth every evening. Indications: Supplement Yes Taking Physician Outpatient   ezetimibe (ZETIA) 10 MG Tab TAKE ONE TABLET BY MOUTH EVERY DAY Yes Taking Chandrakant Freire M.D.   verapamil ER (CALAN SR) 240 MG Tab CR Take 1 Tablet by mouth every day. Yes Taking Chandrakant Freire M.D.   aspirin (ASA) 81 MG Chew Tab chewable tablet Chew 81 mg every morning. Yes Taking Physician Outpatient   fluticasone (FLONASE) 50 MCG/ACT nasal spray Administer 1 Spray into affected nostril(S) 2 times a day.  Patient not taking: Reported on 5/1/2024   Rosario Singh, A.P.R.N.   diclofenac sodium (VOLTAREN) 1 % Gel Apply 2 g topically 4 times a day as needed (To hand joints for pain).  Patient not taking: Reported on 5/1/2024   Blanco Rm M.D.        PMH:  Past Medical History:   Diagnosis Date    Anesthesia     States feels like she can't breathe with induction for right hip surgery, gets nausea vomiting with most pain meds.  Uses anti-nausea medicine if takes morphine    Arthritis     To hips, shoulders, hands    Benign essential HTN 9/7/2017    Cataract     CPAP (continuous positive airway pressure) dependence     Dyslipidemia 3/5/2019    Expressive aphasia     Related to CVA    Family history of CVA     Heart murmur     DARNI (obstructive sleep apnea) 1/26/2017    AHI 23.6, minimum saturation 69%, on CPAP 15 cm.    Post-nasal drip     Restless leg syndrome     4/25/17-Resolved, states was related to right hip and none S/P replacement.    Sick sinus syndrome (HCC) 6/29/2022    Sinus arrest 6/29/2022    Stroke (HCC) 2/1993, 8/1993    Aftvmcnh-ddeqpl-oadwoigmny aphasia,mouth forming words, reading, numbers    Walker as ambulation aid      Past Surgical History:   Procedure Laterality Date    PB RECONSTR TOTAL SHOULDER IMPLANT Left 3/21/2019    Procedure: ARTHROPLASTY, SHOULDER, TOTAL-  REVISION REVERSE;  Surgeon: Yoshi Gamble M.D.;  Location: SURGERY  [facility];  Service: Orthopedics    ORIF, FRACTURE, HUMERUS Left 3/21/2019    Procedure: ORIF, FRACTURE, HUMERUS;  Surgeon: Yoshi Gamble M.D.;  Location: [facility];  Service: Orthopedics    KNEE ARTHROPLASTY TOTAL Left 2/12/2018    Procedure: KNEE ARTHROPLASTY TOTAL;  Surgeon: Earle Jacobsen M.D.;  Location: [facility];  Service: Orthopedics    CERVICAL FUSION POSTERIOR  8/8/2017    Procedure: CERVICAL FUSION POSTERIOR- C3-5, INSTRUMENTED;  Surgeon: Florentino Pike M.D.;  Location: [facility];  Service:     CERVICAL LAMINECTOMY POSTERIOR  8/8/2017    Procedure: CERVICAL LAMINECTOMY POSTERIOR- C3-5;  Surgeon: Florentino Pike M.D.;  Location: [facility];  Service:     HIP ARTHROPLASTY TOTAL Left 5/8/2017    Procedure: HIP ARTHROPLASTY TOTAL;  Surgeon: Earle Jacobsen M.D.;  Location: [facility];  Service:     HIP ARTHROPLASTY TOTAL Right 12/12/2016    Procedure: HIP ARTHROPLASTY TOTAL;  Surgeon: Earle Jacobsen M.D.;  Location: [facility];  Service:     FUSION, SPINE, LUMBAR, PLIF  9/26/2016    Procedure: LUMBAR FUSION POSTERIOR L2-S1 onlay ;  Surgeon: Florentino Pike M.D.;  Location: [facility];  Service:     LUMBAR LAMINECTOMY DISKECTOMY N/A 9/26/2016    Procedure: LUMBAR LAMINECTOMY DISKECTOMY L2-S1;  Surgeon: Florentino Pike M.D.;  Location: [facility];  Service:     COLONOSCOPY  2/2016    Every 10 years    SHOULDER ARTHROPLASTY TOTAL Left 8/11/2015    Procedure: SHOULDER ARTHROPLASTY REVERSE TOTAL;  Surgeon: Yoshi Gamble M.D.;  Location: [facility];  Service:     KNEE ARTHROPLASTY TOTAL  6/23/2014    Performed by Earle Jacobsen M.D. at [facility]    HAMMERTOE CORRECTION Left 11/12/2007    Removed 3rd toe    LUMBAR LAMINECTOMY DISKECTOMY  1993    Lumbar    CARPAL TUNNEL RELEASE Bilateral 1993    CERVICAL DISK AND FUSION ANTERIOR  1993     X2    HYSTERECTOMY, TOTAL ABDOMINAL  1977    BUNIONECTOMY Bilateral 1980's    HAMMERTOE CORRECTION Right 1980's    Removed 3rd toe    ORIF, FINGER Left as child    Index     Family History   Problem Relation Age of Onset    Cancer Mother         vaginal    Stroke Father     Hypertension Brother     Diabetes Brother     Hypertension Brother     Hypertension Brother     Stroke Daughter      Social History     Socioeconomic History    Marital status: Single     Spouse name: Not on file    Number of children: Not on file    Years of education: Not on file    Highest education level: Not on file   Occupational History    Not on file   Tobacco Use    Smoking status: Never    Smokeless tobacco: Never   Vaping Use    Vaping Use: Never used   Substance and Sexual Activity    Alcohol use: Not Currently    Drug use: No    Sexual activity: Not Currently   Other Topics Concern    Not on file   Social History Narrative    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  She is currently driving without any accidents.  She does use a walker to ambulate.  Not having any falls.  Does not have hearing aids but is having hearing concerns.  Some increased aphasia especially since her history of CVAs and memory issues.  March 2019 she was hospitalized from her fracture of her left arm and did have home health which she no longer has right now.  She does not have family here but she does have neighbors who help her out.     Social Determinants of Health     Financial Resource Strain: Not on file   Food Insecurity: Not on file   Transportation Needs: Not on file   Physical Activity: Not on file   Stress: Not on file   Social Connections: Not on file   Intimate Partner Violence: Not on file   Housing Stability: Not on file           Allergies/Intolerances:  Allergies   Allergen Reactions    Benicar [Olmesartan] Rash     Blisters    Tolerated losartan 4/12/24.    Dilaudid  [Hydromorphone Hcl] Vomiting    Lidocaine     Other Misc Shortness of Breath  "    Anesthesia with surgery 2016  Pt couldn't breathe while being put under and tapped side of bed to let RN know. \"It was the scariest feeling and I want everyone to know\"      Codeine Vomiting    Demerol Vomiting    Hydrocodone Vomiting    Other Drug Vomiting     \"all pain meds\" per pt can take morphine.    Oxycodone Vomiting    Statins [Hmg-Coa-R Inhibitors] Unspecified     Muscle cramping    Valium Vomiting    Amlodipine Swelling    Diazepam     Nexletol [Bempedoic Acid] Palpitations     Chest pain and dizziness    Simvastatin Myalgia and Unspecified     Headache, fatigue    Hydrochlorothiazide      Frequent urination       ROS:   Review of Systems   Constitutional:  Negative for chills, fever, malaise/fatigue and weight loss.   HENT:  Negative for congestion and hearing loss.    Eyes:  Negative for blurred vision and double vision.   Respiratory:  Negative for cough, sputum production, shortness of breath and wheezing.    Cardiovascular:  Negative for chest pain and palpitations.   Gastrointestinal:  Negative for abdominal pain, constipation, diarrhea, nausea and vomiting.   Genitourinary:  Negative for dysuria.   Musculoskeletal:  Negative for myalgias.   Skin:  Negative for itching and rash.   Neurological:  Negative for dizziness, focal weakness and headaches.        History of aphasia due to CVA   Endo/Heme/Allergies:  Does not bruise/bleed easily.   Psychiatric/Behavioral:  The patient is not nervous/anxious.       ROS was reviewed and were negative except as above.    Objective    Most Recent Vital Signs:  BP (!) 84/62 (BP Location: Right arm, Patient Position: Sitting, BP Cuff Size: Adult)   Pulse 63   Temp 36.8 °C (98.3 °F) (Temporal)   Resp 16   Ht 1.499 m (4' 11\")   Wt 70.3 kg (155 lb)   SpO2 93%   BMI 31.31 kg/m²     Physical Exam:  Physical Exam  Vitals and nursing note reviewed.   Constitutional:       General: She is not in acute distress.     Appearance: Normal appearance. She is not " ill-appearing.      Comments: Ambulates with walker   HENT:      Head: Normocephalic and atraumatic.      Nose: Nose normal.      Mouth/Throat:      Mouth: Mucous membranes are moist.   Eyes:      Pupils: Pupils are equal, round, and reactive to light.   Cardiovascular:      Rate and Rhythm: Normal rate and regular rhythm.   Pulmonary:      Effort: Pulmonary effort is normal. No respiratory distress.      Breath sounds: Normal breath sounds. No stridor.   Musculoskeletal:      Cervical back: Normal range of motion.      Right lower leg: No edema.      Left lower leg: No edema.      Comments: Left hand thumb without any surrounding erythema, swelling or drainage.  No signs of active infection.  Chronically deformed nailbed   Skin:     General: Skin is warm and dry.      Capillary Refill: Capillary refill takes less than 2 seconds.      Coloration: Skin is not jaundiced or pale.   Neurological:      General: No focal deficit present.      Mental Status: She is alert and oriented to person, place, and time.   Psychiatric:         Mood and Affect: Mood normal.         Behavior: Behavior normal.          Pertinent Lab/Imaging Results:  [unfilled]  @CMP@  WBC   Date/Time Value Ref Range Status   04/28/2024 06:18 AM 4.2 (L) 4.8 - 10.8 K/uL Final     RBC   Date/Time Value Ref Range Status   04/28/2024 06:18 AM 5.73 (H) 4.20 - 5.40 M/uL Final     Hemoglobin   Date/Time Value Ref Range Status   04/28/2024 06:18 AM 16.0 12.0 - 16.0 g/dL Final     Hematocrit   Date/Time Value Ref Range Status   04/28/2024 06:18 AM 50.3 (H) 37.0 - 47.0 % Final     MCV   Date/Time Value Ref Range Status   04/28/2024 06:18 AM 87.8 81.4 - 97.8 fL Final     MCH   Date/Time Value Ref Range Status   04/28/2024 06:18 AM 27.9 27.0 - 33.0 pg Final     MCHC   Date/Time Value Ref Range Status   04/28/2024 06:18 AM 31.8 (L) 32.2 - 35.5 g/dL Final     Comment:     Please note new reference range effective 05/22/2023.     MPV   Date/Time Value Ref Range Status  "  04/28/2024 06:18 AM 10.0 9.0 - 12.9 fL Final      Sodium   Date/Time Value Ref Range Status   04/28/2024 06:18  135 - 145 mmol/L Final     Potassium   Date/Time Value Ref Range Status   04/28/2024 06:18 AM 4.6 3.6 - 5.5 mmol/L Final     Chloride   Date/Time Value Ref Range Status   04/28/2024 06:18 AM 98 96 - 112 mmol/L Final     Co2   Date/Time Value Ref Range Status   04/28/2024 06:18 AM 23 20 - 33 mmol/L Final     Glucose   Date/Time Value Ref Range Status   04/28/2024 06:18  (H) 65 - 99 mg/dL Final     Bun   Date/Time Value Ref Range Status   04/28/2024 06:18 AM 27 (H) 8 - 22 mg/dL Final     Creatinine   Date/Time Value Ref Range Status   04/28/2024 06:18 AM 0.71 0.50 - 1.40 mg/dL Final     Alkaline Phosphatase   Date/Time Value Ref Range Status   04/28/2024 06:18 AM 92 30 - 99 U/L Final     AST(SGOT)   Date/Time Value Ref Range Status   04/28/2024 06:18  (H) 12 - 45 U/L Final     ALT(SGPT)   Date/Time Value Ref Range Status   04/28/2024 06:18  (H) 2 - 50 U/L Final     Total Bilirubin   Date/Time Value Ref Range Status   04/28/2024 06:18 AM 0.6 0.1 - 1.5 mg/dL Final      CPK Total   Date/Time Value Ref Range Status   04/28/2024 06:18 AM 1818 (HH) 0 - 154 U/L Final     Comment:     Critical Result. Read Back Performed. Results called to: [id number] by:  [id number] on: 2024-04-28 07:36:11              No results found for: \"BLOODCULTU\", \"BLDCULT\", \"BCHOLD\"    No results found for: \"BLOODCULTU\", \"BLDCULT\", \"BCHOLD\"          Impression/Assessment      1. Other chronic osteomyelitis of left hand (HCC)  CBC WITH DIFFERENTIAL    Comp Metabolic Panel      2. History of CVA (cerebrovascular accident)        3. Transaminitis          Irina Syeda Layton is a 83 y.o. female with a history of multiple CVAs in the past, sick sinus syndrome status post pacemaker placement, hypertension, who presents to the emergency department complaining of left hand first digit pain.  The patient had been progressing " over the last 6 months. Recurrent infection of the last thumb with purulent fluid from under nail which would improve with antibiotics and then recurred once antibiotics were stopped.  Admitted 4/8/2024 after patient was seen in the [facility] clinic and was told that she had a bone infection and report to the ER.  MRI 4/11 enhancement of the left hand first distal phalanx consistent with osteomyelitis and surrounding cellulitis.  Orthopedic surgeon recommended IV antibiotics and declined amputation at this time. No cultures available since no drainage was present on presentation.  Patient was discharged on IV daptomycin but developed elevated CPK levels and concerns for rhabdomyolysis.  Patient was readmitted on 4/26/2024 for frequent monitoring and labs.  Transition to p.o. doxycycline 100 mg twice daily for additional 4 weeks with end date of 5/26/2024.  Total of 6 weeks of antibiotic therapy.    5/1/24- Today Patient reports feeling well. Pt stating that the left thumb has healed well.  Chronically deformed nailbed.  Left hand thumb without any surrounding erythema, swelling or drainage. Wound pictures reviewed in EPIC. Denies drainage, pungent odor, redness, pain. Denies feeling generally ill, fevers/chills, general malaise, headache, n/v/d.  Tolerating doxycycline with no complaints of side effects.  Most recent labs reviewed from 4/28/2024, mild leukopenia 4.2, neutrophils WNL, CMP with mild elevated BUN trending down to 27, elevated LFTs 202/175, trending down.  Repeat labs today and again in 2 weeks for close monitoring.  Standing order to count.  Recommended to avoid calcium products at least 2 to 3 hours before and after taking doxycycline and wear sunscreen while outside.    - Transaminitis, ongoing, unclear etiology, potentially due to daptomycin toxicity but this would be unusual   - No culture results to guide therapy so if she worsens on doxycycline will attempt obtain cultures to better guide  antibiotic   - In the setting of chronic osteomyelitis without surgical intervention for source control there is a possibility of recurrence of infection, amputation, hospital admission, sepsis.  PLAN:   - Continue p.o. doxycycline 100 mg twice daily for 4 weeks with end date of 5/26/2024.  - Repeat Labs CBC/CMP every 2 weeks for close monitoring.  2 count.  Continue to trend elevated LFTs  - Medication education provided and S/S of side effects discussed   - Recommend routine follow up with orthopedic surgery  - Continue wound care to left hand thumb  - Recommended to start po probiotic  - Education provided on S/S of infection and when to report to ER/ call 911       Return visit: 1 month. Follow up with primary care physician for chronic medical problems      I have performed a physical exam,  updated ROS and plan today. I have reviewed previous images, labs, and provider notes.      ANDREW Martinez.    All Patients should seek medical re-evaluation or report to the ER for new, increasing or worsening symptoms. In some circumstances medical conditions can change from the initial evaluation and may require emergent medical re-evaluation. This includes but is not limited to chest pain, shortness of breath, atypical abdominal pain, atypical headache, ALOC, fever >101, low blood pressure, high respiratory rate (above 30), low oxygen saturation (below 90%), acute delirium, abnormal bleeding, inability to tolerate any intake, weakness on one side of the body, any worsened or concerning conditions.    Please note that this dictation was created using voice recognition software. I have worked with technical experts from [facility] to optimize the interface.  I have made every reasonable attempt to correct obvious errors, but there may be errors of grammar and possibly content that I did not discover before finalizing the note.

## 2024-04-30 NOTE — PROGRESS NOTES
Chief Complaint   Patient presents with    Follow-Up     DARIN  LAST SEEN ON 1/31/2024- JANI FATIMA   CPAP 15       HPI:  Irina Traylor is a 83 y.o. year old female here today for follow-up on DARIN.  Last OV 1/3/24     Currently using CPAP @ 15cm H20 nightly; REM[employer]; device obtained >5yrs old.  Compliance report only has data from 2020. She did NOT obtain a new device. She notes not answering unfamiliar phone numbers. She would like to get a new device. She continues to use her current one every night.    Interval events:  4/30/24: Patient was hospitalized for abnormal CPK being elevated and diagnosed with rhabdomyolysis due to daptomycin.  Daptomycin per I&D for left stump osteomyelitis was stopped and patient was hydrated and CPK and kidney function improved.    Sleep hx:  PSG indicates moderate obstructive sleep apnea with an AHI of 23.6, minimum oxygen saturation of 69 %.       Overnight oximetry 7/6/2015 using CPAP 15 cm noted average saturation 93% and patient spent 11 minutes less than 88% ox saturation.      ROS: As per HPI and otherwise negative if not stated.    Past Medical History:   Diagnosis Date    Anesthesia     States feels like she can't breathe with induction for right hip surgery, gets nausea vomiting with most pain meds.  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    Post-nasal drip     Restless leg syndrome     4/25/17-Resolved, states was related to right hip and none S/P replacement.    Sick sinus syndrome (HCC) 6/29/2022    Sinus arrest 6/29/2022    Stroke ([facility]) 2/1993, 8/1993    Ztuulopi-vledqh-tartqbsyra aphasia,mouth forming  words, reading, numbers    Walker as ambulation aid        Past Surgical History:   Procedure Laterality Date    PB RECONSTR TOTAL SHOULDER IMPLANT Left 3/21/2019    Procedure: ARTHROPLASTY, SHOULDER, TOTAL-  REVISION REVERSE;  Surgeon: Yoshi Gamble M.D.;  Location: [facility];  Service: Orthopedics    ORIF, FRACTURE, HUMERUS Left 3/21/2019    Procedure: ORIF, FRACTURE, HUMERUS;  Surgeon: Yoshi Gamble M.D.;  Location: [facility];  Service: Orthopedics    KNEE ARTHROPLASTY TOTAL Left 2/12/2018    Procedure: KNEE ARTHROPLASTY TOTAL;  Surgeon: Earle Jacobsen M.D.;  Location: [facility];  Service: Orthopedics    CERVICAL FUSION POSTERIOR  8/8/2017    Procedure: CERVICAL FUSION POSTERIOR- C3-5, INSTRUMENTED;  Surgeon: Florentino Pike M.D.;  Location: [facility];  Service:     CERVICAL LAMINECTOMY POSTERIOR  8/8/2017    Procedure: CERVICAL LAMINECTOMY POSTERIOR- C3-5;  Surgeon: Florentino Pike M.D.;  Location: [facility];  Service:     HIP ARTHROPLASTY TOTAL Left 5/8/2017    Procedure: HIP ARTHROPLASTY TOTAL;  Surgeon: Earle Jacobsen M.D.;  Location: [facility];  Service:     HIP ARTHROPLASTY TOTAL Right 12/12/2016    Procedure: HIP ARTHROPLASTY TOTAL;  Surgeon: Earle Jacobsen M.D.;  Location: [facility];  Service:     FUSION, SPINE, LUMBAR, PLIF  9/26/2016    Procedure: LUMBAR FUSION POSTERIOR L2-S1 onlay ;  Surgeon: Florentino Pike M.D.;  Location: [facility];  Service:     LUMBAR LAMINECTOMY DISKECTOMY N/A 9/26/2016    Procedure: LUMBAR LAMINECTOMY DISKECTOMY L2-S1;  Surgeon: Florentino Pike M.D.;  Location: [facility];  Service:     COLONOSCOPY  2/2016    Every 10 years    SHOULDER ARTHROPLASTY TOTAL Left 8/11/2015    Procedure: SHOULDER ARTHROPLASTY REVERSE TOTAL;  Surgeon: Yoshi Gamble M.D.;  Location: [facility];  Service:     KNEE  ARTHROPLASTY TOTAL  6/23/2014    Performed by Earle Jacobsen M.D. at [facility]    HAMMERTOE CORRECTION Left 11/12/2007    Removed 3rd toe    LUMBAR LAMINECTOMY DISKECTOMY  1993    Lumbar    CARPAL TUNNEL RELEASE Bilateral 1993    CERVICAL DISK AND FUSION ANTERIOR  1993    X2    HYSTERECTOMY, TOTAL ABDOMINAL  1977    BUNIONECTOMY Bilateral 1980's    HAMMERTOE CORRECTION Right 1980's    Removed 3rd toe    ORIF, FINGER Left as child    Index       Family History   Problem Relation Age of Onset    Cancer Mother         vaginal    Stroke Father     Hypertension Brother     Diabetes Brother     Hypertension Brother     Hypertension Brother     Stroke Daughter        Social History     Socioeconomic History    Marital status: Single     Spouse name: Not on file    Number of children: Not on file    Years of education: Not on file    Highest education level: Not on file   Occupational History    Not on file   Tobacco Use    Smoking status: Never    Smokeless tobacco: Never   Vaping Use    Vaping Use: Never used   Substance and Sexual Activity    Alcohol use: Not Currently    Drug use: No    Sexual activity: Not Currently   Other Topics Concern    Not on file   Social History Narrative    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  She is currently driving without any accidents.  She does use a walker to ambulate.  Not having any falls.  Does not have hearing aids but is having hearing concerns.  Some increased aphasia especially since her history of CVAs and memory issues.  March 2019 she was hospitalized from her fracture of her left arm and did have home health which she no longer has right now.  She does not have family here but she does have neighbors who help her out.     Social Determinants of Health     Financial Resource Strain: Not on file   Food Insecurity: Not on file   Transportation Needs: Not on file   Physical Activity: Not on file   Stress: Not on file   Social Connections: Not on file    Intimate Partner Violence: Not on file   Housing Stability: Not on file       Allergies as of 04/30/2024 - Reviewed 04/30/2024   Allergen Reaction Noted    Benicar [olmesartan] Rash 06/09/2014    Dilaudid  [hydromorphone hcl] Vomiting 12/03/2019    Lidocaine  09/09/2014    Other misc Shortness of Breath 08/08/2017    Codeine Vomiting 05/10/2013    Demerol Vomiting 05/10/2013    Hydrocodone Vomiting 03/20/2019    Other drug Vomiting 06/09/2014    Oxycodone Vomiting 03/20/2019    Statins [hmg-coa-r inhibitors] Unspecified 05/10/2013    Valium Vomiting 06/09/2014    Amlodipine Swelling 02/17/2023    Diazepam  06/20/2023    Nexletol [bempedoic acid] Palpitations 12/20/2022    Simvastatin Myalgia and Unspecified 09/01/2023    Hydrochlorothiazide  02/17/2023        Vitals:  /72 (BP Location: Right arm, Patient Position: Sitting, BP Cuff Size: Adult)   Pulse (!) 58   Resp 16   Ht 1.524 m (5')   Wt 67.1 kg (148 lb)   SpO2 92%     Current medications as of today   Current Outpatient Medications   Medication Sig Dispense Refill    doxycycline monohydrate (ADOXA) 100 MG tablet Take 1 Tablet by mouth every 12 hours for 28 days. 56 Tablet 0    azelastine (ASTELIN) 137 MCG/SPRAY nasal spray Administer 1 Spray into each nostril 2 times a day.      B Complex-Biotin-FA Tab Take 1 Tablet by mouth every morning. Indications: Supplement      fluticasone (FLONASE) 50 MCG/ACT nasal spray Administer 2 Sprays into each nostril every morning.      magnesium oxide (MAG-OX) 400 MG Tab tablet Take 400 mg by mouth every morning. Indications: Supplement      ascorbic acid (ASCORBIC ACID) 500 MG Tab Take 1,000 mg by mouth 2 times a day. 2 tablets = 1,000 mg.      loratadine (CLARITIN) 10 MG Tab Take 10 mg by mouth 2 times a day.      losartan (COZAAR) 25 MG Tab Take 25 mg by mouth 2 times a day. Indications: High Blood Pressure Disorder      acetaminophen (TYLENOL) 325 MG Tab Take 650 mg by mouth every four hours as needed for Mild  Pain. 2 tablets = 650 mg.      diclofenac sodium (VOLTAREN) 1 % Gel Apply 2 g topically 4 times a day as needed (To hand joints for pain).      Omega-3 Fatty Acids (FISH OIL) 1000 MG Cap capsule Take 1,000 mg by mouth every evening. Indications: Supplement      ezetimibe (ZETIA) 10 MG Tab TAKE ONE TABLET BY MOUTH EVERY  Tablet 2    verapamil ER (CALAN SR) 240 MG Tab CR Take 1 Tablet by mouth every day. 90 Tablet 3    aspirin (ASA) 81 MG Chew Tab chewable tablet Chew 81 mg every morning.       No current facility-administered medications for this visit.         Physical Exam:   Gen:           Alert and oriented, No apparent distress. Mood and affect appropriate, normal interaction with examiner.  Eyes:          PERRL, EOM intact, sclere white, conjunctive moist. Glasses.  Ears:          Not examined.   Hearing:     Grossly intact.  Nose:          Normal, no lesions or deformities.  Dentition:    Not examined.   Oropharynx:   Not examined.   Mallampati Classification: Not examined.   Neck:        Supple, trachea midline, no masses.  Respiratory Effort: No intercostal retractions or use of accessory muscles.   Lung Auscultation:      Clear to auscultation bilaterally; no rales, rhonchi or wheezing.  CV:            Regular rate and rhythm. No murmurs, rubs or gallops.  Abd:           Not examined.   Lymphadenopathy: Not examined.  Gait and Station: Walker  Digits and Nails: No clubbing, cyanosis, petechiae, or nodes.   Cranial Nerves: II-XII grossly intact.  Skin:        No rashes, lesions or ulcers noted.               Ext:           No cyanosis or edema. BLE's discoloration, only 4 digits on each foot. Right hand thumb nail with discoloration.      Assessment:  1. DARIN (obstructive sleep apnea)        2. Primary hypertension        3. BMI 28.0-28.9,adult        4. Nonsmoker            Immunizations:    Flu:declines  Pneumovax 23:not due  Prevnar 13:not due  PCV 20: declines  COVID-19: declines    Plan:  Patient  has hx of sleep apnea but current device >5yrs old and no longer recording current data. Patient notes continues consistently nightly use. Recommend obtaining new device. She has a hx of chronic sinusitis and does not have current nasal sprays prescribed.   RX flonase/azelestine to use twice daily  F/u with PCP for ongoing management of HTN and other health care concerns.  Encourage regular walking and healthy diet for conditioning.  Follow up in 3 months with NEW CPAP device for compliance check, sooner if needed. Email sent to [facility] to help with setup of new device for patient.    Please note that this dictation was created using voice recognition software. I have made every reasonable attempt to correct obvious errors, but it is possible there are errors of grammar and possibly content that I did not discover before finalizing the note.

## 2024-04-30 NOTE — PATIENT INSTRUCTIONS
Speak to DME : [facility] Phone. [phone] Fax. [phone]  To get new CPAP - call in 3 days to check status on getting new device    DAPTOMYCIN IV was stopped in the hospital - you were discharged home on doxycyline 1 tab twice daily for 28 days.    Start using fluticasone 1 spray to each nostril twice daily and azelestine 1 squirt to each nostril twice daily. Rx's sent to

## 2024-05-01 ENCOUNTER — OFFICE VISIT (OUTPATIENT)
Dept: INFECTIOUS DISEASES | Facility: MEDICAL CENTER | Age: 84
End: 2024-05-01
Attending: NURSE PRACTITIONER
Payer: MEDICARE

## 2024-05-01 ENCOUNTER — OFFICE VISIT (OUTPATIENT)
Dept: MEDICAL GROUP | Facility: PHYSICIAN GROUP | Age: 84
End: 2024-05-01
Payer: MEDICARE

## 2024-05-01 VITALS
HEIGHT: 60 IN | OXYGEN SATURATION: 94 % | TEMPERATURE: 99 F | BODY MASS INDEX: 30.43 KG/M2 | WEIGHT: 155 LBS | SYSTOLIC BLOOD PRESSURE: 122 MMHG | HEART RATE: 77 BPM | DIASTOLIC BLOOD PRESSURE: 58 MMHG

## 2024-05-01 VITALS
HEART RATE: 63 BPM | SYSTOLIC BLOOD PRESSURE: 84 MMHG | RESPIRATION RATE: 16 BRPM | DIASTOLIC BLOOD PRESSURE: 62 MMHG | BODY MASS INDEX: 31.25 KG/M2 | TEMPERATURE: 98.3 F | OXYGEN SATURATION: 93 % | HEIGHT: 59 IN | WEIGHT: 155 LBS

## 2024-05-01 DIAGNOSIS — Z89.421 ACQUIRED ABSENCE OF OTHER RIGHT TOE(S) (HCC): ICD-10-CM

## 2024-05-01 DIAGNOSIS — M86.642 OTHER CHRONIC OSTEOMYELITIS OF LEFT HAND (HCC): ICD-10-CM

## 2024-05-01 DIAGNOSIS — I10 PRIMARY HYPERTENSION: ICD-10-CM

## 2024-05-01 DIAGNOSIS — I73.9 PVD (PERIPHERAL VASCULAR DISEASE) (HCC): ICD-10-CM

## 2024-05-01 DIAGNOSIS — E28.39 ESTROGEN DEFICIENCY: ICD-10-CM

## 2024-05-01 DIAGNOSIS — Z86.73 HISTORY OF CVA (CEREBROVASCULAR ACCIDENT): ICD-10-CM

## 2024-05-01 DIAGNOSIS — R74.01 TRANSAMINITIS: ICD-10-CM

## 2024-05-01 DIAGNOSIS — M62.82 NON-TRAUMATIC RHABDOMYOLYSIS: ICD-10-CM

## 2024-05-01 DIAGNOSIS — Z89.422 ACQUIRED ABSENCE OF OTHER LEFT TOE(S) (HCC): ICD-10-CM

## 2024-05-01 DIAGNOSIS — Z09 HOSPITAL DISCHARGE FOLLOW-UP: Primary | ICD-10-CM

## 2024-05-01 PROCEDURE — 99213 OFFICE O/P EST LOW 20 MIN: CPT | Performed by: NURSE PRACTITIONER

## 2024-05-01 PROCEDURE — 3078F DIAST BP <80 MM HG: CPT | Performed by: NURSE PRACTITIONER

## 2024-05-01 PROCEDURE — 3074F SYST BP LT 130 MM HG: CPT | Performed by: NURSE PRACTITIONER

## 2024-05-01 RX ORDER — DOXYCYCLINE 100 MG/1
100 TABLET ORAL EVERY 12 HOURS
Qty: 56 TABLET | Refills: 0 | Status: SHIPPED | OUTPATIENT
Start: 2024-05-01 | End: 2024-05-29

## 2024-05-01 RX ORDER — IRBESARTAN 300 MG/1
300 TABLET ORAL NIGHTLY
COMMUNITY
End: 2024-05-01

## 2024-05-01 RX ORDER — IRBESARTAN 300 MG/1
300 TABLET ORAL DAILY
Qty: 100 TABLET | Refills: 3 | Status: SHIPPED | OUTPATIENT
Start: 2024-05-01 | End: 2024-05-01

## 2024-05-01 RX ORDER — IRBESARTAN 300 MG/1
300 TABLET ORAL DAILY
Qty: 100 TABLET | Refills: 3 | Status: SHIPPED | OUTPATIENT
Start: 2024-05-01

## 2024-05-01 ASSESSMENT — ENCOUNTER SYMPTOMS
DIARRHEA: 0
CONSTIPATION: 0

## 2024-05-01 ASSESSMENT — FIBROSIS 4 INDEX
FIB4 SCORE: 3.32
FIB4 SCORE: 3.32

## 2024-05-01 NOTE — PROGRESS NOTES
Subjective:     Irina Traylor is a 83 y.o. female who presents for Hospital Follow-up.    Transitional Care Management  TCM Outreach Date and Time: Filed (4/29/2024 11:25 AM)    Discharge Questions  Actual Discharge Date: 04/28/24  Now that you are home, how are you feeling?: Good  Did you receive any new prescriptions?: Yes  Were you able to get them filled?: Yes  Meds to Bed or Pharmacy filled?: Meds to Bed  Do you have any questions about your current medications or new medications (Review Med Rec)?: No  Did you have any durable medical equipment ordered?: No  Do you have a follow up appointment scheduled with your PCP?: No  Was an appointment scheduled for the patient?: Yes  Appointment Date: 05/01/24  Appointment Time: 0900  Any issues or paperwork you wish to discuss with your PCP?: No  Are you (patient) able to get to the appointment?: Yes  If Home Health was ordered, have they contacted you (Patient): Yes  Did you have enough support after your last discharge?: Yes  Does this patient qualify for the CCM program?: Yes    Transitional Care  Number of attempts made to contact patient: 1  Current or previous attempts completed within two business days of discharge? : Yes  Provided education regarding treatment plan, medications, self-management, ADLs?: No  Has patient completed an Advanced Directive?: Yes  Is the patient's advanced directive on file?: Yes  Has the Care Manager's phone number provided?: No  Is there anything else I can help you with?: No    Discharge Summary  Chief Complaint: body aches  Admitting Diagnosis: Rhabdomyolysis  Discharge Diagnosis: Rhabdomyolysis        HPI:   Recently hospitalized for rhabdomyolsis. Patient was seen at [facility] concerning left first digit pain.  There were concerns for osteomyelitis and patient was directed to the emergency room. During the course of hospitalization orthopedic surgery was consulted and IV antibiotics were recommended.  MRI of her left hand did  show marrow edema, osteomyelitis of the first digital phalanx and surround cellulitis. Infectious disease was consulted and recommended intravenous daptomycin with end date of 5/5/2024 then 2 weeks of oral doxycycline 100 mg twice daily. Patient was admitted to telemetry floor.  She did become hypoxic requiring oxygen supplementation.  BNP was 1117.  She was given furosemide 20 mg IV.  Echocardiogram showed left ejection fraction of 55% with grade 2 diastolic dysfunction.  Oxygen was able to be weaned down after furosemide.  Patient was transitioned from irbesartan to losartan during her hospitalization. Physical therapy and occupational therapy recommended home health.  Patient was discharged to [facility] nursing [facility].  At discharge her irbesartan was stopped and she was to continue with losartan 25 mg daily.  Her verapamil 240 mg daily was continued. Patient presented back to the ER on 4/26/2024 after her CK level became elevated with treatment with daptomycin.  Daptomycin was stopped.  She was given IV fluids.  Her labs fortunately did improve.  She was discharged home with 4 weeks of doxycycline and told to follow-up with outpatient infectious disease.  She is scheduled for infectious disease later this morning. She has been home for 3 days. Her IV line was discontinued at the hospital. Home health has been ordered. She is trying to set up home health but has been attending multiple appointments and unable to schedule at this time.  At home she has resumed her irbesartan and verapamil for blood pressure control.  She states that her blood pressures have been good at home.  Blood pressure today 122/58.    Current medicines (including reconciliation performed today)  Current Outpatient Medications   Medication Sig Dispense Refill    Apoaequorin (PREVAGEN EXTRA STRENGTH) 20 MG Cap Take  by mouth.      irbesartan (AVAPRO) 300 MG Tab Take 1 Tablet by mouth every day. 100 Tablet 3    azelastine (ASTELIN)  137 MCG/SPRAY nasal spray Administer 1 Spray into affected nostril(S) 2 times a day. 90 mL 3    B Complex-Biotin-FA Tab Take 1 Tablet by mouth every morning. Indications: Supplement      magnesium oxide (MAG-OX) 400 MG Tab tablet Take 400 mg by mouth every morning. Indications: Supplement      ascorbic acid (ASCORBIC ACID) 500 MG Tab Take 1,000 mg by mouth 2 times a day. 2 tablets = 1,000 mg.      loratadine (CLARITIN) 10 MG Tab Take 10 mg by mouth 2 times a day.      acetaminophen (TYLENOL) 325 MG Tab Take 650 mg by mouth every four hours as needed for Mild Pain. 2 tablets = 650 mg.      Omega-3 Fatty Acids (FISH OIL) 1000 MG Cap capsule Take 1,000 mg by mouth every evening. Indications: Supplement      ezetimibe (ZETIA) 10 MG Tab TAKE ONE TABLET BY MOUTH EVERY  Tablet 2    verapamil ER (CALAN SR) 240 MG Tab CR Take 1 Tablet by mouth every day. 90 Tablet 3    aspirin (ASA) 81 MG Chew Tab chewable tablet Chew 81 mg every morning.      doxycycline monohydrate (ADOXA) 100 MG tablet Take 1 Tablet by mouth every 12 hours for 28 days. 56 Tablet 0    fluticasone (FLONASE) 50 MCG/ACT nasal spray Administer 1 Spray into affected nostril(S) 2 times a day. (Patient not taking: Reported on 5/1/2024) 16 g 3    diclofenac sodium (VOLTAREN) 1 % Gel Apply 2 g topically 4 times a day as needed (To hand joints for pain). (Patient not taking: Reported on 5/1/2024)       No current facility-administered medications for this visit.       Allergies:   Benicar [olmesartan], Dilaudid  [hydromorphone hcl], Lidocaine, Other misc, Codeine, Demerol, Hydrocodone, Other drug, Oxycodone, Statins [hmg-coa-r inhibitors], Valium, Amlodipine, Diazepam, Nexletol [bempedoic acid], Simvastatin, and Hydrochlorothiazide    Social History     Tobacco Use    Smoking status: Never    Smokeless tobacco: Never   Vaping Use    Vaping Use: Never used   Substance Use Topics    Alcohol use: Not Currently    Drug use: No       ROS:  Denies chest pain,  "shortness of breath, fever, chills, discharge.     Objective:     Vitals:    05/01/24 0852   BP: 122/58   BP Location: Right arm   Patient Position: Sitting   BP Cuff Size: Adult   Pulse: 77   Temp: 37.2 °C (99 °F)   TempSrc: Temporal   SpO2: 94%   Weight: 70.3 kg (155 lb)   Height: 1.511 m (4' 11.5\")     Body mass index is 30.78 kg/m².    Physical Exam:    Gen: Alert and oriented, No apparent distress.  Neck: Neck is supple without lymphadenopathy.  Lungs: Normal effort, CTA bilaterally, no wheezes, rhonchi, or rales.    CV: Regular rate and rhythm. No murmurs, rubs, or gallops.  Ext: No clubbing, cyanosis, edema. Left thumb with yellow thick nail, no discharge or redness, capillary refill <3 seconds, no pain. Missing middle toe to both left and right foot.       Assessment and Plan:     1. Hospital discharge follow-up  Acute uncomplicated problem.  Hospital discharge follow-up completed today.    2. Other chronic osteomyelitis of left hand (HCC)  Acute complicated problem.  Keep upcoming infectious disease appointment.  Continue doxycycline 100 mg twice daily.    3. Non-traumatic rhabdomyolysis  Acute complicated problem.  She continues to stay hydrated.  Check updated labs.  She remains of daptomycin.  - CREATINE KINASE; Future  - Basic Metabolic Panel; Future    4. Primary hypertension  Chronic stable problem.  Stop losartan.  Continue irbesartan 300 mg daily and verapamil  mg daily.  - irbesartan (AVAPRO) 300 MG Tab; Take 1 Tablet by mouth every day.  Dispense: 100 Tablet; Refill: 3    5. Estrogen deficiency  Acute uncomplicated problem.  Due for bone density.    - DS-BONE DENSITY STUDY (DEXA); Future    6. PVD (peripheral vascular disease) (HCC)  Chronic stable problem.  Continue verapamil, irbesartan and Zetia.    7. 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  Recommend continue with good foot support and using walker with " ambulation.      HCC Gap Form    Diagnosis to address: I73.9 - PVD (peripheral vascular disease) (HCC)  Assessment and plan: Chronic, stable. Continue with current defined treatment plan: blood pressure control and zetia. Follow-up at least annually.  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 Continue with current defined treatment plan: good foot support and use of walker with ambulation. Follow-up at least annually.  Diagnosis: Z89.421 - Acquired absence of other right toe(s) (HCC)  Assessment and plan: Chronic, stable. Continue with current defined treatment plan: good foot support and use of walker with ambulation. Follow-up at least annually.  Last edited 05/01/24 17:27 PDT by DIYA [email or web address].           - Chart and discharge summary were reviewed.   - Hospitalization and results reviewed with patient.   - Medications reviewed including instructions regarding high risk medications, dosing and side effects.  - Recommended Services: Referral to Home Health-order in place  - Advance directive/POLST on file?  Yes    Follow-up:Return in about 4 weeks (around 5/29/2024) for Hypertension, labs, .    Face-to-face transitional care management services with HIGH (today's visit is within days post discharge & LACE+ score 59+) medical decision complexity were provided.

## 2024-05-01 NOTE — ASSESSMENT & PLAN NOTE
Continues 4 week treatment with doxycycline. Has infectious disease appointment later this morning. Previously on IV daptomycin but developed rhabdomyolysis and antibiotics stopped.

## 2024-05-02 ENCOUNTER — HOME CARE VISIT (OUTPATIENT)
Dept: HOME HEALTH SERVICES | Facility: HOME HEALTHCARE | Age: 84
End: 2024-05-02
Payer: MEDICARE

## 2024-05-02 PROCEDURE — 665005 NO-PAY RAP - HOME HEALTH

## 2024-05-02 ASSESSMENT — ENCOUNTER SYMPTOMS
DENIES PAIN: 1
PERSON REPORTING PAIN: PATIENT
LAST BOWEL MOVEMENT: 66962
BOWEL PATTERN NORMAL: 1

## 2024-05-03 VITALS
OXYGEN SATURATION: 93 % | RESPIRATION RATE: 16 BRPM | HEART RATE: 63 BPM | SYSTOLIC BLOOD PRESSURE: 108 MMHG | TEMPERATURE: 98 F | DIASTOLIC BLOOD PRESSURE: 60 MMHG

## 2024-05-03 PROCEDURE — G0180 MD CERTIFICATION HHA PATIENT: HCPCS | Performed by: NURSE PRACTITIONER

## 2024-05-03 ASSESSMENT — ENCOUNTER SYMPTOMS
RHINORRHEA: 1
HYPERTENSION: 1
RESPIRATORY SYMPTOMS COMMENTS: OSA USES CPAP
DESCRIPTION OF MEMORY LOSS: SHORT TERM
DRY SKIN: 1

## 2024-05-03 ASSESSMENT — ACTIVITIES OF DAILY LIVING (ADL)
TELEPHONE USE ASSESSED: 1
FEEDING ASSESSED: 1
TOILETING: STAND BY ASSIST
SHOPPING: DEPENDENT
TOILETING: CONTACT GUARD ASSIST
USING THE TELPHONE: NEEDS ASSISTANCE
PHYSICAL TRANSFERS ASSESSED: 1
AMBULATION ASSISTANCE: 1
AMBULATION ASSISTANCE: SUPERVISION
DRESSING_LB_CURRENT_FUNCTION: MINIMUM ASSIST
TOILETING: 1
BATHING_CURRENT_FUNCTION: MODERATE ASSIST
LAUNDRY ASSESSED: 1
FEEDING: INDEPENDENT
CURRENT_FUNCTION: SUPERVISION
TOILETING: ONE PERSON
HOUSEKEEPING ASSESSED: 1
BATHING ASSESSED: 1
DRESSING_UB_CURRENT_FUNCTION: MODERATE ASSIST
BATHING_CURRENT_FUNCTION: ONE PERSON
LAUNDRY: DEPENDENT
TRANSPORTATION: DEPENDENT
ORAL_CARE_ASSESSED: 1
CURRENT_FUNCTION: ONE PERSON
PREPARING MEALS: NEEDS ASSISTANCE
GROOMING ASSESSED: 1
LIGHT HOUSEKEEPING: DEPENDENT
GROOMING_CURRENT_FUNCTION: INDEPENDENT
TRANSPORTATION ASSESSED: 1
ORAL_CARE_CURRENT_FUNCTION: INDEPENDENT
SHOPPING ASSESSED: 1
OASIS_M1830: 03
TOILETING: SUPERVISION

## 2024-05-03 NOTE — CASE COMMUNICATION
Primary dx/Skilled need: 84 yo female admitted to  with osteoarthritis bilat hands.  Pt is being treated for osteomyelitis of the Left thumb by [facility].  PMH: Rhabdomyolysis, DARIN, sick sinus syndrome, pacemaker, PVD, HTN, CVA, aphasia, [place], and spinal stenosis.  SN frequency: 1w1, 2w2, 1w2  Zip code: [id number]  Disciplines ordered: SN, PT, OT.  Pt request [facility]  Insurance and authorization: [facility]  Certification period: 5/2/24 - 6/30/24  Special c onsiderations: Lives alone.  Receives in home Communion on Thursdays, usually around noon please consider Communion time when scheduling Thursday visits.

## 2024-05-06 ENCOUNTER — HOSPITAL ENCOUNTER (OUTPATIENT)
Facility: MEDICAL CENTER | Age: 84
End: 2024-05-06
Attending: NURSE PRACTITIONER
Payer: MEDICARE

## 2024-05-06 ENCOUNTER — HOME CARE VISIT (OUTPATIENT)
Dept: HOME HEALTH SERVICES | Facility: HOME HEALTHCARE | Age: 84
End: 2024-05-06
Payer: MEDICARE

## 2024-05-06 VITALS
BODY MASS INDEX: 30.5 KG/M2 | WEIGHT: 151 LBS | TEMPERATURE: 97.9 F | DIASTOLIC BLOOD PRESSURE: 70 MMHG | SYSTOLIC BLOOD PRESSURE: 130 MMHG | OXYGEN SATURATION: 98 % | HEART RATE: 78 BPM

## 2024-05-06 LAB
ALBUMIN SERPL BCP-MCNC: 4.3 G/DL (ref 3.2–4.9)
ALBUMIN/GLOB SERPL: 1.2 G/DL
ALP SERPL-CCNC: 92 U/L (ref 30–99)
ALT SERPL-CCNC: 35 U/L (ref 2–50)
ANION GAP SERPL CALC-SCNC: 13 MMOL/L (ref 7–16)
AST SERPL-CCNC: 31 U/L (ref 12–45)
BASOPHILS # BLD AUTO: 2 % (ref 0–1.8)
BASOPHILS # BLD: 0.08 K/UL (ref 0–0.12)
BILIRUB SERPL-MCNC: 1 MG/DL (ref 0.1–1.5)
BUN SERPL-MCNC: 68 MG/DL (ref 8–22)
CALCIUM ALBUM COR SERPL-MCNC: 9.9 MG/DL (ref 8.5–10.5)
CALCIUM SERPL-MCNC: 10.1 MG/DL (ref 8.5–10.5)
CHLORIDE SERPL-SCNC: 103 MMOL/L (ref 96–112)
CK SERPL-CCNC: 43 U/L (ref 0–154)
CO2 SERPL-SCNC: 23 MMOL/L (ref 20–33)
CREAT SERPL-MCNC: 1 MG/DL (ref 0.5–1.4)
EOSINOPHIL # BLD AUTO: 0.09 K/UL (ref 0–0.51)
EOSINOPHIL NFR BLD: 2.3 % (ref 0–6.9)
ERYTHROCYTE [DISTWIDTH] IN BLOOD BY AUTOMATED COUNT: 53.7 FL (ref 35.9–50)
GFR SERPLBLD CREATININE-BSD FMLA CKD-EPI: 56 ML/MIN/1.73 M 2
GLOBULIN SER CALC-MCNC: 3.6 G/DL (ref 1.9–3.5)
GLUCOSE SERPL-MCNC: 87 MG/DL (ref 65–99)
HCT VFR BLD AUTO: 54 % (ref 37–47)
HGB BLD-MCNC: 16.8 G/DL (ref 12–16)
IMM GRANULOCYTES # BLD AUTO: 0.03 K/UL (ref 0–0.11)
IMM GRANULOCYTES NFR BLD AUTO: 0.8 % (ref 0–0.9)
LYMPHOCYTES # BLD AUTO: 0.68 K/UL (ref 1–4.8)
LYMPHOCYTES NFR BLD: 17.3 % (ref 22–41)
MCH RBC QN AUTO: 28 PG (ref 27–33)
MCHC RBC AUTO-ENTMCNC: 31.1 G/DL (ref 32.2–35.5)
MCV RBC AUTO: 89.9 FL (ref 81.4–97.8)
MONOCYTES # BLD AUTO: 0.42 K/UL (ref 0–0.85)
MONOCYTES NFR BLD AUTO: 10.7 % (ref 0–13.4)
NEUTROPHILS # BLD AUTO: 2.63 K/UL (ref 1.82–7.42)
NEUTROPHILS NFR BLD: 66.9 % (ref 44–72)
NRBC # BLD AUTO: 0 K/UL
NRBC BLD-RTO: 0 /100 WBC (ref 0–0.2)
PLATELET # BLD AUTO: 389 K/UL (ref 164–446)
PMV BLD AUTO: 11 FL (ref 9–12.9)
POTASSIUM SERPL-SCNC: 5.1 MMOL/L (ref 3.6–5.5)
PROT SERPL-MCNC: 7.9 G/DL (ref 6–8.2)
RBC # BLD AUTO: 6.01 M/UL (ref 4.2–5.4)
SODIUM SERPL-SCNC: 139 MMOL/L (ref 135–145)
WBC # BLD AUTO: 3.9 K/UL (ref 4.8–10.8)

## 2024-05-06 ASSESSMENT — ACTIVITIES OF DAILY LIVING (ADL)
AMBULATION ASSISTANCE: STAND BY ASSIST
CURRENT_FUNCTION: STAND BY ASSIST

## 2024-05-06 ASSESSMENT — ENCOUNTER SYMPTOMS
COUGH CHARACTERISTICS: MOIST
HIGHEST PAIN SEVERITY IN PAST 24 HOURS: 0/10
COUGH: 1
DENIES PAIN: 1
PAIN SEVERITY GOAL: 0/10
BOWEL PATTERN NORMAL: 1
STOOL FREQUENCY: DAILY
VOMITING: PT DENIES ANY EMESIS AT THIS TIME
LAST BOWEL MOVEMENT: 66966
NAUSEA: PT DENIES ANY NAUSEA AT THIS TIME
LOWEST PAIN SEVERITY IN PAST 24 HOURS: 0/10

## 2024-05-06 ASSESSMENT — FIBROSIS 4 INDEX: FIB4 SCORE: 3.32

## 2024-05-07 ENCOUNTER — HOME CARE VISIT (OUTPATIENT)
Dept: HOME HEALTH SERVICES | Facility: HOME HEALTHCARE | Age: 84
End: 2024-05-07
Payer: MEDICARE

## 2024-05-07 ENCOUNTER — TELEPHONE (OUTPATIENT)
Dept: SLEEP MEDICINE | Facility: MEDICAL CENTER | Age: 84
End: 2024-05-07

## 2024-05-07 VITALS
OXYGEN SATURATION: 92 % | DIASTOLIC BLOOD PRESSURE: 74 MMHG | SYSTOLIC BLOOD PRESSURE: 138 MMHG | HEART RATE: 72 BPM | TEMPERATURE: 98.2 F | RESPIRATION RATE: 17 BRPM

## 2024-05-08 ENCOUNTER — HOME CARE VISIT (OUTPATIENT)
Dept: HOME HEALTH SERVICES | Facility: HOME HEALTHCARE | Age: 84
End: 2024-05-08
Payer: MEDICARE

## 2024-05-08 ASSESSMENT — ENCOUNTER SYMPTOMS: DIFFICULTY THINKING: 1

## 2024-05-08 NOTE — CASE COMMUNICATION
Quality Review for SOC OASIS by CLAUDETTE Smallwood RN on  May 9, 2024    Edits completed by CLAUDETTE Smallwood RN:  1.  and  diagnosis coding updated per chart review.  2. Changed  to 5/2/24 per the LSOC order  3. Added SNF to , changed discharge date to 4/28/24 per EPIC  4. Checked #3 and 4 on   5.  Per narrative that patient needs supervision to moderate assistance and a walker for safety, the following changes were  made:  and  changed to 2;  changed to 3  6.  is 3 per ambulation status  7.  checked noninvasive mechanical ventilator, CPAP

## 2024-05-09 ENCOUNTER — HOME CARE VISIT (OUTPATIENT)
Dept: HOME HEALTH SERVICES | Facility: HOME HEALTHCARE | Age: 84
End: 2024-05-09
Payer: MEDICARE

## 2024-05-09 VITALS
BODY MASS INDEX: 29.08 KG/M2 | SYSTOLIC BLOOD PRESSURE: 124 MMHG | TEMPERATURE: 98.5 F | RESPIRATION RATE: 16 BRPM | DIASTOLIC BLOOD PRESSURE: 64 MMHG | HEART RATE: 61 BPM | OXYGEN SATURATION: 98 % | WEIGHT: 144 LBS

## 2024-05-09 DIAGNOSIS — J32.9 CHRONIC SINUSITIS, UNSPECIFIED LOCATION: ICD-10-CM

## 2024-05-09 RX ORDER — FLUTICASONE PROPIONATE 50 MCG
1 SPRAY, SUSPENSION (ML) NASAL 2 TIMES DAILY
Qty: 16 G | Refills: 3 | Status: SHIPPED | OUTPATIENT
Start: 2024-05-09 | End: 2024-05-13 | Stop reason: SDUPTHER

## 2024-05-09 RX ORDER — AZELASTINE 1 MG/ML
1 SPRAY, METERED NASAL 2 TIMES DAILY
Qty: 90 ML | Refills: 3 | Status: SHIPPED | OUTPATIENT
Start: 2024-05-09 | End: 2024-05-13 | Stop reason: SDUPTHER

## 2024-05-09 ASSESSMENT — ENCOUNTER SYMPTOMS
PERSON REPORTING PAIN: PATIENT
DENIES PAIN: 1
MUSCLE WEAKNESS: 1
BOWEL PATTERN NORMAL: 1
VOMITING: DENIES
NAUSEA: DENIES

## 2024-05-09 ASSESSMENT — FIBROSIS 4 INDEX: FIB4 SCORE: 1.09

## 2024-05-09 NOTE — CASE COMMUNICATION
Pt says that she uses [facility] for her mail in pharmacy. Pt says that she is waiting for nasal sprays ( Astelin and Flonase). SN called her pharmacy and they said that they only had flonase. Informed pt that they were sent to [facility] pharmacy. Will you please send these two medications to the correct pharmacy? Thank you.

## 2024-05-10 ENCOUNTER — HOME CARE VISIT (OUTPATIENT)
Dept: HOME HEALTH SERVICES | Facility: HOME HEALTHCARE | Age: 84
End: 2024-05-10
Payer: MEDICARE

## 2024-05-10 NOTE — CASE COMMUNICATION
Reviewed and approved of edits.  ----- Message -----  From: Mariia Smallwood R.N.  Sent: 5/8/2024   2:20 PM PDT  To: Daxa Hirsch R.N.      Quality Review for SOC OASIS by CLAUDETTE Smallwood, CHRIS on  May 9, 2024    Edits completed by CLAUDETTE Smallwood RN:  1.  and  diagnosis coding updated per chart review.  2. Changed  to 5/2/24 per the LSOC order  3. Added SNF to , changed discharge date to 4/28/24 per EPIC  4. C hecked #3 and 4 on   5.  Per narrative that patient needs supervision to moderate assistance and a walker for safety, the following changes were made:  and  changed to 2;  changed to 3  6.  is 3 per ambulation status  7.  checked noninvasive mechanical ventilator, CPAP

## 2024-05-13 ENCOUNTER — HOSPITAL ENCOUNTER (OUTPATIENT)
Facility: MEDICAL CENTER | Age: 84
End: 2024-05-13
Attending: NURSE PRACTITIONER
Payer: MEDICARE

## 2024-05-13 ENCOUNTER — HOME CARE VISIT (OUTPATIENT)
Dept: HOME HEALTH SERVICES | Facility: HOME HEALTHCARE | Age: 84
End: 2024-05-13
Payer: MEDICARE

## 2024-05-13 VITALS
BODY MASS INDEX: 28.68 KG/M2 | OXYGEN SATURATION: 95 % | SYSTOLIC BLOOD PRESSURE: 132 MMHG | DIASTOLIC BLOOD PRESSURE: 78 MMHG | HEART RATE: 64 BPM | TEMPERATURE: 98.5 F | WEIGHT: 142 LBS | RESPIRATION RATE: 18 BRPM

## 2024-05-13 DIAGNOSIS — J32.9 CHRONIC SINUSITIS, UNSPECIFIED LOCATION: ICD-10-CM

## 2024-05-13 LAB
ALBUMIN SERPL BCP-MCNC: 4.3 G/DL (ref 3.2–4.9)
ALBUMIN/GLOB SERPL: 1.4 G/DL
ALP SERPL-CCNC: 84 U/L (ref 30–99)
ALT SERPL-CCNC: 16 U/L (ref 2–50)
ANION GAP SERPL CALC-SCNC: 13 MMOL/L (ref 7–16)
AST SERPL-CCNC: 28 U/L (ref 12–45)
BASOPHILS # BLD AUTO: 1.2 % (ref 0–1.8)
BASOPHILS # BLD: 0.07 K/UL (ref 0–0.12)
BILIRUB SERPL-MCNC: 1 MG/DL (ref 0.1–1.5)
BUN SERPL-MCNC: 66 MG/DL (ref 8–22)
CALCIUM ALBUM COR SERPL-MCNC: 9.3 MG/DL (ref 8.5–10.5)
CALCIUM SERPL-MCNC: 9.5 MG/DL (ref 8.5–10.5)
CHLORIDE SERPL-SCNC: 99 MMOL/L (ref 96–112)
CO2 SERPL-SCNC: 21 MMOL/L (ref 20–33)
CREAT SERPL-MCNC: 0.74 MG/DL (ref 0.5–1.4)
EOSINOPHIL # BLD AUTO: 0.1 K/UL (ref 0–0.51)
EOSINOPHIL NFR BLD: 1.7 % (ref 0–6.9)
ERYTHROCYTE [DISTWIDTH] IN BLOOD BY AUTOMATED COUNT: 51.6 FL (ref 35.9–50)
GFR SERPLBLD CREATININE-BSD FMLA CKD-EPI: 81 ML/MIN/1.73 M 2
GLOBULIN SER CALC-MCNC: 3.1 G/DL (ref 1.9–3.5)
GLUCOSE SERPL-MCNC: 68 MG/DL (ref 65–99)
HCT VFR BLD AUTO: 48.6 % (ref 37–47)
HGB BLD-MCNC: 15.2 G/DL (ref 12–16)
IMM GRANULOCYTES # BLD AUTO: 0.02 K/UL (ref 0–0.11)
IMM GRANULOCYTES NFR BLD AUTO: 0.3 % (ref 0–0.9)
LYMPHOCYTES # BLD AUTO: 0.87 K/UL (ref 1–4.8)
LYMPHOCYTES NFR BLD: 15.2 % (ref 22–41)
MCH RBC QN AUTO: 27.7 PG (ref 27–33)
MCHC RBC AUTO-ENTMCNC: 31.3 G/DL (ref 32.2–35.5)
MCV RBC AUTO: 88.7 FL (ref 81.4–97.8)
MONOCYTES # BLD AUTO: 0.49 K/UL (ref 0–0.85)
MONOCYTES NFR BLD AUTO: 8.6 % (ref 0–13.4)
NEUTROPHILS # BLD AUTO: 4.18 K/UL (ref 1.82–7.42)
NEUTROPHILS NFR BLD: 73 % (ref 44–72)
NRBC # BLD AUTO: 0 K/UL
NRBC BLD-RTO: 0 /100 WBC (ref 0–0.2)
PLATELET # BLD AUTO: 382 K/UL (ref 164–446)
PMV BLD AUTO: 11.2 FL (ref 9–12.9)
POTASSIUM SERPL-SCNC: 5.1 MMOL/L (ref 3.6–5.5)
PROT SERPL-MCNC: 7.4 G/DL (ref 6–8.2)
RBC # BLD AUTO: 5.48 M/UL (ref 4.2–5.4)
SODIUM SERPL-SCNC: 133 MMOL/L (ref 135–145)
WBC # BLD AUTO: 5.7 K/UL (ref 4.8–10.8)

## 2024-05-13 RX ORDER — AZELASTINE 1 MG/ML
1 SPRAY, METERED NASAL 2 TIMES DAILY
Qty: 90 ML | Refills: 3 | Status: SHIPPED | OUTPATIENT
Start: 2024-05-13

## 2024-05-13 RX ORDER — FLUTICASONE PROPIONATE 50 MCG
1 SPRAY, SUSPENSION (ML) NASAL 2 TIMES DAILY
Qty: 16 G | Refills: 3 | Status: SHIPPED | OUTPATIENT
Start: 2024-05-13

## 2024-05-13 ASSESSMENT — ACTIVITIES OF DAILY LIVING (ADL)
AMBULATION ASSISTANCE: 1
AMBULATION ASSISTANCE ON FLAT SURFACES: 1
TRANSPORTATION: DEPENDENT
TRANSPORTATION ASSESSED: 1
AMBULATION_DISTANCE/DURATION_TOLERATED: 100 FT
AMBULATION ASSISTANCE: INDEPENDENT
PHYSICAL TRANSFERS ASSESSED: 1
PHYSICAL_TRANSFERS_DEVICES: RW
CURRENT_FUNCTION: INDEPENDENT

## 2024-05-13 ASSESSMENT — ENCOUNTER SYMPTOMS
MUSCLE WEAKNESS: 1
BOWEL PATTERN NORMAL: 1
PERSON REPORTING PAIN: PATIENT
MUSCLE WEAKNESS: 1
LAST BOWEL MOVEMENT: 66973
PERSON REPORTING PAIN: PATIENT
NAUSEA: PT DENIES NAUSEA AT THIS TIME.
DENIES PAIN: 1
FATIGUES EASILY: 1
DENIES PAIN: 1
STOOL FREQUENCY: LESS THAN DAILY
LIMITED RANGE OF MOTION: 1
VOMITING: PT DENIES EMESIS AT THIS TIME.

## 2024-05-13 ASSESSMENT — FIBROSIS 4 INDEX: FIB4 SCORE: 1.11

## 2024-05-14 ENCOUNTER — HOME CARE VISIT (OUTPATIENT)
Dept: HOME HEALTH SERVICES | Facility: HOME HEALTHCARE | Age: 84
End: 2024-05-14
Payer: MEDICARE

## 2024-05-14 VITALS
DIASTOLIC BLOOD PRESSURE: 66 MMHG | HEART RATE: 60 BPM | SYSTOLIC BLOOD PRESSURE: 134 MMHG | RESPIRATION RATE: 18 BRPM | BODY MASS INDEX: 28.07 KG/M2 | OXYGEN SATURATION: 97 % | TEMPERATURE: 97.5 F | WEIGHT: 139 LBS

## 2024-05-14 ASSESSMENT — FIBROSIS 4 INDEX: FIB4 SCORE: 1.48

## 2024-05-15 ENCOUNTER — HOME CARE VISIT (OUTPATIENT)
Dept: HOME HEALTH SERVICES | Facility: HOME HEALTHCARE | Age: 84
End: 2024-05-15
Payer: MEDICARE

## 2024-05-16 ENCOUNTER — HOME CARE VISIT (OUTPATIENT)
Dept: HOME HEALTH SERVICES | Facility: HOME HEALTHCARE | Age: 84
End: 2024-05-16
Payer: MEDICARE

## 2024-05-16 VITALS
DIASTOLIC BLOOD PRESSURE: 84 MMHG | RESPIRATION RATE: 18 BRPM | WEIGHT: 143 LBS | OXYGEN SATURATION: 99 % | BODY MASS INDEX: 28.88 KG/M2 | SYSTOLIC BLOOD PRESSURE: 140 MMHG | TEMPERATURE: 97.6 F | HEART RATE: 69 BPM

## 2024-05-16 ASSESSMENT — ENCOUNTER SYMPTOMS
STOOL FREQUENCY: DAILY
PERSON REPORTING PAIN: PATIENT
DENIES PAIN: 1
LAST BOWEL MOVEMENT: 66976
BOWEL PATTERN NORMAL: 1
VOMITING: PT DENIES ANY EMESIS AT THIS TIME
MUSCLE WEAKNESS: 1
NAUSEA: PT DENIES ANY NAUSEA AT THIS TIME

## 2024-05-16 ASSESSMENT — FIBROSIS 4 INDEX: FIB4 SCORE: 1.48

## 2024-05-20 ASSESSMENT — ENCOUNTER SYMPTOMS
PERSON REPORTING PAIN: PATIENT
DENIES PAIN: 1

## 2024-05-20 ASSESSMENT — ACTIVITIES OF DAILY LIVING (ADL)
AMBULATION_DISTANCE/DURATION_TOLERATED: 120 FT
AMBULATION ASSISTANCE ON FLAT SURFACES: 1

## 2024-05-21 ENCOUNTER — HOME CARE VISIT (OUTPATIENT)
Dept: HOME HEALTH SERVICES | Facility: HOME HEALTHCARE | Age: 84
End: 2024-05-21
Payer: MEDICARE

## 2024-05-21 VITALS
DIASTOLIC BLOOD PRESSURE: 82 MMHG | HEART RATE: 79 BPM | OXYGEN SATURATION: 97 % | WEIGHT: 140 LBS | TEMPERATURE: 98.5 F | BODY MASS INDEX: 28.28 KG/M2 | RESPIRATION RATE: 18 BRPM | SYSTOLIC BLOOD PRESSURE: 118 MMHG

## 2024-05-21 VITALS
SYSTOLIC BLOOD PRESSURE: 118 MMHG | RESPIRATION RATE: 18 BRPM | HEART RATE: 79 BPM | DIASTOLIC BLOOD PRESSURE: 82 MMHG | TEMPERATURE: 98.5 F | OXYGEN SATURATION: 97 %

## 2024-05-21 ASSESSMENT — ENCOUNTER SYMPTOMS
VOMITING: PT DENIES EMESIS AT THIS TIME.
DENIES PAIN: 1
BOWEL PATTERN NORMAL: 1
NAUSEA: PT DENIES NAUSEA AT THIS TIME.
PERSON REPORTING PAIN: PATIENT
STOOL FREQUENCY: DAILY
FATIGUES EASILY: 1
DENIES PAIN: 1
PERSON REPORTING PAIN: PATIENT
LAST BOWEL MOVEMENT: 66981
SKIN LESIONS: 1

## 2024-05-21 ASSESSMENT — FIBROSIS 4 INDEX: FIB4 SCORE: 1.48

## 2024-05-22 ENCOUNTER — HOME CARE VISIT (OUTPATIENT)
Dept: HOME HEALTH SERVICES | Facility: HOME HEALTHCARE | Age: 84
End: 2024-05-22
Payer: MEDICARE

## 2024-05-22 VITALS
SYSTOLIC BLOOD PRESSURE: 138 MMHG | DIASTOLIC BLOOD PRESSURE: 66 MMHG | HEART RATE: 68 BPM | TEMPERATURE: 98 F | RESPIRATION RATE: 18 BRPM | OXYGEN SATURATION: 96 %

## 2024-05-22 ASSESSMENT — ACTIVITIES OF DAILY LIVING (ADL)
DRESSING_LB_CURRENT_FUNCTION: SUPERVISION
BATHING_CURRENT_FUNCTION: CONTACT GUARD ASSIST
TOILETING: 1
GROOMING ASSESSED: 1
BATHING_CURRENT_FUNCTION: MINIMUM ASSIST
TOILETING: INDEPENDENT
DRESSING_UB_CURRENT_FUNCTION: INDEPENDENT
DRESSING_LB_CURRENT_FUNCTION: STAND BY ASSIST
BATHING ASSESSED: 1
PHYSICAL TRANSFERS ASSESSED: 1

## 2024-05-23 ENCOUNTER — HOME CARE VISIT (OUTPATIENT)
Dept: HOME HEALTH SERVICES | Facility: HOME HEALTHCARE | Age: 84
End: 2024-05-23
Payer: MEDICARE

## 2024-05-23 ENCOUNTER — TELEPHONE (OUTPATIENT)
Dept: SLEEP MEDICINE | Facility: MEDICAL CENTER | Age: 84
End: 2024-05-23
Payer: MEDICARE

## 2024-05-23 VITALS
SYSTOLIC BLOOD PRESSURE: 118 MMHG | HEART RATE: 60 BPM | OXYGEN SATURATION: 95 % | DIASTOLIC BLOOD PRESSURE: 60 MMHG | RESPIRATION RATE: 18 BRPM | TEMPERATURE: 98.4 F

## 2024-05-23 NOTE — TELEPHONE ENCOUNTER
Called pt to inform her that I [facility] with all the [facility] information and advise her to give us a call back if there are any further issues or concerns.

## 2024-05-26 ASSESSMENT — ACTIVITIES OF DAILY LIVING (ADL)
TRANSPORTATION ASSESSED: 1
PHYSICAL_TRANSFERS_DEVICES: RW
CURRENT_FUNCTION: SUPERVISION
AMBULATION ASSISTANCE ON UNEVEN SURFACES: 1
AMBULATION ASSISTANCE: 1
AMBULATION ASSISTANCE: SUPERVISION
AMBULATION ASSISTANCE ON FLAT SURFACES: 1
TRANSPORTATION: DEPENDENT
PHYSICAL TRANSFERS ASSESSED: 1
AMBULATION_DISTANCE/DURATION_TOLERATED: 120 FT

## 2024-05-26 ASSESSMENT — ENCOUNTER SYMPTOMS
DENIES PAIN: 1
PERSON REPORTING PAIN: PATIENT

## 2024-05-27 ENCOUNTER — HOME CARE VISIT (OUTPATIENT)
Dept: HOME HEALTH SERVICES | Facility: HOME HEALTHCARE | Age: 84
End: 2024-05-27
Payer: MEDICARE

## 2024-05-27 VITALS
RESPIRATION RATE: 18 BRPM | OXYGEN SATURATION: 95 % | TEMPERATURE: 98.7 F | DIASTOLIC BLOOD PRESSURE: 60 MMHG | SYSTOLIC BLOOD PRESSURE: 140 MMHG | HEART RATE: 76 BPM

## 2024-05-27 ASSESSMENT — ENCOUNTER SYMPTOMS
PERSON REPORTING PAIN: PATIENT
DENIES PAIN: 1

## 2024-05-28 ENCOUNTER — HOME CARE VISIT (OUTPATIENT)
Dept: HOME HEALTH SERVICES | Facility: HOME HEALTHCARE | Age: 84
End: 2024-05-28
Payer: MEDICARE

## 2024-05-29 ENCOUNTER — HOME CARE VISIT (OUTPATIENT)
Dept: HOME HEALTH SERVICES | Facility: HOME HEALTHCARE | Age: 84
End: 2024-05-29
Payer: MEDICARE

## 2024-05-29 VITALS
SYSTOLIC BLOOD PRESSURE: 124 MMHG | DIASTOLIC BLOOD PRESSURE: 62 MMHG | OXYGEN SATURATION: 92 % | TEMPERATURE: 97.4 F | HEART RATE: 60 BPM | RESPIRATION RATE: 18 BRPM

## 2024-05-29 ASSESSMENT — ENCOUNTER SYMPTOMS: PAIN: 0/10 PAIN

## 2024-05-30 ENCOUNTER — HOME CARE VISIT (OUTPATIENT)
Dept: HOME HEALTH SERVICES | Facility: HOME HEALTHCARE | Age: 84
End: 2024-05-30
Payer: MEDICARE

## 2024-05-30 VITALS
BODY MASS INDEX: 29.08 KG/M2 | OXYGEN SATURATION: 97 % | SYSTOLIC BLOOD PRESSURE: 128 MMHG | RESPIRATION RATE: 18 BRPM | DIASTOLIC BLOOD PRESSURE: 80 MMHG | WEIGHT: 144 LBS | HEART RATE: 71 BPM | TEMPERATURE: 97.6 F

## 2024-05-30 ASSESSMENT — ENCOUNTER SYMPTOMS
FATIGUES EASILY: 1
LAST BOWEL MOVEMENT: 66989
DENIES PAIN: 1
STOOL FREQUENCY: LESS THAN DAILY
BOWEL PATTERN NORMAL: 1
PERSON REPORTING PAIN: PATIENT
DYSPNEA ON EXERTION: 1

## 2024-05-30 ASSESSMENT — FIBROSIS 4 INDEX: FIB4 SCORE: 1.48

## 2024-05-31 ENCOUNTER — OFFICE VISIT (OUTPATIENT)
Dept: INFECTIOUS DISEASES | Facility: MEDICAL CENTER | Age: 84
End: 2024-05-31
Attending: NURSE PRACTITIONER
Payer: MEDICARE

## 2024-05-31 VITALS
RESPIRATION RATE: 16 BRPM | OXYGEN SATURATION: 92 % | WEIGHT: 155 LBS | TEMPERATURE: 98.4 F | BODY MASS INDEX: 31.25 KG/M2 | SYSTOLIC BLOOD PRESSURE: 102 MMHG | HEART RATE: 60 BPM | HEIGHT: 59 IN | DIASTOLIC BLOOD PRESSURE: 54 MMHG

## 2024-05-31 DIAGNOSIS — M86.642 OTHER CHRONIC OSTEOMYELITIS OF LEFT HAND (HCC): ICD-10-CM

## 2024-05-31 DIAGNOSIS — Z86.73 HISTORY OF CVA (CEREBROVASCULAR ACCIDENT): ICD-10-CM

## 2024-05-31 ASSESSMENT — ENCOUNTER SYMPTOMS
WHEEZING: 0
BRUISES/BLEEDS EASILY: 0
WEIGHT LOSS: 0
BLURRED VISION: 0
SPUTUM PRODUCTION: 0
DOUBLE VISION: 0
HEADACHES: 0
PALPITATIONS: 0
FOCAL WEAKNESS: 0
CONSTIPATION: 0
DIARRHEA: 0
VOMITING: 0
COUGH: 0
SHORTNESS OF BREATH: 0
DIZZINESS: 0
ABDOMINAL PAIN: 0
CHILLS: 0
MYALGIAS: 0
NAUSEA: 0
NERVOUS/ANXIOUS: 0
FEVER: 0

## 2024-05-31 ASSESSMENT — FIBROSIS 4 INDEX: FIB4 SCORE: 1.48

## 2024-05-31 NOTE — PROGRESS NOTES
INFECTIOUS  DISEASE  OUTPATIENT CLINIC  NOTE   Subjective   Primary care provider: NAYAN Longo.     Reason for Follow Up:   Follow-up for   1. Other chronic osteomyelitis of left hand (HCC)        2. History of CVA (cerebrovascular accident)          HPI: Patient previously seen and treated by ID team as inpatient during hospital admission.   Irina Traylor is a 83 y.o. female with a history of multiple CVAs in the past, sick sinus syndrome status post pacemaker placement, hypertension, who presents to the emergency department complaining of left hand first digit pain.  The patient had been progressing over the last 6 months. Recurrent infection of the last thumb with purulent fluid from under nail which would improve with antibiotics and then recurred once antibiotics were stopped.  Admitted 4/8/2024 after patient was seen in the [facility] express clinic and was told that she had a bone infection and report to the ER.  MRI 4/11 enhancement of the left hand first distal phalanx consistent with osteomyelitis and surrounding cellulitis.  Orthopedic surgeon recommended IV antibiotics and declined amputation at this time. No cultures available since no drainage was present on presentation.  Patient was discharged on IV daptomycin but developed elevated CPK levels and concerns for rhabdomyolysis.  Patient was readmitted on 4/26/2024 for frequent monitoring and labs.  Transition to p.o. doxycycline 100 mg twice daily for additional 4 weeks with end date of 5/26/2024.  Total of 6 weeks of antibiotic therapy.    5/1/24- Today Patient reports feeling well. Pt stating that the left thumb has healed well.  Chronically deformed nailbed.  Left hand thumb without any surrounding erythema, swelling or drainage. Wound pictures reviewed in EPIC. Denies drainage, pungent odor, redness, pain. Denies feeling generally ill, fevers/chills, general malaise, headache, n/v/d.  Tolerating doxycycline with no complaints of  side effects.  Most recent labs reviewed from 4/28/2024, mild leukopenia 4.2, neutrophils WNL, CMP with mild elevated BUN trending down to 27, elevated LFTs 202/175, trending down.  Repeat labs today and again in 2 weeks for close monitoring.  Standing order to count.  Recommended to avoid calcium products at least 2 to 3 hours before and after taking doxycycline and wear sunscreen while outside.    5/31/24-patient following up after completion of a 6-week course of antibiotic therapy which ended on 5/26/2024 for osteomyelitis of the left hand first digit.  Most recent labs reviewed from 5/13/2024, WBC 5.7, anemia resolved, CMP mostly unremarkable besides mild hyponatremia 133, previously elevated LFTs now within normal limits.  Patient reports that he tolerated doxycycline with no complaints of side effects.  Today he denies any nausea, vomiting, fever, chills, constipation or diarrhea.  Left hand digit still with deformed nailbed but no signs of infection.    Multiple allergies.  Elevated CPK while on IV daptomycin  Current Antimicrobials: None  Previous Antimicrobials: Daptomycin, doxycycline    Other Current Medications:  Home Medications    Medication Sig Taking? Last Dose Authorizing Provider   Nutritional Supplements (ADULT NUTRITIONAL SUPPLEMENT PO) Take 2 Tablets by mouth 1 time a day as needed (constiaption). Herb-Lax  Indications: constipation Yes Taking Sarita Noble, A.P.R.N.   azelastine (ASTELIN) 137 MCG/SPRAY nasal spray Administer 1 Spray into affected nostril(S) 2 times a day. Indications: Hayfever Yes Taking Rosario Singh A.P.R.N.   fluticasone (FLONASE) 50 MCG/ACT nasal spray Administer 1 Spray into affected nostril(S) 2 times a day. Indications: Allergic Rhinitis, Stuffy Nose Yes Taking Rosario Singh, A.P.R.N.   irbesartan (AVAPRO) 300 MG Tab Take 1 Tablet by mouth every day. Yes Taking Sarita Noble, A.P.R.N.   B Complex-Biotin-FA Tab Take 1 Tablet by mouth every morning.  Indications: Supplement Yes Taking Physician Outpatient   magnesium oxide (MAG-OX) 400 MG Tab tablet Take 400 mg by mouth every morning. Indications: Supplement Yes Taking Physician Outpatient   ascorbic acid (ASCORBIC ACID) 500 MG Tab Take 1,000 mg by mouth 2 times a day. 2 tablets = 1,000 mg.  Indications: Supplement Yes Taking Physician Outpatient   loratadine (CLARITIN) 10 MG Tab Take 10 mg by mouth 2 times a day. Indications: Hayfever, Runny Nose Yes Taking Physician Outpatient   diclofenac sodium (VOLTAREN) 1 % Gel Apply 2 g topically 4 times a day as needed (To hand joints for pain). Yes Taking Blanco Rm M.D.   ezetimibe (ZETIA) 10 MG Tab TAKE ONE TABLET BY MOUTH EVERY DAY Yes Taking Chandrakant Freire M.D.   verapamil ER (CALAN SR) 240 MG Tab CR Take 1 Tablet by mouth every day. Yes Taking Chandrakant Freire M.D.   aspirin (ASA) 81 MG Chew Tab chewable tablet Chew 81 mg every morning. Indications: Preventative Yes Taking Physician Outpatient        PMH:  Past Medical History:   Diagnosis Date    Anesthesia     States feels like she can't breathe with induction for right hip surgery, gets nausea vomiting with most pain meds.  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    Post-nasal drip     Restless leg syndrome     4/25/17-Resolved, states was related to right hip and none S/P replacement.    Sick sinus syndrome (HCC) 6/29/2022    Sinus arrest 6/29/2022    Stroke (HCC) 2/1993, 8/1993    [id number]-[id number]-[id number] aphasia,mouth forming words, reading, numbers    Walker as ambulation aid      Past Surgical History:   Procedure Laterality Date    PB RECONSTR  TOTAL SHOULDER IMPLANT Left 3/21/2019    Procedure: ARTHROPLASTY, SHOULDER, TOTAL-  REVISION REVERSE;  Surgeon: Yoshi Gamble M.D.;  Location: [facility];  Service: Orthopedics    ORIF, FRACTURE, HUMERUS Left 3/21/2019    Procedure: ORIF, FRACTURE, HUMERUS;  Surgeon: Yoshi Gamble M.D.;  Location: [facility];  Service: Orthopedics    KNEE ARTHROPLASTY TOTAL Left 2/12/2018    Procedure: KNEE ARTHROPLASTY TOTAL;  Surgeon: Earle Jacobsen M.D.;  Location: [facility];  Service: Orthopedics    CERVICAL FUSION POSTERIOR  8/8/2017    Procedure: CERVICAL FUSION POSTERIOR- C3-5, INSTRUMENTED;  Surgeon: Florentino Pike M.D.;  Location: [facility];  Service:     CERVICAL LAMINECTOMY POSTERIOR  8/8/2017    Procedure: CERVICAL LAMINECTOMY POSTERIOR- C3-5;  Surgeon: Florentino Pike M.D.;  Location: [facility];  Service:     HIP ARTHROPLASTY TOTAL Left 5/8/2017    Procedure: HIP ARTHROPLASTY TOTAL;  Surgeon: Earle Jacobsen M.D.;  Location: [facility];  Service:     HIP ARTHROPLASTY TOTAL Right 12/12/2016    Procedure: HIP ARTHROPLASTY TOTAL;  Surgeon: Earle Jacobsen M.D.;  Location: [facility];  Service:     FUSION, SPINE, LUMBAR, PLIF  9/26/2016    Procedure: LUMBAR FUSION POSTERIOR L2-S1 onlay ;  Surgeon: Florentino Pike M.D.;  Location: [facility];  Service:     LUMBAR LAMINECTOMY DISKECTOMY N/A 9/26/2016    Procedure: LUMBAR LAMINECTOMY DISKECTOMY L2-S1;  Surgeon: Florentino Pike M.D.;  Location: [facility];  Service:     COLONOSCOPY  2/2016    Every 10 years    SHOULDER ARTHROPLASTY TOTAL Left 8/11/2015    Procedure: SHOULDER ARTHROPLASTY REVERSE TOTAL;  Surgeon: Yoshi Gamble M.D.;  Location: [facility];  Service:     KNEE ARTHROPLASTY TOTAL  6/23/2014    Performed by Earle Jacobsen M.D. at [facility]    HAMMERTOE CORRECTION Left  11/12/2007    Removed 3rd toe    LUMBAR LAMINECTOMY DISKECTOMY  1993    Lumbar    CARPAL TUNNEL RELEASE Bilateral 1993    CERVICAL DISK AND FUSION ANTERIOR  1993    X2    HYSTERECTOMY, TOTAL ABDOMINAL  1977    BUNIONECTOMY Bilateral 1980's    HAMMERTOE CORRECTION Right 1980's    Removed 3rd toe    ORIF, FINGER Left as child    Index     Family History   Problem Relation Age of Onset    Cancer Mother         vaginal    Stroke Father     Hypertension Brother     Diabetes Brother     Hypertension Brother     Hypertension Brother     Stroke Daughter      Social History     Socioeconomic History    Marital status: Single     Spouse name: Not on file    Number of children: Not on file    Years of education: Not on file    Highest education level: Not on file   Occupational History    Not on file   Tobacco Use    Smoking status: Never    Smokeless tobacco: Never   Vaping Use    Vaping status: Never Used   Substance and Sexual Activity    Alcohol use: Not Currently    Drug use: No    Sexual activity: Not Currently   Other Topics Concern    Not on file   Social History Narrative    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  She is currently driving without any accidents.  She does use a walker to ambulate.  Not having any falls.  Does not have hearing aids but is having hearing concerns.  Some increased aphasia especially since her history of CVAs and memory issues.  March 2019 she was hospitalized from her fracture of her left arm and did have home health which she no longer has right now.  She does not have family here but she does have neighbors who help her out.     Social Determinants of Health     Financial Resource Strain: Not on file   Food Insecurity: Not on file   Transportation Needs: Not on file   Physical Activity: Not on file   Stress: Not on file   Social Connections: Feeling Socially Integrated (5/2/2024)    OASIS : Social Isolation     Frequency of experiencing loneliness or isolation: Never   Intimate  "Partner Violence: Not on file   Housing Stability: Not on file           Allergies/Intolerances:  Allergies   Allergen Reactions    Benicar [Olmesartan] Rash     Blisters    Tolerated losartan 4/12/24.    Dilaudid  [Hydromorphone Hcl] Vomiting    Lidocaine     Other Misc Shortness of Breath     Anesthesia with surgery 2016  Pt couldn't breathe while being put under and tapped side of bed to let RN know. \"It was the scariest feeling and I want everyone to know\"      Codeine Vomiting    Demerol Vomiting    Hydrocodone Vomiting    Other Drug Vomiting     \"all pain meds\" per pt can take morphine.    Oxycodone Vomiting    Statins [Hmg-Coa-R Inhibitors] Unspecified     Muscle cramping    Valium Vomiting    Amlodipine Swelling    Diazepam     Nexletol [Bempedoic Acid] Palpitations     Chest pain and dizziness    Simvastatin Myalgia and Unspecified     Headache, fatigue    Hydrochlorothiazide      Frequent urination       ROS:   Review of Systems   Constitutional:  Negative for chills, fever, malaise/fatigue and weight loss.   HENT:  Negative for congestion and hearing loss.    Eyes:  Negative for blurred vision and double vision.   Respiratory:  Negative for cough, sputum production, shortness of breath and wheezing.    Cardiovascular:  Negative for chest pain and palpitations.   Gastrointestinal:  Negative for abdominal pain, constipation, diarrhea, nausea and vomiting.   Genitourinary:  Negative for dysuria.   Musculoskeletal:  Negative for myalgias.   Skin:  Negative for itching and rash.   Neurological:  Negative for dizziness, focal weakness and headaches.        History of aphasia due to CVA   Endo/Heme/Allergies:  Does not bruise/bleed easily.   Psychiatric/Behavioral:  The patient is not nervous/anxious.       ROS was reviewed and were negative except as above.    Objective    Most Recent Vital Signs:  /54 (BP Location: Right arm, Patient Position: Sitting, BP Cuff Size: Adult)   Pulse 60   Temp 36.9 °C " "(98.4 °F) (Temporal)   Resp 16   Ht 1.499 m (4' 11\")   Wt 70.3 kg (155 lb)   SpO2 92%   BMI 31.31 kg/m²     Physical Exam:  Physical Exam  Vitals and nursing note reviewed.   Constitutional:       General: She is not in acute distress.     Appearance: Normal appearance. She is not ill-appearing.      Comments: Ambulates with walker   HENT:      Head: Normocephalic and atraumatic.      Nose: Nose normal.      Mouth/Throat:      Mouth: Mucous membranes are moist.   Eyes:      Pupils: Pupils are equal, round, and reactive to light.   Cardiovascular:      Rate and Rhythm: Normal rate and regular rhythm.   Pulmonary:      Effort: Pulmonary effort is normal. No respiratory distress.      Breath sounds: Normal breath sounds. No stridor.   Musculoskeletal:      Cervical back: Normal range of motion.      Right lower leg: No edema.      Left lower leg: No edema.      Comments: Left hand thumb without any surrounding erythema, swelling or drainage.  No signs of active infection.  Chronically deformed nailbed   Skin:     General: Skin is warm and dry.      Capillary Refill: Capillary refill takes less than 2 seconds.      Coloration: Skin is not jaundiced or pale.   Neurological:      General: No focal deficit present.      Mental Status: She is alert and oriented to person, place, and time.   Psychiatric:         Mood and Affect: Mood normal.         Behavior: Behavior normal.          Pertinent Lab/Imaging Results:  [unfilled]  @CMP@  WBC   Date/Time Value Ref Range Status   05/13/2024 01:41 PM 5.7 4.8 - 10.8 K/uL Final     RBC   Date/Time Value Ref Range Status   05/13/2024 01:41 PM 5.48 (H) 4.20 - 5.40 M/uL Final     Hemoglobin   Date/Time Value Ref Range Status   05/13/2024 01:41 PM 15.2 12.0 - 16.0 g/dL Final     Hematocrit   Date/Time Value Ref Range Status   05/13/2024 01:41 PM 48.6 (H) 37.0 - 47.0 % Final     MCV   Date/Time Value Ref Range Status   05/13/2024 01:41 PM 88.7 81.4 - 97.8 fL Final     MCH   Date/Time " "Value Ref Range Status   05/13/2024 01:41 PM 27.7 27.0 - 33.0 pg Final     MCHC   Date/Time Value Ref Range Status   05/13/2024 01:41 PM 31.3 (L) 32.2 - 35.5 g/dL Final     Comment:     Please note new reference range effective 05/22/2023.     MPV   Date/Time Value Ref Range Status   05/13/2024 01:41 PM 11.2 9.0 - 12.9 fL Final      Sodium   Date/Time Value Ref Range Status   05/13/2024 01:41  (L) 135 - 145 mmol/L Final     Potassium   Date/Time Value Ref Range Status   05/13/2024 01:41 PM 5.1 3.6 - 5.5 mmol/L Final     Chloride   Date/Time Value Ref Range Status   05/13/2024 01:41 PM 99 96 - 112 mmol/L Final     Co2   Date/Time Value Ref Range Status   05/13/2024 01:41 PM 21 20 - 33 mmol/L Final     Glucose   Date/Time Value Ref Range Status   05/13/2024 01:41 PM 68 65 - 99 mg/dL Final     Bun   Date/Time Value Ref Range Status   05/13/2024 01:41 PM 66 (H) 8 - 22 mg/dL Final     Creatinine   Date/Time Value Ref Range Status   05/13/2024 01:41 PM 0.74 0.50 - 1.40 mg/dL Final     Alkaline Phosphatase   Date/Time Value Ref Range Status   05/13/2024 01:41 PM 84 30 - 99 U/L Final     AST(SGOT)   Date/Time Value Ref Range Status   05/13/2024 01:41 PM 28 12 - 45 U/L Final     ALT(SGPT)   Date/Time Value Ref Range Status   05/13/2024 01:41 PM 16 2 - 50 U/L Final     Total Bilirubin   Date/Time Value Ref Range Status   05/13/2024 01:41 PM 1.0 0.1 - 1.5 mg/dL Final      CPK Total   Date/Time Value Ref Range Status   05/06/2024 10:30 AM 43 0 - 154 U/L Final            No results found for: \"BLOODCULTU\", \"BLDCULT\", \"BCHOLD\"    No results found for: \"BLOODCULTU\", \"BLDCULT\", \"BCHOLD\"          Impression/Assessment      1. Other chronic osteomyelitis of left hand (HCC)        2. History of CVA (cerebrovascular accident)          Irina Traylor is a 83 y.o. female with a history of multiple CVAs in the past, sick sinus syndrome status post pacemaker placement, hypertension, who presents to the emergency department " complaining of left hand first digit pain.  The patient had been progressing over the last 6 months. Recurrent infection of the last thumb with purulent fluid from under nail which would improve with antibiotics and then recurred once antibiotics were stopped.  Admitted 4/8/2024 after patient was seen in the [facility] express clinic and was told that she had a bone infection and report to the ER.  MRI 4/11 enhancement of the left hand first distal phalanx consistent with osteomyelitis and surrounding cellulitis.  Orthopedic surgeon recommended IV antibiotics and declined amputation at this time. No cultures available since no drainage was present on presentation.  Patient was discharged on IV daptomycin but developed elevated CPK levels and concerns for rhabdomyolysis.  Patient was readmitted on 4/26/2024 for frequent monitoring and labs.  Transition to p.o. doxycycline 100 mg twice daily for additional 4 weeks with end date of 5/26/2024.  Total of 6 weeks of antibiotic therapy.    5/31/24-patient following up after completion of a 6-week course of antibiotic therapy which ended on 5/26/2024 for osteomyelitis of the left hand first digit.  Most recent labs reviewed from 5/13/2024, WBC 5.7, anemia resolved, CMP mostly unremarkable besides mild hyponatremia 133, previously elevated LFTs now within normal limits.  Patient reports that he tolerated doxycycline with no complaints of side effects.  Today he denies any nausea, vomiting, fever, chills, constipation or diarrhea.  Left hand digit still with deformed nailbed but no signs of infection.    - No culture results to guide therapy so if she worsens on doxycycline will attempt obtain cultures to better guide antibiotic   - In the setting of chronic osteomyelitis without surgical intervention for source control there is a possibility of recurrence of infection, amputation, hospital admission, sepsis.  PLAN:   - Completed 6-week course of antibiotic therapy on 5/26/2024.  No  further need for antibiotics.  - Continue wound care to left hand thumb  - Continue routine follow-up with PCP.  - Education provided on S/S of infection and when to report to ER/ call [phone]       Return visit: PRN. Follow up with primary care physician for chronic medical problems      I have performed a physical exam,  updated ROS and plan today. I have reviewed previous images, labs, and provider notes.      ANDREW Martinez.    All Patients should seek medical re-evaluation or report to the ER for new, increasing or worsening symptoms. In some circumstances medical conditions can change from the initial evaluation and may require emergent medical re-evaluation. This includes but is not limited to chest pain, shortness of breath, atypical abdominal pain, atypical headache, ALOC, fever >101, low blood pressure, high respiratory rate (above 30), low oxygen saturation (below 90%), acute delirium, abnormal bleeding, inability to tolerate any intake, weakness on one side of the body, any worsened or concerning conditions.    Please note that this dictation was created using voice recognition software. I have worked with technical experts from [employer] to optimize the interface.  I have made every reasonable attempt to correct obvious errors, but there may be errors of grammar and possibly content that I did not discover before finalizing the note.

## 2024-06-03 ENCOUNTER — HOME CARE VISIT (OUTPATIENT)
Dept: HOME HEALTH SERVICES | Facility: HOME HEALTHCARE | Age: 84
End: 2024-06-03
Payer: MEDICARE

## 2024-06-03 ENCOUNTER — APPOINTMENT (OUTPATIENT)
Dept: FAMILY PLANNING/WOMEN'S HEALTH CLINIC | Facility: PHYSICIAN GROUP | Age: 84
End: 2024-06-03
Payer: MEDICARE

## 2024-06-03 VITALS
SYSTOLIC BLOOD PRESSURE: 152 MMHG | HEART RATE: 60 BPM | OXYGEN SATURATION: 94 % | TEMPERATURE: 97.4 F | RESPIRATION RATE: 18 BRPM | DIASTOLIC BLOOD PRESSURE: 64 MMHG

## 2024-06-03 PROCEDURE — G0152 HHCP-SERV OF OT,EA 15 MIN: HCPCS

## 2024-06-03 ASSESSMENT — ENCOUNTER SYMPTOMS
DENIES PAIN: 1
PERSON REPORTING PAIN: PATIENT

## 2024-06-05 ENCOUNTER — HOME CARE VISIT (OUTPATIENT)
Dept: HOME HEALTH SERVICES | Facility: HOME HEALTHCARE | Age: 84
End: 2024-06-05
Payer: MEDICARE

## 2024-06-05 VITALS
DIASTOLIC BLOOD PRESSURE: 78 MMHG | TEMPERATURE: 98.6 F | HEART RATE: 60 BPM | SYSTOLIC BLOOD PRESSURE: 138 MMHG | RESPIRATION RATE: 17 BRPM | OXYGEN SATURATION: 94 %

## 2024-06-05 PROCEDURE — G0151 HHCP-SERV OF PT,EA 15 MIN: HCPCS
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
## 2024-06-10 ENCOUNTER — HOME CARE VISIT (OUTPATIENT)
Dept: HOME HEALTH SERVICES | Facility: HOME HEALTHCARE | Age: 84
End: 2024-06-10
Payer: MEDICARE

## 2024-06-10 VITALS
RESPIRATION RATE: 16 BRPM | SYSTOLIC BLOOD PRESSURE: 136 MMHG | TEMPERATURE: 98.9 F | HEART RATE: 67 BPM | DIASTOLIC BLOOD PRESSURE: 74 MMHG | OXYGEN SATURATION: 96 %

## 2024-06-10 PROCEDURE — G0151 HHCP-SERV OF PT,EA 15 MIN: HCPCS

## 2024-06-10 ASSESSMENT — ENCOUNTER SYMPTOMS
PERSON REPORTING PAIN: PATIENT
DENIES PAIN: 1

## 2024-06-10 ASSESSMENT — ACTIVITIES OF DAILY LIVING (ADL)
TRANSPORTATION COMMENTS: FALL RISK
AMBULATION ASSISTANCE ON FLAT SURFACES: 1
AMBULATION_DISTANCE/DURATION_TOLERATED: 180 FT

## 2024-06-11 ENCOUNTER — HOME CARE VISIT (OUTPATIENT)
Dept: HOME HEALTH SERVICES | Facility: HOME HEALTHCARE | Age: 84
End: 2024-06-11
Payer: MEDICARE

## 2024-06-11 ENCOUNTER — OFFICE VISIT (OUTPATIENT)
Dept: MEDICAL GROUP | Facility: PHYSICIAN GROUP | Age: 84
End: 2024-06-11
Payer: MEDICARE

## 2024-06-11 VITALS
HEIGHT: 59 IN | OXYGEN SATURATION: 96 % | SYSTOLIC BLOOD PRESSURE: 104 MMHG | DIASTOLIC BLOOD PRESSURE: 60 MMHG | TEMPERATURE: 97.3 F | WEIGHT: 150 LBS | HEART RATE: 60 BPM | BODY MASS INDEX: 30.24 KG/M2

## 2024-06-11 VITALS
TEMPERATURE: 99 F | SYSTOLIC BLOOD PRESSURE: 158 MMHG | RESPIRATION RATE: 16 BRPM | OXYGEN SATURATION: 95 % | DIASTOLIC BLOOD PRESSURE: 82 MMHG | HEART RATE: 67 BPM

## 2024-06-11 DIAGNOSIS — L70.0 OPEN COMEDONE: ICD-10-CM

## 2024-06-11 DIAGNOSIS — I10 PRIMARY HYPERTENSION: ICD-10-CM

## 2024-06-11 PROCEDURE — G0152 HHCP-SERV OF OT,EA 15 MIN: HCPCS

## 2024-06-11 PROCEDURE — 3078F DIAST BP <80 MM HG: CPT | Performed by: NURSE PRACTITIONER

## 2024-06-11 PROCEDURE — 3074F SYST BP LT 130 MM HG: CPT | Performed by: NURSE PRACTITIONER

## 2024-06-11 PROCEDURE — 99214 OFFICE O/P EST MOD 30 MIN: CPT | Performed by: NURSE PRACTITIONER

## 2024-06-11 RX ORDER — LOSARTAN POTASSIUM 25 MG/1
1 TABLET ORAL
COMMUNITY
End: 2024-06-11

## 2024-06-11 RX ORDER — IRBESARTAN 150 MG/1
150 TABLET ORAL DAILY
Qty: 100 TABLET | Refills: 1 | Status: SHIPPED | OUTPATIENT
Start: 2024-06-11

## 2024-06-11 ASSESSMENT — ACTIVITIES OF DAILY LIVING (ADL)
AMBULATION ASSISTANCE ON FLAT SURFACES: 1
AMBULATION_DISTANCE/DURATION_TOLERATED: 200 FT
CURRENT_FUNCTION: INDEPENDENT
TOILETING: INDEPENDENT
PREPARING MEALS: INDEPENDENT
DRESSING_UB_CURRENT_FUNCTION: INDEPENDENT
GROOMING ASSESSED: 1
TOILETING: 1
TELEPHONE USE ASSESSED: 1
GROOMING_CURRENT_FUNCTION: INDEPENDENT
BATHING ASSESSED: 1
BATHING_CURRENT_FUNCTION: INDEPENDENT
USING THE TELPHONE: INDEPENDENT
PHYSICAL TRANSFERS ASSESSED: 1
DRESSING_LB_CURRENT_FUNCTION: INDEPENDENT
TRANSPORTATION COMMENTS: FALL RISK

## 2024-06-11 ASSESSMENT — ENCOUNTER SYMPTOMS
DENIES PAIN: 1
PERSON REPORTING PAIN: PATIENT
PERSON REPORTING PAIN: PATIENT
DENIES PAIN: 1

## 2024-06-11 ASSESSMENT — FIBROSIS 4 INDEX: FIB4 SCORE: 1.48

## 2024-06-11 NOTE — ASSESSMENT & PLAN NOTE
Blood pressure today 104/60.  Continues verapamil  mg daily and irbesartan 300 mg daily. Previous blood pressures at infections disease low normal. Plan to decrease irbesartan dose to 150 mg daily. Continue current verapamil ER dose.

## 2024-06-11 NOTE — ASSESSMENT & PLAN NOTE
She has bumps to her chest and back that have been present for at least a year. She has a friend that has been squeezing the bumps. The bumps are increasing in size, no pain. Patient would like to have bumps removed.

## 2024-06-11 NOTE — PROGRESS NOTES
Subjective:     CC: follow-up, bumps    HPI:   Irina presents today with the following:    Primary hypertension  Blood pressure today 104/60.  Continues verapamil  mg daily and irbesartan 300 mg daily. Previous blood pressures at infections disease low normal. Plan to decrease irbesartan dose to 150 mg daily. Continue current verapamil ER dose.     Open comedone  She has bumps to her chest and back that have been present for at least a year. She has a friend that has been squeezing the bumps. The bumps are increasing in size, no pain. Patient would like to have bumps removed.     Past Medical History:   Diagnosis Date    Anesthesia     States feels like she can't breathe with induction for right hip surgery, gets nausea vomiting with most pain meds.  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    Post-nasal drip     Restless leg syndrome     4/25/17-Resolved, states was related to right hip and none S/P replacement.    Sick sinus syndrome (HCC) 6/29/2022    Sinus arrest 6/29/2022    Stroke [facility]) 2/1993, 8/1993    Vnlafifm-jwlrlk-mbhcbsexqj aphasia,mouth forming words, reading, numbers    Walker as ambulation aid        Social History     Tobacco Use    Smoking status: Never    Smokeless tobacco: Never   Vaping Use    Vaping status: Never Used   Substance Use Topics    Alcohol use: Not Currently    Drug use: No       Current Outpatient Medications Ordered in Epic   Medication Sig Dispense Refill    irbesartan (AVAPRO) 150 MG Tab Take 1 Tablet by mouth every day. 100 Tablet 1    Nutritional Supplements (ADULT NUTRITIONAL SUPPLEMENT PO) Take 2 Tablets by mouth 1  "time a day as needed (constiaption). Herb-Lax  Indications: constipation      azelastine (ASTELIN) 137 MCG/SPRAY nasal spray Administer 1 Spray into affected nostril(S) 2 times a day. Indications: Hayfever 90 mL 3    fluticasone (FLONASE) 50 MCG/ACT nasal spray Administer 1 Spray into affected nostril(S) 2 times a day. Indications: Allergic Rhinitis, Stuffy Nose 16 g 3    B Complex-Biotin-FA Tab Take 1 Tablet by mouth every morning. Indications: Supplement      magnesium oxide (MAG-OX) 400 MG Tab tablet Take 400 mg by mouth every morning. Indications: Supplement      ascorbic acid (ASCORBIC ACID) 500 MG Tab Take 1,000 mg by mouth 2 times a day. 2 tablets = 1,000 mg.  Indications: Supplement      ezetimibe (ZETIA) 10 MG Tab TAKE ONE TABLET BY MOUTH EVERY  Tablet 2    verapamil ER (CALAN SR) 240 MG Tab CR Take 1 Tablet by mouth every day. 90 Tablet 3    aspirin (ASA) 81 MG Chew Tab chewable tablet Chew 81 mg every morning. Indications: Preventative      loratadine (CLARITIN) 10 MG Tab Take 10 mg by mouth 2 times a day. Indications: Hayfever, Runny Nose (Patient not taking: Reported on 6/11/2024)      diclofenac sodium (VOLTAREN) 1 % Gel Apply 2 g topically 4 times a day as needed (To hand joints for pain). (Patient not taking: Reported on 6/11/2024)       No current [facility]-ordered facility-administered medications on file.       Allergies:  Benicar [olmesartan], Dilaudid  [hydromorphone hcl], Lidocaine, Other misc, Codeine, Demerol, Hydrocodone, Other drug, Oxycodone, Statins [hmg-coa-r inhibitors], Valium, Amlodipine, Diazepam, Nexletol [bempedoic acid], Simvastatin, and Hydrochlorothiazide    Health Maintenance: Declines vaccinations. Bone density order reprinted and encouraged patient to schedule.       Objective:     Vital signs reviewed  Exam:  /60 (BP Location: Right arm, Patient Position: Sitting, BP Cuff Size: Adult)   Pulse 60   Temp 36.3 °C (97.3 °F) (Temporal)   Ht 1.499 m (4' 11\")   Wt 68 kg " (150 lb)   SpO2 96%   BMI 30.30 kg/m²  Body mass index is 30.3 kg/m².    Gen: Alert and oriented, No apparent distress.  Eyes:   Lids normal. Glasses in place.   Lungs: Normal effort, CTA bilaterally, no wheezes, rhonchi, or rales  CV: Regular rate and rhythm. No murmurs, rubs, or gallops.  Ext: Left thumb nail has fallen off, small nail at base of nail bed growing. Using four wheel walker with ambulation.   Skin:    pea sized non-painful comedone to back and midchest without redness, swelling or discharge.        Assessment & Plan:     83 y.o. female with the following -     1. Primary hypertension  Chronic exacerbated problem.  Blood pressure low normal plan to decrease irbesartan dose to 150 mg daily.  Continue verapamil  mg daily.  Return in 1 month for follow-up.  - irbesartan (AVAPRO) 150 MG Tab; Take 1 Tablet by mouth every day.  Dispense: 100 Tablet; Refill: 1    2. Open comedone  Chronic exacerbated problem.  Discussed with patient that appears to be comedones and will place referral to dermatology as she would like to have them removed.  - Referral to Dermatology      Return in about 4 weeks (around 7/9/2024) for Hypertension.    Please note that this dictation was created using voice recognition software. I have made every reasonable attempt to correct obvious errors, but I expect that there are errors of grammar and possibly content that I did not discover before finalizing the note.

## 2024-06-18 ENCOUNTER — TELEPHONE (OUTPATIENT)
Dept: MEDICAL GROUP | Facility: PHYSICIAN GROUP | Age: 84
End: 2024-06-18
Payer: MEDICARE

## 2024-06-19 ENCOUNTER — HOME CARE VISIT (OUTPATIENT)
Dept: HOME HEALTH SERVICES | Facility: HOME HEALTHCARE | Age: 84
End: 2024-06-19
Payer: MEDICARE

## 2024-06-19 VITALS
TEMPERATURE: 97.9 F | DIASTOLIC BLOOD PRESSURE: 76 MMHG | RESPIRATION RATE: 18 BRPM | SYSTOLIC BLOOD PRESSURE: 132 MMHG | HEART RATE: 62 BPM | OXYGEN SATURATION: 97 %

## 2024-06-19 PROCEDURE — G0151 HHCP-SERV OF PT,EA 15 MIN: HCPCS

## 2024-06-19 ASSESSMENT — ENCOUNTER SYMPTOMS
DENIES PAIN: 1
PERSON REPORTING PAIN: PATIENT

## 2024-06-19 ASSESSMENT — ACTIVITIES OF DAILY LIVING (ADL)
AMBULATION ASSISTANCE ON FLAT SURFACES: 1
OASIS_M1830: 02
AMBULATION ASSISTANCE ON UNEVEN SURFACES: 1
AMBULATION_DISTANCE/DURATION_TOLERATED: 300 FT
HOME_HEALTH_OASIS: 01

## 2024-06-19 ASSESSMENT — PATIENT HEALTH QUESTIONNAIRE - PHQ9: CLINICAL INTERPRETATION OF PHQ2 SCORE: 0

## 2024-06-19 NOTE — TELEPHONE ENCOUNTER
Pt left message stating she isn't feel well, can't eat this morning and is wanting her Hernia surgery moved up sooner. Called pt back and she said she is feeling a lot better and is drinking Crystal light and it is helping but she hasn't urinated yet after drinking it. Pt has had 1st appt with [facility] but wasn't told when her surgery would be scheduled. Pt is wanting to  know if she can get in sooner for surgery since she wasn't feel well this morning. Pt stated she left a voicemail with them but hasn't heard back yet. Let pt know I will try and call to tomorrow and see if they set it up yet. Let pt know if she starts to feel sick again to come in a be seen by UC or PCP-ak

## 2024-06-19 NOTE — TELEPHONE ENCOUNTER
Called and left message with [facility] surgery scheduler for Dr Armas to check status for pt and ask for a return call for pt. Unable to leave voicemail for pt to let her know, voicemail box is not set up-ak

## 2024-06-25 ENCOUNTER — OFFICE VISIT (OUTPATIENT)
Dept: DERMATOLOGY | Facility: IMAGING CENTER | Age: 84
End: 2024-06-25
Payer: MEDICARE

## 2024-06-25 DIAGNOSIS — D48.5 NEOPLASM OF UNCERTAIN BEHAVIOR OF SKIN: ICD-10-CM

## 2024-06-25 DIAGNOSIS — R20.9 DISTURBANCE OF SKIN SENSATION: ICD-10-CM

## 2024-06-25 NOTE — PROGRESS NOTES
[facility] Dermatology Clinic Note    CC: bump on chest, back       HPI:  Irina Traylor is a 83 y.o. female who presents today as new patient for evaluation and management of    She notes growths on her central chest and back. They get inflamed and are tender. She would like them removed   No hx of skin cancers       ROS: no fevers/chills. Other pertinent positives and negatives as above.     Meds/PMH/PSH/FamHx/Allergies:   I have reviewed past medical history, surgical history, medications family history, allergies relevant to my specialty in the chart.       PHYSICAL EXAM:   A focused skin exam was completed of the chest, back, upper extremities with the following pertinent findings listed below.   Remaining above-listed examined areas within normal limits / negative for rashes or lesions.    Central chest with 1 by 1cm subcutaneous nodule with overlying punctum - for BX   Back with 2 by 2cm subcuaneous nodule with overlying punctum - for BX   Incidentally noted, on left arm there is a pearly pink papule with arborizing vessels -- ddx BCC         IMPRESSION / PLAN:    Neoplasm of uncertain significance, n=2 for punch - location/s: back   - differential diagnosis as in exam   - discussed options today, proceed with biopsy. Further treatment may be recommended     Punch Biopsy Procedure Note:  Procedure: Biopsy by punch technique  Number of biopsies performed:   Size: 6mm (chest) 8mm (back)   Location: central chest, back   Differential diagnosis: as discussed above   Risks, benefits and alternatives of procedure discussed, verbal consent obtained for photo (see chart) and written informed consent obtained for procedure. Time out completed. Area of biopsy prepped with alcohol. Anesthesia with 1% lidocaine with epinephrine administered intradermally with a 30 gauge needle. Punch biopsy of site performed. Hemostasis achieved with pressure and 4.0 prolene sutures. Vaseline applied to wound with bandage. Patient  tolerated procedure well, and there were no complications.  The pathology specimen was sent to the lab via the staff.  Wound care was discussed with the patient. Please call clinic if post procedure complications such as bleeding at home, concern for infection.     ** of note, patient has allergy to lidocaine listed in her chart from 2014. Could not find documentation of what happens or why allergy entered. 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 Proceeded with lidocaine for biopsy. Injected 1.5 cc intradermally, no reaction noted. Had her sit for 20 min after biopsy, no reaction noted, safe to go home. Call if any symptoms.     Neoplasm of uncertain significance, n=1 - location/s: left arm, shave   - differential diagnosis as in exam. Concerning for BCC   - discussed options today, proceed with biopsy. Further treatment may be recommended     Shave Biopsy Procedure Note:   Number of biopsies: 1  Location: as above   Risks, benefits and alternatives of procedure discussed, verbal consent obtained for photo (see chart) and informed consent obtained for procedure. Time out completed. Area of biopsy prepped with alcohol. Anesthesia with 1% lidocaine with epinephrine administered intradermally with 30 gauge needle. Shave biopsy of the site performed. Hemostasis achieved with pressure and aluminum chloride. Vaseline applied to wound with bandage. Patient tolerated procedure well and there were no complications. The specimen was sent to the pathology lab by the staff and patient will be contacted with results in 1-2 weeks. Further treatment may be required. Wound care was discussed. Instructed to call clinic if patient experiences any complications such as post-operative bleeding, infection.         Follow up:  7-10 days for suture removal        Sho Ozuna MD   [facility] Dermatology

## 2024-06-27 ENCOUNTER — HOME CARE VISIT (OUTPATIENT)
Dept: HOME HEALTH SERVICES | Facility: HOME HEALTHCARE | Age: 84
End: 2024-06-27
Payer: MEDICARE

## 2024-06-27 DIAGNOSIS — I10 PRIMARY HYPERTENSION: ICD-10-CM

## 2024-06-27 DIAGNOSIS — M15.9 PRIMARY OSTEOARTHRITIS INVOLVING MULTIPLE JOINTS: ICD-10-CM

## 2024-06-27 NOTE — CASE COMMUNICATION
Arterial Line  Performed by: Felix Chahal M.D.  Authorized by: Felix Chahal M.D.     Start Time:  3/29/2021 6:45 PM  End Time:  3/29/2021 6:47 PM  Localization: ultrasound guidance  Image captured, interpreted and electronically stored.  Patient Location:  OR  Indication: continuous blood pressure monitoring and blood sampling needed        Catheter Size:  20 G  Seldinger Technique?: Yes    Laterality:  Right  Line Secured:  Transparent dressing, tape and antimicrobial disc  Events: patient tolerated procedure well with no complications           Quality Review Completed for DC OASIS by KYUNG Kennedy, RN on 6/27/2024:     Edits completed by KYUNG Kennedy RN:  1.  is yes to pain per care plan interventions  2.  D is 0 per  response  3.  changed to 0-no indication of assistance needed per other OASIS responses is independent  4. [id number] is no, there is no supportive documentation for acute onset of mental status changes

## 2024-06-27 NOTE — Clinical Note
Therapist agrees to changes. Thanks, CIARRA  ----- Message -----  From: Bridget Kennedy R.N.  Sent: 6/27/2024  11:05 AM PDT  To: Ngozi Benedict, PT      Quality Review Completed for DC OASIS by KYUNG Kennedy, RN on 6/27/2024:     Edits completed by KYUNG Kennedy, RN:  1.  is yes to pain per care plan interventions  2.  D is 0 per  response  3.  changed to 0-no indication of assistance needed per other OASIS responses is independent  4. [id number] is no, there is no supportive documentation for acute onset of mental status changes

## 2024-06-28 ENCOUNTER — PATIENT OUTREACH (OUTPATIENT)
Dept: HEALTH INFORMATION MANAGEMENT | Facility: OTHER | Age: 84
End: 2024-06-28
Payer: MEDICARE

## 2024-06-28 DIAGNOSIS — I10 PRIMARY HYPERTENSION: ICD-10-CM

## 2024-07-01 PROCEDURE — RXMED WILLOW AMBULATORY MEDICATION CHARGE: Performed by: NURSE PRACTITIONER

## 2024-07-01 PROCEDURE — RXMED WILLOW AMBULATORY MEDICATION CHARGE: Performed by: INTERNAL MEDICINE

## 2024-07-02 ENCOUNTER — PHARMACY VISIT (OUTPATIENT)
Dept: PHARMACY | Facility: MEDICAL CENTER | Age: 84
End: 2024-07-02
Payer: COMMERCIAL

## 2024-07-03 ENCOUNTER — NON-PROVIDER VISIT (OUTPATIENT)
Dept: DERMATOLOGY | Facility: IMAGING CENTER | Age: 84
End: 2024-07-03
Payer: MEDICARE

## 2024-07-03 NOTE — PROGRESS NOTES
Tenisha Traylor is a 83 y.o. female here for a Non-Provider Visit for Suture Removal.    Sutures were placed by  on date: 06/25/2024  Skin is healed: Yes  Provider notified if skin is not healed, or if there is redness, heat, pain, or drainage from incision: no  Sutures removed.   Mastisol and steristips are placed: No    Advised to use emollient (vaseline, aquaphor, etc.) as needed, avoid peroxide and antibiotic ointment to reduce irritation.     Path report has not been reviewed by provider.  Path report has not reviewed with patient.

## 2024-07-08 ENCOUNTER — OFFICE VISIT (OUTPATIENT)
Dept: DERMATOLOGY | Facility: IMAGING CENTER | Age: 84
End: 2024-07-08
Payer: MEDICARE

## 2024-07-08 DIAGNOSIS — C44.519 BASAL CELL CARCINOMA (BCC) OF SKIN OF OTHER PART OF TORSO: ICD-10-CM

## 2024-07-08 DIAGNOSIS — L90.5 SCAR: ICD-10-CM

## 2024-07-08 PROCEDURE — 17261 DSTRJ MAL LES T/A/L .6-1.0CM: CPT | Performed by: STUDENT IN AN ORGANIZED HEALTH CARE EDUCATION/TRAINING PROGRAM

## 2024-07-09 ENCOUNTER — OFFICE VISIT (OUTPATIENT)
Dept: MEDICAL GROUP | Facility: PHYSICIAN GROUP | Age: 84
End: 2024-07-09
Payer: MEDICARE

## 2024-07-09 ENCOUNTER — HOSPITAL ENCOUNTER (OUTPATIENT)
Dept: LAB | Facility: MEDICAL CENTER | Age: 84
End: 2024-07-09
Attending: NURSE PRACTITIONER
Payer: MEDICARE

## 2024-07-09 VITALS
BODY MASS INDEX: 30 KG/M2 | OXYGEN SATURATION: 92 % | WEIGHT: 148.81 LBS | DIASTOLIC BLOOD PRESSURE: 64 MMHG | TEMPERATURE: 98.8 F | SYSTOLIC BLOOD PRESSURE: 122 MMHG | HEART RATE: 61 BPM | HEIGHT: 59 IN

## 2024-07-09 DIAGNOSIS — C44.619 BASAL CELL CARCINOMA, ARM, LEFT: ICD-10-CM

## 2024-07-09 DIAGNOSIS — K42.9 UMBILICAL HERNIA WITHOUT OBSTRUCTION AND WITHOUT GANGRENE: ICD-10-CM

## 2024-07-09 DIAGNOSIS — I10 PRIMARY HYPERTENSION: ICD-10-CM

## 2024-07-09 DIAGNOSIS — H26.9 CATARACT OF BOTH EYES, UNSPECIFIED CATARACT TYPE: ICD-10-CM

## 2024-07-09 DIAGNOSIS — M62.82 NON-TRAUMATIC RHABDOMYOLYSIS: ICD-10-CM

## 2024-07-09 PROBLEM — L70.0 OPEN COMEDONE: Status: RESOLVED | Noted: 2024-06-11 | Resolved: 2024-07-09

## 2024-07-09 PROBLEM — R74.8 ELEVATED LIVER ENZYMES: Status: RESOLVED | Noted: 2021-08-12 | Resolved: 2024-07-09

## 2024-07-09 PROBLEM — R79.89 ELEVATED BRAIN NATRIURETIC PEPTIDE (BNP) LEVEL: Status: RESOLVED | Noted: 2024-04-10 | Resolved: 2024-07-09

## 2024-07-09 PROBLEM — G31.9 CEREBRAL ATROPHY (HCC): Status: ACTIVE | Noted: 2024-07-09

## 2024-07-09 LAB
ANION GAP SERPL CALC-SCNC: 13 MMOL/L (ref 7–16)
BUN SERPL-MCNC: 60 MG/DL (ref 8–22)
CALCIUM SERPL-MCNC: 9.7 MG/DL (ref 8.5–10.5)
CHLORIDE SERPL-SCNC: 98 MMOL/L (ref 96–112)
CK SERPL-CCNC: 45 U/L (ref 0–154)
CO2 SERPL-SCNC: 26 MMOL/L (ref 20–33)
CREAT SERPL-MCNC: 1.44 MG/DL (ref 0.5–1.4)
GFR SERPLBLD CREATININE-BSD FMLA CKD-EPI: 36 ML/MIN/1.73 M 2
GLUCOSE SERPL-MCNC: 98 MG/DL (ref 65–99)
POTASSIUM SERPL-SCNC: 5.1 MMOL/L (ref 3.6–5.5)
SODIUM SERPL-SCNC: 137 MMOL/L (ref 135–145)

## 2024-07-09 PROCEDURE — 99214 OFFICE O/P EST MOD 30 MIN: CPT | Performed by: NURSE PRACTITIONER

## 2024-07-09 PROCEDURE — 3074F SYST BP LT 130 MM HG: CPT | Performed by: NURSE PRACTITIONER

## 2024-07-09 PROCEDURE — 36415 COLL VENOUS BLD VENIPUNCTURE: CPT

## 2024-07-09 PROCEDURE — 3078F DIAST BP <80 MM HG: CPT | Performed by: NURSE PRACTITIONER

## 2024-07-09 PROCEDURE — 80048 BASIC METABOLIC PNL TOTAL CA: CPT

## 2024-07-09 PROCEDURE — 82550 ASSAY OF CK (CPK): CPT

## 2024-07-09 ASSESSMENT — FIBROSIS 4 INDEX: FIB4 SCORE: 1.48

## 2024-07-10 ENCOUNTER — APPOINTMENT (OUTPATIENT)
Dept: RADIOLOGY | Facility: MEDICAL CENTER | Age: 84
End: 2024-07-10
Attending: NURSE PRACTITIONER
Payer: MEDICARE

## 2024-07-15 ENCOUNTER — HOSPITAL ENCOUNTER (OUTPATIENT)
Facility: MEDICAL CENTER | Age: 84
End: 2024-07-15
Attending: SURGERY | Admitting: SURGERY
Payer: MEDICARE

## 2024-07-15 ENCOUNTER — APPOINTMENT (OUTPATIENT)
Dept: FAMILY PLANNING/WOMEN'S HEALTH CLINIC | Facility: PHYSICIAN GROUP | Age: 84
End: 2024-07-15
Payer: MEDICARE

## 2024-07-15 DIAGNOSIS — R94.4 DECREASED GFR: ICD-10-CM

## 2024-07-15 PROBLEM — Z95.0 PACEMAKER: Status: ACTIVE | Noted: 2024-07-15

## 2024-07-16 ENCOUNTER — APPOINTMENT (OUTPATIENT)
Dept: ADMISSIONS | Facility: MEDICAL CENTER | Age: 84
End: 2024-07-16
Attending: SURGERY
Payer: MEDICARE

## 2024-07-16 DIAGNOSIS — E78.5 DYSLIPIDEMIA: ICD-10-CM

## 2024-07-16 PROCEDURE — RXMED WILLOW AMBULATORY MEDICATION CHARGE: Performed by: INTERNAL MEDICINE

## 2024-07-16 RX ORDER — EZETIMIBE 10 MG/1
TABLET ORAL
Qty: 100 TABLET | Refills: 0 | Status: SHIPPED | OUTPATIENT
Start: 2024-07-16

## 2024-07-17 ENCOUNTER — PHARMACY VISIT (OUTPATIENT)
Dept: PHARMACY | Facility: MEDICAL CENTER | Age: 84
End: 2024-07-17
Payer: COMMERCIAL

## 2024-07-17 ENCOUNTER — OFFICE VISIT (OUTPATIENT)
Dept: SLEEP MEDICINE | Facility: MEDICAL CENTER | Age: 84
End: 2024-07-17
Attending: NURSE PRACTITIONER
Payer: MEDICARE

## 2024-07-17 ENCOUNTER — OFFICE VISIT (OUTPATIENT)
Dept: FAMILY PLANNING/WOMEN'S HEALTH CLINIC | Facility: PHYSICIAN GROUP | Age: 84
End: 2024-07-17
Payer: MEDICARE

## 2024-07-17 VITALS
HEIGHT: 59 IN | OXYGEN SATURATION: 93 % | HEART RATE: 76 BPM | SYSTOLIC BLOOD PRESSURE: 122 MMHG | WEIGHT: 144 LBS | DIASTOLIC BLOOD PRESSURE: 74 MMHG | RESPIRATION RATE: 16 BRPM | BODY MASS INDEX: 29.03 KG/M2

## 2024-07-17 VITALS
WEIGHT: 144 LBS | BODY MASS INDEX: 29.03 KG/M2 | SYSTOLIC BLOOD PRESSURE: 138 MMHG | DIASTOLIC BLOOD PRESSURE: 78 MMHG | HEIGHT: 59 IN

## 2024-07-17 DIAGNOSIS — G47.33 OSA (OBSTRUCTIVE SLEEP APNEA): Chronic | ICD-10-CM

## 2024-07-17 DIAGNOSIS — R05.9 COUGH, UNSPECIFIED TYPE: ICD-10-CM

## 2024-07-17 DIAGNOSIS — Z95.0 PACEMAKER: ICD-10-CM

## 2024-07-17 DIAGNOSIS — G31.84 MILD COGNITIVE IMPAIRMENT: ICD-10-CM

## 2024-07-17 DIAGNOSIS — E78.00 PURE HYPERCHOLESTEROLEMIA: ICD-10-CM

## 2024-07-17 DIAGNOSIS — Z89.421 ACQUIRED ABSENCE OF OTHER RIGHT TOE(S) (HCC): ICD-10-CM

## 2024-07-17 DIAGNOSIS — Z91.81 RISK FOR FALLS: ICD-10-CM

## 2024-07-17 DIAGNOSIS — I10 PRIMARY HYPERTENSION: ICD-10-CM

## 2024-07-17 DIAGNOSIS — G31.9 CEREBRAL ATROPHY (HCC): ICD-10-CM

## 2024-07-17 DIAGNOSIS — I49.5 SICK SINUS SYNDROME (HCC): Chronic | ICD-10-CM

## 2024-07-17 DIAGNOSIS — Z89.422 ACQUIRED ABSENCE OF OTHER LEFT TOE(S) (HCC): ICD-10-CM

## 2024-07-17 DIAGNOSIS — Z78.9 NONSMOKER: ICD-10-CM

## 2024-07-17 DIAGNOSIS — R09.81 SINUS CONGESTION: ICD-10-CM

## 2024-07-17 PROCEDURE — 99214 OFFICE O/P EST MOD 30 MIN: CPT | Performed by: NURSE PRACTITIONER

## 2024-07-17 PROCEDURE — 3074F SYST BP LT 130 MM HG: CPT | Performed by: NURSE PRACTITIONER

## 2024-07-17 PROCEDURE — 1125F AMNT PAIN NOTED PAIN PRSNT: CPT

## 2024-07-17 PROCEDURE — 3075F SYST BP GE 130 - 139MM HG: CPT

## 2024-07-17 PROCEDURE — 3078F DIAST BP <80 MM HG: CPT

## 2024-07-17 PROCEDURE — 1125F AMNT PAIN NOTED PAIN PRSNT: CPT | Performed by: NURSE PRACTITIONER

## 2024-07-17 PROCEDURE — G0439 PPPS, SUBSEQ VISIT: HCPCS

## 2024-07-17 PROCEDURE — 3078F DIAST BP <80 MM HG: CPT | Performed by: NURSE PRACTITIONER

## 2024-07-17 PROCEDURE — 99213 OFFICE O/P EST LOW 20 MIN: CPT | Performed by: NURSE PRACTITIONER

## 2024-07-17 RX ORDER — GUAIFENESIN 600 MG/1
TABLET, EXTENDED RELEASE ORAL
Qty: 28 TABLET | Refills: 0 | Status: SHIPPED | OUTPATIENT
Start: 2024-07-17

## 2024-07-17 SDOH — ECONOMIC STABILITY: FOOD INSECURITY: WITHIN THE PAST 12 MONTHS, THE FOOD YOU BOUGHT JUST DIDN'T LAST AND YOU DIDN'T HAVE MONEY TO GET MORE.: NEVER TRUE

## 2024-07-17 SDOH — ECONOMIC STABILITY: TRANSPORTATION INSECURITY
IN THE PAST 12 MONTHS, HAS THE LACK OF TRANSPORTATION KEPT YOU FROM MEDICAL APPOINTMENTS OR FROM GETTING MEDICATIONS?: NO

## 2024-07-17 SDOH — ECONOMIC STABILITY: FOOD INSECURITY: WITHIN THE PAST 12 MONTHS, YOU WORRIED THAT YOUR FOOD WOULD RUN OUT BEFORE YOU GOT MONEY TO BUY MORE.: NEVER TRUE

## 2024-07-17 SDOH — ECONOMIC STABILITY: TRANSPORTATION INSECURITY
IN THE PAST 12 MONTHS, HAS LACK OF TRANSPORTATION KEPT YOU FROM MEETINGS, WORK, OR FROM GETTING THINGS NEEDED FOR DAILY LIVING?: NO

## 2024-07-17 SDOH — ECONOMIC STABILITY: INCOME INSECURITY: HOW HARD IS IT FOR YOU TO PAY FOR THE VERY BASICS LIKE FOOD, HOUSING, MEDICAL CARE, AND HEATING?: NOT HARD AT ALL

## 2024-07-17 ASSESSMENT — PAIN SCALES - GENERAL: PAINLEVEL: 2=MINIMAL-SLIGHT

## 2024-07-17 ASSESSMENT — ACTIVITIES OF DAILY LIVING (ADL): BATHING_REQUIRES_ASSISTANCE: 0

## 2024-07-17 ASSESSMENT — FIBROSIS 4 INDEX
FIB4 SCORE: 1.48
FIB4 SCORE: 1.48

## 2024-07-17 ASSESSMENT — ENCOUNTER SYMPTOMS: GENERAL WELL-BEING: FAIR

## 2024-07-17 ASSESSMENT — PATIENT HEALTH QUESTIONNAIRE - PHQ9: CLINICAL INTERPRETATION OF PHQ2 SCORE: 0

## 2024-07-19 ENCOUNTER — PRE-ADMISSION TESTING (OUTPATIENT)
Dept: ADMISSIONS | Facility: MEDICAL CENTER | Age: 84
End: 2024-07-19
Attending: SURGERY
Payer: MEDICARE

## 2024-07-19 ENCOUNTER — PATIENT OUTREACH (OUTPATIENT)
Dept: HEALTH INFORMATION MANAGEMENT | Facility: OTHER | Age: 84
End: 2024-07-19

## 2024-07-19 ENCOUNTER — TELEPHONE (OUTPATIENT)
Dept: SLEEP MEDICINE | Facility: MEDICAL CENTER | Age: 84
End: 2024-07-19
Payer: MEDICARE

## 2024-07-19 DIAGNOSIS — M19.042 PRIMARY OSTEOARTHRITIS OF BOTH HANDS: ICD-10-CM

## 2024-07-19 DIAGNOSIS — E61.1 IRON DEFICIENCY: ICD-10-CM

## 2024-07-19 DIAGNOSIS — M19.041 PRIMARY OSTEOARTHRITIS OF BOTH HANDS: ICD-10-CM

## 2024-07-19 DIAGNOSIS — M16.11 PRIMARY OSTEOARTHRITIS OF RIGHT HIP: ICD-10-CM

## 2024-07-19 DIAGNOSIS — Z91.81 RISK FOR FALLS: ICD-10-CM

## 2024-07-19 DIAGNOSIS — M50.00 INTERVERTEBRAL CERVICAL DISC DISORDER WITH MYELOPATHY, CERVICAL REGION: ICD-10-CM

## 2024-07-19 DIAGNOSIS — M48.061 SPINAL STENOSIS OF LUMBAR REGION, UNSPECIFIED WHETHER NEUROGENIC CLAUDICATION PRESENT: ICD-10-CM

## 2024-07-19 DIAGNOSIS — Z95.0 PACEMAKER: ICD-10-CM

## 2024-07-19 DIAGNOSIS — M19.019 OSTEOARTHRITIS OF SHOULDER, UNSPECIFIED LATERALITY, UNSPECIFIED OSTEOARTHRITIS TYPE: ICD-10-CM

## 2024-07-19 DIAGNOSIS — G47.33 OSA (OBSTRUCTIVE SLEEP APNEA): Chronic | ICD-10-CM

## 2024-07-19 DIAGNOSIS — G31.9 CEREBRAL ATROPHY (HCC): ICD-10-CM

## 2024-07-19 DIAGNOSIS — I49.5 SICK SINUS SYNDROME (HCC): Chronic | ICD-10-CM

## 2024-07-19 DIAGNOSIS — H26.9 CATARACT OF BOTH EYES, UNSPECIFIED CATARACT TYPE: ICD-10-CM

## 2024-07-19 DIAGNOSIS — K42.9 UMBILICAL HERNIA WITHOUT OBSTRUCTION AND WITHOUT GANGRENE: ICD-10-CM

## 2024-07-19 DIAGNOSIS — E55.9 VITAMIN D DEFICIENCY: ICD-10-CM

## 2024-07-19 DIAGNOSIS — R47.01 APHASIA: ICD-10-CM

## 2024-07-19 DIAGNOSIS — H91.8X3 OTHER SPECIFIED HEARING LOSS OF BOTH EARS: ICD-10-CM

## 2024-07-19 DIAGNOSIS — Z91.09 ALLERGY TO ENVIRONMENTAL FACTORS: ICD-10-CM

## 2024-07-19 DIAGNOSIS — Z86.73 HISTORY OF CVA (CEREBROVASCULAR ACCIDENT): ICD-10-CM

## 2024-07-19 DIAGNOSIS — G31.84 MILD COGNITIVE IMPAIRMENT: ICD-10-CM

## 2024-07-19 DIAGNOSIS — M86.642 OTHER CHRONIC OSTEOMYELITIS OF LEFT HAND (HCC): ICD-10-CM

## 2024-07-19 DIAGNOSIS — I73.9 PVD (PERIPHERAL VASCULAR DISEASE) (HCC): ICD-10-CM

## 2024-07-19 DIAGNOSIS — Z89.422 ACQUIRED ABSENCE OF OTHER LEFT TOE(S) (HCC): ICD-10-CM

## 2024-07-19 DIAGNOSIS — M17.10 PRIMARY LOCALIZED OSTEOARTHROSIS OF LOWER LEG, UNSPECIFIED LATERALITY: ICD-10-CM

## 2024-07-19 DIAGNOSIS — I10 PRIMARY HYPERTENSION: ICD-10-CM

## 2024-07-19 DIAGNOSIS — E78.00 PURE HYPERCHOLESTEROLEMIA: ICD-10-CM

## 2024-07-19 DIAGNOSIS — M15.9 PRIMARY OSTEOARTHRITIS INVOLVING MULTIPLE JOINTS: ICD-10-CM

## 2024-07-22 ENCOUNTER — TELEPHONE (OUTPATIENT)
Dept: MEDICAL GROUP | Facility: PHYSICIAN GROUP | Age: 84
End: 2024-07-22

## 2024-07-22 ENCOUNTER — PRE-ADMISSION TESTING (OUTPATIENT)
Dept: ADMISSIONS | Facility: MEDICAL CENTER | Age: 84
End: 2024-07-22
Attending: SURGERY
Payer: MEDICARE

## 2024-07-22 DIAGNOSIS — Z01.812 PRE-OPERATIVE LABORATORY EXAMINATION: ICD-10-CM

## 2024-07-22 DIAGNOSIS — Z89.422 ACQUIRED ABSENCE OF OTHER LEFT TOE(S) (HCC): ICD-10-CM

## 2024-07-22 DIAGNOSIS — Z01.810 PRE-OPERATIVE CARDIOVASCULAR EXAMINATION: ICD-10-CM

## 2024-07-22 DIAGNOSIS — Z91.81 RISK FOR FALLS: ICD-10-CM

## 2024-07-22 DIAGNOSIS — Z89.421 ACQUIRED ABSENCE OF OTHER RIGHT TOE(S) (HCC): ICD-10-CM

## 2024-07-22 LAB
BASOPHILS # BLD AUTO: 0.8 % (ref 0–1.8)
BASOPHILS # BLD: 0.05 K/UL (ref 0–0.12)
EKG IMPRESSION: NORMAL
EOSINOPHIL # BLD AUTO: 0.12 K/UL (ref 0–0.51)
EOSINOPHIL NFR BLD: 2 % (ref 0–6.9)
ERYTHROCYTE [DISTWIDTH] IN BLOOD BY AUTOMATED COUNT: 52.5 FL (ref 35.9–50)
HCT VFR BLD AUTO: 48.4 % (ref 37–47)
HGB BLD-MCNC: 15.6 G/DL (ref 12–16)
IMM GRANULOCYTES # BLD AUTO: 0.06 K/UL (ref 0–0.11)
IMM GRANULOCYTES NFR BLD AUTO: 1 % (ref 0–0.9)
LYMPHOCYTES # BLD AUTO: 0.57 K/UL (ref 1–4.8)
LYMPHOCYTES NFR BLD: 9.5 % (ref 22–41)
MCH RBC QN AUTO: 29.4 PG (ref 27–33)
MCHC RBC AUTO-ENTMCNC: 32.2 G/DL (ref 32.2–35.5)
MCV RBC AUTO: 91.1 FL (ref 81.4–97.8)
MONOCYTES # BLD AUTO: 0.56 K/UL (ref 0–0.85)
MONOCYTES NFR BLD AUTO: 9.3 % (ref 0–13.4)
NEUTROPHILS # BLD AUTO: 4.65 K/UL (ref 1.82–7.42)
NEUTROPHILS NFR BLD: 77.4 % (ref 44–72)
NRBC # BLD AUTO: 0 K/UL
NRBC BLD-RTO: 0 /100 WBC (ref 0–0.2)
PLATELET # BLD AUTO: 333 K/UL (ref 164–446)
PMV BLD AUTO: 11.1 FL (ref 9–12.9)
RBC # BLD AUTO: 5.31 M/UL (ref 4.2–5.4)
WBC # BLD AUTO: 6 K/UL (ref 4.8–10.8)

## 2024-07-22 PROCEDURE — 93010 ELECTROCARDIOGRAM REPORT: CPT | Performed by: INTERNAL MEDICINE

## 2024-07-22 PROCEDURE — 93005 ELECTROCARDIOGRAM TRACING: CPT

## 2024-07-22 PROCEDURE — 85025 COMPLETE CBC W/AUTO DIFF WBC: CPT

## 2024-07-22 PROCEDURE — 36415 COLL VENOUS BLD VENIPUNCTURE: CPT

## 2024-07-22 RX ORDER — ASPIRIN 81 MG/1
81 TABLET, CHEWABLE ORAL DAILY
COMMUNITY

## 2024-07-24 ENCOUNTER — TELEPHONE (OUTPATIENT)
Dept: CARDIOLOGY | Facility: MEDICAL CENTER | Age: 84
End: 2024-07-24
Payer: MEDICARE

## 2024-07-25 RX ORDER — SODIUM CHLORIDE, SODIUM LACTATE, POTASSIUM CHLORIDE, CALCIUM CHLORIDE 600; 310; 30; 20 MG/100ML; MG/100ML; MG/100ML; MG/100ML
INJECTION, SOLUTION INTRAVENOUS CONTINUOUS
OUTPATIENT
Start: 2024-07-25 | End: 2024-07-25

## 2024-07-29 ENCOUNTER — OFFICE VISIT (OUTPATIENT)
Dept: CARDIOLOGY | Facility: MEDICAL CENTER | Age: 84
End: 2024-07-29
Attending: INTERNAL MEDICINE
Payer: MEDICARE

## 2024-07-29 ENCOUNTER — NON-PROVIDER VISIT (OUTPATIENT)
Dept: CARDIOLOGY | Facility: MEDICAL CENTER | Age: 84
End: 2024-07-29

## 2024-07-29 VITALS
DIASTOLIC BLOOD PRESSURE: 70 MMHG | RESPIRATION RATE: 14 BRPM | BODY MASS INDEX: 29.56 KG/M2 | HEIGHT: 59 IN | SYSTOLIC BLOOD PRESSURE: 138 MMHG | OXYGEN SATURATION: 96 % | HEART RATE: 63 BPM | WEIGHT: 146.6 LBS

## 2024-07-29 DIAGNOSIS — I10 PRIMARY HYPERTENSION: ICD-10-CM

## 2024-07-29 DIAGNOSIS — E78.00 PURE HYPERCHOLESTEROLEMIA: ICD-10-CM

## 2024-07-29 DIAGNOSIS — Z01.810 PREOPERATIVE CARDIOVASCULAR EXAMINATION: ICD-10-CM

## 2024-07-29 DIAGNOSIS — Z86.73 HISTORY OF CVA (CEREBROVASCULAR ACCIDENT): ICD-10-CM

## 2024-07-29 DIAGNOSIS — Z91.81 RISK FOR FALLS: ICD-10-CM

## 2024-07-29 DIAGNOSIS — I49.5 SICK SINUS SYNDROME (HCC): Chronic | ICD-10-CM

## 2024-07-29 DIAGNOSIS — Z95.0 PACEMAKER: ICD-10-CM

## 2024-07-29 PROCEDURE — 99213 OFFICE O/P EST LOW 20 MIN: CPT | Performed by: INTERNAL MEDICINE

## 2024-07-29 RX ORDER — LORATADINE 10 MG/1
10 TABLET ORAL DAILY
COMMUNITY

## 2024-07-29 RX ORDER — MULTIVIT WITH MINERALS/LUTEIN
1000 TABLET ORAL DAILY
COMMUNITY

## 2024-07-29 ASSESSMENT — FIBROSIS 4 INDEX: FIB4 SCORE: 1.702702702702702703

## 2024-07-30 ENCOUNTER — APPOINTMENT (OUTPATIENT)
Dept: SLEEP MEDICINE | Facility: MEDICAL CENTER | Age: 84
End: 2024-07-30
Attending: NURSE PRACTITIONER
Payer: MEDICARE

## 2024-07-30 ENCOUNTER — TELEPHONE (OUTPATIENT)
Dept: CARDIOLOGY | Facility: MEDICAL CENTER | Age: 84
End: 2024-07-30
Payer: MEDICARE

## 2024-07-31 SDOH — ECONOMIC STABILITY: FOOD INSECURITY: WITHIN THE PAST 12 MONTHS, YOU WORRIED THAT YOUR FOOD WOULD RUN OUT BEFORE YOU GOT MONEY TO BUY MORE.: NEVER TRUE

## 2024-07-31 SDOH — ECONOMIC STABILITY: FOOD INSECURITY: WITHIN THE PAST 12 MONTHS, THE FOOD YOU BOUGHT JUST DIDN'T LAST AND YOU DIDN'T HAVE MONEY TO GET MORE.: NEVER TRUE

## 2024-07-31 SDOH — HEALTH STABILITY: PHYSICAL HEALTH: ON AVERAGE, HOW MANY DAYS PER WEEK DO YOU ENGAGE IN MODERATE TO STRENUOUS EXERCISE (LIKE A BRISK WALK)?: 0 DAYS

## 2024-07-31 SDOH — ECONOMIC STABILITY: INCOME INSECURITY: IN THE LAST 12 MONTHS, WAS THERE A TIME WHEN YOU WERE NOT ABLE TO PAY THE MORTGAGE OR RENT ON TIME?: NO

## 2024-07-31 SDOH — ECONOMIC STABILITY: HOUSING INSECURITY
IN THE LAST 12 MONTHS, WAS THERE A TIME WHEN YOU DID NOT HAVE A STEADY PLACE TO SLEEP OR SLEPT IN A SHELTER (INCLUDING NOW)?: NO

## 2024-07-31 SDOH — HEALTH STABILITY: PHYSICAL HEALTH: ON AVERAGE, HOW MANY MINUTES DO YOU ENGAGE IN EXERCISE AT THIS LEVEL?: 0 MIN

## 2024-07-31 SDOH — ECONOMIC STABILITY: HOUSING INSECURITY: IN THE LAST 12 MONTHS, HOW MANY PLACES HAVE YOU LIVED?: 1

## 2024-07-31 ASSESSMENT — LIFESTYLE VARIABLES
AUDIT-C TOTAL SCORE: 0
HOW OFTEN DO YOU HAVE SIX OR MORE DRINKS ON ONE OCCASION: NEVER
SKIP TO QUESTIONS 9-10: 1
HOW OFTEN DO YOU HAVE A DRINK CONTAINING ALCOHOL: NEVER
HOW MANY STANDARD DRINKS CONTAINING ALCOHOL DO YOU HAVE ON A TYPICAL DAY: PATIENT DOES NOT DRINK

## 2024-07-31 ASSESSMENT — SOCIAL DETERMINANTS OF HEALTH (SDOH)
HOW OFTEN DO YOU ATTEND CHURCH OR RELIGIOUS SERVICES?: 1 TO 4 TIMES PER YEAR
IN THE PAST 12 MONTHS, HAS THE ELECTRIC, GAS, OIL, OR WATER COMPANY THREATENED TO SHUT OFF SERVICE IN YOUR HOME?: NO
WITHIN THE LAST YEAR, HAVE YOU BEEN AFRAID OF YOUR PARTNER OR EX-PARTNER?: NO
HOW OFTEN DO YOU GET TOGETHER WITH FRIENDS OR RELATIVES?: THREE TIMES A WEEK
WITHIN THE LAST YEAR, HAVE YOU BEEN HUMILIATED OR EMOTIONALLY ABUSED IN OTHER WAYS BY YOUR PARTNER OR EX-PARTNER?: NO
HOW OFTEN DO YOU ATTENT MEETINGS OF THE CLUB OR ORGANIZATION YOU BELONG TO?: 1 TO 4 TIMES PER YEAR
WITHIN THE LAST YEAR, HAVE TO BEEN RAPED OR FORCED TO HAVE ANY KIND OF SEXUAL ACTIVITY BY YOUR PARTNER OR EX-PARTNER?: NO
DO YOU BELONG TO ANY CLUBS OR ORGANIZATIONS SUCH AS CHURCH GROUPS UNIONS, FRATERNAL OR ATHLETIC GROUPS, OR SCHOOL GROUPS?: YES
HOW HARD IS IT FOR YOU TO PAY FOR THE VERY BASICS LIKE FOOD, HOUSING, MEDICAL CARE, AND HEATING?: NOT HARD AT ALL
WITHIN THE LAST YEAR, HAVE YOU BEEN KICKED, HIT, SLAPPED, OR OTHERWISE PHYSICALLY HURT BY YOUR PARTNER OR EX-PARTNER?: NO
IN A TYPICAL WEEK, HOW MANY TIMES DO YOU TALK ON THE PHONE WITH FAMILY, FRIENDS, OR NEIGHBORS?: THREE TIMES A WEEK

## 2024-07-31 ASSESSMENT — PATIENT HEALTH QUESTIONNAIRE - PHQ9: CLINICAL INTERPRETATION OF PHQ2 SCORE: 0

## 2024-07-31 NOTE — PROGRESS NOTES
2 RN skin assessment done; skin not WDL. Surgical incision to Left hip. See wound flowsheet.    OBSTETRIC HISTORY:   OB History          4    Para   4    Term   3       1    AB        Living   3         SAB        IAB        Ectopic        Multiple        Live Births   3                COMPREHENSIVE GYN HISTORY:  PAP History: Reports abnormal Paps. Date: 2004 Treatment: Colposcopy  Infection History: Reports Chlamydia. Denies PID.  Benign History: Denies uterine fibroids. Denies ovarian cysts. Denies endometriosis.   Cancer History: Denies cervical cancer. Denies uterine cancer or hyperplasia. Denies ovarian cancer. Denies vulvar cancer or pre-cancer. Denies vaginal cancer or pre-cancer. Denies breast cancer. Denies colon cancer.  Sexual Activity History:  reports that she currently engages in sexual activity. She reports using the following method of birth control/protection: Bilateral salpingectomy.   Menstrual History: Age of menarche: 14 years. Every 28 days, flows for 4-5 days. Light to moderate flow.  Dysmenorrhea History: Reports mild dysmenorrhea.  Contraception: Bilateral salpingectomy         HPI:   46 y.o.  No LMP recorded. Patient has had an ablation.   Patient is  here for her annual gynecologic exam.  She has GYN complaints hot flashes, insomnia, mental fog, night sweats--discussed marcelle-menopause vs menopause. She denies bladder, breast and bowel complaints.    ROS:  GENERAL: No weight gain or weight loss.   CHEST: No shortness of breath.   ABDOMEN: No abdominal pain, constipation, diarrhea, nausea, vomiting or rectal bleeding.   URINARY: No frequency, dysuria, hematuria, or burning on urination.  REPRODUCTIVE: See HPI.   BREASTS: No breast pain, lumps, or nipple discharge.   PSYCHIATRIC: No depression or anxiety.    PE:   /77   Wt 69.4 kg (153 lb)   BMI 25.46 kg/m²   APPEARANCE: Well nourished, well developed, in no acute distress.  NECK: Neck symmetric without  thyromegaly.  NODES: No inguinal, cervical lymph node enlargement.  CHEST: Lungs clear to  auscultation.  HEART: Regular rate and rhythm, no murmurs, rubs or gallops.  ABDOMEN: Soft. No tenderness or masses. No hernias.  BREASTS: Symmetrical, no skin changes or visible lesions. No palpable masses, nipple discharge or adenopathy bilaterally.  PELVIC:   VULVA: No lesions. Normal female genitalia.  URETHRAL MEATUS: Normal size and location, no lesions, no prolapse.  URETHRA: No masses, tenderness, prolapse or scarring.  VAGINA: Moist and well rugated, no discharge, no significant cystocele or rectocele.  CERVIX: No lesions and discharge.  UTERUS: Normal size, regular shape, mobile, non-tender, bladder base nontender.  ADNEXA: No masses or tenderness.    PROCEDURES:  Pap smear  HRHPV    Assessment:  Normal Gynecologic Exam  Discussed Sheyla-menopause and risk of Factor V Leiden less with patch or vaginal preparation but still need Prometrium. Might cause irregular bleeding because not menopausal yet. Wants to give it a try    Recommendations routine screening and no risk factors:  Mammogram at age 40  Colonoscopy age 45  Bone density age 65  Return to clinic as needed for any problems.  Return to clinic in one year. It is recommended to have a physician breast exam and pelvic exam annually. This is different than doing a Pap smear.         As of April 1, 2021, the Cures Act has been passed nationally. This new law requires that all doctors progress notes, lab results, pathology reports and radiology reports be released IMMEDIATELY to the patient in the patient portal. That means that the results are released to you at the EXACT same time they are released to me. Therefore, with all of the patients that I have I am not able to reply to each patient exactly when the results come in. So there will be a delay from when you see the results to when I see them and have time to come up with a response to send you. Also I only see these results when I am on the computer at work. So if the results come in over the  weekend or after 5 pm of a work day, I will not see them until the next business day. As you can tell, this is a challenge as a physician to give every patient the quick response they hope for and deserve. So please be patient!     Thanks for understanding,

## 2024-08-09 ENCOUNTER — HOSPITAL ENCOUNTER (OUTPATIENT)
Dept: RADIOLOGY | Facility: MEDICAL CENTER | Age: 84
End: 2024-08-09
Attending: INTERNAL MEDICINE
Payer: MEDICARE

## 2024-08-09 DIAGNOSIS — Z01.810 PREOPERATIVE CARDIOVASCULAR EXAMINATION: ICD-10-CM

## 2024-08-12 ENCOUNTER — PATIENT OUTREACH (OUTPATIENT)
Dept: HEALTH INFORMATION MANAGEMENT | Facility: OTHER | Age: 84
End: 2024-08-12
Payer: MEDICARE

## 2024-08-12 ENCOUNTER — OFFICE VISIT (OUTPATIENT)
Dept: CARDIOLOGY | Facility: MEDICAL CENTER | Age: 84
End: 2024-08-12
Attending: PHYSICIAN ASSISTANT
Payer: MEDICARE

## 2024-08-12 VITALS
WEIGHT: 142 LBS | RESPIRATION RATE: 16 BRPM | BODY MASS INDEX: 28.63 KG/M2 | OXYGEN SATURATION: 98 % | HEIGHT: 59 IN | SYSTOLIC BLOOD PRESSURE: 134 MMHG | DIASTOLIC BLOOD PRESSURE: 76 MMHG | HEART RATE: 66 BPM

## 2024-08-12 DIAGNOSIS — E78.00 PURE HYPERCHOLESTEROLEMIA: ICD-10-CM

## 2024-08-12 DIAGNOSIS — I10 PRIMARY HYPERTENSION: ICD-10-CM

## 2024-08-12 DIAGNOSIS — I49.5 SICK SINUS SYNDROME (HCC): Chronic | ICD-10-CM

## 2024-08-12 DIAGNOSIS — Z86.73 HISTORY OF CVA (CEREBROVASCULAR ACCIDENT): ICD-10-CM

## 2024-08-12 DIAGNOSIS — K42.9 UMBILICAL HERNIA WITHOUT OBSTRUCTION AND WITHOUT GANGRENE: ICD-10-CM

## 2024-08-12 PROCEDURE — 3075F SYST BP GE 130 - 139MM HG: CPT | Performed by: PHYSICIAN ASSISTANT

## 2024-08-12 PROCEDURE — 99214 OFFICE O/P EST MOD 30 MIN: CPT | Performed by: PHYSICIAN ASSISTANT

## 2024-08-12 PROCEDURE — 3078F DIAST BP <80 MM HG: CPT | Performed by: PHYSICIAN ASSISTANT

## 2024-08-12 PROCEDURE — 99213 OFFICE O/P EST LOW 20 MIN: CPT | Performed by: PHYSICIAN ASSISTANT

## 2024-08-12 ASSESSMENT — FIBROSIS 4 INDEX: FIB4 SCORE: 1.702702702702702703

## 2024-08-12 NOTE — PROGRESS NOTES
Chief Complaint   Patient presents with    Hypertension     Follow up          Subjective:   Irina Traylor is a 83 y.o. female who presents today for follow-up.     Patient of Dr. Freire, see detailed HPI on his progress note 7/29/24.      She was last seen 7/29/24 for preoperative evaluation, due to the higher RCRI score and lack activity a stress test was recommended.      Today she is here to review the stress test which showed no ischemia.   She uses a walker for ambulation, lives on her own and does all her own chores and medications. She is anxious to have the ventral hernia repair though has concerns about an allergic reaction to the material they use.     She is not interested in re-trialing bempedoic acid.  She does not want to use injectable medications.    Past Medical History:   Diagnosis Date    Anesthesia     States feels like she can't breathe with induction for right hip surgery, gets nausea vomiting with most pain meds.  Uses anti-nausea medicine if takes morphine    Arthritis     To hips, shoulders, hands    Benign essential HTN 09/07/2017    Blood clotting disorder (HCC) 07/19/2024    DVT left leg    Cataract 07/19/2024    not surgery yet    CPAP (continuous positive airway pressure) dependence     Dyslipidemia 03/05/2019    Expressive aphasia     Related to CVA    Family history of CVA     Heart murmur     High cholesterol     DARIN (obstructive sleep apnea) 01/26/2017    cpap but doesn't work    Pacemaker 07/19/2024    Being followed by Dr. Freire    Post-nasal drip     Restless leg syndrome     4/25/17-Resolved, states was related to right hip and none S/P replacement.    Sick sinus syndrome ([facility]) 06/29/2022    Sinus arrest 06/29/2022    Stroke ([facility]) 2/1993, 8/1993    Hbgngwat-ljefra-pvmxmrtxdu aphasia,mouth forming words, reading, numbers    Walker as ambulation aid          Family History   Problem Relation Age of Onset    Cancer Mother         vaginal    Stroke Father     Hypertension  "Brother     Diabetes Brother     Hypertension Brother     Hypertension Brother     Stroke Daughter          Social History     Tobacco Use    Smoking status: Never    Smokeless tobacco: Never   Vaping Use    Vaping status: Never Used   Substance Use Topics    Alcohol use: Not Currently    Drug use: No         Allergies   Allergen Reactions    Benicar [Olmesartan] Rash     Blisters    Tolerated losartan 4/12/24.    Dilaudid  [Hydromorphone Hcl] Vomiting    Other Misc Shortness of Breath     Anesthesia with surgery 2016  Pt couldn't breathe while being put under and tapped side of bed to let RN know. \"It was the scariest feeling and I want everyone to know\"      Amlodipine Swelling    Codeine Vomiting    Demerol Vomiting    Diazepam Vomiting    Hydrocodone Vomiting    Nexletol [Bempedoic Acid] Palpitations     Chest pain and dizziness    Other Drug Vomiting     \"all pain meds\" per pt can take morphine.    Oxycodone Vomiting    Simvastatin Myalgia and Unspecified     Headache, fatigue    Statins [Hmg-Coa-R Inhibitors] Unspecified     Muscle cramping    Valium Vomiting    Mucinex Fast-Max Chest Joe Ms [Guaifenesin]      Vision/speech problems         Current Outpatient Medications   Medication Sig    loratadine (CLARITIN) 10 MG Tab Take 10 mg by mouth every day.    vitamin E 1000 units (450 mg) Cap Take 1,000 Units by mouth every day.    Apoaequorin (PREVAGEN PO) Take  by mouth.    Non Formulary Request every day. VITALIZER GOLD WITH VIT K    aspirin (ASA) 81 MG Chew Tab chewable tablet Chew 81 mg every day.       FOLLOW INSTRUCTIONS FROM SURGEON OR SPECIALIST    guaiFENesin ER (MUCINEX) 600 MG TABLET SR 12 HR Take 1-2 tablets by mouth every 12 hours as needed for congestion. (Patient taking differently: Take 1-2 tablets by mouth every 12 hours as needed for congestion.    MEDICATION INSTRUCTIONS FOR SURGERY/PROCEDURE SCHEDULED FOR 7/25/24   OK TO CONTINUE TAKING PRIOR TO SURGERY AND DAY OF SURGERY)    ezetimibe " (ZETIA) 10 MG Tab Take 1 tablet by mouth once daily (Patient taking differently:     MEDICATION INSTRUCTIONS FOR SURGERY/PROCEDURE SCHEDULED FOR 7/25/24   CONTINUE TAKING MED PRIOR TO SURGERY)    irbesartan (AVAPRO) 150 MG Tab Take 1 Tablet by mouth every day.    Nutritional Supplements (ADULT NUTRITIONAL SUPPLEMENT PO) Take 2 Tablets by mouth 1 time a day as needed (constiaption). Herb-Lax    MEDICATION INSTRUCTIONS FOR SURGERY/PROCEDURE SCHEDULED FOR 7/25/24   DO NOT TAKE 7 DAYS PRIOR TO SURGERY   Indications: constipation    azelastine (ASTELIN) 137 MCG/SPRAY nasal spray Administer 1 spray into affected nostril(s) 2 times a day. Indications: Hayfever (Patient taking differently: Administer 1 Spray into affected nostril(S) 2 times a day.     MEDICATION INSTRUCTIONS FOR SURGERY/PROCEDURE SCHEDULED FOR 7/25/24   OK TO CONTINUE TAKING PRIOR TO SURGERY AND DAY OF SURGERY IF NEEDED  Indications: Hayfever)    fluticasone (FLONASE) 50 MCG/ACT nasal spray Administer 1 spray into affected nostril(s) 2 times a day. Indications: Allergic Rhinitis, Stuffy Nose (Patient taking differently: Administer 1 Spray into affected nostril(S) 2 times a day.     MEDICATION INSTRUCTIONS FOR SURGERY/PROCEDURE SCHEDULED FOR 7/25/24   OK TO CONTINUE TAKING PRIOR TO SURGERY AND DAY OF SURGERY   Indications: Allergic Rhinitis, Stuffy Nose)    B Complex-Biotin-FA Tab Take 1 Tablet by mouth every morning.     MEDICATION INSTRUCTIONS FOR SURGERY/PROCEDURE SCHEDULED FOR 7/25/24   DO NOT TAKE 7 DAYS PRIOR TO SURGERY   Indications: Supplement    magnesium oxide (MAG-OX) 400 MG Tab tablet Take 400 mg by mouth every morning.     MEDICATION INSTRUCTIONS FOR SURGERY/PROCEDURE SCHEDULED FOR 7/25/24   DO NOT TAKE 7 DAYS PRIOR TO SURGERY   Indications: Supplement    ascorbic acid (ASCORBIC ACID) 500 MG Tab Take 1,000 mg by mouth 2 times a day. 2 tablets = 1,000 mg.    MEDICATION INSTRUCTIONS FOR SURGERY/PROCEDURE SCHEDULED FOR 7/25/24   DO NOT TAKE 7 DAYS  "PRIOR TO SURGERY   Indications: Supplement    verapamil ER (CALAN SR) 240 MG Tab CR Take 1 Tablet by mouth every day. (Patient taking differently: Take 240 mg by mouth every day.     MEDICATION INSTRUCTIONS FOR SURGERY/PROCEDURE SCHEDULED FOR 7/25/24   OK TO CONTINUE TAKING PRIOR TO SURGERY AND DAY OF SURGERY   Indications: High Blood Pressure Disorder)    Misc. Devices Misc Four wheel walker with seat         ROS       Objective:   /76 (BP Location: Left arm, Patient Position: Sitting)   Pulse 66   Resp 16   Ht 1.499 m (4' 11\")   Wt 64.4 kg (142 lb)   SpO2 98%  Body mass index is 28.68 kg/m².         Physical Exam       Assessment:     1. Sick sinus syndrome (HCC)        2. History of CVA (cerebrovascular accident)        3. Pure hypercholesterolemia        4. Umbilical hernia without obstruction and without gangrene             Medical Decision Making:  Today's Assessment / Plan:     # Preoperative cardiovascular evaluation: She is planning to undergo surgery potentially involving the peritoneal cavity and she has additional risk factors with a known history of prior CVA and an ECG with possible ischemic findings.    Given that she is unable to subjectively achieve greater than 4 METS of activity, she was referred for a cardiac PET stress test which did not show any ischemia. She had a relatively recent echocardiogram that did not demonstrate severe valvular abnormalities and she appears euvolemic on examination today.  She has declined optimal therapy for her recurrent strokes.   Her RCRI is 2 and an above average risk of any complications using the NSQIP calculator.  She is moderate risk for surgical complications though they are non modifiable and she does not require any further testing before surgery.        # History of recurrent CVA, hyperlipidemia: She has not had detected atrial fibrillation on her PPM and her CVAs are most likely atherosclerotic in etiology.  She has not been able to tolerate " Name band; multiple statins and is not willing to try these medications again due to intolerable side effects.  She is also refused assistance with obtaining a PCSK9 inhibitor and declines bempedoic acid  -Continue aspirin 81 mg daily  -Continue ezetimibe 10 mg daily     # Hypertension:   -Continue irbesartan 300 mg nightly  -verapamil to 240 mg daily     # Carotid artery disease, peripheral vascular disease: This was only mild on her recent CTA of the head/neck.  -Medical management as above     # Sick sinus syndrome (s/p permanent pacemaker implantation): Remote monitoring has shown normal device function with minimal pacer requirement.  -Continue follow-up per [facility]     # Mild mitral regurgitation, moderate aortic insufficiency:  -Intermittent surveillance echocardiogram as indicated     Return in about 6 months (around 2/12/2025) for follow up with Dr. Freire.  Sooner if problems.

## 2024-08-12 NOTE — PROGRESS NOTES
JOHNY Aguayo received referral from Beth PEREZ asking to schedule uber ride to todays 8/12 appt w/Cardiology. CHW called [facility] and schedule uber ride for pt. Pt will get a text with details.

## 2024-08-21 ENCOUNTER — TELEPHONE (OUTPATIENT)
Dept: HEALTH INFORMATION MANAGEMENT | Facility: OTHER | Age: 84
End: 2024-08-21
Payer: MEDICARE

## 2024-08-21 NOTE — TELEPHONE ENCOUNTER
Returned patient call from 08/20/24, left VM indicating I needed more information in order to thoroughly answer her question. Left call back number.

## 2024-08-29 ENCOUNTER — TELEPHONE (OUTPATIENT)
Dept: MEDICAL GROUP | Facility: PHYSICIAN GROUP | Age: 84
End: 2024-08-29
Payer: MEDICARE

## 2024-08-29 NOTE — TELEPHONE ENCOUNTER
Pt called for status on her hernia surgery, checked chart and it looks like cardiology was requesting she do a stress test and stress test was completed. Spoke with Norma at [facility] and they haven't received clearance after the stress test saying she is good to go from cardiology but she stated she will reach out to them for it.

## 2024-09-05 ENCOUNTER — OFFICE VISIT (OUTPATIENT)
Dept: DERMATOLOGY | Facility: IMAGING CENTER | Age: 84
End: 2024-09-05
Payer: MEDICARE

## 2024-09-05 DIAGNOSIS — C44.619 BASAL CELL CARCINOMA, ARM, LEFT: ICD-10-CM

## 2024-09-05 PROCEDURE — 99213 OFFICE O/P EST LOW 20 MIN: CPT | Performed by: STUDENT IN AN ORGANIZED HEALTH CARE EDUCATION/TRAINING PROGRAM

## 2024-09-05 NOTE — PROGRESS NOTES
[facility] Dermatology Clinic Note    CC: bump on chest, back       HPI:  Irina Traylor is a 83 y.o. female who presents today for follow up of basal cell carcinoma on left shoulder. At last visit, she had cryotherapy to lesion for conservative management to see if this would clear lesion since it appeared clinically superficial. Today, the areas have healed well, but there is a persistent red spot at the site of the biopsy. Not spreading. Notes another cyst on central chest she would like to remove if possible.     ROS: no fevers/chills. Other pertinent positives and negatives as above.     Meds/PMH/PSH/FamHx/Allergies:  reviewed       PHYSICAL EXAM:   A focused skin exam was completed of the chest, back, upper extremities with the following pertinent findings listed below.  Remaining above-listed examined areas within normal limits / negative for rashes or lesions.    Central chest with well healed scar   Left arm with 0.8 by 0.8cm red macule with fine telangiectasias         IMPRESSION / PLAN:    Basal cell carcinoma, left shoulder   Discussed diagnosis, that basal cell is the most common type of skin cancer, does not often spread to other parts of the body (metastasize) however will continue to grow and cause local destruction if not treated. Options for treatment for best rate of cure include wide local excision vs electrodessication and curettage with >95% cure rate. Topical chemotherapies with imiquimod is also effective.   - s/p cycle of cryptherapy, some clinically residual lesion   - recommend WLE for definitive management. Discussed at today's visit.  - she notes she had multiple surgeries coming up for hernia, eye surgies, would like to wait until after these surgeries to treat BCC. Discussed that this is reasonable given that it is a very slow growing cancer, low risk of spreading elsewhere. Will call her in January for scheduling of surgery.             Follow up:  January, scheduling WLE        Sho Ozuna MD   [facility]

## 2024-09-10 ENCOUNTER — APPOINTMENT (OUTPATIENT)
Dept: ADMISSIONS | Facility: MEDICAL CENTER | Age: 84
End: 2024-09-10
Attending: SURGERY
Payer: MEDICARE

## 2024-09-13 ENCOUNTER — PRE-ADMISSION TESTING (OUTPATIENT)
Dept: ADMISSIONS | Facility: MEDICAL CENTER | Age: 84
End: 2024-09-13
Attending: SURGERY
Payer: MEDICARE

## 2024-09-13 NOTE — PREPROCEDURE INSTRUCTIONS
Pre-admit telephone appointment completed. Reviewed the Preparing for your procedure handout with patient over the phone. Patient instructed per anesthesia/pharmacy guidelines regarding taking, holding, or contacting provider for instructions on regularly prescribed medications before surgery.     Unable to bring CPAP  Unable to get here to do blood work, will have to do day of surgery

## 2024-09-25 ENCOUNTER — HOSPITAL ENCOUNTER (EMERGENCY)
Facility: MEDICAL CENTER | Age: 84
End: 2024-09-25
Attending: EMERGENCY MEDICINE
Payer: MEDICARE

## 2024-09-25 ENCOUNTER — TELEPHONE (OUTPATIENT)
Dept: HEALTH INFORMATION MANAGEMENT | Facility: OTHER | Age: 84
End: 2024-09-25

## 2024-09-25 ENCOUNTER — APPOINTMENT (OUTPATIENT)
Dept: RADIOLOGY | Facility: MEDICAL CENTER | Age: 84
End: 2024-09-25
Attending: EMERGENCY MEDICINE
Payer: MEDICARE

## 2024-09-25 ENCOUNTER — OFFICE VISIT (OUTPATIENT)
Dept: URGENT CARE | Facility: PHYSICIAN GROUP | Age: 84
End: 2024-09-25
Payer: MEDICARE

## 2024-09-25 VITALS
RESPIRATION RATE: 14 BRPM | HEART RATE: 67 BPM | WEIGHT: 130 LBS | BODY MASS INDEX: 26.21 KG/M2 | OXYGEN SATURATION: 95 % | SYSTOLIC BLOOD PRESSURE: 168 MMHG | HEIGHT: 59 IN | TEMPERATURE: 97.3 F | DIASTOLIC BLOOD PRESSURE: 98 MMHG

## 2024-09-25 VITALS
WEIGHT: 130 LBS | SYSTOLIC BLOOD PRESSURE: 130 MMHG | HEIGHT: 59 IN | TEMPERATURE: 98.4 F | HEART RATE: 67 BPM | DIASTOLIC BLOOD PRESSURE: 84 MMHG | BODY MASS INDEX: 26.21 KG/M2 | RESPIRATION RATE: 13 BRPM | OXYGEN SATURATION: 94 %

## 2024-09-25 DIAGNOSIS — K59.00 CONSTIPATION, UNSPECIFIED CONSTIPATION TYPE: ICD-10-CM

## 2024-09-25 DIAGNOSIS — K42.0 IRREDUCIBLE UMBILICAL HERNIA: ICD-10-CM

## 2024-09-25 DIAGNOSIS — R10.32 LEFT LOWER QUADRANT ABDOMINAL PAIN: ICD-10-CM

## 2024-09-25 LAB
ALBUMIN SERPL BCP-MCNC: 3.8 G/DL (ref 3.2–4.9)
ALBUMIN/GLOB SERPL: 1.3 G/DL
ALP SERPL-CCNC: 78 U/L (ref 30–99)
ALT SERPL-CCNC: 12 U/L (ref 2–50)
ANION GAP SERPL CALC-SCNC: 11 MMOL/L (ref 7–16)
APPEARANCE UR: CLEAR
AST SERPL-CCNC: 20 U/L (ref 12–45)
BASOPHILS # BLD AUTO: 1.4 % (ref 0–1.8)
BASOPHILS # BLD: 0.07 K/UL (ref 0–0.12)
BILIRUB SERPL-MCNC: 0.9 MG/DL (ref 0.1–1.5)
BILIRUB UR QL STRIP.AUTO: NEGATIVE
BUN SERPL-MCNC: 36 MG/DL (ref 8–22)
CALCIUM ALBUM COR SERPL-MCNC: 9.8 MG/DL (ref 8.5–10.5)
CALCIUM SERPL-MCNC: 9.6 MG/DL (ref 8.4–10.2)
CHLORIDE SERPL-SCNC: 102 MMOL/L (ref 96–112)
CO2 SERPL-SCNC: 27 MMOL/L (ref 20–33)
COLOR UR: YELLOW
CREAT SERPL-MCNC: 1.01 MG/DL (ref 0.5–1.4)
EOSINOPHIL # BLD AUTO: 0.07 K/UL (ref 0–0.51)
EOSINOPHIL NFR BLD: 1.4 % (ref 0–6.9)
ERYTHROCYTE [DISTWIDTH] IN BLOOD BY AUTOMATED COUNT: 44.7 FL (ref 35.9–50)
GFR SERPLBLD CREATININE-BSD FMLA CKD-EPI: 56 ML/MIN/1.73 M 2
GLOBULIN SER CALC-MCNC: 3 G/DL (ref 1.9–3.5)
GLUCOSE SERPL-MCNC: 79 MG/DL (ref 65–99)
GLUCOSE UR STRIP.AUTO-MCNC: NEGATIVE MG/DL
HCT VFR BLD AUTO: 48.1 % (ref 37–47)
HGB BLD-MCNC: 15.6 G/DL (ref 12–16)
IMM GRANULOCYTES # BLD AUTO: 0.03 K/UL (ref 0–0.11)
IMM GRANULOCYTES NFR BLD AUTO: 0.6 % (ref 0–0.9)
KETONES UR STRIP.AUTO-MCNC: NEGATIVE MG/DL
LEUKOCYTE ESTERASE UR QL STRIP.AUTO: NEGATIVE
LIPASE SERPL-CCNC: 48 U/L (ref 11–82)
LYMPHOCYTES # BLD AUTO: 0.71 K/UL (ref 1–4.8)
LYMPHOCYTES NFR BLD: 14 % (ref 22–41)
MCH RBC QN AUTO: 28.7 PG (ref 27–33)
MCHC RBC AUTO-ENTMCNC: 32.4 G/DL (ref 32.2–35.5)
MCV RBC AUTO: 88.6 FL (ref 81.4–97.8)
MICRO URNS: NORMAL
MONOCYTES # BLD AUTO: 0.58 K/UL (ref 0–0.85)
MONOCYTES NFR BLD AUTO: 11.5 % (ref 0–13.4)
NEUTROPHILS # BLD AUTO: 3.6 K/UL (ref 1.82–7.42)
NEUTROPHILS NFR BLD: 71.1 % (ref 44–72)
NITRITE UR QL STRIP.AUTO: NEGATIVE
NRBC # BLD AUTO: 0 K/UL
NRBC BLD-RTO: 0 /100 WBC (ref 0–0.2)
PH UR STRIP.AUTO: 6 [PH] (ref 5–8)
PLATELET # BLD AUTO: 340 K/UL (ref 164–446)
PMV BLD AUTO: 10.5 FL (ref 9–12.9)
POTASSIUM SERPL-SCNC: 4 MMOL/L (ref 3.6–5.5)
PROT SERPL-MCNC: 6.8 G/DL (ref 6–8.2)
PROT UR QL STRIP: NEGATIVE MG/DL
RBC # BLD AUTO: 5.43 M/UL (ref 4.2–5.4)
RBC UR QL AUTO: NEGATIVE
SODIUM SERPL-SCNC: 140 MMOL/L (ref 135–145)
SP GR UR STRIP.AUTO: 1.02
WBC # BLD AUTO: 5.1 K/UL (ref 4.8–10.8)

## 2024-09-25 PROCEDURE — 85025 COMPLETE CBC W/AUTO DIFF WBC: CPT

## 2024-09-25 PROCEDURE — 36415 COLL VENOUS BLD VENIPUNCTURE: CPT

## 2024-09-25 PROCEDURE — 99285 EMERGENCY DEPT VISIT HI MDM: CPT

## 2024-09-25 PROCEDURE — 96376 TX/PRO/DX INJ SAME DRUG ADON: CPT

## 2024-09-25 PROCEDURE — 96374 THER/PROPH/DIAG INJ IV PUSH: CPT | Mod: XU

## 2024-09-25 PROCEDURE — 700111 HCHG RX REV CODE 636 W/ 250 OVERRIDE (IP): Mod: JZ | Performed by: EMERGENCY MEDICINE

## 2024-09-25 PROCEDURE — 80053 COMPREHEN METABOLIC PANEL: CPT

## 2024-09-25 PROCEDURE — 81003 URINALYSIS AUTO W/O SCOPE: CPT

## 2024-09-25 PROCEDURE — 700117 HCHG RX CONTRAST REV CODE 255: Performed by: EMERGENCY MEDICINE

## 2024-09-25 PROCEDURE — 83690 ASSAY OF LIPASE: CPT

## 2024-09-25 PROCEDURE — 700101 HCHG RX REV CODE 250: Performed by: EMERGENCY MEDICINE

## 2024-09-25 PROCEDURE — 96375 TX/PRO/DX INJ NEW DRUG ADDON: CPT

## 2024-09-25 PROCEDURE — 74177 CT ABD & PELVIS W/CONTRAST: CPT

## 2024-09-25 RX ORDER — ACETAMINOPHEN 500 MG
500 TABLET ORAL EVERY 6 HOURS PRN
COMMUNITY

## 2024-09-25 RX ORDER — SODIUM PHOSPHATE,MONO-DIBASIC 19G-7G/118
1 ENEMA (ML) RECTAL ONCE
Status: COMPLETED | OUTPATIENT
Start: 2024-09-25 | End: 2024-09-25

## 2024-09-25 RX ORDER — ONDANSETRON 2 MG/ML
4 INJECTION INTRAMUSCULAR; INTRAVENOUS ONCE
Status: COMPLETED | OUTPATIENT
Start: 2024-09-25 | End: 2024-09-25

## 2024-09-25 RX ADMIN — IOHEXOL 90 ML: 350 INJECTION, SOLUTION INTRAVENOUS at 16:41

## 2024-09-25 RX ADMIN — FENTANYL CITRATE 50 MCG: 50 INJECTION, SOLUTION INTRAMUSCULAR; INTRAVENOUS at 14:45

## 2024-09-25 RX ADMIN — SODIUM PHOSPHATE 1 ENEMA: 7; 19 ENEMA RECTAL at 17:08

## 2024-09-25 RX ADMIN — FENTANYL CITRATE 25 MCG: 50 INJECTION, SOLUTION INTRAMUSCULAR; INTRAVENOUS at 13:14

## 2024-09-25 RX ADMIN — ONDANSETRON 4 MG: 2 INJECTION INTRAMUSCULAR; INTRAVENOUS at 13:14

## 2024-09-25 ASSESSMENT — FIBROSIS 4 INDEX
FIB4 SCORE: 1.702702702702702703
FIB4 SCORE: 1.702702702702702703

## 2024-09-25 NOTE — ED TRIAGE NOTES
"Chief Complaint   Patient presents with    Abdominal Pain     LLQ abdominal pain related to previous umbilical hernia. Pt c/o of tenderness in LLQ upon palpation. No fever on arrival.       /83   Pulse 60   Temp 36.3 °C (97.3 °F) (Temporal)   Resp (!) 23   Ht 1.499 m (4' 11\")   Wt 59 kg (130 lb)   SpO2 96%   BMI 26.26 kg/m²     "

## 2024-09-25 NOTE — PROGRESS NOTES
Verbal consent was acquired by the patient to use [employer] ambient listening note generation during this visit     Date: 09/25/24        Chief Complaint   Patient presents with    Other     Had a hernia surgery, she is not eating, had extremely px, not sleeping. Toradol doesn't do anything       HPI is difficult to obtain as patient appears very stressed and in a significant amount of pain.  She also has chronic deficits secondary to CVA.     History of Present Illness  The patient is an 83-year-old female who presents for evaluation of hernia.    She has been experiencing pain for the past 2 months and 2 weeks, which has been progressively worsening.  She reports the pain has significantly worsened over the past 2 days. She identifies the area of most pain as her bladder or bowels. She experiences discomfort when sitting and feels as though everything is being pushed down. She has been taking medication to soften her stools in preparation for surgery. She has also tried castor oil wraps, which provide temporary relief during the day.  However, she did not find Tylenol effective. She attempted to use Toradol yesterday but found it unhelpful. She is seeking a solution to manage her pain until her scheduled surgery in 5 days. She reports having soft bowel movements and has been consuming a lot of juice. She has been taking aspirin and has experienced vomiting before dinner yesterday.      She does not wish to visit another hospital and prefers to go to [facility]. She is concerned about the cost of her bills. She does not have phone service and needs to retrieve her phone book from home. She plans to order an medical Uber to take her home and then to [facility].       ROS:    As otherwise stated in HPI    Medical/SX/ Social History:  Reviewed per chart    Pertinent Medications:    Current Outpatient Medications on File Prior to Visit   Medication Sig Dispense Refill     loratadine (CLARITIN) 10 MG Tab Take 10 mg by mouth 2 times a day. MEDICATION INSTRUCTIONS FOR SURGERY/PROCEDURE SCHEDULED FOR 9/30/24   OK TO CONTINUE TAKING PRIOR TO SURGERY AND DAY OF SURGERY      vitamin E 1000 units (450 mg) Cap Take 1,000 Units by mouth every day. MEDICATION INSTRUCTIONS FOR SURGERY/PROCEDURE SCHEDULED FOR 9/30/24   DO NOT TAKE 7 DAYS PRIOR TO SURGERY      Apoaequorin (PREVAGEN PO) Take  by mouth every day. MEDICATION INSTRUCTIONS FOR SURGERY/PROCEDURE SCHEDULED FOR 9/30/24   OK TO CONTINUE TAKING PRIOR TO SURGERY AND DAY OF SURGERY      Non Formulary Request every day. VITALIZER GOLD WITH VIT K, MEDICATION INSTRUCTIONS FOR SURGERY/PROCEDURE SCHEDULED FOR 9/30/24   DO NOT TAKE 7 DAYS PRIOR TO SURGERY      aspirin (ASA) 81 MG Chew Tab chewable tablet Chew 81 mg every day.       FOLLOW INSTRUCTIONS FROM SURGEON OR SPECIALIST      Misc. Devices Misc Four wheel walker with seat 1 Each 0    guaiFENesin ER (MUCINEX) 600 MG TABLET SR 12 HR Take 1-2 tablets by mouth every 12 hours as needed for congestion. (Patient taking differently: Take 1-2 tablets by mouth every 12 hours as needed for congestion.    MEDICATION INSTRUCTIONS FOR SURGERY/PROCEDURE SCHEDULED FOR 9/30/24  OK TO CONTINUE TAKING PRIOR TO SURGERY AND DAY OF SURGERY) 28 Tablet 0    ezetimibe (ZETIA) 10 MG Tab Take 1 tablet by mouth once daily (Patient taking differently: every evening. HOLD 24 HOURS PRIOR TO SURGERY) 100 Tablet 0    irbesartan (AVAPRO) 150 MG Tab Take 1 Tablet by mouth every day. (Patient taking differently: Take 150 mg by mouth every day. HOLD 24 HOURS PRIOR TO SURGERY) 100 Tablet 1    Nutritional Supplements (ADULT NUTRITIONAL SUPPLEMENT PO) Take 2 Tablets by mouth 1 time a day as needed (constiaption). Herb-Lax    MEDICATION INSTRUCTIONS FOR SURGERY/PROCEDURE SCHEDULED FOR 9/30/24   DO NOT TAKE 7 DAYS PRIOR TO SURGERY   Indications: constipation      azelastine (ASTELIN) 137 MCG/SPRAY nasal spray Administer 1 spray  into affected nostril(s) 2 times a day. Indications: Hayfever (Patient taking differently: Administer 1 Spray into affected nostril(S) 2 times a day.     MEDICATION INSTRUCTIONS FOR SURGERY/PROCEDURE SCHEDULED FOR 9/30/24   OK TO CONTINUE TAKING PRIOR TO SURGERY AND DAY OF SURGERY IF NEEDED  Indications: Hayfever) 90 mL 3    fluticasone (FLONASE) 50 MCG/ACT nasal spray Administer 1 spray into affected nostril(s) 2 times a day. Indications: Allergic Rhinitis, Stuffy Nose (Patient taking differently: Administer 1 Spray into affected nostril(S) 2 times a day.     MEDICATION INSTRUCTIONS FOR SURGERY/PROCEDURE SCHEDULED FOR 9/30/24   OK TO CONTINUE TAKING PRIOR TO SURGERY AND DAY OF SURGERY   Indications: Allergic Rhinitis, Stuffy Nose) 16 g 3    B Complex-Biotin-FA Tab Take 1 Tablet by mouth every morning.     MEDICATION INSTRUCTIONS FOR SURGERY/PROCEDURE SCHEDULED FOR 9/30/24   DO NOT TAKE 7 DAYS PRIOR TO SURGERY   Indications: Supplement      magnesium oxide (MAG-OX) 400 MG Tab tablet Take 400 mg by mouth every morning.     MEDICATION INSTRUCTIONS FOR SURGERY/PROCEDURE SCHEDULED FOR 9/30/24   DO NOT TAKE 7 DAYS PRIOR TO SURGERY   Indications: Supplement      ascorbic acid (ASCORBIC ACID) 500 MG Tab Take 1,000 mg by mouth 2 times a day. 2 tablets = 1,000 mg.    MEDICATION INSTRUCTIONS FOR SURGERY/PROCEDURE SCHEDULED FOR 9/30/24   DO NOT TAKE 7 DAYS PRIOR TO SURGERY   Indications: Supplement      verapamil ER (CALAN SR) 240 MG Tab CR Take 1 Tablet by mouth every day. (Patient taking differently: Take 240 mg by mouth every day.     MEDICATION INSTRUCTIONS FOR SURGERY/PROCEDURE SCHEDULED FOR 9/30/24  OK TO CONTINUE TAKING PRIOR TO SURGERY AND DAY OF SURGERY   Indications: High Blood Pressure) 90 Tablet 3     No current facility-administered medications on file prior to visit.        Allergies:    Benicar [olmesartan], Dilaudid  [hydromorphone hcl], Other misc, Amlodipine, Codeine, Demerol, Diazepam, Hydrocodone, Nexletol  [bempedoic acid], Other drug, Oxycodone, Simvastatin, Statins [hmg-coa-r inhibitors], Valium, and Mucinex fast-max chest mary ms [guaifenesin]     Problem list, medications, and allergies reviewed by myself today in Epic     Physical Exam:    Vitals:    09/25/24 1038   BP: 130/84   Pulse: 67   Resp: 13   Temp: 36.9 °C (98.4 °F)   SpO2: 94%             Physical Exam  Constitutional:       General: She is not in acute distress.     Appearance: Normal appearance.      Comments: Appears to be in quite a bit of pain.  Does not want to sit or lay down as it does cause the pain to worsen.    HENT:      Head: Normocephalic and atraumatic.   Abdominal:      General: Abdomen is protuberant. Bowel sounds are decreased.      Tenderness: There is abdominal tenderness in the periumbilical area, suprapubic area, left upper quadrant and left lower quadrant. There is guarding. There is no right CVA tenderness, left CVA tenderness or rebound.      Hernia: A hernia is present. Hernia is present in the umbilical area (firm severly painful).   Neurological:      Mental Status: She is alert.   Psychiatric:         Mood and Affect: Mood is anxious.         Speech: Speech is rapid and pressured.            Medical Decision making and plan :  I personally reviewed prior external notes and test results pertinent to today's visit. Pt is clinically stable at today's acute urgent care visit.  Patient appears nontoxic with no acute distress noted. Appropriate for outpatient care at this time.    Pleasant 83 y.o. female presented clinic with:     Assessment & Plan  1. Hernia.  The hernia is irreducible, raising concerns of incarceration due to increase in severity.  Incarceration versus increased pain may be secondary to inflammation secondary to fiber intake.  Discussed with patient's that there is a risk of compromised blood supply to the affected bowel segment, potentially leading to necrosis and escalating pain. Given the severity of her  pain, an emergency department evaluation is warranted to assess the need for immediate surgery. Her vital signs remain stable. She was transferred to a higher level of care at [facility], with contact numbers for Norma and Tenisha documented for her reference.  Patient did request I called Pat and Norma I did call both and leave a message . Unfortunately, medical Uber through [facility] declined to transport her to the emergency department. She expressed reluctance to visit [facility]. A non-emergent ambulance was called to transport her due to concerns of hernia incarceration secondary to escalating pain.      Disposition:  Higher level care via EMS transfer center called.     Voice Recognition Disclaimer:  Portions of this document were created using voice recognition software and [employer] technology provided by [employer]. The software does have a chance of producing errors of grammar and possibly content. I have made every reasonable attempt to correct obvious errors, but there may be errors of grammar and possibly content that I did not discover before finalizing the  documentation.    Shannen Macdonald, [email or web address].

## 2024-09-25 NOTE — ED PROVIDER NOTES
ED Provider Note    CHIEF COMPLAINT  Chief Complaint   Patient presents with    Abdominal Pain     LLQ abdominal pain related to previous umbilical hernia. Pt c/o of tenderness in LLQ upon palpation. No fever on arrival.         HPI/ROS    Irina Traylor is a 83 y.o. female who presents with abdominal pain.  The patient states she has had left lower quadrant abdominal pain over the last 2 weeks.  She states it is worse with certain movements as well as deep inspiration.  She does have a known history of an umbilical hernia but the pain seems to more left lateral on the left.  The patient has not had any associated vomiting.  She has no associated fevers.  She denies difficulties with urination.    PAST MEDICAL HISTORY   has a past medical history of Anesthesia, Arthritis, Benign essential HTN (09/07/2017), Blood clotting disorder (HCC) (07/19/2024), Cataract (07/19/2024), CPAP (continuous positive airway pressure) dependence, Dyslipidemia (03/05/2019), Expressive aphasia, Family history of CVA, Heart murmur, High cholesterol, DARIN (obstructive sleep apnea) (01/26/2017), Pacemaker (07/19/2024), Post-nasal drip, Restless leg syndrome, Sick sinus syndrome (HCC) (06/29/2022), Sinus arrest (06/29/2022), Stroke ([facility]) (2/1993, 8/1993), and Walker as ambulation aid.    SURGICAL HISTORY   has a past surgical history that includes hysterectomy, total abdominal (1977); fusion, spine, lumbar, plif (9/26/2016); lumbar laminectomy diskectomy (N/A, 9/26/2016); lumbar laminectomy diskectomy (1993); carpal tunnel release (Bilateral, 1993); orif, finger (Left, as child); hammertoe correction (Left, 11/12/2007); cervical disk and fusion anterior (1993); hammertoe correction (Right, 1980's); bunionectomy (Bilateral, 1980's); colonoscopy (2/2016); cervical fusion posterior (8/8/2017); cervical laminectomy posterior (8/8/2017); reconstr total shoulder implant (Left, 3/21/2019); orif, fracture, humerus (Left, 3/21/2019); shoulder  "arthroplasty total (Left, 8/11/2015); knee arthroplasty total (6/23/2014); hip arthroplasty total (Right, 12/12/2016); hip arthroplasty total (Left, 5/8/2017); and knee arthroplasty total (Left, 2/12/2018).    FAMILY HISTORY  Family History   Problem Relation Age of Onset    Cancer Mother         vaginal    Stroke Father     Hypertension Brother     Diabetes Brother     Hypertension Brother     Hypertension Brother     Stroke Daughter        SOCIAL HISTORY  Social History     Tobacco Use    Smoking status: Never    Smokeless tobacco: Never   Vaping Use    Vaping status: Never Used   Substance and Sexual Activity    Alcohol use: Not Currently    Drug use: No    Sexual activity: Not Currently       CURRENT MEDICATIONS  Home Medications    **Home medications have not yet been reviewed for this encounter**         ALLERGIES  Allergies   Allergen Reactions    Benicar [Olmesartan] Rash     Blisters    Tolerated losartan 4/12/24.    Dilaudid  [Hydromorphone Hcl] Vomiting    Other Misc Shortness of Breath     Anesthesia with surgery 2016  Pt couldn't breathe while being put under and tapped side of bed to let RN know. \"It was the scariest feeling and I want everyone to know\"      Amlodipine Swelling    Codeine Vomiting    Demerol Vomiting    Diazepam Vomiting    Hydrocodone Vomiting    Nexletol [Bempedoic Acid] Palpitations     Chest pain and dizziness    Other Drug Vomiting     \"all pain meds\" per pt can take morphine.    Oxycodone Vomiting    Simvastatin Myalgia and Unspecified     Headache, fatigue    Statins [Hmg-Coa-R Inhibitors] Unspecified     Muscle cramping    Valium Vomiting    Mucinex Fast-Max Chest Joe Ms [Guaifenesin]      Vision/speech problems       PHYSICAL EXAM  VITAL SIGNS: /83   Pulse 60   Temp 36.3 °C (97.3 °F) (Temporal)   Resp (!) 23   Ht 1.499 m (4' 11\")   Wt 59 kg (130 lb)   SpO2 96%   BMI 26.26 kg/m²    In general the patient appears ill    HEENT unremarkable    Pulmonary the patient's " lungs are clear to auscultation bilaterally    Cardiovascular S1-S2 with a regular rate and rhythm    GI the patient's left lower quadrant discomfort to deep palpation with voluntary guarding and some slight distention.  She does have hyperactive bowel sounds.    Skin no rashes, pallor, no jaundice    Extremities no distal edema    Neurologic examination is grossly intact    EKG/LABS  Results for orders placed or performed during the hospital encounter of 09/25/24   CBC WITH DIFFERENTIAL   Result Value Ref Range    WBC 5.1 4.8 - 10.8 K/uL    RBC 5.43 (H) 4.20 - 5.40 M/uL    Hemoglobin 15.6 12.0 - 16.0 g/dL    Hematocrit 48.1 (H) 37.0 - 47.0 %    MCV 88.6 81.4 - 97.8 fL    MCH 28.7 27.0 - 33.0 pg    MCHC 32.4 32.2 - 35.5 g/dL    RDW 44.7 35.9 - 50.0 fL    Platelet Count 340 164 - 446 K/uL    MPV 10.5 9.0 - 12.9 fL    Neutrophils-Polys 71.10 44.00 - 72.00 %    Lymphocytes 14.00 (L) 22.00 - 41.00 %    Monocytes 11.50 0.00 - 13.40 %    Eosinophils 1.40 0.00 - 6.90 %    Basophils 1.40 0.00 - 1.80 %    Immature Granulocytes 0.60 0.00 - 0.90 %    Nucleated RBC 0.00 0.00 - 0.20 /100 WBC    Neutrophils (Absolute) 3.60 1.82 - 7.42 K/uL    Lymphs (Absolute) 0.71 (L) 1.00 - 4.80 K/uL    Monos (Absolute) 0.58 0.00 - 0.85 K/uL    Eos (Absolute) 0.07 0.00 - 0.51 K/uL    Baso (Absolute) 0.07 0.00 - 0.12 K/uL    Immature Granulocytes (abs) 0.03 0.00 - 0.11 K/uL    NRBC (Absolute) 0.00 K/uL   COMP METABOLIC PANEL   Result Value Ref Range    Sodium 140 135 - 145 mmol/L    Potassium 4.0 3.6 - 5.5 mmol/L    Chloride 102 96 - 112 mmol/L    Co2 27 20 - 33 mmol/L    Anion Gap 11.0 7.0 - 16.0    Glucose 79 65 - 99 mg/dL    Bun 36 (H) 8 - 22 mg/dL    Creatinine 1.01 0.50 - 1.40 mg/dL    Calcium 9.6 8.4 - 10.2 mg/dL    Correct Calcium 9.8 8.5 - 10.5 mg/dL    AST(SGOT) 20 12 - 45 U/L    ALT(SGPT) 12 2 - 50 U/L    Alkaline Phosphatase 78 30 - 99 U/L    Total Bilirubin 0.9 0.1 - 1.5 mg/dL    Albumin 3.8 3.2 - 4.9 g/dL    Total Protein 6.8 6.0 -  8.2 g/dL    Globulin 3.0 1.9 - 3.5 g/dL    A-G Ratio 1.3 g/dL   LIPASE   Result Value Ref Range    Lipase 48 11 - 82 U/L   URINALYSIS (UA)    Specimen: Urine, Clean Catch   Result Value Ref Range    Color Yellow     Character Clear     Specific Gravity 1.020 <1.035    Ph 6.0 5.0 - 8.0    Glucose Negative Negative mg/dL    Ketones Negative Negative mg/dL    Protein Negative Negative mg/dL    Bilirubin Negative Negative    Nitrite Negative Negative    Leukocyte Esterase Negative Negative    Occult Blood Negative Negative    Micro Urine Req see below    ESTIMATED GFR   Result Value Ref Range    GFR (CKD-EPI) 56 (A) >60 mL/min/1.73 m 2     *Note: Due to a large number of results and/or encounters for the requested time period, some results have not been displayed. A complete set of results can be found in Results Review.       I have independently interpreted this EKG    RADIOLOGY/PROCEDURES   I have independently interpreted the diagnostic imaging associated with this visit and am waiting the final reading from the radiologist.   My preliminary interpretation is as follows: CT scan was reviewed and there is a large amount of stool in the vault    Radiologist interpretation:  CT-ABDOMEN-PELVIS WITH   Final Result      1. Small umbilical hernia containing mesenteric fat, with no evidence for incarceration.   2. No acute inflammatory process in the abdomen or pelvis.   3. Large amount of stool throughout the colon.   4. Simple appearing bilateral renal cortical cysts, requiring no further imaging follow-up as per consensus guidelines based on imaging criteria.   5. Cholelithiasis.   6. Dilated pancreatic duct measuring up to 6 mm in diameter.   7. Atherosclerosis.          COURSE & MEDICAL DECISION MAKING    This an 83-year-old female who presents the emergency department with left lower quadrant discomfort.  CT scan was performed as I was concern for possible diverticulitis.  This does show evidence of a large amount of  stool and I suspect this is from constipation.  Labs do not support cholecystitis nor pancreatitis.  The patient's urinalysis does not support a urinary source.  The patient will receive an enema and we will discharge the patient home with instructions to take a teaspoon of MiraLAX in the morning and at night.  I like her to recheck in 48 to 72 hours if she is not doing better.    FINAL DIAGNOSIS  1.  Left lower quadrant abdominal pain  2.  Suspect constipation    Disposition  Patient will be discharged after the enema.     Electronically signed by: Unruly Raphael M.D., 9/25/2024 12:40 PM    Of note the patient's umbilical hernia is easily reducible and nontender therefore I do not suspect it is the source.

## 2024-09-25 NOTE — ED NOTES
Round ed with patient after getting up to bedside commode. Patient repositioned and requests lights off to rest again. Call light within reach.

## 2024-09-25 NOTE — TELEPHONE ENCOUNTER
"09/25/24 - Patient called and left a voicemail regarding pain and needing \"something more than Tylenol.\" And that patient \"cannot take the pain anymore.\" Patient is either going to urgent care or ER based on voicemail, unable to clearly hear. Called patient left VM to return call and advised one of those two options for an assessment. Will keep an eye on patient admission/status.  09/25/24 10:01am - Patient at urgent care. Awaiting provider notes/AVS. Will contact patient once she is checked out.    09/26/24 10:33 am - Patient called, spoke with her and she is in better spirits and feeling less pain. She was evidently impacted and had been given an enema, which she says helped. She is scheduled for hernia repair possible mesh on Monday 09/30/24. Patient encouraged to reach out in the meantime for any needs or questions, urged to seek care at UC or ER over the weekend if needed.   "

## 2024-09-25 NOTE — ED NOTES
Medication history reviewed with pt. Med rec is complete.  Allergies reviewed, per pt    Pt has some RX bottles at bedside, went over all medications and returned RX bottles back to pts at bedside.  Pt is going to have surgery soon and was holding some of her vitamins and ASPRIN 81MG     Patient has not had any outpatient antibiotics in the last 30 days.    Pt is not on any anticoagulants

## 2024-09-26 ENCOUNTER — PATIENT OUTREACH (OUTPATIENT)
Dept: HEALTH INFORMATION MANAGEMENT | Facility: OTHER | Age: 84
End: 2024-09-26
Payer: MEDICARE

## 2024-09-26 DIAGNOSIS — E78.00 PURE HYPERCHOLESTEROLEMIA: ICD-10-CM

## 2024-09-26 DIAGNOSIS — K42.9 UMBILICAL HERNIA WITHOUT OBSTRUCTION AND WITHOUT GANGRENE: ICD-10-CM

## 2024-09-26 DIAGNOSIS — Z91.81 RISK FOR FALLS: ICD-10-CM

## 2024-09-26 NOTE — PROGRESS NOTES
Assessment/Education and Self Management of Care    Patient called this RN on 09/25/24 (noted in telephone encounter) and had complaints of severe px in abdominal area. She is to have a hernia repair upcoming on 09/30/24. She sought care at ER and was sent home understanding instruction. Per patient, she was impacted and given an enema which she stated helped. She is encouraged to reach out in the meantime for any needs and urged to seek care at UC/ER over the weekend if necessary. Plan to follow up pending possibility of home health post surgery.     Plan of Care and Goals    Patient is having a surgery for hernia repair on 09/30/24. This is patient's goal at this time and we will continue checking in after pending possibility of HH which patient really wishes to have after her procedure.     Barriers:    Physical, hernia complication/repair/surgery    Progress:    Progressing    Next outreach:  11/01/24

## 2024-09-26 NOTE — ED NOTES
Discharge instructions given and discussed. Patient educated to come back to ER for new or worsening symptoms. Patient verbalized understanding. All IV's removed. GCS of 15. Pt walked out with walker with steady gait.

## 2024-09-26 NOTE — DISCHARGE INSTRUCTIONS
Take a teaspoon of MiraLAX in the morning and at night and recheck if you are not better in 24 to 48 hours.

## 2024-09-29 ENCOUNTER — ANESTHESIA EVENT (OUTPATIENT)
Dept: SURGERY | Facility: MEDICAL CENTER | Age: 84
End: 2024-09-29
Payer: MEDICARE

## 2024-09-30 ENCOUNTER — PHARMACY VISIT (OUTPATIENT)
Dept: PHARMACY | Facility: MEDICAL CENTER | Age: 84
End: 2024-09-30
Payer: COMMERCIAL

## 2024-09-30 ENCOUNTER — ANESTHESIA (OUTPATIENT)
Dept: SURGERY | Facility: MEDICAL CENTER | Age: 84
End: 2024-09-30
Payer: MEDICARE

## 2024-09-30 ENCOUNTER — HOSPITAL ENCOUNTER (OUTPATIENT)
Facility: MEDICAL CENTER | Age: 84
End: 2024-09-30
Attending: SURGERY | Admitting: SURGERY
Payer: MEDICARE

## 2024-09-30 VITALS
BODY MASS INDEX: 27.69 KG/M2 | RESPIRATION RATE: 16 BRPM | HEART RATE: 60 BPM | HEIGHT: 59 IN | TEMPERATURE: 97.3 F | OXYGEN SATURATION: 95 % | WEIGHT: 137.35 LBS | DIASTOLIC BLOOD PRESSURE: 75 MMHG | SYSTOLIC BLOOD PRESSURE: 133 MMHG

## 2024-09-30 DIAGNOSIS — G89.18 POSTOPERATIVE PAIN: ICD-10-CM

## 2024-09-30 DIAGNOSIS — I10 HYPERTENSION, ESSENTIAL: ICD-10-CM

## 2024-09-30 PROCEDURE — 160035 HCHG PACU - 1ST 60 MINS PHASE I: Performed by: SURGERY

## 2024-09-30 PROCEDURE — 160009 HCHG ANES TIME/MIN: Performed by: SURGERY

## 2024-09-30 PROCEDURE — RXMED WILLOW AMBULATORY MEDICATION CHARGE: Performed by: INTERNAL MEDICINE

## 2024-09-30 PROCEDURE — 700101 HCHG RX REV CODE 250: Performed by: SURGERY

## 2024-09-30 PROCEDURE — 160027 HCHG SURGERY MINUTES - 1ST 30 MINS LEVEL 2: Performed by: SURGERY

## 2024-09-30 PROCEDURE — RXMED WILLOW AMBULATORY MEDICATION CHARGE: Performed by: NURSE PRACTITIONER

## 2024-09-30 PROCEDURE — 160036 HCHG PACU - EA ADDL 30 MINS PHASE I: Performed by: SURGERY

## 2024-09-30 PROCEDURE — 700101 HCHG RX REV CODE 250: Performed by: STUDENT IN AN ORGANIZED HEALTH CARE EDUCATION/TRAINING PROGRAM

## 2024-09-30 PROCEDURE — 160002 HCHG RECOVERY MINUTES (STAT): Performed by: SURGERY

## 2024-09-30 PROCEDURE — 700105 HCHG RX REV CODE 258: Performed by: SURGERY

## 2024-09-30 PROCEDURE — 700111 HCHG RX REV CODE 636 W/ 250 OVERRIDE (IP): Mod: JZ | Performed by: STUDENT IN AN ORGANIZED HEALTH CARE EDUCATION/TRAINING PROGRAM

## 2024-09-30 PROCEDURE — RXMED WILLOW AMBULATORY MEDICATION CHARGE: Performed by: SURGERY

## 2024-09-30 PROCEDURE — 160048 HCHG OR STATISTICAL LEVEL 1-5: Performed by: SURGERY

## 2024-09-30 PROCEDURE — 160025 RECOVERY II MINUTES (STATS): Performed by: SURGERY

## 2024-09-30 PROCEDURE — 160038 HCHG SURGERY MINUTES - EA ADDL 1 MIN LEVEL 2: Performed by: SURGERY

## 2024-09-30 PROCEDURE — 160046 HCHG PACU - 1ST 60 MINS PHASE II: Performed by: SURGERY

## 2024-09-30 PROCEDURE — RXMED WILLOW AMBULATORY MEDICATION CHARGE: Performed by: PHYSICIAN ASSISTANT

## 2024-09-30 PROCEDURE — 160047 HCHG PACU  - EA ADDL 30 MINS PHASE II: Performed by: SURGERY

## 2024-09-30 RX ORDER — LABETALOL HYDROCHLORIDE 5 MG/ML
5 INJECTION, SOLUTION INTRAVENOUS
Status: CANCELLED | OUTPATIENT
Start: 2024-09-30

## 2024-09-30 RX ORDER — EPHEDRINE SULFATE 50 MG/ML
INJECTION, SOLUTION INTRAVENOUS PRN
Status: DISCONTINUED | OUTPATIENT
Start: 2024-09-30 | End: 2024-09-30 | Stop reason: SURG

## 2024-09-30 RX ORDER — LIDOCAINE HYDROCHLORIDE 20 MG/ML
INJECTION, SOLUTION EPIDURAL; INFILTRATION; INTRACAUDAL; PERINEURAL PRN
Status: DISCONTINUED | OUTPATIENT
Start: 2024-09-30 | End: 2024-09-30 | Stop reason: SURG

## 2024-09-30 RX ORDER — CEFAZOLIN SODIUM 1 G/3ML
INJECTION, POWDER, FOR SOLUTION INTRAMUSCULAR; INTRAVENOUS PRN
Status: DISCONTINUED | OUTPATIENT
Start: 2024-09-30 | End: 2024-09-30 | Stop reason: SURG

## 2024-09-30 RX ORDER — HALOPERIDOL 5 MG/ML
1 INJECTION INTRAMUSCULAR
Status: CANCELLED | OUTPATIENT
Start: 2024-09-30

## 2024-09-30 RX ORDER — DEXAMETHASONE SODIUM PHOSPHATE 4 MG/ML
INJECTION, SOLUTION INTRA-ARTICULAR; INTRALESIONAL; INTRAMUSCULAR; INTRAVENOUS; SOFT TISSUE PRN
Status: DISCONTINUED | OUTPATIENT
Start: 2024-09-30 | End: 2024-09-30 | Stop reason: SURG

## 2024-09-30 RX ORDER — METOPROLOL TARTRATE 1 MG/ML
1 INJECTION, SOLUTION INTRAVENOUS
Status: CANCELLED | OUTPATIENT
Start: 2024-09-30

## 2024-09-30 RX ORDER — EPHEDRINE SULFATE 50 MG/ML
5 INJECTION, SOLUTION INTRAVENOUS
Status: CANCELLED | OUTPATIENT
Start: 2024-09-30

## 2024-09-30 RX ORDER — ALBUTEROL SULFATE 5 MG/ML
2.5 SOLUTION RESPIRATORY (INHALATION)
Status: CANCELLED | OUTPATIENT
Start: 2024-09-30

## 2024-09-30 RX ORDER — DIPHENHYDRAMINE HYDROCHLORIDE 50 MG/ML
12.5 INJECTION INTRAMUSCULAR; INTRAVENOUS
Status: CANCELLED | OUTPATIENT
Start: 2024-09-30

## 2024-09-30 RX ORDER — OXYCODONE HYDROCHLORIDE 5 MG/1
5 TABLET ORAL EVERY 4 HOURS PRN
Qty: 12 TABLET | Refills: 0 | Status: SHIPPED | OUTPATIENT
Start: 2024-09-30 | End: 2024-10-03

## 2024-09-30 RX ORDER — ACETAMINOPHEN 325 MG/1
650 TABLET ORAL EVERY 6 HOURS
Qty: 60 TABLET | Refills: 0 | Status: SHIPPED | OUTPATIENT
Start: 2024-09-30

## 2024-09-30 RX ORDER — ONDANSETRON 4 MG/1
4 TABLET, ORALLY DISINTEGRATING ORAL EVERY 6 HOURS PRN
Qty: 18 TABLET | Refills: 0 | Status: SHIPPED | OUTPATIENT
Start: 2024-09-30

## 2024-09-30 RX ORDER — HYDRALAZINE HYDROCHLORIDE 20 MG/ML
5 INJECTION INTRAMUSCULAR; INTRAVENOUS
Status: CANCELLED | OUTPATIENT
Start: 2024-09-30

## 2024-09-30 RX ORDER — ONDANSETRON 2 MG/ML
INJECTION INTRAMUSCULAR; INTRAVENOUS PRN
Status: DISCONTINUED | OUTPATIENT
Start: 2024-09-30 | End: 2024-09-30 | Stop reason: SURG

## 2024-09-30 RX ORDER — ONDANSETRON 2 MG/ML
4 INJECTION INTRAMUSCULAR; INTRAVENOUS
Status: CANCELLED | OUTPATIENT
Start: 2024-09-30

## 2024-09-30 RX ORDER — OXYCODONE HCL 5 MG/5 ML
10 SOLUTION, ORAL ORAL
Status: DISCONTINUED | OUTPATIENT
Start: 2024-09-30 | End: 2024-09-30 | Stop reason: HOSPADM

## 2024-09-30 RX ORDER — VERAPAMIL HYDROCHLORIDE 240 MG/1
240 TABLET, FILM COATED, EXTENDED RELEASE ORAL DAILY
Qty: 90 TABLET | Refills: 3 | Status: SHIPPED | OUTPATIENT
Start: 2024-09-30

## 2024-09-30 RX ORDER — SODIUM CHLORIDE, SODIUM LACTATE, POTASSIUM CHLORIDE, CALCIUM CHLORIDE 600; 310; 30; 20 MG/100ML; MG/100ML; MG/100ML; MG/100ML
INJECTION, SOLUTION INTRAVENOUS CONTINUOUS
Status: ACTIVE | OUTPATIENT
Start: 2024-09-30 | End: 2024-09-30

## 2024-09-30 RX ORDER — IBUPROFEN 600 MG/1
600 TABLET, FILM COATED ORAL EVERY 6 HOURS
Qty: 45 TABLET | Refills: 0 | Status: SHIPPED | OUTPATIENT
Start: 2024-09-30

## 2024-09-30 RX ORDER — ACETAMINOPHEN 500 MG
1000 TABLET ORAL ONCE
Status: DISCONTINUED | OUTPATIENT
Start: 2024-09-30 | End: 2024-09-30 | Stop reason: HOSPADM

## 2024-09-30 RX ORDER — OXYCODONE HCL 5 MG/5 ML
5 SOLUTION, ORAL ORAL
Status: DISCONTINUED | OUTPATIENT
Start: 2024-09-30 | End: 2024-09-30 | Stop reason: HOSPADM

## 2024-09-30 RX ORDER — POLYETHYLENE GLYCOL 3350 17 G/17G
17 POWDER, FOR SOLUTION ORAL DAILY
Qty: 20 EACH | Refills: 0 | Status: SHIPPED | OUTPATIENT
Start: 2024-09-30

## 2024-09-30 RX ORDER — BUPIVACAINE HYDROCHLORIDE AND EPINEPHRINE 5; 5 MG/ML; UG/ML
INJECTION, SOLUTION EPIDURAL; INTRACAUDAL; PERINEURAL
Status: DISCONTINUED | OUTPATIENT
Start: 2024-09-30 | End: 2024-09-30 | Stop reason: HOSPADM

## 2024-09-30 RX ADMIN — SODIUM CHLORIDE, POTASSIUM CHLORIDE, SODIUM LACTATE AND CALCIUM CHLORIDE: 600; 310; 30; 20 INJECTION, SOLUTION INTRAVENOUS at 12:28

## 2024-09-30 RX ADMIN — FENTANYL CITRATE 50 MCG: 50 INJECTION, SOLUTION INTRAMUSCULAR; INTRAVENOUS at 12:30

## 2024-09-30 RX ADMIN — EPHEDRINE SULFATE 10 MG: 50 INJECTION, SOLUTION INTRAVENOUS at 12:57

## 2024-09-30 RX ADMIN — ROCURONIUM BROMIDE 50 MG: 10 INJECTION, SOLUTION INTRAVENOUS at 12:33

## 2024-09-30 RX ADMIN — CEFAZOLIN 2 G: 1 INJECTION, POWDER, FOR SOLUTION INTRAMUSCULAR; INTRAVENOUS at 12:33

## 2024-09-30 RX ADMIN — ONDANSETRON 4 MG: 2 INJECTION INTRAMUSCULAR; INTRAVENOUS at 12:34

## 2024-09-30 RX ADMIN — LIDOCAINE HYDROCHLORIDE 60 MG: 20 INJECTION, SOLUTION EPIDURAL; INFILTRATION; INTRACAUDAL at 12:33

## 2024-09-30 RX ADMIN — DEXAMETHASONE SODIUM PHOSPHATE 4 MG: 4 INJECTION INTRA-ARTICULAR; INTRALESIONAL; INTRAMUSCULAR; INTRAVENOUS; SOFT TISSUE at 12:34

## 2024-09-30 RX ADMIN — PROPOFOL 100 MG: 10 INJECTION, EMULSION INTRAVENOUS at 12:33

## 2024-09-30 RX ADMIN — SUGAMMADEX 200 MG: 100 INJECTION, SOLUTION INTRAVENOUS at 13:00

## 2024-09-30 ASSESSMENT — PAIN SCALES - GENERAL: PAIN_LEVEL: 0

## 2024-09-30 ASSESSMENT — FIBROSIS 4 INDEX: FIB4 SCORE: 1.375452111892931968

## 2024-09-30 NOTE — ANESTHESIA POSTPROCEDURE EVALUATION
Patient: Irina Traylor    Procedure Summary       Date: 09/30/24 Room / Location:  OR  / SURGERY [facility]    Anesthesia Start: 1228 Anesthesia Stop: 1315    Procedure: OPEN UMBILICAL HERNIA REPAIR (Abdomen) Diagnosis: (IRREDUCIBLE UMBILICAL HERNIA)    Surgeons: Michael Armas M.D. Responsible Provider: Luc Wynne M.D.    Anesthesia Type: general ASA Status: 3            Final Anesthesia Type: general  Last vitals  BP   Blood Pressure : 133/75    Temp   36.2 °C (97.2 °F)    Pulse   60   Resp   16    SpO2   95 %      Anesthesia Post Evaluation    Patient location during evaluation: PACU  Patient participation: complete - patient participated  Level of consciousness: awake and alert  Pain score: 0    Airway patency: patent  Anesthetic complications: no  Cardiovascular status: hemodynamically stable  Respiratory status: acceptable  Hydration status: euvolemic    PONV: none          There were no known notable events for this encounter.     Nurse Pain Score: 0 (NPRS)

## 2024-09-30 NOTE — ANESTHESIA PREPROCEDURE EVALUATION
Case: [id number] Date/Time: 09/30/24 1315    Procedure: OPEN UMBILICAL HERNIA REPAIR, POSSIBLE MESH (Abdomen)    Anesthesia type: General    Pre-op diagnosis: IRREDUCIBLE UMBILICAL HERNIA    Location: [facility] OR 01 / SURGERY [facility]    Surgeons: Michael Armas M.D.            Relevant Problems   ANESTHESIA   (positive) DARIN (obstructive sleep apnea)      CARDIAC   (positive) Pacemaker   (positive) Primary hypertension   (positive) Sick sinus syndrome (HCC)      Other   (positive) Osteomyelitis (HCC)       Physical Exam    Airway   Mallampati: II  TM distance: >3 FB  Neck ROM: full       Cardiovascular - normal exam  Rhythm: regular  Rate: normal  (-) murmur     Dental - normal exam           Pulmonary - normal exam  Breath sounds clear to auscultation     Abdominal    Neurological - normal exam                   Anesthesia Plan    ASA 3   ASA physical status 3 criteria: implanted pacemaker and CVA or TIA - history (> 3 months)    Plan - general       Airway plan will be ETT          Induction: intravenous    Postoperative Plan: Postoperative administration of opioids is intended.    Pertinent diagnostic labs and testing reviewed    Informed Consent:    Anesthetic plan and risks discussed with patient.    Use of blood products discussed with: patient whom consented to blood products.

## 2024-09-30 NOTE — ANESTHESIA TIME REPORT
Anesthesia Start and Stop Event Times       Date Time Event    9/30/2024 1228 Anesthesia Start     1315 Anesthesia Stop          Responsible Staff  09/30/24      Name Role Begin End    Luc Wynne M.D. Anesth 1228 1315          Overtime Reason:  no overtime (within assigned shift)    Comments:

## 2024-09-30 NOTE — ANESTHESIA PROCEDURE NOTES
Airway    Date/Time: 9/30/2024 12:35 PM    Performed by: Luc Wynne M.D.  Authorized by: Luc Wynne M.D.    Location:  OR  Urgency:  Elective  Indications for Airway Management:  Anesthesia      Spontaneous Ventilation: absent    Sedation Level:  Deep  Preoxygenated: Yes    Patient Position:  Sniffing  Final Airway Type:  Endotracheal airway  Final Endotracheal Airway:  ETT  Cuffed: Yes    Technique Used for Successful ETT Placement:  Direct laryngoscopy    Insertion Site:  Oral  Blade Type:  Garcia  Laryngoscope Blade/Videolaryngoscope Blade Size:  2  ETT Size (mm):  7.0  Measured from:  Teeth  ETT to Teeth (cm):  21  Placement Verified by: auscultation and capnometry    Cormack-Lehane Classification:  Grade I - full view of glottis  Number of Attempts at Approach:  1

## 2024-10-02 ENCOUNTER — APPOINTMENT (OUTPATIENT)
Dept: DERMATOLOGY | Facility: IMAGING CENTER | Age: 84
End: 2024-10-02
Payer: MEDICARE

## 2024-10-04 ENCOUNTER — TELEPHONE (OUTPATIENT)
Dept: HEALTH INFORMATION MANAGEMENT | Facility: OTHER | Age: 84
End: 2024-10-04
Payer: MEDICARE

## 2024-10-07 ENCOUNTER — TELEPHONE (OUTPATIENT)
Dept: HEALTH INFORMATION MANAGEMENT | Facility: OTHER | Age: 84
End: 2024-10-07
Payer: MEDICARE

## 2024-10-08 ENCOUNTER — OFFICE VISIT (OUTPATIENT)
Dept: MEDICAL GROUP | Facility: PHYSICIAN GROUP | Age: 84
End: 2024-10-08
Payer: MEDICARE

## 2024-10-08 ENCOUNTER — TELEPHONE (OUTPATIENT)
Dept: HEALTH INFORMATION MANAGEMENT | Facility: OTHER | Age: 84
End: 2024-10-08

## 2024-10-08 ENCOUNTER — HOME HEALTH ADMISSION (OUTPATIENT)
Dept: HOME HEALTH SERVICES | Facility: HOME HEALTHCARE | Age: 84
End: 2024-10-08
Payer: MEDICARE

## 2024-10-08 ENCOUNTER — PATIENT OUTREACH (OUTPATIENT)
Dept: HEALTH INFORMATION MANAGEMENT | Facility: OTHER | Age: 84
End: 2024-10-08
Payer: MEDICARE

## 2024-10-08 VITALS
BODY MASS INDEX: 29.03 KG/M2 | WEIGHT: 144 LBS | SYSTOLIC BLOOD PRESSURE: 122 MMHG | HEIGHT: 59 IN | TEMPERATURE: 98 F | DIASTOLIC BLOOD PRESSURE: 60 MMHG | HEART RATE: 73 BPM | OXYGEN SATURATION: 97 %

## 2024-10-08 DIAGNOSIS — R47.01 APHASIA: ICD-10-CM

## 2024-10-08 DIAGNOSIS — I10 PRIMARY HYPERTENSION: ICD-10-CM

## 2024-10-08 DIAGNOSIS — Z87.19 S/P HERNIA REPAIR: ICD-10-CM

## 2024-10-08 DIAGNOSIS — Z98.890 S/P HERNIA REPAIR: ICD-10-CM

## 2024-10-08 DIAGNOSIS — Z86.73 HISTORY OF CVA (CEREBROVASCULAR ACCIDENT): ICD-10-CM

## 2024-10-08 PROBLEM — K42.9 UMBILICAL HERNIA WITHOUT OBSTRUCTION AND WITHOUT GANGRENE: Status: RESOLVED | Noted: 2024-04-13 | Resolved: 2024-10-08

## 2024-10-08 PROCEDURE — 99214 OFFICE O/P EST MOD 30 MIN: CPT | Performed by: STUDENT IN AN ORGANIZED HEALTH CARE EDUCATION/TRAINING PROGRAM

## 2024-10-08 PROCEDURE — 3078F DIAST BP <80 MM HG: CPT | Performed by: STUDENT IN AN ORGANIZED HEALTH CARE EDUCATION/TRAINING PROGRAM

## 2024-10-08 PROCEDURE — 3074F SYST BP LT 130 MM HG: CPT | Performed by: STUDENT IN AN ORGANIZED HEALTH CARE EDUCATION/TRAINING PROGRAM

## 2024-10-08 ASSESSMENT — ENCOUNTER SYMPTOMS
FEVER: 0
CHILLS: 0
SHORTNESS OF BREATH: 0
PALPITATIONS: 0

## 2024-10-08 ASSESSMENT — FIBROSIS 4 INDEX: FIB4 SCORE: 1.375452111892931968

## 2024-10-09 ENCOUNTER — PATIENT OUTREACH (OUTPATIENT)
Dept: HEALTH INFORMATION MANAGEMENT | Facility: OTHER | Age: 84
End: 2024-10-09
Payer: MEDICARE

## 2024-10-09 ENCOUNTER — TELEPHONE (OUTPATIENT)
Dept: MEDICAL GROUP | Facility: PHYSICIAN GROUP | Age: 84
End: 2024-10-09
Payer: MEDICARE

## 2024-10-09 DIAGNOSIS — I10 PRIMARY HYPERTENSION: ICD-10-CM

## 2024-10-11 ENCOUNTER — PATIENT OUTREACH (OUTPATIENT)
Dept: HEALTH INFORMATION MANAGEMENT | Facility: OTHER | Age: 84
End: 2024-10-11
Payer: MEDICARE

## 2024-10-11 DIAGNOSIS — I10 PRIMARY HYPERTENSION: ICD-10-CM

## 2024-10-14 ENCOUNTER — PATIENT OUTREACH (OUTPATIENT)
Dept: HEALTH INFORMATION MANAGEMENT | Facility: OTHER | Age: 84
End: 2024-10-14
Payer: MEDICARE

## 2024-10-14 ENCOUNTER — TELEPHONE (OUTPATIENT)
Dept: MEDICAL GROUP | Facility: PHYSICIAN GROUP | Age: 84
End: 2024-10-14
Payer: MEDICARE

## 2024-10-14 DIAGNOSIS — I10 PRIMARY HYPERTENSION: ICD-10-CM

## 2024-10-15 ENCOUNTER — TELEPHONE (OUTPATIENT)
Dept: HEALTH INFORMATION MANAGEMENT | Facility: OTHER | Age: 84
End: 2024-10-15
Payer: MEDICARE

## 2024-10-23 ENCOUNTER — OFFICE VISIT (OUTPATIENT)
Dept: SLEEP MEDICINE | Facility: MEDICAL CENTER | Age: 84
End: 2024-10-23
Attending: NURSE PRACTITIONER
Payer: MEDICARE

## 2024-10-23 ENCOUNTER — OFFICE VISIT (OUTPATIENT)
Dept: MEDICAL GROUP | Facility: PHYSICIAN GROUP | Age: 84
End: 2024-10-23
Payer: MEDICARE

## 2024-10-23 VITALS
HEIGHT: 59 IN | WEIGHT: 139 LBS | HEART RATE: 78 BPM | BODY MASS INDEX: 28.02 KG/M2 | DIASTOLIC BLOOD PRESSURE: 80 MMHG | OXYGEN SATURATION: 96 % | SYSTOLIC BLOOD PRESSURE: 130 MMHG | RESPIRATION RATE: 16 BRPM

## 2024-10-23 VITALS
WEIGHT: 139.55 LBS | DIASTOLIC BLOOD PRESSURE: 68 MMHG | HEIGHT: 59 IN | SYSTOLIC BLOOD PRESSURE: 130 MMHG | BODY MASS INDEX: 28.13 KG/M2 | TEMPERATURE: 97.9 F | OXYGEN SATURATION: 95 % | HEART RATE: 71 BPM

## 2024-10-23 DIAGNOSIS — Z98.890 HISTORY OF UMBILICAL HERNIA REPAIR: ICD-10-CM

## 2024-10-23 DIAGNOSIS — I10 PRIMARY HYPERTENSION: ICD-10-CM

## 2024-10-23 DIAGNOSIS — J30.9 ALLERGIC RHINITIS, UNSPECIFIED SEASONALITY, UNSPECIFIED TRIGGER: ICD-10-CM

## 2024-10-23 DIAGNOSIS — G47.33 OSA (OBSTRUCTIVE SLEEP APNEA): Chronic | ICD-10-CM

## 2024-10-23 DIAGNOSIS — Z87.19 HISTORY OF UMBILICAL HERNIA REPAIR: ICD-10-CM

## 2024-10-23 DIAGNOSIS — Z78.9 NONSMOKER: ICD-10-CM

## 2024-10-23 PROBLEM — G89.18 POSTOPERATIVE PAIN: Status: RESOLVED | Noted: 2024-09-30 | Resolved: 2024-10-23

## 2024-10-23 PROCEDURE — 3075F SYST BP GE 130 - 139MM HG: CPT | Performed by: NURSE PRACTITIONER

## 2024-10-23 PROCEDURE — 3079F DIAST BP 80-89 MM HG: CPT | Performed by: NURSE PRACTITIONER

## 2024-10-23 PROCEDURE — 3078F DIAST BP <80 MM HG: CPT | Performed by: NURSE PRACTITIONER

## 2024-10-23 PROCEDURE — 99213 OFFICE O/P EST LOW 20 MIN: CPT | Performed by: NURSE PRACTITIONER

## 2024-10-23 PROCEDURE — 99214 OFFICE O/P EST MOD 30 MIN: CPT | Performed by: NURSE PRACTITIONER

## 2024-10-23 ASSESSMENT — FIBROSIS 4 INDEX
FIB4 SCORE: 1.375452111892931968
FIB4 SCORE: 1.375452111892931968

## 2024-10-28 ENCOUNTER — NON-PROVIDER VISIT (OUTPATIENT)
Dept: CARDIOLOGY | Facility: MEDICAL CENTER | Age: 84
End: 2024-10-28
Payer: MEDICARE

## 2024-10-28 PROCEDURE — 93294 REM INTERROG EVL PM/LDLS PM: CPT | Performed by: INTERNAL MEDICINE

## 2024-12-10 DIAGNOSIS — E78.5 DYSLIPIDEMIA: ICD-10-CM

## 2024-12-10 DIAGNOSIS — I10 PRIMARY HYPERTENSION: ICD-10-CM

## 2024-12-10 PROCEDURE — RXMED WILLOW AMBULATORY MEDICATION CHARGE: Performed by: PHYSICIAN ASSISTANT

## 2024-12-10 RX ORDER — EZETIMIBE 10 MG/1
TABLET ORAL
Qty: 100 TABLET | Refills: 3 | Status: SHIPPED | OUTPATIENT
Start: 2024-12-10

## 2024-12-10 RX ORDER — IRBESARTAN 150 MG/1
150 TABLET ORAL DAILY
Qty: 100 TABLET | Refills: 3 | Status: SHIPPED | OUTPATIENT
Start: 2024-12-10

## 2024-12-10 NOTE — TELEPHONE ENCOUNTER
Requested Prescriptions     Signed Prescriptions Disp Refills    irbesartan (AVAPRO) 150 MG Tab 100 Tablet 3     Sig: Take 1 Tablet by mouth every day.     Authorizing Provider: RHODA BELLA    ezetimibe (ZETIA) 10 MG Tab 100 Tablet 3     Sig: Take 1 tablet by mouth once daily     Authorizing Provider: RHODA BELLA A.P.R.N.

## 2024-12-10 NOTE — TELEPHONE ENCOUNTER
Received request via: Pharmacy    Was the patient seen in the last year in this department? Yes    Does the patient have an active prescription (recently filled or refills available) for medication(s) requested? No    Pharmacy Name: [facility]     Does the patient have long term Plus and need 100-day supply? (This applies to ALL medications) Yes, quantity updated to 100 days

## 2024-12-12 ENCOUNTER — OFFICE VISIT (OUTPATIENT)
Dept: MEDICAL GROUP | Facility: PHYSICIAN GROUP | Age: 84
End: 2024-12-12
Payer: MEDICARE

## 2024-12-12 VITALS
OXYGEN SATURATION: 96 % | BODY MASS INDEX: 29.56 KG/M2 | TEMPERATURE: 99.2 F | SYSTOLIC BLOOD PRESSURE: 138 MMHG | HEART RATE: 60 BPM | WEIGHT: 146.61 LBS | HEIGHT: 59 IN | DIASTOLIC BLOOD PRESSURE: 70 MMHG

## 2024-12-12 DIAGNOSIS — I10 PRIMARY HYPERTENSION: ICD-10-CM

## 2024-12-12 DIAGNOSIS — M62.82 NON-TRAUMATIC RHABDOMYOLYSIS: ICD-10-CM

## 2024-12-12 PROCEDURE — 99214 OFFICE O/P EST MOD 30 MIN: CPT | Performed by: NURSE PRACTITIONER

## 2024-12-12 PROCEDURE — 3078F DIAST BP <80 MM HG: CPT | Performed by: NURSE PRACTITIONER

## 2024-12-12 PROCEDURE — 3075F SYST BP GE 130 - 139MM HG: CPT | Performed by: NURSE PRACTITIONER

## 2024-12-12 ASSESSMENT — FIBROSIS 4 INDEX: FIB4 SCORE: 1.39

## 2024-12-12 NOTE — PROGRESS NOTES
Subjective:     CC: Requesting labs    HPI:   Irina presents today with the following:    Continues to have muscle pain for the last 3 months. The muscles in her arms hurt. No new medications. No falls. Right arm worse than left. She has completed home health yesterday. She plans to continue with home PT exercises. She would like labs, she expresses concern that she has not had repeat labs since she was on an IV antibiotic presented to the hospital. 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 She was hospitalized for rhabdomyolysis concern and given IV antibiotics.  We discussed that she had to repeat CPKs that were normal range. She would like labs reordered. She is staying hydrated.       Past Medical History:   Diagnosis Date    Anesthesia     States feels like she can't breathe with induction for right hip surgery, gets nausea vomiting with most pain meds.  Uses anti-nausea medicine if takes morphine    Arthritis     To hips, shoulders, hands    Benign essential HTN 09/07/2017    Blood clotting disorder (HCC) 07/19/2024    DVT left leg    Cataract 07/19/2024    not surgery yet    CPAP (continuous positive airway pressure) dependence     Dyslipidemia 03/05/2019    Expressive aphasia     Related to CVA    Family history of CVA     Heart murmur     High cholesterol     DARIN (obstructive sleep apnea) 01/26/2017    uses CPAP    Pacemaker 07/19/2024    Being followed by Dr. Freire    Post-nasal drip     Restless leg syndrome     4/25/17-Resolved, states was related to right hip and none S/P replacement.    Sick sinus syndrome ([facility]) 06/29/2022    Sinus arrest 06/29/2022    Stroke ([facility]) 2/1993, 8/1993    Dplhxeat-kwnvbr-jtyzqzheln aphasia,mouth forming words, reading, numbers    Umbilical hernia without obstruction and without gangrene 04/13/2024    Walker as ambulation aid        Social History     Tobacco Use    Smoking status: Never    Smokeless tobacco: Never    Vaping Use    Vaping status: Never Used   Substance Use Topics    Alcohol use: Not Currently    Drug use: No       Current Outpatient Medications Ordered in Epic   Medication Sig Dispense Refill    irbesartan (AVAPRO) 150 MG Tab Take 1 tablet by mouth every day. 100 Tablet 3    ezetimibe (ZETIA) 10 MG Tab Take 1 tablet by mouth once daily 100 Tablet 3    verapamil ER (CALAN SR) 240 MG Tab CR Take 1 Tablet by mouth every day. 90 Tablet 3    polyethylene glycol/lytes (MIRALAX) Pack Take 1 Packet by mouth every day. While taking narcotics, hold if greater then 3 BMs a day 20 Each 0    loratadine (CLARITIN) 10 MG Tab Take 10 mg by mouth every day. Takes bid      vitamin E 1000 units (450 mg) Cap Take 1,000 Units by mouth every day.      Apoaequorin (PREVAGEN PO) Take 1 Tablet by mouth every day.      Non Formulary Request Take 1 Package by mouth every day. VITALIZER GOLD WITH VIT K, MEDICATION INSTRUCTIONS FOR SURGERY/PROCEDURE SCHEDULED FOR 9/30/24   DO NOT TAKE 7 DAYS PRIOR TO SURGERY      aspirin (ASA) 81 MG Chew Tab chewable tablet Chew 81 mg every day.         Misc. Devices Misc Four wheel walker with seat 1 Each 0    Nutritional Supplements (ADULT NUTRITIONAL SUPPLEMENT PO) Take 5-10 Tablets by mouth 1 time a day as needed (constiaption). Herb-Lax      Indications: constipation      B Complex-Biotin-FA Tab Take 1 Tablet by mouth every morning.     Indications: Supplement      magnesium oxide (MAG-OX) 400 MG Tab tablet Take 400 mg by mouth every morning. Y  Indications: Supplement      Ascorbic Acid (VITAMIN C) 1000 MG Tab Take 3,000 mg by mouth every day. 3 tablets = 1,000 mg.      Indications: Supplement      acetaminophen (TYLENOL) 325 MG Tab Take 2 Tablets by mouth every 6 hours. Alternate w/ ibuprofen to take a medication every 3 hrs, take scheduled for 3 days post-op then as needed (Patient not taking: Reported on 12/12/2024) 60 Tablet 0    ibuprofen (MOTRIN) 600 MG Tab Take 1 Tablet by mouth every 6 hours.  "Alternate w/ tylenol to take a medication every 3 hrs, take scheduled for 3 days post-op then as needed (Patient not taking: Reported on 12/12/2024) 45 Tablet 0    ondansetron (ZOFRAN ODT) 4 MG TABLET DISPERSIBLE Take 1 Tablet by mouth every 6 hours as needed for Nausea/Vomiting. (Patient not taking: Reported on 12/12/2024) 18 Tablet 0    acetaminophen (TYLENOL) 500 MG Tab Take 500 mg by mouth every 6 hours as needed. Indications: Pain (Patient not taking: Reported on 12/12/2024)       No current [facility]-ordered facility-administered medications on file.       Allergies:  Benicar [olmesartan], Dilaudid  [hydromorphone hcl], Other misc, Amlodipine, Codeine, Demerol, Diazepam, Hydrocodone, Nexletol [bempedoic acid], Other drug, Oxycodone, Simvastatin, Statins [hmg-coa-r inhibitors], Valium, and Mucinex fast-max chest mary ms [guaifenesin]    Health Maintenance: Reviewed      Objective:     Vital signs reviewed  Exam:  /70 (BP Location: Left arm, Patient Position: Sitting, BP Cuff Size: Adult)   Pulse 60   Temp 37.3 °C (99.2 °F) (Temporal)   Ht 1.499 m (4' 11\")   Wt 66.5 kg (146 lb 9.7 oz)   SpO2 96%   BMI 29.61 kg/m²  Body mass index is 29.61 kg/m².    Gen: Alert and oriented, No apparent distress.  Eyes:   Lids normal. Glasses in place.   Lungs: Normal effort, CTA bilaterally, no wheezes, rhonchi, or rales  CV: Regular rate and rhythm. No murmurs, rubs, or gallops.  Ext: No clubbing, cyanosis, edema. Using four wheel walker. Left and right biceps, triceps and forearms tender to touch, no right elbow or shoulder pain.         Assessment & Plan:     84 y.o. female with the following -     1. Non-traumatic rhabdomyolysis  Chronic exacerbated problem.  Patient would like repeat labs.  She is not, taking any antibiotics.  She would like reassurance that her arm pain is not due to the medications.  She is not on any statins.  Check updated labs.  Encouraged to continue with home PT exercises.  - CREATINE KINASE; " Future  - Basic Metabolic Panel; Future    2. Primary hypertension  Chronic stable problem.  BP today 138/70.  Continue irbesartan 150 mg daily and verapamil 240 mg daily.  Check updated BMP.  Previous GFR 56.  - Basic Metabolic Panel; Future      Return in about 3 months (around 3/12/2025) for Multiple Issues.    Please note that this dictation was created using voice recognition software. I have made every reasonable attempt to correct obvious errors, but I expect that there are errors of grammar and possibly content that I did not discover before finalizing the note.

## 2024-12-16 PROCEDURE — RXMED WILLOW AMBULATORY MEDICATION CHARGE: Performed by: NURSE PRACTITIONER

## 2024-12-17 PROCEDURE — RXMED WILLOW AMBULATORY MEDICATION CHARGE: Performed by: NURSE PRACTITIONER

## 2024-12-18 ENCOUNTER — PHARMACY VISIT (OUTPATIENT)
Dept: PHARMACY | Facility: MEDICAL CENTER | Age: 84
End: 2024-12-18
Payer: COMMERCIAL

## 2025-01-15 ENCOUNTER — HOME HEALTH ADMISSION (OUTPATIENT)
Dept: HOME HEALTH SERVICES | Facility: HOME HEALTHCARE | Age: 85
End: 2025-01-15
Payer: MEDICARE

## 2025-01-15 ENCOUNTER — OFFICE VISIT (OUTPATIENT)
Dept: MEDICAL GROUP | Facility: PHYSICIAN GROUP | Age: 85
End: 2025-01-15
Payer: MEDICARE

## 2025-01-15 VITALS
OXYGEN SATURATION: 96 % | TEMPERATURE: 98.1 F | SYSTOLIC BLOOD PRESSURE: 118 MMHG | HEIGHT: 59 IN | HEART RATE: 63 BPM | DIASTOLIC BLOOD PRESSURE: 62 MMHG | WEIGHT: 151.24 LBS | BODY MASS INDEX: 30.49 KG/M2

## 2025-01-15 DIAGNOSIS — M19.041 PRIMARY OSTEOARTHRITIS OF BOTH HANDS: ICD-10-CM

## 2025-01-15 DIAGNOSIS — I10 PRIMARY HYPERTENSION: ICD-10-CM

## 2025-01-15 DIAGNOSIS — M19.042 PRIMARY OSTEOARTHRITIS OF BOTH HANDS: ICD-10-CM

## 2025-01-15 DIAGNOSIS — Z91.81 RISK FOR FALLS: ICD-10-CM

## 2025-01-15 DIAGNOSIS — T14.8XXA WOUND OF SKIN: ICD-10-CM

## 2025-01-15 DIAGNOSIS — I49.5 SICK SINUS SYNDROME (HCC): ICD-10-CM

## 2025-01-15 DIAGNOSIS — M15.0 PRIMARY OSTEOARTHRITIS INVOLVING MULTIPLE JOINTS: ICD-10-CM

## 2025-01-15 DIAGNOSIS — Z86.73 HISTORY OF CVA (CEREBROVASCULAR ACCIDENT): ICD-10-CM

## 2025-01-15 DIAGNOSIS — L82.1 SEBORRHEIC KERATOSIS: ICD-10-CM

## 2025-01-15 DIAGNOSIS — R47.01 APHASIA: ICD-10-CM

## 2025-01-15 DIAGNOSIS — Z95.0 PACEMAKER: ICD-10-CM

## 2025-01-15 PROCEDURE — 3074F SYST BP LT 130 MM HG: CPT | Performed by: NURSE PRACTITIONER

## 2025-01-15 PROCEDURE — 99214 OFFICE O/P EST MOD 30 MIN: CPT | Performed by: NURSE PRACTITIONER

## 2025-01-15 PROCEDURE — 3078F DIAST BP <80 MM HG: CPT | Performed by: NURSE PRACTITIONER

## 2025-01-15 ASSESSMENT — PATIENT HEALTH QUESTIONNAIRE - PHQ9: CLINICAL INTERPRETATION OF PHQ2 SCORE: 0

## 2025-01-15 ASSESSMENT — FIBROSIS 4 INDEX: FIB4 SCORE: 1.39

## 2025-01-15 NOTE — PROGRESS NOTES
Subjective:     CC: orders needed    HPI:   Irina presents today with the following:      She lives alone in a single story house. She does have ramps to get into house and ramp going out to the patio. She Uber's to her appointments as she does not drive. She uses 4 wheel walker. She would like home health. She needs help with showering, washing hair and help arouind the house. She has upcoming 2/28/2025 left cataract surgery with Dr. Mauricio Knight and 2 weeks later will do right eye cataract.       Bra strap causing pain mid back. Her friend Norma looked at the area and put a cushion dressing as the bra connecters were digging into the skin. She changed bras that has helped. Her friend put a band aid over the area. She would like me to look at the area.     Left lower abdomen has spots she would like me to look at. Notes history of skin cancer        Past Medical History:   Diagnosis Date    Anesthesia     States feels like she can't breathe with induction for right hip surgery, gets nausea vomiting with most pain meds.  Uses anti-nausea medicine if takes morphine    Arthritis     To hips, shoulders, hands    Benign essential HTN 09/07/2017    Blood clotting disorder (HCC) 07/19/2024    DVT left leg    Cataract 07/19/2024    not surgery yet    CPAP (continuous positive airway pressure) dependence     Dyslipidemia 03/05/2019    Expressive aphasia     Related to CVA    Family history of CVA     Heart murmur     High cholesterol     DARIN (obstructive sleep apnea) 01/26/2017    uses CPAP    Pacemaker 07/19/2024    Being followed by Dr. Freire    Post-nasal drip     Restless leg syndrome     4/25/17-Resolved, states was related to right hip and none S/P replacement.    Sick sinus syndrome (HCC) 06/29/2022    Sinus arrest 06/29/2022    Stroke ([facility]) 2/1993, 8/1993    Ambeiwca-jnvvaf-bfrikccxcv aphasia,mouth forming words, reading, numbers    Umbilical hernia without obstruction and without gangrene 04/13/2024     Walker as ambulation aid        Social History     Tobacco Use    Smoking status: Never    Smokeless tobacco: Never   Vaping Use    Vaping status: Never Used   Substance Use Topics    Alcohol use: Not Currently    Drug use: No       Current Outpatient Medications Ordered in Epic   Medication Sig Dispense Refill    irbesartan (AVAPRO) 150 MG Tab Take 1 tablet by mouth every day. 100 Tablet 3    ezetimibe (ZETIA) 10 MG Tab Take 1 tablet by mouth once daily 100 Tablet 3    verapamil ER (CALAN SR) 240 MG Tab CR Take 1 Tablet by mouth every day. 90 Tablet 3    acetaminophen (TYLENOL) 325 MG Tab Take 2 Tablets by mouth every 6 hours. Alternate w/ ibuprofen to take a medication every 3 hrs, take scheduled for 3 days post-op then as needed 60 Tablet 0    ibuprofen (MOTRIN) 600 MG Tab Take 1 Tablet by mouth every 6 hours. Alternate w/ tylenol to take a medication every 3 hrs, take scheduled for 3 days post-op then as needed 45 Tablet 0    polyethylene glycol/lytes (MIRALAX) Pack Take 1 Packet by mouth every day. While taking narcotics, hold if greater then 3 BMs a day 20 Each 0    acetaminophen (TYLENOL) 500 MG Tab Take 500 mg by mouth every 6 hours as needed. Indications: Pain      loratadine (CLARITIN) 10 MG Tab Take 10 mg by mouth every day. Takes bid      vitamin E 1000 units (450 mg) Cap Take 1,000 Units by mouth every day.      Apoaequorin (PREVAGEN PO) Take 1 Tablet by mouth every day.      Non Formulary Request Take 1 Package by mouth every day. VITALIZER GOLD WITH VIT K, MEDICATION INSTRUCTIONS FOR SURGERY/PROCEDURE SCHEDULED FOR 9/30/24   DO NOT TAKE 7 DAYS PRIOR TO SURGERY      aspirin (ASA) 81 MG Chew Tab chewable tablet Chew 81 mg every day.         Misc. Devices Misc Four wheel walker with seat 1 Each 0    Nutritional Supplements (ADULT NUTRITIONAL SUPPLEMENT PO) Take 5-10 Tablets by mouth 1 time a day as needed (constiaption). Herb-Lax      Indications: constipation      B Complex-Biotin-FA Tab Take 1 Tablet  "by mouth every morning.     Indications: Supplement      magnesium oxide (MAG-OX) 400 MG Tab tablet Take 400 mg by mouth every morning. Y  Indications: Supplement      Ascorbic Acid (VITAMIN C) 1000 MG Tab Take 3,000 mg by mouth every day. 3 tablets = 1,000 mg.      Indications: Supplement      ondansetron (ZOFRAN ODT) 4 MG TABLET DISPERSIBLE Take 1 Tablet by mouth every 6 hours as needed for Nausea/Vomiting. (Patient not taking: Reported on 1/15/2025) 18 Tablet 0     No current Epic-ordered facility-administered medications on file.       Allergies:  Benicar [olmesartan], Dilaudid  [hydromorphone hcl], Other misc, Amlodipine, Codeine, Demerol, Diazepam, Hydrocodone, Nexletol [bempedoic acid], Other drug, Oxycodone, Simvastatin, Statins [hmg-coa-r inhibitors], Valium, and Mucinex fast-max chest mary ms [guaifenesin]    Health Maintenance: Reviewed       Objective:     Vital signs reviewed  Exam:  /62 (BP Location: Right arm, Patient Position: Sitting, BP Cuff Size: Adult)   Pulse 63   Temp 36.7 °C (98.1 °F) (Temporal)   Ht 1.499 m (4' 11\")   Wt 68.6 kg (151 lb 3.8 oz)   SpO2 96%   BMI 30.55 kg/m²  Body mass index is 30.55 kg/m².    Gen: Alert and oriented, No apparent distress.  Lungs: Normal effort, CTA bilaterally, no wheezes, rhonchi, or rales  CV: Regular rate and rhythm. Left upper chest pacemaker present. No murmurs, rubs, or gallops.  Skin:   LLQ with well circumscribed stuck on lesions, yellow/tan color, back at the bra line with small scab with pink blanchable color in area of concern, no pain, discharge, warmth or redness.      Assessment & Plan:     84 y.o. female with the following -     1. Primary hypertension  Chronic stable problem.  BP today 118/62.  Continues irbesartan 150 mg daily and verapamil  mg daily.  Referral to home health.  - Referral to Home Health    2. Seborrheic keratosis  Acute uncomplicated problem.  Reassurance for the patient that the lesions in the area of " concern appear to be seborrheic keratosis.    3. Wound of skin  Acute uncomplicated problem.  Suspect the Quiring from the problems taking into the skin causing a small wound.  The area has now scabbed over.  Keep area clean and dry.    4. 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  Referral to home health.  - Referral to Home Health    5. Sick sinus syndrome (HCC)  Chronic stable problem.  Continue follow-up with cardiology continue pacemaker checks regularly.    6. History of CVA (cerebrovascular accident)  7. Primary osteoarthritis involving multiple joints  8. Primary osteoarthritis of both hands  9. Pacemaker  10. Risk for falls  Chronic exacerbated problem.  Patient requesting referral to home health.  Referral placed.  - Referral to Home Health    HCC Gap Form    Diagnosis to address: I49.5 - Sick sinus syndrome (HCC)  Assessment and plan: Chronic, stable, as based on today's assessment and impact on other conditions evaluated today. Continue with current treatment plan: has pacemaker, continue with pacemaker checks. Follow-up with specialist as directed, but at least annually.  Last edited 01/15/25 13:59 PST by ANDREW Longo.          Return in about 3 months (around 4/15/2025).    Please note that this dictation was created using voice recognition software. I have made every reasonable attempt to correct obvious errors, but I expect that there are errors of grammar and possibly content that I did not discover before finalizing the note.

## 2025-01-27 ENCOUNTER — NON-PROVIDER VISIT (OUTPATIENT)
Dept: CARDIOLOGY | Facility: MEDICAL CENTER | Age: 85
End: 2025-01-27
Payer: MEDICARE

## 2025-01-27 PROCEDURE — 93294 REM INTERROG EVL PM/LDLS PM: CPT | Performed by: INTERNAL MEDICINE

## 2025-01-27 NOTE — CARDIAC REMOTE MONITOR - SCAN
Device transmission reviewed. Device demonstrated appropriate function.       Electronically Signed by: Reji Carl M.D.    1/27/2025  4:37 PM     Doxepin Counseling:  Patient advised that the medication is sedating and not to drive a car after taking this medication. Patient informed of potential adverse effects including but not limited to dry mouth, urinary retention, and blurry vision.  The patient verbalized understanding of the proper use and possible adverse effects of doxepin.  All of the patient's questions and concerns were addressed. Klisyri Counseling:  I discussed with the patient the risks of Klisyri including but not limited to erythema, scaling, itching, weeping, crusting, and pain. Dapsone Pregnancy And Lactation Text: This medication is Pregnancy Category C and is not considered safe during pregnancy or breast feeding. Topical Ketoconazole Counseling: Patient counseled that this medication may cause skin irritation or allergic reactions.  In the event of skin irritation, the patient was advised to reduce the amount of the drug applied or use it less frequently.   The patient verbalized understanding of the proper use and possible adverse effects of ketoconazole.  All of the patient's questions and concerns were addressed. Doxycycline Pregnancy And Lactation Text: This medication is Pregnancy Category D and not consider safe during pregnancy. It is also excreted in breast milk but is considered safe for shorter treatment courses. Ilumya Pregnancy And Lactation Text: The risk during pregnancy and breastfeeding is uncertain with this medication. Propranolol Counseling:  I discussed with the patient the risks of propranolol including but not limited to low heart rate, low blood pressure, low blood sugar, restlessness and increased cold sensitivity. They should call the office if they experience any of these side effects. Skyrizi Counseling: I discussed with the patient the risks of risankizumab-rzaa including but not limited to immunosuppression, and serious infections.  The patient understands that monitoring is required including a PPD at baseline and must alert us or the primary physician if symptoms of infection or other concerning signs are noted. Opioid Counseling: I discussed with the patient the potential side effects of opioids including but not limited to addiction, altered mental status, and depression. I stressed avoiding alcohol, benzodiazepines, muscle relaxants and sleep aids unless specifically okayed by a physician. The patient verbalized understanding of the proper use and possible adverse effects of opioids. All of the patient's questions and concerns were addressed. They were instructed to flush the remaining pills down the toilet if they did not need them for pain. Dutasteride Male Counseling: Dustasteride Counseling:  I discussed with the patient the risks of use of dutasteride including but not limited to decreased libido, decreased ejaculate volume, and gynecomastia. Women who can become pregnant should not handle medication.  All of the patient's questions and concerns were addressed. Solaraze Counseling:  I discussed with the patient the risks of Solaraze including but not limited to erythema, scaling, itching, weeping, crusting, and pain. Griseofulvin Pregnancy And Lactation Text: This medication is Pregnancy Category X and is known to cause serious birth defects. It is unknown if this medication is excreted in breast milk but breast feeding should be avoided. Azathioprine Pregnancy And Lactation Text: This medication is Pregnancy Category D and isn't considered safe during pregnancy. It is unknown if this medication is excreted in breast milk. Spironolactone Pregnancy And Lactation Text: This medication can cause feminization of the male fetus and should be avoided during pregnancy. The active metabolite is also found in breast milk. Rifampin Counseling: I discussed with the patient the risks of rifampin including but not limited to liver damage, kidney damage, red-orange body fluids, nausea/vomiting and severe allergy. Wartpeel Pregnancy And Lactation Text: This medication is Pregnancy Category X and contraindicated in pregnancy and in women who may become pregnant. It is unknown if this medication is excreted in breast milk. Azithromycin Counseling:  I discussed with the patient the risks of azithromycin including but not limited to GI upset, allergic reaction, drug rash, diarrhea, and yeast infections. Solaraze Pregnancy And Lactation Text: This medication is Pregnancy Category B and is considered safe. There is some data to suggest avoiding during the third trimester. It is unknown if this medication is excreted in breast milk. Propranolol Pregnancy And Lactation Text: This medication is Pregnancy Category C and it isn't known if it is safe during pregnancy. It is excreted in breast milk. Dupixent Counseling: I discussed with the patient the risks of dupilumab including but not limited to eye infection and irritation, cold sores, injection site reactions, worsening of asthma, allergic reactions and increased risk of parasitic infection.  Live vaccines should be avoided while taking dupilumab. Dupilumab will also interact with certain medications such as warfarin and cyclosporine. The patient understands that monitoring is required and they must alert us or the primary physician if symptoms of infection or other concerning signs are noted. Benzoyl Peroxide Pregnancy And Lactation Text: This medication is Pregnancy Category C. It is unknown if benzoyl peroxide is excreted in breast milk. Rifampin Pregnancy And Lactation Text: This medication is Pregnancy Category C and it isn't know if it is safe during pregnancy. It is also excreted in breast milk and should not be used if you are breast feeding. Winlevi Counseling:  I discussed with the patient the risks of topical clascoterone including but not limited to erythema, scaling, itching, and stinging. Patient voiced their understanding. Cellcept Counseling:  I discussed with the patient the risks of mycophenolate mofetil including but not limited to infection/immunosuppression, GI upset, hypokalemia, hypercholesterolemia, bone marrow suppression, lymphoproliferative disorders, malignancy, GI ulceration/bleed/perforation, colitis, interstitial lung disease, kidney failure, progressive multifocal leukoencephalopathy, and birth defects.  The patient understands that monitoring is required including a baseline creatinine and regular CBC testing. In addition, patient must alert us immediately if symptoms of infection or other concerning signs are noted. Use Enhanced Medication Counseling?: No Prednisone Pregnancy And Lactation Text: This medication is Pregnancy Category C and it isn't know if it is safe during pregnancy. This medication is excreted in breast milk. Niacinamide Pregnancy And Lactation Text: These medications are considered safe during pregnancy. Ivermectin Pregnancy And Lactation Text: This medication is Pregnancy Category C and it isn't known if it is safe during pregnancy. It is also excreted in breast milk. Isotretinoin Pregnancy And Lactation Text: This medication is Pregnancy Category X and is considered extremely dangerous during pregnancy. It is unknown if it is excreted in breast milk. Drysol Pregnancy And Lactation Text: This medication is considered safe during pregnancy and breast feeding. Xolair Counseling:  Patient informed of potential adverse effects including but not limited to fever, muscle aches, rash and allergic reactions.  The patient verbalized understanding of the proper use and possible adverse effects of Xolair.  All of the patient's questions and concerns were addressed. Sotyktu Counseling:  I discussed the most common side effects of Sotyktu including: common cold, sore throat, sinus infections, cold sores, canker sores, folliculitis, and acne.  I also discussed more serious side effects of Sotyktu including but not limited to: serious allergic reactions; increased risk for infections such as TB; cancers such as lymphomas; rhabdomyolysis and elevated CPK; and elevated triglycerides and liver enzymes.  Erythromycin Counseling:  I discussed with the patient the risks of erythromycin including but not limited to GI upset, allergic reaction, drug rash, diarrhea, increase in liver enzymes, and yeast infections. Xolair Pregnancy And Lactation Text: This medication is Pregnancy Category B and is considered safe during pregnancy. This medication is excreted in breast milk. Topical Ketoconazole Pregnancy And Lactation Text: This medication is Pregnancy Category B and is considered safe during pregnancy. It is unknown if it is excreted in breast milk. Infliximab Counseling:  I discussed with the patient the risks of infliximab including but not limited to myelosuppression, immunosuppression, autoimmune hepatitis, demyelinating diseases, lymphoma, and serious infections.  The patient understands that monitoring is required including a PPD at baseline and must alert us or the primary physician if symptoms of infection or other concerning signs are noted. Azathioprine Counseling:  I discussed with the patient the risks of azathioprine including but not limited to myelosuppression, immunosuppression, hepatotoxicity, lymphoma, and infections.  The patient understands that monitoring is required including baseline LFTs, Creatinine, possible TPMP genotyping and weekly CBCs for the first month and then every 2 weeks thereafter.  The patient verbalized understanding of the proper use and possible adverse effects of azathioprine.  All of the patient's questions and concerns were addressed. Elidel Counseling: Patient may experience a mild burning sensation during topical application. Elidel is not approved in children less than 2 years of age. There have been case reports of hematologic and skin malignancies in patients using topical calcineurin inhibitors although causality is questionable. Klisyri Pregnancy And Lactation Text: It is unknown if this medication can harm a developing fetus or if it is excreted in breast milk. Doxepin Pregnancy And Lactation Text: This medication is Pregnancy Category C and it isn't known if it is safe during pregnancy. It is also excreted in breast milk and breast feeding isn't recommended. Itraconazole Counseling:  I discussed with the patient the risks of itraconazole including but not limited to liver damage, nausea/vomiting, neuropathy, and severe allergy.  The patient understands that this medication is best absorbed when taken with acidic beverages such as non-diet cola or ginger ale.  The patient understands that monitoring is required including baseline LFTs and repeat LFTs at intervals.  The patient understands that they are to contact us or the primary physician if concerning signs are noted. Dutasteride Female Counseling: Dutasteride Counseling:  I discussed with the patient the risks of use of dutasteride including but not limited to decreased libido and sexual dysfunction. Explained the teratogenic nature of the medication and stressed the importance of not getting pregnant during treatment. All of the patient's questions and concerns were addressed. Opioid Pregnancy And Lactation Text: These medications can lead to premature delivery and should be avoided during pregnancy. These medications are also present in breast milk in small amounts. Picato Counseling:  I discussed with the patient the risks of Picato including but not limited to erythema, scaling, itching, weeping, crusting, and pain. Gabapentin Counseling: I discussed with the patient the risks of gabapentin including but not limited to dizziness, somnolence, fatigue and ataxia. Carac Counseling:  I discussed with the patient the risks of Carac including but not limited to erythema, scaling, itching, weeping, crusting, and pain. Gabapentin Pregnancy And Lactation Text: This medication is Pregnancy Category C and isn't considered safe during pregnancy. It is excreted in breast milk. Itraconazole Pregnancy And Lactation Text: This medication is Pregnancy Category C and it isn't know if it is safe during pregnancy. It is also excreted in breast milk. Cibinqo Counseling: I discussed with the patient the risks of Cibinqo therapy including but not limited to common cold, nausea, headache, cold sores, increased blood CPK levels, dizziness, UTIs, fatigue, acne, and vomitting. Live vaccines should be avoided.  This medication has been linked to serious infections; higher rate of mortality; malignancy and lymphoproliferative disorders; major adverse cardiovascular events; thrombosis; thrombocytopenia and lymphopenia; lipid elevations; and retinal detachment. Winlevi Pregnancy And Lactation Text: This medication is considered safe during pregnancy and breastfeeding. Sarecycline Counseling: Patient advised regarding possible photosensitivity and discoloration of the teeth, skin, lips, tongue and gums.  Patient instructed to avoid sunlight, if possible.  When exposed to sunlight, patients should wear protective clothing, sunglasses, and sunscreen.  The patient was instructed to call the office immediately if the following severe adverse effects occur:  hearing changes, easy bruising/bleeding, severe headache, or vision changes.  The patient verbalized understanding of the proper use and possible adverse effects of sarecycline.  All of the patient's questions and concerns were addressed. Spevigo Counseling: I discussed with the patient the risks of Spevigo including but not limited to fatigue, nasuea, vomiting, headache, pruritus, urinary tract infection, an infusion related reactions.  The patient understands that monitoring is required including screening for tuberculosis at baseline and yearly screening thereafter while continuing Spevigo therapy. They should contact us if symptoms of infection or other concerning signs are noted. Adbry Counseling: I discussed with the patient the risks of tralokinumab including but not limited to eye infection and irritation, cold sores, injection site reactions, worsening of asthma, allergic reactions and increased risk of parasitic infection.  Live vaccines should be avoided while taking tralokinumab. The patient understands that monitoring is required and they must alert us or the primary physician if symptoms of infection or other concerning signs are noted. Minoxidil Counseling: Minoxidil is a topical medication which can increase blood flow where it is applied. It is uncertain how this medication increases hair growth. Side effects are uncommon and include stinging and allergic reactions. SSKI Counseling:  I discussed with the patient the risks of SSKI including but not limited to thyroid abnormalities, metallic taste, GI upset, fever, headache, acne, arthralgias, paraesthesias, lymphadenopathy, easy bleeding, arrhythmias, and allergic reaction. High Dose Vitamin A Counseling: Side effects reviewed, pt to contact office should one occur. Sotyktu Pregnancy And Lactation Text: There is insufficient data to evaluate whether or not Sotyktu is safe to use during pregnancy.   It is not known if Sotyktu passes into breast milk and whether or not it is safe to use when breastfeeding.   Nsaids Counseling: NSAID Counseling: I discussed with the patient that NSAIDs should be taken with food. Prolonged use of NSAIDs can result in the development of stomach ulcers.  Patient advised to stop taking NSAIDs if abdominal pain occurs.  The patient verbalized understanding of the proper use and possible adverse effects of NSAIDs.  All of the patient's questions and concerns were addressed. Soolantra Counseling: I discussed with the patients the risks of topial Soolantra. This is a medicine which decreases the number of mites and inflammation in the skin. You experience burning, stinging, eye irritation or allergic reactions.  Please call our office if you develop any problems from using this medication. Azithromycin Pregnancy And Lactation Text: This medication is considered safe during pregnancy and is also secreted in breast milk. Dupixent Pregnancy And Lactation Text: This medication likely crosses the placenta but the risk for the fetus is uncertain. This medication is excreted in breast milk. Xeljanz Counseling: I discussed with the patient the risks of Xeljanz therapy including increased risk of infection, liver issues, headache, diarrhea, or cold symptoms. Live vaccines should be avoided. They were instructed to call if they have any problems. Nsaids Pregnancy And Lactation Text: These medications are considered safe up to 30 weeks gestation. It is excreted in breast milk. Elidel Pregnancy And Lactation Text: This medication is Pregnancy Category C. It is unknown if this medication is excreted in breast milk. Arava Counseling:  Patient counseled regarding adverse effects of Arava including but not limited to nausea, vomiting, abnormalities in liver function tests. Patients may develop mouth sores, rash, diarrhea, and abnormalities in blood counts. The patient understands that monitoring is required including LFTs and blood counts.  There is a rare possibility of scarring of the liver and lung problems that can occur when taking methotrexate. Persistent nausea, loss of appetite, pale stools, dark urine, cough, and shortness of breath should be reported immediately. Patient advised to discontinue Arava treatment and consult with a physician prior to attempting conception. The patient will have to undergo a treatment to eliminate Arava from the body prior to conception. Soolantra Pregnancy And Lactation Text: This medication is Pregnancy Category C. This medication is considered safe during breast feeding. Enbrel Counseling:  I discussed with the patient the risks of etanercept including but not limited to myelosuppression, immunosuppression, autoimmune hepatitis, demyelinating diseases, lymphoma, and infections.  The patient understands that monitoring is required including a PPD at baseline and must alert us or the primary physician if symptoms of infection or other concerning signs are noted. Bactrim Counseling:  I discussed with the patient the risks of sulfa antibiotics including but not limited to GI upset, allergic reaction, drug rash, diarrhea, dizziness, photosensitivity, and yeast infections.  Rarely, more serious reactions can occur including but not limited to aplastic anemia, agranulocytosis, methemoglobinemia, blood dyscrasias, liver or kidney failure, lung infiltrates or desquamative/blistering drug rashes. High Dose Vitamin A Pregnancy And Lactation Text: High dose vitamin A therapy is contraindicated during pregnancy and breast feeding. Infliximab Pregnancy And Lactation Text: This medication is Pregnancy Category B and is considered safe during pregnancy. It is unknown if this medication is excreted in breast milk. Dutasteride Pregnancy And Lactation Text: This medication is absolutely contraindicated in women, especially during pregnancy and breast feeding. Feminization of male fetuses is possible if taking while pregnant. Erythromycin Pregnancy And Lactation Text: This medication is Pregnancy Category B and is considered safe during pregnancy. It is also excreted in breast milk. Topical Metronidazole Counseling: Metronidazole is a topical antibiotic medication. You may experience burning, stinging, redness, or allergic reactions.  Please call our office if you develop any problems from using this medication. Finasteride Male Counseling: Finasteride Counseling:  I discussed with the patient the risks of use of finasteride including but not limited to decreased libido, decreased ejaculate volume, gynecomastia, and depression. Women should not handle medication.  All of the patient's questions and concerns were addressed. Protopic Counseling: Patient may experience a mild burning sensation during topical application. Protopic is not approved in children less than 2 years of age. There have been case reports of hematologic and skin malignancies in patients using topical calcineurin inhibitors although causality is questionable. Minoxidil Pregnancy And Lactation Text: This medication has not been assigned a Pregnancy Risk Category but animal studies failed to show danger with the topical medication. It is unknown if the medication is excreted in breast milk. Adbry Pregnancy And Lactation Text: It is unknown if this medication will adversely affect pregnancy or breast feeding. Metronidazole Counseling:  I discussed with the patient the risks of metronidazole including but not limited to seizures, nausea/vomiting, a metallic taste in the mouth, nausea/vomiting and severe allergy. Topical Metronidazole Pregnancy And Lactation Text: This medication is Pregnancy Category B and considered safe during pregnancy.  It is also considered safe to use while breastfeeding. Rituxan Counseling:  I discussed with the patient the risks of Rituxan infusions. Side effects can include infusion reactions, severe drug rashes including mucocutaneous reactions, reactivation of latent hepatitis and other infections and rarely progressive multifocal leukoencephalopathy.  All of the patient's questions and concerns were addressed. VTAMA Counseling: I discussed with the patient that VTAMA is not for use in the eyes, mouth or mouth. They should call the office if they develop any signs of allergic reactions to VTAMA. The patient verbalized understanding of the proper use and possible adverse effects of VTAMA.  All of the patient's questions and concerns were addressed. Erivedge Counseling- I discussed with the patient the risks of Erivedge including but not limited to nausea, vomiting, diarrhea, constipation, weight loss, changes in the sense of taste, decreased appetite, muscle spasms, and hair loss.  The patient verbalized understanding of the proper use and possible adverse effects of Erivedge.  All of the patient's questions and concerns were addressed. Sarecycline Pregnancy And Lactation Text: This medication is Pregnancy Category D and not consider safe during pregnancy. It is also excreted in breast milk. Spevigo Pregnancy And Lactation Text: The risk during pregnancy and breastfeeding is uncertain with this medication. This medication does cross the placenta. It is unknown if this medication is found in breast milk. Cyclophosphamide Counseling:  I discussed with the patient the risks of cyclophosphamide including but not limited to hair loss, hormonal abnormalities, decreased fertility, abdominal pain, diarrhea, nausea and vomiting, bone marrow suppression and infection. The patient understands that monitoring is required while taking this medication. Glycopyrrolate Counseling:  I discussed with the patient the risks of glycopyrrolate including but not limited to skin rash, drowsiness, dry mouth, difficulty urinating, and blurred vision. Cibinqo Pregnancy And Lactation Text: It is unknown if this medication will adversely affect pregnancy or breast feeding.  You should not take this medication if you are currently pregnant or planning a pregnancy or while breastfeeding. Ketoconazole Counseling:   Patient counseled regarding improving absorption with orange juice.  Adverse effects include but are not limited to breast enlargement, headache, diarrhea, nausea, upset stomach, liver function test abnormalities, taste disturbance, and stomach pain.  There is a rare possibility of liver failure that can occur when taking ketoconazole. The patient understands that monitoring of LFTs may be required, especially at baseline. The patient verbalized understanding of the proper use and possible adverse effects of ketoconazole.  All of the patient's questions and concerns were addressed. Sski Pregnancy And Lactation Text: This medication is Pregnancy Category D and isn't considered safe during pregnancy. It is excreted in breast milk. Bactrim Pregnancy And Lactation Text: This medication is Pregnancy Category D and is known to cause fetal risk.  It is also excreted in breast milk. Xelkarinz Pregnancy And Lactation Text: This medication is Pregnancy Category D and is not considered safe during pregnancy.  The risk during breast feeding is also uncertain. Topical Retinoid counseling:  Patient advised to apply a pea-sized amount only at bedtime and wait 30 minutes after washing their face before applying.  If too drying, patient may add a non-comedogenic moisturizer. The patient verbalized understanding of the proper use and possible adverse effects of retinoids.  All of the patient's questions and concerns were addressed. Tetracycline Counseling: Patient counseled regarding possible photosensitivity and increased risk for sunburn.  Patient instructed to avoid sunlight, if possible.  When exposed to sunlight, patients should wear protective clothing, sunglasses, and sunscreen.  The patient was instructed to call the office immediately if the following severe adverse effects occur:  hearing changes, easy bruising/bleeding, severe headache, or vision changes.  The patient verbalized understanding of the proper use and possible adverse effects of tetracycline.  All of the patient's questions and concerns were addressed. Patient understands to avoid pregnancy while on therapy due to potential birth defects. Calcipotriene Counseling:  I discussed with the patient the risks of calcipotriene including but not limited to erythema, scaling, itching, and irritation. Vtama Pregnancy And Lactation Text: It is unknown if this medication can cause problems during pregnancy and breastfeeding. Olanzapine Counseling- I discussed with the patient the common side effects of olanzapine including but are not limited to: lack of energy, dry mouth, increased appetite, sleepiness, tremor, constipation, dizziness, changes in behavior, or restlessness.  Explained that teenagers are more likely to experience headaches, abdominal pain, pain in the arms or legs, tiredness, and sleepiness.  Serious side effects include but are not limited: increased risk of death in elderly patients who are confused, have memory loss, or dementia-related psychosis; hyperglycemia; increased cholesterol and triglycerides; and weight gain. Eucrisa Counseling: Patient may experience a mild burning sensation during topical application. Eucrisa is not approved in children less than 3 months of age. Arava Pregnancy And Lactation Text: This medication is Pregnancy Category X and is absolutely contraindicated during pregnancy. It is unknown if it is excreted in breast milk. Clofazimine Counseling:  I discussed with the patient the risks of clofazimine including but not limited to skin and eye pigmentation, liver damage, nausea/vomiting, gastrointestinal bleeding and allergy. Mirvaso Counseling: Mirvaso is a topical medication which can decrease superficial blood flow where applied. Side effects are uncommon and include stinging, redness and allergic reactions. Topical Steroids Counseling: I discussed with the patient that prolonged use of topical steroids can result in the increased appearance of superficial blood vessels (telangiectasias), lightening (hypopigmentation) and thinning of the skin (atrophy).  Patient understands to avoid using high potency steroids in skin folds, the groin or the face.  The patient verbalized understanding of the proper use and possible adverse effects of topical steroids.  All of the patient's questions and concerns were addressed. Metronidazole Pregnancy And Lactation Text: This medication is Pregnancy Category B and considered safe during pregnancy.  It is also excreted in breast milk. Hydroxyzine Counseling: Patient advised that the medication is sedating and not to drive a car after taking this medication.  Patient informed of potential adverse effects including but not limited to dry mouth, urinary retention, and blurry vision.  The patient verbalized understanding of the proper use and possible adverse effects of hydroxyzine.  All of the patient's questions and concerns were addressed. Rituxan Pregnancy And Lactation Text: This medication is Pregnancy Category C and it isn't know if it is safe during pregnancy. It is unknown if this medication is excreted in breast milk but similar antibodies are known to be excreted. Stelara Counseling:  I discussed with the patient the risks of ustekinumab including but not limited to immunosuppression, malignancy, posterior leukoencephalopathy syndrome, and serious infections.  The patient understands that monitoring is required including a PPD at baseline and must alert us or the primary physician if symptoms of infection or other concerning signs are noted. Thalidomide Counseling: I discussed with the patient the risks of thalidomide including but not limited to birth defects, anxiety, weakness, chest pain, dizziness, cough and severe allergy. Ketoconazole Pregnancy And Lactation Text: This medication is Pregnancy Category C and it isn't know if it is safe during pregnancy. It is also excreted in breast milk and breast feeding isn't recommended. Finasteride Female Counseling: Finasteride Counseling:  I discussed with the patient the risks of use of finasteride including but not limited to decreased libido and sexual dysfunction. Explained the teratogenic nature of the medication and stressed the importance of not getting pregnant during treatment. All of the patient's questions and concerns were addressed. Cyclophosphamide Pregnancy And Lactation Text: This medication is Pregnancy Category D and it isn't considered safe during pregnancy. This medication is excreted in breast milk. Protopic Pregnancy And Lactation Text: This medication is Pregnancy Category C. It is unknown if this medication is excreted in breast milk when applied topically. Glycopyrrolate Pregnancy And Lactation Text: This medication is Pregnancy Category B and is considered safe during pregnancy. It is unknown if it is excreted breast milk. Bimzelx Counseling:  I discussed with the patient the risks of Bimzelx including but not limited to depression, immunosuppression, allergic reactions and infections.  The patient understands that monitoring is required including a PPD at baseline and must alert us or the primary physician if symptoms of infection or other concerning signs are noted. Litfulo Counseling: I discussed with the patient the risks of Litfulo therapy including but not limited to upper respiratory tract infections, shingles, cold sores, and nausea. Live vaccines should be avoided.  This medication has been linked to serious infections; higher rate of mortality; malignancy and lymphoproliferative disorders; major adverse cardiovascular events; thrombosis; gastrointestinal perforations; neutropenia; lymphopenia; anemia; liver enzyme elevations; and lipid elevations. Humira Counseling:  I discussed with the patient the risks of adalimumab including but not limited to myelosuppression, immunosuppression, autoimmune hepatitis, demyelinating diseases, lymphoma, and serious infections.  The patient understands that monitoring is required including a PPD at baseline and must alert us or the primary physician if symptoms of infection or other concerning signs are noted. Calcipotriene Pregnancy And Lactation Text: The use of this medication during pregnancy or lactation is not recommended as there is insufficient data. Zoryve Counseling:  I discussed with the patient that Zoryve is not for use in the eyes, mouth or vagina. The most commonly reported side effects include diarrhea, headache, insomnia, application site pain, upper respiratory tract infections, and urinary tract infections.  All of the patient's questions and concerns were addressed. Libtayo Counseling- I discussed with the patient the risks of Libtayo including but not limited to nausea, vomiting, diarrhea, and bone or muscle pain.  The patient verbalized understanding of the proper use and possible adverse effects of Libtayo.  All of the patient's questions and concerns were addressed. Qbrexza Counseling:  I discussed with the patient the risks of Qbrexza including but not limited to headache, mydriasis, blurred vision, dry eyes, nasal dryness, dry mouth, dry throat, dry skin, urinary hesitation, and constipation.  Local skin reactions including erythema, burning, stinging, and itching can also occur. Cyclosporine Counseling:  I discussed with the patient the risks of cyclosporine including but not limited to hypertension, gingival hyperplasia,myelosuppression, immunosuppression, liver damage, kidney damage, neurotoxicity, lymphoma, and serious infections. The patient understands that monitoring is required including baseline blood pressure, CBC, CMP, lipid panel and uric acid, and then 1-2 times monthly CMP and blood pressure. Olanzapine Pregnancy And Lactation Text: This medication is pregnancy category C.   There are no adequate and well controlled trials with olanzapine in pregnant females.  Olanzapine should be used during pregnancy only if the potential benefit justifies the potential risk to the fetus.   In a study in lactating healthy women, olanzapine was excreted in breast milk.  It is recommended that women taking olanzapine should not breast feed. Cephalexin Counseling: I counseled the patient regarding use of cephalexin as an antibiotic for prophylactic and/or therapeutic purposes. Cephalexin (commonly prescribed under brand name Keflex) is a cephalosporin antibiotic which is active against numerous classes of bacteria, including most skin bacteria. Side effects may include nausea, diarrhea, gastrointestinal upset, rash, hives, yeast infections, and in rare cases, hepatitis, kidney disease, seizures, fever, confusion, neurologic symptoms, and others. Patients with severe allergies to penicillin medications are cautioned that there is about a 10% incidence of cross-reactivity with cephalosporins. When possible, patients with penicillin allergies should use alternatives to cephalosporins for antibiotic therapy. Minocycline Counseling: Patient advised regarding possible photosensitivity and discoloration of the teeth, skin, lips, tongue and gums.  Patient instructed to avoid sunlight, if possible.  When exposed to sunlight, patients should wear protective clothing, sunglasses, and sunscreen.  The patient was instructed to call the office immediately if the following severe adverse effects occur:  hearing changes, easy bruising/bleeding, severe headache, or vision changes.  The patient verbalized understanding of the proper use and possible adverse effects of minocycline.  All of the patient's questions and concerns were addressed. Siliq Counseling:  I discussed with the patient the risks of Siliq including but not limited to new or worsening depression, suicidal thoughts and behavior, immunosuppression, malignancy, posterior leukoencephalopathy syndrome, and serious infections.  The patient understands that monitoring is required including a PPD at baseline and must alert us or the primary physician if symptoms of infection or other concerning signs are noted. There is also a special program designed to monitor depression which is required with Siliq. Hydroquinone Counseling:  Patient advised that medication may result in skin irritation, lightening (hypopigmentation), dryness, and burning.  In the event of skin irritation, the patient was advised to reduce the amount of the drug applied or use it less frequently.  Rarely, spots that are treated with hydroquinone can become darker (pseudoochronosis).  Should this occur, patient instructed to stop medication and call the office. The patient verbalized understanding of the proper use and possible adverse effects of hydroquinone.  All of the patient's questions and concerns were addressed. Tazorac Counseling:  Patient advised that medication is irritating and drying.  Patient may need to apply sparingly and wash off after an hour before eventually leaving it on overnight.  The patient verbalized understanding of the proper use and possible adverse effects of tazorac.  All of the patient's questions and concerns were addressed. Topical Steroids Applications Pregnancy And Lactation Text: Most topical steroids are considered safe to use during pregnancy and lactation.  Any topical steroid applied to the breast or nipple should be washed off before breastfeeding. Litfulo Pregnancy And Lactation Text: Based on animal studies, Lifulo may cause embryo-fetal harm when administered to pregnant women.  The medication should not be used in pregnancy.  Breastfeeding is not recommended during treatment. Acitretin Counseling:  I discussed with the patient the risks of acitretin including but not limited to hair loss, dry lips/skin/eyes, liver damage, hyperlipidemia, depression/suicidal ideation, photosensitivity.  Serious rare side effects can include but are not limited to pancreatitis, pseudotumor cerebri, bony changes, clot formation/stroke/heart attack.  Patient understands that alcohol is contraindicated since it can result in liver toxicity and significantly prolong the elimination of the drug by many years. Terbinafine Counseling: Patient counseling regarding adverse effects of terbinafine including but not limited to headache, diarrhea, rash, upset stomach, liver function test abnormalities, itching, taste/smell disturbance, nausea, abdominal pain, and flatulence.  There is a rare possibility of liver failure that can occur when taking terbinafine.  The patient understands that a baseline LFT and kidney function test may be required. The patient verbalized understanding of the proper use and possible adverse effects of terbinafine.  All of the patient's questions and concerns were addressed. Finasteride Pregnancy And Lactation Text: This medication is absolutely contraindicated during pregnancy. It is unknown if it is excreted in breast milk. Bimzelx Pregnancy And Lactation Text: This medication crosses the placenta and the safety is uncertain during pregnancy. It is unknown if this medication is present in breast milk. Hydroxychloroquine Counseling:  I discussed with the patient that a baseline ophthalmologic exam is needed at the start of therapy and every year thereafter while on therapy. A CBC may also be warranted for monitoring.  The side effects of this medication were discussed with the patient, including but not limited to agranulocytosis, aplastic anemia, seizures, rashes, retinopathy, and liver toxicity. Patient instructed to call the office should any adverse effect occur.  The patient verbalized understanding of the proper use and possible adverse effects of Plaquenil.  All the patient's questions and concerns were addressed. Mirvaso Pregnancy And Lactation Text: This medication has not been assigned a Pregnancy Risk Category. It is unknown if the medication is excreted in breast milk. Aklief counseling:  Patient advised to apply a pea-sized amount only at bedtime and wait 30 minutes after washing their face before applying.  If too drying, patient may add a non-comedogenic moisturizer.  The most commonly reported side effects including irritation, redness, scaling, dryness, stinging, burning, itching, and increased risk of sunburn.  The patient verbalized understanding of the proper use and possible adverse effects of retinoids.  All of the patient's questions and concerns were addressed. Birth Control Pills Counseling: Birth Control Pill Counseling: I discussed with the patient the potential side effects of OCPs including but not limited to increased risk of stroke, heart attack, thrombophlebitis, deep venous thrombosis, hepatic adenomas, breast changes, GI upset, headaches, and depression.  The patient verbalized understanding of the proper use and possible adverse effects of OCPs. All of the patient's questions and concerns were addressed. Detail Level: Simple Hydroxychloroquine Pregnancy And Lactation Text: This medication has been shown to cause fetal harm but it isn't assigned a Pregnancy Risk Category. There are small amounts excreted in breast milk. Terbinafine Pregnancy And Lactation Text: This medication is Pregnancy Category B and is considered safe during pregnancy. It is also excreted in breast milk and breast feeding isn't recommended. Olumiant Counseling: I discussed with the patient the risks of Olumiant therapy including but not limited to upper respiratory tract infections, shingles, cold sores, and nausea. Live vaccines should be avoided.  This medication has been linked to serious infections; higher rate of mortality; malignancy and lymphoproliferative disorders; major adverse cardiovascular events; thrombosis; gastrointestinal perforations; neutropenia; lymphopenia; anemia; liver enzyme elevations; and lipid elevations. Taltz Counseling: I discussed with the patient the risks of ixekizumab including but not limited to immunosuppression, serious infections, worsening of inflammatory bowel disease and drug reactions.  The patient understands that monitoring is required including a PPD at baseline and must alert us or the primary physician if symptoms of infection or other concerning signs are noted. Cimzia Counseling:  I discussed with the patient the risks of Cimzia including but not limited to immunosuppression, allergic reactions and infections.  The patient understands that monitoring is required including a PPD at baseline and must alert us or the primary physician if symptoms of infection or other concerning signs are noted. Opzelura Counseling:  I discussed with the patient the risks of Opzelura including but not limited to nasopharngitis, bronchitis, ear infection, eosinophila, hives, diarrhea, folliculitis, tonsillitis, and rhinorrhea.  Taken orally, this medication has been linked to serious infections; higher rate of mortality; malignancy and lymphoproliferative disorders; major adverse cardiovascular events; thrombosis; thrombocytopenia, anemia, and neutropenia; and lipid elevations. Aklief Pregnancy And Lactation Text: It is unknown if this medication is safe to use during pregnancy.  It is unknown if this medication is excreted in breast milk.  Breastfeeding women should use the topical cream on the smallest area of the skin for the shortest time needed while breastfeeding.  Do not apply to nipple and areola. Libtayo Pregnancy And Lactation Text: This medication is contraindicated in pregnancy and when breast feeding. Acitretin Pregnancy And Lactation Text: This medication is Pregnancy Category X and should not be given to women who are pregnant or may become pregnant in the future. This medication is excreted in breast milk. Hydroxyzine Pregnancy And Lactation Text: This medication is not safe during pregnancy and should not be taken. It is also excreted in breast milk and breast feeding isn't recommended. Oral Minoxidil Counseling- I discussed with the patient the risks of oral minoxidil including but not limited to shortness of breath, swelling of the feet or ankles, dizziness, lightheadedness, unwanted hair growth and allergic reaction.  The patient verbalized understanding of the proper use and possible adverse effects of oral minoxidil.  All of the patient's questions and concerns were addressed. Cantharidin Counseling:  I discussed with the patient the risks of Cantharidin including but not limited to pain, redness, burning, itching, and blistering. Cephalexin Pregnancy And Lactation Text: This medication is Pregnancy Category B and considered safe during pregnancy.  It is also excreted in breast milk but can be used safely for shorter doses. Tranexamic Acid Counseling:  Patient advised of the small risk of bleeding problems with tranexamic acid. They were also instructed to call if they developed any nausea, vomiting or diarrhea. All of the patient's questions and concerns were addressed. Oral Minoxidil Pregnancy And Lactation Text: This medication should only be used when clearly needed if you are pregnant, attempting to become pregnant or breast feeding. 5-Fu Counseling: 5-Fluorouracil Counseling:  I discussed with the patient the risks of 5-fluorouracil including but not limited to erythema, scaling, itching, weeping, crusting, and pain. Tazorac Pregnancy And Lactation Text: This medication is not safe during pregnancy. It is unknown if this medication is excreted in breast milk. Colchicine Counseling:  Patient counseled regarding adverse effects including but not limited to stomach upset (nausea, vomiting, stomach pain, or diarrhea).  Patient instructed to limit alcohol consumption while taking this medication.  Colchicine may reduce blood counts especially with prolonged use.  The patient understands that monitoring of kidney function and blood counts may be required, especially at baseline. The patient verbalized understanding of the proper use and possible adverse effects of colchicine.  All of the patient's questions and concerns were addressed. Hyrimoz Counseling:  I discussed with the patient the risks of adalimumab including but not limited to myelosuppression, immunosuppression, autoimmune hepatitis, demyelinating diseases, lymphoma, and serious infections.  The patient understands that monitoring is required including a PPD at baseline and must alert us or the primary physician if symptoms of infection or other concerning signs are noted. Otezla Pregnancy And Lactation Text: This medication is Pregnancy Category C and it isn't known if it is safe during pregnancy. It is unknown if it is excreted in breast milk. Cantharidin Pregnancy And Lactation Text: This medication has not been proven safe during pregnancy. It is unknown if this medication is excreted in breast milk. Qbrexza Pregnancy And Lactation Text: There is no available data on Qbrexza use in pregnant women.  There is no available data on Qbrexza use in lactation. Fluconazole Counseling:  Patient counseled regarding adverse effects of fluconazole including but not limited to headache, diarrhea, nausea, upset stomach, liver function test abnormalities, taste disturbance, and stomach pain.  There is a rare possibility of liver failure that can occur when taking fluconazole.  The patient understands that monitoring of LFTs and kidney function test may be required, especially at baseline. The patient verbalized understanding of the proper use and possible adverse effects of fluconazole.  All of the patient's questions and concerns were addressed. Topical Sulfur Applications Counseling: Topical Sulfur Counseling: Patient counseled that this medication may cause skin irritation or allergic reactions.  In the event of skin irritation, the patient was advised to reduce the amount of the drug applied or use it less frequently.   The patient verbalized understanding of the proper use and possible adverse effects of topical sulfur application.  All of the patient's questions and concerns were addressed. Azelaic Acid Counseling: Patient counseled that medicine may cause skin irritation and to avoid applying near the eyes.  In the event of skin irritation, the patient was advised to reduce the amount of the drug applied or use it less frequently.   The patient verbalized understanding of the proper use and possible adverse effects of azelaic acid.  All of the patient's questions and concerns were addressed. Rhofade Counseling: Rhofade is a topical medication which can decrease superficial blood flow where applied. Side effects are uncommon and include stinging, redness and allergic reactions. Cimzia Pregnancy And Lactation Text: This medication crosses the placenta but can be considered safe in certain situations. Cimzia may be excreted in breast milk. Bexarotene Counseling:  I discussed with the patient the risks of bexarotene including but not limited to hair loss, dry lips/skin/eyes, liver abnormalities, hyperlipidemia, pancreatitis, depression/suicidal ideation, photosensitivity, drug rash/allergic reactions, hypothyroidism, anemia, leukopenia, infection, cataracts, and teratogenicity.  Patient understands that they will need regular blood tests to check lipid profile, liver function tests, white blood cell count, thyroid function tests and pregnancy test if applicable. Opzelura Pregnancy And Lactation Text: There is insufficient data to evaluate drug-associated risk for major birth defects, miscarriage, or other adverse maternal or fetal outcomes.  There is a pregnancy registry that monitors pregnancy outcomes in pregnant persons exposed to the medication during pregnancy.  It is unknown if this medication is excreted in breast milk.  Do not breastfeed during treatment and for about 4 weeks after the last dose. Albendazole Counseling:  I discussed with the patient the risks of albendazole including but not limited to cytopenia, kidney damage, nausea/vomiting and severe allergy.  The patient understands that this medication is being used in an off-label manner. Topical Sulfur Applications Pregnancy And Lactation Text: This medication is considered safe during pregnancy and breast feeding secondary to limited systemic absorption. Quinolones Counseling:  I discussed with the patient the risks of fluoroquinolones including but not limited to GI upset, allergic reaction, drug rash, diarrhea, dizziness, photosensitivity, yeast infections, liver function test abnormalities, tendonitis/tendon rupture. Oxybutynin Counseling:  I discussed with the patient the risks of oxybutynin including but not limited to skin rash, drowsiness, dry mouth, difficulty urinating, and blurred vision. Simponi Counseling:  I discussed with the patient the risks of golimumab including but not limited to myelosuppression, immunosuppression, autoimmune hepatitis, demyelinating diseases, lymphoma, and serious infections.  The patient understands that monitoring is required including a PPD at baseline and must alert us or the primary physician if symptoms of infection or other concerning signs are noted. Zyclara Counseling:  I discussed with the patient the risks of imiquimod including but not limited to erythema, scaling, itching, weeping, crusting, and pain.  Patient understands that the inflammatory response to imiquimod is variable from person to person and was educated regarded proper titration schedule.  If flu-like symptoms develop, patient knows to discontinue the medication and contact us. Odomzo Counseling- I discussed with the patient the risks of Odomzo including but not limited to nausea, vomiting, diarrhea, constipation, weight loss, changes in the sense of taste, decreased appetite, muscle spasms, and hair loss.  The patient verbalized understanding of the proper use and possible adverse effects of Odomzo.  All of the patient's questions and concerns were addressed. Tranexamic Acid Pregnancy And Lactation Text: It is unknown if this medication is safe during pregnancy or breast feeding. Clindamycin Counseling: I counseled the patient regarding use of clindamycin as an antibiotic for prophylactic and/or therapeutic purposes. Clindamycin is active against numerous classes of bacteria, including skin bacteria. Side effects may include nausea, diarrhea, gastrointestinal upset, rash, hives, yeast infections, and in rare cases, colitis. Birth Control Pills Pregnancy And Lactation Text: This medication should be avoided if pregnant and for the first 30 days post-partum. Low Dose Naltrexone Counseling- I discussed with the patient the potential risks and side effects of low dose naltrexone including but not limited to: more vivid dreams, headaches, nausea, vomiting, abdominal pain, fatigue, dizziness, and anxiety. Methotrexate Counseling:  Patient counseled regarding adverse effects of methotrexate including but not limited to nausea, vomiting, abnormalities in liver function tests. Patients may develop mouth sores, rash, diarrhea, and abnormalities in blood counts. The patient understands that monitoring is required including LFT's and blood counts.  There is a rare possibility of scarring of the liver and lung problems that can occur when taking methotrexate. Persistent nausea, loss of appetite, pale stools, dark urine, cough, and shortness of breath should be reported immediately. Patient advised to discontinue methotrexate treatment at least three months before attempting to become pregnant.  I discussed the need for folate supplements while taking methotrexate.  These supplements can decrease side effects during methotrexate treatment. The patient verbalized understanding of the proper use and possible adverse effects of methotrexate.  All of the patient's questions and concerns were addressed. Olumiant Pregnancy And Lactation Text: Based on animal studies, Olumiant may cause embryo-fetal harm when administered to pregnant women.  The medication should not be used in pregnancy.  Breastfeeding is not recommended during treatment. Clindamycin Pregnancy And Lactation Text: This medication can be used in pregnancy if certain situations. Clindamycin is also present in breast milk. Tremfya Counseling: I discussed with the patient the risks of guselkumab including but not limited to immunosuppression, serious infections, and drug reactions.  The patient understands that monitoring is required including a PPD at baseline and must alert us or the primary physician if symptoms of infection or other concerning signs are noted. Topical Clindamycin Counseling: Patient counseled that this medication may cause skin irritation or allergic reactions.  In the event of skin irritation, the patient was advised to reduce the amount of the drug applied or use it less frequently.   The patient verbalized understanding of the proper use and possible adverse effects of clindamycin.  All of the patient's questions and concerns were addressed. Valtrex Counseling: I discussed with the patient the risks of valacyclovir including but not limited to kidney damage, nausea, vomiting and severe allergy.  The patient understands that if the infection seems to be worsening or is not improving, they are to call. Methotrexate Pregnancy And Lactation Text: This medication is Pregnancy Category X and is known to cause fetal harm. This medication is excreted in breast milk. Cimetidine Counseling:  I discussed with the patient the risks of Cimetidine including but not limited to gynecomastia, headache, diarrhea, nausea, drowsiness, arrhythmias, pancreatitis, skin rashes, psychosis, bone marrow suppression and kidney toxicity. Otezla Counseling: The side effects of Otezla were discussed with the patient, including but not limited to worsening or new depression, weight loss, diarrhea, nausea, upper respiratory tract infection, and headache. Patient instructed to call the office should any adverse effect occur.  The patient verbalized understanding of the proper use and possible adverse effects of Otezla.  All the patient's questions and concerns were addressed. Imiquimod Counseling:  I discussed with the patient the risks of imiquimod including but not limited to erythema, scaling, itching, weeping, crusting, and pain.  Patient understands that the inflammatory response to imiquimod is variable from person to person and was educated regarded proper titration schedule.  If flu-like symptoms develop, patient knows to discontinue the medication and contact us. Griseofulvin Counseling:  I discussed with the patient the risks of griseofulvin including but not limited to photosensitivity, cytopenia, liver damage, nausea/vomiting and severe allergy.  The patient understands that this medication is best absorbed when taken with a fatty meal (e.g., ice cream or french fries). Dapsone Counseling: I discussed with the patient the risks of dapsone including but not limited to hemolytic anemia, agranulocytosis, rashes, methemoglobinemia, kidney failure, peripheral neuropathy, headaches, GI upset, and liver toxicity.  Patients who start dapsone require monitoring including baseline LFTs and weekly CBCs for the first month, then every month thereafter.  The patient verbalized understanding of the proper use and possible adverse effects of dapsone.  All of the patient's questions and concerns were addressed. Wartpeel Counseling:  I discussed with the patient the risks of Wartpeel including but not limited to erythema, scaling, itching, weeping, crusting, and pain. Bexarotene Pregnancy And Lactation Text: This medication is Pregnancy Category X and should not be given to women who are pregnant or may become pregnant. This medication should not be used if you are breast feeding. Rinvoq Counseling: I discussed with the patient the risks of Rinvoq therapy including but not limited to upper respiratory tract infections, shingles, cold sores, bronchitis, nausea, cough, fever, acne, and headache. Live vaccines should be avoided.  This medication has been linked to serious infections; higher rate of mortality; malignancy and lymphoproliferative disorders; major adverse cardiovascular events; thrombosis; thrombocytopenia, anemia, and neutropenia; lipid elevations; liver enzyme elevations; and gastrointestinal perforations. Spironolactone Counseling: Patient advised regarding risks of diarrhea, abdominal pain, hyperkalemia, birth defects (for female patients), liver toxicity and renal toxicity. The patient may need blood work to monitor liver and kidney function and potassium levels while on therapy. The patient verbalized understanding of the proper use and possible adverse effects of spironolactone.  All of the patient's questions and concerns were addressed. Low Dose Naltrexone Pregnancy And Lactation Text: Naltrexone is pregnancy category C.  There have been no adequate and well-controlled studies in pregnant women.  It should be used in pregnancy only if the potential benefit justifies the potential risk to the fetus.   Limited data indicates that naltrexone is minimally excreted into breastmilk. Cosentyx Counseling:  I discussed with the patient the risks of Cosentyx including but not limited to worsening of Crohn's disease, immunosuppression, allergic reactions and infections.  The patient understands that monitoring is required including a PPD at baseline and must alert us or the primary physician if symptoms of infection or other concerning signs are noted. Drysol Counseling:  I discussed with the patient the risks of drysol/aluminum chloride including but not limited to skin rash, itching, irritation, burning. Niacinamide Counseling: I recommended taking niacin or niacinamide, also know as vitamin B3, twice daily. Recent evidence suggests that taking vitamin B3 (500 mg twice daily) can reduce the risk of actinic keratoses and non-melanoma skin cancers. Side effects of vitamin B3 include flushing and headache. Prednisone Counseling:  I discussed with the patient the risks of prolonged use of prednisone including but not limited to weight gain, insomnia, osteoporosis, mood changes, diabetes, susceptibility to infection, glaucoma and high blood pressure.  In cases where prednisone use is prolonged, patients should be monitored with blood pressure checks, serum glucose levels and an eye exam.  Additionally, the patient may need to be placed on GI prophylaxis, PCP prophylaxis, and calcium and vitamin D supplementation and/or a bisphosphonate.  The patient verbalized understanding of the proper use and the possible adverse effects of prednisone.  All of the patient's questions and concerns were addressed. Rinvoq Pregnancy And Lactation Text: Based on animal studies, Rinvoq may cause embryo-fetal harm when administered to pregnant women.  The medication should not be used in pregnancy.  Breastfeeding is not recommended during treatment and for 6 days after the last dose. Benzoyl Peroxide Counseling: Patient counseled that medicine may cause skin irritation and bleach clothing.  In the event of skin irritation, the patient was advised to reduce the amount of the drug applied or use it less frequently.   The patient verbalized understanding of the proper use and possible adverse effects of benzoyl peroxide.  All of the patient's questions and concerns were addressed. Ivermectin Counseling:  Patient instructed to take medication on an empty stomach with a full glass of water.  Patient informed of potential adverse effects including but not limited to nausea, diarrhea, dizziness, itching, and swelling of the extremities or lymph nodes.  The patient verbalized understanding of the proper use and possible adverse effects of ivermectin.  All of the patient's questions and concerns were addressed. Isotretinoin Counseling: Patient should get monthly blood tests, not donate blood, not drive at night if vision affected, not share medication, and not undergo elective surgery for 6 months after tx completed. Side effects reviewed, pt to contact office should one occur. Ilumya Counseling: I discussed with the patient the risks of tildrakizumab including but not limited to immunosuppression, malignancy, posterior leukoencephalopathy syndrome, and serious infections.  The patient understands that monitoring is required including a PPD at baseline and must alert us or the primary physician if symptoms of infection or other concerning signs are noted. Valtrex Pregnancy And Lactation Text: this medication is Pregnancy Category B and is considered safe during pregnancy. This medication is not directly found in breast milk but it's metabolite acyclovir is present. Doxycycline Counseling:  Patient counseled regarding possible photosensitivity and increased risk for sunburn.  Patient instructed to avoid sunlight, if possible.  When exposed to sunlight, patients should wear protective clothing, sunglasses, and sunscreen.  The patient was instructed to call the office immediately if the following severe adverse effects occur:  hearing changes, easy bruising/bleeding, severe headache, or vision changes.  The patient verbalized understanding of the proper use and possible adverse effects of doxycycline.  All of the patient's questions and concerns were addressed.

## 2025-02-04 ENCOUNTER — TELEPHONE (OUTPATIENT)
Dept: MEDICAL GROUP | Facility: PHYSICIAN GROUP | Age: 85
End: 2025-02-04
Payer: MEDICARE

## 2025-02-04 NOTE — TELEPHONE ENCOUNTER
Rosa from [facility] called requesting PT evaluation for pt and letting you know pt had fall and was seen by UC in [facility] for this.

## 2025-02-18 ENCOUNTER — PATIENT OUTREACH (OUTPATIENT)
Dept: HEALTH INFORMATION MANAGEMENT | Facility: OTHER | Age: 85
End: 2025-02-18
Payer: MEDICARE

## 2025-02-18 ENCOUNTER — OFFICE VISIT (OUTPATIENT)
Dept: SLEEP MEDICINE | Facility: MEDICAL CENTER | Age: 85
End: 2025-02-18
Attending: NURSE PRACTITIONER
Payer: MEDICARE

## 2025-02-18 VITALS
HEART RATE: 59 BPM | SYSTOLIC BLOOD PRESSURE: 114 MMHG | BODY MASS INDEX: 29.43 KG/M2 | OXYGEN SATURATION: 94 % | DIASTOLIC BLOOD PRESSURE: 60 MMHG | WEIGHT: 146 LBS | HEIGHT: 59 IN

## 2025-02-18 DIAGNOSIS — Z78.9 NONSMOKER: ICD-10-CM

## 2025-02-18 DIAGNOSIS — G47.33 OSA (OBSTRUCTIVE SLEEP APNEA): Chronic | ICD-10-CM

## 2025-02-18 DIAGNOSIS — I10 PRIMARY HYPERTENSION: ICD-10-CM

## 2025-02-18 PROCEDURE — 99213 OFFICE O/P EST LOW 20 MIN: CPT | Performed by: NURSE PRACTITIONER

## 2025-02-18 PROCEDURE — 99214 OFFICE O/P EST MOD 30 MIN: CPT | Performed by: NURSE PRACTITIONER

## 2025-02-18 PROCEDURE — 3078F DIAST BP <80 MM HG: CPT | Performed by: NURSE PRACTITIONER

## 2025-02-18 PROCEDURE — 3074F SYST BP LT 130 MM HG: CPT | Performed by: NURSE PRACTITIONER

## 2025-02-18 ASSESSMENT — FIBROSIS 4 INDEX: FIB4 SCORE: 1.39

## 2025-02-18 NOTE — PROGRESS NOTES
02/18/25 - Monthly outreach complete.     Patient and I discussed briefly checking in for monthly outreach but prior strokes make this difficult; additionally, patient has an appointment this afternoon she wasn't aware of and I've given her directions and the time to check in - Patient will contact er.   This monthly outreach will be a check in and next month (03/2025) we will address chronic conditions in their entirety.     No additional needs or questions from the patient at this time.

## 2025-02-18 NOTE — PROGRESS NOTES
Chief Complaint   Patient presents with    Follow-Up     Last Office Visit 10/23/24 with Rosario Singh    1st Compliance: No     DME: [facility]     PAP/O2/OAT: CPAP @ 15cm H20    Wireless Data? YES - RESMED - Compliance uploaded   Set up date: 5/28/24       HPI:  Irina Traylor is a 84 y.o. year old female here today for follow-up on DARIN. Hx of CVA with speech impairment.  Last OV 10/23/24     Currently using CPAP @ 15cm H20 nightly; RESMED; device obtained 5/28/24.   Compliance report 1/15/25-2/13/25 indicates 97% compliance, 23 nights greater than 4 hours use, average nightly use 5 hours 45 minutes, pressure 11 cm, significant mask leak but overall AHI 3.3/h.  Reviewed with patient.       Interval events  2/18/25: Patient is compliant with CPAP but notes having some issues connecting the tubing together for usage in the last week.  She has lots of supplies on hand and reviewed the need to adjust her mask but change her cushions at least every 1 to 2 months for proper fit.  She denies morning headaches or shortness of breath.  She uses Dimetapp twice a day to help with sinus congestion and mucus with benefit.  She generally goes to bed at 11:30 PM after saying her prayers and wakes around 8:30 AM.  She does have incontinence issues with urine and wearing adult diapers.  She notes her neighbors and a couple close friends help her within her home.  She does have home health at the moment but this may end soon.  She is asking about food stamps and obtaining fresh fruits and vegetables.  10/23/2024: Patient noted at last office is increased sinus congestion so recommended using Mucinex and sinus sprays for relief.  She continues to have ongoing congestion with minimal improvement.  PCP recommend saline spray and daily Claritin.  Previously discussed going back to ENT.  She is also lost 25 pounds since her last sleep study and may do well with a lower pressure setting which would also help mask leak.   Reviewed in detail with patient.  Because of her chronic pain and frequent diarrhea she would really like to avoid an in lab titration study.  She tends to start the night in her bed but will generally go to the recliner if she is uncomfortable.     Sleep hx:  PSG indicates moderate obstructive sleep apnea with an AHI of 23.6, minimum oxygen saturation of 69%.       Overnight oximetry 7/6/2015 using CPAP 15 cm noted average saturation 93% and patient spent 11 minutes less than 88% ox saturation.         ROS: As per HPI and otherwise negative if not stated.    Past Medical History:   Diagnosis Date    Anesthesia     States feels like she can't breathe with induction for right hip surgery, gets nausea vomiting with most pain meds.  Uses anti-nausea medicine if takes morphine    Arthritis     To hips, shoulders, hands    Benign essential HTN 09/07/2017    Blood clotting disorder (HCC) 07/19/2024    DVT left leg    Cataract 07/19/2024    not surgery yet    CPAP (continuous positive airway pressure) dependence     Dyslipidemia 03/05/2019    Expressive aphasia     Related to CVA    Family history of CVA     Heart murmur     High cholesterol     DARIN (obstructive sleep apnea) 01/26/2017    uses CPAP    Pacemaker 07/19/2024    Being followed by Dr. Freire    Post-nasal drip     Restless leg syndrome     4/25/17-Resolved, states was related to right hip and none S/P replacement.    Sick sinus syndrome ([facility]) 06/29/2022    Sinus arrest 06/29/2022    Stroke ([facility]) 2/1993, 8/1993    Maanaabk-vyjpnc-zfbqrdtifo aphasia,mouth forming words, reading, numbers    Umbilical hernia without obstruction and without gangrene 04/13/2024    Walker as ambulation aid        Past Surgical History:   Procedure Laterality Date    UMBILICAL HERNIA REPAIR  9/30/2024    Procedure: OPEN UMBILICAL HERNIA REPAIR;  Surgeon: Michael Armas M.D.;  Location: SURGERY [facility];  Service: General    PB RECONSTR TOTAL SHOULDER IMPLANT Left 3/21/2019     Procedure: ARTHROPLASTY, SHOULDER, TOTAL-  REVISION REVERSE;  Surgeon: Yoshi Gamble M.D.;  Location: [facility];  Service: Orthopedics    ORIF, FRACTURE, HUMERUS Left 3/21/2019    Procedure: ORIF, FRACTURE, HUMERUS;  Surgeon: Yoshi Gamble M.D.;  Location: [facility];  Service: Orthopedics    KNEE ARTHROPLASTY TOTAL Left 2/12/2018    Procedure: KNEE ARTHROPLASTY TOTAL;  Surgeon: Earle Jacobsen M.D.;  Location: [facility];  Service: Orthopedics    CERVICAL FUSION POSTERIOR  8/8/2017    Procedure: CERVICAL FUSION POSTERIOR- C3-5, INSTRUMENTED;  Surgeon: Florentino Pike M.D.;  Location: [facility];  Service:     CERVICAL LAMINECTOMY POSTERIOR  8/8/2017    Procedure: CERVICAL LAMINECTOMY POSTERIOR- C3-5;  Surgeon: Florentino Pike M.D.;  Location: [facility];  Service:     HIP ARTHROPLASTY TOTAL Left 5/8/2017    Procedure: HIP ARTHROPLASTY TOTAL;  Surgeon: Earle Jacobsen M.D.;  Location: [facility];  Service:     HIP ARTHROPLASTY TOTAL Right 12/12/2016    Procedure: HIP ARTHROPLASTY TOTAL;  Surgeon: Earle Jacobsen M.D.;  Location: [facility];  Service:     FUSION, SPINE, LUMBAR, PLIF  9/26/2016    Procedure: LUMBAR FUSION POSTERIOR L2-S1 onlay ;  Surgeon: Florentino Pike M.D.;  Location: [facility];  Service:     LUMBAR LAMINECTOMY DISKECTOMY N/A 9/26/2016    Procedure: LUMBAR LAMINECTOMY DISKECTOMY L2-S1;  Surgeon: Florentino Pike M.D.;  Location: [facility];  Service:     COLONOSCOPY  2/2016    Every 10 years    SHOULDER ARTHROPLASTY TOTAL Left 8/11/2015    Procedure: SHOULDER ARTHROPLASTY REVERSE TOTAL;  Surgeon: Yoshi Gamble M.D.;  Location: [facility];  Service:     KNEE ARTHROPLASTY TOTAL  6/23/2014    Performed by Earle Jacobsen M.D. at [facility]    HAMMERTOE CORRECTION Left 11/12/2007    Removed 3rd toe    LUMBAR  LAMINECTOMY DISKECTOMY  1993    Lumbar    CARPAL TUNNEL RELEASE Bilateral 1993    CERVICAL DISK AND FUSION ANTERIOR  1993    X2    HYSTERECTOMY, TOTAL ABDOMINAL  1977    BUNIONECTOMY Bilateral 1980's    HAMMERTOE CORRECTION Right 1980's    Removed 3rd toe    ORIF, FINGER Left as child    Index       Family History   Problem Relation Age of Onset    Cancer Mother         vaginal    Stroke Father     Hypertension Brother     Diabetes Brother     Hypertension Brother     Hypertension Brother     Stroke Daughter        Social History     Socioeconomic History    Marital status: Single     Spouse name: Not on file    Number of children: Not on file    Years of education: Not on file    Highest education level: Not on file   Occupational History    Not on file   Tobacco Use    Smoking status: Never    Smokeless tobacco: Never   Vaping Use    Vaping status: Never Used   Substance and Sexual Activity    Alcohol use: Not Currently    Drug use: No    Sexual activity: Not Currently   Other Topics Concern    Not on file   Social History Narrative    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  She is currently driving without any accidents.  She does use a walker to ambulate.  Not having any falls.  Does not have hearing aids but is having hearing concerns.  Some increased aphasia especially since her history of CVAs and memory issues.  March 2019 she was hospitalized from her fracture of her left arm and did have home health which she no longer has right now.  She does not have family here but she does have neighbors who help her out.     Social Drivers of Health     Financial Resource Strain: Low Risk  (7/31/2024)    Overall Financial Resource Strain (CARDIA)     Difficulty of Paying Living Expenses: Not hard at all   Food Insecurity: No Food Insecurity (7/31/2024)    Hunger Vital Sign     Worried About Running Out of Food in the Last Year: Never true     Ran Out of Food in the Last Year: Never true   Transportation Needs: No  Transportation Needs (7/31/2024)    PRAPARE - Transportation     Lack of Transportation (Medical): No     Lack of Transportation (Non-Medical): No   Physical Activity: Inactive (7/31/2024)    Exercise Vital Sign     Days of Exercise per Week: 0 days     Minutes of Exercise per Session: 0 min   Stress: No Stress Concern Present (7/31/2024)    [employer] [place] [employer] - Occupational Stress Questionnaire     Feeling of Stress : Not at all   Social Connections: Moderately Integrated (7/31/2024)    Social Connection and Isolation Panel [NHANES]     Frequency of Communication with Friends and Family: Three times a week     Frequency of Social Gatherings with Friends and Family: Three times a week     Attends Quaker Services: 1 to 4 times per year     Active Member of Clubs or Organizations: Yes     Attends Club or Organization Meetings: 1 to 4 times per year     Marital Status:    Intimate Partner Violence: Not At Risk (7/31/2024)    Humiliation, Afraid, Rape, and Kick questionnaire     Fear of Current or Ex-Partner: No     Emotionally Abused: No     Physically Abused: No     Sexually Abused: No   Housing Stability: Low Risk  (7/31/2024)    Housing Stability Vital Sign     Unable to Pay for Housing in the Last Year: No     Number of Places Lived in the Last Year: 1     Unstable Housing in the Last Year: No       Allergies as of 02/18/2025 - Reviewed 02/18/2025   Allergen Reaction Noted    Benicar [olmesartan] Rash 06/09/2014    Dilaudid  [hydromorphone hcl] Vomiting 12/03/2019    Other misc Shortness of Breath 08/08/2017    Amlodipine Swelling 02/17/2023    Codeine Vomiting 05/10/2013    Demerol Vomiting 05/10/2013    Diazepam Vomiting 06/20/2023    Hydrocodone Vomiting 03/20/2019    Nexletol [bempedoic acid] Palpitations 12/20/2022    Other drug Vomiting 06/09/2014    Oxycodone Vomiting 03/20/2019    Simvastatin Myalgia and Unspecified 09/01/2023    Statins [hmg-coa-r inhibitors] Unspecified  "05/10/2013    Valium Vomiting 06/09/2014    Mucinex fast-max chest mary ms [guaifenesin]  07/29/2024        Vitals:  /60   Pulse (!) 59   Ht 1.499 m (4' 11\")   Wt 66.2 kg (146 lb)   SpO2 94%     Current medications as of today   Current Outpatient Medications   Medication Sig Dispense Refill    bacitracin 500 UNIT/GM ointment Apply to affected area(s) once daily. 30 g 0    irbesartan (AVAPRO) 150 MG Tab Take 1 tablet by mouth every day. 100 Tablet 3    ezetimibe (ZETIA) 10 MG Tab Take 1 tablet by mouth once daily 100 Tablet 3    verapamil ER (CALAN SR) 240 MG Tab CR Take 1 Tablet by mouth every day. 90 Tablet 3    acetaminophen (TYLENOL) 325 MG Tab Take 2 Tablets by mouth every 6 hours. Alternate w/ ibuprofen to take a medication every 3 hrs, take scheduled for 3 days post-op then as needed 60 Tablet 0    ibuprofen (MOTRIN) 600 MG Tab Take 1 Tablet by mouth every 6 hours. Alternate w/ tylenol to take a medication every 3 hrs, take scheduled for 3 days post-op then as needed 45 Tablet 0    polyethylene glycol/lytes (MIRALAX) Pack Take 1 Packet by mouth every day. While taking narcotics, hold if greater then 3 BMs a day 20 Each 0    acetaminophen (TYLENOL) 500 MG Tab Take 500 mg by mouth every 6 hours as needed. Indications: Pain      loratadine (CLARITIN) 10 MG Tab Take 10 mg by mouth every day. Takes bid      vitamin E 1000 units (450 mg) Cap Take 1,000 Units by mouth every day.      Apoaequorin (PREVAGEN PO) Take 1 Tablet by mouth every day.      Non Formulary Request Take 1 Package by mouth every day. VITALIZER GOLD WITH VIT K, MEDICATION INSTRUCTIONS FOR SURGERY/PROCEDURE SCHEDULED FOR 9/30/24   DO NOT TAKE 7 DAYS PRIOR TO SURGERY      aspirin (ASA) 81 MG Chew Tab chewable tablet Chew 81 mg every day.         Misc. Devices Misc Four wheel walker with seat 1 Each 0    Nutritional Supplements (ADULT NUTRITIONAL SUPPLEMENT PO) Take 5-10 Tablets by mouth 1 time a day as needed (constiaption). Herb-Lax      " Indications: constipation      B Complex-Biotin-FA Tab Take 1 Tablet by mouth every morning.     Indications: Supplement      magnesium oxide (MAG-OX) 400 MG Tab tablet Take 400 mg by mouth every morning. Y  Indications: Supplement      Ascorbic Acid (VITAMIN C) 1000 MG Tab Take 3,000 mg by mouth every day. 3 tablets = 1,000 mg.      Indications: Supplement       No current facility-administered medications for this visit.         Physical Exam:   Gen:           Alert and oriented, No apparent distress. Mood and affect appropriate, normal interaction with examiner.  Eyes:          PERRL, EOM intact, sclere white, conjunctive moist. Glasses.  Ears:          Not examined.   Hearing:     Grossly intact.  Nose:          Normal, no lesions or deformities.  Dentition:    Not examined.   Oropharynx:   Not examined.   Mallampati Classification: Not examined.   Neck:        Supple, trachea midline, no masses.  Respiratory Effort: No intercostal retractions or use of accessory muscles.   Lung Auscultation:      Clear to auscultation bilaterally; no rales, rhonchi or wheezing.  CV:            Regular rate and rhythm. No murmurs, rubs or gallops.  Abd:           Not examined.   Lymphadenopathy: Not examined.  Gait and Station: walker  Digits and Nails: No clubbing, cyanosis, petechiae, or nodes.   Cranial Nerves: II-XII grossly intact.  Skin:        No rashes, lesions or ulcers noted.               Ext:           No cyanosis or edema.      Assessment:  1. DARIN (obstructive sleep apnea)        2. BMI 29.0-29.9,adult        3. Nonsmoker            Immunizations:    Flu:recommend  Pneumovax 23:not due  Prevnar 13:not due  PCV 20: recommend  COVID-19: recommend    Plan:  DARIN is well treated.  She will continue to benefit from auto CPAP.  Reviewed proper mask fit and changing of supplies.   DME mask/supplies  Encourage weight loss healthy diet and activity.  Patient to follow-up with primary care regarding aid with healthy fruits and  vegetables in the community.  She relies on [employer] for transport through insurance.  She has no family in the area.  She relies on neighbors/friends for support and help.  Follow-up in 1 year with compliance report, sooner if needed.    Please note that this dictation was created using voice recognition software. I have made every reasonable attempt to correct obvious errors, but it is possible there are errors of grammar and possibly content that I did not discover before finalizing the note.

## 2025-02-18 NOTE — PATIENT INSTRUCTIONS
Equipment replacement schedule:  Mask cushion every month  Nasal pillows 2 times per month  Mask every 6 months  Head gear every 6 months  Tubing every 3 months  Ultra-fine filters 2 times per month  Foam filter every 6 months  Humidifier chamber every 6 months  Chin strap every 6 months    MAKE SURE YOU ARE CHANGING YOUR MASK EVERY 4-6 WEEKS

## 2025-02-19 ENCOUNTER — PATIENT OUTREACH (OUTPATIENT)
Dept: HEALTH INFORMATION MANAGEMENT | Facility: OTHER | Age: 85
End: 2025-02-19
Payer: MEDICARE

## 2025-02-19 DIAGNOSIS — I10 PRIMARY HYPERTENSION: ICD-10-CM

## 2025-02-20 NOTE — PROGRESS NOTES
Community Health Worker Follow-Up    Reason for outreach: CHW Dede received referral from Beth PEREZ sharing she got a message from pts Pulmonary provider stating pt was asking about food stamps and fresh fruits and vegetables.     CHW Interventions: CHW attempted to contact pt via phone to ask if there was a good time today to drop off a food box. Unable to reach pt at this time. LVM with contact information    Specific Resources Provided:  Housing/Shelter: N/A  Transportation: N/A  Food: SNAP Outreach       POW and        Food Pantry List  Financial: N/A  Social Supports: N/A  Other: N/A    Plan: CHW dropped off food box and SNAP information by pts front door. Other food bank resources sent via mail.

## 2025-03-13 ENCOUNTER — HOSPITAL ENCOUNTER (OUTPATIENT)
Dept: RADIOLOGY | Facility: MEDICAL CENTER | Age: 85
End: 2025-03-13
Attending: STUDENT IN AN ORGANIZED HEALTH CARE EDUCATION/TRAINING PROGRAM
Payer: MEDICARE

## 2025-03-13 ENCOUNTER — OFFICE VISIT (OUTPATIENT)
Dept: MEDICAL GROUP | Facility: PHYSICIAN GROUP | Age: 85
End: 2025-03-13
Payer: MEDICARE

## 2025-03-13 VITALS
OXYGEN SATURATION: 96 % | TEMPERATURE: 96.7 F | HEART RATE: 79 BPM | HEIGHT: 59 IN | SYSTOLIC BLOOD PRESSURE: 126 MMHG | BODY MASS INDEX: 29.43 KG/M2 | WEIGHT: 146 LBS | DIASTOLIC BLOOD PRESSURE: 82 MMHG

## 2025-03-13 DIAGNOSIS — G89.29 CHRONIC TOE PAIN, RIGHT FOOT: ICD-10-CM

## 2025-03-13 DIAGNOSIS — M79.674 CHRONIC TOE PAIN, RIGHT FOOT: ICD-10-CM

## 2025-03-13 DIAGNOSIS — M79.601 PAIN OF RIGHT UPPER EXTREMITY: ICD-10-CM
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
## 2025-03-13 PROCEDURE — 73080 X-RAY EXAM OF ELBOW: CPT | Mod: RT

## 2025-03-13 PROCEDURE — 3074F SYST BP LT 130 MM HG: CPT | Performed by: STUDENT IN AN ORGANIZED HEALTH CARE EDUCATION/TRAINING PROGRAM

## 2025-03-13 PROCEDURE — 99213 OFFICE O/P EST LOW 20 MIN: CPT | Performed by: STUDENT IN AN ORGANIZED HEALTH CARE EDUCATION/TRAINING PROGRAM

## 2025-03-13 PROCEDURE — 73090 X-RAY EXAM OF FOREARM: CPT | Mod: RT

## 2025-03-13 PROCEDURE — 3079F DIAST BP 80-89 MM HG: CPT | Performed by: STUDENT IN AN ORGANIZED HEALTH CARE EDUCATION/TRAINING PROGRAM

## 2025-03-13 ASSESSMENT — ENCOUNTER SYMPTOMS
SHORTNESS OF BREATH: 0
CHILLS: 0
PALPITATIONS: 0
FEVER: 0

## 2025-03-13 ASSESSMENT — FIBROSIS 4 INDEX: FIB4 SCORE: 1.39

## 2025-03-13 NOTE — PROGRESS NOTES
Subjective:   Verbal consent was acquired by the patient to use [facility] [facility] [employer] [facility] ambient listening note generation during this visit Yes     CC: Follow-up on recent fall    History of Present Illness  Ms. Traylor is a pleasant 84-year-old established patient of Sarita Noble who presents today for follow-up after a fall.    She experienced a fall approximately 3 weeks ago while attempting to get into bed in the dark, resulting in a tear in her shoulder skin and subsequent bleeding. She suspects she may have hit her head during the fall but is uncertain. She reports persistent pain in her right arm, which she attributes to the fall. She is right-handed and experiences pain from the elbow to the forearm. She has been experiencing difficulty with arm mobility, which has been addressed through physical therapy. She has not experienced any headaches or changes in vision since the incident. She sought immediate medical attention at an emergency room following the fall. She reports that her blood pressure was elevated to 200 on the Thursday after the fall. She has been taking a large dose of baby aspirin nightly, which may have contributed to the prolonged bleeding. She has been receiving home visits from nurses who provide her with exercises to aid in her recovery.    She also reports pain in her little toe, which has been causing her discomfort while walking. She has been using slippers to alleviate the discomfort.    She has some skin issues on her left arm and is under the care of a dermatologist.    MEDICATIONS  Current: Baby aspirin    Patient Active Problem List    Diagnosis Date Noted    History of umbilical hernia repair 10/23/2024    Risk for falls 07/17/2024    Pacemaker 07/15/2024    Cerebral atrophy (HCC) 07/09/2024    Cataract of both eyes 07/09/2024    Basal cell carcinoma, arm, left 07/09/2024    Primary osteoarthritis of both hands 04/13/2024    Osteomyelitis (HCC) 04/08/2024    Pure hypercholesterolemia  "02/17/2023    Acquired absence of other right toe(s) (formerly [facility]) 10/13/2022    Primary osteoarthritis involving multiple joints 09/01/2022    Vitamin D deficiency 07/28/2022    Primary hypertension 07/12/2022    Sick sinus syndrome (HCC) 06/29/2022    Acquired absence of other left toe(s) (formerly [facility]) 03/25/2022    PVD (peripheral vascular disease) (formerly [facility]) 03/25/2022    Allergy to environmental factors 02/27/2020    History of CVA (cerebrovascular accident) 12/03/2019    Aphasia due to CVA 12/03/2019    Impaired hearing 12/03/2019    Mild cognitive impairment 12/03/2019    Iron deficiency 12/03/2019    Intervertebral cervical disc disorder with myelopathy, cervical region 08/08/2017    DARIN (obstructive sleep apnea) 01/26/2017    Osteoarthritis of right hip 12/12/2016    Spinal stenosis 09/26/2016    Osteoarthritis of shoulder 08/11/2015    Primary localized osteoarthrosis, lower leg 06/23/2014       Health Maintenance: Completed    ROS:  Review of Systems   Constitutional:  Negative for chills and fever.   Respiratory:  Negative for shortness of breath.    Cardiovascular:  Negative for chest pain and palpitations.       Objective:     Exam:  /82 (BP Location: Right arm, Patient Position: Sitting, BP Cuff Size: Adult)   Pulse 79   Temp 35.9 °C (96.7 °F) (Temporal)   Ht 1.499 m (4' 11\")   Wt 66.2 kg (146 lb)   SpO2 96%   BMI 29.49 kg/m²  Body mass index is 29.49 kg/m².    Physical Exam  Constitutional:       Appearance: Normal appearance.   Cardiovascular:      Rate and Rhythm: Normal rate and regular rhythm.      Heart sounds: Normal heart sounds.   Pulmonary:      Effort: Pulmonary effort is normal. No respiratory distress.      Breath sounds: Normal breath sounds. No stridor. No wheezing, rhonchi or rales.   Musculoskeletal:      Right elbow: Decreased range of motion.      Right forearm: Tenderness present. No swelling, edema or bony tenderness.   Neurological:      Mental Status: She is alert. "               Assessment & Plan:     84 y.o. female with the following -     1. Fall, subsequent encounter  Patient reports that she had a fall 3 weeks ago and was seen at [facility].  She states that she does not believe she hit her head.  She denies having headaches.  She continues to have right arm pain.  I will obtain x-rays of the forearm and elbow.  Patient has been working with physical therapy since her fall.  - DX-FOREARM RIGHT; Future  - DX-ELBOW-COMPLETE 3+ RIGHT; Future    2. Chronic toe pain, right foot  Chronic, ongoing.  Patient reports having pain of the right pinky toe.  She is requesting referral for podiatry.  Referral has been placed.  - Referral to Podiatry            Return if symptoms worsen or fail to improve.    Please note that this dictation was created using voice recognition software. I have made every reasonable attempt to correct obvious errors, but I expect that there are errors of grammar and possibly content that I did not discover before finalizing the note.

## 2025-03-14 ENCOUNTER — RESULTS FOLLOW-UP (OUTPATIENT)
Dept: MEDICAL GROUP | Facility: PHYSICIAN GROUP | Age: 85
End: 2025-03-14
Payer: MEDICARE

## 2025-03-24 ENCOUNTER — HOSPITAL ENCOUNTER (OUTPATIENT)
Dept: RADIOLOGY | Facility: MEDICAL CENTER | Age: 85
DRG: 291 | End: 2025-03-24
Payer: MEDICARE

## 2025-03-24 ENCOUNTER — APPOINTMENT (OUTPATIENT)
Dept: RADIOLOGY | Facility: MEDICAL CENTER | Age: 85
DRG: 291 | End: 2025-03-24
Attending: STUDENT IN AN ORGANIZED HEALTH CARE EDUCATION/TRAINING PROGRAM
Payer: MEDICARE

## 2025-03-24 ENCOUNTER — OFFICE VISIT (OUTPATIENT)
Dept: URGENT CARE | Facility: PHYSICIAN GROUP | Age: 85
End: 2025-03-24
Payer: MEDICARE

## 2025-03-24 ENCOUNTER — HOSPITAL ENCOUNTER (INPATIENT)
Facility: MEDICAL CENTER | Age: 85
LOS: 2 days | DRG: 291 | End: 2025-03-26
Attending: STUDENT IN AN ORGANIZED HEALTH CARE EDUCATION/TRAINING PROGRAM | Admitting: STUDENT IN AN ORGANIZED HEALTH CARE EDUCATION/TRAINING PROGRAM
Payer: MEDICARE

## 2025-03-24 VITALS
RESPIRATION RATE: 16 BRPM | HEART RATE: 60 BPM | WEIGHT: 143 LBS | OXYGEN SATURATION: 92 % | SYSTOLIC BLOOD PRESSURE: 128 MMHG | TEMPERATURE: 101 F | DIASTOLIC BLOOD PRESSURE: 70 MMHG | HEIGHT: 59 IN | BODY MASS INDEX: 28.83 KG/M2

## 2025-03-24 DIAGNOSIS — R05.9 COUGH, UNSPECIFIED TYPE: ICD-10-CM

## 2025-03-24 DIAGNOSIS — I50.33 ACUTE ON CHRONIC HEART FAILURE WITH PRESERVED EJECTION FRACTION (HFPEF, >= 50%) (HCC): ICD-10-CM

## 2025-03-24 DIAGNOSIS — R05.1 ACUTE COUGH: ICD-10-CM

## 2025-03-24 DIAGNOSIS — J06.9 ACUTE UPPER RESPIRATORY INFECTION, UNSPECIFIED: ICD-10-CM

## 2025-03-24 DIAGNOSIS — J96.01 ACUTE RESPIRATORY FAILURE WITH HYPOXIA (HCC): ICD-10-CM

## 2025-03-24 DIAGNOSIS — G47.33 OSA ON CPAP: ICD-10-CM

## 2025-03-24 DIAGNOSIS — I50.9 ACUTE ON CHRONIC CONGESTIVE HEART FAILURE, UNSPECIFIED HEART FAILURE TYPE (HCC): ICD-10-CM

## 2025-03-24 LAB
ALBUMIN SERPL BCP-MCNC: 3.9 G/DL (ref 3.2–4.9)
ALBUMIN/GLOB SERPL: 1.1 G/DL
ALP SERPL-CCNC: 119 U/L (ref 30–99)
ALT SERPL-CCNC: 31 U/L (ref 2–50)
ANION GAP SERPL CALC-SCNC: 13 MMOL/L (ref 7–16)
AST SERPL-CCNC: 40 U/L (ref 12–45)
BASOPHILS # BLD AUTO: 0.7 % (ref 0–1.8)
BASOPHILS # BLD: 0.06 K/UL (ref 0–0.12)
BILIRUB SERPL-MCNC: 1.1 MG/DL (ref 0.1–1.5)
BUN SERPL-MCNC: 46 MG/DL (ref 8–22)
CALCIUM ALBUM COR SERPL-MCNC: 9.6 MG/DL (ref 8.5–10.5)
CALCIUM SERPL-MCNC: 9.5 MG/DL (ref 8.5–10.5)
CHLORIDE SERPL-SCNC: 101 MMOL/L (ref 96–112)
CO2 SERPL-SCNC: 23 MMOL/L (ref 20–33)
CREAT SERPL-MCNC: 0.99 MG/DL (ref 0.5–1.4)
EKG IMPRESSION: NORMAL
EKG IMPRESSION: NORMAL
EOSINOPHIL # BLD AUTO: 0.08 K/UL (ref 0–0.51)
EOSINOPHIL NFR BLD: 1 % (ref 0–6.9)
ERYTHROCYTE [DISTWIDTH] IN BLOOD BY AUTOMATED COUNT: 47.7 FL (ref 35.9–50)
FLUAV RNA SPEC QL NAA+PROBE: NEGATIVE
FLUBV RNA SPEC QL NAA+PROBE: NEGATIVE
GFR SERPLBLD CREATININE-BSD FMLA CKD-EPI: 57 ML/MIN/1.73 M 2
GLOBULIN SER CALC-MCNC: 3.5 G/DL (ref 1.9–3.5)
GLUCOSE SERPL-MCNC: 83 MG/DL (ref 65–99)
HCT VFR BLD AUTO: 53 % (ref 37–47)
HGB BLD-MCNC: 16.4 G/DL (ref 12–16)
IMM GRANULOCYTES # BLD AUTO: 0.08 K/UL (ref 0–0.11)
IMM GRANULOCYTES NFR BLD AUTO: 1 % (ref 0–0.9)
LYMPHOCYTES # BLD AUTO: 0.75 K/UL (ref 1–4.8)
LYMPHOCYTES NFR BLD: 9.1 % (ref 22–41)
MCH RBC QN AUTO: 25.7 PG (ref 27–33)
MCHC RBC AUTO-ENTMCNC: 30.9 G/DL (ref 32.2–35.5)
MCV RBC AUTO: 82.9 FL (ref 81.4–97.8)
MONOCYTES # BLD AUTO: 1.27 K/UL (ref 0–0.85)
MONOCYTES NFR BLD AUTO: 15.3 % (ref 0–13.4)
NEUTROPHILS # BLD AUTO: 6.04 K/UL (ref 1.82–7.42)
NEUTROPHILS NFR BLD: 72.9 % (ref 44–72)
NRBC # BLD AUTO: 0 K/UL
NRBC BLD-RTO: 0 /100 WBC (ref 0–0.2)
NT-PROBNP SERPL IA-MCNC: 3098 PG/ML (ref 0–125)
PLATELET # BLD AUTO: 365 K/UL (ref 164–446)
PMV BLD AUTO: 10.8 FL (ref 9–12.9)
POTASSIUM SERPL-SCNC: 4.7 MMOL/L (ref 3.6–5.5)
PROT SERPL-MCNC: 7.4 G/DL (ref 6–8.2)
RBC # BLD AUTO: 6.39 M/UL (ref 4.2–5.4)
RSV RNA SPEC QL NAA+PROBE: NEGATIVE
SARS-COV-2 RNA RESP QL NAA+PROBE: NEGATIVE
SODIUM SERPL-SCNC: 137 MMOL/L (ref 135–145)
TROPONIN T SERPL-MCNC: 16 NG/L (ref 6–19)
WBC # BLD AUTO: 8.3 K/UL (ref 4.8–10.8)

## 2025-03-24 PROCEDURE — 0241U POCT CEPHEID COV-2, FLU A/B, RSV - PCR: CPT

## 2025-03-24 PROCEDURE — 700101 HCHG RX REV CODE 250: Performed by: STUDENT IN AN ORGANIZED HEALTH CARE EDUCATION/TRAINING PROGRAM

## 2025-03-24 PROCEDURE — 71046 X-RAY EXAM CHEST 2 VIEWS: CPT

## 2025-03-24 PROCEDURE — 3078F DIAST BP <80 MM HG: CPT

## 2025-03-24 PROCEDURE — 99285 EMERGENCY DEPT VISIT HI MDM: CPT

## 2025-03-24 PROCEDURE — 700111 HCHG RX REV CODE 636 W/ 250 OVERRIDE (IP): Mod: JZ | Performed by: STUDENT IN AN ORGANIZED HEALTH CARE EDUCATION/TRAINING PROGRAM

## 2025-03-24 PROCEDURE — 71045 X-RAY EXAM CHEST 1 VIEW: CPT

## 2025-03-24 PROCEDURE — 84484 ASSAY OF TROPONIN QUANT: CPT

## 2025-03-24 PROCEDURE — 93005 ELECTROCARDIOGRAM TRACING: CPT | Mod: TC

## 2025-03-24 PROCEDURE — 99223 1ST HOSP IP/OBS HIGH 75: CPT | Mod: AI | Performed by: STUDENT IN AN ORGANIZED HEALTH CARE EDUCATION/TRAINING PROGRAM

## 2025-03-24 PROCEDURE — 83880 ASSAY OF NATRIURETIC PEPTIDE: CPT

## 2025-03-24 PROCEDURE — 99215 OFFICE O/P EST HI 40 MIN: CPT

## 2025-03-24 PROCEDURE — 93005 ELECTROCARDIOGRAM TRACING: CPT | Mod: TC | Performed by: STUDENT IN AN ORGANIZED HEALTH CARE EDUCATION/TRAINING PROGRAM

## 2025-03-24 PROCEDURE — 85025 COMPLETE CBC W/AUTO DIFF WBC: CPT

## 2025-03-24 PROCEDURE — 80053 COMPREHEN METABOLIC PANEL: CPT

## 2025-03-24 PROCEDURE — 36415 COLL VENOUS BLD VENIPUNCTURE: CPT

## 2025-03-24 PROCEDURE — 94640 AIRWAY INHALATION TREATMENT: CPT

## 2025-03-24 PROCEDURE — 3074F SYST BP LT 130 MM HG: CPT

## 2025-03-24 PROCEDURE — 770020 HCHG ROOM/CARE - TELE (206)

## 2025-03-24 PROCEDURE — 96374 THER/PROPH/DIAG INJ IV PUSH: CPT

## 2025-03-24 RX ORDER — IRBESARTAN 150 MG/1
150 TABLET ORAL DAILY
Status: DISCONTINUED | OUTPATIENT
Start: 2025-03-25 | End: 2025-03-26 | Stop reason: HOSPADM

## 2025-03-24 RX ORDER — FUROSEMIDE 10 MG/ML
40 INJECTION INTRAMUSCULAR; INTRAVENOUS ONCE
Status: COMPLETED | OUTPATIENT
Start: 2025-03-24 | End: 2025-03-24

## 2025-03-24 RX ORDER — ONDANSETRON 2 MG/ML
4 INJECTION INTRAMUSCULAR; INTRAVENOUS EVERY 4 HOURS PRN
Status: DISCONTINUED | OUTPATIENT
Start: 2025-03-24 | End: 2025-03-26 | Stop reason: HOSPADM

## 2025-03-24 RX ORDER — IPRATROPIUM BROMIDE AND ALBUTEROL SULFATE 2.5; .5 MG/3ML; MG/3ML
3 SOLUTION RESPIRATORY (INHALATION)
Status: COMPLETED | OUTPATIENT
Start: 2025-03-24 | End: 2025-03-24

## 2025-03-24 RX ORDER — EZETIMIBE 10 MG/1
10 TABLET ORAL EVERY EVENING
Status: DISCONTINUED | OUTPATIENT
Start: 2025-03-25 | End: 2025-03-26 | Stop reason: HOSPADM

## 2025-03-24 RX ORDER — ENOXAPARIN SODIUM 100 MG/ML
40 INJECTION SUBCUTANEOUS DAILY
Status: DISCONTINUED | OUTPATIENT
Start: 2025-03-24 | End: 2025-03-26 | Stop reason: HOSPADM

## 2025-03-24 RX ORDER — ACETAMINOPHEN 325 MG/1
650 TABLET ORAL EVERY 6 HOURS PRN
Status: DISCONTINUED | OUTPATIENT
Start: 2025-03-24 | End: 2025-03-26 | Stop reason: HOSPADM

## 2025-03-24 RX ORDER — ONDANSETRON 4 MG/1
4 TABLET, ORALLY DISINTEGRATING ORAL EVERY 4 HOURS PRN
Status: DISCONTINUED | OUTPATIENT
Start: 2025-03-24 | End: 2025-03-26 | Stop reason: HOSPADM

## 2025-03-24 RX ORDER — VERAPAMIL HYDROCHLORIDE 240 MG/1
240 TABLET, FILM COATED, EXTENDED RELEASE ORAL DAILY
Status: DISCONTINUED | OUTPATIENT
Start: 2025-03-25 | End: 2025-03-26 | Stop reason: HOSPADM

## 2025-03-24 RX ORDER — ASPIRIN 81 MG/1
81 TABLET, CHEWABLE ORAL DAILY
Status: DISCONTINUED | OUTPATIENT
Start: 2025-03-25 | End: 2025-03-26 | Stop reason: HOSPADM

## 2025-03-24 RX ORDER — LABETALOL HYDROCHLORIDE 5 MG/ML
10 INJECTION, SOLUTION INTRAVENOUS EVERY 4 HOURS PRN
Status: DISCONTINUED | OUTPATIENT
Start: 2025-03-24 | End: 2025-03-26 | Stop reason: HOSPADM

## 2025-03-24 RX ORDER — FUROSEMIDE 10 MG/ML
40 INJECTION INTRAMUSCULAR; INTRAVENOUS 2 TIMES DAILY
Status: DISCONTINUED | OUTPATIENT
Start: 2025-03-25 | End: 2025-03-26 | Stop reason: HOSPADM

## 2025-03-24 RX ADMIN — IPRATROPIUM BROMIDE AND ALBUTEROL SULFATE 3 ML: .5; 2.5 SOLUTION RESPIRATORY (INHALATION) at 22:02

## 2025-03-24 RX ADMIN — FUROSEMIDE 40 MG: 10 INJECTION, SOLUTION INTRAVENOUS at 21:36

## 2025-03-24 ASSESSMENT — FIBROSIS 4 INDEX
FIB4 SCORE: 1.39
FIB4 SCORE: 1.39

## 2025-03-24 ASSESSMENT — ENCOUNTER SYMPTOMS
FEVER: 1
PALPITATIONS: 0
SHORTNESS OF BREATH: 1
COUGH: 1

## 2025-03-24 NOTE — PROGRESS NOTES
CHIEF COMPLAINT  Chief Complaint   Patient presents with    Cough     X 1 day cough, chest congestion     Subjective:   Irina Traylor is a 84 y.o. female who presents to urgent care with concerns for symptoms of cough an congestion x 1 day.  She also reports associated symptoms of shortness of breath and fatigue.  States feeling feverish earlier this morning.  She denies any symptoms of chest pain or dizziness.  Denies any pertinent sick contacts.  Patient denies any use of OTC medication for alleviation of symptoms, as symptoms started yesterday evening.  She does report concern as her shortness of breath has been progressively worsening and making it difficult for her to talk in full sentences.  Denies any history of asthma or chronic lung disease.      Review of Systems   Constitutional:  Positive for fever and malaise/fatigue.   HENT:  Positive for congestion.    Respiratory:  Positive for cough and shortness of breath.    Cardiovascular:  Negative for chest pain and palpitations.       PAST MEDICAL HISTORY  Patient Active Problem List    Diagnosis Date Noted    History of umbilical hernia repair 10/23/2024    Risk for falls 07/17/2024    Pacemaker 07/15/2024    Cerebral atrophy (HCC) 07/09/2024    Cataract of both eyes 07/09/2024    Basal cell carcinoma, arm, left 07/09/2024    Primary osteoarthritis of both hands 04/13/2024    Osteomyelitis (HCC) 04/08/2024    Pure hypercholesterolemia 02/17/2023    Acquired absence of other right toe(s) (HCC) 10/13/2022    Primary osteoarthritis involving multiple joints 09/01/2022    Vitamin D deficiency 07/28/2022    Primary hypertension 07/12/2022    Sick sinus syndrome (HCC) 06/29/2022    Acquired absence of other left toe(s) (HCC) 03/25/2022    PVD (peripheral vascular disease) (HCC) 03/25/2022    Allergy to environmental factors 02/27/2020    History of CVA (cerebrovascular accident) 12/03/2019    Aphasia due to CVA 12/03/2019    Impaired hearing 12/03/2019  "   Mild cognitive impairment 12/03/2019    Iron deficiency 12/03/2019    Intervertebral cervical disc disorder with myelopathy, cervical region 08/08/2017    DARIN (obstructive sleep apnea) 01/26/2017    Osteoarthritis of right hip 12/12/2016    Spinal stenosis 09/26/2016    Osteoarthritis of shoulder 08/11/2015    Primary localized osteoarthrosis, lower leg 06/23/2014       SURGICAL HISTORY   has a past surgical history that includes hysterectomy, total abdominal (1977); fusion, spine, lumbar, plif (9/26/2016); lumbar laminectomy diskectomy (N/A, 9/26/2016); lumbar laminectomy diskectomy (1993); carpal tunnel release (Bilateral, 1993); orif, finger (Left, as child); hammertoe correction (Left, 11/12/2007); cervical disk and fusion anterior (1993); hammertoe correction (Right, 1980's); bunionectomy (Bilateral, 1980's); colonoscopy (2/2016); cervical fusion posterior (8/8/2017); cervical laminectomy posterior (8/8/2017); reconstr total shoulder implant (Left, 3/21/2019); orif, fracture, humerus (Left, 3/21/2019); shoulder arthroplasty total (Left, 8/11/2015); knee arthroplasty total (6/23/2014); hip arthroplasty total (Right, 12/12/2016); hip arthroplasty total (Left, 5/8/2017); knee arthroplasty total (Left, 2/12/2018); and umbilical hernia repair (9/30/2024).    ALLERGIES  Allergies   Allergen Reactions    Benicar [Olmesartan] Rash     Blisters    Tolerated losartan 4/12/24.    Dilaudid  [Hydromorphone Hcl] Vomiting    Other Misc Shortness of Breath     Anesthesia with surgery 2016  Pt couldn't breathe while being put under and tapped side of bed to let RN know. \"It was the scariest feeling and I want everyone to know\"      Amlodipine Swelling    Codeine Vomiting    Demerol Vomiting    Diazepam Vomiting    Hydrocodone Vomiting    Nexletol [Bempedoic Acid] Palpitations     Chest pain and dizziness    Other Drug Vomiting     \"all pain meds\" per pt can take morphine.    Oxycodone Vomiting    Simvastatin Myalgia and " "Unspecified     Headache, fatigue    Statins [Hmg-Coa-R Inhibitors] Unspecified     Muscle cramping    Valium Vomiting    Mucinex Fast-Max Chest Joe Ms [Guaifenesin]      Vision/speech problems       CURRENT MEDICATIONS  Home Medications       Reviewed by Luis Fitch Ass't (Medical Assistant) on 03/24/25 at 1501  Med List Status: <None>     Medication Last Dose Status   acetaminophen (TYLENOL) 325 MG Tab Taking Active   acetaminophen (TYLENOL) 500 MG Tab Taking Active   Apoaequorin (PREVAGEN PO) Taking Active   Ascorbic Acid (VITAMIN C) 1000 MG Tab Taking Active   aspirin (ASA) 81 MG Chew Tab chewable tablet Taking Active   B Complex-Biotin-FA Tab Taking Active   bacitracin 500 UNIT/GM ointment Taking Active   ezetimibe (ZETIA) 10 MG Tab Taking Active   ibuprofen (MOTRIN) 600 MG Tab Taking Active   irbesartan (AVAPRO) 150 MG Tab Taking Active   magnesium oxide (MAG-OX) 400 MG Tab tablet Taking Active   Misc. Devices Misc Taking Active   Non Formulary Request Taking Active   Nutritional Supplements (ADULT NUTRITIONAL SUPPLEMENT PO) Taking Active   polyethylene glycol/lytes (MIRALAX) Pack Taking Active   verapamil ER (CALAN SR) 240 MG Tab CR Taking Active   vitamin E 1000 units (450 mg) Cap Taking Active                    SOCIAL HISTORY  Social History     Tobacco Use    Smoking status: Never    Smokeless tobacco: Never   Vaping Use    Vaping status: Never Used   Substance and Sexual Activity    Alcohol use: Not Currently    Drug use: No    Sexual activity: Not Currently       FAMILY HISTORY  Family History   Problem Relation Age of Onset    Cancer Mother         vaginal    Stroke Father     Hypertension Brother     Diabetes Brother     Hypertension Brother     Hypertension Brother     Stroke Daughter          Medications, Allergies, and current problem list reviewed today in Epic.     Objective:     /70   Pulse 60   Temp (!) 38.3 °C (101 °F)   Resp 16   Ht 1.499 m (4' 11\")   Wt 64.9 kg (143 lb) "   SpO2 92%     Physical Exam  Vitals reviewed.   Constitutional:       General: She is not in acute distress.     Appearance: Normal appearance. She is not ill-appearing or toxic-appearing.   HENT:      Head: Normocephalic.      Nose: Nose normal.      Mouth/Throat:      Mouth: Mucous membranes are moist.      Pharynx: Oropharynx is clear.   Cardiovascular:      Rate and Rhythm: Normal rate and regular rhythm.      Pulses: Normal pulses.      Heart sounds: Normal heart sounds.   Pulmonary:      Effort: Pulmonary effort is normal.      Breath sounds: Decreased air movement present. Examination of the right-upper field reveals rhonchi. Examination of the left-upper field reveals rhonchi. Examination of the right-middle field reveals rhonchi. Rhonchi present.   Skin:     General: Skin is warm.   Neurological:      General: No focal deficit present.      Mental Status: She is alert.   Psychiatric:         Mood and Affect: Mood normal.         Lab Results/POC Test Results   Results for orders placed or performed in visit on 03/24/25   POCT CoV-2, Flu A/B, RSV by PCR    Collection Time: 03/24/25  4:35 PM   Result Value Ref Range    SARS-CoV-2 by PCR Negative Negative, Invalid    Influenza virus A RNA Negative Negative, Invalid    Influenza virus B, PCR Negative Negative, Invalid    RSV, PCR Negative Negative, Invalid     *Note: Due to a large number of results and/or encounters for the requested time period, some results have not been displayed. A complete set of results can be found in Results Review.            Assessment/Plan:     Diagnosis and associated orders:     1. Cough, unspecified type  DX-CHEST-2 VIEWS      2. Acute upper respiratory infection, unspecified  POCT CoV-2, Flu A/B, RSV by PCR         Comments/MDM:     Patient presents urgent care with 1 day history of persistent cough and congestion.  Does report symptoms of fever and shortness of breath.  Upon physical exam patient is alert no apparent signs of  distress.  She is febrile today in clinic.  She is nontoxic-appearing.  Auscultation reveals scattered rhonchi to both right and left upper lobes, as well as right lower lobes.  O2 saturation 92%.  Chest x-ray obtained, shows diffuse interstitial prominence.  Considerations include fluid overload versus atypical infection.  Patient left prior to discussion of x-ray results.  I was able to reach out to patient via telephone and advised her on further evaluation and management needed in the ER given history as well as chest x-ray imaging.  Patient verbalized understanding and is agreeable to further evaluation in the ER.  Transfer center notified.         Differential diagnosis, natural history, supportive care, and indications for immediate follow-up discussed.    Advised the patient to follow-up with the primary care physician for recheck, reevaluation, and consideration of further management.    Please note that this dictation was created using voice recognition software. I have made a reasonable attempt to correct obvious errors, but I expect that there are errors of grammar and possibly content that I did not discover before finalizing the note.    This note was electronically signed by NAYAN Hoff

## 2025-03-25 PROBLEM — N18.31 CHRONIC KIDNEY DISEASE, STAGE 3A: Status: ACTIVE | Noted: 2025-03-25

## 2025-03-25 LAB
ANION GAP SERPL CALC-SCNC: 12 MMOL/L (ref 7–16)
BUN SERPL-MCNC: 39 MG/DL (ref 8–22)
CALCIUM SERPL-MCNC: 8.9 MG/DL (ref 8.5–10.5)
CHLORIDE SERPL-SCNC: 102 MMOL/L (ref 96–112)
CO2 SERPL-SCNC: 25 MMOL/L (ref 20–33)
CREAT SERPL-MCNC: 0.81 MG/DL (ref 0.5–1.4)
ERYTHROCYTE [DISTWIDTH] IN BLOOD BY AUTOMATED COUNT: 47.7 FL (ref 35.9–50)
GFR SERPLBLD CREATININE-BSD FMLA CKD-EPI: 72 ML/MIN/1.73 M 2
GLUCOSE SERPL-MCNC: 118 MG/DL (ref 65–99)
HCT VFR BLD AUTO: 51.6 % (ref 37–47)
HGB BLD-MCNC: 15.9 G/DL (ref 12–16)
MCH RBC QN AUTO: 25.6 PG (ref 27–33)
MCHC RBC AUTO-ENTMCNC: 30.8 G/DL (ref 32.2–35.5)
MCV RBC AUTO: 83.2 FL (ref 81.4–97.8)
PLATELET # BLD AUTO: 353 K/UL (ref 164–446)
PMV BLD AUTO: 10.6 FL (ref 9–12.9)
POTASSIUM SERPL-SCNC: 3.7 MMOL/L (ref 3.6–5.5)
RBC # BLD AUTO: 6.2 M/UL (ref 4.2–5.4)
SODIUM SERPL-SCNC: 139 MMOL/L (ref 135–145)
WBC # BLD AUTO: 7.6 K/UL (ref 4.8–10.8)

## 2025-03-25 PROCEDURE — 770020 HCHG ROOM/CARE - TELE (206)

## 2025-03-25 PROCEDURE — A9270 NON-COVERED ITEM OR SERVICE: HCPCS | Performed by: STUDENT IN AN ORGANIZED HEALTH CARE EDUCATION/TRAINING PROGRAM

## 2025-03-25 PROCEDURE — 96376 TX/PRO/DX INJ SAME DRUG ADON: CPT

## 2025-03-25 PROCEDURE — 96372 THER/PROPH/DIAG INJ SC/IM: CPT

## 2025-03-25 PROCEDURE — 36415 COLL VENOUS BLD VENIPUNCTURE: CPT

## 2025-03-25 PROCEDURE — 85027 COMPLETE CBC AUTOMATED: CPT

## 2025-03-25 PROCEDURE — 700102 HCHG RX REV CODE 250 W/ 637 OVERRIDE(OP): Performed by: STUDENT IN AN ORGANIZED HEALTH CARE EDUCATION/TRAINING PROGRAM

## 2025-03-25 PROCEDURE — 700111 HCHG RX REV CODE 636 W/ 250 OVERRIDE (IP): Mod: JZ | Performed by: STUDENT IN AN ORGANIZED HEALTH CARE EDUCATION/TRAINING PROGRAM

## 2025-03-25 PROCEDURE — 80048 BASIC METABOLIC PNL TOTAL CA: CPT

## 2025-03-25 PROCEDURE — 99232 SBSQ HOSP IP/OBS MODERATE 35: CPT | Performed by: NURSE PRACTITIONER

## 2025-03-25 RX ADMIN — FUROSEMIDE 40 MG: 10 INJECTION, SOLUTION INTRAVENOUS at 06:51

## 2025-03-25 RX ADMIN — FUROSEMIDE 40 MG: 10 INJECTION, SOLUTION INTRAVENOUS at 17:50

## 2025-03-25 RX ADMIN — ENOXAPARIN SODIUM 40 MG: 100 INJECTION SUBCUTANEOUS at 17:51

## 2025-03-25 RX ADMIN — ENOXAPARIN SODIUM 40 MG: 100 INJECTION SUBCUTANEOUS at 01:34

## 2025-03-25 RX ADMIN — EZETIMIBE 10 MG: 10 TABLET ORAL at 17:50

## 2025-03-25 RX ADMIN — VERAPAMIL HYDROCHLORIDE 240 MG: 240 TABLET, FILM COATED, EXTENDED RELEASE ORAL at 06:51

## 2025-03-25 RX ADMIN — ASPIRIN 81 MG: 81 TABLET, CHEWABLE ORAL at 06:52

## 2025-03-25 RX ADMIN — IRBESARTAN 150 MG: 150 TABLET ORAL at 06:51

## 2025-03-25 SDOH — ECONOMIC STABILITY: TRANSPORTATION INSECURITY
IN THE PAST 12 MONTHS, HAS LACK OF RELIABLE TRANSPORTATION KEPT YOU FROM MEDICAL APPOINTMENTS, MEETINGS, WORK OR FROM GETTING THINGS NEEDED FOR DAILY LIVING?: NO

## 2025-03-25 ASSESSMENT — ENCOUNTER SYMPTOMS
EYES NEGATIVE: 1
CHILLS: 0
SHORTNESS OF BREATH: 1
PSYCHIATRIC NEGATIVE: 1
GASTROINTESTINAL NEGATIVE: 1
FEVER: 0
WEAKNESS: 1
SPUTUM PRODUCTION: 1
MYALGIAS: 1

## 2025-03-25 ASSESSMENT — LIFESTYLE VARIABLES
ON A TYPICAL DAY WHEN YOU DRINK ALCOHOL HOW MANY DRINKS DO YOU HAVE: 0
TOTAL SCORE: 0
HOW MANY TIMES IN THE PAST YEAR HAVE YOU HAD 5 OR MORE DRINKS IN A DAY: 0
HAVE PEOPLE ANNOYED YOU BY CRITICIZING YOUR DRINKING: NO
AVERAGE NUMBER OF DAYS PER WEEK YOU HAVE A DRINK CONTAINING ALCOHOL: 0
ALCOHOL_USE: NO
DOES PATIENT WANT TO STOP DRINKING: NO
EVER FELT BAD OR GUILTY ABOUT YOUR DRINKING: NO
HAVE YOU EVER FELT YOU SHOULD CUT DOWN ON YOUR DRINKING: NO
EVER HAD A DRINK FIRST THING IN THE MORNING TO STEADY YOUR NERVES TO GET RID OF A HANGOVER: NO
TOTAL SCORE: 0
CONSUMPTION TOTAL: NEGATIVE
TOTAL SCORE: 0

## 2025-03-25 ASSESSMENT — COGNITIVE AND FUNCTIONAL STATUS - GENERAL
MOVING TO AND FROM BED TO CHAIR: A LITTLE
CLIMB 3 TO 5 STEPS WITH RAILING: A LITTLE
TURNING FROM BACK TO SIDE WHILE IN FLAT BAD: A LITTLE
SUGGESTED CMS G CODE MODIFIER MOBILITY: CK
MOBILITY SCORE: 18
SUGGESTED CMS G CODE MODIFIER DAILY ACTIVITY: CH
WALKING IN HOSPITAL ROOM: A LITTLE
STANDING UP FROM CHAIR USING ARMS: A LITTLE
MOVING FROM LYING ON BACK TO SITTING ON SIDE OF FLAT BED: A LITTLE
DAILY ACTIVITIY SCORE: 24

## 2025-03-25 ASSESSMENT — SOCIAL DETERMINANTS OF HEALTH (SDOH)
WITHIN THE LAST YEAR, HAVE TO BEEN RAPED OR FORCED TO HAVE ANY KIND OF SEXUAL ACTIVITY BY YOUR PARTNER OR EX-PARTNER?: NO
WITHIN THE PAST 12 MONTHS, YOU WORRIED THAT YOUR FOOD WOULD RUN OUT BEFORE YOU GOT THE MONEY TO BUY MORE: NEVER TRUE
IN THE PAST 12 MONTHS, HAS THE ELECTRIC, GAS, OIL, OR WATER COMPANY THREATENED TO SHUT OFF SERVICE IN YOUR HOME?: NO
WITHIN THE PAST 12 MONTHS, THE FOOD YOU BOUGHT JUST DIDN'T LAST AND YOU DIDN'T HAVE MONEY TO GET MORE: NEVER TRUE
WITHIN THE LAST YEAR, HAVE YOU BEEN AFRAID OF YOUR PARTNER OR EX-PARTNER?: NO
WITHIN THE LAST YEAR, HAVE YOU BEEN HUMILIATED OR EMOTIONALLY ABUSED IN OTHER WAYS BY YOUR PARTNER OR EX-PARTNER?: NO
WITHIN THE LAST YEAR, HAVE YOU BEEN KICKED, HIT, SLAPPED, OR OTHERWISE PHYSICALLY HURT BY YOUR PARTNER OR EX-PARTNER?: NO

## 2025-03-25 ASSESSMENT — FIBROSIS 4 INDEX: FIB4 SCORE: 1.67

## 2025-03-25 ASSESSMENT — PAIN DESCRIPTION - PAIN TYPE: TYPE: ACUTE PAIN

## 2025-03-25 NOTE — HOSPITAL COURSE
Ms. Irina Traylor is a 84 y.o. female with a past medical history of previous CVA, sick sinus syndrome status post pacemaker, hypertension.who presented 3/24/2025 with shortness of breath.      Patient lives alone at home and ambulates with a walker. For the last few days, she has noted progressive worsening shortness of breath, generalized weakness, headache, cough.  She reports compliance with her home medications.  She went to urgent care today and had a chest x-ray done that was showing possible pulmonary edema versus atypical infection and was recommended to come to the ER for further evaluation.     In ER, patient hypoxic.  Pertinent labs include elevated BNP 3098.  Initial troponin negative.  Chest x-ray showing pulmonary edema.  Patient admitted to hospital medicine for management of care.

## 2025-03-25 NOTE — PROGRESS NOTES
4 Eyes Skin Assessment Completed by Ofelia, RN and CHRIS Sherman.    Head WDL  Ears WDL  Nose WDL  Mouth WDL  Neck WDL  Breast/Chest WDL  Shoulder Blades WDL  Spine WDL  (R) Arm/Elbow/Hand Redness and Blanching  (L) Arm/Elbow/Hand Redness and Blanching  Abdomen WDL  Groin WDL  Scrotum/Coccyx/Buttocks Redness and Blanching  (R) Leg Edema, redness to knee  (L) Leg Edema  (R) Heel/Foot/Toe Redness and Blanching, flaky  (L) Heel/Foot/Toe Redness and Blanching, flaky          Devices In Places Pulse Ox and Oxy Mask      Interventions In Place Pillows and Dri-Kyle Pads    Possible Skin Injury No    Pictures Uploaded Into Epic Yes  Wound Consult Placed N/A  RN Wound Prevention Protocol Ordered No

## 2025-03-25 NOTE — ED PROVIDER NOTES
"ER Provider Note    Scribed for Arvind Hernandez M.D. by Nina Schuster. 3/24/2025  7:44 PM    Primary Care Provider: NAYAN Longo    CHIEF COMPLAINT   Chief Complaint   Patient presents with    Cough     Starting yesterday   X-ray @    \"Diffuse interstitial prominence could relate to fluid overload or atypical infection\"   Sent for further evaluation      EXTERNAL RECORDS REVIEWED  External ED Note patient was seen at [facility] earlier today for a cough and chest congestion, a DX-chest and CoV-2, Flu A/B and RSV by PCR was ordered and the x-ray showed fluid overload v.s atypical infection and sent here. In 2024 patient had an ECHO cardiogram which showed moderate aortic regurgitation with preserved ejection fraction.       HPI/ROS  LIMITATION TO HISTORY   Select: : None  OUTSIDE HISTORIAN(S):  Friend patient's neighbors    Irina Traylor is a 84 y.o. female who presents to the ED for evaluation of a cough and shortness of breath. Her friend state she started coughing yesterday and it worsening today so she took her to Urgent Care where the x-ray showed fluid on her lungs versus pneumonia. The Urgent Care recommended she come in to the emergency department today. She denies coughing up any mucus now, but state she was earlier. Her friend notes she had a fever. She repots she has been experiencing a head aches and is chronically congested. Her friend states the patient was having shortness of breath when talking. She denies stomach aches. She denies leg or feet swelling. She denies any lung disease history and denies any smoking history. She notes she uses a CPAP at night, but denies supplemental oxygen use. The patient reports she took her blood pressure medications this morning and took her baby aspirin and cholesterol medication.     PAST MEDICAL HISTORY  Past Medical History:   Diagnosis Date    Anesthesia     States feels like she can't breathe with induction for right hip " surgery, gets nausea vomiting with most pain meds.  Uses anti-nausea medicine if takes morphine    Arthritis     To hips, shoulders, hands    Benign essential HTN 09/07/2017    Blood clotting disorder (HCC) 07/19/2024    DVT left leg    Cataract 07/19/2024    not surgery yet    CPAP (continuous positive airway pressure) dependence     Dyslipidemia 03/05/2019    Expressive aphasia     Related to CVA    Family history of CVA     Heart murmur     High cholesterol     DARIN (obstructive sleep apnea) 01/26/2017    uses CPAP    Pacemaker 07/19/2024    Being followed by Dr. Freire    Post-nasal drip     Restless leg syndrome     4/25/17-Resolved, states was related to right hip and none S/P replacement.    Sick sinus syndrome ([facility]) 06/29/2022    Sinus arrest 06/29/2022    Stroke ([facility]) 2/1993, 8/1993    Gdxhivgn-zlsfvx-enmjsvftbh aphasia,mouth forming words, reading, numbers    Umbilical hernia without obstruction and without gangrene 04/13/2024    Walker as ambulation aid        SURGICAL HISTORY  Past Surgical History:   Procedure Laterality Date    UMBILICAL HERNIA REPAIR  9/30/2024    Procedure: OPEN UMBILICAL HERNIA REPAIR;  Surgeon: Michael Armas M.D.;  Location: [facility];  Service: General    PB RECONSTR TOTAL SHOULDER IMPLANT Left 3/21/2019    Procedure: ARTHROPLASTY, SHOULDER, TOTAL-  REVISION REVERSE;  Surgeon: Yoshi Gamble M.D.;  Location: [facility];  Service: Orthopedics    ORIF, FRACTURE, HUMERUS Left 3/21/2019    Procedure: ORIF, FRACTURE, HUMERUS;  Surgeon: Yoshi Gamble M.D.;  Location: [facility];  Service: Orthopedics    KNEE ARTHROPLASTY TOTAL Left 2/12/2018    Procedure: KNEE ARTHROPLASTY TOTAL;  Surgeon: Earle Jacobsen M.D.;  Location: [facility];  Service: Orthopedics    CERVICAL FUSION POSTERIOR  8/8/2017    Procedure: CERVICAL FUSION POSTERIOR- C3-5, INSTRUMENTED;  Surgeon: Florentino Pike M.D.;  Location: [facility]  ORS;  Service:     CERVICAL LAMINECTOMY POSTERIOR  8/8/2017    Procedure: CERVICAL LAMINECTOMY POSTERIOR- C3-5;  Surgeon: Florentino Pike M.D.;  Location: [facility];  Service:     HIP ARTHROPLASTY TOTAL Left 5/8/2017    Procedure: HIP ARTHROPLASTY TOTAL;  Surgeon: Earle Jacobsen M.D.;  Location: [facility];  Service:     HIP ARTHROPLASTY TOTAL Right 12/12/2016    Procedure: HIP ARTHROPLASTY TOTAL;  Surgeon: Earle Jacobsen M.D.;  Location: [facility];  Service:     FUSION, SPINE, LUMBAR, PLIF  9/26/2016    Procedure: LUMBAR FUSION POSTERIOR L2-S1 onlay ;  Surgeon: Florentino Pike M.D.;  Location: [facility];  Service:     LUMBAR LAMINECTOMY DISKECTOMY N/A 9/26/2016    Procedure: LUMBAR LAMINECTOMY DISKECTOMY L2-S1;  Surgeon: Florentino Pike M.D.;  Location: [facility];  Service:     COLONOSCOPY  2/2016    Every 10 years    SHOULDER ARTHROPLASTY TOTAL Left 8/11/2015    Procedure: SHOULDER ARTHROPLASTY REVERSE TOTAL;  Surgeon: Yoshi Gamble M.D.;  Location: [facility];  Service:     KNEE ARTHROPLASTY TOTAL  6/23/2014    Performed by Earle Jacobsen M.D. at [facility]    HAMMERTOE CORRECTION Left 11/12/2007    Removed 3rd toe    LUMBAR LAMINECTOMY DISKECTOMY  1993    Lumbar    CARPAL TUNNEL RELEASE Bilateral 1993    CERVICAL DISK AND FUSION ANTERIOR  1993    X2    HYSTERECTOMY, TOTAL ABDOMINAL  1977    BUNIONECTOMY Bilateral 1980's    HAMMERTOE CORRECTION Right 1980's    Removed 3rd toe    ORIF, FINGER Left as child    Index       FAMILY HISTORY  Family History   Problem Relation Age of Onset    Cancer Mother         vaginal    Stroke Father     Hypertension Brother     Diabetes Brother     Hypertension Brother     Hypertension Brother     Stroke Daughter        SOCIAL HISTORY   reports that she has never smoked. She has never used smokeless tobacco. She reports that she does not currently use alcohol. She  reports that she does not use drugs.    CURRENT MEDICATIONS  Previous Medications    ACETAMINOPHEN (TYLENOL) 325 MG TAB    Take 2 Tablets by mouth every 6 hours. Alternate w/ ibuprofen to take a medication every 3 hrs, take scheduled for 3 days post-op then as needed    ACETAMINOPHEN (TYLENOL) 500 MG TAB    Take 500 mg by mouth every 6 hours as needed. Indications: Pain    APOAEQUORIN (PREVAGEN PO)    Take 1 Tablet by mouth every day.    ASCORBIC ACID (VITAMIN C) 1000 MG TAB    Take 3,000 mg by mouth every day. 3 tablets = 1,000 mg.      Indications: Supplement    ASPIRIN (ASA) 81 MG CHEW TAB CHEWABLE TABLET    Chew 81 mg every day.       B COMPLEX-BIOTIN-FA TAB    Take 1 Tablet by mouth every morning.     Indications: Supplement    BACITRACIN 500 UNIT/GM OINTMENT    Apply to affected area(s) once daily.    EZETIMIBE (ZETIA) 10 MG TAB    Take 1 tablet by mouth once daily    IBUPROFEN (MOTRIN) 600 MG TAB    Take 1 Tablet by mouth every 6 hours. Alternate w/ tylenol to take a medication every 3 hrs, take scheduled for 3 days post-op then as needed    IRBESARTAN (AVAPRO) 150 MG TAB    Take 1 tablet by mouth every day.    MAGNESIUM OXIDE (MAG-OX) 400 MG TAB TABLET    Take 400 mg by mouth every morning. Y  Indications: Supplement    MISC. DEVICES MISC    Four wheel walker with seat    NON FORMULARY REQUEST    Take 1 Package by mouth every day. VITALIZER GOLD WITH VIT K, MEDICATION INSTRUCTIONS FOR SURGERY/PROCEDURE SCHEDULED FOR 9/30/24   DO NOT TAKE 7 DAYS PRIOR TO SURGERY    NUTRITIONAL SUPPLEMENTS (ADULT NUTRITIONAL SUPPLEMENT PO)    Take 5-10 Tablets by mouth 1 time a day as needed (constiaption). Herb-Lax      Indications: constipation    POLYETHYLENE GLYCOL/LYTES (MIRALAX) PACK    Take 1 Packet by mouth every day. While taking narcotics, hold if greater then 3 BMs a day    VERAPAMIL ER (CALAN SR) 240 MG TAB CR    Take 1 Tablet by mouth every day.    VITAMIN E 1000 UNITS (450 MG) CAP    Take 1,000 Units by mouth  "every day.       ALLERGIES  Benicar [olmesartan], Dilaudid  [hydromorphone hcl], Other misc, Amlodipine, Codeine, Demerol, Diazepam, Hydrocodone, Nexletol [bempedoic acid], Other drug, Oxycodone, Simvastatin, Statins [hmg-coa-r inhibitors], Valium, and Mucinex fast-max chest mary ms [guaifenesin]    PHYSICAL EXAM  BP (!) 150/79   Pulse 60   Temp 37.2 °C (98.9 °F) (Temporal)   Resp 17   Ht 1.499 m (4' 11\")   Wt 65.2 kg (143 lb 11.8 oz)   SpO2 91% Comment: RA  BMI 29.03 kg/m²   Constitutional: Awake and alert  HENT: Normal inspection  Eyes: Normal inspection  Neck: Grossly normal range of motion.  Cardiovascular: Normal heart rate, Normal rhythm.  Symmetric peripheral pulses.   Thorax & Lungs: Coarse rhonchi breath sound bilaterally with no respiratory distress, not using excess muscles, talking in complete sentences, No chest tenderness.  Abdomen: Bowel sounds normal, soft, non-distended, nontender, no mass  Skin: No obvious rash.  Back: No tenderness, No CVA tenderness.   Extremities: 1 plus pitting edema in lower extremities. No clubbing, cyanosis, edema, no Homans or cords.  Neurologic: Grossly normal   Psychiatric: Normal for situation    DIAGNOSTIC STUDIES    EKG/LABS    Labs Reviewed   CBC WITH DIFFERENTIAL - Abnormal; Notable for the following components:       Result Value    RBC 6.39 (*)     Hemoglobin 16.4 (*)     Hematocrit 53.0 (*)     MCH 25.7 (*)     MCHC 30.9 (*)     Neutrophils-Polys 72.90 (*)     Lymphocytes 9.10 (*)     Monocytes 15.30 (*)     Immature Granulocytes 1.00 (*)     Lymphs (Absolute) 0.75 (*)     Monos (Absolute) 1.27 (*)     All other components within normal limits   COMP METABOLIC PANEL - Abnormal; Notable for the following components:    Bun 46 (*)     Alkaline Phosphatase 119 (*)     All other components within normal limits   PROBRAIN NATRIURETIC PEPTIDE, NT - Abnormal; Notable for the following components:    NT-proBNP 3098 (*)     All other components within normal limits "   ESTIMATED GFR - Abnormal; Notable for the following components:    GFR (CKD-EPI) 57 (*)     All other components within normal limits   TROPONIN       Results for orders placed or performed during the hospital encounter of 25   EKG   Result Value Ref Range    Report       [facility] Emergency Dept.    Test Date:  2025  Pt Name:    JACEK VELASQUEZ             Department: ER  MRN:        [id number]                      Room:  Gender:     Female                       Technician: [id number]  :        [id number]                   Requested By:ER TRIAGE PROTOCOL  Order #:    [id number]                    Reading MD:    Measurements  Intervals                                Axis  Rate:       61                           P:          71  KS:         167                          QRS:        -60  QRSD:       120                          T:          -44  QT:         451  QTc:        455    Interpretive Statements  Sinus rhythm  Probable left atrial enlargement  Incomplete left bundle branch block  Left ventricular hypertrophy  Anterior Q waves, possibly due to LVH  Compared to ECG 2024 11:35:46  Q waves now present       *Note: Due to a large number of results and/or encounters for the requested time period, some results have not been displayed. A complete set of results can be found in Results Review.     I have independently interpreted this EKG.  Patient has normal sinus rhythm with ventricular rate of 61, QTc 455, incomplete left bundle branch block.  No evidence of STEMI.    RADIOLOGY/PROCEDURES   The attending emergency physician has independently interpreted the diagnostic imaging associated with this visit and am waiting the final reading from the radiologist.   My preliminary interpretation is a follows: Pulmonary edema and mild bibasilar atelectasis    Radiologist interpretation:  DX-CHEST-PORTABLE (1 VIEW)   Final Result      1.  Pulmonary edema and mild bibasilar atelectasis, worse  than prior.   2.  Stable cardiomegaly.          COURSE & MEDICAL DECISION MAKING     ASSESSMENT, COURSE AND PLAN  Care Narrative:     7:44 PM Patient presents to the ED with a cough and chest congestion.  Has a history of pacemaker, CPAP for obstructive sleep apnea.  On exam patient was hemodynamically stable, did have some pitting edema, was sent in due to abnormal x-ray at urgent care.  Here she was talking in 3-4 word sentences, did have some mild increased work of breathing.  Denies any chest pain.  Ordered for oxygen and respiratory consult, CBC w diff, DX-chest, EKG, CMP, BNP, and troponin to evaluate her symptoms.     8:20 PM Patient was reevaluated at bedside. Discussed lab  and radiology  results with the patient. I informed them that they are hypoxic at 85% oxygen saturation and I will be putting them on supplemental oxygen.    9:02 PM - Patient was reevaluated at bedside. Discussed lab  and radiology  results with the patient and informed them that it looks like she has an exacerbation of her heart failure. She is now on nasal cannula oxygen and I will administer diuretics through her IV. I informed the patient of my plan to admit today given the patient's current presentation and diagnostic study results. Patient verbalizes understanding and support with my plan for admission.     9:13 PM I discussed the patient's case and the above findings with Dr. Capps (Hospitalist) who agrees to see the patient for admission.      DISPOSITION AND DISCUSSIONS  I have discussed management of the patient with the following physicians and CORRY's:  Dr. Capps (Hospitalist)    Discussion of management with other [facility] or appropriate source(s): None     Barriers to care at this time, including but not limited to:  None .    DISPOSITION:  Patient will be hospitalized by Dr. Capps (Hospitalist) in guarded condition.     FINAL DIANGOSIS  1. Acute cough Acute   2. Acute respiratory failure with hypoxia (HCC) Acute   3. Acute on  chronic congestive heart failure, unspecified heart failure type (HCC) Acute      Nina POSEY (Scribnuno), am scribing for, and in the presence of, Arvind Hernandez M.D..    Electronically signed by: Nina Schuster (Miko), 3/24/2025    Arvind POSEY M.D. personally performed the services described in this documentation, as scribed by Nina Schuster in my presence, and it is both accurate and complete.       The note accurately reflects work and decisions made by me.  Arvind Hernandez M.D.  3/25/2025  2:43 AM

## 2025-03-25 NOTE — H&P
"Hospital Medicine History & Physical Note    Date of Service  3/24/2025    Primary Care Physician  ANDREW Longo.    Consultants  None    Code Status  DNAR/DNI    Chief Complaint  Chief Complaint   Patient presents with    Cough     Starting yesterday   X-ray @    \"Diffuse interstitial prominence could relate to fluid overload or atypical infection\"   Sent for further evaluation        History of Presenting Illness  Irina Traylor is a 84 y.o. female who presented 3/24/2025 with shortness of breath.  Patient lives alone at home and ambulates with a walker.  She has a history of previous CVA, sick sinus syndrome status post pacemaker, hypertension.  For the last few days, she has noted progressive worsening shortness of breath, generalized weakness, headache, cough.  She reports compliance with her home medications.  She went to urgent care today and had a chest x-ray done that was showing possible pulmonary edema versus atypical infection and was recommended to come to the ER for further evaluation.    In ER, patient hypoxic.  Pertinent labs include elevated BNP 3098.  Initial troponin negative.  Chest x-ray showing pulmonary edema.    I discussed the plan of care with patient.    Review of Systems  ROS    Past Medical History   has a past medical history of Anesthesia, Arthritis, Benign essential HTN (09/07/2017), Blood clotting disorder (HCC) (07/19/2024), Cataract (07/19/2024), CPAP (continuous positive airway pressure) dependence, Dyslipidemia (03/05/2019), Expressive aphasia, Family history of CVA, Heart murmur, High cholesterol, DARIN (obstructive sleep apnea) (01/26/2017), Pacemaker (07/19/2024), Post-nasal drip, Restless leg syndrome, Sick sinus syndrome (HCC) (06/29/2022), Sinus arrest (06/29/2022), Stroke (HCC) (2/1993, 8/1993), Umbilical hernia without obstruction and without gangrene (04/13/2024), and Walker as ambulation aid.    Surgical History   has a past surgical history that " "includes hysterectomy, total abdominal (1977); fusion, spine, lumbar, plif (9/26/2016); lumbar laminectomy diskectomy (N/A, 9/26/2016); lumbar laminectomy diskectomy (1993); carpal tunnel release (Bilateral, 1993); orif, finger (Left, as child); hammertoe correction (Left, 11/12/2007); cervical disk and fusion anterior (1993); hammertoe correction (Right, 1980's); bunionectomy (Bilateral, 1980's); colonoscopy (2/2016); cervical fusion posterior (8/8/2017); cervical laminectomy posterior (8/8/2017); pr reconstr total shoulder implant (Left, 3/21/2019); orif, fracture, humerus (Left, 3/21/2019); shoulder arthroplasty total (Left, 8/11/2015); knee arthroplasty total (6/23/2014); hip arthroplasty total (Right, 12/12/2016); hip arthroplasty total (Left, 5/8/2017); knee arthroplasty total (Left, 2/12/2018); and umbilical hernia repair (9/30/2024).     Family History  family history includes Cancer in her mother; Diabetes in her brother; Hypertension in her brother, brother, and brother; Stroke in her daughter and father.   Family history reviewed with patient. There is no family history that is pertinent to the chief complaint.     Social History   reports that she has never smoked. She has never used smokeless tobacco. She reports that she does not currently use alcohol. She reports that she does not use drugs.    Allergies  Allergies   Allergen Reactions    Benicar [Olmesartan] Rash     Blisters    Tolerated losartan 4/12/24.    Dilaudid  [Hydromorphone Hcl] Vomiting    Other Misc Shortness of Breath     Anesthesia with surgery 2016  Pt couldn't breathe while being put under and tapped side of bed to let RN know. \"It was the scariest feeling and I want everyone to know\"      Amlodipine Swelling    Codeine Vomiting    Demerol Vomiting    Diazepam Vomiting    Hydrocodone Vomiting    Nexletol [Bempedoic Acid] Palpitations     Chest pain and dizziness    Other Drug Vomiting     \"all pain meds\" per pt can take morphine.    " Oxycodone Vomiting    Simvastatin Myalgia and Unspecified     Headache, fatigue    Statins [Hmg-Coa-R Inhibitors] Unspecified     Muscle cramping    Valium Vomiting    Mucinex Fast-Max Chest Joe Ms [Guaifenesin]      Vision/speech problems       Medications  Prior to Admission Medications   Prescriptions Last Dose Informant Patient Reported? Taking?   Apoaequorin (PREVAGEN PO)  Rx Bottle (For Med Information) Yes No   Sig: Take 1 Tablet by mouth every day.   Ascorbic Acid (VITAMIN C) 1000 MG Tab  Rx Bottle (For Med Information) Yes No   Sig: Take 3,000 mg by mouth every day. 3 tablets = 1,000 mg.      Indications: Supplement   B Complex-Biotin-FA Tab  Patient Yes No   Sig: Take 1 Tablet by mouth every morning.     Indications: Supplement   Misc. Devices Misc  Patient No No   Sig: Four wheel walker with seat   Non Formulary Request  Rx Bottle (For Med Information) Yes No   Sig: Take 1 Package by mouth every day. VITALIZER GOLD WITH VIT K, MEDICATION INSTRUCTIONS FOR SURGERY/PROCEDURE SCHEDULED FOR 9/30/24   DO NOT TAKE 7 DAYS PRIOR TO SURGERY   Nutritional Supplements (ADULT NUTRITIONAL SUPPLEMENT PO)  Rx Bottle (For Med Information) Yes No   Sig: Take 5-10 Tablets by mouth 1 time a day as needed (constiaption). Herb-Lax      Indications: constipation   acetaminophen (TYLENOL) 325 MG Tab   No No   Sig: Take 2 Tablets by mouth every 6 hours. Alternate w/ ibuprofen to take a medication every 3 hrs, take scheduled for 3 days post-op then as needed   acetaminophen (TYLENOL) 500 MG Tab  Rx Bottle (For Med Information) Yes No   Sig: Take 500 mg by mouth every 6 hours as needed. Indications: Pain   aspirin (ASA) 81 MG Chew Tab chewable tablet  Patient Yes No   Sig: Chew 81 mg every day.      bacitracin 500 UNIT/GM ointment   No No   Sig: Apply to affected area(s) once daily.   ezetimibe (ZETIA) 10 MG Tab   No No   Sig: Take 1 tablet by mouth once daily   ibuprofen (MOTRIN) 600 MG Tab   No No   Sig: Take 1 Tablet by mouth  every 6 hours. Alternate w/ tylenol to take a medication every 3 hrs, take scheduled for 3 days post-op then as needed   irbesartan (AVAPRO) 150 MG Tab   No No   Sig: Take 1 tablet by mouth every day.   magnesium oxide (MAG-OX) 400 MG Tab tablet  Patient Yes No   Sig: Take 400 mg by mouth every morning. Y  Indications: Supplement   polyethylene glycol/lytes (MIRALAX) Pack   No No   Sig: Take 1 Packet by mouth every day. While taking narcotics, hold if greater then 3 BMs a day   verapamil ER (CALAN SR) 240 MG Tab CR   No No   Sig: Take 1 Tablet by mouth every day.   vitamin E 1000 units (450 mg) Cap  Patient Yes No   Sig: Take 1,000 Units by mouth every day.      Facility-Administered Medications: None       Physical Exam  Temp:  [37.2 °C (98.9 °F)-38.3 °C (101 °F)] 37.2 °C (98.9 °F)  Pulse:  [60-75] 75  Resp:  [16-20] 20  BP: (128-172)/(70-82) 172/82  SpO2:  [91 %-92 %] 91 %  Blood Pressure : (!) 172/82   Temperature: 37.2 °C (98.9 °F)   Pulse: 75   Respiration: 20   Pulse Oximetry: 91 %       Physical Exam  Constitutional:       Appearance: Normal appearance. She is normal weight.   HENT:      Head: Normocephalic.      Mouth/Throat:      Mouth: Mucous membranes are moist.   Cardiovascular:      Rate and Rhythm: Normal rate and regular rhythm.      Pulses: Normal pulses.   Pulmonary:      Effort: Pulmonary effort is normal.      Comments: Mild expiratory wheezing  Bibasilar crackles  Abdominal:      General: Abdomen is flat. Bowel sounds are normal.      Palpations: Abdomen is soft.   Musculoskeletal:         General: Swelling present.   Skin:     General: Skin is warm.   Neurological:      General: No focal deficit present.      Mental Status: She is alert and oriented to person, place, and time. Mental status is at baseline.   Psychiatric:         Mood and Affect: Mood normal.         Behavior: Behavior normal.         Thought Content: Thought content normal.         Judgment: Judgment normal.          Laboratory:  Recent Labs     03/24/25  1838   WBC 8.3   RBC 6.39*   HEMOGLOBIN 16.4*   HEMATOCRIT 53.0*   MCV 82.9   MCH 25.7*   MCHC 30.9*   RDW 47.7   PLATELETCT 365   MPV 10.8     Recent Labs     03/24/25 2015   SODIUM 137   POTASSIUM 4.7   CHLORIDE 101   CO2 23   GLUCOSE 83   BUN 46*   CREATININE 0.99   CALCIUM 9.5     Recent Labs     03/24/25 2015   ALTSGPT 31   ASTSGOT 40   ALKPHOSPHAT 119*   TBILIRUBIN 1.1   GLUCOSE 83         Recent Labs     03/24/25 2015   NTPROBNP 3098*         Recent Labs     03/24/25 2015   TROPONINT 16       Imaging:  DX-CHEST-PORTABLE (1 VIEW)   Final Result      1.  Pulmonary edema and mild bibasilar atelectasis, worse than prior.   2.  Stable cardiomegaly.          X-Ray:  I have personally reviewed the images and compared with prior images. and My impression is: pulm edema    Assessment/Plan:  Justification for Admission Status  I anticipate this patient will require at least two midnights for appropriate medical management, necessitating inpatient admission because pt ha hypoxemia from fluid overload    Patient will need a Telemetry bed on MEDICAL service .  The need is secondary to fluid overload.    Acute hypoxemic respiratory failure (HCC)  Assessment & Plan  Patient requiring nasal cannula, normally not on home oxygen  Found to have elevated BNP and pulmonary edema on chest x-ray.  Patient has bibasilar crackles and pitting edema in lower extremities  Start IV Lasix 40 mg twice daily  Give albuterol x 1 now  Elevate head of bed  Last echocardiogram in April 2024 showing aortic regurgitation, normal EF.  Repeat echocardiogram  Oxygen as needed, keep O2 over 90%    Hypertension  Assessment & Plan  Resume home medication    Sick sinus syndrome (HCC)- (present on admission)  Assessment & Plan  Status post pacemaker    ACP (advance care planning)- (present on admission)  Assessment & Plan  Reviewed with pt. Dnr/dni        VTE prophylaxis: lovenox ppx

## 2025-03-25 NOTE — ED NOTES
Med Rec completed per patient     Allergies reviewed: yes    Antibiotics in the past 30 days: no    Anticoagulant in past 14 days: no   Patient takes Aspirin 81 mg daily. Last Dose:03/23/2025     Pharmacy patient utilizes: [facility] for this visit  [phone]

## 2025-03-25 NOTE — ASSESSMENT & PLAN NOTE
Patient requiring nasal cannula, normally not on home oxygen  Found to have elevated BNP and pulmonary edema on chest x-ray.  Patient has bibasilar crackles and pitting edema in lower extremities  Start IV Lasix 40 mg twice daily  Give albuterol x 1 now  Elevate head of bed  Last echocardiogram in April 2024 showing aortic regurgitation, normal EF.  Repeat echocardiogram  Oxygen as needed, keep O2 over 90%

## 2025-03-25 NOTE — ED TRIAGE NOTES
"Chief Complaint   Patient presents with    Cough     Starting yesterday   X-ray @    \"Diffuse interstitial prominence could relate to fluid overload or atypical infection\"   Sent for further evaluation      BP (!) 150/79   Pulse 60   Temp 37.2 °C (98.9 °F) (Temporal)   Resp 17   Ht 1.499 m (4' 11\")   Wt 65.2 kg (143 lb 11.8 oz)   SpO2 91% Comment: RA  BMI 29.03 kg/m²     Pt is alert/oriented and follows commands. Pt speaking in full sentences and responds appropriately to questions. No acute distress noted in triage and respirations are even and unlabored.      Pt placed in lobby and educated on triage process. Pt encouraged to alert staff for any changes in condition.    "

## 2025-03-25 NOTE — ED NOTES
Assumed care of patient, patient bedside report received from RN Clovis. Pt AAO X 4 , respirations even and unlabored, on 2 liters O2 via nasal canula . Introduced self as pt RN, POC discussed, call light in reach, Oxygen safety measures in place and RN traced the line, Fall risk interventions in place.

## 2025-03-25 NOTE — ED NOTES
Hourly round completed. Patient connected to monitor, resting with unlabored respirations.   Denied any new complaints. No current needs identified.  Gurney in low position, side rail up for pt safety. Call light within reach.

## 2025-03-25 NOTE — ED NOTES
Patient bedside report given to Arlin . Pt AAO X 4 , respirations even and unlabored, on 2 liters O2 via oxymask . Call light in reach, Oxygen safety measures in place and RN traced the line, Fall risk interventions in place.

## 2025-03-25 NOTE — PROGRESS NOTES
[facility] Medicine Daily Progress Note    Date of Service  3/25/2025    Chief Complaint  Irina Traylor is a 84 y.o. female admitted 3/24/2025 with SOB, pulmonary edema    Hospital Course  Ms. Irina Traylor is a 84 y.o. female with a past medical history of previous CVA, sick sinus syndrome status post pacemaker, hypertension.who presented 3/24/2025 with shortness of breath.      Patient lives alone at home and ambulates with a walker. For the last few days, she has noted progressive worsening shortness of breath, generalized weakness, headache, cough.  She reports compliance with her home medications.  She went to urgent care today and had a chest x-ray done that was showing possible pulmonary edema versus atypical infection and was recommended to come to the ER for further evaluation.     In ER, patient hypoxic.  Pertinent labs include elevated BNP 3098.  Initial troponin negative.  Chest x-ray showing pulmonary edema.  Patient admitted to hospital medicine for management of care.       Interval Problem Update  Patient seen and examined.  Patient continues to have mild shortness of breath, however, this has improved since admission.  Discussed with patient chest x-ray which noted significant pulmonary edema.  Patient is also requiring oxygen and baseline is room air.  Will repeat her echocardiogram due to noted shortness of breath.  Continue to diurese patient.    Plan of care: Do repeat echocardiogram; continue to diurese patient due to noted pulmonary edema; continue to wean off of oxygen  Disposition: Patient currently inpatient status as she is anticipated to stay 2-3 midnights for management of pulmonary edema; shortness of breath; need to diurese  Lab work: Reviewed; expected  VSS at this time      I have discussed this patient's plan of care and discharge plan at IDT rounds today with Case Management, Nursing, Nursing leadership, and other members of the IDT  team.    Consultants/Specialty  NONE    Code Status  DNAR/DNI    Disposition  <MEDICALLYCLEARED>  I have placed the appropriate orders for post-discharge needs.    Review of Systems  Review of Systems   Constitutional:  Positive for malaise/fatigue. Negative for chills and fever.   HENT: Negative.     Eyes: Negative.    Respiratory:  Positive for sputum production and shortness of breath.    Cardiovascular:  Positive for chest pain.   Gastrointestinal: Negative.    Genitourinary: Negative.    Musculoskeletal:  Positive for myalgias.   Skin: Negative.    Neurological:  Positive for weakness.   Endo/Heme/Allergies: Negative.    Psychiatric/Behavioral: Negative.          Physical Exam  Temp:  [37.2 °C (98.9 °F)-38.3 °C (101 °F)] 37.2 °C (98.9 °F)  Pulse:  [60-89] 71  Resp:  [16-20] 18  BP: (128-181)/() 166/77  SpO2:  [88 %-96 %] 92 %    Physical Exam  Vitals and nursing note reviewed.   HENT:      Head: Normocephalic.      Nose: Nose normal.      Mouth/Throat:      Mouth: Mucous membranes are moist.   Cardiovascular:      Rate and Rhythm: Normal rate and regular rhythm.      Pulses: Normal pulses.      Heart sounds: Normal heart sounds.   Pulmonary:      Effort: Pulmonary effort is normal.      Breath sounds: Normal breath sounds.   Chest:      Chest wall: Tenderness present.   Abdominal:      General: Bowel sounds are normal.      Palpations: Abdomen is soft.   Musculoskeletal:         General: Tenderness present.      Cervical back: Normal range of motion and neck supple.   Skin:     Capillary Refill: Capillary refill takes 2 to 3 seconds.      Coloration: Skin is pale.   Neurological:      Mental Status: She is alert. Mental status is at baseline.      Motor: Weakness present.         Fluids    Intake/Output Summary (Last 24 hours) at 3/25/2025 1310  Last data filed at 3/25/2025 1200  Gross per 24 hour   Intake 400 ml   Output 0 ml   Net 400 ml        Laboratory  Recent Labs     03/24/25  1838 03/25/25  0700    WBC 8.3 7.6   RBC 6.39* 6.20*   HEMOGLOBIN 16.4* 15.9   HEMATOCRIT 53.0* 51.6*   MCV 82.9 83.2   MCH 25.7* 25.6*   MCHC 30.9* 30.8*   RDW 47.7 47.7   PLATELETCT 365 353   MPV 10.8 10.6     Recent Labs     03/24/25 2015 03/25/25  0700   SODIUM 137 139   POTASSIUM 4.7 3.7   CHLORIDE 101 102   CO2 23 25   GLUCOSE 83 118*   BUN 46* 39*   CREATININE 0.99 0.81   CALCIUM 9.5 8.9                   Imaging  DX-CHEST-PORTABLE (1 VIEW)   Final Result      1.  Pulmonary edema and mild bibasilar atelectasis, worse than prior.   2.  Stable cardiomegaly.      EC-ECHOCARDIOGRAM COMPLETE W/O CONT    (Results Pending)        Assessment/Plan  * Heart failure (HCC)- (present on admission)  Assessment & Plan  Repeat echocardiogram last obtained on April 2024    Acute hypoxemic respiratory failure (HCC)  Assessment & Plan  Patient requiring nasal cannula, normally not on home oxygen  Found to have elevated BNP and pulmonary edema on chest x-ray.  Patient has bibasilar crackles and pitting edema in lower extremities  Start IV Lasix 40 mg twice daily  Give albuterol x 1 now  Elevate head of bed  Last echocardiogram in April 2024 showing aortic regurgitation, normal EF.  Repeat echocardiogram  Oxygen as needed, keep O2 over 90%    Hypertension  Assessment & Plan  Resume home medication    Sick sinus syndrome (HCC)- (present on admission)  Assessment & Plan  Status post pacemaker    ACP (advance care planning)- (present on admission)  Assessment & Plan  Reviewed with pt. Dnr/dni         VTE prophylaxis:    enoxaparin ppx      I have performed a physical exam and reviewed and updated ROS and Plan today (3/25/2025). In review of yesterday's note (3/24/2025), there are no changes except as documented above.

## 2025-03-25 NOTE — ED NOTES
Removed soiled bedding, gown, and brief. Performed continence care and provided clean linen and gown. Skin is clean and dry. Purewick placed and connected to suction. Call light within reach and patient comfortable.

## 2025-03-25 NOTE — CARE PLAN
Problem: Knowledge Deficit - Standard  Goal: Patient and family/care givers will demonstrate understanding of plan of care, disease process/condition, diagnostic tests and medications  Outcome: Progressing   The patient is Stable - Low risk of patient condition declining or worsening         Progress made toward(s) clinical / shift goals:  patient involved in plan of care, fall prevention measures in place    Patient is not progressing towards the following goals: n/a

## 2025-03-26 ENCOUNTER — PHARMACY VISIT (OUTPATIENT)
Dept: PHARMACY | Facility: MEDICAL CENTER | Age: 85
End: 2025-03-26
Payer: COMMERCIAL

## 2025-03-26 ENCOUNTER — APPOINTMENT (OUTPATIENT)
Dept: CARDIOLOGY | Facility: MEDICAL CENTER | Age: 85
DRG: 291 | End: 2025-03-26
Attending: STUDENT IN AN ORGANIZED HEALTH CARE EDUCATION/TRAINING PROGRAM
Payer: MEDICARE

## 2025-03-26 VITALS
HEIGHT: 59 IN | OXYGEN SATURATION: 91 % | BODY MASS INDEX: 28.93 KG/M2 | DIASTOLIC BLOOD PRESSURE: 78 MMHG | WEIGHT: 143.52 LBS | SYSTOLIC BLOOD PRESSURE: 121 MMHG | TEMPERATURE: 97.7 F | HEART RATE: 62 BPM | RESPIRATION RATE: 18 BRPM

## 2025-03-26 PROBLEM — I50.33 ACUTE ON CHRONIC HEART FAILURE WITH PRESERVED EJECTION FRACTION (HFPEF, >= 50%) (HCC): Status: RESOLVED | Noted: 2025-03-24 | Resolved: 2025-03-26

## 2025-03-26 PROBLEM — J96.01 ACUTE HYPOXEMIC RESPIRATORY FAILURE (HCC): Status: RESOLVED | Noted: 2025-03-24 | Resolved: 2025-03-26

## 2025-03-26 PROBLEM — Z71.89 ACP (ADVANCE CARE PLANNING): Status: RESOLVED | Noted: 2019-12-03 | Resolved: 2025-03-26

## 2025-03-26 PROBLEM — I27.20 PULMONARY HYPERTENSION (HCC): Status: ACTIVE | Noted: 2025-03-26

## 2025-03-26 PROBLEM — I50.1 ACUTE CARDIAC PULMONARY EDEMA (HCC): Status: ACTIVE | Noted: 2025-03-26

## 2025-03-26 PROBLEM — I50.33 ACUTE ON CHRONIC HEART FAILURE WITH PRESERVED EJECTION FRACTION (HFPEF, >= 50%) (HCC): Status: ACTIVE | Noted: 2025-03-24

## 2025-03-26 LAB
ANION GAP SERPL CALC-SCNC: 11 MMOL/L (ref 7–16)
BUN SERPL-MCNC: 45 MG/DL (ref 8–22)
CALCIUM SERPL-MCNC: 8.7 MG/DL (ref 8.5–10.5)
CHLORIDE SERPL-SCNC: 99 MMOL/L (ref 96–112)
CO2 SERPL-SCNC: 28 MMOL/L (ref 20–33)
CREAT SERPL-MCNC: 0.97 MG/DL (ref 0.5–1.4)
ERYTHROCYTE [DISTWIDTH] IN BLOOD BY AUTOMATED COUNT: 46.7 FL (ref 35.9–50)
GFR SERPLBLD CREATININE-BSD FMLA CKD-EPI: 58 ML/MIN/1.73 M 2
GLUCOSE SERPL-MCNC: 80 MG/DL (ref 65–99)
HCT VFR BLD AUTO: 50.8 % (ref 37–47)
HGB BLD-MCNC: 15.6 G/DL (ref 12–16)
LV EJECT FRACT  99904: 55
LV EJECT FRACT MOD 2C 99903: 32.23
LV EJECT FRACT MOD 4C 99902: 41.36
LV EJECT FRACT MOD BP 99901: 35.65
MCH RBC QN AUTO: 25.5 PG (ref 27–33)
MCHC RBC AUTO-ENTMCNC: 30.7 G/DL (ref 32.2–35.5)
MCV RBC AUTO: 83 FL (ref 81.4–97.8)
NT-PROBNP SERPL IA-MCNC: 1312 PG/ML (ref 0–125)
PLATELET # BLD AUTO: 361 K/UL (ref 164–446)
PMV BLD AUTO: 10.7 FL (ref 9–12.9)
POTASSIUM SERPL-SCNC: 3.7 MMOL/L (ref 3.6–5.5)
RBC # BLD AUTO: 6.12 M/UL (ref 4.2–5.4)
SODIUM SERPL-SCNC: 138 MMOL/L (ref 135–145)
WBC # BLD AUTO: 6.1 K/UL (ref 4.8–10.8)

## 2025-03-26 PROCEDURE — RXMED WILLOW AMBULATORY MEDICATION CHARGE: Performed by: STUDENT IN AN ORGANIZED HEALTH CARE EDUCATION/TRAINING PROGRAM

## 2025-03-26 PROCEDURE — A9270 NON-COVERED ITEM OR SERVICE: HCPCS | Performed by: STUDENT IN AN ORGANIZED HEALTH CARE EDUCATION/TRAINING PROGRAM

## 2025-03-26 PROCEDURE — 85027 COMPLETE CBC AUTOMATED: CPT

## 2025-03-26 PROCEDURE — 36415 COLL VENOUS BLD VENIPUNCTURE: CPT

## 2025-03-26 PROCEDURE — 80048 BASIC METABOLIC PNL TOTAL CA: CPT

## 2025-03-26 PROCEDURE — 97166 OT EVAL MOD COMPLEX 45 MIN: CPT

## 2025-03-26 PROCEDURE — 700102 HCHG RX REV CODE 250 W/ 637 OVERRIDE(OP): Performed by: STUDENT IN AN ORGANIZED HEALTH CARE EDUCATION/TRAINING PROGRAM

## 2025-03-26 PROCEDURE — 93306 TTE W/DOPPLER COMPLETE: CPT | Mod: 26 | Performed by: INTERNAL MEDICINE

## 2025-03-26 PROCEDURE — 700111 HCHG RX REV CODE 636 W/ 250 OVERRIDE (IP): Mod: JZ | Performed by: STUDENT IN AN ORGANIZED HEALTH CARE EDUCATION/TRAINING PROGRAM

## 2025-03-26 PROCEDURE — 93306 TTE W/DOPPLER COMPLETE: CPT

## 2025-03-26 PROCEDURE — 97535 SELF CARE MNGMENT TRAINING: CPT

## 2025-03-26 PROCEDURE — 700102 HCHG RX REV CODE 250 W/ 637 OVERRIDE(OP): Mod: JZ | Performed by: STUDENT IN AN ORGANIZED HEALTH CARE EDUCATION/TRAINING PROGRAM

## 2025-03-26 PROCEDURE — 97161 PT EVAL LOW COMPLEX 20 MIN: CPT

## 2025-03-26 PROCEDURE — A9270 NON-COVERED ITEM OR SERVICE: HCPCS | Mod: JZ | Performed by: STUDENT IN AN ORGANIZED HEALTH CARE EDUCATION/TRAINING PROGRAM

## 2025-03-26 PROCEDURE — 83880 ASSAY OF NATRIURETIC PEPTIDE: CPT

## 2025-03-26 PROCEDURE — 99239 HOSP IP/OBS DSCHRG MGMT >30: CPT | Performed by: STUDENT IN AN ORGANIZED HEALTH CARE EDUCATION/TRAINING PROGRAM

## 2025-03-26 RX ORDER — POTASSIUM CHLORIDE 1500 MG/1
40 TABLET, EXTENDED RELEASE ORAL ONCE
Status: COMPLETED | OUTPATIENT
Start: 2025-03-26 | End: 2025-03-26

## 2025-03-26 RX ORDER — SPIRONOLACTONE 25 MG/1
25 TABLET ORAL DAILY
Qty: 30 TABLET | Refills: 2 | Status: SHIPPED | OUTPATIENT
Start: 2025-03-26

## 2025-03-26 RX ORDER — FUROSEMIDE 20 MG/1
20 TABLET ORAL DAILY
Qty: 30 TABLET | Refills: 2 | Status: SHIPPED | OUTPATIENT
Start: 2025-03-26

## 2025-03-26 RX ADMIN — IRBESARTAN 150 MG: 150 TABLET ORAL at 04:37

## 2025-03-26 RX ADMIN — POTASSIUM CHLORIDE 40 MEQ: 1500 TABLET, EXTENDED RELEASE ORAL at 08:50

## 2025-03-26 RX ADMIN — FUROSEMIDE 40 MG: 10 INJECTION, SOLUTION INTRAVENOUS at 04:37

## 2025-03-26 RX ADMIN — VERAPAMIL HYDROCHLORIDE 240 MG: 240 TABLET, FILM COATED, EXTENDED RELEASE ORAL at 04:37

## 2025-03-26 RX ADMIN — ASPIRIN 81 MG: 81 TABLET, CHEWABLE ORAL at 04:37

## 2025-03-26 ASSESSMENT — COGNITIVE AND FUNCTIONAL STATUS - GENERAL
HELP NEEDED FOR BATHING: A LOT
SUGGESTED CMS G CODE MODIFIER MOBILITY: CJ
MOBILITY SCORE: 22
PERSONAL GROOMING: A LITTLE
DAILY ACTIVITIY SCORE: 16
DRESSING REGULAR UPPER BODY CLOTHING: A LOT
MOVING FROM LYING ON BACK TO SITTING ON SIDE OF FLAT BED: A LITTLE
TURNING FROM BACK TO SIDE WHILE IN FLAT BAD: A LITTLE
TOILETING: A LITTLE
DRESSING REGULAR LOWER BODY CLOTHING: A LOT
SUGGESTED CMS G CODE MODIFIER DAILY ACTIVITY: CK

## 2025-03-26 ASSESSMENT — FIBROSIS 4 INDEX: FIB4 SCORE: 1.63

## 2025-03-26 ASSESSMENT — GAIT ASSESSMENTS
DISTANCE (FEET): 200
ASSISTIVE DEVICE: 4 WHEEL WALKER
GAIT LEVEL OF ASSIST: STANDBY ASSIST

## 2025-03-26 ASSESSMENT — PAIN DESCRIPTION - PAIN TYPE: TYPE: ACUTE PAIN

## 2025-03-26 ASSESSMENT — ACTIVITIES OF DAILY LIVING (ADL): TOILETING: INDEPENDENT

## 2025-03-26 NOTE — DISCHARGE PLANNING
Case Management Discharge Planning    Admission Date: 3/24/2025  GMLOS: 3.5  ALOS: 2    6-Clicks ADL Score: 24  6-Clicks Mobility Score: 22    Anticipated Discharge Dispo: Discharge Disposition: D/T to home under [facility] care in anticipation of covered skilled care (06)  Discharge Address: [address] [facility] [address]    DME Needed: No    Action(s) Taken: Choice obtained and Referral(s) sent    Patient discussed in rounds; medically cleared for discharge home today. Resumption of home health order sent to [facility]. Pending review and acceptance.     Escalations Completed: None    Medically Clear: Yes    Next Steps: F/U with [facility].     Barriers to Discharge: Outpatient referrals pending    Is the patient up for discharge tomorrow: No

## 2025-03-26 NOTE — PROGRESS NOTES
Bedside report received from off going RN/tech: Malaika, assumed care of patient.     Fall Risk Score: MODERATE RISK  Fall risk interventions in place: Place yellow fall risk ID band on patient, Provide patient/family education based on risk assessment, Educate patient/family to call staff for assistance when getting out of bed, Place fall precaution signage outside patient door, Place patient in room close to nursing station, Utilize bed/chair fall alarm, and Bed alarm connected correctly  Bed type: Regular (Sriram Score less than 17 interventions in place)  Patient on cardiac monitor: Yes  IVF/IV medications: Not Applicable   Oxygen: Room Air  Bedside sitter: Not Applicable   Isolation: Not applicable

## 2025-03-26 NOTE — DISCHARGE SUMMARY
"Discharge Summary    CHIEF COMPLAINT ON ADMISSION  Chief Complaint   Patient presents with    Cough     Starting yesterday   X-ray @    \"Diffuse interstitial prominence could relate to fluid overload or atypical infection\"   Sent for further evaluation        Reason for Admission  Acute respiratory failure with hypoxia in setting of acute on chronic heart failure with preserved ejection fraction    Admission Date  3/24/2025    CODE STATUS  DNAR/DNI    HPI & HOSPITAL COURSE  Ms. Irina Traylor is a 84 y.o. female who presented on 3/25/2025 with worsening shortness of breath.  This is a pleasant woman with a history of previous stroke, sick sinus syndrome status post pacemaker, primary pretension, and obstructive sleep apnea on CPAP.    Patient reportedly being alone at home usually ambulates with walker.  For the past few days, she has noted progressively worsening shortness of breath, generalized weakness, headache, and cough.  She reported being compliant with her medications.  She initially went to the urgent care center and obtained a chest x-ray, that showed possible pulmonary edema versus atypical infection prompting recommendation to go to the emergency room for further evaluation.    In the emergency room, patient was noted to be hypoxic requiring supplemental oxygen.  NT-proBNP was elevated at 3098.  There was concern for fluid overload with respiratory crackles and pitting edema in the lower extremities.  Chest x-ray showing pulmonary edema.  She was started on forced IV diuresis with furosemide 40 was IV twice daily with breathing treatments with albuterol.  Patient's last echocardiogram in April 2024 showed moderate aortic insufficiency with normal ejection fraction of 55%.      Due to concern for acute exacerbation of heart failure with preserved ejection fraction, patient was admitted to the telemetry floor for further care and evaluation.  She was continued on IV diuresis.  Repeat " echocardiogram was performed, which again showed normal ejection fraction of 55-60 %.  Elevated right ventricular systolic pressure of 45 minutes of mercury consistent with pulmonary hypertension.  Mild pulmonic valve insufficiency with mild tricuspid regurgitation.  Moderate aortic insufficiency.  Mild concentric left ventricular hypertrophy.  Compared to previous study, increased estimated PASP was noted.    Patient was able to wean off of oxygen back to room air.  She did require supplemental oxygen at nighttime as she did not have her CPAP.  Given the findings, the concern was that the patient's CPAP may need to be readjusted.  Patient was advised to go back to her sleep medicine physician for reassessment.  Outpatient referral for sleep medicine was placed prior to discharge.  She was also started on low-dose furosemide and spironolactone for ongoing diuresis due to concern for fluid retention.  She was also started on Farxiga for HFpEF.  Patient was discharged to home with home health as per her request.     Therefore, she is discharged in good and stable condition to home with organized home healthcare and close outpatient follow-up.    The patient met 2-midnight criteria for an inpatient stay at the time of discharge.    Discharge Date  3/26/2025    FOLLOW UP ITEMS POST DISCHARGE  -Follow-up primary care provider in 3 to 5 days with labs.  -Follow-up with sleep medicine for reassessment of CPAP settings.    DISCHARGE DIAGNOSES  Principal Problem (Resolved):    Acute on chronic heart failure with preserved ejection fraction (HFpEF, >= 50%) (HCC) (POA: Yes)  Active Problems:    Acute cardiac pulmonary edema (HCC) (POA: Yes)    DARIN on CPAP (POA: Yes)      Overview: AHI 23.6, minimum saturation 69%, on CPAP 15 cm.    Sick sinus syndrome (HCC) (Chronic) (POA: Yes)    Hypertension (POA: Unknown)    Pulmonary hypertension (HCC) (POA: Yes)  Resolved Problems:    ACP (advance care planning) (POA: Yes)      Overview:  IMO load March 2020    Acute respiratory failure with hypoxia (HCC) (POA: Unknown)      FOLLOW UP  Future Appointments   Date Time Provider Department Center   4/1/2025  1:30 PM Sho Ozuna M.D. [facility]     CAROLE LongoRLizbethN.  [address]  [address]  [facility] [id number] [phone]    Follow up in 3 day(s)      Sarita Noble A.P.R.N.  [address]  [address]  [facility] [address]  [phone]            MEDICATIONS ON DISCHARGE     Medication List        START taking these medications        Instructions   Empagliflozin 10 MG Tabs tablet  Commonly known as: Jardiance   Take 1 Tablet by mouth every day.  Dose: 10 mg     furosemide 20 MG Tabs  Commonly known as: Lasix   Take 1 Tablet by mouth every day.  Dose: 20 mg     spironolactone 25 MG Tabs  Commonly known as: Aldactone   Take 1 Tablet by mouth every day.  Dose: 25 mg            CONTINUE taking these medications        Instructions   ADULT NUTRITIONAL SUPPLEMENT PO   Take 5-10 Tablets by mouth 1 time a day as needed (constiaption). Herb-Lax      Indications: constipation  Dose: 5-10 Tablet     aspirin 81 MG Chew chewable tablet  Commonly known as: Asa   Chew 81 mg every day.   Dose: 81 mg     B Complex-Biotin-FA Tabs   Take 1 Tablet by mouth every morning.     Indications: Supplement  Dose: 1 Tablet     ezetimibe 10 MG Tabs  Commonly known as: Zetia   Doctor's comments: Needs easy to open bottle please  Take 1 tablet by mouth once daily     irbesartan 150 MG Tabs  Commonly known as: Avapro   Doctor's comments: Needs easy to open bottle please  Take 1 tablet by mouth every day.  Dose: 150 mg     magnesium oxide 400 MG Tabs tablet  Commonly known as: Mag-Ox   Take 400 mg by mouth every morning. Indications: Supplement  Dose: 400 mg     polyethylene glycol/lytes Pack  Commonly known as: Miralax   Take 1 Packet by mouth every day. While taking narcotics, hold if greater then 3 BMs a day  Dose: 17 g     PREVAGEN PO   Take 1 Tablet by mouth  "every day.  Dose: 1 Tablet     verapamil  MG Tbcr  Commonly known as: Calan SR   Take 1 Tablet by mouth every day.  Dose: 240 mg     Vitamin C 1000 MG Tabs   Take 3,000 mg by mouth every day. 3 tablets = 1,000 mg.      Indications: Supplement  Dose: 3,000 mg     vitamin E 1000 units (450 mg) Caps   Take 1,000 Units by mouth every day.  Dose: 1,000 Units              Allergies  Allergies   Allergen Reactions    Benicar [Olmesartan] Rash     Blisters    Tolerated losartan 4/12/24.    Dilaudid  [Hydromorphone Hcl] Vomiting    Other Misc Shortness of Breath     Anesthesia with surgery 2016  Pt couldn't breathe while being put under and tapped side of bed to let RN know. \"It was the scariest feeling and I want everyone to know\"      Amlodipine Swelling    Codeine Vomiting    Demerol Vomiting    Diazepam Vomiting    Hydrocodone Vomiting    Nexletol [Bempedoic Acid] Palpitations     Chest pain and dizziness    Other Drug Vomiting     \"all pain meds\" per pt can take morphine.    Oxycodone Vomiting    Simvastatin Myalgia and Unspecified     Headache, fatigue    Statins [Hmg-Coa-R Inhibitors] Unspecified     Muscle cramping    Valium Vomiting    Mucinex Fast-Max Chest Joe Ms [Guaifenesin]      Vision/speech problems       DIET  Orders Placed This Encounter   Procedures    Diet Order Diet: Regular     Standing Status:   Standing     Number of Occurrences:   1     Diet::   Regular [1]       ACTIVITY  As tolerated.  Weight bearing as tolerated    CONSULTATIONS  None    PROCEDURES  None    LABORATORY  Lab Results   Component Value Date    SODIUM 138 03/26/2025    POTASSIUM 3.7 03/26/2025    CHLORIDE 99 03/26/2025    CO2 28 03/26/2025    GLUCOSE 80 03/26/2025    BUN 45 (H) 03/26/2025    CREATININE 0.97 03/26/2025        Lab Results   Component Value Date    WBC 6.1 03/26/2025    HEMOGLOBIN 15.6 03/26/2025    HEMATOCRIT 50.8 (H) 03/26/2025    PLATELETCT 361 03/26/2025        Total time of the discharge process 34 minutes.  "

## 2025-03-26 NOTE — DISCHARGE PLANNING
"TCN following. HTH/SCP chart review completed. Note pt currently admitted/inpatient at [facility] secondary to heart failure.    Note pt has been evaluated by PT with recs as below for HH. OT recs  pending. Note pt with recent supplemental 02 use while at [facility], though is currently on RA and per review, does not appear to utilize supplemental 02 at baseline (though noted [employer] is in pt's chart as supplier). Given current chart review, anticipating that pt will dc to home with HH services if this remains indicated at time of medical clearance. Per chart patient has [facility] and TCN will monitor for need for transitional care follow up with PCP if indicated at time of dc.    Completed:   - PT \"Recommend home health for continued physical therapy services (HH depending on progress, may not need HH if walks consistently with nsg to build her endurance.)\"  - TCN will monitor for HH choice/referral* and update accordingly       *noted pt dc'd to home with [facility]; would note per OT note later in PM on 3/26, \"Pt would benefit from post acute placement prior to DC home; however, likely to decline and adamant about returning home with HH. HH OT not a long term solution for baseline deficits and pt should consider a more long-term support living environment.)\"    "

## 2025-03-26 NOTE — DISCHARGE PLANNING
ATTN: Case Management  RE: Referral for Home Health    Reason for referral denial: Patient with [facility]. Please send to them.              Unfortunately, we are not able to accept this referral for the reason listed above. If further clarity is needed, our Transitional Care Specialists are available to discuss any barriers to service at [phone].      We look forward to collaborating with you in the future,  [facility] Team

## 2025-03-26 NOTE — DISCHARGE INSTRUCTIONS
Thank you for coming to [facility].  It has been my pleasure to take care of you!    -Please follow-up with your primary care provider in 3 to 5 days with labs.  -Please follow-up with pulmonary and sleep medicine to reexamined CPAP settings.  HF Patient Discharge Instructions  Monitor your weight daily, and maintain a weight chart, to track your weight changes.   Activity as tolerated, unless your Doctor has ordered otherwise.  Follow a low fat, low cholesterol, low salt diet unless instructed otherwise by your Doctor. Read the labels on the back of food products and track your intake of fat, cholesterol and salt.   Fluid Restriction No. If a Fluid Restriction has been ordered by your Doctor, measure fluids with a measuring cup to ensure that you are not exceeding the restriction.   No smoking.  Oxygen No. If your Doctor has ordered that you wear Oxygen at home, it is important to wear it as ordered.  Did you receive an explanation from staff on the importance of taking each of your medications and why it is necessary to keep taking them unless your doctor says to stop? Yes  Were all of your questions answered about how to manage your heart failure and what to do if you have increased signs and symptoms after you go home? Yes  Do you feel like your heart failure care team involved you in the care treatment plan and allowed you to make decisions regarding your care while in the hospital and addressed any discharge needs you might have? Yes    See the educational handout provided at discharge for more information on monitoring your daily weight, activity and diet. This also explains more about Heart Failure, symptoms of a flare-up and some of the tests that you have undergone.     Warning Signs of a Flare-Up include:  Swelling in the ankles or lower legs.  Shortness of breath, while at rest, or while doing normal activities.   Shortness of breath at night when in bed, or coughing in bed.   Requiring more pillows to sleep  at night, or needing to sit up at night to sleep.  Feeling weak, dizzy or fatigued.     When to call your Doctor:  Call your Primary Care Physician or Cardiologist if:   You experience any pain radiating to your jaw or neck.  You have any difficulty breathing.  You experience weight gain of 3 lbs in a day or 5 lbs in a week.   You feel any palpitations or irregular heartbeats.  You become dizzy or lose consciousness.   If you have had an angiogram or had a pacemaker or AICD placed, and experience:  Bleeding, drainage or swelling at the surgical / puncture site.  Fever greater than 100.0 F  Shock from internal defibrillator.  Cool and / or numb extremities.     Please access the AHA My HF Guide/Heart Failure Interactive Workbook:   [email or web address]

## 2025-03-26 NOTE — DISCHARGE PLANNING
Case Management Discharge Planning    Admission Date: 3/24/2025  GMLOS: 3.9  ALOS: 2    6-Clicks ADL Score: 24  6-Clicks Mobility Score: 22    Anticipated Discharge Dispo: Discharge Disposition: D/T to home under [facility] care in anticipation of covered skilled care (06)  Discharge Address: [address] [place] [address]    DME Needed: No    Action(s) Taken: DC Assessment Complete (See below), Choice obtained, and Referral(s) sent    RN CM  met with pt at bedside to complete assessment and discuss discharge planning.Pt A&Ox4 and able to verify the information on the face sheet.     Pt lives  alone in a SS house with ramp to enter, Corinne Steven (p) [phone]; is pt's friend and emergency contact.       Patient reports, prior to admission she uses a walker for ambulation and  is independent with ADL's and IADL’s.  Pt is on room air at baseline and uses a CPAP at night.    Pt reported that her friends and neighbors  is good support for her. Pt receives monthly SSI deposits.      The patient's PCP is Sarita FATIMA  [facility]      Patient denies tobacco, alcohol and recreational drug use.  Pt denies any SA or MH concerns.      Patients confirmed medical coverage via [facility] S[facility] .  Patient has means to transportation and her friends or neighbors will provide transport once medically stable for discharge.      RNCM discussed discharge planning.  Patient reported pt's goal is to return home with resumption of home health services. MD to order home health and plan to discharge patient home today.      Escalations Completed: None    Medically Clear: Yes    Next Steps: F/U with medical team regarding D/C needs/ barriers.     Barriers to Discharge: Outpatient referrals pending    Is the patient up for discharge tomorrow: No     Care Transition Team Assessment    Information Source  Orientation Level: Oriented X4  Information Given By: Patient  Informant's Name: Tenisha Traylor  Who is  responsible for making decisions for patient? : Patient    Readmission Evaluation  Is this a readmission?: No    Elopement Risk  Legal Hold: No  Ambulatory or Self Mobile in Wheelchair: Yes  Disoriented: No  Psychiatric Symptoms: None  History of Wandering: No  Elopement this Admit: No  Vocalizing Wanting to Leave: No  Displays Behaviors, Body Language Wanting to Leave: No-Not at Risk for Elopement  Elopement Risk: Not at Risk for Elopement    Interdisciplinary Discharge Planning  Lives with - Patient's Self Care Capacity: Alone and Able to Care For Self  Patient or legal guardian wants to designate a caregiver: No  Support Systems: Friends / Neighbors  Housing / Facility: [address] [place]  Prior Services: Other (Comments) (neighbors help by doing her grocery shopping and helping pt to wash her hair.)    Discharge Preparedness  What is your plan after discharge?: Home health care  What are your discharge supports?:  ([place] and friends)  Prior Functional Level: Uses Walker, Independent with Activities of Daily Living, Independent with Medication Management    Functional Assesment  Prior Functional Level: Uses Walker, Independent with Activities of Daily Living, Independent with Medication Management    Finances  Financial Barriers to Discharge: No  Prescription Coverage: Yes    Vision / Hearing Impairment  Right Eye Vision: (P) Impaired, Wears Glasses  Left Eye Vision: (P) Impaired, Wears Glasses    Advance Directive  Advance Directive?: POLST (AD on file)    Domestic Abuse  Have you ever been the victim of abuse or violence?: No  Possible Abuse/Neglect Reported to:: Not Applicable    Psychological Assessment  History of Substance Abuse: None  History of Psychiatric Problems: No    Discharge Risks or Barriers  Discharge risks or barriers?: Complex medical needs  Patient risk factors: Complex medical needs    Anticipated Discharge Information  Discharge Disposition: D/T to home under [facility] care in anticipation of  covered skilled care (06)  Discharge Address: [address]  [place] [address]

## 2025-03-26 NOTE — THERAPY
Physical Therapy   Initial Evaluation     Patient Name: Irina Traylor  Age:  84 y.o., Sex:  female  Medical Record #: [id number]  Today's Date: 3/26/2025     Precautions  Precautions: Fall Risk  Comments: baseline expressive aphasia    Assessment  Patient is 84 y.o. female with a diagnosis of heart failure, admitted with c/o shortness of breath. Patient lives alone at home and ambulates with a walker. She has a history of previous CVA, sick sinus syndrome status post pacemaker, hypertension. For the last few days, she has noted progressive worsening shortness of breath, generalized weakness, headache, cough.  .  Additional factors influencing patient status / progress : today, pt is very happy to get up to walk as she normally walks a mile a day using her 4ww. Pt tolerates ambulation x 200 feet today, reporting SOB by end but maintaining pulse ox of 92% on RA. Pt appears close to baseline, but will need to be up with nsg assist for hallway walking to build her endurance. See details below.      Plan         DC Equipment Recommendations: None  Discharge Recommendations: Recommend home health for continued physical therapy services (HH depending on progress, may not need HH if walks consistently with nsg to build her endurance.)  Patient will not be actively followed for physical therapy services at this time, however may be seen if requested by physician for 1 more visit within 30 days to address any discharge or equipment needs.             Objective       03/26/25 0845   Initial Contact Note    Initial Contact Note Order Received and Verified, Evaluation Only - Patient Does Not Require Further Acute Physical Therapy at this Time.  However, May Benefit from Post Acute Therapy for Higher Level Functional Deficits.   Precautions   Precautions Fall Risk   Comments baseline expressive aphasia   Vitals   Pulse 64  (with ambulation)   Pulse Oximetry 92 %  (by end of walking on RA, started at 88%)   O2 Delivery  Device None - Room Air   Pain 0 - 10 Group   Therapist Pain Assessment During Activity;Nurse Notified;0   Prior Living Situation   Prior Services Other (Comments)  (neighbors help by doing her grocery shopping and helping pt to wash her hair.)   Housing / Facility 1 Story House   Steps Into Home 0   Steps In Home 0   Equipment Owned 4-Wheel Walker   Lives with - Patient's Self Care Capacity Alone and Able to Care For Self   Prior Level of Functional Mobility   Bed Mobility Independent   Transfer Status Independent   Ambulation Independent   Ambulation Distance 1 mile per day with goal of 2 miles   Assistive Devices Used 4-Wheel Walker   History of Falls   History of Falls No   Date of Last Fall   (pt denies recent falls)   Cognition    Cognition / Consciousness WDL   Level of Consciousness Alert   Comments very motivated to walk. Eager for OOB   Active ROM Upper Body   Comments pt reports limited UE reaching at baseline.   Active ROM Lower Body    Active ROM Lower Body  X   Comments shallow L df, baseline since CVA   Strength Lower Body   Lower Body Strength  X   Comments grossly 3-/5 L df, better when pt focuses on gait.Pt reports prior therapy to address this   Balance Assessment   Sitting Balance (Static) Fair   Sitting Balance (Dynamic) Fair   Standing Balance (Static) Fair   Standing Balance (Dynamic) Fair   Weight Shift Sitting Good   Weight Shift Standing Good   Bed Mobility    Supine to Sit Minimal Assist   Sit to Supine   (up chair)   Scooting Supervised   Rolling   (pivoted and scooted to EOB)   Gait Analysis   Gait Level Of Assist Standby Assist   Assistive Device 4 Wheel Walker   Distance (Feet) 200   # of Times Distance was Traveled 1   Weight Bearing Status no restrictions   Functional Mobility   Sit to Stand Supervised   Bed, Chair, Wheelchair Transfer Supervised   Toilet Transfers Supervised  (pt asks to use toilet, no assist needed for wipe, but limited reach to toilet paper, easier at home per pt  report)   Transfer Method Stand Step   6 Clicks Assessment - How much HELP from from another person do you currently need... (If the patient hasn't done an activity recently, how much help from another person do you think he/she would need if he/she tried?)   Turning from your back to your side while in a flat bed without using bedrails? 3   Moving from lying on your back to sitting on the side of a flat bed without using bedrails? 3   Moving to and from a bed to a chair (including a wheelchair)? 4   Standing up from a chair using your arms (e.g., wheelchair, or bedside chair)? 4   Walking in hospital room? 4   Climbing 3-5 steps with a railing? 4   6 clicks Mobility Score 22   Activity Tolerance   Sitting in Chair 20+   Standing 6   Education Group   Education Provided Role of Physical Therapist   Role of Physical Therapist Patient Response Patient;Acceptance;Explanation;Verbal Demonstration   Additional Comments ed done re importance of consistent ambulation with nsg, ed done re use call button when needs toilet and ask nsg to accompany her on hallway walks. Pt agreeable and nsg/CNA updated.   Anticipated Discharge Equipment and Recommendations   DC Equipment Recommendations None   Discharge Recommendations Recommend home health for continued physical therapy services  (HH depending on progress, may not need HH if walks consistently with nsg to build her endurance.)   Interdisciplinary Plan of Care Collaboration   IDT Collaboration with  Nursing   Patient Position at End of Therapy Seated;Chair Alarm On;Call Light within Reach;Tray Table within Reach;Phone within Reach   Collaboration Comments nsg updated   Session Information   Date / Session Number  3/26-1x only, dc needs only

## 2025-03-26 NOTE — PROGRESS NOTES
Bedside report received from off going RN/tech: Ofelia, assumed care of patient.     Fall Risk Score: LOW RISK  Fall risk interventions in place: Provide patient/family education based on risk assessment, Educate patient/family to call staff for assistance when getting out of bed, Place fall precaution signage outside patient door, Place patient in room close to nursing station, Utilize bed/chair fall alarm, and Bed alarm connected correctly  Bed type: Regular (Sriram Score less than 17 interventions in place)  Patient on cardiac monitor: Yes  IVF/IV medications: Not Applicable   Oxygen: Room Air  Bedside sitter: Not Applicable   Isolation: Not applicable

## 2025-03-26 NOTE — CARE PLAN
The patient is Stable - Low risk of patient condition declining or worsening    Shift Goals  Clinical Goals: pt will report no SOB, VSS, safety  Patient Goals: rest/sleep  Family Goals: MAGDY    Progress made toward(s) clinical / shift goals:    Problem: Knowledge Deficit - Standard  Goal: Patient and family/care givers will demonstrate understanding of plan of care, disease process/condition, diagnostic tests and medications  Outcome: Progressing  Note: Patient educated on prescribed medication, the need to call for assistance before getting up, and the need for skin preventative interventions. pt verbalized understanding        Problem: Psychosocial  Goal: Patient's level of anxiety will decrease  Outcome: Progressing  Goal: Patient's ability to verbalize feelings about condition will improve  Outcome: Progressing     Problem: Communication  Goal: The ability to communicate needs accurately and effectively will improve  Outcome: Progressing  Note: Pt demonstrating adequate use of call-light and communicating needs effectively.     Problem: Respiratory  Goal: Patient will achieve/maintain optimum respiratory ventilation and gas exchange  Outcome: Progressing     Problem: Urinary Elimination  Goal: Establish and maintain regular urinary output  Outcome: Progressing     Problem: Mobility  Goal: Patient's capacity to carry out activities will improve  Outcome: Progressing  Note: Patient reported being up to chair for a couple hours in the afternoon before returning to bed at start of shift.      Problem: Fall Risk  Goal: Patient will remain free from falls  Outcome: Progressing  Note: Patient educated on fall interventions. Fall interventions/precautions in place. Patient remained free from falls.       Problem: Skin Integrity  Goal: Risk for impaired skin integrity will decrease  Outcome: Progressing  Note: Skin protective interventions in place (slide sheet, CAMERON mattress, offloading dressings, and barrier paste)          Patient is not progressing towards the following goals: NA

## 2025-03-27 NOTE — CARE PLAN
Problem: Discharge Barriers/Planning  Goal: Patient's continuum of care needs are met  Outcome: Progressing  Flowsheets (Taken 3/26/2025 1730)  Continuum of Care Needs:   Assessed for discharge barriers   Collaborated with Case Management/   Communicated discharge barriers to interdisciplinary tream   Provided and explained discharge instructions   Involved patient/family/support system in discharge process  Note: Cynthia ROMAN will contact patient within 24-48 hours per ED case manager      Problem: Care Map:  Day of Discharge Optimal Outcome for the Heart Failure Patient  Goal: Day of Discharge:  Optimal Care of the heart failure patient  Outcome: Progressing  Note: HF education completed. Patient given booklet.      The patient is Stable - Low risk of patient condition declining or worsening    Shift Goals: Pending echocardiogram   Clinical Goals: CHF education  Patient Goals: Discharge planning  Family Goals: MAGDY    Progress made toward(s) clinical / shift goals:  HF education    Patient is not progressing towards the following goals: patient refusing placement per OT recommendations, states has sufficient support at home

## 2025-03-27 NOTE — THERAPY
Occupational Therapy   Initial Evaluation     Patient Name: Irina Traylor  Age:  84 y.o., Sex:  female  Medical Record #: [id number]  Today's Date: 3/26/2025     Precautions  Precautions: (P) Fall Risk  Comments: (P) baseline expressive aphasia and BUE deficits    Assessment    Patient is a very pleasant 84 y.o. female with a PMHx significant for CVA, sick sinus syndrome status post pacemaker, hypertension. Presented to the hospital with c/o SOB. Pt lives alone in a single-story house and was receiving intermittent assist for ADLs through neighbors. Neighbors present and supportive but expressing they are unable to provide current level of assist needed and that they do not feel pt is safe to DC home.     Pt greeted and seen for OT eval and treatment. Required SPV for mobility and CGA - mod A for ADLs. Extensive education provided on role of OT in acute care vs HH, energy conservation techniques, home safety, compensatory strategies for ADLs, AE and pathology of bedrest. Pt expressing need for assist and frsutration that HH therapy does not consistently assist with showering. Extensive education provided on role of therapy to assist with maximizing occupational independence and need to discontinue services once patients have met their baseline. Educated on need to obtain information on resources available to pt if to DC home. Educated pt on concern of being home alone if neighbors out of town or unable to assist; pt declining need despite stating need for consistent assistance prior in the session. All questions answered. Explained reasoning for DC recommendation. Currently limited by impaired vision, cognition/safety awareness, coordination, AROM, strength, balance, activity tolerance, functional mobility and pain which are currently affecting patients ability to complete ADL/IADLs at baseline. Will continue to follow.    Plan    Occupational Therapy Initial Treatment Plan   Treatment Interventions: (P) Self  "Care / Activities of Daily Living, Adaptive Equipment, Neuro Re-Education / Balance, Therapeutic Exercises, Therapeutic Activity, Family / Caregiver Training  Treatment Frequency: (P) 3 Times per Week  Duration: (P) Until Therapy Goals Met    DC Equipment Recommendations: (P) Tub / Shower Seat, Hand Held Shower  Discharge Recommendations: (P) Other - (Pt would benefit from post acute placement prior to DC home; however, likely to decline and adamant about returning home with HH. HH OT not a long term solution for baseline deficits and pt should consider a more long-term support living environment.)     Objective     03/26/25 1653   Prior Living Situation   Prior Services Intermittent Physical Support for ADL Per Family   Housing / Facility 1 Story House   Steps Into Home 0   Steps In Home 0   Bathroom Set up Bathtub / Shower Combination;Shower Chair   Equipment Owned 4-Wheel Walker;Tub / Shower Seat   Lives with - Patient's Self Care Capacity Alone and Unable to Care For Self   Comments Pt lives alone in a SSH. Neighbors present and stating they are unable to provide the level of assist pt currently needs. Pt reports shower chair provided by care chest but is not fully functional; \"buttons don't work\" and it is \"too tall\" for patient.   Prior Level of ADL Function   Self Feeding Independent   Grooming / Hygiene Requires Assist  (brush hair)   Bathing Requires Assist  (wash hair)   Dressing Requires Assist   Toileting Independent   Comments Incontinent at baseline but reports managing with briefs/pads; does not change frequently enough   Prior Level of IADL Function   Shopping Requires Assist   Prior Level Of Mobility Independent With Device in Home   Precautions   Precautions Fall Risk   Comments baseline expressive aphasia and BUE deficits   Vitals   O2 Delivery Device None - Room Air   Vitals Comments VSS with no c/o dizziness or light headedness   Pain 0 - 10 Group   Therapist Pain Assessment During Activity;Post " "Activity Pain Same as Prior to Activity;Nurse Notified  (No c/o pain)   Cognition    Cognition / Consciousness X   Level of Consciousness Alert   Comments Impaired insight into current deficits and level of assist needed   Active ROM Upper Body   Active ROM Upper Body  X   Comments B shoulder flexion/abduction limited to ~ 40 degrees; baseline   Strength Upper Body   Upper Body Strength  X   Gross Strength Generalized Weakness, Equal Bilaterally.    Sensation Upper Body   Comments Denies N/T   Coordination Upper Body   Coordination X   Comments Impaired by baseline shoulder ROM limitations, weakness and arthritic changes to digits   Balance Assessment   Sitting Balance (Static) Fair   Sitting Balance (Dynamic) Fair   Standing Balance (Static) Fair   Standing Balance (Dynamic) Fair   Weight Shift Sitting Good   Weight Shift Standing Good   Comments 4WW in standing   Bed Mobility    Comments recliner pre and post   ADL Assessment   Bathing Moderate Assist   Upper Body Dressing Moderate Assist  (max A to remove sports bra; min A to doff/don gown)   Lower Body Dressing Moderate Assist   Toileting Contact Guard Assist   Comments Reports using AE at baseline but also reporting unable to bathe/dress self independently stating \"I need help\"   Functional Mobility   Sit to Stand Supervised   Bed, Chair, Wheelchair Transfer Supervised   Toilet Transfers Supervised   Tub / Shower Transfers Supervised   Transfer Method Stand Step   Visual Perception   Comments Denies changes; glasses at baseline   Edema / Skin Assessment   Comments scattered abrasions   Activity Tolerance   Comments Limited by weakness and fatigue   Patient / Family Goals   Patient / Family Goal #1 \"I want to go home\"   Short Term Goals   Short Term Goal # 1 Pt will be able to complete LB dressing with SPV   Short Term Goal # 2 Pt will be able to complete LB dressing with SPV and AE PRN   Short Term Goal # 3 Pt will be able to complete toileting ADLs including " clothing management with SPV   Education Group   Education Provided Energy Conservation;Home Safety;Role of Occupational Therapist;Activities of Daily Living;Adaptive Equipment;Pathology of bedrest   Role of Occupational Therapist Patient Response Patient;Other;Acceptance;Explanation;Verbal Demonstration   Energy Conservation Patient Response Patient;Other;Acceptance;Explanation;Verbal Demonstration   Home Safety Patient Response Patient;Other;Acceptance;Explanation;Verbal Demonstration   ADL Patient Response Patient;Other;Acceptance;Explanation;Verbal Demonstration   Adaptive Equipment Patient Response Patient;Other;Acceptance;Explanation;Verbal Demonstration   Pathology of Bedrest Patient Response Patient;Other;Acceptance;Explanation;Verbal Demonstration   Additional Comments Extensive education provided on role of OT in acute care vs HH, energy conservation techniques, home safety, compensatory strategies for ADLs, AE and pathology of bedrest. Pt expressing need for assist and frsutration that HH therapy does not consistently assist with showering. Extensive education provided on role of therapy to assist with maximizing occupational independence and need to discontinue services once patients have met their baseline. Educated on need to obtain information on resources available to pt if to DC home. Educated pt on concern of being home alone if neighbors out of town or unable to assist; pt declining need despite stating need for consistent assistance prior in the session. All questions answered. Explained reasoning for DC recommendation.   Occupational Therapy Initial Treatment Plan    Treatment Interventions Self Care / Activities of Daily Living;Adaptive Equipment;Neuro Re-Education / Balance;Therapeutic Exercises;Therapeutic Activity;Family / Caregiver Training   Treatment Frequency 3 Times per Week   Duration Until Therapy Goals Met   Problem List   Problem List Decreased Active Daily Living Skills;Decreased  Homemaking Skills;Decreased Upper Extremity Strength Right;Decreased Upper Extremity Strength Left;Decreased Upper Extremity AROM Right;Decreased Upper Extremity AROM Left;Decreased Upper Extremity PROM Right;Decreased Upper Extremity PROM Left;Decreased Functional Mobility;Decreased Activity Tolerance;Impaired Posture / Trunk Alignment;Impaired Coordination Right Upper Extremity;Impaired Coordination Left Upper Extremity;Impaired Postural Control / Balance;Impaired Vision   Anticipated Discharge Equipment and Recommendations   DC Equipment Recommendations Tub / Shower Seat;Hand Held Shower   Discharge Recommendations Other -  (Pt would benefit from post acute placement prior to DC home; however, likely to decline and adamant about returning home with HH. HH OT not a long term solution for baseline deficits and pt should consider a more long-term support living environment.)   Interdisciplinary Plan of Care Collaboration   IDT Collaboration with  Nursing;Other (See Comments)  (neighbors)   Patient Position at End of Therapy Seated;Chair Alarm On;Call Light within Reach;Tray Table within Reach;Phone within Reach;Family / Friend in Room   Collaboration Comments OT report and recs; RN updated   Session Information   Date / Session Number  3/26, 1 (1/3, 4/1)

## 2025-03-27 NOTE — PROGRESS NOTES
Patient discharged home with neighbors/friends. All personal belongings collected. IV access removed. Monitor removed, monitor room notified. Discharge instructions discussed. Medications reviewed; med's to bed delivered to patient. Follow up appointments scheduled. Patient escorted off unit via wheelchair without incident.

## 2025-03-28 ENCOUNTER — TELEPHONE (OUTPATIENT)
Dept: CARDIOLOGY | Facility: MEDICAL CENTER | Age: 85
End: 2025-03-28
Payer: MEDICARE

## 2025-03-28 ENCOUNTER — TELEPHONE (OUTPATIENT)
Dept: HEALTH INFORMATION MANAGEMENT | Facility: OTHER | Age: 85
End: 2025-03-28
Payer: MEDICARE

## 2025-03-28 ENCOUNTER — PATIENT OUTREACH (OUTPATIENT)
Dept: MEDICAL GROUP | Facility: PHYSICIAN GROUP | Age: 85
End: 2025-03-28
Payer: MEDICARE

## 2025-03-28 NOTE — PROGRESS NOTES
Transitional Care Management  TCM Outreach Date and Time: Filed (3/28/2025  3:04 PM)    Discharge Questions  Actual Discharge Date: 03/26/25  Now that you are home, how are you feeling?: Fair (able to speak in short sentences, cough remains but she says it is better; 146/86, 67bpm; CPAP machine was cleaned by her friend and she had the best breathing in a long time.)  Did you receive any new prescriptions?: Yes (jardiance, furosemide, spironolactone,)  Were you able to get them filled?: Yes  Meds to Bed or Pharmacy filled?: Meds to Bed  Do you have any questions about your current medications or new medications (Review Med Rec)?: No (patient states she received her new medications and took them today along with all of her regular medications)  Did you have any durable medical equipment ordered?: No  Do you have a follow up appointment scheduled with your PCP?: No  Was an appointment scheduled for the patient?: Yes  Appointment Date: 04/02/25  Appointment Time: 1500  Any issues or paperwork you wish to discuss with your PCP?: Yes (Please specify) (podiatry referral)  Are you (patient) able to get to the appointment?: Yes (she uses [employer] through [employer].)  If Home Health was ordered, have they contacted you (Patient): Yes (JENNIF[facility]  [phone])  Did you have enough support after your last discharge?: Yes (very little support. Will have  and has neighbors who can help sometimes)  Does this patient qualify for the CCM program?: Yes (currently enrolled)    Transitional Care  Number of attempts made to contact patient: 1  Current or previous attempts completed within two business days of discharge? : Yes  Provided education regarding treatment plan, medications, self-management, ADLs?: Yes (Labs ordered to be completed on 4-2-25 discussed possibly having that done after cardiology appt. Take BP  twice a day and bring to appts.  Ask home health for a new scale and she has already reqeusted a .)  Has patient  completed an Advanced Directive?: Yes  Is the patient's advanced directive on file?: Yes  Is there anything else I can help you with?: No    Discharge Summary  Chief Complaint: cough  Admitting Diagnosis: Heart failure (HCC) (I50.9)  Discharge Diagnosis: Acute on chronic heart failure with preserved ejection fraction (HFpEF, >= 50%)

## 2025-04-01 ENCOUNTER — APPOINTMENT (OUTPATIENT)
Dept: DERMATOLOGY | Facility: IMAGING CENTER | Age: 85
End: 2025-04-01
Payer: MEDICARE

## 2025-04-01 DIAGNOSIS — C44.619 BASAL CELL CARCINOMA, ARM, LEFT: ICD-10-CM

## 2025-04-01 DIAGNOSIS — D18.01 CHERRY ANGIOMA: ICD-10-CM

## 2025-04-01 DIAGNOSIS — L82.1 SEBORRHEIC KERATOSES: ICD-10-CM

## 2025-04-01 DIAGNOSIS — Z12.83 SKIN CANCER SCREENING: ICD-10-CM

## 2025-04-01 PROCEDURE — 99213 OFFICE O/P EST LOW 20 MIN: CPT | Performed by: STUDENT IN AN ORGANIZED HEALTH CARE EDUCATION/TRAINING PROGRAM

## 2025-04-01 NOTE — PROGRESS NOTES
[facility] Note    CC: Follow-Up       HPI:  Irina Traylor is a 83 y.o. female who presents today for follow up of basal cell carcinoma on left shoulder, upper body skin exam/DAVEY.    She was scheduled in February for excision of BCC on left upper arm, however had to cancel due to hospitalization. She is here to check on lesion today. Also requests upper body check.       ROS: no fevers/chills. Other pertinent positives and negatives as above.     Meds/PMH/PSH/FamHx/Allergies:  reviewed       PHYSICAL EXAM / IMPRESSION / PLAN::   An upper body skin exam was completed of the scalp, face, neck, chest, back, upper extremities with the following pertinent findings listed below.  Remaining above-listed examined areas within normal limits / negative for rashes or lesions.      Basal cell carcinoma, left shoulder   Exam: today, there is an atrophic white scar, no evidence of clinically residual BCC.      - discussed options, can continue to monitor for recurrence, shave biopsy vs full excision    - she elects to monitor given many recent illnesses, hospitalizations and no evidence of clinical lesion today.    - call sooner if new lumps or bumps, concerns     Seborrheic keratosis   Exam: scattered tan to brown verrucous papules and plaques on trunk and extremities     benign, reassurance     Cherry Angiomas   Exam: scattered 1-3mm bright red macules and thin papules on trunk and extremities     - benign, reassurance     Skin Cancer Screening and Prevention Counseling  - Recommend regular sun protection/sunscreen use, SPF 30 or greater with broad spectrum coverage need for reapplication every  minutes.   - Recommend broad brimmed hats, UPF sun protective clothing when outdoors for extended periods of time   - Avoid peak sun from 10 am to 3pm as possible         Follow up:  Return in about 6 months (around 10/1/2025) for DAVEY, sooner as needed for spots of concern.        MD [facility]  Dermatology

## 2025-04-01 NOTE — PROGRESS NOTES
CARDIOLOGY CLINIC NOTE      Date of Visit: 4/1/2025    Primary Care Provider: NAYAN Longo    Patient Name: Irina Traylor  YOB: 1940  MRN: [id number]     Reason for Visit:   Routine follow-up for chronic cardiovascular disease, preoperative cardiovascular evaluation     Patient Story:   Irina Traylor is an 84 year-old woman with a past medical history of prior embolic stroke with resultant aphasia, hypertension, obesity, obstructive sleep apnea, peripheral vascular disease, and sick sinus syndrome.  Briefly, she has a history of multiple strokes in the past and most recently was admitted on 6/20/2022 after experiencing dysphagia and slurred speech.  She was ultimately found to have an acute infarct in the left posterior temporal/occipital cortex.  Following this event, she was ordered for cardiac monitoring to assess for atrial fibrillation, which did not demonstrate any evidence of atrial fibrillation, however, she did have a 6.3 second sinus pause that was associated with near syncope.  She was sent to the emergency department and ultimately underwent permanent pacemaker implantation.    At her initial clinic visit she was noted to have significant lower extremity edema and her amlodipine was stopped.  She was started on HCTZ, but did not tolerate this due to frequent urination.  After stopping amlodipine, her edema essentially resolved.  With regard to her lipid control, she was previously recommended to start a PCSK9 inhibitor, however, this was unaffordable for her.  She was then tried on simvastatin-ezetimibe combination therapy, which she also did not tolerate.  Therefore, she has only been taking ezetimibe.    At her last clinic visit, she had no acute cardiac complaints, although she had not been not walking recently because her neighbors who assist her were on vacation.  Following that appointment, she unfortunately developed left thumb osteomyelitis for which  she was admitted on 4/8/2024.  She was ultimately discharged on daptomycin, which caused rhabdomyolysis and she was readmitted on 4/26/2024.  She was then scheduled for a ventral hernia operation and had a preoperative ECG showing a possible new LBBB.  A stress test was ordered for evaluation which showed no ischemia or infarction.  Following this she had an open umbilical hernia repair without cardiac complications.    She returns today for hospital follow-up.  On 3/25/2025 she presented to the hospital with shortness of breath.  She was hypoxemic she had an NT proBNP that was elevated at 3000 and hypervolemic on exam.  She was treated with IV diuretics.  During this admission she had an echocardiogram that showed preserved LV systolic function, mild LVH, moderate AI mildly elevated RVSP of 45 IVC was normal size with inspiratory collapse.  At discharge her NT proBNP was down to 1300.  Her weight was 143 pounds at discharge.  She was given dapagliflozin at discharge.    Tenisha is here alone today. She cares for herself with the help of [facility] and friends that  prescriptions. She feels well, she has a hard to time recalling what her symptoms were that brought her to seek medical attention. Today, she is not short of breath or dizzy. She is hungry.  She does monitor her weights and blood pressures at home although she cannot find the log of this today.  She denies lightheadedness or dizziness.  She has a POLST form at home.     Medications and Allergies:     Current Outpatient Medications   Medication Sig Dispense Refill    furosemide (LASIX) 20 MG Tab Take 1 Tablet by mouth every day. 30 Tablet 2    spironolactone (ALDACTONE) 25 MG Tab Take 1 Tablet by mouth every day. 30 Tablet 2    Empagliflozin (JARDIANCE) 10 MG Tab tablet Take 1 Tablet by mouth every day. 30 Tablet 2    irbesartan (AVAPRO) 150 MG Tab Take 1 tablet by mouth every day. 100 Tablet 3    ezetimibe (ZETIA) 10 MG Tab Take 1 tablet by  "mouth once daily (Patient taking differently: Take 10 mg by mouth every day.) 100 Tablet 3    verapamil ER (CALAN SR) 240 MG Tab CR Take 1 Tablet by mouth every day. 90 Tablet 3    polyethylene glycol/lytes (MIRALAX) Pack Take 1 Packet by mouth every day. While taking narcotics, hold if greater then 3 BMs a day 20 Each 0    vitamin E 1000 units (450 mg) Cap Take 1,000 Units by mouth every day.      Apoaequorin (PREVAGEN PO) Take 1 Tablet by mouth every day.      aspirin (ASA) 81 MG Chew Tab chewable tablet Chew 81 mg every day.         Nutritional Supplements (ADULT NUTRITIONAL SUPPLEMENT PO) Take 5-10 Tablets by mouth 1 time a day as needed (constiaption). Herb-Lax      Indications: constipation      B Complex-Biotin-FA Tab Take 1 Tablet by mouth every morning.     Indications: Supplement      magnesium oxide (MAG-OX) 400 MG Tab tablet Take 400 mg by mouth every morning. Indications: Supplement      Ascorbic Acid (VITAMIN C) 1000 MG Tab Take 3,000 mg by mouth every day. 3 tablets = 1,000 mg.      Indications: Supplement       No current facility-administered medications for this visit.     Allergies   Allergen Reactions    Benicar [Olmesartan] Rash     Blisters    Tolerated losartan 4/12/24.    Dilaudid  [Hydromorphone Hcl] Vomiting    Other Misc Shortness of Breath     Anesthesia with surgery 2016  Pt couldn't breathe while being put under and tapped side of bed to let RN know. \"It was the scariest feeling and I want everyone to know\"      Amlodipine Swelling    Codeine Vomiting    Demerol Vomiting    Diazepam Vomiting    Hydrocodone Vomiting    Nexletol [Bempedoic Acid] Palpitations     Chest pain and dizziness    Other Drug Vomiting     \"all pain meds\" per pt can take morphine.    Oxycodone Vomiting    Simvastatin Myalgia and Unspecified     Headache, fatigue    Statins [Hmg-Coa-R Inhibitors] Unspecified     Muscle cramping    Valium Vomiting    Mucinex Fast-Max Chest Joe Ms [Guaifenesin]      Vision/speech " problems      Medical Decision Making:   # Hospital follow for Acute HFpEF, stage c nyha 2 today  - She has diuresed well on the outpatient medications including lasix 20mg, spironolactone 25mg and dapagliflozin 10 mg.  She has a home health nurse who is monitoring her vitals and weights at home.  I have asked her to get her blood drawn to assess for any electrolyte disturbances or renal abnormalities.  Otherwise she should continue these current medications    # History of recurrent CVA, hyperlipidemia: She has not had detected atrial fibrillation on her PPM and her CVAs are most atherosclerotic in etiology.  She has not been able to tolerate multiple statins and is not willing to try these medications again due to intolerable side effects.  She is also refused assistance with obtaining a PCSK9 inhibitor.  -Continue aspirin 81 mg daily  -Continue ezetimibe 10 mg daily    # Hypertension: I would prefer to control her blood pressure with ARB and spironolactone and decrease dose of the Verapamil to 180mg to avoid bradycardia given her age and HFpEF    -Continue irbesartan 1501mg, increase to 300mg if blood pressures are uncontrolled (>140/90)  - Dec Verapamil to 180mg    # Carotid artery disease, peripheral vascular disease: This was only mild on her recent CTA of the head/neck.  -Medical management as above    # Sick sinus syndrome (s/p permanent pacemaker implantation): Remote monitoring has shown normal device function with minimal pacer requirement.  -Continue follow-up per [facility]    # Mild mitral regurgitation, moderate aortic insufficiency:  -Intermittent surveillance echocardiogram as indicated    # DNR on POLST: I reviewed her POLST from 2022 which is the most update we have in our system, it is DNR but Full treatment. Note will be sent to [email or web address] to review this with patient and confirm [facility] has most up today POLST.     Follow Up  3 months, labs now with Home Health     Cardiac Studies and  "Procedures:   Echocardiography  TTE (4/11/2024)  Normal left ventricular systolic function.   Estimated left ventricular ejection fraction 55 to 60%.  Right ventricle is not well-visualized.  Unable to estimate RVSP.  Indeterminate diastolic function due to mitral valve calcification  Moderate aortic insufficiency.  Normal IVC.    TTE (6/21/2021)  The left ventricular ejection fraction is normal; visually estimated to be 55%.  Mild concentric left ventricular hypertrophy.  At least grade 1 diastolic dysfunction, difficult assessment due to mitral valve calcification.  Thickened, calcified mitral valve leaflets.  Mild mitral regurgitation.  Mild aortic insufficiency.  Normal left atrial size.  Normal IVC.    CT/MRI  CTA head/neck (6/20/2022)  Mild bilateral internal carotid artery atherosclerotic plaque without significant stenosis    Electrophysiology  ECG (7/20/2024)  Normal sinus rhythm, atypical LBBB versus LVH with LAD, anterior Q waves in V1/V2 with poor R wave progression    2-week cardiac monitor (7/21/2021)  Predominantly sinus rhythm.   Significant sinus arrest.   Asymptomatic SVT and rare ectopy.      Vital Signs:   There were no vitals taken for this visit.   BP Readings from Last 4 Encounters:   03/26/25 121/78   03/24/25 128/70   03/13/25 126/82   02/18/25 114/60     Wt Readings from Last 4 Encounters:   03/26/25 65.1 kg (143 lb 8.3 oz)   03/24/25 64.9 kg (143 lb)   03/13/25 66.2 kg (146 lb)   02/18/25 66.2 kg (146 lb)     There is no height or weight on file to calculate BMI.     Laboratories:   Lipids  Lab Results   Component Value Date/Time     (H) 09/05/2023 11:44 AM    LDL 86 07/05/2023 09:49 AM    LDL 89 05/22/2023 09:45 AM     (H) 02/08/2023 10:36 AM    LDL 92 08/02/2022 10:40 AM       No results found for: \"LDLCALC\"  Lab Results   Component Value Date/Time    HDL 57 09/05/2023 11:44 AM    HDL 65 07/05/2023 09:49 AM    HDL 70 05/22/2023 09:45 AM    HDL 58 02/08/2023 10:36 AM    HDL " "65 08/02/2022 10:40 AM       Lab Results   Component Value Date/Time    TRIGLYCERIDE 58 09/05/2023 11:44 AM    TRIGLYCERIDE 83 07/05/2023 09:49 AM    TRIGLYCERIDE 54 05/22/2023 09:45 AM    TRIGLYCERIDE 67 02/08/2023 10:36 AM    TRIGLYCERIDE 41 08/02/2022 10:40 AM       Lab Results   Component Value Date/Time    CHOLSTRLTOT 169 09/05/2023 11:44 AM    CHOLSTRLTOT 168 07/05/2023 09:49 AM    CHOLSTRLTOT 170 05/22/2023 09:45 AM    CHOLSTRLTOT 177 02/08/2023 10:36 AM    CHOLSTRLTOT 165 08/02/2022 10:40 AM       No results found for: \"LIPOPROTA\"      Chemistries  Lab Results   Component Value Date/Time    CREATININE 0.97 03/26/2025 02:41 AM    CREATININE 0.81 03/25/2025 07:00 AM    CREATININE 0.99 03/24/2025 08:15 PM    CREATININE 1.01 09/25/2024 12:52 PM    CREATININE 1.44 (H) 07/09/2024 02:32 PM     Lab Results   Component Value Date/Time    BUN 45 (H) 03/26/2025 02:41 AM    BUN 39 (H) 03/25/2025 07:00 AM    BUN 46 (H) 03/24/2025 08:15 PM    BUN 36 (H) 09/25/2024 12:52 PM    BUN 60 (H) 07/09/2024 02:32 PM     Lab Results   Component Value Date/Time    POTASSIUM 3.7 03/26/2025 02:41 AM    POTASSIUM 3.7 03/25/2025 07:00 AM    POTASSIUM 4.7 03/24/2025 08:15 PM     Lab Results   Component Value Date/Time    SODIUM 138 03/26/2025 02:41 AM    SODIUM 139 03/25/2025 07:00 AM    SODIUM 137 03/24/2025 08:15 PM     Lab Results   Component Value Date/Time    GLUCOSE 80 03/26/2025 02:41 AM    GLUCOSE 118 (H) 03/25/2025 07:00 AM    GLUCOSE 83 03/24/2025 08:15 PM     Lab Results   Component Value Date/Time    ASTSGOT 40 03/24/2025 08:15 PM    ASTSGOT 20 09/25/2024 12:52 PM    ASTSGOT 28 05/13/2024 01:41 PM     Lab Results   Component Value Date/Time    ALTSGPT 31 03/24/2025 08:15 PM    ALTSGPT 12 09/25/2024 12:52 PM    ALTSGPT 16 05/13/2024 01:41 PM     Lab Results   Component Value Date/Time    ALKPHOSPHAT 119 (H) 03/24/2025 08:15 PM    ALKPHOSPHAT 78 09/25/2024 12:52 PM    ALKPHOSPHAT 84 05/13/2024 01:41 PM     Lab Results " "  Component Value Date/Time    HBA1C 5.4 08/02/2022 10:40 AM    HBA1C 5.5 06/20/2022 09:25 PM    HBA1C 5.7 (H) 07/08/2021 10:34 AM     No results found for: \"TSH\"  Lab Results   Component Value Date/Time    NTPROBNP 1312 (H) 03/26/2025 02:41 AM    NTPROBNP 3098 (H) 03/24/2025 08:15 PM    NTPROBNP 1306 (H) 04/26/2024 05:32 PM     Lab Results   Component Value Date/Time    TROPONINT 16 03/24/2025 08:15 PM    TROPONINT 29 (H) 04/26/2024 05:32 PM    TROPONINT 15 07/12/2022 05:53 PM     Blood Counts  Lab Results   Component Value Date/Time    HEMOGLOBIN 15.6 03/26/2025 02:41 AM    HEMOGLOBIN 15.9 03/25/2025 07:00 AM    HEMOGLOBIN 16.4 (H) 03/24/2025 06:38 PM     Lab Results   Component Value Date/Time    PLATELETCT 361 03/26/2025 02:41 AM    PLATELETCT 353 03/25/2025 07:00 AM    PLATELETCT 365 03/24/2025 06:38 PM     Lab Results   Component Value Date/Time    WBC 6.1 03/26/2025 02:41 AM    WBC 7.6 03/25/2025 07:00 AM    WBC 8.3 03/24/2025 06:38 PM      Physical Examination:   General: Uses walker to assist with ambulation, somewhat frail for age, but in no acute distress  Eyes: Extraocular movements intact, anicteric  Neck: Full range of motion, no jugular venous distension  Pulmonary: Normal respiratory effort, no distress  Cardiovascular: Regular rate, regular rhythm  Extremities: Warm and well perfused, no significant bilateral lower extremity edema  Neurological: Alert and oriented, moderate aphasia  Psychiatric: Normal affect, appropriate judgment     Past History:   Past Medical History  The patient's past medical history was reviewed.  See HPI and self-reported patient medical history form for pertinent medical history to consultation.    Past Social History  The patient's social history was reviewed.  See [facility] [place] self-reported patient medical history form for pertinent social history to consultation.    Past Family History  The patient's family history was reviewed.  See [facility] [place] self-reported patient medical history form " for pertinent family history to consultation.    Review of Systems  A pertinent cardiac review of systems was performed and was otherwise unremarkable except as per HPI and self-reported patient medical history form.

## 2025-04-02 ENCOUNTER — OFFICE VISIT (OUTPATIENT)
Dept: CARDIOLOGY | Facility: MEDICAL CENTER | Age: 85
End: 2025-04-02
Attending: PHYSICIAN ASSISTANT
Payer: MEDICARE

## 2025-04-02 ENCOUNTER — OFFICE VISIT (OUTPATIENT)
Dept: MEDICAL GROUP | Facility: PHYSICIAN GROUP | Age: 85
End: 2025-04-02
Payer: MEDICARE

## 2025-04-02 VITALS
HEIGHT: 59 IN | TEMPERATURE: 98 F | DIASTOLIC BLOOD PRESSURE: 60 MMHG | BODY MASS INDEX: 27.96 KG/M2 | SYSTOLIC BLOOD PRESSURE: 94 MMHG | OXYGEN SATURATION: 96 % | HEART RATE: 61 BPM | WEIGHT: 138.67 LBS | RESPIRATION RATE: 14 BRPM

## 2025-04-02 VITALS
HEIGHT: 59 IN | OXYGEN SATURATION: 94 % | SYSTOLIC BLOOD PRESSURE: 92 MMHG | DIASTOLIC BLOOD PRESSURE: 54 MMHG | BODY MASS INDEX: 26.81 KG/M2 | WEIGHT: 133 LBS | RESPIRATION RATE: 16 BRPM | HEART RATE: 60 BPM

## 2025-04-02 DIAGNOSIS — G31.84 MILD COGNITIVE IMPAIRMENT: ICD-10-CM

## 2025-04-02 DIAGNOSIS — I10 HYPERTENSION, ESSENTIAL: ICD-10-CM

## 2025-04-02 DIAGNOSIS — N18.31 CHRONIC KIDNEY DISEASE, STAGE 3A: ICD-10-CM

## 2025-04-02 DIAGNOSIS — R47.01 APHASIA: ICD-10-CM

## 2025-04-02 DIAGNOSIS — Z09 HOSPITAL DISCHARGE FOLLOW-UP: Primary | ICD-10-CM

## 2025-04-02 DIAGNOSIS — I50.31 ACUTE HEART FAILURE WITH PRESERVED EJECTION FRACTION (HCC): ICD-10-CM

## 2025-04-02 DIAGNOSIS — Z86.73 HISTORY OF CVA (CEREBROVASCULAR ACCIDENT): ICD-10-CM

## 2025-04-02 PROCEDURE — 99213 OFFICE O/P EST LOW 20 MIN: CPT | Performed by: PHYSICIAN ASSISTANT

## 2025-04-02 PROCEDURE — 99212 OFFICE O/P EST SF 10 MIN: CPT | Performed by: PHYSICIAN ASSISTANT

## 2025-04-02 PROCEDURE — 99214 OFFICE O/P EST MOD 30 MIN: CPT | Performed by: PHYSICIAN ASSISTANT

## 2025-04-02 PROCEDURE — RXMED WILLOW AMBULATORY MEDICATION CHARGE: Performed by: PHYSICIAN ASSISTANT

## 2025-04-02 RX ORDER — VERAPAMIL HYDROCHLORIDE 180 MG/1
180 TABLET, FILM COATED, EXTENDED RELEASE ORAL DAILY
Qty: 100 TABLET | Refills: 3 | Status: SHIPPED | OUTPATIENT
Start: 2025-04-02

## 2025-04-02 ASSESSMENT — ENCOUNTER SYMPTOMS
COUGH: 0
HEARTBURN: 0
INSOMNIA: 0
WEIGHT LOSS: 0
BACK PAIN: 0
DIARRHEA: 0
DEPRESSION: 0
CONSTIPATION: 0
SHORTNESS OF BREATH: 0
HEADACHES: 0

## 2025-04-02 ASSESSMENT — FIBROSIS 4 INDEX
FIB4 SCORE: 1.63
FIB4 SCORE: 1.63

## 2025-04-02 NOTE — PROGRESS NOTES
Subjective:     Irina Traylor is a 84 y.o. female who presents for Hospital Follow-up.    Transitional Care Management  TCM Outreach Date and Time: Filed (3/28/2025  3:04 PM)    Discharge Questions  Actual Discharge Date: 03/26/25  Now that you are home, how are you feeling?: Fair (able to speak in short sentences, cough remains but she says it is better; 146/86, 67bpm; CPAP machine was cleaned by her friend and she had the best breathing in a long time.)  Did you receive any new prescriptions?: Yes (jardiance, furosemide, spironolactone,)  Were you able to get them filled?: Yes  Meds to Bed or Pharmacy filled?: Meds to Bed  Do you have any questions about your current medications or new medications (Review Med Rec)?: No (patient states she received her new medications and took them today along with all of her regular medications)  Did you have any durable medical equipment ordered?: No  Do you have a follow up appointment scheduled with your PCP?: No  Was an appointment scheduled for the patient?: Yes  Appointment Date: 04/02/25  Appointment Time: 1500  Any issues or paperwork you wish to discuss with your PCP?: Yes (Please specify) (podiatry referral)  Are you (patient) able to get to the appointment?: Yes (she uses [employer] through [facility].)  If Home Health was ordered, have they contacted you (Patient): Yes (JENNIFER  [phone])  Did you have enough support after your last discharge?: Yes (very little support. Will have  and has neighbors who can help sometimes)  Does this patient qualify for the CCM program?: Yes (currently enrolled)    Transitional Care  Number of attempts made to contact patient: 1  Current or previous attempts completed within two business days of discharge? : Yes  Provided education regarding treatment plan, medications, self-management, ADLs?: Yes (Labs ordered to be completed on 4-2-25 discussed possibly having that done after cardiology appt. Take BP  twice a day and bring to appts.   "Ask home health for a new scale and she has already reqeusted a .)  Has patient completed an Advanced Directive?: Yes  Is the patient's advanced directive on file?: Yes  Is there anything else I can help you with?: No (I did contact her  CHW Dede Guadarrama and suggested a follow up call on Monday.)    Discharge Summary  Chief Complaint: cough  Admitting Diagnosis: Heart failure (HCC) (I50.9)  Discharge Diagnosis: Acute on chronic heart failure with preserved ejection fraction (HFpEF, >= 50%)    HPI:   Recently hospitalized for heart failure exacerbation, per D/C summary:  \"Patient Presents on 3/25/2025 with worsening shortness of breath.  This is a pleasant woman with a history of previous stroke, sick sinus syndrome status post pacemaker, primary pretension, and obstructive sleep apnea on CPAP.     Patient reportedly being alone at home usually ambulates with walker.  For the past few days, she has noted progressively worsening shortness of breath, generalized weakness, headache, and cough.  She reported being compliant with her medications.  She initially went to the urgent care center and obtained a chest x-ray, that showed possible pulmonary edema versus atypical infection prompting recommendation to go to the emergency room for further evaluation.     In the emergency room, patient was noted to be hypoxic requiring supplemental oxygen.  NT-proBNP was elevated at 3098.  There was concern for fluid overload with respiratory crackles and pitting edema in the lower extremities.  Chest x-ray showing pulmonary edema.  She was started on forced IV diuresis with furosemide 40 was IV twice daily with breathing treatments with albuterol.  Patient's last echocardiogram in April 2024 showed moderate aortic insufficiency with normal ejection fraction of 55%.       Due to concern for acute exacerbation of heart failure with preserved ejection fraction, patient was admitted to the telemetry floor for further care " "and evaluation.  She was continued on IV diuresis.  Repeat echocardiogram was performed, which again showed normal ejection fraction of 55-60 %.  Elevated right ventricular systolic pressure of 45 minutes of mercury consistent with pulmonary hypertension.  Mild pulmonic valve insufficiency with mild tricuspid regurgitation.  Moderate aortic insufficiency.  Mild concentric left ventricular hypertrophy.  Compared to previous study, increased estimated PASP was noted.     Patient was able to wean off of oxygen back to room air.  She did require supplemental oxygen at nighttime as she did not have her CPAP.  Given the findings, the concern was that the patient's CPAP may need to be readjusted.  Patient was advised to go back to her sleep medicine physician for reassessment.  Outpatient referral for sleep medicine was placed prior to discharge.  She was also started on low-dose furosemide and spironolactone for ongoing diuresis due to concern for fluid retention.  She was also started on Farxiga for HFpEF.  Patient was discharged to home with home health as per her request. \"    Current medicines (including reconciliation performed today)  Current Outpatient Medications   Medication Sig Dispense Refill   • verapamil ER (CALAN SR) 180 MG Tab CR Take 1 Tablet by mouth every day. 100 Tablet 3   • furosemide (LASIX) 20 MG Tab Take 1 Tablet by mouth every day. 30 Tablet 2   • spironolactone (ALDACTONE) 25 MG Tab Take 1 Tablet by mouth every day. 30 Tablet 2   • Empagliflozin (JARDIANCE) 10 MG Tab tablet Take 1 Tablet by mouth every day. 30 Tablet 2   • irbesartan (AVAPRO) 150 MG Tab Take 1 tablet by mouth every day. 100 Tablet 3   • ezetimibe (ZETIA) 10 MG Tab Take 1 tablet by mouth once daily (Patient taking differently: Take 10 mg by mouth every day.) 100 Tablet 3   • polyethylene glycol/lytes (MIRALAX) Pack Take 1 Packet by mouth every day. While taking narcotics, hold if greater then 3 BMs a day 20 Each 0   • vitamin E " 1000 units (450 mg) Cap Take 1,000 Units by mouth every day.     • Apoaequorin (PREVAGEN PO) Take 1 Tablet by mouth every day.     • aspirin (ASA) 81 MG Chew Tab chewable tablet Chew 81 mg every day.        • Nutritional Supplements (ADULT NUTRITIONAL SUPPLEMENT PO) Take 5-10 Tablets by mouth 1 time a day as needed (constiaption). Herb-Lax      Indications: constipation     • B Complex-Biotin-FA Tab Take 1 Tablet by mouth every morning.     Indications: Supplement     • magnesium oxide (MAG-OX) 400 MG Tab tablet Take 400 mg by mouth every morning. Indications: Supplement     • Ascorbic Acid (VITAMIN C) 1000 MG Tab Take 3,000 mg by mouth every day. 3 tablets = 1,000 mg.      Indications: Supplement       No current facility-administered medications for this visit.       Allergies:   Benicar [olmesartan], Dilaudid  [hydromorphone hcl], Other misc, Amlodipine, Codeine, Demerol, Diazepam, Hydrocodone, Nexletol [bempedoic acid], Other drug, Oxycodone, Simvastatin, Statins [hmg-coa-r inhibitors], Valium, and Mucinex fast-max chest mary ms [guaifenesin]    Social History     Tobacco Use   • Smoking status: Never   • Smokeless tobacco: Never   Vaping Use   • Vaping status: Never Used   Substance Use Topics   • Alcohol use: Not Currently   • Drug use: No       ROS:  Review of Systems   Constitutional:  Positive for malaise/fatigue. Negative for weight loss.   Respiratory:  Negative for cough and shortness of breath.    Cardiovascular:  Negative for leg swelling.   Gastrointestinal:  Negative for constipation, diarrhea and heartburn.   Genitourinary:  Negative for dysuria, frequency and urgency.   Musculoskeletal:  Negative for back pain.   Neurological:  Negative for headaches.   Psychiatric/Behavioral:  Negative for depression. The patient does not have insomnia.          Objective:     Vitals:    04/02/25 1430   BP: 94/60   Pulse: 61   Resp: 14   Temp: 36.7 °C (98 °F)   TempSrc: Temporal   SpO2: 96%   Weight: 62.9 kg (138  "lb 10.7 oz)   Height: 1.499 m (4' 11\")     Body mass index is 28.01 kg/m².    Physical Exam:  Physical Exam  Constitutional:       General: She is not in acute distress.     Appearance: Normal appearance. She is not ill-appearing.   HENT:      Head: Normocephalic and atraumatic.      Right Ear: Tympanic membrane and ear canal normal. There is no impacted cerumen.      Left Ear: Tympanic membrane and ear canal normal. There is no impacted cerumen.      Nose: Nose normal.      Mouth/Throat:      Mouth: Mucous membranes are moist.      Pharynx: Oropharynx is clear. No posterior oropharyngeal erythema.   Eyes:      Extraocular Movements: Extraocular movements intact.      Conjunctiva/sclera: Conjunctivae normal.      Pupils: Pupils are equal, round, and reactive to light.   Cardiovascular:      Rate and Rhythm: Normal rate and regular rhythm.      Heart sounds: No murmur heard.  Pulmonary:      Effort: Pulmonary effort is normal. No respiratory distress.      Breath sounds: Normal breath sounds. No wheezing.   Abdominal:      General: Abdomen is flat. There is no distension.      Palpations: Abdomen is soft.      Tenderness: There is no abdominal tenderness.   Musculoskeletal:         General: Normal range of motion.      Cervical back: Normal range of motion.      Right lower leg: No edema.      Left lower leg: No edema.   Lymphadenopathy:      Cervical: No cervical adenopathy.   Skin:     General: Skin is warm and dry.      Capillary Refill: Capillary refill takes less than 2 seconds.   Neurological:      General: No focal deficit present.      Mental Status: She is alert and oriented to person, place, and time.      Deep Tendon Reflexes: Reflexes normal.   Psychiatric:         Mood and Affect: Mood normal.         Assessment and Plan:   1. Hospital discharge follow-up    - Chart and discharge summary were reviewed.   - Hospitalization and results reviewed with patient.   - Medications reviewed including instructions " regarding high risk medications, dosing and side effects.  - Recommended Services: No services needed at this time  - Advance directive/POLST on file?  Yes    Follow-up:No follow-ups on file.    Face-to-face transitional care management services with [facility] (today's visit is within 14 days post discharge & LACE+ score of 28-58) medical decision complexity were provided.

## 2025-04-04 ENCOUNTER — PHARMACY VISIT (OUTPATIENT)
Dept: PHARMACY | Facility: MEDICAL CENTER | Age: 85
End: 2025-04-04
Payer: COMMERCIAL

## 2025-04-08 ENCOUNTER — TELEPHONE (OUTPATIENT)
Dept: MEDICAL GROUP | Facility: PHYSICIAN GROUP | Age: 85
End: 2025-04-08
Payer: MEDICARE

## 2025-04-08 NOTE — TELEPHONE ENCOUNTER
[facility] Medical examiners called stating patient passed away and are wanting to see if you would sign death certificate. They will send it to your online queue and are okay waiting until pcp is back in office for this.

## 2025-04-16 NOTE — TELEPHONE ENCOUNTER
Attempted to complete death certificate this evening. Unable to sign as place of death not able to be changed. Medical examiner called and will reach out to mortuary to updated. Advised to check in AM.

## 2025-06-13 ENCOUNTER — TELEPHONE (OUTPATIENT)
Dept: CARDIOLOGY | Facility: MEDICAL CENTER | Age: 85
End: 2025-06-13

## 2025-06-13 NOTE — TELEPHONE ENCOUNTER
Unable to reach pt in regards to her outstanding lab order. VM not set up on either phone number that we have on file.

## 2025-06-17 NOTE — TELEPHONE ENCOUNTER
Upon further chart review, 4/8/25 encounter states that pt passed away. I canceled pt's upcoming appt with JA on 7/2/25.

## 2025-07-02 ENCOUNTER — APPOINTMENT (OUTPATIENT)
Dept: CARDIOLOGY | Facility: MEDICAL CENTER | Age: 85
End: 2025-07-02
Attending: PHYSICIAN ASSISTANT

## 2025-07-08 ENCOUNTER — PATIENT OUTREACH (OUTPATIENT)
Dept: HEALTH INFORMATION MANAGEMENT | Facility: OTHER | Age: 85
End: 2025-07-08

## 2025-07-08 DIAGNOSIS — E78.00 PURE HYPERCHOLESTEROLEMIA: Primary | ICD-10-CM

## 2025-07-08 NOTE — PROGRESS NOTES
07/08/25 - Monthly outreach attempt #1, left VM.  07/09/25 - Monthly outreach attempt #2, left VM. Closing encounter.

## (undated) DEVICE — LACTATED RINGERS INJ 1000 ML - (14EA/CA 60CA/PF)

## (undated) DEVICE — NEPTUNE 4 PORT MANIFOLD - (20/PK)

## (undated) DEVICE — SUTURE GENERAL

## (undated) DEVICE — PACK MINOR BASIN - (2EA/CA)

## (undated) DEVICE — GOWN WARMING STANDARD FLEX - (30/CA)

## (undated) DEVICE — SLEEVE, SEQUENTIAL CALF REG

## (undated) DEVICE — SPONGE GAUZE STER 4X4 8-PL - (2/PK 50PK/BX 12BX/CS)

## (undated) DEVICE — SODIUM CHL. IRRIGATION 0.9% 3000ML (4EA/CA 65CA/PF)

## (undated) DEVICE — DRAPE LARGE 3 QUARTER - (20/CA)

## (undated) DEVICE — HANDPIECE 10FT INTPLS SCT PLS IRRIGATION HAND CONTROL SET (6/PK)

## (undated) DEVICE — DRAPE ABDOMINAL STERILE LAPAROSCOPIC 102IN X 121IN 77IN (12EA/CA)

## (undated) DEVICE — GLOVE SZ 7.5 LF PROTEXIS (50PR/BX)

## (undated) DEVICE — SUCTION INSTRUMENT YANKAUER BULBOUS TIP W/O VENT (50EA/CA)

## (undated) DEVICE — TIP INTPLS HFLO ML ORFC BTRY - (12/CS)  FOR SURGILAV

## (undated) DEVICE — WATER IRRIG. STER. 1500 ML - (9/CA)

## (undated) DEVICE — BLADE SURGICAL #15 - (50/BX 3BX/CA)

## (undated) DEVICE — TUBE CONNECTING SUCTION - CLEAR PLASTIC STERILE 72 IN (50EA/CA)

## (undated) DEVICE — BONE MILL BM210

## (undated) DEVICE — GOWN SURGICAL XX-LARGE - (28EA/CA) SIRUS NON REINFORCED

## (undated) DEVICE — BLADE RECIP 77.5 X 11.2 X .76MM (1/EA)

## (undated) DEVICE — GLOVE BIOGEL PI ORTHO SZ 8.5 PF LF (40/BX)

## (undated) DEVICE — GLOVE BIOGEL PI INDICATOR SZ 6.5 SURGICAL PF LF - (50/BX 4BX/CA)

## (undated) DEVICE — SODIUM CHL IRRIGATION 0.9% 1000ML (12EA/CA)

## (undated) DEVICE — PACK TOTAL HIP - (1/CA)

## (undated) DEVICE — PROTECTOR ULNA NERVE - (36PR/CA)

## (undated) DEVICE — SYRINGE 10 ML CONTROL LL (25EA/BX 4BX/CA)

## (undated) DEVICE — SURGIFOAM (12X7) - (12EA/CA)

## (undated) DEVICE — Device

## (undated) DEVICE — BANDAGE ELASTIC STERILE VELCRO 6 X 5 YDS (25EA/CA)

## (undated) DEVICE — GLOVE BIOGEL INDICATOR SZ 6.5 SURGICAL PF LTX - (50PR/BX 4BX/CA)

## (undated) DEVICE — CHLORAPREP 26 ML APPLICATOR - ORANGE TINT(25/CA)

## (undated) DEVICE — TOOL DISSECT MATCH HEAD

## (undated) DEVICE — TRAY SKIN SCRUB PVP WET (20EA/CA) PART #DYND70356 DISCONTINUED

## (undated) DEVICE — DRESSING AQUACEL AG ADVANTAGE 3.5 X 10" (10EA/BX)"

## (undated) DEVICE — SET LEADWIRE 5 LEAD BEDSIDE DISPOSABLE ECG (1SET OF 5/EA)

## (undated) DEVICE — MASK ANESTHESIA ADULT  - (100/CA)

## (undated) DEVICE — COVER MAYO STAND X-LG - (22EA/CA)

## (undated) DEVICE — PATTIES SURG X-RAYCOTTONOID - 1/2 X 3 IN (200/CA)

## (undated) DEVICE — GLOVE BIOGEL SZ 6.5 SURGICAL PF LTX (50PR/BX 4BX/CA)

## (undated) DEVICE — GLOVE BIOGEL PI ORTHO SZ 6 1/2 SURGICAL PF LF (40PR/BX)

## (undated) DEVICE — SUTURE 2-0 VICRYL PLUS CT-1 36 (36PK/BX)"

## (undated) DEVICE — GOWN SURGEONS LARGE - (32/CA)

## (undated) DEVICE — ADHESIVE DERMABOND HVD MINI (12EA/BX)

## (undated) DEVICE — SPONGE GAUZESTER 4 X 4 4PLY - (128PK/CA)

## (undated) DEVICE — PACK MAJOR ORTHO - (2EA/CA)

## (undated) DEVICE — BANDAGE ELASTIC 6 IN X 5 YDS - LATEX FREE (10/BX)

## (undated) DEVICE — GLOVE BIOGEL PI ULTRATOUCH SZ 7.5 SURGICAL PF LF -(50/BX 4BX/CA)

## (undated) DEVICE — NEEDLE NON SAFETY HYPO 22 GA X 1 1/2 IN (100/BX)

## (undated) DEVICE — DRAPETIBURON SHOULDER W/POUCH - (5EA/CA)

## (undated) DEVICE — BLADE SAGITTAL SYSTEM 18MM

## (undated) DEVICE — BLOCK

## (undated) DEVICE — TUBING C&T SET FLYING LEADS DRAIN TUBING (10EA/BX)

## (undated) DEVICE — NEEDLE SAFETY 18 GA X 1 1/2 IN (100EA/BX)

## (undated) DEVICE — STOCKINETTE IMPERVIOUS 12X48 - STERILELF (10/CA)"

## (undated) DEVICE — SUTURE 2-0 MONOCRYL PLUS UNDYED CT-1 1 X 36 (36EA/BX)"

## (undated) DEVICE — KIT ROOM DECONTAMINATION

## (undated) DEVICE — IMMOBILIZER KNEE 20 INCH - (1/EA)

## (undated) DEVICE — GLOVE, LITE (PAIR)

## (undated) DEVICE — GLOVE BIOGEL INDICATOR SZ 8 SURGICAL PF LTX - (50/BX 4BX/CA)

## (undated) DEVICE — SPIDER SHOULDER HOLDER (12EA/BX)

## (undated) DEVICE — DRAPE C-ARM LARGE 41IN X 74 IN - (10/BX 2BX/CA)

## (undated) DEVICE — TUBING CLEARLINK DUO-VENT - C-FLO (48EA/CA)

## (undated) DEVICE — KIT ANESTHESIA W/CIRCUIT & 3/LT BAG W/FILTER (20EA/CA)

## (undated) DEVICE — GLOVE BIOGEL PI INDICATOR SZ 7.0 SURGICAL PF LF - (50/BX 4BX/CA)

## (undated) DEVICE — BAG, SPONGE COUNT 50600

## (undated) DEVICE — GOWN SURGEONS X-LARGE - DISP. (30/CA)

## (undated) DEVICE — HEAD HOLDER JUNIOR/ADULT

## (undated) DEVICE — GLOVE BIOGEL SZ 7 SURGICAL PF LTX - (50PR/BX 4BX/CA)

## (undated) DEVICE — SUTURE 0 ETHIBOND CT-1 - (12/BX) 18 INCH

## (undated) DEVICE — TOURNIQUET CUFF 34 X 4 ONE PORT DISP - STERILE (10/BX)

## (undated) DEVICE — GLOVE BIOGEL PI INDICATOR SZ 8.0 SURGICAL PF LF -(50/BX 4BX/CA)

## (undated) DEVICE — TUBE E-T HI-LO CUFF 7.0MM (10EA/PK)

## (undated) DEVICE — ELECTRODE 850 FOAM ADHESIVE - HYDROGEL RADIOTRNSPRNT (50/PK)

## (undated) DEVICE — SET SUCT.W/SLEEVE VIA-GUARD - (10/BX10BX/CA)

## (undated) DEVICE — SUTURE 4-0 VICRYL PLUS FS-2 - 27 INCH (36/BX)

## (undated) DEVICE — TAPE CLOTH MEDIPORE 6 INCH - (12RL/CA)

## (undated) DEVICE — SENSOR SPO2 NEO LNCS ADHESIVE (20/BX) SEE USER NOTES

## (undated) DEVICE — WATER IRRIGATION STERILE 1000ML (12EA/CA)

## (undated) DEVICE — SUTURE 2-0 VICRYL PLUS CT-1 - 8 X 18 INCH(12/BX)

## (undated) DEVICE — CANISTER SUCTION 3000ML MECHANICAL FILTER AUTO SHUTOFF MEDI-VAC NONSTERILE LF DISP  (40EA/CA)

## (undated) DEVICE — GLOVE BIOGEL SZ 7.5 SURGICAL PF LTX - (50PR/BX 4BX/CA)

## (undated) DEVICE — DRESSING ABDOMINAL PAD STERILE 8 X 10" (360EA/CA)"

## (undated) DEVICE — HEMOSTAT SURG ABSORBABLE - 2 X 3 IN SURGICEL (24EA/CA)

## (undated) DEVICE — PADDING CAST 6 IN X 4 YDS - SOF-ROLL (6RL/BG 6BG/CA)

## (undated) DEVICE — GLOVE BIOGEL PI ORTHO SZ 7.5 PF LF (40PR/BX)

## (undated) DEVICE — CANISTER SUCTION RIGID RED 1500CC (40EA/CA)

## (undated) DEVICE — ELECTRODE DUAL RETURN W/ CORD - (50/PK)

## (undated) DEVICE — KIT SURGIFLO W/OUT THROMBIN - (6EA/CA)

## (undated) DEVICE — DRAPE IOBAN II INCISE 23X17 - (10EA/BX 4BX/CA)

## (undated) DEVICE — STERI STRIP COMPOUND BENZOIN - TINCTURE 0.6ML WITH APPLICATOR (40EA/BX)

## (undated) DEVICE — SLEEVE, VASO, THIGH, MED

## (undated) DEVICE — GLOVE BIOGEL PI ORTHO SZ 8 PF LF (40PR/BX)

## (undated) DEVICE — MIXER BONE CEMENT REVOLUTION - W/FEMORAL PRESSURIZER (6/CA)

## (undated) DEVICE — SUTURE 3-0 VICRYL PLUS SH - 8X 18 INCH (12/BX)

## (undated) DEVICE — DRAPE U ORTHOPEDIC - (10/BX)

## (undated) DEVICE — DISSECT TOOL MIDAS F2/8TA23

## (undated) DEVICE — DRAPE SURGICAL U 77X120 - (10/CA)

## (undated) DEVICE — GLOVE BIOGEL SZ 8 SURGICAL PF LTX - (50PR/BX 4BX/CA)

## (undated) DEVICE — SYRINGE DISP. 60 CC LL - (30/BX, 12BX/CA)**WHEN THESE ARE GONE ORDER #500206**

## (undated) DEVICE — NEEDLE SPINAL NON-SAFETY 18 GA X 3 IN (25EA/BX)

## (undated) DEVICE — SHEET PEDIATRIC LAPAROTOMY - (10/CA)

## (undated) DEVICE — FIBERWIRE 2.0 (12EA/BX)

## (undated) DEVICE — SUTURE 0 ETHIBOND MO6 C/R - (12/BX) 8-18 INCH ETHICON

## (undated) DEVICE — TOWEL STOP TIMEOUT SAFETY FLAG (40EA/CA)

## (undated) DEVICE — STOCKING THIGH HIGH MEDIUM - REG. (6PR/BX)

## (undated) DEVICE — GLOVE BIOGEL PI ORTHO SZ 7 PF LF (40PR/BX)

## (undated) DEVICE — PACK NEURO - (2EA/CA)

## (undated) DEVICE — BANDAGE ROLL STERILE BULKEE 4.5 IN X 4 YD (100EA/CA)

## (undated) DEVICE — LACTATED RINGERS INJ. 500 ML - (24EA/CA)

## (undated) DEVICE — KIT EVACUATER 3 SPRING PVC LF 1/8 DRAIN SIZE (10EA/CA)"

## (undated) DEVICE — SLEEVE VASO DVT COMPRESSION CALF MED - (10PR/CA)

## (undated) DEVICE — MIDAS LUBRICATOR DIFFUSER PACK (4EA/CA)

## (undated) DEVICE — SUTURE 0 VICRYL PLUS CT-1 - 8 X 18 INCH (12/BX)

## (undated) DEVICE — DRESSING 3X8 ADAPTIC GAUZE - NON-ADHERING (36/PK 6PK/BX)

## (undated) DEVICE — HUMID-VENT HEAT AND MOISTURE EXCHANGE- (50/BX)

## (undated) DEVICE — BOVIE BLADE COATED &INSULATED - 25/PK

## (undated) DEVICE — SET EXTENSION WITH 2 PORTS (48EA/CA) ***PART #2C8610 IS A SUBSTITUTE*****

## (undated) DEVICE — GLOVE BIOGEL INDICATOR SZ 7.5 SURGICAL PF LTX - (50PR/BX 4BX/CA)

## (undated) DEVICE — CLOSURE SKIN STRIP 1/2 X 4 IN - (STERI STRIP) (50/BX 4BX/CA)

## (undated) DEVICE — PAD UNIVERSAL MULTI USE (1/EA)

## (undated) DEVICE — BLADE SAGITTAL 6 SYSTEM 25MM

## (undated) DEVICE — APPLICATOR COTTON TIP 6 IN - STERILE (2EA/PK 100PK/BX)

## (undated) DEVICE — MASK, LARYNGEAL AIRWAY #4

## (undated) DEVICE — DRAPE 36X28IN RAD CARM BND BG - (25/CA) O

## (undated) DEVICE — SUTURE 0 VICRYL PLUS CT-1 - 36 INCH (36/BX)

## (undated) DEVICE — DRILL BIT

## (undated) DEVICE — SYRINGE 30 ML LL (56/BX)

## (undated) DEVICE — GLOVE LITE HANDLE DISP. - (1/PK 80PK/CS)

## (undated) DEVICE — DRAPE MICROSCOPE X-LONG (10EA/CA)

## (undated) DEVICE — CATHETER IV 20 GA X 1-1/4 ---SURG.& SDS ONLY--- (50EA/BX)